# Patient Record
Sex: FEMALE | Race: WHITE | Employment: OTHER | URBAN - METROPOLITAN AREA
[De-identification: names, ages, dates, MRNs, and addresses within clinical notes are randomized per-mention and may not be internally consistent; named-entity substitution may affect disease eponyms.]

---

## 2017-03-17 ENCOUNTER — HOSPITAL ENCOUNTER (OUTPATIENT)
Age: 42
Setting detail: SPECIMEN
Discharge: HOME | End: 2017-03-17
Payer: MEDICARE

## 2017-03-18 LAB
CULTURE: NORMAL
CULTURE: NORMAL
Lab: NORMAL
SPECIMEN DESCRIPTION: NORMAL
STATUS: NORMAL

## 2017-03-22 ENCOUNTER — HOSPITAL ENCOUNTER (OUTPATIENT)
Dept: GENERAL RADIOLOGY | Facility: CLINIC | Age: 42
Discharge: HOME | End: 2017-03-22
Payer: MEDICARE

## 2017-03-22 ENCOUNTER — HOSPITAL ENCOUNTER (OUTPATIENT)
Age: 42
Setting detail: SPECIMEN
Discharge: HOME | End: 2017-03-22
Payer: MEDICARE

## 2017-03-22 DIAGNOSIS — E66.01 MORBID OBESITY WITH BMI OF 45.0-49.9, ADULT (HCC): ICD-10-CM

## 2017-03-22 DIAGNOSIS — M25.552 LEFT HIP PAIN: ICD-10-CM

## 2017-03-22 DIAGNOSIS — E03.9 HYPOTHYROIDISM, UNSPECIFIED TYPE: ICD-10-CM

## 2017-03-22 DIAGNOSIS — R73.09 ABNORMAL GLUCOSE: ICD-10-CM

## 2017-03-22 DIAGNOSIS — E87.6 HYPOPOTASSEMIA: ICD-10-CM

## 2017-03-22 DIAGNOSIS — Z13.9 SCREENING: ICD-10-CM

## 2017-03-22 DIAGNOSIS — Z86.69 HX OF SEIZURE DISORDER: ICD-10-CM

## 2017-03-22 LAB
ABSOLUTE EOS #: 0.1 K/UL (ref 0–0.4)
ABSOLUTE LYMPH #: 2 K/UL (ref 1–4.8)
ABSOLUTE MONO #: 0.3 K/UL (ref 0.1–1.2)
ALBUMIN SERPL-MCNC: 3.8 G/DL (ref 3.5–5.2)
ALBUMIN/GLOBULIN RATIO: 1.2 (ref 1–2.5)
ALP BLD-CCNC: 59 U/L (ref 35–104)
ALT SERPL-CCNC: 10 U/L (ref 5–33)
ANION GAP SERPL CALCULATED.3IONS-SCNC: 13 MMOL/L (ref 9–17)
AST SERPL-CCNC: 16 U/L
BASOPHILS # BLD: 0 % (ref 0–2)
BASOPHILS ABSOLUTE: 0 K/UL (ref 0–0.2)
BILIRUB SERPL-MCNC: 0.37 MG/DL (ref 0.3–1.2)
BILIRUBIN DIRECT: <0.08 MG/DL
BILIRUBIN, INDIRECT: NORMAL MG/DL (ref 0–1)
BUN BLDV-MCNC: 13 MG/DL (ref 6–20)
BUN/CREAT BLD: ABNORMAL (ref 9–20)
CALCIUM SERPL-MCNC: 8.8 MG/DL (ref 8.6–10.4)
CHLORIDE BLD-SCNC: 106 MMOL/L (ref 98–107)
CHOLESTEROL/HDL RATIO: 3.3
CHOLESTEROL: 156 MG/DL
CO2: 22 MMOL/L (ref 20–31)
CREAT SERPL-MCNC: 1.02 MG/DL (ref 0.5–0.9)
DIFFERENTIAL TYPE: ABNORMAL
EOSINOPHILS RELATIVE PERCENT: 2 % (ref 1–4)
ESTIMATED AVERAGE GLUCOSE: 91 MG/DL
GFR AFRICAN AMERICAN: >60 ML/MIN
GFR NON-AFRICAN AMERICAN: >60 ML/MIN
GFR SERPL CREATININE-BSD FRML MDRD: ABNORMAL ML/MIN/{1.73_M2}
GFR SERPL CREATININE-BSD FRML MDRD: ABNORMAL ML/MIN/{1.73_M2}
GLOBULIN: NORMAL G/DL (ref 1.5–3.8)
GLUCOSE BLD-MCNC: 90 MG/DL (ref 70–99)
HBA1C MFR BLD: 4.8 % (ref 4–6)
HCT VFR BLD CALC: 40.4 % (ref 36–46)
HDLC SERPL-MCNC: 48 MG/DL
HEMOGLOBIN: 13.4 G/DL (ref 12–16)
HEPATITIS C ANTIBODY: NONREACTIVE
LDL CHOLESTEROL: 96 MG/DL (ref 0–130)
LYMPHOCYTES # BLD: 37 % (ref 24–44)
MCH RBC QN AUTO: 26.4 PG (ref 26–34)
MCHC RBC AUTO-ENTMCNC: 33.2 G/DL (ref 31–37)
MCV RBC AUTO: 79.4 FL (ref 80–100)
MONOCYTES # BLD: 6 % (ref 2–11)
PDW BLD-RTO: 14.2 % (ref 12.5–15.4)
PLATELET # BLD: 148 K/UL (ref 140–450)
PLATELET ESTIMATE: ABNORMAL
PMV BLD AUTO: 9.9 FL (ref 6–12)
POTASSIUM SERPL-SCNC: 4.2 MMOL/L (ref 3.7–5.3)
RBC # BLD: 5.09 M/UL (ref 4–5.2)
RBC # BLD: ABNORMAL 10*6/UL
SEG NEUTROPHILS: 55 % (ref 36–66)
SEGMENTED NEUTROPHILS ABSOLUTE COUNT: 2.9 K/UL (ref 1.8–7.7)
SODIUM BLD-SCNC: 141 MMOL/L (ref 135–144)
THYROXINE, FREE: 1.89 NG/DL (ref 0.93–1.7)
TOTAL PROTEIN: 6.9 G/DL (ref 6.4–8.3)
TRIGL SERPL-MCNC: 60 MG/DL
TSH SERPL DL<=0.05 MIU/L-ACNC: <0.01 MIU/L (ref 0.3–5)
VLDLC SERPL CALC-MCNC: NORMAL MG/DL (ref 1–30)
WBC # BLD: 5.4 K/UL (ref 3.5–11)
WBC # BLD: ABNORMAL 10*3/UL

## 2017-03-22 PROCEDURE — 73502 X-RAY EXAM HIP UNI 2-3 VIEWS: CPT

## 2017-03-23 LAB — HIV AG/AB: NONREACTIVE

## 2017-05-30 RX ORDER — TOPIRAMATE 50 MG/1
75 TABLET, FILM COATED ORAL 2 TIMES DAILY
Qty: 270 TABLET | Refills: 3 | Status: SHIPPED | OUTPATIENT
Start: 2017-05-30 | End: 2017-06-28 | Stop reason: SDUPTHER

## 2017-06-28 ENCOUNTER — OFFICE VISIT (OUTPATIENT)
Dept: NEUROLOGY | Age: 42
End: 2017-06-28
Payer: MEDICARE

## 2017-06-28 VITALS
HEART RATE: 71 BPM | DIASTOLIC BLOOD PRESSURE: 90 MMHG | HEIGHT: 61 IN | SYSTOLIC BLOOD PRESSURE: 125 MMHG | WEIGHT: 259 LBS | BODY MASS INDEX: 48.9 KG/M2

## 2017-06-28 DIAGNOSIS — R51.9 HEADACHE DISORDER: Primary | ICD-10-CM

## 2017-06-28 PROCEDURE — G8417 CALC BMI ABV UP PARAM F/U: HCPCS | Performed by: PSYCHIATRY & NEUROLOGY

## 2017-06-28 PROCEDURE — 99214 OFFICE O/P EST MOD 30 MIN: CPT | Performed by: PSYCHIATRY & NEUROLOGY

## 2017-06-28 PROCEDURE — 1036F TOBACCO NON-USER: CPT | Performed by: PSYCHIATRY & NEUROLOGY

## 2017-06-28 PROCEDURE — G8427 DOCREV CUR MEDS BY ELIG CLIN: HCPCS | Performed by: PSYCHIATRY & NEUROLOGY

## 2017-06-28 RX ORDER — LAMOTRIGINE 150 MG/1
150 TABLET ORAL DAILY
Qty: 90 TABLET | Refills: 3 | Status: SHIPPED | OUTPATIENT
Start: 2017-06-28 | End: 2018-06-21 | Stop reason: SDUPTHER

## 2017-06-28 RX ORDER — TOPIRAMATE 50 MG/1
TABLET, FILM COATED ORAL
Qty: 335 TABLET | Refills: 3 | Status: SHIPPED | OUTPATIENT
Start: 2017-06-28 | End: 2018-07-16 | Stop reason: SDUPTHER

## 2018-01-04 ENCOUNTER — HOSPITAL ENCOUNTER (OUTPATIENT)
Age: 43
Setting detail: SPECIMEN
Discharge: HOME | End: 2018-01-04
Payer: MEDICARE

## 2018-01-04 DIAGNOSIS — R73.09 ABNORMAL GLUCOSE: ICD-10-CM

## 2018-01-04 DIAGNOSIS — E03.9 HYPOTHYROIDISM, UNSPECIFIED TYPE: ICD-10-CM

## 2018-01-04 DIAGNOSIS — E23.0 HYPOPITUITARISM (HCC): ICD-10-CM

## 2018-01-04 LAB
ABSOLUTE EOS #: 0.13 K/UL (ref 0–0.44)
ABSOLUTE IMMATURE GRANULOCYTE: <0.03 K/UL (ref 0–0.3)
ABSOLUTE LYMPH #: 1.37 K/UL (ref 1.1–3.7)
ABSOLUTE MONO #: 0.54 K/UL (ref 0.1–1.2)
ALBUMIN SERPL-MCNC: 3.6 G/DL (ref 3.5–5.2)
ALBUMIN/GLOBULIN RATIO: 1.1 (ref 1–2.5)
ALP BLD-CCNC: 73 U/L (ref 35–104)
ALT SERPL-CCNC: 12 U/L (ref 5–33)
ANION GAP SERPL CALCULATED.3IONS-SCNC: 10 MMOL/L (ref 9–17)
AST SERPL-CCNC: 16 U/L
BASOPHILS # BLD: 0 % (ref 0–2)
BASOPHILS ABSOLUTE: 0.03 K/UL (ref 0–0.2)
BILIRUB SERPL-MCNC: 0.33 MG/DL (ref 0.3–1.2)
BILIRUBIN DIRECT: <0.08 MG/DL
BILIRUBIN, INDIRECT: NORMAL MG/DL (ref 0–1)
BUN BLDV-MCNC: 11 MG/DL (ref 6–20)
BUN/CREAT BLD: ABNORMAL (ref 9–20)
CALCIUM SERPL-MCNC: 8.9 MG/DL (ref 8.6–10.4)
CHLORIDE BLD-SCNC: 103 MMOL/L (ref 98–107)
CHOLESTEROL/HDL RATIO: 3
CHOLESTEROL: 143 MG/DL
CO2: 23 MMOL/L (ref 20–31)
CREAT SERPL-MCNC: 0.9 MG/DL (ref 0.5–0.9)
DIFFERENTIAL TYPE: ABNORMAL
EOSINOPHILS RELATIVE PERCENT: 2 % (ref 1–4)
GFR AFRICAN AMERICAN: >60 ML/MIN
GFR NON-AFRICAN AMERICAN: >60 ML/MIN
GFR SERPL CREATININE-BSD FRML MDRD: ABNORMAL ML/MIN/{1.73_M2}
GFR SERPL CREATININE-BSD FRML MDRD: ABNORMAL ML/MIN/{1.73_M2}
GLOBULIN: NORMAL G/DL (ref 1.5–3.8)
GLUCOSE FASTING: 100 MG/DL (ref 70–99)
HCT VFR BLD CALC: 43 % (ref 36.3–47.1)
HDLC SERPL-MCNC: 47 MG/DL
HEMOGLOBIN: 14 G/DL (ref 11.9–15.1)
IMMATURE GRANULOCYTES: 0 %
LDL CHOLESTEROL: 75 MG/DL (ref 0–130)
LYMPHOCYTES # BLD: 19 % (ref 24–43)
MCH RBC QN AUTO: 27.5 PG (ref 25.2–33.5)
MCHC RBC AUTO-ENTMCNC: 32.6 G/DL (ref 28.4–34.8)
MCV RBC AUTO: 84.5 FL (ref 82.6–102.9)
MONOCYTES # BLD: 7 % (ref 3–12)
PDW BLD-RTO: 12.6 % (ref 11.8–14.4)
PLATELET # BLD: 185 K/UL (ref 138–453)
PLATELET ESTIMATE: ABNORMAL
PMV BLD AUTO: 12 FL (ref 8.1–13.5)
POTASSIUM SERPL-SCNC: 3.8 MMOL/L (ref 3.7–5.3)
RBC # BLD: 5.09 M/UL (ref 3.95–5.11)
RBC # BLD: ABNORMAL 10*6/UL
SEG NEUTROPHILS: 72 % (ref 36–65)
SEGMENTED NEUTROPHILS ABSOLUTE COUNT: 5.28 K/UL (ref 1.5–8.1)
SODIUM BLD-SCNC: 136 MMOL/L (ref 135–144)
TOTAL PROTEIN: 6.9 G/DL (ref 6.4–8.3)
TRIGL SERPL-MCNC: 104 MG/DL
TSH SERPL DL<=0.05 MIU/L-ACNC: <0.01 MIU/L (ref 0.3–5)
VLDLC SERPL CALC-MCNC: NORMAL MG/DL (ref 1–30)
WBC # BLD: 7.4 K/UL (ref 3.5–11.3)
WBC # BLD: ABNORMAL 10*3/UL

## 2018-01-05 LAB
ESTIMATED AVERAGE GLUCOSE: 94 MG/DL
HBA1C MFR BLD: 4.9 % (ref 4–6)
THYROXINE, FREE: 2.07 NG/DL (ref 0.93–1.7)

## 2018-01-15 ENCOUNTER — HOSPITAL ENCOUNTER (OUTPATIENT)
Dept: GENERAL RADIOLOGY | Facility: CLINIC | Age: 43
Discharge: HOME | End: 2018-01-15
Payer: MEDICARE

## 2018-01-15 DIAGNOSIS — R07.81 RIB PAIN: ICD-10-CM

## 2018-01-15 PROCEDURE — 71046 X-RAY EXAM CHEST 2 VIEWS: CPT

## 2018-03-07 ENCOUNTER — HOSPITAL ENCOUNTER (OUTPATIENT)
Dept: GENERAL RADIOLOGY | Facility: CLINIC | Age: 43
Discharge: HOME | End: 2018-03-09
Payer: MEDICARE

## 2018-03-07 DIAGNOSIS — M54.9 UPPER BACK PAIN: ICD-10-CM

## 2018-03-07 PROCEDURE — 72074 X-RAY EXAM THORAC SPINE4/>VW: CPT

## 2018-05-08 ENCOUNTER — HOSPITAL ENCOUNTER (OUTPATIENT)
Dept: MAMMOGRAPHY | Facility: CLINIC | Age: 43
Discharge: HOME | End: 2018-05-10
Payer: MEDICARE

## 2018-05-08 DIAGNOSIS — Z12.39 SCREENING FOR BREAST CANCER: ICD-10-CM

## 2018-05-08 PROCEDURE — 77067 SCR MAMMO BI INCL CAD: CPT

## 2018-05-24 ENCOUNTER — HOSPITAL ENCOUNTER (OUTPATIENT)
Age: 43
Setting detail: SPECIMEN
Discharge: HOME | End: 2018-05-24
Payer: MEDICARE

## 2018-05-24 ENCOUNTER — HOSPITAL ENCOUNTER (OUTPATIENT)
Dept: GENERAL RADIOLOGY | Facility: CLINIC | Age: 43
Discharge: HOME | End: 2018-05-26
Payer: MEDICARE

## 2018-05-24 DIAGNOSIS — M79.671 RIGHT FOOT PAIN: ICD-10-CM

## 2018-05-24 LAB — URIC ACID: 5.2 MG/DL (ref 2.4–5.7)

## 2018-05-24 PROCEDURE — 73620 X-RAY EXAM OF FOOT: CPT

## 2018-06-21 RX ORDER — LAMOTRIGINE 150 MG/1
150 TABLET ORAL DAILY
Qty: 90 TABLET | Refills: 0 | Status: SHIPPED | OUTPATIENT
Start: 2018-06-21 | End: 2018-07-26 | Stop reason: SDUPTHER

## 2018-06-23 ENCOUNTER — HOSPITAL ENCOUNTER (OUTPATIENT)
Facility: CLINIC | Age: 43
Discharge: HOME | End: 2018-06-23
Payer: MEDICARE

## 2018-06-23 LAB
BUN BLDV-MCNC: 10 MG/DL (ref 6–20)
CHOLESTEROL, FASTING: 161 MG/DL
CHOLESTEROL/HDL RATIO: 3.4
CREAT SERPL-MCNC: 1.12 MG/DL (ref 0.5–0.9)
GFR AFRICAN AMERICAN: >60 ML/MIN
GFR NON-AFRICAN AMERICAN: 54 ML/MIN
GFR SERPL CREATININE-BSD FRML MDRD: ABNORMAL ML/MIN/{1.73_M2}
GFR SERPL CREATININE-BSD FRML MDRD: ABNORMAL ML/MIN/{1.73_M2}
HCT VFR BLD CALC: 41.7 % (ref 36.3–47.1)
HDLC SERPL-MCNC: 47 MG/DL
HEMOGLOBIN: 13.2 G/DL (ref 11.9–15.1)
LDL CHOLESTEROL: 96 MG/DL (ref 0–130)
MCH RBC QN AUTO: 27.1 PG (ref 25.2–33.5)
MCHC RBC AUTO-ENTMCNC: 31.7 G/DL (ref 28.4–34.8)
MCV RBC AUTO: 85.6 FL (ref 82.6–102.9)
NRBC AUTOMATED: 0 PER 100 WBC
PDW BLD-RTO: 13.7 % (ref 11.8–14.4)
PLATELET # BLD: 147 K/UL (ref 138–453)
PMV BLD AUTO: 11.8 FL (ref 8.1–13.5)
RBC # BLD: 4.87 M/UL (ref 3.95–5.11)
TRIGLYCERIDE, FASTING: 91 MG/DL
VLDLC SERPL CALC-MCNC: NORMAL MG/DL (ref 1–30)
WBC # BLD: 5.8 K/UL (ref 3.5–11.3)

## 2018-06-23 PROCEDURE — 85027 COMPLETE CBC AUTOMATED: CPT

## 2018-06-23 PROCEDURE — 84520 ASSAY OF UREA NITROGEN: CPT

## 2018-06-23 PROCEDURE — 36415 COLL VENOUS BLD VENIPUNCTURE: CPT

## 2018-06-23 PROCEDURE — 83036 HEMOGLOBIN GLYCOSYLATED A1C: CPT

## 2018-06-23 PROCEDURE — 83516 IMMUNOASSAY NONANTIBODY: CPT

## 2018-06-23 PROCEDURE — 82565 ASSAY OF CREATININE: CPT

## 2018-06-23 PROCEDURE — 80061 LIPID PANEL: CPT

## 2018-06-23 PROCEDURE — 83519 RIA NONANTIBODY: CPT

## 2018-06-24 LAB
ESTIMATED AVERAGE GLUCOSE: 97 MG/DL
HBA1C MFR BLD: 5 % (ref 4–6)

## 2018-06-26 LAB — ACETYLCHOLINE BINDING ANTIBODY: 0 NMOL/L (ref 0–0.4)

## 2018-06-27 LAB
SEND OUT REPORT: NORMAL
SEND OUT REPORT: NORMAL
TEST NAME: NORMAL
TEST NAME: NORMAL

## 2018-07-16 RX ORDER — TOPIRAMATE 50 MG/1
TABLET, FILM COATED ORAL
Qty: 105 TABLET | Refills: 0 | Status: SHIPPED | OUTPATIENT
Start: 2018-07-16 | End: 2018-07-26 | Stop reason: SDUPTHER

## 2018-07-20 ENCOUNTER — HOSPITAL ENCOUNTER (OUTPATIENT)
Dept: MRI IMAGING | Age: 43
Discharge: HOME | End: 2018-07-22
Payer: MEDICARE

## 2018-07-20 DIAGNOSIS — I63.9 BRAIN STEM INFARCTION (HCC): ICD-10-CM

## 2018-07-20 DIAGNOSIS — H51.8 SPASM OF CONJUGATE GAZE: ICD-10-CM

## 2018-07-20 DIAGNOSIS — C72.32: ICD-10-CM

## 2018-07-20 DIAGNOSIS — G70.00 MYASTHENIA GRAVIS WITHOUT EXACERBATION (HCC): ICD-10-CM

## 2018-07-20 DIAGNOSIS — E13.9 MATURITY ONSET DIABETES MELLITUS IN YOUNG (HCC): ICD-10-CM

## 2018-07-20 PROCEDURE — 6360000004 HC RX CONTRAST MEDICATION: Performed by: OPHTHALMOLOGY

## 2018-07-20 PROCEDURE — A9579 GAD-BASE MR CONTRAST NOS,1ML: HCPCS | Performed by: OPHTHALMOLOGY

## 2018-07-20 PROCEDURE — 70553 MRI BRAIN STEM W/O & W/DYE: CPT

## 2018-07-20 RX ADMIN — GADOTERIDOL 20 ML: 279.3 INJECTION, SOLUTION INTRAVENOUS at 07:53

## 2018-07-26 ENCOUNTER — OFFICE VISIT (OUTPATIENT)
Dept: NEUROLOGY | Age: 43
End: 2018-07-26
Payer: MEDICARE

## 2018-07-26 VITALS
HEART RATE: 77 BPM | HEIGHT: 61 IN | SYSTOLIC BLOOD PRESSURE: 134 MMHG | BODY MASS INDEX: 46.82 KG/M2 | WEIGHT: 248 LBS | DIASTOLIC BLOOD PRESSURE: 89 MMHG

## 2018-07-26 DIAGNOSIS — G40.909 SEIZURE DISORDER (HCC): Primary | ICD-10-CM

## 2018-07-26 PROCEDURE — G8417 CALC BMI ABV UP PARAM F/U: HCPCS | Performed by: PSYCHIATRY & NEUROLOGY

## 2018-07-26 PROCEDURE — G8427 DOCREV CUR MEDS BY ELIG CLIN: HCPCS | Performed by: PSYCHIATRY & NEUROLOGY

## 2018-07-26 PROCEDURE — 99214 OFFICE O/P EST MOD 30 MIN: CPT | Performed by: PSYCHIATRY & NEUROLOGY

## 2018-07-26 PROCEDURE — G8599 NO ASA/ANTIPLAT THER USE RNG: HCPCS | Performed by: PSYCHIATRY & NEUROLOGY

## 2018-07-26 PROCEDURE — 1036F TOBACCO NON-USER: CPT | Performed by: PSYCHIATRY & NEUROLOGY

## 2018-07-26 RX ORDER — TOPIRAMATE 50 MG/1
TABLET, FILM COATED ORAL
Qty: 270 TABLET | Refills: 3 | Status: SHIPPED | OUTPATIENT
Start: 2018-07-26 | End: 2018-08-16 | Stop reason: SDUPTHER

## 2018-07-26 RX ORDER — LAMOTRIGINE 150 MG/1
150 TABLET ORAL DAILY
Qty: 90 TABLET | Refills: 0 | Status: SHIPPED | OUTPATIENT
Start: 2018-07-26 | End: 2018-09-26 | Stop reason: SDUPTHER

## 2018-07-26 NOTE — PROGRESS NOTES
CONSTITUTIONAL Weight: absent, Appetite: absent, Fatigue: absent      HEENT Ears: deaf on left side, Eyes: glasses, Visual disturbance: absent   RESPIRATORY Shortness of breath: absent, Cough: present   CARDIOVASCULAR Chest pain: absent, Leg swelling :absent, Fainting: absent      GI Constipation: absent, Diarrhea: absent, Swallowing change: absent       Urinary frequency: present, Urinary urgency: absent   MUSCULOSKELETAL Neck pain: absent, Back pain: present, Stiffness: absent, Muscle pain: absent, Joint pain: absent, Restless Legs: absent   DERMATOLOGIC Hair loss: absent, Skin changes: absent   NEUROLOGIC Memory loss: absent, Confusion: absent, Seizures: absent Trouble walking or imbalance: absent, Dizziness: absent, Weakness: absent, Numbness: absent,  Tremor: absent, Spasm: absent, Speech difficulty: absent, Headache: absent, Light sensitivity: absent   PSYCHIATRIC Anxiety: absent, Hallucination: absent, Mood disorder: present, depression   HEMATOLOGIC Abnormal bleeding: absent, Anemia: absent, Clotting disorder: absent, Lymph gland changes: absent
Abdomen No masses   Extremities Extremities normal, atraumatic, no cyanosis or edema    Pulses 2+ and symmetric   Skin: Skin color, texture, turgor normal, no rashes or lesions      Neurological Exam:  Mental status  Alert and oriented; intact memory with no confusion, speech or language problems; no hallucinations or delusions    Cranial nerves  II - visual fields intact to confrontation    III, IV, VI - extra-ocular muscles full: no pupillary defect; no BENITO, no nystagmus, no ptosis V - normal facial sensation                                                          VIII - impaired hearing IX, X - symmetrical palate                                                            VII - normal facial symmetry XI - symmetrical shoulder shrug                                                 XII - midline tongue without atrophy or fasciculation   Motor function  Normal muscle bulk and tone; normal power 5/5, including fine motor movements    Sensory function Intact to touch, pin, vibration, proprioception    Cerebellar Intact fine motor movement. No involuntary movements or tremors    Reflex function Intact 2+ DTR and symmetric with no pathologic reflex or Babinski sign    Gait                  Normal station and gait      Thyroid profile Lab Results   Component Value Date    TSH <0.01 (L) 01/04/2018        Vit B12, folate No results found for: Maulik Davis     RPR No results found for: RPR     JAYY Lab Results   Component Value Date    JAYY NEGATIVE 10/23/2013        2 hour GTT Lab Results   Component Value Date    LABA1C 5.0 06/23/2018     Lab Results   Component Value Date    EAG 97 06/23/2018        ESR Lab Results   Component Value Date    SEDRATE 27 (H) 10/23/2013            Assessment and recommendations:  Headaches, history of seizures and history of optic glioma status post radiation therapy. The patient has been stable from neurological standpoint. No headaches and no further breakthrough seizures.   Recommend to

## 2018-08-16 RX ORDER — TOPIRAMATE 50 MG/1
TABLET, FILM COATED ORAL
Qty: 315 TABLET | Refills: 3 | Status: SHIPPED | OUTPATIENT
Start: 2018-08-16 | End: 2019-07-16 | Stop reason: SDUPTHER

## 2018-08-21 ENCOUNTER — HOSPITAL ENCOUNTER (OUTPATIENT)
Facility: CLINIC | Age: 43
Discharge: HOME | End: 2018-08-21
Payer: MEDICARE

## 2018-08-21 LAB
C-REACTIVE PROTEIN: 4.4 MG/L (ref 0–5)
SEDIMENTATION RATE, ERYTHROCYTE: 20 MM (ref 0–20)

## 2018-08-21 PROCEDURE — 86140 C-REACTIVE PROTEIN: CPT

## 2018-08-21 PROCEDURE — 36415 COLL VENOUS BLD VENIPUNCTURE: CPT

## 2018-08-21 PROCEDURE — 85651 RBC SED RATE NONAUTOMATED: CPT

## 2018-09-26 RX ORDER — LAMOTRIGINE 150 MG/1
150 TABLET ORAL DAILY
Qty: 90 TABLET | Refills: 0 | Status: SHIPPED | OUTPATIENT
Start: 2018-09-26 | End: 2019-01-28 | Stop reason: ALTCHOICE

## 2019-01-18 PROBLEM — M17.0 PRIMARY OSTEOARTHRITIS OF BOTH KNEES: Status: ACTIVE | Noted: 2019-01-18

## 2019-01-19 ENCOUNTER — HOSPITAL ENCOUNTER (OUTPATIENT)
Facility: CLINIC | Age: 44
Discharge: HOME | End: 2019-01-19
Payer: MEDICARE

## 2019-01-19 DIAGNOSIS — Z01.818 PREOP EXAMINATION: ICD-10-CM

## 2019-01-19 DIAGNOSIS — R73.09 ABNORMAL GLUCOSE: ICD-10-CM

## 2019-01-19 DIAGNOSIS — Z13.220 SCREENING CHOLESTEROL LEVEL: ICD-10-CM

## 2019-01-19 DIAGNOSIS — E23.0 HYPOPITUITARISM (HCC): ICD-10-CM

## 2019-01-19 LAB
ABSOLUTE EOS #: 0.16 K/UL (ref 0–0.44)
ABSOLUTE IMMATURE GRANULOCYTE: <0.03 K/UL (ref 0–0.3)
ABSOLUTE LYMPH #: 1.76 K/UL (ref 1.1–3.7)
ABSOLUTE MONO #: 0.41 K/UL (ref 0.1–1.2)
ALBUMIN SERPL-MCNC: 3.5 G/DL (ref 3.5–5.2)
ALBUMIN/GLOBULIN RATIO: 1.1 (ref 1–2.5)
ALP BLD-CCNC: 61 U/L (ref 35–104)
ALT SERPL-CCNC: 11 U/L (ref 5–33)
ANION GAP SERPL CALCULATED.3IONS-SCNC: 11 MMOL/L (ref 9–17)
AST SERPL-CCNC: 14 U/L
BASOPHILS # BLD: 1 % (ref 0–2)
BASOPHILS ABSOLUTE: 0.04 K/UL (ref 0–0.2)
BILIRUB SERPL-MCNC: 0.35 MG/DL (ref 0.3–1.2)
BILIRUBIN DIRECT: 0.08 MG/DL
BILIRUBIN, INDIRECT: 0.27 MG/DL (ref 0–1)
BUN BLDV-MCNC: 14 MG/DL (ref 6–20)
BUN/CREAT BLD: ABNORMAL (ref 9–20)
CALCIUM SERPL-MCNC: 8.6 MG/DL (ref 8.6–10.4)
CHLORIDE BLD-SCNC: 107 MMOL/L (ref 98–107)
CHOLESTEROL/HDL RATIO: 3.6
CHOLESTEROL: 152 MG/DL
CO2: 23 MMOL/L (ref 20–31)
CREAT SERPL-MCNC: 1.03 MG/DL (ref 0.5–0.9)
DIFFERENTIAL TYPE: ABNORMAL
EOSINOPHILS RELATIVE PERCENT: 2 % (ref 1–4)
GFR AFRICAN AMERICAN: >60 ML/MIN
GFR NON-AFRICAN AMERICAN: 59 ML/MIN
GFR SERPL CREATININE-BSD FRML MDRD: ABNORMAL ML/MIN/{1.73_M2}
GFR SERPL CREATININE-BSD FRML MDRD: ABNORMAL ML/MIN/{1.73_M2}
GLOBULIN: NORMAL G/DL (ref 1.5–3.8)
GLUCOSE FASTING: 89 MG/DL (ref 70–99)
HCT VFR BLD CALC: 41.8 % (ref 36.3–47.1)
HDLC SERPL-MCNC: 42 MG/DL
HEMOGLOBIN: 13.4 G/DL (ref 11.9–15.1)
IMMATURE GRANULOCYTES: 0 %
LDL CHOLESTEROL: 94 MG/DL (ref 0–130)
LYMPHOCYTES # BLD: 25 % (ref 24–43)
MCH RBC QN AUTO: 27.3 PG (ref 25.2–33.5)
MCHC RBC AUTO-ENTMCNC: 32.1 G/DL (ref 28.4–34.8)
MCV RBC AUTO: 85.1 FL (ref 82.6–102.9)
MONOCYTES # BLD: 6 % (ref 3–12)
NRBC AUTOMATED: 0 PER 100 WBC
PDW BLD-RTO: 12.4 % (ref 11.8–14.4)
PLATELET # BLD: 161 K/UL (ref 138–453)
PLATELET ESTIMATE: ABNORMAL
PMV BLD AUTO: 11.3 FL (ref 8.1–13.5)
POTASSIUM SERPL-SCNC: 4.1 MMOL/L (ref 3.7–5.3)
RBC # BLD: 4.91 M/UL (ref 3.95–5.11)
RBC # BLD: ABNORMAL 10*6/UL
SEG NEUTROPHILS: 66 % (ref 36–65)
SEGMENTED NEUTROPHILS ABSOLUTE COUNT: 4.57 K/UL (ref 1.5–8.1)
SODIUM BLD-SCNC: 141 MMOL/L (ref 135–144)
THYROXINE, FREE: 1.25 NG/DL (ref 0.93–1.7)
TOTAL PROTEIN: 6.6 G/DL (ref 6.4–8.3)
TRIGL SERPL-MCNC: 80 MG/DL
TSH SERPL DL<=0.05 MIU/L-ACNC: <0.01 MIU/L (ref 0.3–5)
VLDLC SERPL CALC-MCNC: NORMAL MG/DL (ref 1–30)
WBC # BLD: 7 K/UL (ref 3.5–11.3)
WBC # BLD: ABNORMAL 10*3/UL

## 2019-01-19 PROCEDURE — 84443 ASSAY THYROID STIM HORMONE: CPT

## 2019-01-19 PROCEDURE — 85025 COMPLETE CBC W/AUTO DIFF WBC: CPT

## 2019-01-19 PROCEDURE — 36415 COLL VENOUS BLD VENIPUNCTURE: CPT

## 2019-01-19 PROCEDURE — 83036 HEMOGLOBIN GLYCOSYLATED A1C: CPT

## 2019-01-19 PROCEDURE — 80061 LIPID PANEL: CPT

## 2019-01-19 PROCEDURE — 80048 BASIC METABOLIC PNL TOTAL CA: CPT

## 2019-01-19 PROCEDURE — 80076 HEPATIC FUNCTION PANEL: CPT

## 2019-01-19 PROCEDURE — 84439 ASSAY OF FREE THYROXINE: CPT

## 2019-01-20 LAB
ESTIMATED AVERAGE GLUCOSE: 91 MG/DL
HBA1C MFR BLD: 4.8 % (ref 4–6)

## 2019-01-28 ENCOUNTER — OFFICE VISIT (OUTPATIENT)
Dept: NEUROLOGY | Age: 44
End: 2019-01-28
Payer: MEDICARE

## 2019-01-28 VITALS
WEIGHT: 252.2 LBS | BODY MASS INDEX: 47.62 KG/M2 | DIASTOLIC BLOOD PRESSURE: 78 MMHG | HEART RATE: 69 BPM | HEIGHT: 61 IN | SYSTOLIC BLOOD PRESSURE: 137 MMHG

## 2019-01-28 DIAGNOSIS — G40.909 SEIZURE DISORDER (HCC): Primary | ICD-10-CM

## 2019-01-28 PROCEDURE — G8427 DOCREV CUR MEDS BY ELIG CLIN: HCPCS | Performed by: PSYCHIATRY & NEUROLOGY

## 2019-01-28 PROCEDURE — 99213 OFFICE O/P EST LOW 20 MIN: CPT | Performed by: PSYCHIATRY & NEUROLOGY

## 2019-01-28 PROCEDURE — 1036F TOBACCO NON-USER: CPT | Performed by: PSYCHIATRY & NEUROLOGY

## 2019-01-28 PROCEDURE — G8484 FLU IMMUNIZE NO ADMIN: HCPCS | Performed by: PSYCHIATRY & NEUROLOGY

## 2019-01-28 PROCEDURE — G8417 CALC BMI ABV UP PARAM F/U: HCPCS | Performed by: PSYCHIATRY & NEUROLOGY

## 2019-03-05 ENCOUNTER — HOSPITAL ENCOUNTER (OUTPATIENT)
Age: 44
Setting detail: SPECIMEN
Discharge: HOME | End: 2019-03-05
Payer: MEDICARE

## 2019-03-06 ENCOUNTER — HOSPITAL ENCOUNTER (OUTPATIENT)
Age: 44
Setting detail: SPECIMEN
Discharge: HOME | End: 2019-03-06
Payer: MEDICARE

## 2019-03-06 LAB
ABSOLUTE EOS #: 0.11 K/UL (ref 0–0.44)
ABSOLUTE IMMATURE GRANULOCYTE: <0.03 K/UL (ref 0–0.3)
ABSOLUTE LYMPH #: 2.28 K/UL (ref 1.1–3.7)
ABSOLUTE MONO #: 0.41 K/UL (ref 0.1–1.2)
ALBUMIN SERPL-MCNC: 3.8 G/DL (ref 3.5–5.2)
ALBUMIN/GLOBULIN RATIO: 1.2 (ref 1–2.5)
ALP BLD-CCNC: 66 U/L (ref 35–104)
ALT SERPL-CCNC: 12 U/L (ref 5–33)
ANION GAP SERPL CALCULATED.3IONS-SCNC: 13 MMOL/L (ref 9–17)
AST SERPL-CCNC: 15 U/L
BASOPHILS # BLD: 1 % (ref 0–2)
BASOPHILS ABSOLUTE: 0.03 K/UL (ref 0–0.2)
BILIRUB SERPL-MCNC: 0.36 MG/DL (ref 0.3–1.2)
BILIRUBIN DIRECT: <0.08 MG/DL
BILIRUBIN, INDIRECT: NORMAL MG/DL (ref 0–1)
BUN BLDV-MCNC: 11 MG/DL (ref 6–20)
BUN/CREAT BLD: ABNORMAL (ref 9–20)
CALCIUM SERPL-MCNC: 8.8 MG/DL (ref 8.6–10.4)
CHLORIDE BLD-SCNC: 110 MMOL/L (ref 98–107)
CHOLESTEROL/HDL RATIO: 3.7
CHOLESTEROL: 152 MG/DL
CO2: 21 MMOL/L (ref 20–31)
CREAT SERPL-MCNC: 1.01 MG/DL (ref 0.5–0.9)
DIFFERENTIAL TYPE: NORMAL
EOSINOPHILS RELATIVE PERCENT: 2 % (ref 1–4)
ESTIMATED AVERAGE GLUCOSE: 94 MG/DL
ESTRADIOL LEVEL: <5 PG/ML (ref 27–314)
GFR AFRICAN AMERICAN: >60 ML/MIN
GFR NON-AFRICAN AMERICAN: >60 ML/MIN
GFR SERPL CREATININE-BSD FRML MDRD: ABNORMAL ML/MIN/{1.73_M2}
GFR SERPL CREATININE-BSD FRML MDRD: ABNORMAL ML/MIN/{1.73_M2}
GLOBULIN: NORMAL G/DL (ref 1.5–3.8)
GLUCOSE BLD-MCNC: 93 MG/DL (ref 70–99)
HBA1C MFR BLD: 4.9 % (ref 4–6)
HCT VFR BLD CALC: 42.9 % (ref 36.3–47.1)
HDLC SERPL-MCNC: 41 MG/DL
HEMOGLOBIN: 13.7 G/DL (ref 11.9–15.1)
HPV SAMPLE: NORMAL
HPV, GENOTYPE 16: NOT DETECTED
HPV, GENOTYPE 18: NOT DETECTED
HPV, HIGH RISK OTHER: NOT DETECTED
HPV, INTERPRETATION: NORMAL
IMMATURE GRANULOCYTES: 0 %
LDL CHOLESTEROL: 92 MG/DL (ref 0–130)
LYMPHOCYTES # BLD: 39 % (ref 24–43)
MCH RBC QN AUTO: 27.2 PG (ref 25.2–33.5)
MCHC RBC AUTO-ENTMCNC: 31.9 G/DL (ref 28.4–34.8)
MCV RBC AUTO: 85.3 FL (ref 82.6–102.9)
MONOCYTES # BLD: 7 % (ref 3–12)
NRBC AUTOMATED: 0 PER 100 WBC
PDW BLD-RTO: 13 % (ref 11.8–14.4)
PLATELET # BLD: 170 K/UL (ref 138–453)
PLATELET ESTIMATE: NORMAL
PMV BLD AUTO: 12.4 FL (ref 8.1–13.5)
POTASSIUM SERPL-SCNC: 3.7 MMOL/L (ref 3.7–5.3)
PROGESTERONE LEVEL: <0.05 NG/ML
RBC # BLD: 5.03 M/UL (ref 3.95–5.11)
RBC # BLD: NORMAL 10*6/UL
SEG NEUTROPHILS: 51 % (ref 36–65)
SEGMENTED NEUTROPHILS ABSOLUTE COUNT: 3.01 K/UL (ref 1.5–8.1)
SODIUM BLD-SCNC: 144 MMOL/L (ref 135–144)
SPECIMEN DESCRIPTION: NORMAL
THYROXINE, FREE: 1.42 NG/DL (ref 0.93–1.7)
TOTAL PROTEIN: 7 G/DL (ref 6.4–8.3)
TRIGL SERPL-MCNC: 94 MG/DL
VLDLC SERPL CALC-MCNC: NORMAL MG/DL (ref 1–30)
WBC # BLD: 5.9 K/UL (ref 3.5–11.3)
WBC # BLD: NORMAL 10*3/UL

## 2019-03-19 LAB — CYTOLOGY REPORT: NORMAL

## 2019-04-01 ENCOUNTER — HOSPITAL ENCOUNTER (OUTPATIENT)
Dept: MAMMOGRAPHY | Facility: CLINIC | Age: 44
Discharge: HOME | End: 2019-04-03
Payer: MEDICARE

## 2019-04-01 DIAGNOSIS — M81.0 OSTEOPOROSIS, UNSPECIFIED OSTEOPOROSIS TYPE, UNSPECIFIED PATHOLOGICAL FRACTURE PRESENCE: ICD-10-CM

## 2019-04-03 ENCOUNTER — HOSPITAL ENCOUNTER (OUTPATIENT)
Dept: MAMMOGRAPHY | Facility: CLINIC | Age: 44
Discharge: HOME | End: 2019-04-05
Payer: MEDICARE

## 2019-04-03 PROCEDURE — 77080 DXA BONE DENSITY AXIAL: CPT

## 2019-05-09 ENCOUNTER — HOSPITAL ENCOUNTER (OUTPATIENT)
Dept: MAMMOGRAPHY | Facility: CLINIC | Age: 44
Discharge: HOME | End: 2019-05-11
Payer: MEDICARE

## 2019-05-09 DIAGNOSIS — Z12.39 SCREENING FOR BREAST CANCER: ICD-10-CM

## 2019-05-09 PROCEDURE — 77067 SCR MAMMO BI INCL CAD: CPT

## 2019-06-05 PROBLEM — F32.A DEPRESSION: Status: ACTIVE | Noted: 2019-06-05

## 2019-06-08 ENCOUNTER — HOSPITAL ENCOUNTER (OUTPATIENT)
Facility: CLINIC | Age: 44
Discharge: HOME | End: 2019-06-08
Payer: MEDICARE

## 2019-06-08 DIAGNOSIS — R73.09 ABNORMAL GLUCOSE: ICD-10-CM

## 2019-06-08 DIAGNOSIS — E89.0 POSTOPERATIVE HYPOTHYROIDISM: ICD-10-CM

## 2019-06-08 LAB
ANION GAP SERPL CALCULATED.3IONS-SCNC: 12 MMOL/L (ref 9–17)
BUN BLDV-MCNC: 12 MG/DL (ref 6–20)
BUN/CREAT BLD: ABNORMAL (ref 9–20)
CALCIUM SERPL-MCNC: 9 MG/DL (ref 8.6–10.4)
CHLORIDE BLD-SCNC: 107 MMOL/L (ref 98–107)
CO2: 24 MMOL/L (ref 20–31)
CREAT SERPL-MCNC: 0.99 MG/DL (ref 0.5–0.9)
GFR AFRICAN AMERICAN: >60 ML/MIN
GFR NON-AFRICAN AMERICAN: >60 ML/MIN
GFR SERPL CREATININE-BSD FRML MDRD: ABNORMAL ML/MIN/{1.73_M2}
GFR SERPL CREATININE-BSD FRML MDRD: ABNORMAL ML/MIN/{1.73_M2}
GLUCOSE FASTING: 95 MG/DL (ref 70–99)
POTASSIUM SERPL-SCNC: 4.5 MMOL/L (ref 3.7–5.3)
SODIUM BLD-SCNC: 143 MMOL/L (ref 135–144)
THYROXINE, FREE: 1.31 NG/DL (ref 0.93–1.7)

## 2019-06-08 PROCEDURE — 83036 HEMOGLOBIN GLYCOSYLATED A1C: CPT

## 2019-06-08 PROCEDURE — 80048 BASIC METABOLIC PNL TOTAL CA: CPT

## 2019-06-08 PROCEDURE — 36415 COLL VENOUS BLD VENIPUNCTURE: CPT

## 2019-06-08 PROCEDURE — 84439 ASSAY OF FREE THYROXINE: CPT

## 2019-06-09 LAB
ESTIMATED AVERAGE GLUCOSE: 91 MG/DL
HBA1C MFR BLD: 4.8 % (ref 4–6)

## 2019-07-16 RX ORDER — TOPIRAMATE 50 MG/1
TABLET, FILM COATED ORAL
Qty: 270 TABLET | Refills: 1 | Status: SHIPPED | OUTPATIENT
Start: 2019-07-16 | End: 2020-01-09 | Stop reason: SDUPTHER

## 2019-10-16 ENCOUNTER — HOSPITAL ENCOUNTER (OUTPATIENT)
Age: 44
Setting detail: SPECIMEN
Discharge: HOME | End: 2019-10-16
Payer: MEDICARE

## 2019-10-16 DIAGNOSIS — E23.0 HYPOPITUITARISM (HCC): ICD-10-CM

## 2019-10-16 DIAGNOSIS — E89.0 POSTOPERATIVE HYPOTHYROIDISM: ICD-10-CM

## 2019-10-16 DIAGNOSIS — R73.01 IMPAIRED FASTING GLUCOSE: ICD-10-CM

## 2019-10-16 LAB
ALBUMIN SERPL-MCNC: 4.1 G/DL (ref 3.5–5.2)
ALBUMIN/GLOBULIN RATIO: 1.1 (ref 1–2.5)
ALP BLD-CCNC: 82 U/L (ref 35–104)
ALT SERPL-CCNC: 11 U/L (ref 5–33)
ANION GAP SERPL CALCULATED.3IONS-SCNC: 14 MMOL/L (ref 9–17)
AST SERPL-CCNC: 17 U/L
BILIRUB SERPL-MCNC: 0.3 MG/DL (ref 0.3–1.2)
BUN BLDV-MCNC: 14 MG/DL (ref 6–20)
BUN/CREAT BLD: ABNORMAL (ref 9–20)
CALCIUM SERPL-MCNC: 9 MG/DL (ref 8.6–10.4)
CHLORIDE BLD-SCNC: 105 MMOL/L (ref 98–107)
CO2: 24 MMOL/L (ref 20–31)
CREAT SERPL-MCNC: 0.96 MG/DL (ref 0.5–0.9)
ESTIMATED AVERAGE GLUCOSE: 97 MG/DL
GFR AFRICAN AMERICAN: >60 ML/MIN
GFR NON-AFRICAN AMERICAN: >60 ML/MIN
GFR SERPL CREATININE-BSD FRML MDRD: ABNORMAL ML/MIN/{1.73_M2}
GFR SERPL CREATININE-BSD FRML MDRD: ABNORMAL ML/MIN/{1.73_M2}
GLUCOSE FASTING: 80 MG/DL (ref 70–99)
HBA1C MFR BLD: 5 % (ref 4–6)
POTASSIUM SERPL-SCNC: 3.8 MMOL/L (ref 3.7–5.3)
SODIUM BLD-SCNC: 143 MMOL/L (ref 135–144)
T3 FREE: 2.45 PG/ML (ref 2.02–4.43)
THYROXINE, FREE: 1.39 NG/DL (ref 0.93–1.7)
TOTAL PROTEIN: 7.9 G/DL (ref 6.4–8.3)
TSH SERPL DL<=0.05 MIU/L-ACNC: <0.01 MIU/L (ref 0.3–5)

## 2020-01-09 RX ORDER — TOPIRAMATE 50 MG/1
TABLET, FILM COATED ORAL
Qty: 270 TABLET | Refills: 1 | Status: SHIPPED | OUTPATIENT
Start: 2020-01-09 | End: 2020-06-15

## 2020-01-16 ENCOUNTER — OFFICE VISIT (OUTPATIENT)
Dept: NEUROLOGY | Age: 45
End: 2020-01-16
Payer: MEDICARE

## 2020-01-16 VITALS
WEIGHT: 263.2 LBS | DIASTOLIC BLOOD PRESSURE: 83 MMHG | SYSTOLIC BLOOD PRESSURE: 121 MMHG | HEIGHT: 61 IN | BODY MASS INDEX: 49.69 KG/M2 | HEART RATE: 72 BPM

## 2020-01-16 PROCEDURE — G8417 CALC BMI ABV UP PARAM F/U: HCPCS | Performed by: PSYCHIATRY & NEUROLOGY

## 2020-01-16 PROCEDURE — G8427 DOCREV CUR MEDS BY ELIG CLIN: HCPCS | Performed by: PSYCHIATRY & NEUROLOGY

## 2020-01-16 PROCEDURE — G8482 FLU IMMUNIZE ORDER/ADMIN: HCPCS | Performed by: PSYCHIATRY & NEUROLOGY

## 2020-01-16 PROCEDURE — 99213 OFFICE O/P EST LOW 20 MIN: CPT | Performed by: PSYCHIATRY & NEUROLOGY

## 2020-01-16 PROCEDURE — 1036F TOBACCO NON-USER: CPT | Performed by: PSYCHIATRY & NEUROLOGY

## 2020-01-16 NOTE — PROGRESS NOTES
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)     Fibromyalgia     Herpes zoster without mention of complication     Hypothyroidism     Migraine     Seizures ([facility] [address]     Sleep apnea      Past Surgical History:   Procedure Laterality Date    BRAIN BIOPSY      tumor biopsy    COCHLEAR IMPLANT Left     KNEE SURGERY      scope    TONSILLECTOMY       Social History     Socioeconomic History    Marital status: Single     Spouse name: Not on file    Number of children: Not on file    Years of education: Not on file    Highest education level: Not on file   Occupational History     Comment: disabled   Social Needs    Financial resource strain: Not on file    Food insecurity:     Worry: Not on file     Inability: Not on file    Transportation needs:     Medical: Not on file     Non-medical: Not on file   Tobacco Use    Smoking status: Never Smoker    Smokeless tobacco: Never Used   Substance and Sexual Activity    Alcohol use: No     Alcohol/week: 0.0 standard drinks     Comment: rare    Drug use: No    Sexual activity: Not Currently     Partners: Male   Lifestyle    Physical activity:     Days per week: Not on file     Minutes per session: Not on file    Stress: Not on file   Relationships    Social connections:     Talks on phone: Not on file     Gets together: Not on file     Attends Mosque service: Not on file     Active member of club or organization: Not on file     Attends meetings of clubs or organizations: Not on file     Relationship status: Not on file    Intimate partner violence:     Fear of current or ex partner: Not on file     Emotionally abused: Not on file Physically abused: Not on file     Forced sexual activity: Not on file   Other Topics Concern    Not on file   Social History Narrative    Not on file     Family History   Problem Relation Age of Onset   Otelia Elmer Migraines Mother     Cancer Mother 50        breast    Diabetes Father     High Blood Pressure Father     High Cholesterol Father     Migraines Sister     High Blood Pressure Maternal Grandmother     Cancer Paternal Grandmother 70        colon cancer    Cancer Paternal Uncle         esophageal     Allergies   Allergen Reactions    Bactrim [Sulfamethoxazole-Trimethoprim]     Bee Venom     Ciprofloxacin     Milk-Related Compounds Diarrhea     /83 (Site: Left Upper Arm, Position: Sitting)   Pulse 72   Ht 5' 1\" (1.549 m)   Wt 263 lb 3.2 oz (119.4 kg)   BMI 49.73 kg/m²         General Examination    General Alert, cooperative, no distress, appears stated age   Head Normocephalic, without obvious abnormality, atraumatic    Eyes Conjunctiva/corneas clear, Lymph nodes: Cervical, supraclavicular, and axillary nodes normal   Ears Nose Throat Normal external ear canals, both ears Nares normal, septum midline   Neck Supple, symmetrical, trachea midline, no adenopathy   Back Symmetric, no curvature, ROM normal, no CVA tenderness   Lungs Respirations unlabored   Chest Wall No deformity   Heart Regular rate and rhythm   Abdomen No masses   Extremities Extremities normal, atraumatic, no cyanosis or edema    Pulses 2+ and symmetric   Skin: Skin color, texture, turgor normal, no rashes or lesions      Neurological Exam:  Mental status  Alert and oriented; intact memory with no confusion, speech or language problems; no hallucinations or delusions    Cranial nerves  II - visual fields intact to confrontation    III, IV, VI - extra-ocular muscles full: no pupillary defect; no BENITO, no nystagmus, no ptosis V - normal facial sensation                                                          VIII - impaired

## 2020-02-26 PROBLEM — F33.1 MODERATE EPISODE OF RECURRENT MAJOR DEPRESSIVE DISORDER (HCC): Status: ACTIVE | Noted: 2020-02-26

## 2020-03-01 ENCOUNTER — HOSPITAL ENCOUNTER (OUTPATIENT)
Facility: CLINIC | Age: 45
Discharge: HOME | End: 2020-03-01
Payer: MEDICARE

## 2020-03-01 LAB
CHOLESTEROL/HDL RATIO: 3.2
CHOLESTEROL: 147 MG/DL
HDLC SERPL-MCNC: 46 MG/DL
LDL CHOLESTEROL: 86 MG/DL (ref 0–130)
TRIGL SERPL-MCNC: 77 MG/DL
TSH SERPL DL<=0.05 MIU/L-ACNC: 0.01 MIU/L (ref 0.3–5)
VLDLC SERPL CALC-MCNC: NORMAL MG/DL (ref 1–30)

## 2020-03-01 PROCEDURE — 84443 ASSAY THYROID STIM HORMONE: CPT

## 2020-03-01 PROCEDURE — 80061 LIPID PANEL: CPT

## 2020-03-01 PROCEDURE — 83036 HEMOGLOBIN GLYCOSYLATED A1C: CPT

## 2020-03-01 PROCEDURE — 36415 COLL VENOUS BLD VENIPUNCTURE: CPT

## 2020-03-02 LAB
ESTIMATED AVERAGE GLUCOSE: 97 MG/DL
HBA1C MFR BLD: 5 % (ref 4–6)

## 2020-06-16 RX ORDER — TOPIRAMATE 50 MG/1
TABLET, FILM COATED ORAL
Qty: 315 TABLET | Refills: 1 | Status: SHIPPED | OUTPATIENT
Start: 2020-06-16 | End: 2020-12-18

## 2020-07-15 ENCOUNTER — APPOINTMENT (OUTPATIENT)
Dept: GENERAL RADIOLOGY | Facility: CLINIC | Age: 45
End: 2020-07-15
Payer: MEDICARE

## 2020-07-15 ENCOUNTER — HOSPITAL ENCOUNTER (EMERGENCY)
Facility: CLINIC | Age: 45
Discharge: HOME | End: 2020-07-15
Attending: EMERGENCY MEDICINE
Payer: MEDICARE

## 2020-07-15 VITALS
SYSTOLIC BLOOD PRESSURE: 138 MMHG | BODY MASS INDEX: 49.84 KG/M2 | TEMPERATURE: 98.6 F | WEIGHT: 264 LBS | OXYGEN SATURATION: 95 % | DIASTOLIC BLOOD PRESSURE: 85 MMHG | RESPIRATION RATE: 24 BRPM | HEIGHT: 61 IN | HEART RATE: 75 BPM

## 2020-07-15 LAB
ABSOLUTE EOS #: 0.1 K/UL (ref 0–0.4)
ABSOLUTE IMMATURE GRANULOCYTE: NORMAL K/UL (ref 0–0.3)
ABSOLUTE LYMPH #: 1.9 K/UL (ref 1–4.8)
ABSOLUTE MONO #: 0.4 K/UL (ref 0.1–1.2)
ANION GAP SERPL CALCULATED.3IONS-SCNC: 8 MMOL/L (ref 9–17)
BASOPHILS # BLD: 1 % (ref 0–2)
BASOPHILS ABSOLUTE: 0 K/UL (ref 0–0.2)
BUN BLDV-MCNC: 11 MG/DL (ref 6–20)
BUN/CREAT BLD: ABNORMAL (ref 9–20)
CALCIUM SERPL-MCNC: 8.6 MG/DL (ref 8.6–10.4)
CHLORIDE BLD-SCNC: 105 MMOL/L (ref 98–107)
CO2: 25 MMOL/L (ref 20–31)
CREAT SERPL-MCNC: 1 MG/DL (ref 0.5–0.9)
D-DIMER QUANTITATIVE: 0.33 MG/L FEU
DIFFERENTIAL TYPE: NORMAL
EOSINOPHILS RELATIVE PERCENT: 2 % (ref 1–4)
GFR AFRICAN AMERICAN: >60 ML/MIN
GFR NON-AFRICAN AMERICAN: >60 ML/MIN
GFR SERPL CREATININE-BSD FRML MDRD: ABNORMAL ML/MIN/{1.73_M2}
GFR SERPL CREATININE-BSD FRML MDRD: ABNORMAL ML/MIN/{1.73_M2}
GLUCOSE BLD-MCNC: 104 MG/DL (ref 70–99)
HCT VFR BLD CALC: 40.2 % (ref 36–46)
HEMOGLOBIN: 13.1 G/DL (ref 12–16)
IMMATURE GRANULOCYTES: NORMAL %
LYMPHOCYTES # BLD: 28 % (ref 24–44)
MCH RBC QN AUTO: 27.9 PG (ref 26–34)
MCHC RBC AUTO-ENTMCNC: 32.7 G/DL (ref 31–37)
MCV RBC AUTO: 85.3 FL (ref 80–100)
MONOCYTES # BLD: 6 % (ref 2–11)
NRBC AUTOMATED: NORMAL PER 100 WBC
PDW BLD-RTO: 13.7 % (ref 12.5–15.4)
PLATELET # BLD: 176 K/UL (ref 140–450)
PLATELET ESTIMATE: NORMAL
PMV BLD AUTO: 8.4 FL (ref 6–12)
POTASSIUM SERPL-SCNC: 3.8 MMOL/L (ref 3.7–5.3)
RBC # BLD: 4.71 M/UL (ref 4–5.2)
RBC # BLD: NORMAL 10*6/UL
SEG NEUTROPHILS: 63 % (ref 36–66)
SEGMENTED NEUTROPHILS ABSOLUTE COUNT: 4.5 K/UL (ref 1.8–7.7)
SODIUM BLD-SCNC: 138 MMOL/L (ref 135–144)
TROPONIN INTERP: NORMAL
TROPONIN T: NORMAL NG/ML
TROPONIN, HIGH SENSITIVITY: 7 NG/L (ref 0–14)
WBC # BLD: 7 K/UL (ref 3.5–11)
WBC # BLD: NORMAL 10*3/UL

## 2020-07-15 PROCEDURE — 93005 ELECTROCARDIOGRAM TRACING: CPT | Performed by: EMERGENCY MEDICINE

## 2020-07-15 PROCEDURE — 85025 COMPLETE CBC W/AUTO DIFF WBC: CPT

## 2020-07-15 PROCEDURE — 36415 COLL VENOUS BLD VENIPUNCTURE: CPT

## 2020-07-15 PROCEDURE — 71045 X-RAY EXAM CHEST 1 VIEW: CPT

## 2020-07-15 PROCEDURE — 84484 ASSAY OF TROPONIN QUANT: CPT

## 2020-07-15 PROCEDURE — 99285 EMERGENCY DEPT VISIT HI MDM: CPT

## 2020-07-15 PROCEDURE — 85379 FIBRIN DEGRADATION QUANT: CPT

## 2020-07-15 PROCEDURE — 80048 BASIC METABOLIC PNL TOTAL CA: CPT

## 2020-07-15 ASSESSMENT — PAIN SCALES - GENERAL: PAINLEVEL_OUTOF10: 4

## 2020-07-16 ENCOUNTER — CARE COORDINATION (OUTPATIENT)
Dept: CARE COORDINATION | Age: 45
End: 2020-07-16

## 2020-07-16 LAB
EKG ATRIAL RATE: 70 BPM
EKG P AXIS: 27 DEGREES
EKG P-R INTERVAL: 132 MS
EKG Q-T INTERVAL: 422 MS
EKG QRS DURATION: 96 MS
EKG QTC CALCULATION (BAZETT): 455 MS
EKG R AXIS: 13 DEGREES
EKG T AXIS: 40 DEGREES
EKG VENTRICULAR RATE: 70 BPM

## 2020-07-16 NOTE — CARE COORDINATION
First outreach call to follow up with patient for follow up ED visit/COVID monitoring, no answer, left detailed vm to return call to this nurse. If no return call, ACM will attempt to contact again tomorrow.      Veda Golden, [facility], [place] RN Care Manager  [phone]
factors. Patient referred for LOOP Program:   Patient's preferred e-mail:  [email or web address]  Patient's preferred phone number: [phone]  Care Plan: self monitoring  LOOP Provider: Michelle/Lima    Patient states that she went to the Urgent Care and was sent to the ED because she was sob and they do no have xrays. She has hx anxiety and asthma. States she used her inhaler but did not help. Encouraged to call PCP for f/u appt.    Patient enrolled in Loop Program.

## 2020-12-18 NOTE — TELEPHONE ENCOUNTER
Pharmacy requesting refill of Topamax.       Medication active on med list yes      Date of last fill: 6/15/20  verified on 12/18/2020   verified by [facility] LPN      Date of last appointment 1/16/20    Next Visit Date:  1/20/2021

## 2020-12-20 RX ORDER — TOPIRAMATE 50 MG/1
TABLET, FILM COATED ORAL
Qty: 315 TABLET | Refills: 0 | Status: SHIPPED | OUTPATIENT
Start: 2020-12-20 | End: 2021-03-15

## 2021-01-15 ENCOUNTER — HOSPITAL ENCOUNTER (OUTPATIENT)
Dept: MAMMOGRAPHY | Facility: CLINIC | Age: 46
Discharge: HOME | End: 2021-01-17
Payer: MEDICARE

## 2021-01-15 DIAGNOSIS — Z12.31 SCREENING MAMMOGRAM, ENCOUNTER FOR: ICD-10-CM

## 2021-01-15 PROCEDURE — 77067 SCR MAMMO BI INCL CAD: CPT

## 2021-01-20 ENCOUNTER — OFFICE VISIT (OUTPATIENT)
Dept: NEUROLOGY | Age: 46
End: 2021-01-20
Payer: MEDICARE

## 2021-01-20 VITALS
HEART RATE: 83 BPM | BODY MASS INDEX: 51.77 KG/M2 | TEMPERATURE: 97.6 F | SYSTOLIC BLOOD PRESSURE: 118 MMHG | WEIGHT: 274.2 LBS | DIASTOLIC BLOOD PRESSURE: 81 MMHG | HEIGHT: 61 IN

## 2021-01-20 DIAGNOSIS — G40.909 SEIZURE DISORDER (HCC): Primary | ICD-10-CM

## 2021-01-20 PROCEDURE — G8417 CALC BMI ABV UP PARAM F/U: HCPCS | Performed by: PSYCHIATRY & NEUROLOGY

## 2021-01-20 PROCEDURE — 1036F TOBACCO NON-USER: CPT | Performed by: PSYCHIATRY & NEUROLOGY

## 2021-01-20 PROCEDURE — G8482 FLU IMMUNIZE ORDER/ADMIN: HCPCS | Performed by: PSYCHIATRY & NEUROLOGY

## 2021-01-20 PROCEDURE — 99214 OFFICE O/P EST MOD 30 MIN: CPT | Performed by: PSYCHIATRY & NEUROLOGY

## 2021-01-20 PROCEDURE — G8427 DOCREV CUR MEDS BY ELIG CLIN: HCPCS | Performed by: PSYCHIATRY & NEUROLOGY

## 2021-01-20 RX ORDER — INDOMETHACIN 25 MG/1
25 CAPSULE ORAL 2 TIMES DAILY WITH MEALS
Qty: 10 CAPSULE | Refills: 0 | Status: SHIPPED | OUTPATIENT
Start: 2021-01-20 | End: 2021-01-27 | Stop reason: SDUPTHER

## 2021-01-20 NOTE — PROGRESS NOTES
[facility]  [facility], [address] [place] [address], [address] [place] [address]  Ph: [phone] or [phone]  FAX: CHANA Pimentel is a 39 y.o. female who comes in today for a follow-up visit. The patient previously has a history of optic glioma status post radiation therapy. She has a history of headaches and epilepsy which are under control. At her last visit on 1/28/2019, the patient was continued on topiramate 75 mg/100 mg a day and lamotrigine 150 mg once a day. The patient reports being compliant with medications without side effects. The patient denies headache, seizure, mood change or vision change. Today, the patient reports sore spots on her scalp under the skin. She reports pain and they last all day. The sore spots come in in various spots. These sore spots have been causing pain for several months. Patient is also reporting new shaking episodes. She admits to sadness due to recent losses. She had a lot of pain when she previously obtained an MRI of the brain. The patient got a cochlear implant in August 2017. Her hearing is improved. The patient's last seizure was in 2000  Other comorbid conditions include history of fibromyalgia, hearing loss, history of optic glioma status post radiation treatment  Her most recent MRI of the brain was done in February 2013.                             REVIEW OF SYSTEMS     Constitutional Weight: present, Appetite: absent, Fatigue: absent   HEENT Visual disturbance: absent, Ears: normal   Respiratory Shortness of breath: absent, Cough: present   Cardiovascular Chest pain: absent, Leg swelling :absent   GI Constipation: absent, Diarrhea: absent, Swallowing change: absent    Urinary frequency: absent, Urinary urgency: absent,    Musculoskeletal Neck pain: present, Back pain: present, Stiffness: absent, Muscle pain: absent, Joint pain: present   Dermatological Hair loss: absent, Skin changes: absent   Neurological Memory Never     Active member of club or organization: Yes     Attends meetings of clubs or organizations: More than 4 times per year     Relationship status: Never     Intimate partner violence     Fear of current or ex partner: Not on file     Emotionally abused: Not on file     Physically abused: Not on file     Forced sexual activity: Not on file   Other Topics Concern    Not on file   Social History Narrative    Not on file     Family History   Problem Relation Age of Onset    Migraines Mother     Cancer Mother 50        breast    Diabetes Father     High Blood Pressure Father     High Cholesterol Father     Migraines Sister     High Blood Pressure Maternal Grandmother     Cancer Paternal Grandmother 70        colon cancer    Cancer Paternal Uncle         esophageal     Allergies   Allergen Reactions    Bactrim [Sulfamethoxazole-Trimethoprim]     Bee Venom     Ciprofloxacin     Milk-Related Compounds Diarrhea     /81   Pulse 83   Temp 97.6 °F (36.4 °C) (Temporal)   Ht 5' 1\" (1.549 m)   Wt 274 lb 3.2 oz (124.4 kg)   BMI 51.81 kg/m²         General Examination    General Alert, cooperative, no distress, appears stated age   Head Normocephalic, without obvious abnormality, atraumatic    Eyes Conjunctiva/corneas clear, Lymph nodes: Cervical, supraclavicular, and axillary nodes normal   Ears Nose Throat Normal external ear canals, both ears Nares normal, septum midline   Neck Supple, symmetrical, trachea midline, no adenopathy   Back Symmetric, no curvature, ROM normal, no CVA tenderness   Lungs Respirations unlabored   Chest Wall No deformity   Heart Regular rate and rhythm   Abdomen No masses   Extremities Extremities normal, atraumatic, no cyanosis or edema    Pulses 2+ and symmetric   Skin: Skin color, texture, turgor normal, no rashes or lesions      Neurological Exam:  Mental status  Alert and oriented; intact memory with no confusion, speech or language problems; no hallucinations or delusions    Cranial nerves  II - visual fields intact to confrontation    III, IV, VI - extra-ocular muscles full: no pupillary defect; no BENITO, no nystagmus, no ptosis V - normal facial sensation                                                          VIII - impaired hearing IX, X - symmetrical palate                                                            VII - normal facial symmetry XI - symmetrical shoulder shrug                                                 XII - midline tongue without atrophy or fasciculation   Motor function  Normal muscle bulk and tone; normal power 5/5, including fine motor movements    Sensory function Intact to touch, pin, vibration, proprioception    Cerebellar Intact fine motor movement. No involuntary movements or tremors    Reflex function Intact 2+ DTR and symmetric with no pathologic reflex or Babinski sign    Gait                  Normal station and gait      Thyroid profile Lab Results   Component Value Date    TSH 0.01 (L) 03/01/2020        Vit B12, folate No results found for: Usha Khalil     RPR No results found for: RPR     JAYY Lab Results   Component Value Date    JAYY NEGATIVE 10/23/2013        2 hour GTT Lab Results   Component Value Date    LABA1C 4.8 01/08/2021     Lab Results   Component Value Date    EAG 97 03/01/2020        ESR Lab Results   Component Value Date    SEDRATE 20 08/21/2018            Assessment and recommendations:  Headaches, history of seizures and history of optic glioma status post radiation therapy. The patient is neurologically stable. Her headaches are under remission for three years now. The patient also does not report having any breakthrough seizures. No mood change. Recommend to continue same dose topiramate 75/100 mg a day along with lamotrigine.   I discussed with her about possibility of stratifying the medication and tapering of lamotrigine however at this time the patient is satisfied with her symptoms control and intends to keep her current medications the same dose. I initiated the patient on indomethacin 25 mg twice daily for 5 days for suspected nummular headaches. Please call back to report effectiveness of medication. Medication compliance, side effects were discussed. Seizure precautions were discussed. She'll follow-up with me on a yearly basis. Scribe Attestation:   By signing my name below, Jermain To, attest that this documentation has been prepared under the direction and in the presence of Sisto Babinski MD.  Electronically Signed: Anika Shepard 1/20/2021 1:02 PM  Physician Attestation:  Gurjit Velásquez MD, personally performed the services described in this documentation. All medical record entries made by the scribe were at my direction and in my presence. I have reviewed the chart and discharge instructions (if applicable) and agree that the record reflects my personal performance and is accurate and complete.   Electronically Signed: Jamel Fitzgerald 1/20/2021 1:02 PM    Diplomate, American Board of Psychiatry and Neurology  Diplomate, American Board of Clinical Neurophysiology  Diplomate, American Board of Epilepsy

## 2021-01-25 NOTE — TELEPHONE ENCOUNTER
Patient phoned in to let Dr. Rosa M Colon know that the indomethacin (indocin) 25 mg capsule is working and is requesting a prescription for the medication.

## 2021-01-26 NOTE — TELEPHONE ENCOUNTER
Patient calling for refill of indomethacin (indocin) 25 mg capsule. Take 1 capsule by mouth 2 times daily (with meals) for 5 days.     Medication active on med list yes      Date of last fill: 1/20/2021  verified on 1/26/2021   verified by Anthony Hood      Date of last appointment 1/20/2021    Next Visit Date:  1/20/2022    Pt notified response time for refills is 24-48 hours

## 2021-01-26 NOTE — TELEPHONE ENCOUNTER
We can give the prescription of indomethacin 25 mg twice daily for next 5 days. I would not recommend the patient to take medication long-term.   Thank you

## 2021-01-27 RX ORDER — INDOMETHACIN 25 MG/1
25 CAPSULE ORAL 2 TIMES DAILY WITH MEALS
Qty: 10 CAPSULE | Refills: 0 | Status: SHIPPED | OUTPATIENT
Start: 2021-01-27 | End: 2022-01-20

## 2021-03-15 NOTE — TELEPHONE ENCOUNTER
Pharmacy requesting refill of Topamax 50 mg.      Medication active on med list yes      Date of last Rx: 12/20/2020 for 90 days  with 0 refills verified on 3/15/21   verified by CASSANDRA ZAMORANO      Date of last appointment 1/20/21    Next Visit Date:  1/20/22

## 2021-03-16 RX ORDER — TOPIRAMATE 50 MG/1
TABLET, FILM COATED ORAL
Qty: 315 TABLET | Refills: 2 | Status: SHIPPED | OUTPATIENT
Start: 2021-03-16 | End: 2021-12-06

## 2021-11-12 PROBLEM — N18.31 TYPE 2 DIABETES MELLITUS WITH STAGE 3A CHRONIC KIDNEY DISEASE, WITHOUT LONG-TERM CURRENT USE OF INSULIN (HCC): Status: ACTIVE | Noted: 2021-11-12

## 2021-11-12 PROBLEM — E11.22 TYPE 2 DIABETES MELLITUS WITH STAGE 3A CHRONIC KIDNEY DISEASE, WITHOUT LONG-TERM CURRENT USE OF INSULIN (HCC): Status: ACTIVE | Noted: 2021-11-12

## 2021-12-03 NOTE — TELEPHONE ENCOUNTER
Pharmacy requesting a  refill of [id number].       Medication active on med list yes      Date of last prescription 01/20/2021  with 2 refills verified on 12/03/2021    verified by ZACH SANFORD      Date of last appointment 01/20/2021    Next Visit Date:  1/20/2022

## 2021-12-06 RX ORDER — TOPIRAMATE 50 MG/1
TABLET, FILM COATED ORAL
Qty: 315 TABLET | Refills: 2 | Status: SHIPPED | OUTPATIENT
Start: 2021-12-06 | End: 2022-09-07

## 2022-01-20 ENCOUNTER — OFFICE VISIT (OUTPATIENT)
Dept: NEUROLOGY | Age: 47
End: 2022-01-20
Payer: MEDICARE

## 2022-01-20 VITALS
HEIGHT: 61 IN | WEIGHT: 272 LBS | HEART RATE: 77 BPM | DIASTOLIC BLOOD PRESSURE: 82 MMHG | SYSTOLIC BLOOD PRESSURE: 121 MMHG | BODY MASS INDEX: 51.35 KG/M2

## 2022-01-20 DIAGNOSIS — G40.909 SEIZURE DISORDER (HCC): Primary | ICD-10-CM

## 2022-01-20 PROCEDURE — G8427 DOCREV CUR MEDS BY ELIG CLIN: HCPCS | Performed by: PSYCHIATRY & NEUROLOGY

## 2022-01-20 PROCEDURE — 1036F TOBACCO NON-USER: CPT | Performed by: PSYCHIATRY & NEUROLOGY

## 2022-01-20 PROCEDURE — 99213 OFFICE O/P EST LOW 20 MIN: CPT | Performed by: PSYCHIATRY & NEUROLOGY

## 2022-01-20 PROCEDURE — G8417 CALC BMI ABV UP PARAM F/U: HCPCS | Performed by: PSYCHIATRY & NEUROLOGY

## 2022-01-20 PROCEDURE — G8484 FLU IMMUNIZE NO ADMIN: HCPCS | Performed by: PSYCHIATRY & NEUROLOGY

## 2022-01-20 RX ORDER — MELOXICAM 15 MG/1
TABLET ORAL
COMMUNITY
Start: 2021-12-13

## 2022-01-20 NOTE — PROGRESS NOTES
[facility]  [facility], [address] [place] [address], [address] [place] [address]  Ph: [phone] or [phone]  FAX: CHANA Pimentel is a 42 y.o. female who comes in today for a follow-up visit. The patient previously has a history of optic glioma status post radiation therapy. She has a history of headaches and epilepsy which are under control. At her last visit on 1/28/2019, the patient was continued on topiramate 75 mg/100 mg a day and lamotrigine 150 mg once a day. The patient reports being compliant with medications without side effects. The patient denies headache, seizure, mood change or vision change. Last visit, the patient reported sore spots on her scalp under the skin. She reports pain and they last all day. The sore spots come in in various spots. These sore spots have been causing pain for several months. Patient is also reporting new shaking episodes. She admits to sadness due to recent losses. She had a lot of pain when she previously obtained an MRI of the brain. The patient got a cochlear implant in August 2017. Her hearing is improved. Today the patient is accompanied by her . He states that she has suffered complete hearing loss in her \"good ear\" and requires an implant in that ear now. Patient has an implant in the other ear, which has restored 50% of her hearing. The patient explained that she would like to refrain from MRIs as much as possible because they interfered with her hearing implants. Last time patient was seen, she was on Topamax and seizure-free. Denies headaches as well. She admits to occasional sinus headaches but she explains that they are mild. She has been compliant with her current medications with no side effects.        The patient's last seizure was in 2000  Other comorbid conditions include history of fibromyalgia, hearing loss, history of optic glioma status post radiation treatment  Her most recent MRI of the brain was done in February 2013.                             REVIEW OF SYSTEMS     Constitutional Weight: present, Appetite: absent, Fatigue: absent   HEENT Visual disturbance: absent, Ears: normal   Respiratory Shortness of breath: absent, Cough: present   Cardiovascular Chest pain: absent, Leg swelling :absent   GI Constipation: absent, Diarrhea: absent, Swallowing change: absent    Urinary frequency: absent, Urinary urgency: absent,    Musculoskeletal Neck pain: present, Back pain: present, Stiffness: absent, Muscle pain: absent, Joint pain: present   Dermatological Hair loss: absent, Skin changes: absent   Neurological Memory loss: absent, Confusion: absent, Seizures: absent, Trouble walking or imbalance: absent, Dizziness: absent, Weakness: absent, Numbness: absent Tremor: absent, Spasm: absent, Speech difficulty: absent, Headache: absent, Light sensitivity: absent   Psychiatric Anxiety: absent, Hallucination: absent, Depression, present   Hematologic Abnormal bleeding/Bruising: absent, Anemia: absent       Past Medical History:   Diagnosis Date    Acquired acanthosis nigricans     Anemia     Arthritis     Brain cancer ([facility] [address]     Contact dermatitis and other eczema, due to unspecified cause     COVID-19 01/26/2021    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)     Fibromyalgia     Herpes zoster without mention of complication     Hypothyroidism     Migraine     Seizures (HCC)     Sleep apnea      Past Surgical History:   Procedure Laterality Date    BRAIN BIOPSY      tumor biopsy    COCHLEAR IMPLANT Left     KNEE SURGERY      scope    TONSILLECTOMY       Social History     Socioeconomic History    Marital status: Single     Spouse name: Not on file    Number of children: Not on file    Years of education: Not on file    Highest education level: Not on file   Occupational History     Comment: disabled   Tobacco Use    Smoking status: Never Smoker    Smokeless tobacco: Never Used   Vaping Use    Vaping Use: Never used   Substance and Sexual Activity    Alcohol use: No     Alcohol/week: 0.0 standard drinks     Comment: rare    Drug use: No    Sexual activity: Not Currently     Partners: Male   Other Topics Concern    Not on file   Social History Narrative    Not on file     Social Determinants of Health     Financial Resource Strain: Low Risk     Difficulty of Paying Living Expenses: Not hard at all   Food Insecurity: No Food Insecurity    Worried About [address] in the Last Year: Never true    [facility] [employer] in the Last Year: Never true   Transportation Needs:     Lack of Transportation (Medical): Not on file    Lack of Transportation (Non-Medical):  Not on file   Physical Activity:     Days of Exercise per Week: Not on file    Minutes of Exercise per Session: Not on file   Stress:     Feeling of Stress : Not on file   Social Connections:     Frequency of Communication with Friends and Family: Not on file    Frequency of Social Gatherings with Friends and Family: Not on file    Attends Synagogue Services: Not on file    Active Member of [address] [place] [address] or Organizations: Not on file    Attends Club or Organization Meetings: Not on file    Marital Status: Not on file   Intimate Partner Violence:     Fear of Current or Ex-Partner: Not on file    Emotionally Abused: Not on file    Physically Abused: Not on file    Sexually Abused: Not on file   Housing Stability:     Unable to Pay for Housing in the Last Year: Not on file    Number of [place] in the Last Year: Not on file    Unstable Housing in the Last Year: Not on file     Family History   Problem Relation Age of Onset    Migraines Mother     Cancer Mother 50        breast    Diabetes Father     High Blood Pressure Father     High Cholesterol Father     Migraines Sister     High Blood Pressure Maternal Grandmother     Cancer Paternal Grandmother 70        colon cancer    Cancer Paternal Uncle         esophageal Allergies   Allergen Reactions    Bactrim [Sulfamethoxazole-Trimethoprim]     Bee Venom     Ciprofloxacin     Milk-Related Compounds Diarrhea     /82 (Site: Left Upper Arm, Position: Sitting, Cuff Size: Medium Adult)   Pulse 77   Ht 5' 1\" (1.549 m)   Wt 272 lb (123.4 kg)   BMI 51.39 kg/m²         General Examination    General Alert, cooperative, no distress, appears stated age   Head Normocephalic, without obvious abnormality, atraumatic    Eyes Conjunctiva/corneas clear, Lymph nodes: Cervical, supraclavicular, and axillary nodes normal   Ears Nose Throat Normal external ear canals, both ears Nares normal, septum midline   Neck Supple, symmetrical, trachea midline, no adenopathy   Back Symmetric, no curvature, ROM normal, no CVA tenderness   Lungs Respirations unlabored   Chest Wall No deformity   Heart Regular rate and rhythm   Abdomen No masses   Extremities Extremities normal, atraumatic, no cyanosis or edema    Pulses 2+ and symmetric   Skin: Skin color, texture, turgor normal, no rashes or lesions      Neurological Exam:  Mental status  Alert and oriented; intact memory with no confusion, speech or language problems; no hallucinations or delusions    Cranial nerves  II - visual fields intact to confrontation    III, IV, VI - extra-ocular muscles full: no pupillary defect; no BENITO, no nystagmus, no ptosis V - normal facial sensation                                                          VIII - impaired hearing IX, X - symmetrical palate                                                            VII - normal facial symmetry XI - symmetrical shoulder shrug                                                 XII - midline tongue without atrophy or fasciculation   Motor function  Normal muscle bulk and tone; normal power 5/5, including fine motor movements    Sensory function Intact to touch, pin, vibration, proprioception    Cerebellar Intact fine motor movement.  No involuntary movements or tremors Reflex function Intact 2+ DTR and symmetric with no pathologic reflex or Babinski sign    Gait                  Normal station and gait      Thyroid profile Lab Results   Component Value Date    TSH 0.01 (L) 03/01/2020        Vit B12, folate No results found for: Poppy Barrera     RPR No results found for: RPR     JAYY Lab Results   Component Value Date    JAYY NEGATIVE 10/23/2013        2 hour GTT Lab Results   Component Value Date    LABA1C 4.8 01/08/2021     Lab Results   Component Value Date    EAG 97 03/01/2020        ESR Lab Results   Component Value Date    SEDRATE 20 08/21/2018            Assessment and recommendations:  Headaches, history of seizures and history of optic glioma status post radiation therapy. For the last year, the patient has been neurologically stable. She denies having any headache. No breakthrough seizures. Recommend continue topiramate 75 mg +100 mg a day along with lamotrigine 200 mg a day  She will follow-up with me on a yearly basis. Scribe Attestation:   IEtienne, am scribing for, and in the presence of, Dr Amrit Burgess. Electronically signed by: Silvana Tabor 1/20/22   Physician Attestation:  Bin Heath MD, personally performed the services described in this documentation. All medical record entries made by the scribe were at my direction and in my presence. I have reviewed the chart and discharge instructions (if applicable) and agree that the record reflects my personal performance and is accurate and complete.   Electronically Signed: Gerson Capone 1/20/2022 2:14 PM    Diplomate, American Board of Psychiatry and Neurology  Diplomate, American Board of Clinical Neurophysiology  Diplomate, American Board of Epilepsy

## 2022-02-22 ENCOUNTER — HOSPITAL ENCOUNTER (EMERGENCY)
Facility: CLINIC | Age: 47
Discharge: HOME | End: 2022-02-22
Attending: EMERGENCY MEDICINE
Payer: COMMERCIAL

## 2022-02-22 VITALS
TEMPERATURE: 98.2 F | OXYGEN SATURATION: 96 % | HEIGHT: 61 IN | BODY MASS INDEX: 51.35 KG/M2 | SYSTOLIC BLOOD PRESSURE: 153 MMHG | WEIGHT: 272 LBS | HEART RATE: 84 BPM | RESPIRATION RATE: 18 BRPM | DIASTOLIC BLOOD PRESSURE: 89 MMHG

## 2022-02-22 DIAGNOSIS — J01.00 ACUTE MAXILLARY SINUSITIS, RECURRENCE NOT SPECIFIED: Primary | ICD-10-CM

## 2022-02-22 PROCEDURE — 99283 EMERGENCY DEPT VISIT LOW MDM: CPT

## 2022-02-22 RX ORDER — AMOXICILLIN AND CLAVULANATE POTASSIUM 875; 125 MG/1; MG/1
1 TABLET, FILM COATED ORAL 2 TIMES DAILY
Qty: 20 TABLET | Refills: 0 | Status: SHIPPED | OUTPATIENT
Start: 2022-02-22 | End: 2022-03-04

## 2022-02-22 RX ORDER — OXYMETAZOLINE HYDROCHLORIDE 0.05 G/100ML
2 SPRAY NASAL 2 TIMES DAILY
Qty: 1 EACH | Refills: 3 | Status: SHIPPED | OUTPATIENT
Start: 2022-02-22 | End: 2022-03-24

## 2022-02-22 NOTE — ED PROVIDER NOTES
[facility]  [facility]  Phone: [address]      Pt Name: Vira Johnston  MRN: [id number]  [place] 1975  Date of evaluation: 2/22/2022    CHIEF COMPLAINT       Chief Complaint   Patient presents with    Sinusitis       HISTORY OF PRESENT ILLNESS    Vira Johnston is a 42 y.o. female who presents sinus pressure for the last several days. Patient states that she was seen at urgent care center and given Flonase nasal spray has not noticed any improvement. She states that she is not able to follow-up with her family doctor at this time. Noted no fevers or chills. States that she gets frequent sinus infections. REVIEW OF SYSTEMS     Constitutional: No fevers or chills   HEENT: No sore throat, , or earache see above eyes: No blurry vision or double vision no drainage   Cardiovascular: No chest pain or tachycardia   Respiratory: No wheezing or shortness of breath no cough   Gastrointestinal: No nausea, vomiting, diarrhea, constipation, or abdominal pain   : No hematuria or dysuria   Musculoskeletal: No swelling or pain   Skin: No rash   Neurological: No focal neurologic complaints, paresthesias, weakness, or headache   PAST MEDICAL HISTORY    has a past medical history of Acquired acanthosis nigricans, Anemia, Arthritis, Brain cancer ([address] Brain tumor ([address] Contact dermatitis and other eczema, due to unspecified cause, COVID-19, 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 SURGICAL HISTORY      has a past surgical history that includes Brain Biopsy; Tonsillectomy; knee surgery; Cochlear implant (Left); and Dental surgery.     CURRENT MEDICATIONS       Previous Medications    ALBUTEROL SULFATE  (90 BASE) MCG/ACT INHALER    INHALE 2 PUFFS BY MOUTH EVERY 6 HOURS AS NEEDED FOR WHEEZING    ARIPIPRAZOLE (ABILIFY) 20 MG TABLET    Take 1 tablet by mouth daily    BUPROPION (WELLBUTRIN XL) 150 MG EXTENDED RELEASE TABLET    Take 300 mg by mouth daily     CALCIUM-MAGNESIUM-VITAMIN D (CALCIUM 1200+D3 PO)    Take 2 tablets by mouth every morning    [employer] SENNA 8.6 MG TABLET    TAKE 1 TABLET BY MOUTH EVERY DAY    EPINEPHRINE (EPIPEN 2-MINNIE) 0.3 MG/0.3ML SOAJ INJECTION    Inject 0.3 mLs into the muscle once for 1 dose Use as directed for allergic reaction    ERGOCALCIFEROL (VITAMIN D2 PO)      Take 1.25 mg by mouth daily     GABAPENTIN (NEURONTIN) 100 MG CAPSULE    Start with one capsule daily may increase by one tablet up to at total of 3 capsules TID    HANDICAP PLACARD MISC    by Does not apply route EXPIRES IN 5 YEARS FROM ORIGINAL SCRIPT DATE    HYDROCORTISONE (CORTEF) 10 MG TABLET    Take 10 mg by mouth 2 times daily. INDOMETHACIN (INDOCIN) 25 MG CAPSULE    Take 1 capsule by mouth 2 times daily (with meals) for 5 days    KLOR-CON M10 10 MEQ EXTENDED RELEASE TABLET    TAKE 1 TABLET BY MOUTH TWICE A DAY    LAMOTRIGINE (LAMICTAL) 150 MG TABLET    Take 200 mg by mouth daily Pt Is Now Taking 200 MG    LEVOTHYROXINE (SYNTHROID) 150 MCG TABLET    Take 1 tablet by mouth every morning (before breakfast)    LORAZEPAM (ATIVAN) 1 MG TABLET    1 mg daily as needed. MELOXICAM (MOBIC) 15 MG TABLET        METFORMIN (GLUCOPHAGE) 500 MG TABLET    TAKE 1 TABLET BY MOUTH TWICE A DAY WITH MEALS    NYSTATIN (MYCOSTATIN) 030541 UNIT/GM CREAM    APPLY TO AFFECTED AREA TWICE A DAY    SERTRALINE (ZOLOFT) 100 MG TABLET    Take 150 mg by mouth daily     SUMATRIPTAN (IMITREX) 100 MG TABLET    TAKE 1 TABLET AT ONSET OFHEADACHE,MAY REPEAT 2 HOURS LATER. MAX 2/DAY    TOPIRAMATE (TOPAMAX) 50 MG TABLET    TAKE 1 AND 1/2 TABLETS IN THE MORNING AND 2 TABLETS AT NIGHT. ALLERGIES     is allergic to bactrim [sulfamethoxazole-trimethoprim], bee venom, ciprofloxacin, and milk-related compounds.     FAMILY HISTORY     She indicated that her mother is . She indicated that her father is alive. She indicated that her sister is alive. She indicated that the status of her maternal grandmother is unknown. She indicated that the status of her paternal grandmother is unknown. She indicated that her paternal uncle is . family history includes Cancer in her paternal uncle; Cancer (age of onset: 50) in her mother; Cancer (age of onset: 70) in her paternal grandmother; Diabetes in her father; High Blood Pressure in her father and maternal grandmother; High Cholesterol in her father; Migraines in her mother and sister. SOCIAL HISTORY      reports that she has never smoked. She has never used smokeless tobacco. She reports current alcohol use. She reports that she does not use drugs. PHYSICAL EXAM       ED Triage Vitals   BP Temp Temp src Pulse Resp SpO2 Height Weight   -- -- -- -- -- -- -- --     Constitutional: Alert, oriented x3, nontoxic, answering questions appropriately, acting properly for age, in no acute distress   HEENT: Extraocular muscles intact, mucous membranes moist. Pupils equal, round, reactive to light, TMs clear with serous otitis bilaterally, no posterior pharyngeal erythema or exudates. Mild pain on palpation of the frontal and maxillary sinuses bilaterally  Lungs are clear to auscultation  Neck: Trachea midline, supple without lymphadenopathy, no posterior midline neck tenderness to palpation   Musculoskeletal: No extremity pain or swelling   Neurologic: Moving all 4 extremities without difficulty   Skin: Warm and dry       DIFFERENTIAL DIAGNOSIS/ MDM:         DIAGNOSTIC RESULTS     EKG: All EKG's are interpreted by the Emergency Department Physician who either signs or Co-signs this chart in the absence of a cardiologist.        Not indicated unless otherwise documented above    LABS:  No results found for this visit on 22.     Not indicated unless otherwise documented above    RADIOLOGY:   I reviewed the radiologist interpretations:    No orders to display       Not indicated unless otherwise documented above    EMERGENCY DEPARTMENT COURSE:     The patient was given the following medications:  Orders Placed This Encounter   Medications    amoxicillin-clavulanate (AUGMENTIN) 875-125 MG per tablet     Sig: Take 1 tablet by mouth 2 times daily for 10 days     Dispense:  20 tablet     Refill:  0    oxymetazoline (12 HOUR NASAL SPRAY) 0.05 % nasal spray     Si sprays by Nasal route 2 times daily     Dispense:  1 each     Refill:  3        Vitals:   -------------------------  BP (!) 153/89   Pulse 84   Temp 98.2 °F (36.8 °C) (Oral)   Resp 18   Ht 5' 1\" (1.549 m)   Wt 123.4 kg (272 lb)   SpO2 96%   BMI 51.39 kg/m²         I have reviewed the disposition diagnosis with the patient and or their family/guardian. I have answered their questions and given discharge instructions. They voiced understanding of these instructions and did not have any furtherquestions or complaints. CRITICAL CARE:    None    CONSULTS:    None    PROCEDURES:    None      OARRS Report if indicated             FINAL IMPRESSION      1.  Acute maxillary sinusitis, recurrence not specified          DISPOSITION/PLAN   DISPOSITION Decision To Discharge 2022 09:13:49 AM        CONDITION ON DISPOSITION: STABLE       PATIENT REFERRED TO:  Lizbeth Gillette DO  [address] [place] [address]  [phone]    In 3 days  If symptoms worsen      DISCHARGE MEDICATIONS:  New Prescriptions    AMOXICILLIN-CLAVULANATE (AUGMENTIN) 875-125 MG PER TABLET    Take 1 tablet by mouth 2 times daily for 10 days    OXYMETAZOLINE (12 HOUR NASAL SPRAY) 0.05 % NASAL SPRAY    2 sprays by Nasal route 2 times daily       (Please note that portions of thisnote were completed with a voice recognition program.  Efforts were made to edit the dictations but occasionally words are mis-transcribed.)    Barbara Eric MD,, MD  Attending Emergency Physician        Alicia Atkinson Colleen Pino MD  02/22/22 [id number]

## 2022-04-11 ENCOUNTER — HOSPITAL ENCOUNTER (EMERGENCY)
Facility: CLINIC | Age: 47
Discharge: HOME | End: 2022-04-11
Attending: EMERGENCY MEDICINE
Payer: MEDICARE

## 2022-04-11 VITALS
DIASTOLIC BLOOD PRESSURE: 87 MMHG | WEIGHT: 272 LBS | TEMPERATURE: 98.2 F | SYSTOLIC BLOOD PRESSURE: 112 MMHG | BODY MASS INDEX: 51.35 KG/M2 | HEART RATE: 76 BPM | RESPIRATION RATE: 14 BRPM | OXYGEN SATURATION: 100 % | HEIGHT: 61 IN

## 2022-04-11 DIAGNOSIS — H57.89 EYE IRRITATION: Primary | ICD-10-CM

## 2022-04-11 PROCEDURE — 99282 EMERGENCY DEPT VISIT SF MDM: CPT

## 2022-04-11 RX ORDER — OLOPATADINE HYDROCHLORIDE 1 MG/ML
1 SOLUTION/ DROPS OPHTHALMIC 2 TIMES DAILY
Qty: 5 ML | Refills: 0 | Status: SHIPPED | OUTPATIENT
Start: 2022-04-11 | End: 2022-05-11

## 2022-04-11 ASSESSMENT — ENCOUNTER SYMPTOMS
EYE DISCHARGE: 1
EYE REDNESS: 0
GASTROINTESTINAL NEGATIVE: 1
RESPIRATORY NEGATIVE: 1
EYE PAIN: 0
EYE ITCHING: 1

## 2022-04-11 NOTE — ED PROVIDER NOTES
[address] ED  eMERGENCY dEPARTMENT eNCOUnter   Independent Attestation     Pt Name: Reno Nobles  MRN: [id number]  [place] 1975  Date of evaluation: 4/11/22       Reno Nobles is a 42 y.o. female who presents with Eye Problem (pussy and crusty in the mornings for over a week )        Based on the medical record, the care appears appropriate. I was personally available for consultation in the Emergency Department.     Jesu Watt DO  Attending Emergency  Physician                 Jesu Watt DO  04/11/22 1116

## 2022-04-11 NOTE — ED PROVIDER NOTES
[facility]  [facility]  Phone: [phone]        Pt Name: Gila Sy  MRN: [id number]  [place] 1975  Date of evaluation: 22    [address] [place] [address]       Chief Complaint   Patient presents with    Eye Problem     pussy and crusty in the mornings for over a week        HISTORY OF PRESENT ILLNESS (Location/Symptom, Timing/Onset, Context/Setting, Quality, Duration, Modifying Factors, Severity)      Gila Sy is a 42 y.o. female with no pertinent PMH who presents to the ED via private auto with bilateral eye drainage when she wakes up over the last week. She denies any fever chills, headache, dizziness, vision changes, chest pain, shortness of breath, abdominal pain, nausea vomiting diarrhea. States that she wakes up with drainage in both eyes and does improve throughout the day. She denies being around anybody that is sick or any recent illness. She does not take any medication for symptoms. She states that nothing makes her symptoms better or worse at this time. PAST MEDICAL / SURGICAL / SOCIAL / FAMILY HISTORY     PMH:  has a past medical history of Acquired acanthosis nigricans, Anemia, Arthritis, Brain cancer ([address] Brain tumor ([address] Contact dermatitis and other eczema, due to unspecified cause, COVID-19, 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 Surgical History:  has a past surgical history that includes Brain Biopsy; Tonsillectomy; knee surgery; Cochlear implant (Left); and Dental surgery. Social History:  reports that she has never smoked. She has never used smokeless tobacco. She reports current alcohol use. She reports that she does not use drugs. Family History: She indicated that her mother is . She indicated that her father is alive. She indicated that her sister is alive.  She indicated that the status of her maternal grandmother is unknown. She indicated that the status of her paternal grandmother is unknown. She indicated that her paternal uncle is . family history includes Cancer in her paternal uncle; Cancer (age of onset: 50) in her mother; Cancer (age of onset: 70) in her paternal grandmother; Diabetes in her father; High Blood Pressure in her father and maternal grandmother; High Cholesterol in her father; Migraines in her mother and sister. Psychiatric History: None    Allergies: Bactrim [sulfamethoxazole-trimethoprim], Bee venom, Ciprofloxacin, and Milk-related compounds    Home Medications:   Prior to Admission medications    Medication Sig Start Date End Date Taking? Authorizing Provider   olopatadine (PATADAY) 0.1 % ophthalmic solution Place 1 drop into both eyes 2 times daily 22 Yes ADELA Morrison - CNP   [facility] SENNA 8.6 MG tablet TAKE 1 TABLET BY MOUTH EVERY DAY 3/21/22   Sue Choi DO   KLOR-CON M10 10 MEQ extended release tablet TAKE 1 TABLET BY MOUTH TWICE A DAY 22   Talia Peña DO   meloxicam (MOBIC) 15 MG tablet  21   Historical Provider, MD   topiramate (TOPAMAX) 50 MG tablet TAKE 1 AND 1/2 TABLETS IN THE MORNING AND 2 TABLETS AT NIGHT.  21   Susy Nava MD   gabapentin (NEURONTIN) 100 MG capsule Start with one capsule daily may increase by one tablet up to at total of 3 capsules TID 21   Historical Provider, MD   nystatin (MYCOSTATIN) 591874 UNIT/GM cream APPLY TO AFFECTED AREA TWICE A DAY 10/25/21   Prieto Choi DO   metFORMIN (GLUCOPHAGE) 500 MG tablet TAKE 1 TABLET BY MOUTH TWICE A DAY WITH MEALS 6/10/21   Talia Peña DO   Handicap Placard [address] [facility] by Does not apply route EXPIRES IN 5 YEARS FROM ORIGINAL SCRIPT DATE 21   Talia Peña DO   indomethacin (INDOCIN) 25 MG capsule Take 1 capsule by mouth 2 times daily (with meals) for 5 days 21  Susy Nava MD   buPROPion (WELLBUTRIN XL) 150 MG extended release tablet Take 300 mg by mouth daily  12/16/20   Historical Provider, MD   ARIPiprazole (ABILIFY) 20 MG tablet Take 1 tablet by mouth daily 11/23/20   Historical Provider, MD   levothyroxine (SYNTHROID) 150 MCG tablet Take 1 tablet by mouth every morning (before breakfast) 11/20/20   Historical Provider, MD   albuterol sulfate  (90 Base) MCG/ACT inhaler INHALE 2 PUFFS BY MOUTH EVERY 6 HOURS AS NEEDED FOR WHEEZING 7/13/20   Adele Choi DO   EPINEPHrine (EPIPEN 2-MINNIE) 0.3 MG/0.3ML SOAJ injection Inject 0.3 mLs into the muscle once for 1 dose Use as directed for allergic reaction 7/8/20 1/20/22  Samara Barahona DO   lamoTRIgine (LAMICTAL) 150 MG tablet Take 200 mg by mouth daily Pt Is Now Taking 200 MG    Historical Provider, MD   Calcium-Magnesium-Vitamin D (CALCIUM 1200+D3 PO) Take 2 tablets by mouth every morning    Historical Provider, MD   LORazepam (ATIVAN) 1 MG tablet 1 mg daily as needed. 1/29/16   Historical Provider, MD   sertraline (ZOLOFT) 100 MG tablet Take 150 mg by mouth daily     Historical Provider, MD   SUMAtriptan (IMITREX) 100 MG tablet TAKE 1 TABLET AT ONSET OFHEADACHE,MAY REPEAT 2 HOURS LATER. MAX 2/DAY 1/22/15   Emory Simpson MD   Ergocalciferol (VITAMIN D2 PO)   Take 1.25 mg by mouth daily     Historical Provider, MD   hydrocortisone (CORTEF) 10 MG tablet Take 10 mg by mouth 2 times daily. Historical Provider, MD       REVIEW OF SYSTEMS  (2-9 systems for level 4, 10 ormore for level 5)      Review of Systems   Constitutional: Negative. HENT: Negative. Eyes: Positive for discharge and itching. Negative for pain, redness and visual disturbance. Respiratory: Negative. Cardiovascular: Negative. Gastrointestinal: Negative. Endocrine: Negative. Genitourinary: Negative. Musculoskeletal: Negative. Skin: Negative. Neurological: Negative. All other systems negative except as marked.      PHYSICAL EXAM  (up to 7 for level 4, 8 or more for level 5)      INITIAL VITALS:  height is 5' 1\" (1.549 m) and weight is 123.4 kg (272 lb). Her oral temperature is 98.2 °F (36.8 °C). Her blood pressure is 112/87 and her pulse is 76. Her respiration is 14 and oxygen saturation is 100%. Vital signs reviewed. Physical Exam  Constitutional:       General: She is not in acute distress. Appearance: Normal appearance. She is normal weight. She is not ill-appearing or toxic-appearing. HENT:      Head: Normocephalic and atraumatic. Nose: Nose normal. No congestion or rhinorrhea. Mouth/Throat:      Mouth: Mucous membranes are moist.      Pharynx: Oropharynx is clear. Eyes:      General: Lids are normal. No scleral icterus. Right eye: No discharge. Left eye: No discharge. Extraocular Movements: Extraocular movements intact. Conjunctiva/sclera: Conjunctivae normal.      Pupils: Pupils are equal, round, and reactive to light. Neck:      Trachea: No tracheal deviation. Cardiovascular:      Rate and Rhythm: Normal rate and regular rhythm. Pulses: Normal pulses. Heart sounds: Normal heart sounds. Pulmonary:      Breath sounds: Normal breath sounds. Abdominal:      General: Abdomen is flat. There is no distension. Palpations: Abdomen is soft. Tenderness: There is no abdominal tenderness. Musculoskeletal:      Cervical back: Neck supple. Right lower leg: No edema. Left lower leg: No edema. Skin:     General: Skin is warm and dry. Capillary Refill: Capillary refill takes less than 2 seconds. Neurological:      Mental Status: She is alert. Psychiatric:         Mood and Affect: Mood normal.         Behavior: Behavior normal.           DIFFERENTIAL DIAGNOSIS / MDM     After my physical exam, there is no evidence of dactylitis, foreign body, hordeolum. Patient's eye exam is negative.   I do believe patient possibly has some allergic conjunctivitis that she states that she does get seasonal allergies occasionally. Plan discharge patient home to follow-up with her PCP within 1 day. We will also start the patient on Pataday eyedrops for comfort. Instructed patient to return to the ER with any worsening symptoms, fevers or chills, chest pain, shortness of breath, abdominal pain, vision changes. Patient is agreement to this plan at this time. All questions and concerns were answered at this time. The patient understands that at this time there is no evidence for a more malignant underlying process, but the patient also understands that early in the process of an illness or injury, an emergency department workup can be falsely reassuring. Routine discharge counseling was given, and the patient understands that worsening, changing or persistent symptoms should prompt an immediate call or follow up with their primary physician or return to the emergency department. The importance of appropriate follow up was also discussed. I have reviewed the disposition diagnosis with the patient and or their family/guardian. I have answered their questions and given discharge instructions. They voiced understanding of these instructions and did not have any further questions or complaints. PLAN (LABS / IMAGING / EKG):  No orders of the defined types were placed in this encounter. MEDICATIONS ORDERED:  Orders Placed This Encounter   Medications    olopatadine (PATADAY) 0.1 % ophthalmic solution     Sig: Place 1 drop into both eyes 2 times daily     Dispense:  5 mL     Refill:  0       Controlled Substances Monitoring:     DIAGNOSTIC RESULTS     EKG: All EKG's are interpreted by the Emergency Department Physician who either signs or Co-signs this chart in the absenceof a cardiologist.        RADIOLOGY: All images are read by the radiologist and their interpretations are reviewed. No orders to display       No results found.     LABS:  No results found for this visit on 04/11/22. EMERGENCY DEPARTMENT COURSE           Vitals:    Vitals:    04/11/22 1111   BP: 112/87   Pulse: 76   Resp: 14   Temp: 98.2 °F (36.8 °C)   TempSrc: Oral   SpO2: 100%   Weight: 123.4 kg (272 lb)   Height: 5' 1\" (1.549 m)     -------------------------  BP: 112/87, Temp: 98.2 °F (36.8 °C), Pulse: 76, Resp: 14      RE-EVALUATION:  See ED Course notes above. CONSULTS:  None    PROCEDURES:  None    FINAL IMPRESSION      1. Eye irritation          DISPOSITION / PLAN     CONDITION ON DISPOSITION:   Good for discharge.      PATIENT REFERRED TO:  Jade Mchugh DO  [address]  [phone]    Call in 1 day      [facility]  [address]  [phone]    If symptoms worsen      DISCHARGE MEDICATIONS:  Discharge Medication List as of 4/11/2022 11:22 AM      START taking these medications    Details   olopatadine (PATADAY) 0.1 % ophthalmic solution Place 1 drop into both eyes 2 times daily, Disp-5 mL, R-0Normal             ADELA Hernandez CNP   Emergency Medicine Nurse Practitioner    (Please note that portions of this note were completed with a voice recognition program.  Efforts were made to edit the dictations but occasionally words aremis-transcribed.)       ADELA Hernandez CNP  04/11/22 1144

## 2022-05-29 ENCOUNTER — HOSPITAL ENCOUNTER (EMERGENCY)
Facility: CLINIC | Age: 47
Discharge: HOME | End: 2022-05-29
Attending: EMERGENCY MEDICINE
Payer: MEDICARE

## 2022-05-29 VITALS
OXYGEN SATURATION: 98 % | DIASTOLIC BLOOD PRESSURE: 78 MMHG | HEART RATE: 75 BPM | TEMPERATURE: 97.5 F | WEIGHT: 277 LBS | RESPIRATION RATE: 17 BRPM | BODY MASS INDEX: 52.34 KG/M2 | SYSTOLIC BLOOD PRESSURE: 136 MMHG

## 2022-05-29 DIAGNOSIS — B37.2 YEAST DERMATITIS: Primary | ICD-10-CM

## 2022-05-29 PROCEDURE — 99283 EMERGENCY DEPT VISIT LOW MDM: CPT

## 2022-05-29 RX ORDER — CLOTRIMAZOLE 1 %
CREAM (GRAM) TOPICAL
Qty: 40 G | Refills: 0 | Status: SHIPPED | OUTPATIENT
Start: 2022-05-29 | End: 2022-06-05

## 2022-05-29 RX ORDER — FLUCONAZOLE 150 MG/1
150 TABLET ORAL ONCE
Qty: 2 TABLET | Refills: 0 | Status: SHIPPED | OUTPATIENT
Start: 2022-05-29 | End: 2022-05-29

## 2022-05-29 ASSESSMENT — ENCOUNTER SYMPTOMS
NAUSEA: 0
CONSTIPATION: 0
SHORTNESS OF BREATH: 0
VOMITING: 0
BACK PAIN: 0
EYE PAIN: 0
DIARRHEA: 0
ABDOMINAL PAIN: 0
BLOOD IN STOOL: 0
COUGH: 0

## 2022-05-29 NOTE — ED PROVIDER NOTES
[facility]  [facility]  Phone: [phone]        Pt Name: Jake Thomas  MRN: [id number]  [place] 1975  Date of evaluation: 5/29/22      CHIEF COMPLAINT       Chief Complaint   Patient presents with    Rash     pt usually uses nystatin but states it is not working          HISTORY OF PRESENT ILLNESS    Jake Thomas is a 42 y.o. female who presents with a rash in her groin, she has a history of fungal infections she has nystatin cream which she has been using it normally that clears it up but has not this time she says it seems to be getting worse has been no fevers no chills no cough no shortness of breath      REVIEW OF SYSTEMS         Review of Systems   Constitutional: Negative for chills and fever. HENT: Negative for congestion and ear pain. It is hard of hearing   Eyes: Negative for pain and visual disturbance. Respiratory: Negative for cough and shortness of breath. Cardiovascular: Negative for chest pain, palpitations and leg swelling. Gastrointestinal: Negative for abdominal pain, blood in stool, constipation, diarrhea, nausea and vomiting. Endocrine: Negative for polydipsia and polyuria. Genitourinary: Negative for difficulty urinating, dysuria and frequency. Musculoskeletal: Negative for back pain, joint swelling, myalgias, neck pain and neck stiffness. Skin: Positive for rash. Rash in the groin especially in the left side   Neurological: Negative for dizziness, weakness and headaches. Hematological: Negative for adenopathy. Does not bruise/bleed easily. Psychiatric/Behavioral: Negative for confusion, self-injury and suicidal ideas.          PAST MEDICAL HISTORY    has a past medical history of Acquired acanthosis nigricans, Anemia, Arthritis, Brain cancer ([address] [address] Contact dermatitis and other eczema, due to unspecified cause, COVID-19, 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 SURGICAL HISTORY      has a past surgical history that includes Brain Biopsy; Tonsillectomy; knee surgery; Cochlear implant (Left); Dental surgery; and Cochlear implant (Right, 05/10/2022). CURRENT MEDICATIONS       Previous Medications    ALBUTEROL SULFATE  (90 BASE) MCG/ACT INHALER    INHALE 2 PUFFS BY MOUTH EVERY 6 HOURS AS NEEDED FOR WHEEZING    ARIPIPRAZOLE (ABILIFY) 20 MG TABLET    Take 1 tablet by mouth daily    BUPROPION (WELLBUTRIN XL) 150 MG EXTENDED RELEASE TABLET    Take 300 mg by mouth daily     CALCIUM-MAGNESIUM-VITAMIN D (CALCIUM 1200+D3 PO)    Take 2 tablets by mouth every morning    [facility] SENNA 8.6 MG TABLET    TAKE 1 TABLET BY MOUTH EVERY DAY    EPINEPHRINE (EPIPEN 2-MINNIE) 0.3 MG/0.3ML SOAJ INJECTION    Inject 0.3 mLs into the muscle once for 1 dose Use as directed for allergic reaction    ERGOCALCIFEROL (VITAMIN D2 PO)      Take 1.25 mg by mouth daily     GABAPENTIN (NEURONTIN) 100 MG CAPSULE    Start with one capsule daily may increase by one tablet up to at total of 3 capsules TID    HANDICAP PLACARD MISC    by Does not apply route EXPIRES IN 5 YEARS FROM ORIGINAL SCRIPT DATE    HYDROCORTISONE (CORTEF) 10 MG TABLET    Take 10 mg by mouth 2 times daily. INDOMETHACIN (INDOCIN) 25 MG CAPSULE    Take 1 capsule by mouth 2 times daily (with meals) for 5 days    KLOR-CON M10 10 MEQ EXTENDED RELEASE TABLET    TAKE 1 TABLET BY MOUTH TWICE A DAY    LAMOTRIGINE (LAMICTAL) 150 MG TABLET    Take 200 mg by mouth daily Pt Is Now Taking 200 MG    LEVOTHYROXINE (SYNTHROID) 150 MCG TABLET    Take 1 tablet by mouth every morning (before breakfast)    LORAZEPAM (ATIVAN) 1 MG TABLET    1 mg daily as needed.      MELOXICAM (MOBIC) 15 MG TABLET        METFORMIN (GLUCOPHAGE) 500 MG TABLET    TAKE 1 TABLET BY MOUTH TWICE A DAY WITH MEALS    NYSTATIN (MYCOSTATIN) 777100 UNIT/GM CREAM    APPLY TO AFFECTED AREA TWICE A DAY    SERTRALINE (ZOLOFT) 100 MG TABLET    Take 150 mg by mouth daily     SUMATRIPTAN (IMITREX) 100 MG TABLET    TAKE 1 TABLET AT ONSET OFHEADACHE,MAY REPEAT 2 HOURS LATER. MAX 2/DAY    TOPIRAMATE (TOPAMAX) 50 MG TABLET    TAKE 1 AND 1/2 TABLETS IN THE MORNING AND 2 TABLETS AT NIGHT. ALLERGIES     is allergic to bactrim [sulfamethoxazole-trimethoprim], bee venom, ciprofloxacin, and milk-related compounds. FAMILY HISTORY     She indicated that her mother is . She indicated that her father is alive. She indicated that her sister is alive. She indicated that the status of her maternal grandmother is unknown. She indicated that the status of her paternal grandmother is unknown. She indicated that her paternal uncle is . family history includes Cancer in her paternal uncle; Cancer (age of onset: 50) in her mother; Cancer (age of onset: 70) in her paternal grandmother; Diabetes in her father; High Blood Pressure in her father and maternal grandmother; High Cholesterol in her father; Migraines in her mother and sister. SOCIAL HISTORY      reports that she has never smoked. She has never used smokeless tobacco. She reports current alcohol use. She reports that she does not use drugs. PHYSICAL EXAM     INITIAL VITALS:  weight is 125.6 kg (277 lb). Her temperature is 97.5 °F (36.4 °C). Her blood pressure is 136/78 and her pulse is 75. Her respiration is 17 and oxygen saturation is 98%. Physical Exam  Constitutional:       Appearance: She is well-developed. She is obese. She is not ill-appearing or diaphoretic. HENT:      Head: Normocephalic and atraumatic. Eyes:      Conjunctiva/sclera: Conjunctivae normal.      Pupils: Pupils are equal, round, and reactive to light. Cardiovascular:      Rate and Rhythm: Normal rate and regular rhythm. Pulmonary:      Effort: Pulmonary effort is normal.      Breath sounds: Normal breath sounds.    Abdominal:      General: Bowel sounds are normal.      Palpations: Abdomen is soft. Musculoskeletal:         General: No tenderness. Normal range of motion. Cervical back: Normal range of motion. Skin:     General: Skin is warm and dry. Findings: Rash present. Comments: Thematous rash with white discharge in the groin especially on the left side with some satellite lesions consistent with yeast   Neurological:      Mental Status: She is alert and oriented to person, place, and time. Psychiatric:         Mood and Affect: Mood normal.         Behavior: Behavior normal.           DIFFERENTIAL DIAGNOSIS/ MDM:     Monilial infection  DIAGNOSTIC RESULTS     EKG: All EKG's are interpreted by the Emergency Department Physician who either signs or Co-signs this chart in the absence of a cardiologist.        RADIOLOGY:   Non-plain film images such as CT, Ultrasound and MRI are read by the radiologist. Plain radiographic images are visualized and the radiologist interpretations are reviewed as follows:         LABS:  No results found for this visit on 05/29/22. EMERGENCY DEPARTMENT COURSE:   Vitals:    Vitals:    05/29/22 0846 05/29/22 0848 05/29/22 0850   BP:  136/78    Pulse: 75     Resp: 17     Temp:   97.5 °F (36.4 °C)   SpO2: 98%     Weight: 125.6 kg (277 lb)       -------------------------  BP: 136/78, Temp: 97.5 °F (36.4 °C), Heart Rate: 75, Resp: 17          CONSULTS:      PROCEDURES:  None    FINAL IMPRESSION      1. Yeast dermatitis          DISPOSITION/PLAN   Discharged in good condition    PATIENT REFERRED TO:  Willie Martino DO  [address] [place] [address]  [phone]    In 3 days        DISCHARGE MEDICATIONS:  New Prescriptions    CLOTRIMAZOLE (LOTRIMIN) 1 % CREAM    Apply topically 3 times daily to lesions on skin.     FLUCONAZOLE (DIFLUCAN) 150 MG TABLET    Take 1 tablet by mouth once for 1 dose Then repeat in 7 days       (Please note that portions of this note were completed with a voice recognition program.  Efforts were made to edit the dictations but occasionally words are mis-transcribed.)    Veronica Lee MD,, MD, F.A.A.E.M.   Attending Emergency Medicine Physician      Veronica Lee MD  05/29/22 9245

## 2022-06-07 ENCOUNTER — OFFICE VISIT (OUTPATIENT)
Dept: GASTROENTEROLOGY | Age: 47
End: 2022-06-07
Payer: MEDICARE

## 2022-06-07 VITALS
HEART RATE: 73 BPM | WEIGHT: 280 LBS | TEMPERATURE: 97.1 F | DIASTOLIC BLOOD PRESSURE: 89 MMHG | SYSTOLIC BLOOD PRESSURE: 139 MMHG | BODY MASS INDEX: 52.91 KG/M2

## 2022-06-07 DIAGNOSIS — Z12.11 COLON CANCER SCREENING: Primary | ICD-10-CM

## 2022-06-07 DIAGNOSIS — Z12.11 COLON CANCER SCREENING: ICD-10-CM

## 2022-06-07 DIAGNOSIS — K21.9 GASTROESOPHAGEAL REFLUX DISEASE, UNSPECIFIED WHETHER ESOPHAGITIS PRESENT: ICD-10-CM

## 2022-06-07 DIAGNOSIS — K58.1 IRRITABLE BOWEL SYNDROME WITH CONSTIPATION: ICD-10-CM

## 2022-06-07 PROCEDURE — 1036F TOBACCO NON-USER: CPT | Performed by: INTERNAL MEDICINE

## 2022-06-07 PROCEDURE — 99204 OFFICE O/P NEW MOD 45 MIN: CPT | Performed by: INTERNAL MEDICINE

## 2022-06-07 PROCEDURE — G8427 DOCREV CUR MEDS BY ELIG CLIN: HCPCS | Performed by: INTERNAL MEDICINE

## 2022-06-07 PROCEDURE — G8417 CALC BMI ABV UP PARAM F/U: HCPCS | Performed by: INTERNAL MEDICINE

## 2022-06-07 RX ORDER — POLYETHYLENE GLYCOL 3350, SODIUM SULFATE ANHYDROUS, SODIUM BICARBONATE, SODIUM CHLORIDE, POTASSIUM CHLORIDE 236; 22.74; 6.74; 5.86; 2.97 G/4L; G/4L; G/4L; G/4L; G/4L
POWDER, FOR SOLUTION ORAL
Qty: 4000 ML | Refills: 0 | Status: SHIPPED | OUTPATIENT
Start: 2022-06-07 | End: 2022-10-17 | Stop reason: ALTCHOICE

## 2022-06-07 RX ORDER — BISACODYL 5 MG
TABLET, DELAYED RELEASE (ENTERIC COATED) ORAL
Qty: 2 TABLET | Refills: 0 | Status: SHIPPED | OUTPATIENT
Start: 2022-06-07 | End: 2022-10-17 | Stop reason: ALTCHOICE

## 2022-06-07 ASSESSMENT — ENCOUNTER SYMPTOMS
VOMITING: 0
TROUBLE SWALLOWING: 0
NAUSEA: 0
ABDOMINAL DISTENTION: 0
BLOOD IN STOOL: 0
SHORTNESS OF BREATH: 1
WHEEZING: 1
DIARRHEA: 0
CONSTIPATION: 1
ANAL BLEEDING: 0
CHOKING: 0
RECTAL PAIN: 0
ABDOMINAL PAIN: 1
COUGH: 0

## 2022-06-07 NOTE — PROGRESS NOTES
Reason for Referral:   Mindy Martins, DO  [address] [place] [address],  [facility]    Chief Complaint   Patient presents with    Constipation     Patient is new, referred for IBS-C. She states she has tried and failed Senna.  Colon Cancer Screening     Patient was also referred for colon screening. HISTORY OF PRESENT ILLNESS: Ms.Stacey GIRMA Banks is a 42 y.o. female , referred for evaluation of** constipation    here for te firt time   Patient is very hard of hearing, but were able to communicate, she is not constipated as she does have bowel movement   twice /day BM , but hard to get out   Been like that for years > 10 , and now worse   does have ht burn twice /week   No recent labs but deandre cbc/LFTs in 2019/2020      Past Medical,Family, and Social History reviewed and does contribute to the patient presentingcondition. Patient's PMH/PSH,SH,PSYCH Hx, MEDs, ALLERGIES, and ROS were all reviewed and updated in the appropriate sections.     PAST MEDICAL HISTORY:  Past Medical History:   Diagnosis Date    Acquired acanthosis nigricans     Anemia     Arthritis     Brain cancer ([address]     Brain tumor ([address]     age 5    Contact dermatitis and other eczema, due to unspecified cause     COVID-19 01/26/2021    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)     Fibromyalgia     Herpes zoster without mention of complication     Hypothyroidism     Migraine     Seizures ([address]     Sleep apnea        Past Surgical History:   Procedure Laterality Date    BRAIN BIOPSY      tumor biopsy    COCHLEAR IMPLANT Left     COCHLEAR IMPLANT Right 05/10/2022    DENTAL SURGERY      implants 1/22    KNEE SURGERY      scope    TONSILLECTOMY         CURRENT MEDICATIONS:    Current Outpatient Medications:     [employer] SENNA 8.6 MG tablet, TAKE 1 TABLET BY MOUTH EVERY DAY, Disp: 30 tablet, Rfl: 1    KLOR-CON M10 10 MEQ extended release tablet, TAKE 1 TABLET BY MOUTH TWICE A DAY, Disp: 180 tablet, Rfl: 1    meloxicam (MOBIC) 15 MG tablet, , Disp: , Rfl:     topiramate (TOPAMAX) 50 MG tablet, TAKE 1 AND 1/2 TABLETS IN THE MORNING AND 2 TABLETS AT NIGHT., Disp: 315 tablet, Rfl: 2    gabapentin (NEURONTIN) 100 MG capsule, Start with one capsule daily may increase by one tablet up to at total of 3 capsules TID, Disp: , Rfl:     nystatin (MYCOSTATIN) 770690 UNIT/GM cream, APPLY TO AFFECTED AREA TWICE A DAY, Disp: 30 g, Rfl: 0    metFORMIN (GLUCOPHAGE) 500 MG tablet, TAKE 1 TABLET BY MOUTH TWICE A DAY WITH MEALS, Disp: 180 tablet, Rfl: 0    Handicap Placard MISC, by Does not apply route EXPIRES IN 5 YEARS FROM ORIGINAL SCRIPT DATE, Disp: 1 each, Rfl: 0    indomethacin (INDOCIN) 25 MG capsule, Take 1 capsule by mouth 2 times daily (with meals) for 5 days, Disp: 10 capsule, Rfl: 0    buPROPion (WELLBUTRIN XL) 150 MG extended release tablet, Take 300 mg by mouth daily , Disp: , Rfl:     ARIPiprazole (ABILIFY) 20 MG tablet, Take 1 tablet by mouth daily, Disp: , Rfl:     levothyroxine (SYNTHROID) 150 MCG tablet, Take 1 tablet by mouth every morning (before breakfast), Disp: , Rfl:     albuterol sulfate  (90 Base) MCG/ACT inhaler, INHALE 2 PUFFS BY MOUTH EVERY 6 HOURS AS NEEDED FOR WHEEZING, Disp: 8.5 Inhaler, Rfl: 0    EPINEPHrine (EPIPEN 2-MINNIE) 0.3 MG/0.3ML SOAJ injection, Inject 0.3 mLs into the muscle once for 1 dose Use as directed for allergic reaction, Disp: 0.3 mL, Rfl: 0    lamoTRIgine (LAMICTAL) 150 MG tablet, Take 200 mg by mouth daily Pt Is Now Taking 200 MG, Disp: , Rfl:     Calcium-Magnesium-Vitamin D (CALCIUM 1200+D3 PO), Take 2 tablets by mouth every morning, Disp: , Rfl:     LORazepam (ATIVAN) 1 MG tablet, 1 mg daily as needed. , Disp: , Rfl: 0    sertraline (ZOLOFT) 100 MG tablet, Take 150 mg by mouth daily , Disp: , Rfl:     SUMAtriptan (IMITREX) 100 MG tablet, TAKE 1 TABLET AT ONSET OFHEADACHE,MAY REPEAT 2 HOURS LATER.  MAX 2/DAY, Disp: 9 tablet, Rfl: 3   Ergocalciferol (VITAMIN D2 PO),  Take 1.25 mg by mouth daily , Disp: , Rfl:     hydrocortisone (CORTEF) 10 MG tablet, Take 10 mg by mouth 2 times daily. , Disp: , Rfl:     ALLERGIES:   Allergies   Allergen Reactions    Bactrim [Sulfamethoxazole-Trimethoprim]     Bee Venom     Ciprofloxacin     Milk-Related Compounds Diarrhea       FAMILY HISTORY:       Problem Relation Age of Onset    Cancer Mother 50        breast   Prajapati Migraines Mother     Diabetes Father     High Blood Pressure Father     High Cholesterol Father     Migraines Sister     High Blood Pressure Maternal Grandmother     Cancer Paternal Grandmother 70        colon cancer    Cancer Paternal Uncle         esophageal         SOCIAL HISTORY:   Social History     Socioeconomic History    Marital status: Single     Spouse name: Not on file    Number of children: Not on file    Years of education: Not on file    Highest education level: Not on file   Occupational History     Comment: disabled   Tobacco Use    Smoking status: Never Smoker    Smokeless tobacco: Never Used   Vaping Use    Vaping Use: Never used   Substance and Sexual Activity    Alcohol use: Yes     Alcohol/week: 0.0 standard drinks     Comment: rare    Drug use: No    Sexual activity: Not Currently     Partners: Male   Other Topics Concern    Not on file   Social History Narrative    Not on file     Social Determinants of Health     Financial Resource Strain: Low Risk     Difficulty of Paying Living Expenses: Not hard at all   Food Insecurity: No Food Insecurity    Worried About [address] [employer] in the Last Year: Never true    [facility] in the Last Year: Never true   Transportation Needs:     Lack of Transportation (Medical): Not on file    Lack of Transportation (Non-Medical):  Not on file   Physical Activity:     Days of Exercise per Week: Not on file    Minutes of Exercise per Session: Not on file   Stress:     Feeling of Stress : Not on file   Social Connections:     Frequency of Communication with Friends and Family: Not on file    Frequency of Social Gatherings with Friends and Family: Not on file    Attends [employer] Services: Not on file    Active Member of Clubs or Organizations: Not on file    Attends Club or Organization Meetings: Not on file    Marital Status: Not on file   Intimate Partner Violence:     Fear of Current or Ex-Partner: Not on file    Emotionally Abused: Not on file    Physically Abused: Not on file    Sexually Abused: Not on file   Housing Stability:     Unable to Pay for Housing in the Last Year: Not on file    Number of [place] in the Last Year: Not on file    Unstable Housing in the Last Year: Not on file       REVIEW OF SYSTEMS: A 12-point review of systemswas obtained and pertinent positives and negatives were enumerated above in the history of present illness. All other reviewed systems / symptoms were negative. Review of Systems   Constitutional: Negative for appetite change, fatigue and unexpected weight change. HENT: Positive for hearing loss. Negative for trouble swallowing. Respiratory: Positive for shortness of breath (asthma) and wheezing (asthma). Negative for cough and choking. Cardiovascular: Negative for chest pain, palpitations and leg swelling. Gastrointestinal: Positive for abdominal pain (upper) and constipation. Negative for abdominal distention, anal bleeding, blood in stool, diarrhea, nausea, rectal pain and vomiting. Genitourinary: Negative for difficulty urinating. Allergic/Immunologic: Negative for environmental allergies and food allergies. Neurological: Negative for dizziness, weakness, light-headedness, numbness and headaches. Hematological: Does not bruise/bleed easily. Psychiatric/Behavioral: Negative for sleep disturbance. The patient is not nervous/anxious.             LABORATORY DATA: Reviewed  Lab Results   Component Value Date    WBC 7.0 07/15/2020    HGB 13.1 07/15/2020    HCT 40.2 07/15/2020    MCV 85.3 07/15/2020     07/15/2020     07/15/2020    K 3.8 07/15/2020     07/15/2020    CO2 25 07/15/2020    BUN 11 07/15/2020    CREATININE 50.0 01/08/2021    LABALBU 4.1 10/16/2019    BILITOT 0.30 10/16/2019    ALKPHOS 82 10/16/2019    AST 17 10/16/2019    ALT 11 10/16/2019    INR 1.0 03/10/2015         Lab Results   Component Value Date    RBC 4.71 07/15/2020    HGB 13.1 07/15/2020    MCV 85.3 07/15/2020    MCH 27.9 07/15/2020    MCHC 32.7 07/15/2020    RDW 13.7 07/15/2020    MPV 8.4 07/15/2020    BASOPCT 1 07/15/2020    LYMPHSABS 1.90 07/15/2020    MONOSABS 0.40 07/15/2020    NEUTROABS 4.50 07/15/2020    EOSABS 0.10 07/15/2020    BASOSABS 0.00 07/15/2020         DIAGNOSTIC TESTING:     No results found. /89   Pulse 73   Temp 97.1 °F (36.2 °C)   Wt 280 lb (127 kg)   BMI 52.91 kg/m²     PHYSICAL EXAMINATION: Vital signs reviewed per the nursing documentation. Body mass index is 52.91 kg/m². Physical Exam  Vitals and nursing note reviewed. Constitutional:       General: She is not in acute distress. Appearance: She is well-developed. She is not diaphoretic. HENT:      Head: Normocephalic. Mouth/Throat:      Pharynx: No oropharyngeal exudate. Eyes:      General: No scleral icterus. Pupils: Pupils are equal, round, and reactive to light. Neck:      Thyroid: No thyromegaly. Vascular: No JVD. Trachea: No tracheal deviation. Cardiovascular:      Rate and Rhythm: Normal rate and regular rhythm. Heart sounds: Normal heart sounds. No murmur heard. Pulmonary:      Effort: Pulmonary effort is normal. No respiratory distress. Breath sounds: Normal breath sounds. No wheezing. Abdominal:      General: Bowel sounds are normal. There is no distension. Palpations: Abdomen is soft. Tenderness: There is no abdominal tenderness. There is no guarding or rebound.       Comments: No ascites Musculoskeletal:         General: Normal range of motion. Cervical back: Normal range of motion and neck supple. Skin:     General: Skin is warm. Coloration: Skin is not pale. Findings: No erythema or rash. Comments: She is not diaphoretic   Neurological:      Mental Status: She is alert and oriented to person, place, and time. Deep Tendon Reflexes: Reflexes are normal and symmetric. Psychiatric:         Behavior: Behavior normal.         Thought Content: Thought content normal.         Judgment: Judgment normal.         IMPRESSION: Ms. Alan Adam is a 42 y.o. female with      Diagnosis Orders   1. Colon cancer screening  COLONOSCOPY W/ OR W/O BIOPSY   2. Irritable bowel syndrome with constipation     3. Gastroesophageal reflux disease, unspecified whether esophagitis present  EGD     Told her to use stool softeners and will proceed with the above for now and take it from there      Diet/life style/natural hx /complication of the dx were all explained in details   Past medical, past surgical, social history, psychiatric history, medications or allergies, all reviewed and  updated        Thank you for allowing me to participate in the care of Ms. Avelar. For any further questions please do not hesitate to contact me. I have reviewed and agree with the MA/RN ROS. Note is dictated utilizing voice recognition software. Unfortunately this leads to occasional typographical errors. Please contact our office if you have any questions.     Kailey Jamison MD  [facility]  O: #[phone]

## 2022-06-30 ENCOUNTER — HOSPITAL ENCOUNTER (EMERGENCY)
Facility: CLINIC | Age: 47
Discharge: HOME | End: 2022-06-30
Attending: EMERGENCY MEDICINE
Payer: MEDICARE

## 2022-06-30 ENCOUNTER — APPOINTMENT (OUTPATIENT)
Dept: GENERAL RADIOLOGY | Facility: CLINIC | Age: 47
End: 2022-06-30
Payer: MEDICARE

## 2022-06-30 VITALS
DIASTOLIC BLOOD PRESSURE: 70 MMHG | SYSTOLIC BLOOD PRESSURE: 109 MMHG | BODY MASS INDEX: 51.92 KG/M2 | OXYGEN SATURATION: 96 % | TEMPERATURE: 98.6 F | WEIGHT: 275 LBS | RESPIRATION RATE: 16 BRPM | HEART RATE: 64 BPM | HEIGHT: 61 IN

## 2022-06-30 DIAGNOSIS — S80.12XA CONTUSION OF LEFT LOWER LEG, INITIAL ENCOUNTER: ICD-10-CM

## 2022-06-30 DIAGNOSIS — S41.112A LACERATION OF LEFT UPPER EXTREMITY, INITIAL ENCOUNTER: Primary | ICD-10-CM

## 2022-06-30 DIAGNOSIS — S80.01XA CONTUSION OF RIGHT KNEE, INITIAL ENCOUNTER: ICD-10-CM

## 2022-06-30 PROCEDURE — 12002 RPR S/N/AX/GEN/TRNK2.6-7.5CM: CPT

## 2022-06-30 PROCEDURE — 12001 RPR S/N/AX/GEN/TRNK 2.5CM/<: CPT

## 2022-06-30 PROCEDURE — 6370000000 HC RX 637 (ALT 250 FOR IP): Performed by: NURSE PRACTITIONER

## 2022-06-30 PROCEDURE — 99283 EMERGENCY DEPT VISIT LOW MDM: CPT

## 2022-06-30 PROCEDURE — 73590 X-RAY EXAM OF LOWER LEG: CPT

## 2022-06-30 PROCEDURE — 73562 X-RAY EXAM OF KNEE 3: CPT

## 2022-06-30 RX ORDER — AMOXICILLIN AND CLAVULANATE POTASSIUM 875; 125 MG/1; MG/1
1 TABLET, FILM COATED ORAL 2 TIMES DAILY
Qty: 20 TABLET | Refills: 0 | Status: SHIPPED | OUTPATIENT
Start: 2022-06-30 | End: 2022-07-10

## 2022-06-30 RX ORDER — LIDOCAINE HYDROCHLORIDE 10 MG/ML
5 INJECTION, SOLUTION INFILTRATION; PERINEURAL ONCE
Status: DISCONTINUED | OUTPATIENT
Start: 2022-06-30 | End: 2022-06-30 | Stop reason: HOSPADM

## 2022-06-30 RX ORDER — IBUPROFEN 600 MG/1
600 TABLET ORAL ONCE
Status: COMPLETED | OUTPATIENT
Start: 2022-06-30 | End: 2022-06-30

## 2022-06-30 RX ADMIN — IBUPROFEN 600 MG: 600 TABLET ORAL at 12:38

## 2022-06-30 ASSESSMENT — PAIN - FUNCTIONAL ASSESSMENT: PAIN_FUNCTIONAL_ASSESSMENT: 0-10

## 2022-06-30 ASSESSMENT — PAIN SCALES - GENERAL
PAINLEVEL_OUTOF10: 6
PAINLEVEL_OUTOF10: 6

## 2022-06-30 ASSESSMENT — ENCOUNTER SYMPTOMS
GASTROINTESTINAL NEGATIVE: 1
RESPIRATORY NEGATIVE: 1
EYES NEGATIVE: 1

## 2022-06-30 ASSESSMENT — PAIN DESCRIPTION - LOCATION: LOCATION: ARM;KNEE

## 2022-06-30 NOTE — ED PROVIDER NOTES
Emergency Department           COMPLAINT       Chief Complaint   Patient presents with    Laceration     Left BiCept    Knee Pain     Right      PHYSICAL EXAM      ED Triage Vitals [06/30/22 1214]   BP Temp Temp Source Heart Rate Resp SpO2 Height Weight   109/70 98.6 °F (37 °C) Oral 64 16 96 % 5' 1\" (1.549 m) 275 lb (124.7 kg)         Constitutional: Alert, oriented x3, nontoxic, answering questions appropriately, acting properly for age, in no acute distress   HEENT: Extraocular muscles intact,   Neck: Trachea midline no posterior midline neck tenderness palpation  Cardiovascular: Regular rhythm and rate no murmurs   Respiratory: Diminished to auscultation bilaterally no wheezes, rhonchi, rales, no respiratory distress no tachypnea no retractions no hypoxia  Gastrointestinal: Soft, nontender, nondistended, positive bowel sounds. No rebound, rigidity, or guarding. Musculoskeletal: Left upper extremity 3 cm laceration to the inner aspect of the left medial distal humerus with subcutaneous tissue exposed. Radial ulnar arteries tachycardia capillary fill less than 2 seconds. Area of ecchymosis proximal to the laceration. Right lower extremity there is ecchymosis to the right knee and left lower extremity there is ecchymosis to the left tib-fib. There is an abrasion to her right posterior humerus midshaft  Neurologic: Moving all 4 extremities without difficulty there are no gross focal neurologic deficits   Skin: Warm and dry       Physical Exam  DIAGNOSTIC RESULTS     EKG: All EKG's are interpreted by the Emergency Department Physician who either signs or Co-signs this chart in the absence of a cardiologist.    Not indicated unless otherwise documented above or in the midlevel documentation    LABS:  No results found for this visit on 06/30/22.     Not indicated unless otherwise documented above or in the midlevel documentation    RADIOLOGY:   I reviewedthe radiologist interpretations:  XR KNEE RIGHT (3 VIEWS)   Final Result   1. No acute bony or joint abnormality   2. Tricompartmental osteoarthritic changes similar to 2014         XR TIBIA FIBULA LEFT (2 VIEWS)   Final Result   No acute osseous abnormality. Not indicated unless otherwise documented above or in the midlevel documentation    EMERGENCY DEPARTMENT COURSE:       PERTINENT ATTENDING PHYSICIAN COMMENTS:    Mechanical fall no loss of consciousness. Laceration left humerus repair. X-ray right knee and left tib-fib.    1 PM x-rays unremarkable laceration repaired. Supportive care. Watch for signs of infection. Ice as needed. Motrin or Tylenol for pain. Follow-up in 10 days for suture removal.  Return if worsening symptoms or other concerns. Faculty Attestation    I performed a history and physical examination of the patient and discussed management with the mid level provideer. I reviewed the mid level provider's note and agree with the documented findings and plan of care. Any areas of disagreement are noted on the chart. I was personally present for the key portions of any procedures. I have documented in the chart those procedures where I was not present during the key portions. I have reviewed the emergency nurses triage note. I agree with the chief complaint, past medical history, past surgical history, allergies, medications, social and family history as documented unless otherwise noted below. Documentation of the HPI, Physical Exam and Medical Decision Making performed by medical students or scribes is based on my personal performance of the HPI, PE and MDM. For Physician Assistant/ Nurse Practitioner cases/documentation I have personally evaluated this patient and have completed at least one if not all key elements of the E/M (history, physical exam, and MDM). Additional findings are as noted.        Kori Phillips, DO  06/30/22 [address] [facility], DO  07/04/22 [id number]

## 2022-08-12 ENCOUNTER — ANESTHESIA EVENT (OUTPATIENT)
Dept: OPERATING ROOM | Age: 47
End: 2022-08-12
Payer: MEDICARE

## 2022-08-12 ENCOUNTER — HOSPITAL ENCOUNTER (OUTPATIENT)
Age: 47
Setting detail: OUTPATIENT SURGERY
Discharge: HOME | End: 2022-08-12
Attending: INTERNAL MEDICINE | Admitting: INTERNAL MEDICINE
Payer: MEDICARE

## 2022-08-12 ENCOUNTER — ANESTHESIA (OUTPATIENT)
Dept: OPERATING ROOM | Age: 47
End: 2022-08-12
Payer: MEDICARE

## 2022-08-12 VITALS
HEIGHT: 61 IN | BODY MASS INDEX: 51.69 KG/M2 | RESPIRATION RATE: 25 BRPM | DIASTOLIC BLOOD PRESSURE: 89 MMHG | HEART RATE: 73 BPM | TEMPERATURE: 97.9 F | SYSTOLIC BLOOD PRESSURE: 134 MMHG | WEIGHT: 273.8 LBS | OXYGEN SATURATION: 98 %

## 2022-08-12 DIAGNOSIS — K21.9 GASTROESOPHAGEAL REFLUX DISEASE, UNSPECIFIED WHETHER ESOPHAGITIS PRESENT: ICD-10-CM

## 2022-08-12 DIAGNOSIS — Z12.11 ENCOUNTER FOR SCREENING FOR MALIGNANT NEOPLASM OF COLON: ICD-10-CM

## 2022-08-12 LAB
GLUCOSE BLD-MCNC: 77 MG/DL (ref 65–105)
GLUCOSE BLD-MCNC: 85 MG/DL (ref 65–105)

## 2022-08-12 PROCEDURE — 43239 EGD BIOPSY SINGLE/MULTIPLE: CPT | Performed by: INTERNAL MEDICINE

## 2022-08-12 PROCEDURE — 3700000000 HC ANESTHESIA ATTENDED CARE: Performed by: INTERNAL MEDICINE

## 2022-08-12 PROCEDURE — 82947 ASSAY GLUCOSE BLOOD QUANT: CPT

## 2022-08-12 PROCEDURE — 7100000011 HC PHASE II RECOVERY - ADDTL 15 MIN: Performed by: INTERNAL MEDICINE

## 2022-08-12 PROCEDURE — 3609010300 HC COLONOSCOPY W/BIOPSY SINGLE/MULTIPLE: Performed by: INTERNAL MEDICINE

## 2022-08-12 PROCEDURE — 2500000003 HC RX 250 WO HCPCS: Performed by: NURSE ANESTHETIST, CERTIFIED REGISTERED

## 2022-08-12 PROCEDURE — 6360000002 HC RX W HCPCS: Performed by: NURSE ANESTHETIST, CERTIFIED REGISTERED

## 2022-08-12 PROCEDURE — 45380 COLONOSCOPY AND BIOPSY: CPT | Performed by: INTERNAL MEDICINE

## 2022-08-12 PROCEDURE — 7100000010 HC PHASE II RECOVERY - FIRST 15 MIN: Performed by: INTERNAL MEDICINE

## 2022-08-12 PROCEDURE — 2580000003 HC RX 258

## 2022-08-12 PROCEDURE — 3700000001 HC ADD 15 MINUTES (ANESTHESIA): Performed by: INTERNAL MEDICINE

## 2022-08-12 PROCEDURE — 2709999900 HC NON-CHARGEABLE SUPPLY: Performed by: INTERNAL MEDICINE

## 2022-08-12 PROCEDURE — 88305 TISSUE EXAM BY PATHOLOGIST: CPT

## 2022-08-12 PROCEDURE — 2580000003 HC RX 258: Performed by: ANESTHESIOLOGY

## 2022-08-12 PROCEDURE — 3609012400 HC EGD TRANSORAL BIOPSY SINGLE/MULTIPLE: Performed by: INTERNAL MEDICINE

## 2022-08-12 RX ORDER — LIDOCAINE HYDROCHLORIDE 10 MG/ML
1 INJECTION, SOLUTION EPIDURAL; INFILTRATION; INTRACAUDAL; PERINEURAL
Status: DISCONTINUED | OUTPATIENT
Start: 2022-08-12 | End: 2022-08-12 | Stop reason: HOSPADM

## 2022-08-12 RX ORDER — SODIUM CHLORIDE 9 MG/ML
INJECTION, SOLUTION INTRAVENOUS PRN
Status: DISCONTINUED | OUTPATIENT
Start: 2022-08-12 | End: 2022-08-12 | Stop reason: HOSPADM

## 2022-08-12 RX ORDER — PROPOFOL 10 MG/ML
INJECTION, EMULSION INTRAVENOUS PRN
Status: DISCONTINUED | OUTPATIENT
Start: 2022-08-12 | End: 2022-08-12 | Stop reason: SDUPTHER

## 2022-08-12 RX ORDER — DEXTROSE MONOHYDRATE 100 MG/ML
INJECTION, SOLUTION INTRAVENOUS CONTINUOUS
Status: DISCONTINUED | OUTPATIENT
Start: 2022-08-12 | End: 2022-08-12 | Stop reason: HOSPADM

## 2022-08-12 RX ORDER — DEXTROSE MONOHYDRATE 100 MG/ML
INJECTION, SOLUTION INTRAVENOUS
Status: COMPLETED
Start: 2022-08-12 | End: 2022-08-12

## 2022-08-12 RX ORDER — SODIUM CHLORIDE, SODIUM LACTATE, POTASSIUM CHLORIDE, CALCIUM CHLORIDE 600; 310; 30; 20 MG/100ML; MG/100ML; MG/100ML; MG/100ML
INJECTION, SOLUTION INTRAVENOUS CONTINUOUS
Status: DISCONTINUED | OUTPATIENT
Start: 2022-08-12 | End: 2022-08-12 | Stop reason: HOSPADM

## 2022-08-12 RX ORDER — LIDOCAINE HYDROCHLORIDE 20 MG/ML
INJECTION, SOLUTION EPIDURAL; INFILTRATION; INTRACAUDAL; PERINEURAL PRN
Status: DISCONTINUED | OUTPATIENT
Start: 2022-08-12 | End: 2022-08-12 | Stop reason: SDUPTHER

## 2022-08-12 RX ORDER — SODIUM CHLORIDE 9 MG/ML
INJECTION, SOLUTION INTRAVENOUS CONTINUOUS
Status: DISCONTINUED | OUTPATIENT
Start: 2022-08-12 | End: 2022-08-12

## 2022-08-12 RX ORDER — SODIUM CHLORIDE 0.9 % (FLUSH) 0.9 %
5-40 SYRINGE (ML) INJECTION EVERY 12 HOURS SCHEDULED
Status: DISCONTINUED | OUTPATIENT
Start: 2022-08-12 | End: 2022-08-12 | Stop reason: HOSPADM

## 2022-08-12 RX ORDER — SODIUM CHLORIDE 0.9 % (FLUSH) 0.9 %
5-40 SYRINGE (ML) INJECTION PRN
Status: DISCONTINUED | OUTPATIENT
Start: 2022-08-12 | End: 2022-08-12 | Stop reason: HOSPADM

## 2022-08-12 RX ADMIN — PROPOFOL 20 MG: 10 INJECTION, EMULSION INTRAVENOUS at 07:46

## 2022-08-12 RX ADMIN — DEXTROSE MONOHYDRATE: 100 INJECTION, SOLUTION INTRAVENOUS at 07:03

## 2022-08-12 RX ADMIN — PROPOFOL 20 MG: 10 INJECTION, EMULSION INTRAVENOUS at 07:56

## 2022-08-12 RX ADMIN — PROPOFOL 20 MG: 10 INJECTION, EMULSION INTRAVENOUS at 08:18

## 2022-08-12 RX ADMIN — PROPOFOL 20 MG: 10 INJECTION, EMULSION INTRAVENOUS at 08:13

## 2022-08-12 RX ADMIN — PROPOFOL 20 MG: 10 INJECTION, EMULSION INTRAVENOUS at 07:57

## 2022-08-12 RX ADMIN — LIDOCAINE HYDROCHLORIDE 100 MG: 20 INJECTION, SOLUTION EPIDURAL; INFILTRATION; INTRACAUDAL; PERINEURAL at 07:45

## 2022-08-12 RX ADMIN — SODIUM CHLORIDE, POTASSIUM CHLORIDE, SODIUM LACTATE AND CALCIUM CHLORIDE: 600; 310; 30; 20 INJECTION, SOLUTION INTRAVENOUS at 06:54

## 2022-08-12 RX ADMIN — PROPOFOL 50 MG: 10 INJECTION, EMULSION INTRAVENOUS at 07:45

## 2022-08-12 RX ADMIN — PROPOFOL 20 MG: 10 INJECTION, EMULSION INTRAVENOUS at 08:15

## 2022-08-12 RX ADMIN — PROPOFOL 20 MG: 10 INJECTION, EMULSION INTRAVENOUS at 08:07

## 2022-08-12 RX ADMIN — PROPOFOL 20 MG: 10 INJECTION, EMULSION INTRAVENOUS at 08:04

## 2022-08-12 RX ADMIN — PROPOFOL 20 MG: 10 INJECTION, EMULSION INTRAVENOUS at 08:10

## 2022-08-12 RX ADMIN — PROPOFOL 20 MG: 10 INJECTION, EMULSION INTRAVENOUS at 07:51

## 2022-08-12 RX ADMIN — PROPOFOL 20 MG: 10 INJECTION, EMULSION INTRAVENOUS at 08:02

## 2022-08-12 RX ADMIN — PROPOFOL 20 MG: 10 INJECTION, EMULSION INTRAVENOUS at 07:59

## 2022-08-12 RX ADMIN — PROPOFOL 20 MG: 10 INJECTION, EMULSION INTRAVENOUS at 07:48

## 2022-08-12 RX ADMIN — PROPOFOL 20 MG: 10 INJECTION, EMULSION INTRAVENOUS at 07:54

## 2022-08-12 ASSESSMENT — PAIN SCALES - GENERAL: PAINLEVEL_OUTOF10: 0

## 2022-08-12 ASSESSMENT — PAIN - FUNCTIONAL ASSESSMENT: PAIN_FUNCTIONAL_ASSESSMENT: 0-10

## 2022-08-12 NOTE — ANESTHESIA PRE PROCEDURE
Department of Anesthesiology  Preprocedure Note       Name:  Bertha Alejo   Age:  42 y.o.  :  1975                                          MRN:  [id number]         Date:  2022      Surgeon: Nabil Espana):  Jeanetta Pallas, MD    Procedure: Procedure(s):  COLONOSCOPY DIAGNOSTIC  EGD ESOPHAGOGASTRODUODENOSCOPY    Medications prior to admission:   Prior to Admission medications    Medication Sig Start Date End Date Taking? Authorizing Provider   KLOR-CON M10 10 MEQ extended release tablet TAKE 1 TABLET BY MOUTH TWICE A DAY 22   Mariann Choi DO   polyethylene glycol (GOLYTELY) 236 g solution Please follow instructions given to you by your provider 22   Jeanetta Pallas, MD   bisacodyl (BISACODYL) 5 MG EC tablet Please follow instructions given to you by your provider 22   Jeanetta Pallas, MD   [facility] SENNA 8.6 MG tablet TAKE 1 TABLET BY MOUTH EVERY DAY 3/21/22   Juan Juarez DO   meloxicam (MOBIC) 15 MG tablet  21   Historical Provider, MD   topiramate (TOPAMAX) 50 MG tablet TAKE 1 AND 1/2 TABLETS IN THE MORNING AND 2 TABLETS AT NIGHT.  21   Edouard Mcdonald MD   gabapentin (NEURONTIN) 100 MG capsule Start with one capsule daily may increase by one tablet up to at total of 3 capsules TID 21   Historical Provider, MD   nystatin (MYCOSTATIN) 616059 UNIT/GM cream APPLY TO AFFECTED AREA TWICE A DAY 10/25/21   Mariann Choi DO   metFORMIN (GLUCOPHAGE) 500 MG tablet TAKE 1 TABLET BY MOUTH TWICE A DAY WITH MEALS 6/10/21   Juan Juarez DO   Handicap Placard [facility] by Does not apply route EXPIRES IN 5 YEARS FROM ORIGINAL SCRIPT DATE 21   Juan Juarez DO   indomethacin (INDOCIN) 25 MG capsule Take 1 capsule by mouth 2 times daily (with meals) for 5 days  Patient not taking: Reported on 2022  Edouard Mcdonald MD   buPROPion (WELLBUTRIN XL) 150 MG extended release tablet Take 300 mg by mouth daily  20   Historical Provider, MD   ARIPiprazole (ABILIFY) 20 MG tablet Take 1 tablet by mouth daily 11/23/20   Historical Provider, MD   levothyroxine (SYNTHROID) 150 MCG tablet Take 1 tablet by mouth every morning (before breakfast) 11/20/20   Historical Provider, MD   albuterol sulfate  (90 Base) MCG/ACT inhaler INHALE 2 PUFFS BY MOUTH EVERY 6 HOURS AS NEEDED FOR WHEEZING 7/13/20   Moni Choi DO   EPINEPHrine (EPIPEN 2-MINNIE) 0.3 MG/0.3ML SOAJ injection Inject 0.3 mLs into the muscle once for 1 dose Use as directed for allergic reaction 7/8/20 8/12/22  Lori De La Cruz DO   lamoTRIgine (LAMICTAL) 150 MG tablet Take 200 mg by mouth daily Pt Is Now Taking 200 MG    Historical Provider, MD   Calcium-Magnesium-Vitamin D (CALCIUM 1200+D3 PO) Take 2 tablets by mouth every morning    Historical Provider, MD   LORazepam (ATIVAN) 1 MG tablet 1 mg daily as needed. 1/29/16   Historical Provider, MD   sertraline (ZOLOFT) 100 MG tablet Take 150 mg by mouth daily     Historical Provider, MD   SUMAtriptan (IMITREX) 100 MG tablet TAKE 1 TABLET AT ONSET OFHEADACHE,MAY REPEAT 2 HOURS LATER. MAX 2/DAY 1/22/15   Kang Owen MD   Ergocalciferol (VITAMIN D2 PO)   Take 1.25 mg by mouth daily     Historical Provider, MD   hydrocortisone (CORTEF) 10 MG tablet Take 10 mg by mouth 2 times daily. Historical Provider, MD       Current medications:    Current Facility-Administered Medications   Medication Dose Route Frequency Provider Last Rate Last Admin    lidocaine PF 1 % injection 1 mL  1 mL IntraDERmal Once PRN Vanesa Meneses MD        lactated ringers infusion   IntraVENous Continuous Ndal Blake Maza MD        sodium chloride flush 0.9 % injection 5-40 mL  5-40 mL IntraVENous 2 times per day Vanesa Meneses MD        sodium chloride flush 0.9 % injection 5-40 mL  5-40 mL IntraVENous PRN Vanesa Meneses MD        0.9 % sodium chloride infusion   IntraVENous PRN Vanesa Meneses MD           Allergies:     Allergies   Allergen Reactions    Bactrim [Sulfamethoxazole-Trimethoprim]     Bee Venom  Ciprofloxacin     Milk-Related Compounds Diarrhea       Problem List:    Patient Active Problem List   Diagnosis Code    Seizure ([facility] R56.9    Hypothyroidism E03.9    Asthma J45.909    Morbid obesity ([facility] E66.01    ROHIT on CPAP G47.33, Z99.89    Hypopituitarism ([facility] E23.0    Moderate episode of recurrent major depressive disorder ([facility] F33.1    Type 2 diabetes mellitus with stage 3a chronic kidney disease, without long-term current use of insulin ([facility] E11.22, N18.31    Irritable bowel syndrome with constipation K58.1       Past Medical History:        Diagnosis Date    Acquired acanthosis nigricans     Anemia     Arthritis     Asthma     Brain cancer ([facility]     Brain tumor [facility])     age 5    Contact dermatitis and other eczema, due to unspecified cause     COVID-19 01/26/2021    Diabetes mellitus ([facility]     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)     Fibromyalgia     Herpes zoster without mention of complication     Hypothyroidism     Migraine     Seizures ([facility]     last seizure in 2000    Sleep apnea     CPAP    TIA (transient ischemic attack) 2000       Past Surgical History:        Procedure Laterality Date    BRAIN BIOPSY      tumor biopsy    COCHLEAR IMPLANT Left     COCHLEAR IMPLANT Right 05/10/2022    DENTAL SURGERY      implants 1/22    KNEE SURGERY      scope    TONSILLECTOMY         Social History:    Social History     Tobacco Use    Smoking status: Never    Smokeless tobacco: Never   Substance Use Topics    Alcohol use:  Yes     Alcohol/week: 0.0 standard drinks     Comment: rare                                Counseling given: Not Answered      Vital Signs (Current):   Vitals:    08/12/22 0616 08/12/22 0622   BP:  121/77   Pulse:  67   Resp:  16   Temp:  97.1 °F (36.2 °C)   TempSrc:  Temporal   SpO2:  100%   Weight: 273 lb 12.8 oz (124.2 kg)    Height: 5' 1\" (1.549 m)                                               BP Readings from Last 3 Encounters:   08/12/22 121/77   06/30/22 109/70   06/07/22 139/89       NPO Status:                                                                                 BMI:   Wt Readings from Last 3 Encounters:   08/12/22 273 lb 12.8 oz (124.2 kg)   06/30/22 275 lb (124.7 kg)   06/07/22 280 lb (127 kg)     Body mass index is 51.73 kg/m². CBC:   Lab Results   Component Value Date/Time    WBC 7.0 07/15/2020 05:29 PM    RBC 4.71 07/15/2020 05:29 PM    HGB 13.1 07/15/2020 05:29 PM    HCT 40.2 07/15/2020 05:29 PM    MCV 85.3 07/15/2020 05:29 PM    RDW 13.7 07/15/2020 05:29 PM     07/15/2020 05:29 PM       CMP:   Lab Results   Component Value Date/Time     07/15/2020 05:29 PM    K 3.8 07/15/2020 05:29 PM     07/15/2020 05:29 PM    CO2 25 07/15/2020 05:29 PM    BUN 11 07/15/2020 05:29 PM    CREATININE 50.0 01/08/2021 05:50 PM    CREATININE 1.00 07/15/2020 05:29 PM    GFRAA >60 07/15/2020 05:29 PM    LABGLOM >60 07/15/2020 05:29 PM    GLUCOSE 104 07/15/2020 05:29 PM    PROT 7.9 10/16/2019 08:45 AM    CALCIUM 8.6 07/15/2020 05:29 PM    BILITOT 0.30 10/16/2019 08:45 AM    ALKPHOS 82 10/16/2019 08:45 AM    AST 17 10/16/2019 08:45 AM    ALT 11 10/16/2019 08:45 AM       POC Tests: No results for input(s): POCGLU, POCNA, POCK, POCCL, POCBUN, POCHEMO, POCHCT in the last 72 hours.     Coags:   Lab Results   Component Value Date/Time    PROTIME 10.7 03/10/2015 11:25 AM    INR 1.0 03/10/2015 11:25 AM    APTT 24.9 03/10/2015 11:25 AM       HCG (If Applicable):   Lab Results   Component Value Date    HCG NEGATIVE 03/27/2013        ABGs: No results found for: PHART, PO2ART, ZFO6YNH, CMK5TGF, BEART, L1BDMUWL     Type & Screen (If Applicable):  No results found for: LABABO, LABRH    Drug/Infectious Status (If Applicable):  Lab Results   Component Value Date/Time    HEPCAB NONREACTIVE 03/22/2017 08:23 AM       COVID-19 Screening (If Applicable): No results found for: COVID19        Anesthesia Evaluation  Patient summary reviewed and Nursing notes reviewed no history of anesthetic complications:   Airway: Mallampati: IV  TM distance: >3 FB   Neck ROM: full  Mouth opening: > = 3 FB   Dental:    (+) edentulous      Pulmonary:   (+) sleep apnea: on CPAP,  asthma:                            Cardiovascular:  Exercise tolerance: no interval change,       (-) valvular problems/murmurs, CABG/stent, dysrhythmias and  angina    ECG reviewed               Beta Blocker:  Not on Beta Blocker         Neuro/Psych:   (+) seizures (2000): well controlled, TIA, headaches: migraine headaches, depression/anxiety             GI/Hepatic/Renal:   (+) GERD:, renal disease:, morbid obesity     (-) liver disease       Endo/Other:    (+) DiabetesType II DM, , hypothyroidism, blood dyscrasia: anemia:., .                 Abdominal:             Vascular:     - DVT and PE. Other Findings:           Anesthesia Plan      MAC     ASA 3     (Placement Date: 07/13/21; Placement Time: 2005 (created via procedure documentation); Mask Ventilation: Ventilated by mask; Type: Cuffed, Inflated; Tube Size: 7 mm; Laryngoscope: Mac; Blade Size: 3; Location: Oral; Grade View: 1; Insertion Attempts: 1; Placement Verification: Auscultation, End tidal CO2, Symmetrical chest wall movement; Secured at: 21 cm; Airway Comment: Dentition unchanged; Removal Date: 07/13/21; Removal Time: 1025)  Induction: intravenous. Anesthetic plan and risks discussed with patient. Plan discussed with CRNA.     Attending anesthesiologist reviewed and agrees with Isac Uribe MD   8/12/2022

## 2022-08-12 NOTE — H&P
History and Physical Service   [place] [id number]    HISTORY AND PHYSICAL EXAMINATION            Date of Evaluation: 8/12/2022  Patient name:  Afua Muro  MRN:   [id number]  YOB: 1975  PCP:    Harvinder Capone DO    History Obtained From:     Patient, medical records    History of Present Illness: This is Afua Muro a 42 y.o. female who presents today for a COLONOSCOPY DIAGNOSTIC, EGD ESOPHAGOGASTRODUODENOSCOPY by Elizabeth Michel MD for Gastroesophageal reflux disease, unspecified whether esophagitis present [K21.9] Encounter for screening for malignant neoplasm of colon [Z12.11]. Patient has complaints of constipation alternating with constipation. She has GERD and occasional abdominal pain. Patient has hiccups after eating any food. She denies bloody tarry stools,  nausea, vomiting, or unintentional weight loss. Patient followed bowel prep until watery clear. Previous colonoscopy about 5 years ago and unsure of date for EGD. FH colon cancer in paternal grandmother and esophageal cancer in paternal uncle. Denies fever, chills, shortness of breath, cough, congestion, wheezing, chest pain, open sores or wounds. Hx diabetes. POC BS 85. Denies taking any blood thinning medications in the last 10 days.      Past Medical History:     Past Medical History:   Diagnosis Date    Acquired acanthosis nigricans     Anemia     Arthritis     Asthma     Brain cancer ([facility]     Brain tumor [facility])     age 5    Contact dermatitis and other eczema, due to unspecified cause     COVID-19 01/26/2021    Diabetes mellitus [facility])     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)     Fibromyalgia     Herpes zoster without mention of complication     Hypothyroidism     Migraine     Seizures ([facility]     last seizure in 2000    Sleep apnea     CPAP    TIA (transient ischemic attack) 2000        Past Surgical History:     Past Surgical History:   Procedure Laterality Date    BRAIN BIOPSY      tumor biopsy    COCHLEAR IMPLANT Left     COCHLEAR IMPLANT Right 05/10/2022    DENTAL SURGERY      implants 1/22    KNEE SURGERY      scope    TONSILLECTOMY          Medications Prior to Admission:     Prior to Admission medications    Medication Sig Start Date End Date Taking? Authorizing Provider   KLOR-CON M1Loreta 10 MEQ extended release tablet TAKE 1 TABLET BY MOUTH TWICE A DAY 7/25/22   Shaheed Choi DO   polyethylene glycol (GOLYTELY) 236 g solution Please follow instructions given to you by your provider 6/7/22   Andreea Sandoval MD   bisacodyl (BISACODYL) 5 MG EC tablet Please follow instructions given to you by your provider 6/7/22   Andreea Sandoval MD   [facility] SENNA 8.6 MG tablet TAKE 1 TABLET BY MOUTH EVERY DAY 3/21/22   Angelica Delgado DO   meloxicam (MOBIC) 15 MG tablet  12/13/21   Historical Provider, MD   topiramate (TOPAMAX) 50 MG tablet TAKE 1 AND 1/2 TABLETS IN THE MORNING AND 2 TABLETS AT NIGHT.  12/6/21   Gadiel Silva MD   gabapentin (NEURONTIN) 100 MG capsule Start with one capsule daily may increase by one tablet up to at total of 3 capsules TID 8/18/21   Historical Provider, MD   nystatin (MYCOSTATIN) 713387 UNIT/GM cream APPLY TO AFFECTED AREA TWICE A DAY 10/25/21   Shaheed Choi DO   metFORMIN (GLUCOPHAGE) 500 MG tablet TAKE 1 TABLET BY MOUTH TWICE A DAY WITH MEALS 6/10/21   Angelica Delgado DO   Handicap Placard [facility] [facility] by Does not apply route EXPIRES IN 5 YEARS FROM ORIGINAL SCRIPT DATE 4/28/21   Angelica Delgado DO   indomethacin (INDOCIN) 25 MG capsule Take 1 capsule by mouth 2 times daily (with meals) for 5 days  Patient not taking: Reported on 8/12/2022 1/27/21 1/20/22  Gadiel Silva MD   buPROPion (WELLBUTRIN XL) 150 MG extended release tablet Take 300 mg by mouth daily  12/16/20   Historical Provider, MD   ARIPiprazole (ABILIFY) 20 MG tablet Take 1 tablet by mouth daily 11/23/20   Historical Provider, MD   levothyroxine (SYNTHROID) 150 MCG tablet Take 1 tablet by mouth every morning (before breakfast) 11/20/20   Historical Provider, MD   albuterol sulfate  (90 Base) MCG/ACT inhaler INHALE 2 PUFFS BY MOUTH EVERY 6 HOURS AS NEEDED FOR WHEEZING 7/13/20   Yanira Choi DO   EPINEPHrine (EPIPEN 2-MINNIE) 0.3 MG/0.3ML SOAJ injection Inject 0.3 mLs into the muscle once for 1 dose Use as directed for allergic reaction 7/8/20 8/12/22  Ina Mahajan DO   lamoTRIgine (LAMICTAL) 150 MG tablet Take 200 mg by mouth daily Pt Is Now Taking 200 MG    Historical Provider, MD   Calcium-Magnesium-Vitamin D (CALCIUM 1200+D3 PO) Take 2 tablets by mouth every morning    Historical Provider, MD   LORazepam (ATIVAN) 1 MG tablet 1 mg daily as needed. 1/29/16   Historical Provider, MD   sertraline (ZOLOFT) 100 MG tablet Take 150 mg by mouth daily     Historical Provider, MD   SUMAtriptan (IMITREX) 100 MG tablet TAKE 1 TABLET AT ONSET OFHEADACHE,MAY REPEAT 2 HOURS LATER. MAX 2/DAY 1/22/15   Josue Fisher MD   Ergocalciferol (VITAMIN D2 PO)   Take 1.25 mg by mouth daily     Historical Provider, MD   hydrocortisone (CORTEF) 10 MG tablet Take 10 mg by mouth 2 times daily. Historical Provider, MD        Allergies:     Bactrim [sulfamethoxazole-trimethoprim], Bee venom, Ciprofloxacin, and Milk-related compounds    Social History:     Tobacco:    reports that she has never smoked. She has never used smokeless tobacco.  Alcohol:      reports current alcohol use. Drug Use:  reports no history of drug use. Family History:     Family History   Problem Relation Age of Onset    Cancer Mother 50        breast    Migraines Mother     Diabetes Father     High Blood Pressure Father     High Cholesterol Father     Migraines Sister     High Blood Pressure Maternal Grandmother     Cancer Paternal Grandmother 70        colon cancer    Cancer Paternal Uncle         esophageal       Review of Systems:     Positive and Negative as described in HPI.     CONSTITUTIONAL:  negative for fevers, chills, sweats, fatigue, weight loss  HEENT: Bilateral cochlear implants. Sore throat. Wears glasses. negative for runny nose  RESPIRATORY: Sleep apnea - wears CPAP. negative for shortness of breath, cough, congestion, wheezing. CARDIOVASCULAR:  negative for chest pain, palpitations. GASTROINTESTINAL: See HPI  GENITOURINARY: Urinary frequency negative for difficulty of urination, burning with urination  INTEGUMENT: Easy bruising negative for rash, skin lesions  HEMATOLOGIC/LYMPHATIC: Anemia  negative for swelling/edema   ALLERGIC/IMMUNOLOGIC:  negative for urticaria , itching  ENDOCRINE: Hypothyroid. Hypopituitarism. Diabetes. Follows with endocrinology. negative increase in drinking, increase in urination, hot or cold intolerance  MUSCULOSKELETAL: Fibromyalgia. Left hip pain. negative muscle aches, swelling of joints  NEUROLOGICAL: Hx brain cancer. Seizures (last seizure 2000). TIA. Occasional headache. Lightheaded today due to blood sugar - anesthesia Dr. Pk Holguin notified and orders placed. negative for  dizziness, numbness, pain, tingling extremities  BEHAVIOR/PSYCH: Depression negative for anxiety    Physical Exam:   /77   Pulse 67   Temp 97.1 °F (36.2 °C) (Temporal)   Resp 16   Ht 5' 1\" (1.549 m)   Wt 273 lb 12.8 oz (124.2 kg)   SpO2 100%   BMI 51.73 kg/m²   No LMP recorded. (Menstrual status: Other - See Notes). No obstetric history on file. Recent Labs     08/12/22  0652   POCGLU 85       General Appearance:  alert, well appearing, and in no acute distress morbidly obese  Mental status: oriented to person, place, and time with normal affect  Head:  normocephalic, atraumatic. Eye: wearing glasses. no icterus, redness, pupils equal and reactive, extraocular eye movements intact, conjunctiva clear  Ear: Hard of hearing. Right hearing aid and cochlear implant on. Left hearing removed.  normal external ear, no discharge  Nose:  no drainage noted  Mouth: edentulous mucous membranes moist  Neck: supple, thick, no carotid bruits, thyroid not palpable  Lungs: Bilateral equal air entry, clear to ausculation, no wheezing, rales or rhonchi, normal effort  Cardiovascular: HR 67 normal rate, regular rhythm, no murmur, gallop, rub. Abdomen: Soft, obese, nontender, nondistended, normal bowel sounds  Neurologic: There are no new focal motor or sensory deficits, normal muscle tone and bulk, no abnormal sensation, normal speech, cranial nerves II through XII grossly intact  Skin: Scattered bruising bilateral upper extremities. No gross lesions, rashes, or bleeding on exposed skin area  Extremities:  peripheral pulses palpable, no pedal edema or calf pain with palpation  Psych: normal affect     Investigations:      Laboratory Testing:  Recent Results (from the past 24 hour(s))   POC Glucose Fingerstick    Collection Time: 08/12/22  6:52 AM   Result Value Ref Range    POC Glucose 85 65 - 105 mg/dL       No results for input(s): HGB, HCT, WBC, MCV, PLATELET, NA, K, CL, CO2, BUN, CREATININE, GLUCOSE, INR, PROTIME, APTT, AST, ALT, LABALBU, HCG in the last 720 hours. No results for input(s): COVID19 in the last 720 hours. Imaging/Diagnostics:    No results found.     Diagnosis:      Gastroesophageal reflux disease, unspecified whether esophagitis present [K21.9]  Encounter for screening for malignant neoplasm of colon [Z12.11]    Plans:     1. COLONOSCOPY DIAGNOSTIC, EGD ESOPHAGOGASTRODUODENOSCOPY      ADELA Galeano CNP  8/12/2022  7:02 AM

## 2022-08-12 NOTE — ANESTHESIA POSTPROCEDURE EVALUATION
Department of Anesthesiology  Postprocedure Note    Patient: Benito Bentley  MRN: [id number]  YOB: 1975  Date of evaluation: 8/12/2022      Procedure Summary     Date: 08/12/22 Room / Location: [place] [address] / [facility] - INPATIENT    Anesthesia Start: 3175 Anesthesia Stop: 3569    Procedures:       COLONOSCOPY WITH BIOPSY      EGD BIOPSY Diagnosis:       Gastroesophageal reflux disease, unspecified whether esophagitis present      Encounter for screening for malignant neoplasm of colon      (Gastroesophageal reflux disease, unspecified whether esophagitis present [K21.9])      (Encounter for screening for malignant neoplasm of colon [Z12.11])    Surgeons: Brian Benitez MD Responsible Provider: Karis Schlatter, MD    Anesthesia Type: MAC ASA Status: 3          Anesthesia Type: No value filed.     Faisal Phase I:      Faisal Phase II: Faisal Score: 10      Anesthesia Post Evaluation    Patient location during evaluation: PACU  Patient participation: complete - patient participated  Level of consciousness: awake  Airway patency: patent  Nausea & Vomiting: no nausea and no vomiting  Complications: no  Cardiovascular status: hemodynamically stable  Respiratory status: acceptable  Hydration status: stable  Multimodal analgesia pain management approach

## 2022-08-12 NOTE — OP NOTE
PROCEDURE NOTE    DATE OF PROCEDURE: 8/12/2022    SURGEON: Denise Peterson MD  Facility : [facility] [address] [facility]  ASSISTANT: None  Anesthesia: MAC  PREOPERATIVE DIAGNOSIS:   Screening    POSTOPERATIVE DIAGNOSIS: as described below    OPERATION: Total colonoscopy     ANESTHESIA: Moderate Sedation    ESTIMATED BLOOD LOSS: less than 50     COMPLICATIONS: None. SPECIMENS:  Was Obtained:     Sigmoid polyp 8 mm removed with cold forceps    HISTORY: The patient is a 42y.o. year old female with history of above preop diagnosis. I recommended colonoscopy with possible biopsy or polypectomy and I explained the risk, benefits, expected outcome, and alternatives to the procedure. Risks included but are not limited to bleeding, infection, respiratory distress, hypotension, and perforation of the colon and possibility of missing a lesion. The patient understands and is in agreement. The patient was counseled at length about the risks of homero Covid-19 during their perioperative period and any recovery window from their procedure. The patient was made aware that homero Covid-19  may worsen their prognosis for recovering from their procedure  and lend to a higher morbidity and/or mortality risk. All material risks, benefits, and reasonable alternatives including postponing the procedure were discussed. The patient does wish to proceed with the procedure at this time. PROCEDURE: The patient was given IV conscious sedation. The patient's SPO2 remained above 90% throughout the procedure. The colonoscope was inserted per rectum and advanced under direct vision to the cecum without difficulty. Post sedation note : The patient's SPO2 remained above 90% throughout the procedure. the vital signs remained stable , and no immediate complication form the procedure noted, patient will be ready for d/c when criteria is met . The prep was poor.       Findings:  Terminal ileum: normal    Cecum/Ascending

## 2022-08-12 NOTE — DISCHARGE INSTRUCTIONS
POST COLONOSCOPY & EGD INSTRUCTIONS    1. ACTIVITY   No driving, operating machinery, signing legal papers, or making important decisions for 24 hours   Resume normal activity after 24 hours   You may return to work after 24 hours. 2. DIET  _____  Diet modification: No   Once you are able to swallow water normally you may resume your normal diet. Try to avoid spicy, greasy, or fried foods for your first meal after the procedure      3. MEDICATIONS   Do not consume alcohol, tranquilizers or sleeping medications for 24 hours unless otherwise advised by your physician. Resume your usual medications unless otherwise instructed. 4. NORMAL CHANGES YOU MAY EXPERIENCE AFTER ENDOSCOPY:  From the Colonoscopy:   Passing gas for several hours is normal   Some mild abdominal cramping is normal   If a biopsy/polypectomy was done, you may see some spotting of blood   You may feel tired or worn out for the next 24-48 hours due to the prep and sedation  From the EGD:   A sore throat is normal   A bloated feeling and belching from air in the stomach is normal   If a biopsy was done, you may spit up some blood tinged mucus         5. CALL YOUR PHYSICIAN IF YOU EXPERIENCE ANY OF THE FOLLOWING   Vomiting blood or passing blood rectally (color may be red or black)   Severe abdominal pain or tenderness (that is not relieved by passing air)   Fever, chills, or excessive sweating   Persistent nausea or vomiting   Redness or swelling at the IV site      6.  ADDITIONAL INSTRUCTIONS      IF YOU HAVE ANY QUESTIONS OR CONCERNS PLEASE CALL YOUR DOCTOR,  IF YOU FEEL YOU HAVE A MEDICAL EMERGENCY PLEASE DIAL [phone]

## 2022-08-12 NOTE — PROGRESS NOTES
PT HAS RIGHT HEARING AID IN, STATES SHE CANNOT HEAR AT ALL WITHOUT IT. GLASSES GIVEN TO FATHER IN PRE OP.

## 2022-08-12 NOTE — OP NOTE
PROCEDURE NOTE    DATE OF PROCEDURE: 8/12/2022     SURGEON: Kassidy Schulz MD  Facility: [facility] [address] GAB[address] STAFFORD  ASSISTANT: None  Anesthesia: mac   PREOPERATIVE DIAGNOSIS:   GERD    Diagnosis:  Gastritis biopsies were taken  Random small bowel biopsies were taken to rule out celiac      POSTOPERATIVE DIAGNOSIS: As described below    OPERATION: Upper GI endoscopy with Biopsy    ANESTHESIA: Moderate Sedation     ESTIMATED BLOOD LOSS: Less than 50 ml    COMPLICATIONS: None. SPECIMENS:  Was Obtained:     As above     HISTORY: The patient is a 42y.o. year old female with history of above preop diagnosis. I recommended esophagogastroduodenoscopy with possible biopsy and I explained the risk, benefits, expected outcome, and alternatives to the procedure. Risks included but are not limited to bleeding, infection, respiratory distress, hypotension, and perforation of the esophagus, stomach, or duodenum. Patient understands and is in agreement. The patient was counseled at length about the risks of homero Covid-19 during their perioperative period and any recovery window from their procedure. The patient was made aware that homero Covid-19  may worsen their prognosis for recovering from their procedure  and lend to a higher morbidity and/or mortality risk. All material risks, benefits, and reasonable alternatives including postponing the procedure were discussed. The patient does wish to proceed with the procedure at this time. PROCEDURE: The patient was given IV conscious sedation. The patient's SPO2 remained above 90% throughout the procedure. The gastroscope was inserted orally and advanced under direct vision through the esophagus, through the stomach, through the pylorus, and into the descending duodenum. Post sedation note : The patient's SPO2 remained above 90% throughout the procedure. the vital signs remained stable , and no immediate complication form the procedure noted, patient will be ready for d/c when criteria is met . Findings:    Retropharyngeal area was grossly normal appearing    Esophagus: normal    Stomach:    Fundus: normal    Body: abnormal: Gastritis biopsies were taken      Antrum: abnormal: Gastritis biopsies were taken    Duodenum:     Descending: normal    Bulb: normal  Random small bowel biopsies were taken to rule out celiac        The scope was removed and the patient tolerated the procedure well.      Recommendations/Plan:   F/U Biopsies  F/U In Office in 3-4 weeks  Discussed with the family    Electronically signed by Mindi Veloz MD  on 8/12/2022 at 7:55 AM

## 2022-08-15 LAB — SURGICAL PATHOLOGY REPORT: NORMAL

## 2022-09-01 NOTE — TELEPHONE ENCOUNTER
Pharmacy requesting refill of Topiramate.       Medication active on med list yes      Date of last fill: 12/6/21 for #315 and 2 refills  verified on 9/1/2022    verified by Samuel Jha LPN      Date of last appointment: 1/20/2022    Next Visit Date: 1/9/2023

## 2022-09-07 RX ORDER — TOPIRAMATE 50 MG/1
TABLET, FILM COATED ORAL
Qty: 315 TABLET | Refills: 1 | Status: SHIPPED | OUTPATIENT
Start: 2022-09-07

## 2022-09-14 ENCOUNTER — HOSPITAL ENCOUNTER (OUTPATIENT)
Dept: CT IMAGING | Facility: CLINIC | Age: 47
Discharge: HOME | End: 2022-09-16
Payer: MEDICARE

## 2022-09-14 DIAGNOSIS — R22.1 NECK SWELLING: ICD-10-CM

## 2022-09-14 LAB
CREAT SERPL-MCNC: 0.8 MG/DL (ref 0.5–0.9)
GFR AFRICAN AMERICAN: >60 ML/MIN
GFR NON-AFRICAN AMERICAN: >60 ML/MIN
GFR SERPL CREATININE-BSD FRML MDRD: NORMAL ML/MIN/{1.73_M2}

## 2022-09-14 PROCEDURE — 2580000003 HC RX 258: Performed by: FAMILY MEDICINE

## 2022-09-14 PROCEDURE — 36415 COLL VENOUS BLD VENIPUNCTURE: CPT

## 2022-09-14 PROCEDURE — 82565 ASSAY OF CREATININE: CPT

## 2022-09-14 PROCEDURE — 6360000004 HC RX CONTRAST MEDICATION: Performed by: FAMILY MEDICINE

## 2022-09-14 PROCEDURE — 70491 CT SOFT TISSUE NECK W/DYE: CPT

## 2022-09-14 RX ORDER — 0.9 % SODIUM CHLORIDE 0.9 %
70 INTRAVENOUS SOLUTION INTRAVENOUS ONCE
Status: COMPLETED | OUTPATIENT
Start: 2022-09-14 | End: 2022-09-14

## 2022-09-14 RX ORDER — SODIUM CHLORIDE 0.9 % (FLUSH) 0.9 %
10 SYRINGE (ML) INJECTION PRN
Status: DISCONTINUED | OUTPATIENT
Start: 2022-09-14 | End: 2022-09-17 | Stop reason: HOSPADM

## 2022-09-14 RX ADMIN — IOPAMIDOL 75 ML: 755 INJECTION, SOLUTION INTRAVENOUS at 15:15

## 2022-09-14 RX ADMIN — SODIUM CHLORIDE, PRESERVATIVE FREE 10 ML: 5 INJECTION INTRAVENOUS at 15:15

## 2022-09-14 RX ADMIN — SODIUM CHLORIDE 70 ML: 9 INJECTION, SOLUTION INTRAVENOUS at 15:12

## 2022-10-19 ENCOUNTER — OFFICE VISIT (OUTPATIENT)
Dept: GASTROENTEROLOGY | Age: 47
End: 2022-10-19
Payer: MEDICARE

## 2022-10-19 VITALS
SYSTOLIC BLOOD PRESSURE: 126 MMHG | WEIGHT: 278 LBS | TEMPERATURE: 97.4 F | DIASTOLIC BLOOD PRESSURE: 70 MMHG | BODY MASS INDEX: 52.53 KG/M2

## 2022-10-19 DIAGNOSIS — K59.09 OTHER CONSTIPATION: ICD-10-CM

## 2022-10-19 DIAGNOSIS — K63.5 HYPERPLASTIC POLYP OF SIGMOID COLON: ICD-10-CM

## 2022-10-19 DIAGNOSIS — K29.00 OTHER ACUTE GASTRITIS WITHOUT HEMORRHAGE: ICD-10-CM

## 2022-10-19 DIAGNOSIS — K21.9 GASTROESOPHAGEAL REFLUX DISEASE, UNSPECIFIED WHETHER ESOPHAGITIS PRESENT: Primary | ICD-10-CM

## 2022-10-19 DIAGNOSIS — K58.1 IRRITABLE BOWEL SYNDROME WITH CONSTIPATION: ICD-10-CM

## 2022-10-19 PROCEDURE — 1036F TOBACCO NON-USER: CPT | Performed by: INTERNAL MEDICINE

## 2022-10-19 PROCEDURE — G8427 DOCREV CUR MEDS BY ELIG CLIN: HCPCS | Performed by: INTERNAL MEDICINE

## 2022-10-19 PROCEDURE — G8417 CALC BMI ABV UP PARAM F/U: HCPCS | Performed by: INTERNAL MEDICINE

## 2022-10-19 PROCEDURE — 99214 OFFICE O/P EST MOD 30 MIN: CPT | Performed by: INTERNAL MEDICINE

## 2022-10-19 PROCEDURE — G8484 FLU IMMUNIZE NO ADMIN: HCPCS | Performed by: INTERNAL MEDICINE

## 2022-10-19 RX ORDER — POLYETHYLENE GLYCOL 3350 17 G/17G
17 POWDER, FOR SOLUTION ORAL DAILY
Qty: 1530 G | Refills: 1 | Status: SHIPPED | OUTPATIENT
Start: 2022-10-19 | End: 2022-11-18

## 2022-10-19 ASSESSMENT — ENCOUNTER SYMPTOMS
BLOOD IN STOOL: 0
RECTAL PAIN: 0
DIARRHEA: 0
ABDOMINAL DISTENTION: 0
COUGH: 0
ABDOMINAL PAIN: 0
NAUSEA: 0
CONSTIPATION: 1
SHORTNESS OF BREATH: 0
WHEEZING: 0
VOMITING: 0
ANAL BLEEDING: 0
TROUBLE SWALLOWING: 0
SORE THROAT: 0
CHOKING: 0
VOICE CHANGE: 0

## 2022-10-19 NOTE — PROGRESS NOTES
GI CLINIC FOLLOW UP    INTERVAL HISTORY:   No referring provider defined for this encounter. Chief Complaint   Patient presents with    Colonoscopy     Pt is here today for a f/u from colonoscopy/EGD proc, previously seen for GERD & constipation. HISTORY OF PRESENT ILLNESS: Ms.Stacey GIRMA Avelar is a 41 y.o. female , referred for evaluation of constipation , GERD, IBs     Here for f/u   We saw this patient last visit with constipation, acid reflux and IBS. Last visit egd/colon   The patient denied any abdominal pain denied any weight loss denied any bleeding denied any nausea or vomiting at this time she is still mildly constipated around that there is no other abnormality    Findings:     Retropharyngeal area was grossly normal appearing     Esophagus: normal     Stomach:    Fundus: normal    Body: abnormal: Gastritis biopsies were taken       Antrum: abnormal: Gastritis biopsies were taken     Duodenum:     Descending: normal    Bulb: normal  Random small bowel biopsies were taken to rule out celiac           The scope was removed and the patient tolerated the procedure well. Recommendations/Plan:   F/U Biopsies  F/U In Office in 3-4 weeks  Discussed with the family     Electronically signed by Martina Sparks MD  on 8/12/2022 at 7:55 AM       The prep was poor. Findings:  Terminal ileum: normal     Cecum/Ascending colon: normal     Transverse colon: normal     Descending/Sigmoid colon: abnormal: Sessile polyp 8 mm removed with cold forceps     Rectum/Anus: examined in normal and retroflexed positions and was normal     Withdrawal Time was (minutes): 8     The colon was decompressed and the scope was removed. The patient tolerated the procedure well.       Recommendations/Plan:   Lifestyle and dietary modifications as discussed  F/U Biopsies  F/U In OfficeYes  Discussed with the family  Repeat colonoscopy [id number]     Electronically signed by Martina Sparks MD  on 8/12/2022 at 8:21 AM 8/12/22 0751     Surgical Pathology Report -- Diagnosis --   A. SMALL BOWEL, BIOPSY:   -DUODENAL MUCOSA WITH A NORMAL VILLOUS ARCHITECTURE AND NO INCREASE IN   INTRAEPITHELIAL LEUKOCYTES. B.  STOMACH, BIOPSY:   -GASTRIC ANTRAL/BODY TYPE MUCOSA WITH PATCHY MILD CHRONIC GASTRITIS. C.  SIGMOID COLON, BIOPSY:   -HYPERPLASTIC POLYP. Rosario Montoya M.D.   **Electronically Signed Out**         claudette/8/15/2022         Clinical Information   Pre-op Diagnosis:  GASTROESOPHAGEAL REFLUX DISEASE, UNSPECIFIED   WHETHER ESOPHAGITIS PRESENT, ENCOUNTER FOR SCREENING FOR MALIGNANT   NEOPLASM OF COLON   Operative Findings:  BIOPSY SMALL BOWEL R/O CELIAC DISEASE; BIOPSY   STOMACH R/O H. PYLORI; BIOPSY SIGMOID POLYP   Operation Performed:  COLONOSCOPY WITH           Past Medical,Family, and Social History reviewed and does contribute to the patient presentingcondition. Patient's PMH/PSH,SH,PSYCH Hx, MEDs, ALLERGIES, and ROS were all reviewed and updated in the appropriate sections.     PAST MEDICAL HISTORY:  Past Medical History:   Diagnosis Date    Acquired acanthosis nigricans     Anemia     Arthritis     Asthma     Brain cancer ([facility] [address]     Brain tumor [facility])     age 5    Contact dermatitis and other eczema, due to unspecified cause     COVID-19 01/26/2021    Diabetes mellitus [facility])     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)     Fibromyalgia     Herpes zoster without mention of complication     Hypothyroidism     Migraine     Seizures ([facility] [address]     last seizure in 2000    Sleep apnea     CPAP    TIA (transient ischemic attack) 2000       Past Surgical History:   Procedure Laterality Date    BRAIN BIOPSY      tumor biopsy    COCHLEAR IMPLANT Left     COCHLEAR IMPLANT Right 05/10/2022    COLONOSCOPY      COLONOSCOPY N/A 8/12/2022    COLONOSCOPY WITH BIOPSY performed by Martina Sparks MD at [address] [place] [address]      implants 1/22    ENDOSCOPY, COLON, DIAGNOSTIC      KNEE SURGERY      scope TONSILLECTOMY      UPPER GASTROINTESTINAL ENDOSCOPY N/A 8/12/2022    EGD BIOPSY performed by Leena Hernandez MD at [address]:    Current Outpatient Medications:     polyethylene glycol (GLYCOLAX) 17 GM/SCOOP powder, Take 17 g by mouth daily, Disp: 1530 g, Rfl: 1    Melatonin ER 5 MG TBCR, TAKE 1 TABLET BY MOUTH ONCE A DAY AT BEDTIME, Disp: , Rfl:     topiramate (TOPAMAX) 50 MG tablet, TAKE 1 AND 1/2 TABLETS IN THE MORNING AND 2 TABLETS AT NIGHT., Disp: 315 tablet, Rfl: 1    KLOR-CON M10 10 MEQ extended release tablet, TAKE 1 TABLET BY MOUTH TWICE A DAY, Disp: 180 tablet, Rfl: 1    [employer] SENNA 8.6 MG tablet, TAKE 1 TABLET BY MOUTH EVERY DAY, Disp: 30 tablet, Rfl: 1    meloxicam (MOBIC) 15 MG tablet, , Disp: , Rfl:     gabapentin (NEURONTIN) 100 MG capsule, Start with one capsule daily may increase by one tablet up to at total of 3 capsules TID, Disp: , Rfl:     nystatin (MYCOSTATIN) 259180 UNIT/GM cream, APPLY TO AFFECTED AREA TWICE A DAY, Disp: 30 g, Rfl: 0    metFORMIN (GLUCOPHAGE) 500 MG tablet, TAKE 1 TABLET BY MOUTH TWICE A DAY WITH MEALS, Disp: 180 tablet, Rfl: 0    Handicap Placard MISC, by Does not apply route EXPIRES IN 5 YEARS FROM ORIGINAL SCRIPT DATE, Disp: 1 each, Rfl: 0    buPROPion (WELLBUTRIN XL) 150 MG extended release tablet, Take 300 mg by mouth daily , Disp: , Rfl:     ARIPiprazole (ABILIFY) 20 MG tablet, Take 1 tablet by mouth daily, Disp: , Rfl:     levothyroxine (SYNTHROID) 150 MCG tablet, Take 1 tablet by mouth every morning (before breakfast), Disp: , Rfl:     albuterol sulfate  (90 Base) MCG/ACT inhaler, INHALE 2 PUFFS BY MOUTH EVERY 6 HOURS AS NEEDED FOR WHEEZING, Disp: 8.5 Inhaler, Rfl: 0    lamoTRIgine (LAMICTAL) 150 MG tablet, Take 200 mg by mouth daily Pt Is Now Taking 200 MG, Disp: , Rfl:     Calcium-Magnesium-Vitamin D (CALCIUM 1200+D3 PO), Take 2 tablets by mouth every morning, Disp: , Rfl:     LORazepam (ATIVAN) 1 MG tablet, 1 mg daily as needed.  , Disp: , Rfl: 0    sertraline (ZOLOFT) 100 MG tablet, Take 150 mg by mouth daily , Disp: , Rfl:     SUMAtriptan (IMITREX) 100 MG tablet, TAKE 1 TABLET AT ONSET OFHEADACHE,MAY REPEAT 2 HOURS LATER. MAX 2/DAY, Disp: 9 tablet, Rfl: 3    Ergocalciferol (VITAMIN D2 PO),  Take 1.25 mg by mouth daily , Disp: , Rfl:     hydrocortisone (CORTEF) 10 MG tablet, Take 10 mg by mouth 2 times daily. , Disp: , Rfl:     indomethacin (INDOCIN) 25 MG capsule, Take 1 capsule by mouth 2 times daily (with meals) for 5 days (Patient not taking: Reported on 8/12/2022), Disp: 10 capsule, Rfl: 0    EPINEPHrine (EPIPEN 2-MINNIE) 0.3 MG/0.3ML SOAJ injection, Inject 0.3 mLs into the muscle once for 1 dose Use as directed for allergic reaction, Disp: 0.3 mL, Rfl: 0    ALLERGIES:   Allergies   Allergen Reactions    Bactrim [Sulfamethoxazole-Trimethoprim]     Bee Venom     Ciprofloxacin     Milk-Related Compounds Diarrhea       FAMILY HISTORY:       Problem Relation Age of Onset    Cancer Mother 50        breast    Migraines Mother     Diabetes Father     High Blood Pressure Father     High Cholesterol Father     Migraines Sister     High Blood Pressure Maternal Grandmother     Cancer Paternal Grandmother 70        colon cancer    Cancer Paternal Uncle         esophageal         SOCIAL HISTORY:   Social History     Socioeconomic History    Marital status: Single     Spouse name: Not on file    Number of children: Not on file    Years of education: Not on file    Highest education level: Not on file   Occupational History     Comment: disabled   Tobacco Use    Smoking status: Never    Smokeless tobacco: Never   Vaping Use    Vaping Use: Never used   Substance and Sexual Activity    Alcohol use:  Yes     Alcohol/week: 0.0 standard drinks     Comment: rare    Drug use: No    Sexual activity: Not Currently     Partners: Male   Other Topics Concern    Not on file   Social History Narrative    Not on file     Social Determinants of Health     Financial Resource Strain: Not on file   Food Insecurity: Not on file   Transportation Needs: Not on file   Physical Activity: Not on file   Stress: Not on file   Social Connections: Not on file   Intimate Partner Violence: Not on file   Housing Stability: Not on file       REVIEW OF SYSTEMS: A 12-point review of systemswas obtained and pertinent positives and negatives were enumerated above in the history of present illness. All other reviewed systems / symptoms were negative. Review of Systems   Constitutional:  Negative for appetite change, fatigue and unexpected weight change. HENT:  Negative for sore throat, trouble swallowing and voice change. Respiratory:  Negative for cough, choking, shortness of breath and wheezing. Cardiovascular:  Negative for chest pain, palpitations and leg swelling. Gastrointestinal:  Positive for constipation. Negative for abdominal distention, abdominal pain, anal bleeding, blood in stool, diarrhea, nausea, rectal pain and vomiting. Neurological:  Negative for dizziness, weakness, light-headedness, numbness and headaches. Hematological:  Does not bruise/bleed easily. Psychiatric/Behavioral:  Negative for confusion and sleep disturbance. The patient is not nervous/anxious.           LABORATORY DATA: Reviewed  Lab Results   Component Value Date    WBC 7.0 07/15/2020    HGB 13.1 07/15/2020    HCT 40.2 07/15/2020    MCV 85.3 07/15/2020     07/15/2020     07/15/2020    K 3.8 07/15/2020     07/15/2020    CO2 25 07/15/2020    BUN 11 07/15/2020    CREATININE 0.80 09/14/2022    LABALBU 4.1 10/16/2019    BILITOT 0.30 10/16/2019    ALKPHOS 82 10/16/2019    AST 17 10/16/2019    ALT 11 10/16/2019    INR 1.0 03/10/2015         Lab Results   Component Value Date    RBC 4.71 07/15/2020    HGB 13.1 07/15/2020    MCV 85.3 07/15/2020    MCH 27.9 07/15/2020    MCHC 32.7 07/15/2020    RDW 13.7 07/15/2020    MPV 8.4 07/15/2020    BASOPCT 1 07/15/2020    LYMPHSABS 1.90 07/15/2020 MONOSABS 0.40 07/15/2020    NEUTROABS 4.50 07/15/2020    EOSABS 0.10 07/15/2020    BASOSABS 0.00 07/15/2020         DIAGNOSTIC TESTING:     No results found. PHYSICAL EXAMINATION: Vital signs reviewed per the nursing documentation. /70   Temp 97.4 °F (36.3 °C)   Wt 278 lb (126.1 kg)   BMI 52.53 kg/m²   Body mass index is 52.53 kg/m². Physical Exam  Vitals and nursing note reviewed. Constitutional:       General: She is not in acute distress. Appearance: She is well-developed. She is not diaphoretic. HENT:      Head: Normocephalic. Mouth/Throat:      Pharynx: No oropharyngeal exudate. Eyes:      General: No scleral icterus. Pupils: Pupils are equal, round, and reactive to light. Neck:      Thyroid: No thyromegaly. Vascular: No JVD. Trachea: No tracheal deviation. Cardiovascular:      Rate and Rhythm: Normal rate and regular rhythm. Heart sounds: Normal heart sounds. No murmur heard. Pulmonary:      Effort: Pulmonary effort is normal. No respiratory distress. Breath sounds: Normal breath sounds. No wheezing. Abdominal:      General: Bowel sounds are normal. There is no distension. Palpations: Abdomen is soft. Tenderness: There is no abdominal tenderness. There is no guarding or rebound. Comments: No ascites   Musculoskeletal:         General: Normal range of motion. Cervical back: Normal range of motion and neck supple. Skin:     General: Skin is warm. Coloration: Skin is not pale. Findings: No erythema or rash. Comments: She is not diaphoretic   Neurological:      Mental Status: She is alert and oriented to person, place, and time. Deep Tendon Reflexes: Reflexes are normal and symmetric. Psychiatric:         Behavior: Behavior normal.         Thought Content: Thought content normal.         Judgment: Judgment normal.         IMPRESSION: Ms. Amparo Urbina is a 41 y.o. female with      Diagnosis Orders   1. Gastroesophageal reflux disease, unspecified whether esophagitis present        2. Other acute gastritis without hemorrhage        3. Hyperplastic polyp of sigmoid colon        4. Irritable bowel syndrome with constipation        5. Other constipation        We will start her on MiraLAX and see how she does she is to report any new symptom  Her CBC and liver enzymes were all normal    Diet/life style/natural hx /complication of the dx were all explained in details   Past medical, past surgical, social history, psychiatric history, medications or allergies, all reviewed and  updated    Thank you for allowing me to participate in the care of Ms. Avelar. For any further questions please do not hesitate to contact me. I have reviewed and agree with the ROS entered by the MA/RN. Note is dictated utilizing voice recognition software. Unfortunately this leads to occasional typographical errors. Please contact our office if you have any questions.       Elizabet Hanley MD  [facility]  O: #[phone]

## 2022-12-05 ENCOUNTER — HOSPITAL ENCOUNTER (INPATIENT)
Age: 47
LOS: 25 days | Discharge: ANOTHER HOSPITAL | DRG: 551 | End: 2022-12-31
Attending: EMERGENCY MEDICINE | Admitting: INTERNAL MEDICINE
Payer: MEDICARE

## 2022-12-05 ENCOUNTER — APPOINTMENT (OUTPATIENT)
Dept: CT IMAGING | Facility: CLINIC | Age: 47
DRG: 551 | End: 2022-12-05
Payer: MEDICARE

## 2022-12-05 ENCOUNTER — HOSPITAL ENCOUNTER (EMERGENCY)
Facility: CLINIC | Age: 47
Discharge: ANOTHER HOSPITAL | DRG: 551 | End: 2022-12-05
Attending: EMERGENCY MEDICINE
Payer: MEDICARE

## 2022-12-05 VITALS
TEMPERATURE: 98.2 F | RESPIRATION RATE: 24 BRPM | BODY MASS INDEX: 52.3 KG/M2 | DIASTOLIC BLOOD PRESSURE: 70 MMHG | HEART RATE: 83 BPM | SYSTOLIC BLOOD PRESSURE: 119 MMHG | HEIGHT: 61 IN | OXYGEN SATURATION: 94 % | WEIGHT: 277 LBS

## 2022-12-05 DIAGNOSIS — S12.691A OTHER CLOSED NONDISPLACED FRACTURE OF SEVENTH CERVICAL VERTEBRA, INITIAL ENCOUNTER (HCC): Primary | ICD-10-CM

## 2022-12-05 DIAGNOSIS — S32.82XA MULTIPLE CLOSED FRACTURES OF PELVIS WITHOUT DISRUPTION OF PELVIC RING, INITIAL ENCOUNTER (HCC): ICD-10-CM
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
## 2022-12-05 DIAGNOSIS — S22.030A CLOSED WEDGE COMPRESSION FRACTURE OF T3 VERTEBRA, INITIAL ENCOUNTER (HCC): ICD-10-CM

## 2022-12-05 LAB
ABSOLUTE EOS #: 0 K/UL (ref 0–0.4)
ABSOLUTE LYMPH #: 0.92 K/UL (ref 1–4.8)
ABSOLUTE MONO #: 0.51 K/UL (ref 0.1–0.8)
ALBUMIN SERPL-MCNC: 4.5 G/DL (ref 3.5–5.2)
ALBUMIN/GLOBULIN RATIO: 1.3 (ref 1–2.5)
ALP BLD-CCNC: 99 U/L (ref 35–104)
ALT SERPL-CCNC: 20 U/L (ref 5–33)
ANION GAP SERPL CALCULATED.3IONS-SCNC: 11 MMOL/L (ref 9–17)
AST SERPL-CCNC: 33 U/L
BASOPHILS # BLD: 0 % (ref 0–2)
BASOPHILS ABSOLUTE: 0 K/UL (ref 0–0.2)
BILIRUB SERPL-MCNC: 0.4 MG/DL (ref 0.3–1.2)
BUN BLDV-MCNC: 17 MG/DL (ref 6–20)
CALCIUM SERPL-MCNC: 10 MG/DL (ref 8.6–10.4)
CHLORIDE BLD-SCNC: 104 MMOL/L (ref 98–107)
CO2: 26 MMOL/L (ref 20–31)
CREAT SERPL-MCNC: 1.1 MG/DL (ref 0.5–0.9)
EOSINOPHILS RELATIVE PERCENT: 0 % (ref 1–4)
GFR SERPL CREATININE-BSD FRML MDRD: >60 ML/MIN/1.73M2
GLUCOSE BLD-MCNC: 102 MG/DL (ref 70–99)
HCT VFR BLD CALC: 45 % (ref 36–46)
HEMOGLOBIN: 14.8 G/DL (ref 12–16)
INR BLD: 1
LYMPHOCYTES # BLD: 9 % (ref 24–44)
MCH RBC QN AUTO: 28 PG (ref 26–34)
MCHC RBC AUTO-ENTMCNC: 33 G/DL (ref 31–37)
MCV RBC AUTO: 84.9 FL (ref 80–100)
MONOCYTES # BLD: 5 % (ref 1–7)
MORPHOLOGY: NORMAL
PDW BLD-RTO: 14.3 % (ref 12.5–15.4)
PLATELET # BLD: 110 K/UL (ref 140–450)
PMV BLD AUTO: 8.6 FL (ref 6–12)
POTASSIUM SERPL-SCNC: 3.6 MMOL/L (ref 3.7–5.3)
PROTHROMBIN TIME: 10.5 SEC (ref 9.4–12.6)
RBC # BLD: 5.3 M/UL (ref 4–5.2)
SEG NEUTROPHILS: 86 % (ref 36–66)
SEGMENTED NEUTROPHILS ABSOLUTE COUNT: 8.77 K/UL (ref 1.8–7.7)
SODIUM BLD-SCNC: 141 MMOL/L (ref 135–144)
TOTAL PROTEIN: 8 G/DL (ref 6.4–8.3)
TROPONIN, HIGH SENSITIVITY: <6 NG/L (ref 0–14)
WBC # BLD: 10.2 K/UL (ref 3.5–11)

## 2022-12-05 PROCEDURE — 2500000003 HC RX 250 WO HCPCS

## 2022-12-05 PROCEDURE — 36415 COLL VENOUS BLD VENIPUNCTURE: CPT

## 2022-12-05 PROCEDURE — 80053 COMPREHEN METABOLIC PANEL: CPT

## 2022-12-05 PROCEDURE — 85025 COMPLETE CBC W/AUTO DIFF WBC: CPT

## 2022-12-05 PROCEDURE — 85610 PROTHROMBIN TIME: CPT

## 2022-12-05 PROCEDURE — 99285 EMERGENCY DEPT VISIT HI MDM: CPT

## 2022-12-05 PROCEDURE — 93005 ELECTROCARDIOGRAM TRACING: CPT | Performed by: NURSE PRACTITIONER

## 2022-12-05 PROCEDURE — 84484 ASSAY OF TROPONIN QUANT: CPT

## 2022-12-05 PROCEDURE — 72125 CT NECK SPINE W/O DYE: CPT

## 2022-12-05 PROCEDURE — 70450 CT HEAD/BRAIN W/O DYE: CPT

## 2022-12-05 PROCEDURE — 71250 CT THORAX DX C-: CPT

## 2022-12-05 ASSESSMENT — PAIN - FUNCTIONAL ASSESSMENT
PAIN_FUNCTIONAL_ASSESSMENT: 0-10
PAIN_FUNCTIONAL_ASSESSMENT: 0-10

## 2022-12-05 ASSESSMENT — PAIN SCALES - GENERAL
PAINLEVEL_OUTOF10: 10
PAINLEVEL_OUTOF10: 10

## 2022-12-05 ASSESSMENT — ENCOUNTER SYMPTOMS: BACK PAIN: 1

## 2022-12-05 ASSESSMENT — PAIN DESCRIPTION - LOCATION: LOCATION: NECK

## 2022-12-05 NOTE — ED PROVIDER NOTES
[address] ED  EMERGENCY DEPARTMENT ENCOUNTER      Pt Name: Fabian Eisenmenger  MRN: [id number]  [place] 1975  Date of evaluation: 12/5/2022  Provider: ADELA Daniel CNP    CHIEF COMPLAINT       Chief Complaint   Patient presents with    Fall     Pt was about to walk up stairs at 9am, when getting to first step pt fell back hitting head, also right arm, pain to chest area, and lower back pain due to fall. Pt had brain tumor when 13years old making hearing difficult         HISTORY OF PRESENT ILLNESS   (Location/Symptom, Timing/Onset, Context/Setting, Quality, Duration, Modifying Factors, Severity)  Note limiting factors. Fabian Eisenmenger is a 41 y.o. female who presents to the emergency department ration of a fall around 9 AM this morning. She was walking up the stairs carrying a laundry basket and fell backwards landing on her back and head. Patient is developmentally delayed and has a history of a brain tumor as a child with a lot of radiation. She is complaining of significant pain to the head, neck, chest, back. Father states that they gave her Motrin this morning and Motrin again just prior to arrival.  He states that she was able to get up off the floor with his assistance this morning. He states he did not witness the fall but it was a very loud noise. He states that after taking some Motrin she went to lay down and get a nap and she was not able to get herself out of bed after that. Given the duration of symptoms he thought it best to have her evaluated. Nursing Notes were reviewed. REVIEW OF SYSTEMS    (2-9 systems for level 4, 10 or more for level 5)     Review of Systems   Cardiovascular:  Positive for chest pain. Musculoskeletal:  Positive for back pain and neck pain. Neurological:  Positive for headaches. All other systems reviewed and are negative. Except as noted above the remainder of the review of systems was reviewed and negative.        PAST MEDICAL HISTORY     Past Medical History:   Diagnosis Date    Acquired acanthosis nigricans     Anemia     Arthritis     Asthma     Brain cancer ([facility] [address]     Brain tumor [facility])     age 5    Contact dermatitis and other eczema, due to unspecified cause     COVID-19 01/26/2021    Diabetes mellitus 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)     Fibromyalgia     Herpes zoster without mention of complication     Hypothyroidism     Migraine     Seizures ([facility] [address]     last seizure in 2000    Sleep apnea     CPAP    TIA (transient ischemic attack) 2000         SURGICAL HISTORY       Past Surgical History:   Procedure Laterality Date    BRAIN BIOPSY      tumor biopsy    COCHLEAR IMPLANT Left     COCHLEAR IMPLANT Right 05/10/2022    COLONOSCOPY      COLONOSCOPY N/A 8/12/2022    COLONOSCOPY WITH BIOPSY performed by Lauryn Ellison MD at [address] [place] [address]      implants 1/22    ENDOSCOPY, COLON, DIAGNOSTIC      KNEE SURGERY      scope    TONSILLECTOMY      UPPER GASTROINTESTINAL ENDOSCOPY N/A 8/12/2022    EGD BIOPSY performed by Lauryn Ellison MD at [facility]       Discharge Medication List as of 12/5/2022 11:26 PM        CONTINUE these medications which have NOT CHANGED    Details   Melatonin ER 5 MG TBCR TAKE 1 TABLET BY MOUTH ONCE A DAY AT BEDTIMEHistorical Med      topiramate (TOPAMAX) 50 MG tablet TAKE 1 AND 1/2 TABLETS IN THE MORNING AND 2 TABLETS AT NIGHT., Disp-315 tablet, R-1Normal      KLOR-CON M10 10 MEQ extended release tablet TAKE 1 TABLET BY MOUTH TWICE A DAY, Disp-180 tablet, R-1Normal      [facility] SENNA 8.6 MG tablet TAKE 1 TABLET BY MOUTH EVERY DAY, Disp-30 tablet, R-1Normal      meloxicam (MOBIC) 15 MG tablet Historical Med      gabapentin (NEURONTIN) 100 MG capsule Start with one capsule daily may increase by one tablet up to at total of 3 capsules TIDHistorical Med      nystatin (MYCOSTATIN) 918366 UNIT/GM cream APPLY TO AFFECTED AREA TWICE A DAY, Disp-30 g, R-0, Normal metFORMIN (GLUCOPHAGE) 500 MG tablet TAKE 1 TABLET BY MOUTH TWICE A DAY WITH MEALS, Disp-180 tablet, R-0Normal      Handicap Placard MISC Starting Wed 4/28/2021, Disp-1 each, R-0, PrintEXPIRES IN 5 YEARS FROM ORIGINAL SCRIPT DATE      indomethacin (INDOCIN) 25 MG capsule Take 1 capsule by mouth 2 times daily (with meals) for 5 days, Disp-10 capsule, R-0Normal      buPROPion (WELLBUTRIN XL) 150 MG extended release tablet Take 300 mg by mouth daily Historical Med      ARIPiprazole (ABILIFY) 20 MG tablet Take 1 tablet by mouth dailyHistorical Med      levothyroxine (SYNTHROID) 150 MCG tablet Take 1 tablet by mouth every morning (before breakfast)Historical Med      albuterol sulfate  (90 Base) MCG/ACT inhaler INHALE 2 PUFFS BY MOUTH EVERY 6 HOURS AS NEEDED FOR WHEEZING, Disp-8.5 Inhaler,R-0Normal      EPINEPHrine (EPIPEN 2-MINNIE) 0.3 MG/0.3ML SOAJ injection Inject 0.3 mLs into the muscle once for 1 dose Use as directed for allergic reaction, Disp-0.3 mL, R-0Normal      lamoTRIgine (LAMICTAL) 150 MG tablet Take 200 mg by mouth daily Pt Is Now Taking 200 [facility]      Calcium-Magnesium-Vitamin D (CALCIUM 1200+D3 PO) Take 2 tablets by mouth every morningHistorical Med      LORazepam (ATIVAN) 1 MG tablet 1 mg daily as needed. , R-0Historical Med      sertraline (ZOLOFT) 100 MG tablet Take 150 mg by mouth daily Historical Med      SUMAtriptan (IMITREX) 100 MG tablet TAKE 1 TABLET AT ONSET OFHEADACHE,MAY REPEAT 2 HOURS LATER. MAX 2/DAY, Disp-9 tablet, R-3      Ergocalciferol (VITAMIN D2 PO)   Take 1.25 mg by mouth daily Historical Med      hydrocortisone (CORTEF) 10 MG tablet Take 10 mg by mouth 2 times daily. Historical Med             ALLERGIES     Bactrim [sulfamethoxazole-trimethoprim], Bee venom, Ciprofloxacin, and Milk-related compounds    FAMILY HISTORY       Family History   Problem Relation Age of Onset    Cancer Mother 50        breast    Migraines Mother     Diabetes Father     High Blood Pressure Father     High Cholesterol Father     Migraines Sister     High Blood Pressure Maternal Grandmother     Cancer Paternal Grandmother 70        colon cancer    Cancer Paternal Uncle         esophageal          SOCIAL HISTORY       Social History     Socioeconomic History    Marital status: Single   Occupational History     Comment: disabled   Tobacco Use    Smoking status: Never    Smokeless tobacco: Never   Vaping Use    Vaping Use: Never used   Substance and Sexual Activity    Alcohol use: Yes     Alcohol/week: 0.0 standard drinks     Comment: rare    Drug use: No    Sexual activity: Not Currently     Partners: Male     Social Determinants of Health     Financial Resource Strain: Low Risk     Difficulty of Paying Living Expenses: Not hard at all   Food Insecurity: No Food Insecurity    Worried About [address] [employer] in the Last Year: Never true    [employer] in the Last Year: Never true       SCREENINGS         [place]ud[place] Coma Scale  Eye Opening: Spontaneous  Best Verbal Response: Confused  Best Motor Response: Obeys commands  Dudley Coma Scale Score: 14                     CIWA Assessment  BP: 119/70  Heart Rate: 83                 PHYSICAL EXAM    (up to 7 for level 4, 8 or more for level 5)     ED Triage Vitals [12/05/22 1515]   BP Temp Temp src Heart Rate Resp SpO2 Height Weight   (!) 91/59 98.1 °F (36.7 °C) -- 67 17 93 % 5' 1\" (1.549 m) 277 lb (125.6 kg)       Physical Exam  Constitutional:       General: She is not in acute distress. Appearance: She is well-developed. She is not diaphoretic. HENT:      Head: Atraumatic. Comments: No obvious hematoma laceration or abrasion     Right Ear: External ear normal.      Left Ear: External ear normal.      Ears:      Comments: Cochlear implants bilaterally     Nose: Nose normal.      Mouth/Throat:      Pharynx: Oropharynx is clear. No oropharyngeal exudate. Eyes:      General: No scleral icterus. Right eye: No discharge.          Left eye: No discharge. Conjunctiva/sclera: Conjunctivae normal.      Pupils: Pupils are equal, round, and reactive to light. Cardiovascular:      Rate and Rhythm: Normal rate and regular rhythm. Pulses: Normal pulses. Heart sounds: Normal heart sounds. No murmur heard. Pulmonary:      Effort: Pulmonary effort is normal. No respiratory distress. Breath sounds: Normal breath sounds. No stridor. No wheezing or rales. Chest:      Chest wall: Tenderness (diffuse anterior chest) present. Abdominal:      General: Bowel sounds are normal. There is no distension. Palpations: Abdomen is soft. There is no mass. Tenderness: There is no abdominal tenderness. There is no guarding. Musculoskeletal:         General: Tenderness (diffuse thoracic and lumbar tenderness with painful ROM) present. Normal range of motion. Cervical back: Normal range of motion and neck supple. Tenderness (diffuse) present. No rigidity. Skin:     General: Skin is warm and dry. Capillary Refill: Capillary refill takes less than 2 seconds. Neurological:      Mental Status: She is alert. Mental status is at baseline. DIAGNOSTIC RESULTS     EKG: All EKG's are interpreted by the Emergency Department Physician who either signs or Co-signs this chart in the absence of a cardiologist.        RADIOLOGY:   Non-plain film images such as CT, Ultrasound and MRI are read by the radiologist. Plain radiographic images are visualized and preliminarily interpreted by the emergency physician with the below findings:        Interpretation per the Radiologist below, if available at the time of this note:    CT Head W/O Contrast   Final Result   No acute intracranial abnormality. Small amount of fluid in the right mastoid air cells. CT CHEST ABDOMEN PELVIS WO CONTRAST Additional Contrast? None   Final Result   Chest:      1. Mild anterior wedging of T3 suggesting an age indeterminate compression   fracture.   No fracture line is identified. Clinical correlation with the   site of symptoms is suggested. 2. Significant motion artifact with scattered patchy ground-glass opacities   in the lungs which may be related to motion artifact. Ground-glass opacities   are otherwise nonspecific and could be related to atypical infection or   pneumonitis. Abdomen and pelvis:      1. Nondisplaced fractures of the right sacral ala and the right obturator   ring. 2. No acute findings elsewhere in the abdomen or pelvis. 3. Left renal cyst.         CT CSpine W/O Contrast   Final Result   Acute fracture of the right C7 transverse process. Spinal degenerative changes as described. ED BEDSIDE ULTRASOUND:   Performed by ED Physician - none    LABS:  Labs Reviewed   CBC WITH AUTO DIFFERENTIAL - Abnormal; Notable for the following components:       Result Value    RBC 5.30 (*)     Platelets 931 (*)     Seg Neutrophils 86 (*)     Lymphocytes 9 (*)     Eosinophils % 0 (*)     Segs Absolute 8.77 (*)     Absolute Lymph # 0.92 (*)     All other components within normal limits   COMPREHENSIVE METABOLIC PANEL - Abnormal; Notable for the following components:    Glucose 102 (*)     Creatinine 1.10 (*)     Potassium 3.6 (*)     AST 33 (*)     All other components within normal limits   PROTIME-INR   TROPONIN       All other labs were within normal range or not returned as of this dictation.     EMERGENCY DEPARTMENT COURSE and DIFFERENTIAL DIAGNOSIS/MDM:   Vitals:    Vitals:    12/05/22 1515 12/05/22 1903 12/05/22 2140 12/05/22 2246   BP: (!) 91/59 107/68 106/63 119/70   Pulse: 67 81 85 83   Resp: 17 22 26 24   Temp: 98.1 °F (36.7 °C) 98.2 °F (36.8 °C) 97.9 °F (36.6 °C) 98.2 °F (36.8 °C)   TempSrc:  Oral Oral Oral   SpO2: 93% 92% 93% 94%   Weight: 125.6 kg (277 lb)      Height: 5' 1\" (1.549 m)              MDM     Amount and/or Complexity of Data Reviewed  Clinical lab tests: reviewed  Tests in the radiology section of CPT®: reviewed          REASSESSMENT     ED Course as of 12/08/22 1115   Mon Dec 05, 2022   6927 I spoke with Dr. Bonifacio Swan, ER attending who is agreeable to accept the patient. [MR]      ED Course User Index  [MR] Lencholinda ADELA Hernandez CNP     Patient has a transverse process fracture C7, she is placed in an Aspen collar for comfort, I do feel that she most likely has an acute T3 fracture as well given the amount of pain here, she also has some pelvic fractures. With this, her inability to ambulate, her significant amount of pain, and developmental delay, we felt it best to transfer to a trauma center. I spoke with [facility] and they are agreeable to accept the patient. All of this was discussed with the patient and the father. He is agreeable to her transfer to [facility].  Pain was addressed, she was not given any pain medication this time due to the fact that she is not hurting unless she is moving    CRITICAL CARE TIME       CONSULTS:  None    PROCEDURES:  Unless otherwise noted below, none     Procedures        FINAL IMPRESSION      1. Other closed nondisplaced fracture of seventh cervical vertebra, initial encounter ([facility] [address]    2. Closed wedge compression fracture of T3 vertebra, initial encounter ([facility] [address]    3. Fall down stairs, initial encounter          DISPOSITION/PLAN   DISPOSITION Decision To Transfer 12/05/2022 05:19:17 PM      PATIENT REFERRED TO:  No follow-up provider specified. DISCHARGE MEDICATIONS:  Discharge Medication List as of 12/5/2022 11:26 PM        Controlled Substances Monitoring:     No flowsheet data found.     (Please note that portions of this note were completed with a voice recognition program.  Efforts were made to edit the dictations but occasionally words are mis-transcribed.)    ADELA Barth CNP (electronically signed)  Attending Emergency Physician           ADELA Barth CNP  12/08/22 1114

## 2022-12-06 ENCOUNTER — APPOINTMENT (OUTPATIENT)
Dept: CT IMAGING | Age: 47
DRG: 551 | End: 2022-12-06
Payer: MEDICARE

## 2022-12-06 ENCOUNTER — APPOINTMENT (OUTPATIENT)
Dept: GENERAL RADIOLOGY | Age: 47
DRG: 551 | End: 2022-12-06
Payer: MEDICARE

## 2022-12-06 PROBLEM — S12.9XXA CERVICAL TRANSVERSE PROCESS FRACTURE, INITIAL ENCOUNTER (HCC): Status: ACTIVE | Noted: 2022-12-06

## 2022-12-06 PROBLEM — S12.601A CLOSED NONDISPLACED FRACTURE OF SEVENTH CERVICAL VERTEBRA (HCC): Status: ACTIVE | Noted: 2022-12-06

## 2022-12-06 LAB
ABSOLUTE EOS #: 0.13 K/UL (ref 0–0.44)
ABSOLUTE EOS #: 0.14 K/UL (ref 0–0.44)
ABSOLUTE IMMATURE GRANULOCYTE: 0.07 K/UL (ref 0–0.3)
ABSOLUTE IMMATURE GRANULOCYTE: 0.08 K/UL (ref 0–0.3)
ABSOLUTE LYMPH #: 1.25 K/UL (ref 1.1–3.7)
ABSOLUTE LYMPH #: 1.48 K/UL (ref 1.1–3.7)
ABSOLUTE MONO #: 0.7 K/UL (ref 0.1–1.2)
ABSOLUTE MONO #: 0.82 K/UL (ref 0.1–1.2)
ALLEN TEST: ABNORMAL
AMMONIA: 28 UMOL/L (ref 11–51)
ANION GAP SERPL CALCULATED.3IONS-SCNC: 9 MMOL/L (ref 9–17)
ANION GAP SERPL CALCULATED.3IONS-SCNC: 9 MMOL/L (ref 9–17)
BASOPHILS # BLD: 0 % (ref 0–2)
BASOPHILS # BLD: 1 % (ref 0–2)
BASOPHILS ABSOLUTE: 0.03 K/UL (ref 0–0.2)
BASOPHILS ABSOLUTE: 0.05 K/UL (ref 0–0.2)
BILIRUBIN URINE: NEGATIVE
BUN BLDV-MCNC: 15 MG/DL (ref 6–20)
BUN BLDV-MCNC: 9 MG/DL (ref 6–20)
CALCIUM SERPL-MCNC: 8.1 MG/DL (ref 8.6–10.4)
CALCIUM SERPL-MCNC: 8.4 MG/DL (ref 8.6–10.4)
CASTS UA: ABNORMAL /LPF (ref 0–8)
CHLORIDE BLD-SCNC: 105 MMOL/L (ref 98–107)
CHLORIDE BLD-SCNC: 109 MMOL/L (ref 98–107)
CO2: 21 MMOL/L (ref 20–31)
CO2: 22 MMOL/L (ref 20–31)
COLOR: YELLOW
CREAT SERPL-MCNC: 0.89 MG/DL (ref 0.5–0.9)
CREAT SERPL-MCNC: 1.01 MG/DL (ref 0.5–0.9)
EKG ATRIAL RATE: 88 BPM
EKG P AXIS: 44 DEGREES
EKG P-R INTERVAL: 166 MS
EKG Q-T INTERVAL: 368 MS
EKG QRS DURATION: 88 MS
EKG QTC CALCULATION (BAZETT): 445 MS
EKG R AXIS: 41 DEGREES
EKG T AXIS: 20 DEGREES
EKG VENTRICULAR RATE: 88 BPM
EOSINOPHILS RELATIVE PERCENT: 1 % (ref 1–4)
EOSINOPHILS RELATIVE PERCENT: 2 % (ref 1–4)
EPITHELIAL CELLS UA: ABNORMAL /HPF (ref 0–5)
FIO2: 50
GFR SERPL CREATININE-BSD FRML MDRD: >60 ML/MIN/1.73M2
GFR SERPL CREATININE-BSD FRML MDRD: >60 ML/MIN/1.73M2
GLUCOSE BLD-MCNC: 110 MG/DL (ref 65–105)
GLUCOSE BLD-MCNC: 110 MG/DL (ref 70–99)
GLUCOSE BLD-MCNC: 116 MG/DL (ref 74–100)
GLUCOSE BLD-MCNC: 117 MG/DL (ref 74–100)
GLUCOSE BLD-MCNC: 121 MG/DL (ref 70–99)
GLUCOSE BLD-MCNC: 57 MG/DL (ref 65–105)
GLUCOSE BLD-MCNC: 86 MG/DL (ref 65–105)
GLUCOSE BLD-MCNC: 90 MG/DL (ref 65–105)
GLUCOSE BLD-MCNC: 91 MG/DL (ref 65–105)
GLUCOSE URINE: NEGATIVE
HCO3 VENOUS: 23.9 MMOL/L (ref 22–29)
HCT VFR BLD CALC: 40 % (ref 36.3–47.1)
HCT VFR BLD CALC: 40.6 % (ref 36.3–47.1)
HEMOGLOBIN: 12.9 G/DL (ref 11.9–15.1)
HEMOGLOBIN: 13.2 G/DL (ref 11.9–15.1)
IMMATURE GRANULOCYTES: 1 %
IMMATURE GRANULOCYTES: 1 %
KETONES, URINE: ABNORMAL
LEUKOCYTE ESTERASE, URINE: NEGATIVE
LYMPHOCYTES # BLD: 15 % (ref 24–43)
LYMPHOCYTES # BLD: 15 % (ref 24–43)
MAGNESIUM: 1.7 MG/DL (ref 1.6–2.6)
MCH RBC QN AUTO: 28.1 PG (ref 25.2–33.5)
MCH RBC QN AUTO: 28.1 PG (ref 25.2–33.5)
MCHC RBC AUTO-ENTMCNC: 32.3 G/DL (ref 28.4–34.8)
MCHC RBC AUTO-ENTMCNC: 32.5 G/DL (ref 28.4–34.8)
MCV RBC AUTO: 86.6 FL (ref 82.6–102.9)
MCV RBC AUTO: 87.1 FL (ref 82.6–102.9)
MODE: ABNORMAL
MONOCYTES # BLD: 8 % (ref 3–12)
MONOCYTES # BLD: 8 % (ref 3–12)
NEGATIVE BASE EXCESS, ART: 4 (ref 0–2)
NEGATIVE BASE EXCESS, VEN: 3 (ref 0–2)
NITRITE, URINE: NEGATIVE
NRBC AUTOMATED: 0 PER 100 WBC
NRBC AUTOMATED: 0 PER 100 WBC
O2 DEVICE/FLOW/%: ABNORMAL
O2 SAT, VEN: 45 % (ref 60–85)
PCO2, VEN: 48.2 MM HG (ref 41–51)
PDW BLD-RTO: 14 % (ref 11.8–14.4)
PDW BLD-RTO: 14.2 % (ref 11.8–14.4)
PH UA: 5.5 (ref 5–8)
PH VENOUS: 7.3 (ref 7.32–7.43)
PHOSPHORUS: 2.9 MG/DL (ref 2.6–4.5)
PLATELET # BLD: 143 K/UL (ref 138–453)
PLATELET # BLD: 145 K/UL (ref 138–453)
PMV BLD AUTO: 10.7 FL (ref 8.1–13.5)
PMV BLD AUTO: 10.9 FL (ref 8.1–13.5)
PO2, VEN: 27.5 MM HG (ref 30–50)
POC HCO3: 20.7 MMOL/L (ref 21–28)
POC LACTIC ACID: 0.76 MMOL/L (ref 0.56–1.39)
POC O2 SATURATION: 98 % (ref 94–98)
POC PCO2: 37.7 MM HG (ref 35–48)
POC PH: 7.35 (ref 7.35–7.45)
POC PO2: 105 MM HG (ref 83–108)
POC TCO2: 23 MMOL/L (ref 22–30)
POTASSIUM SERPL-SCNC: 3.4 MMOL/L (ref 3.7–5.3)
POTASSIUM SERPL-SCNC: 3.8 MMOL/L (ref 3.7–5.3)
PROTEIN UA: ABNORMAL
RBC # BLD: 4.59 M/UL (ref 3.95–5.11)
RBC # BLD: 4.69 M/UL (ref 3.95–5.11)
RBC UA: ABNORMAL /HPF (ref 0–4)
SAMPLE SITE: ABNORMAL
SEG NEUTROPHILS: 73 % (ref 36–65)
SEG NEUTROPHILS: 75 % (ref 36–65)
SEGMENTED NEUTROPHILS ABSOLUTE COUNT: 6.4 K/UL (ref 1.5–8.1)
SEGMENTED NEUTROPHILS ABSOLUTE COUNT: 7.19 K/UL (ref 1.5–8.1)
SODIUM BLD-SCNC: 135 MMOL/L (ref 135–144)
SODIUM BLD-SCNC: 140 MMOL/L (ref 135–144)
SPECIFIC GRAVITY UA: 1.03 (ref 1–1.03)
TURBIDITY: CLEAR
URINE HGB: NEGATIVE
UROBILINOGEN, URINE: NORMAL
VITAMIN D 25-HYDROXY: 30.3 NG/ML
WBC # BLD: 8.6 K/UL (ref 3.5–11.3)
WBC # BLD: 9.7 K/UL (ref 3.5–11.3)
WBC UA: ABNORMAL /HPF (ref 0–5)

## 2022-12-06 PROCEDURE — 71045 X-RAY EXAM CHEST 1 VIEW: CPT

## 2022-12-06 PROCEDURE — 2580000003 HC RX 258

## 2022-12-06 PROCEDURE — 72190 X-RAY EXAM OF PELVIS: CPT

## 2022-12-06 PROCEDURE — 5A1935Z RESPIRATORY VENTILATION, LESS THAN 24 CONSECUTIVE HOURS: ICD-10-PCS | Performed by: SURGERY

## 2022-12-06 PROCEDURE — 80048 BASIC METABOLIC PNL TOTAL CA: CPT

## 2022-12-06 PROCEDURE — 3209999900 CT THORACIC SPINE TRAUMA RECONSTRUCTION

## 2022-12-06 PROCEDURE — 84100 ASSAY OF PHOSPHORUS: CPT

## 2022-12-06 PROCEDURE — 99221 1ST HOSP IP/OBS SF/LOW 40: CPT | Performed by: NEUROLOGICAL SURGERY

## 2022-12-06 PROCEDURE — 73060 X-RAY EXAM OF HUMERUS: CPT

## 2022-12-06 PROCEDURE — 74018 RADEX ABDOMEN 1 VIEW: CPT

## 2022-12-06 PROCEDURE — 73030 X-RAY EXAM OF SHOULDER: CPT

## 2022-12-06 PROCEDURE — 0BH17EZ INSERTION OF ENDOTRACHEAL AIRWAY INTO TRACHEA, VIA NATURAL OR ARTIFICIAL OPENING: ICD-10-PCS | Performed by: SURGERY

## 2022-12-06 PROCEDURE — 2000000000 HC ICU R&B

## 2022-12-06 PROCEDURE — 82803 BLOOD GASES ANY COMBINATION: CPT

## 2022-12-06 PROCEDURE — 6370000000 HC RX 637 (ALT 250 FOR IP)

## 2022-12-06 PROCEDURE — 87635 SARS-COV-2 COVID-19 AMP PRB: CPT

## 2022-12-06 PROCEDURE — 2700000000 HC OXYGEN THERAPY PER DAY

## 2022-12-06 PROCEDURE — 37799 UNLISTED PX VASCULAR SURGERY: CPT

## 2022-12-06 PROCEDURE — 81001 URINALYSIS AUTO W/SCOPE: CPT

## 2022-12-06 PROCEDURE — 3209999900 CT LUMBAR SPINE TRAUMA RECONSTRUCTION

## 2022-12-06 PROCEDURE — 2500000003 HC RX 250 WO HCPCS

## 2022-12-06 PROCEDURE — 6370000000 HC RX 637 (ALT 250 FOR IP): Performed by: STUDENT IN AN ORGANIZED HEALTH CARE EDUCATION/TRAINING PROGRAM

## 2022-12-06 PROCEDURE — 6360000004 HC RX CONTRAST MEDICATION: Performed by: STUDENT IN AN ORGANIZED HEALTH CARE EDUCATION/TRAINING PROGRAM

## 2022-12-06 PROCEDURE — 83735 ASSAY OF MAGNESIUM: CPT

## 2022-12-06 PROCEDURE — 6360000002 HC RX W HCPCS: Performed by: STUDENT IN AN ORGANIZED HEALTH CARE EDUCATION/TRAINING PROGRAM

## 2022-12-06 PROCEDURE — 31500 INSERT EMERGENCY AIRWAY: CPT

## 2022-12-06 PROCEDURE — 70450 CT HEAD/BRAIN W/O DYE: CPT

## 2022-12-06 PROCEDURE — 83605 ASSAY OF LACTIC ACID: CPT

## 2022-12-06 PROCEDURE — 82140 ASSAY OF AMMONIA: CPT

## 2022-12-06 PROCEDURE — 94660 CPAP INITIATION&MGMT: CPT

## 2022-12-06 PROCEDURE — 36600 WITHDRAWAL OF ARTERIAL BLOOD: CPT

## 2022-12-06 PROCEDURE — 82306 VITAMIN D 25 HYDROXY: CPT

## 2022-12-06 PROCEDURE — 71260 CT THORAX DX C+: CPT | Performed by: STUDENT IN AN ORGANIZED HEALTH CARE EDUCATION/TRAINING PROGRAM

## 2022-12-06 PROCEDURE — 85025 COMPLETE CBC W/AUTO DIFF WBC: CPT

## 2022-12-06 PROCEDURE — 70498 CT ANGIOGRAPHY NECK: CPT

## 2022-12-06 PROCEDURE — 83880 ASSAY OF NATRIURETIC PEPTIDE: CPT

## 2022-12-06 PROCEDURE — 94761 N-INVAS EAR/PLS OXIMETRY MLT: CPT

## 2022-12-06 PROCEDURE — 82947 ASSAY GLUCOSE BLOOD QUANT: CPT

## 2022-12-06 PROCEDURE — 2580000003 HC RX 258: Performed by: STUDENT IN AN ORGANIZED HEALTH CARE EDUCATION/TRAINING PROGRAM

## 2022-12-06 PROCEDURE — 94002 VENT MGMT INPAT INIT DAY: CPT

## 2022-12-06 PROCEDURE — 82374 ASSAY BLOOD CARBON DIOXIDE: CPT

## 2022-12-06 RX ORDER — PROPOFOL 10 MG/ML
5-50 INJECTION, EMULSION INTRAVENOUS CONTINUOUS
Status: DISCONTINUED | OUTPATIENT
Start: 2022-12-06 | End: 2022-12-07

## 2022-12-06 RX ORDER — OXYCODONE HYDROCHLORIDE 5 MG/1
5 TABLET ORAL EVERY 6 HOURS PRN
Status: DISCONTINUED | OUTPATIENT
Start: 2022-12-06 | End: 2022-12-08

## 2022-12-06 RX ORDER — SODIUM CHLORIDE 9 MG/ML
INJECTION, SOLUTION INTRAVENOUS PRN
Status: DISCONTINUED | OUTPATIENT
Start: 2022-12-06 | End: 2022-12-08

## 2022-12-06 RX ORDER — SODIUM CHLORIDE 0.9 % (FLUSH) 0.9 %
5-40 SYRINGE (ML) INJECTION PRN
Status: DISCONTINUED | OUTPATIENT
Start: 2022-12-06 | End: 2022-12-31 | Stop reason: HOSPADM

## 2022-12-06 RX ORDER — INSULIN LISPRO 100 [IU]/ML
0-16 INJECTION, SOLUTION INTRAVENOUS; SUBCUTANEOUS EVERY 4 HOURS
Status: DISCONTINUED | OUTPATIENT
Start: 2022-12-06 | End: 2022-12-08

## 2022-12-06 RX ORDER — TOPIRAMATE 100 MG/1
100 TABLET, FILM COATED ORAL NIGHTLY
Status: DISCONTINUED | OUTPATIENT
Start: 2022-12-06 | End: 2022-12-31 | Stop reason: HOSPADM

## 2022-12-06 RX ORDER — POLYETHYLENE GLYCOL 3350 17 G/17G
17 POWDER, FOR SOLUTION ORAL DAILY
Status: DISCONTINUED | OUTPATIENT
Start: 2022-12-06 | End: 2022-12-16

## 2022-12-06 RX ORDER — ALBUTEROL SULFATE 90 UG/1
1 AEROSOL, METERED RESPIRATORY (INHALATION) EVERY 4 HOURS PRN
Status: DISCONTINUED | OUTPATIENT
Start: 2022-12-06 | End: 2022-12-15

## 2022-12-06 RX ORDER — ONDANSETRON 2 MG/ML
4 INJECTION INTRAMUSCULAR; INTRAVENOUS EVERY 6 HOURS PRN
Status: DISCONTINUED | OUTPATIENT
Start: 2022-12-06 | End: 2022-12-08

## 2022-12-06 RX ORDER — IBUPROFEN 800 MG/1
800 TABLET ORAL ONCE
Status: COMPLETED | OUTPATIENT
Start: 2022-12-06 | End: 2022-12-06

## 2022-12-06 RX ORDER — LEVOTHYROXINE SODIUM 0.07 MG/1
150 TABLET ORAL
Status: DISCONTINUED | OUTPATIENT
Start: 2022-12-06 | End: 2022-12-31 | Stop reason: HOSPADM

## 2022-12-06 RX ORDER — CHLORHEXIDINE GLUCONATE 0.12 MG/ML
15 RINSE ORAL 2 TIMES DAILY
Status: DISCONTINUED | OUTPATIENT
Start: 2022-12-06 | End: 2022-12-07

## 2022-12-06 RX ORDER — SODIUM CHLORIDE, SODIUM LACTATE, POTASSIUM CHLORIDE, CALCIUM CHLORIDE 600; 310; 30; 20 MG/100ML; MG/100ML; MG/100ML; MG/100ML
INJECTION, SOLUTION INTRAVENOUS CONTINUOUS
Status: DISCONTINUED | OUTPATIENT
Start: 2022-12-06 | End: 2022-12-07

## 2022-12-06 RX ORDER — METHOCARBAMOL 750 MG/1
750 TABLET, FILM COATED ORAL EVERY 8 HOURS
Status: DISCONTINUED | OUTPATIENT
Start: 2022-12-06 | End: 2022-12-07

## 2022-12-06 RX ORDER — SODIUM CHLORIDE, SODIUM LACTATE, POTASSIUM CHLORIDE, AND CALCIUM CHLORIDE .6; .31; .03; .02 G/100ML; G/100ML; G/100ML; G/100ML
1000 INJECTION, SOLUTION INTRAVENOUS ONCE
Status: COMPLETED | OUTPATIENT
Start: 2022-12-06 | End: 2022-12-06

## 2022-12-06 RX ORDER — GABAPENTIN 300 MG/1
300 CAPSULE ORAL EVERY 8 HOURS
Status: DISCONTINUED | OUTPATIENT
Start: 2022-12-06 | End: 2022-12-07

## 2022-12-06 RX ORDER — IPRATROPIUM BROMIDE AND ALBUTEROL SULFATE 2.5; .5 MG/3ML; MG/3ML
1 SOLUTION RESPIRATORY (INHALATION)
Status: DISCONTINUED | OUTPATIENT
Start: 2022-12-07 | End: 2022-12-10

## 2022-12-06 RX ORDER — LAMOTRIGINE 100 MG/1
200 TABLET ORAL DAILY
Status: DISCONTINUED | OUTPATIENT
Start: 2022-12-06 | End: 2022-12-31 | Stop reason: HOSPADM

## 2022-12-06 RX ORDER — SODIUM CHLORIDE 0.9 % (FLUSH) 0.9 %
5-40 SYRINGE (ML) INJECTION EVERY 12 HOURS SCHEDULED
Status: DISCONTINUED | OUTPATIENT
Start: 2022-12-06 | End: 2022-12-07

## 2022-12-06 RX ORDER — DEXTROSE MONOHYDRATE 100 MG/ML
INJECTION, SOLUTION INTRAVENOUS CONTINUOUS PRN
Status: DISCONTINUED | OUTPATIENT
Start: 2022-12-06 | End: 2022-12-08

## 2022-12-06 RX ORDER — TOPIRAMATE 50 MG/1
75 TABLET, FILM COATED ORAL DAILY
Status: DISCONTINUED | OUTPATIENT
Start: 2022-12-06 | End: 2022-12-31 | Stop reason: HOSPADM

## 2022-12-06 RX ORDER — ACETAMINOPHEN 500 MG
1000 TABLET ORAL EVERY 8 HOURS SCHEDULED
Status: DISCONTINUED | OUTPATIENT
Start: 2022-12-06 | End: 2022-12-10

## 2022-12-06 RX ADMIN — SODIUM CHLORIDE, POTASSIUM CHLORIDE, SODIUM LACTATE AND CALCIUM CHLORIDE: 600; 310; 30; 20 INJECTION, SOLUTION INTRAVENOUS at 18:38

## 2022-12-06 RX ADMIN — IOPAMIDOL 75 ML: 755 INJECTION, SOLUTION INTRAVENOUS at 03:15

## 2022-12-06 RX ADMIN — LEVOTHYROXINE SODIUM 150 MCG: 75 TABLET ORAL at 08:19

## 2022-12-06 RX ADMIN — ACETAMINOPHEN 1000 MG: 500 TABLET ORAL at 17:19

## 2022-12-06 RX ADMIN — IBUPROFEN 800 MG: 800 TABLET, FILM COATED ORAL at 20:00

## 2022-12-06 RX ADMIN — PROPOFOL 30 MCG/KG/MIN: 10 INJECTION, EMULSION INTRAVENOUS at 22:33

## 2022-12-06 RX ADMIN — METHOCARBAMOL TABLETS 750 MG: 750 TABLET, COATED ORAL at 20:09

## 2022-12-06 RX ADMIN — METHOCARBAMOL TABLETS 750 MG: 750 TABLET, COATED ORAL at 10:25

## 2022-12-06 RX ADMIN — Medication 50 MCG/HR: at 22:43

## 2022-12-06 RX ADMIN — SODIUM CHLORIDE, POTASSIUM CHLORIDE, SODIUM LACTATE AND CALCIUM CHLORIDE 1000 ML: 600; 310; 30; 20 INJECTION, SOLUTION INTRAVENOUS at 00:50

## 2022-12-06 RX ADMIN — SODIUM CHLORIDE, POTASSIUM CHLORIDE, SODIUM LACTATE AND CALCIUM CHLORIDE: 600; 310; 30; 20 INJECTION, SOLUTION INTRAVENOUS at 04:25

## 2022-12-06 RX ADMIN — GABAPENTIN 300 MG: 300 CAPSULE ORAL at 03:46

## 2022-12-06 RX ADMIN — LAMOTRIGINE 200 MG: 100 TABLET ORAL at 08:20

## 2022-12-06 RX ADMIN — OXYCODONE 5 MG: 5 TABLET ORAL at 17:20

## 2022-12-06 RX ADMIN — IOPAMIDOL 75 ML: 755 INJECTION, SOLUTION INTRAVENOUS at 23:05

## 2022-12-06 RX ADMIN — DEXTROSE MONOHYDRATE 125 ML: 100 INJECTION, SOLUTION INTRAVENOUS at 20:56

## 2022-12-06 RX ADMIN — GABAPENTIN 300 MG: 300 CAPSULE ORAL at 10:29

## 2022-12-06 RX ADMIN — METHOCARBAMOL TABLETS 750 MG: 750 TABLET, COATED ORAL at 03:45

## 2022-12-06 RX ADMIN — GABAPENTIN 300 MG: 300 CAPSULE ORAL at 20:09

## 2022-12-06 RX ADMIN — TOPIRAMATE 75 MG: 100 TABLET, FILM COATED ORAL at 08:20

## 2022-12-06 ASSESSMENT — PULMONARY FUNCTION TESTS
PIF_VALUE: 21
PIF_VALUE: 22
PIF_VALUE: 27

## 2022-12-06 ASSESSMENT — PAIN SCALES - GENERAL
PAINLEVEL_OUTOF10: 0
PAINLEVEL_OUTOF10: 9
PAINLEVEL_OUTOF10: 9
PAINLEVEL_OUTOF10: 8

## 2022-12-06 ASSESSMENT — PAIN DESCRIPTION - LOCATION
LOCATION: BACK
LOCATION: BACK

## 2022-12-06 ASSESSMENT — ENCOUNTER SYMPTOMS
BACK PAIN: 1
ABDOMINAL PAIN: 0

## 2022-12-06 NOTE — ED NOTES
Pt respirations are even and unlabored, pt is oriented X 4, speaking in complete sentences, bed is in the lowest position, call light is within reach, NAD noted. Will continue to follow plan of care.         Mireya Hinds RN  12/06/22 1037

## 2022-12-06 NOTE — ED NOTES
Pt respirations are even and unlabored, pt is oriented X 4, speaking in complete sentences, bed is in the lowest position, call light is within reach, NAD noted. Will continue to follow plan of care.         Erin Lopez RN  12/06/22 7116

## 2022-12-06 NOTE — ED NOTES
Ortho @ bedside to evaluate patient and update family on ortho injuries.       Merlinda Council, RN  12/06/22 43060 [facility], RN  12/06/22 1812 Lissy Hurst RN  12/06/22 9965

## 2022-12-06 NOTE — ED NOTES
Report called to [facility] coordinator Domenic Hansen RN. Pt departed ED with [facility] SYS CF ALS.      Andrew Pacheco RN  12/05/22 3675

## 2022-12-06 NOTE — CARE COORDINATION
Case Management Assessment  Initial Evaluation    Date/Time of Evaluation: 12/6/2022 11:33 AM  Assessment Completed by: Myron Morales RN    If patient is discharged prior to next notation, then this note serves as note for discharge by case management. Patient Name: Rj Rainey                   YOB: 1975  Diagnosis: Cervical transverse process fracture, initial encounter ([facility] [address] [S12. 9XXA]                   Date / Time: 12/5/2022 11:39 PM    Patient Admission Status: Inpatient   Readmission Risk (Low < 19, Mod (19-27), High > 27): Readmission Risk Score: 10.6    Current PCP: Edel Cruz, DO  PCP verified by CM? Yes    Chart Reviewed: Yes      History Provided by: Other (see comment) (father Jaci Menchaca)  Patient Orientation: Other (see comment) (patient asleep, information from patient's father Jaci Menchaca at bedside)    Patient Cognition:      Hospitalization in the last 30 days (Readmission):  No    If yes, Readmission Assessment in CM Navigator will be completed. Advance Directives:      Code Status: Full Code   Patient's Primary Decision Maker is:        Discharge Planning:    Patient lives with: Parent Type of Home: House  Primary Care Giver: Family  Patient Support Systems include: Parent   Current Financial resources:    Current community resources:    Current services prior to admission: C-pap            Current DME:              Type of Home Care services:  None    ADLS  Prior functional level: Independent in ADLs/IADLs  Current functional level: Independent in ADLs/IADLs    PT AM-PAC:   /24  OT AM-PAC:   /24    Family can provide assistance at DC: Yes  Would you like Case Management to discuss the discharge plan with any other family members/significant others, and if so, who?     Plans to Return to Present Housing: Yes  Other Identified Issues/Barriers to RETURNING to current housing: mobility  Potential Assistance needed at discharge: N/A            Potential DME:    Patient [facility] to [facility]: [facility] for transportation at discharge:      Financial    Payor: Issa Jarquin / Plan: MEDICARE PART A AND B / Product Type: *No Product type* /     Does insurance require precert for SNF: No    Potential assistance Purchasing Medications: No  Meds-[facility]-Beds request:        [id number] [address]  [address]  Phone: [phone] Fax: [phone]      Notes:    Factors facilitating achievement of predicted outcomes: Family support    Barriers to discharge: Medical complications    Additional Case Management Notes: home with parents    The Plan for Transition of Care is related to the following treatment goals of Cervical transverse process fracture, initial encounter ([facility] [address] [S12. 9XXA]    IF APPLICABLE: The Patient and/or patient representative Remberto Dominguez and her family were provided with a choice of provider and agrees with the discharge plan. Freedom of choice list with basic dialogue that supports the patient's individualized plan of care/goals and shares the quality data associated with the providers was provided to:     Patient Representative Name:       The Patient and/or Patient Representative Agree with the Discharge Plan?       Eric Abreu RN  Case Management Department  Ph: [phone] Fax:

## 2022-12-06 NOTE — ED NOTES
Loss of CTA compatible line. Multiple u/s IV attempts by 2 RN's without success. Resident ntfd.       You Champion, PAU  12/06/22 9439

## 2022-12-06 NOTE — ED NOTES
Pt respirations are even and unlabored, pt is oriented X 4, speaking in complete sentences, bed is in the lowest position, call light is within reach, NAD noted. Will continue to follow plan of care.         Jesus Bennett RN  12/06/22 4005

## 2022-12-06 NOTE — ED PROVIDER NOTES
[facility]   Emergency Department  Faculty Attestation       I performed a history and physical examination of the patient and discussed management with the resident. I reviewed the residents note and agree with the documented findings including all diagnostic interpretations and plan of care. Any areas of disagreement are noted on the chart. I was personally present for the key portions of any procedures. I have documented in the chart those procedures where I was not present during the key portions. I have reviewed the emergency nurses triage note. I agree with the chief complaint, past medical history, past surgical history, allergies, medications, social and family history as documented unless otherwise noted below. Documentation of the HPI, Physical Exam and Medical Decision Making performed by scribpeyman is based on my personal performance of the HPI, PE and MDM. For Physician Assistant/ Nurse Practitioner cases/documentation I have personally evaluated this patient and have completed at least one if not all key elements of the E/M (history, physical exam, and MDM). Additional findings are as noted. Pertinent Comments     Primary Care Physician: Patria Hughes DO      ED Triage Vitals [12/05/22 2341]   BP Temp Temp Source Heart Rate Resp SpO2 Height Weight   111/79 99.8 °F (37.7 °C) Oral 80 28 94 % -- --          This is a 41 y.o. female who presents to the Emergency Department as a transfer from [address] [place] [address]. Patent is deaf and unable to wear her hearinig aides currently due to c-collar. Had a fall earlier today. At [facility] was found to have C7 fracture, T3 fractures, and pelvic fractures. Transferred for trauma and neurosurg eval. On exam is awake and alert. In C-Collar. Heart sounds regular. Lungs with decreased air entry bilaterally. Scattered echymosos. No focal neuro deficits. CTLS precautions  Neurosurgery  Trauma  Admit.        Critical Care: None     Georgina Sellers, MD  Attending Emergency Physician         Christie Kemp MD  12/11/22 2208

## 2022-12-06 NOTE — H&P
TRAUMA HISTORY AND PHYSICAL EXAMINATION    PATIENT NAME: Afua Muro  YOB: 1975  MEDICAL RECORD NO. [id number]   DATE: 12/6/2022  PRIMARY CARE PHYSICIAN: Harvinder Capone DO  PATIENT EVALUATED AT THE REQUEST OF : Donald cole    ACTIVATION   []Trauma Alert     [] Trauma Priority     [x]Trauma Consult. IMPRESSION:     Patient Active Problem List   Diagnosis    Seizure ([facility] [address]    Hypothyroidism    Asthma    Morbid obesity ([facility] [address]    ROHIT on CPAP    Hypopituitarism (HCC)    Moderate episode of recurrent major depressive disorder (HCC)    Type 2 diabetes mellitus with stage 3a chronic kidney disease, without long-term current use of insulin (HCC)    Irritable bowel syndrome with constipation    Gastroesophageal reflux disease    Cervical transverse process fracture, initial encounter [facility])       MEDICAL DECISION MAKING AND PLAN:       Acute C7 Right TP fx  Age indeterminate T3 compression fx  Nondisplaced right sacral ala fx  Nondisplaced fracture right superior pubic ramus  Right obturator ring fx  - CTA neck without vascular injury  - f/u lumbar/thoracic CT recons  - admit to stepdown   - NPO @ [facility] for possible orthopedic surgery tomorrow  - CPAP overnight for increased work of breathing   - PT/OT        CONSULT SERVICES    [x] Neurosurgery     [x] Orthopedic Surgery    [] Cardiothoracic     [] Facial Trauma    [] Plastic Surgery (Burn)    [] Pediatric Surgery     [] Internal Medicine    [] Pulmonary Medicine    [] Other:        HISTORY:     Chief Complaint:  \"My back hurts\"    INJURY SUMMARY  Acute C7 Right TP fx  Age indeterminate T3 compression fx  Nondisplaced right sacral ala fx  Nondisplaced fracture right superior pubic ramus  Right obturator ring fx    If intracranial hemorrhage is present, is it a:  [] BIG 1  [] BIG 2  [] BIG 3    GENERAL DATA  Age 41 y.o.  female   Patient information was obtained from patient and past medical records.   History/Exam limitations: communication barrier Deaf.  Patient presented to the Emergency Department by ambulance where the patient received see Ambulance Run Sheet prior to arrival.  Injury Date: 12/6/2022   Approximate Injury Time:         Transport mode:   []Ambulance      [] Helicopter     []Car       [] Other  Referring Hospital: [facility], (e.g., home, farm, industry, street)  Specific Details of Location (e.g., bedroom, kitchen, garage): home, stairs  Type of Residence (if occurred in home setting) (e.g., apartment, mobile home, single family home): unknown    MECHANISM OF INJURY    [] Motor Vehicle Collision   Specific vehicle type involved (e.g., sedan, minivan, SUV, pickup truck):   Collision with (e.g., type of vehicle, building, barn, ditch, tree):     Type of collision  [] Single Vehicle Collision  []Multiple Vehicle Collision  [] unknown collision type    Mechanism considerations  [] Fatality in Same Vehicle      []Ejected       []Rollover          []Extricated    Internal Compartment   []                      []Passenger:      []Front Seat        []Rear Seat     Personal Restraints  [] Unrestrained   []Lap Belt Only Restrained   [] Shoulder Belt Only Restrained  [] 3 Point Restrained  [] unknown     Air Bags  [] Front Air Bag  []Side Air Bag  []Curtain Airbag []Air Bag Not Deployed    []No Air Bag equipped in vehicle      Pediatric Consideration:      [] Booster Seat  []Infant Car Seat  [] Child Car Seat      [] Motorcycle Collision   Wearing Helmet     []Yes     []No    []Unknown    [] ATV crash  Wearing Helmet     []Yes     []No    []Unknown    [] Bicycle Collision Wearing Helmet     []Yes     []No    []Unknown    [] Pedestrian Struck         [x] Fall    []From Standing     []From Height  Ft     [x]Down Stairs ___steps    [] Assault    [] Gunshot  Specify caliber / type of gun: ____________________________    [] Stabbing  Specify weapon type, size: _____________________________    [] Burn  []Flame   []Scald   []Electrical []Chemical  []Inhalation   []House fire    [] Other ______________________________________________________    [] Other protective devices used / worn ___________________________    HISTORY:     Shannon Lorenz is a 41 y.o. female that presented to the Emergency Department following fall down stairs that occurred at 9am this morning. Patient tripped while walking up the stairs with a basket of laundry. She fell back, landing on her back and head. Patient now c/o pain in her chest, back, shoulders. Denies paresthesias or weakness. History limited due to developmental delay and deafness. Patient does normally wear a hearing aid that is currently out of batteries. Loss of Consciousness [x]No   []Yes Duration(min)       [] Unknown     Total Fluids Given Prior To Arrival  mL    MEDICATIONS:   []  None     []  Information not available due to exam limitations documented above    Prior to Admission medications    Medication Sig Start Date End Date Taking?  Authorizing Provider   Melatonin ER 5 MG TBCR TAKE 1 TABLET BY MOUTH ONCE A DAY AT BEDTIME 8/25/22   Historical Provider, MD   topiramate (TOPAMAX) 50 MG tablet TAKE 1 AND 1/2 TABLETS IN THE MORNING AND 2 TABLETS AT NIGHT. 9/7/22   Kelechi BowelsMD FISHMAN-CON M10 10 MEQ extended release tablet TAKE 1 TABLET BY MOUTH TWICE A DAY 7/25/22   Hugo Sarah DO   [facility] SENNA 8.6 MG tablet TAKE 1 TABLET BY MOUTH EVERY DAY 3/21/22   Hugo Sarah DO   meloxicam (MOBIC) 15 MG tablet  12/13/21   Historical Provider, MD   gabapentin (NEURONTIN) 100 MG capsule Start with one capsule daily may increase by one tablet up to at total of 3 capsules TID 8/18/21   Historical Provider, MD   nystatin (MYCOSTATIN) 001975 UNIT/GM cream APPLY TO AFFECTED AREA TWICE A DAY 10/25/21   Jony Choi DO   metFORMIN (GLUCOPHAGE) 500 MG tablet TAKE 1 TABLET BY MOUTH TWICE A DAY WITH MEALS 6/10/21   Hugo Sarah DO   Handicap Placard MISC by Does not apply route EXPIRES IN 5 YEARS FROM ORIGINAL SCRIPT DATE 4/28/21   Anabelle Chin DO   indomethacin (INDOCIN) 25 MG capsule Take 1 capsule by mouth 2 times daily (with meals) for 5 days  Patient not taking: Reported on 8/12/2022 1/27/21 1/20/22  Jorge Lees MD   buPROPion (WELLBUTRIN XL) 150 MG extended release tablet Take 300 mg by mouth daily  12/16/20   Historical Provider, MD   ARIPiprazole (ABILIFY) 20 MG tablet Take 1 tablet by mouth daily 11/23/20   Historical Provider, MD   levothyroxine (SYNTHROID) 150 MCG tablet Take 1 tablet by mouth every morning (before breakfast) 11/20/20   Historical Provider, MD   albuterol sulfate  (90 Base) MCG/ACT inhaler INHALE 2 PUFFS BY MOUTH EVERY 6 HOURS AS NEEDED FOR WHEEZING 7/13/20   Alison Choi DO   EPINEPHrine (EPIPEN 2-MINNIE) 0.3 MG/0.3ML SOAJ injection Inject 0.3 mLs into the muscle once for 1 dose Use as directed for allergic reaction 7/8/20 8/12/22  Anabelle Chin DO   lamoTRIgine (LAMICTAL) 150 MG tablet Take 200 mg by mouth daily Pt Is Now Taking 200 MG    Historical Provider, MD   Calcium-Magnesium-Vitamin D (CALCIUM 1200+D3 PO) Take 2 tablets by mouth every morning    Historical Provider, MD   LORazepam (ATIVAN) 1 MG tablet 1 mg daily as needed. 1/29/16   Historical Provider, MD   sertraline (ZOLOFT) 100 MG tablet Take 150 mg by mouth daily     Historical Provider, MD   SUMAtriptan (IMITREX) 100 MG tablet TAKE 1 TABLET AT ONSET OFHEADACHE,MAY REPEAT 2 HOURS LATER. MAX 2/DAY 1/22/15   Janina Wolff MD   Ergocalciferol (VITAMIN D2 PO)   Take 1.25 mg by mouth daily     Historical Provider, MD   hydrocortisone (CORTEF) 10 MG tablet Take 10 mg by mouth 2 times daily.     Historical Provider, MD       ALLERGIES:   []  None    []   Information not available due to exam limitations documented above     Bactrim [sulfamethoxazole-trimethoprim], Bee venom, Ciprofloxacin, and Milk-related compounds    PAST MEDICAL HISTORY: []  None   []   Information not available due to exam limitations documented above      has a past medical history of Acquired acanthosis nigricans, Anemia, Arthritis, Asthma, Brain cancer ([facility] [address] Brain tumor ([facility] [address] Contact dermatitis and other eczema, due to unspecified cause, COVID-19, Diabetes mellitus ([facility] [address] 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 has a past surgical history that includes Brain Biopsy; Tonsillectomy; knee surgery; Cochlear implant (Left); Dental surgery; Cochlear implant (Right, 05/10/2022); Colonoscopy; Endoscopy, colon, diagnostic; Colonoscopy (N/A, 8/12/2022); and Upper gastrointestinal endoscopy (N/A, 8/12/2022). FAMILY HISTORY   []   Information not available due to exam limitations documented above    family history includes Cancer in her paternal uncle; Cancer (age of onset: 50) in her mother; Cancer (age of onset: 70) in her paternal grandmother; Diabetes in her father; High Blood Pressure in her father and maternal grandmother; High Cholesterol in her father; Migraines in her mother and sister. SOCIAL HISTORY  []   Information not available due to exam limitations documented above     reports that she has never smoked. She has never used smokeless tobacco.   reports current alcohol use. reports no history of drug use. Review of Systems:    []   Information not available due to exam limitations documented above    Review of Systems   Cardiovascular:  Positive for chest pain. Gastrointestinal:  Negative for abdominal pain. Musculoskeletal:  Positive for back pain. Negative for neck pain. Skin:  Negative for wound. Neurological:  Negative for headaches. Hematological:  Does not bruise/bleed easily.          PHYSICAL EXAMINATION:     GLASCOW COMA SCALE  NEUROMUSCULAR BLOCKADE PRIOR TO ARRIVAL     [x]No        []Yes      Variable  Score   Variable  Score  Eye opening [x]Spontaneous 4 Verbal  [x]Oriented  5     []To voice  3   []Confused  4    []To pain  2   []Inapp words  3    []None  1   []Incomp words 2       []None  1   Motor   [x]Obeys  6    []Localizes pain 5    []Withdraws(pain) 4    []Flexion(pain) 3  []Extension(pain) 2    []None  1     GCS Total = 15    PHYSICAL EXAMINATION    VITAL SIGNS:   Vitals:    12/06/22 0515   BP: 130/78   Pulse:    Resp:    Temp:    SpO2: 97%       Physical Exam  Vitals and nursing note reviewed. Constitutional:       General: She is not in acute distress. Appearance: She is obese. HENT:      Head: Normocephalic and atraumatic. Nose: Nose normal.   Neck:      Comments: Aspen collar in place  Cardiovascular:      Rate and Rhythm: Normal rate and regular rhythm. Pulses: Normal pulses. Pulmonary:      Effort: No respiratory distress. Comments: Tachypneic, no audible wheeze  Abdominal:      Palpations: Abdomen is soft. Tenderness: There is no abdominal tenderness. There is no guarding or rebound. Musculoskeletal:         General: No deformity or signs of injury. Normal range of motion. Comments: Tender over bilateral shoulders   Skin:     Comments: Bruising and abrasion over right shoulder   Neurological:      Mental Status: She is alert. Sensory: No sensory deficit. Motor: No weakness. Comments: No stepoff or deformity of spine, no distinct tenderness, deaf, able to communicate verbally with written questions        FOCUSED ABDOMINAL SONOGRAM FOR TRAUMA (FAST): A FAST exam not performed d/t previous abdominal imaging        RADIOLOGY  CTA NECK W CONTRAST   Final Result   No acute trauma of the major arterial vessels of the neck. XR PELVIS (MIN 3 VIEWS)   Final Result   1. Nondisplaced fracture involving the lateral aspect of the right superior   pubic ramus, though better seen on CT imaging. 2. No other radiographically evident acute fracture seen in the pelvis.          XR SHOULDER RIGHT (MIN 2 VIEWS)   Final Result   1. No acute fracture seen in the right shoulder. 2. No acute fracture seen in the left shoulder. XR SHOULDER LEFT (MIN 2 VIEWS)   Final Result   1. No acute fracture seen in the right shoulder. 2. No acute fracture seen in the left shoulder. CT THORACIC SPINE TRAUMA RECONSTRUCTION    (Results Pending)   CT LUMBAR SPINE TRAUMA RECONSTRUCTION    (Results Pending)         LABS    Labs Reviewed   VITAMIN D 25 HYDROXY   CBC WITH AUTO DIFFERENTIAL   BASIC METABOLIC PANEL   POC GLUCOSE FINGERSTICK         Gifty Boudreaux DO  12/6/22, 5:31 AM         Attending Note    Patient seen as a trauma consult for injuries sustained in a backwards fall down several step,  Ortho and Neurosurgery consulted  I have reviewed the above TECSS note(s) and I either performed the key elements of the medical history and physical exam or was present with the resident when the key elements of the medical history and physical exam were performed. I have discussed the findings, established the care plan and recommendations with Residents Patsy Moe and Obed Bruce.     Joy Todd MD  12/6/2022  11:27 AM

## 2022-12-06 NOTE — CONSULTS
Orthopedic Surgery Consult  (Dr. Macario Dumont)      CC/Reason for consult: Hip pain    HPI:      The patient is a 41 y.o. female who fell from 1 step while carrying a basket of laundry backwards. Patient was able to get up and ambulate with the assistance of his father. Patient walked in the ED and [place], though after resting for period of time was unable to ambulate afterwards. Patient patient is hard of hearing and has cochlear implants with the battery needing charging. Exam completed with notes written on paper, hand gestures, and physical exam.  Patient denies numbness, tingling, or weakness. Besides hip pain patient also reports neck pain and bilateral shoulder pain. No obvious deformities appreciated when evaluated. 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 Past Surgical History  Theresa Sunitha has a past surgical history that includes Brain Biopsy; Tonsillectomy; knee surgery; Cochlear implant (Left); Dental surgery; Cochlear implant (Right, 05/10/2022); Colonoscopy; Endoscopy, colon, diagnostic; Colonoscopy (N/A, 8/12/2022); and Upper gastrointestinal endoscopy (N/A, 8/12/2022). Current Medications  Reviewed. See EMR for details. Allergies  Allergies reviewed:  Bactrim [sulfamethoxazole-trimethoprim], Bee venom, Ciprofloxacin, and Milk-related compounds    Social History  Patient reports that she has never smoked. She has never used smokeless tobacco. She reports current alcohol use. She reports that she does not use drugs. Family History  Patient family history includes Cancer in her paternal uncle; Cancer (age of onset: 50) in her mother; Cancer (age of onset: 70) in her paternal grandmother; Diabetes in her father; High Blood Pressure in her father and maternal grandmother; High Cholesterol in her father; Migraines in her mother and sister. ROS:   General: Limited exam due to cochlear implants need to be charged and patient hard of hearing  CV: No chest pain. Resp: Shortness of breath on 2 L of O2 with nasal cannula  MSK: Hip pain localized posteriorly over sacrum, bilateral shoulder pain, neck pain  Neuro: No numbness, tingling, weakness  10 point ROS negative other than stated above    PE:  Blood pressure 111/79, pulse 80, temperature 99.8 °F (37.7 °C), temperature source Oral, resp. rate 28, SpO2 94 %, not currently breastfeeding. Gen: Alert and oriented, NAD, limited exam due to hard of hearing    Head: Normocephalic, atraumatic. Cardiovascular: Regular rate. Respiratory: Chest symmetric, no accessory muscle use. Pelvis: Stable to anterior and lateral compression. With pain localized to sacrum with anterior compression    RUE: Skin intact. No ecchymoses, abrasion, deformity, or lacerations. Tender to palpation over shoulders and with passive range of motion of shoulder, remaining joints nontender. No crepitus. Compartments soft and easily compressible. Ulnar/median/AIN/PIN/radial motor intact. Axillary/MCN/median/ulnar/radial nerves SILT. Radial pulse 2+ with BCR.    LUE: Skin intact. No ecchymoses, abrasion, deformity, or lacerations. Tender to palpation over shoulders and with passive range of motion of shoulder, remaining joints nontender. No crepitus. Compartments soft and easily compressible. Ulnar/median/AIN/PIN/radial motor intact. Axillary/MCN/median/ulnar/radial nerves SILT. Radial pulse 2+ with BCR. RLE: Skin intact. No ecchymoses, abrasions, deformity, or lacerations. Non tender to palpation. No crepitus. Compartments soft and easily compressible. EHL/FHL/TA/GS complex motor intact. Sural/saphenous/SPN/DPN/plantar nerve distribution SILT. (-) log roll. Patient tolerates hip flexion to 90 degrees with minimal discomfort. Knee ligaments grossly intact. DP/PT pulses 2+ with BCR. LLE: Skin intact, small area of ecchymoses with interval healing over patella. No abrasions, deformity, or lacerations. Non tender to palpation. No crepitus. Compartments soft and easily compressible. EHL/FHL/TA/GS complex motor intact. Sural/saphenous/SPN/DPN/plantar nerve distribution SILT. (-) log roll. Patient tolerates hip flexion to 90 degrees with minimal discomfort. Knee ligaments grossly intact. DP/PT pulses 2+ with BCR. Labs  Recent Labs     12/05/22  1735   WBC 10.2   HGB 14.8   HCT 45.0   *   INR 1.0      K 3.6*   BUN 17   CREATININE 1.10*   GLUCOSE 102*        Imaging   CT chest abdomen pelvis  Chest:       1. Mild anterior wedging of T3 suggesting an age indeterminate compression   fracture. No fracture line is identified. Clinical correlation with the   site of symptoms is suggested. 2. Significant motion artifact with scattered patchy ground-glass opacities   in the lungs which may be related to motion artifact. Ground-glass opacities   are otherwise nonspecific and could be related to atypical infection or   pneumonitis. Abdomen and pelvis:       1. Nondisplaced fractures of the right sacral ala and the right obturator   ring. 2. No acute findings elsewhere in the abdomen or pelvis.    3. Left renal cyst.     We will follow-up pelvis x-rays when available    Assessment/Plan: 41 y.o. female who fell from 1 step backwards, being seen for:    -Right sacral ala fracture  -Right superior pubic ramus fracture    Other injuries:  C7 transverse process fracture    -We will discuss imaging with attending to determine operative versus nonoperative management  -Nonweightbearing right lower extremity  -primary team: Trauma  -NPO  -Please hold DVT prophylaxis  -Follow-up VitD  -Pain control per primary  -Ice for pain and swelling  -PT/OT eval and treat  -Please contact ortho with any questions    Lew Devine DO  Orthopedic Surgery Resident  12:11 AM 12/6/2022

## 2022-12-06 NOTE — PROGRESS NOTES
[facility] CARE DEPARTMENT - [address]     Emergency/Trauma Note    PATIENT NAME: Lily Harris    Shift date: 12/05/2022  Shift day: Monday   Shift # 3    Room # 15/15   Name: Lily Harris            Age: 41 y.o. Gender: female          Adventism: None   Place of Advent: Unknown    Trauma/Incident type: Adult Trauma Consult  Admit Date & Time: 12/5/2022 11:39 PM  TRAUMA NAME: N/A    ADVANCE DIRECTIVES IN CHART? No    NAME OF DECISION MAKER: Per next of kin hierarchy, patient's parent, Jamin Roth ([phone]), is patient's primary decision maker. RELATIONSHIP OF DECISION MAKER TO PATIENT: Patient's Parent    PATIENT/EVENT DESCRIPTION:  Lily Harris is a 41 y.o. female who arrived as a transfer from [facility]. Patient was paged out as an \"Adult Trauma Consult,\" due to a \"Fall. \" Per report, patient has sustained fractures from her fall. Per report, patient has history of \"MRDD and Brain Tumor. \" Pt to be admitted to 15/15. SPIRITUAL ASSESSMENT-INTERVENTION-OUTCOME:   responded to page and gathered patient information outside room.  attempted to speak to patient in room, however, patient has a \"hearing aid that needs charging. \"  attempted to assist patient with hearing aid, with no success. Patient was unable to communicate with  during visit. Patient's father, Jamin Roth, is listed as her primary decision maker.  attempted call to patient's father, Jamin Roth, to inquire about patient's hearing aid and to inform him of patient's hospital room number, however, patient's father did not answer call.  left voicemail with phone number to the ED so that patient's father could receive update from care team. Rocio Rojas was unable to inquire further about patient's hearing aid. PATIENT BELONGINGS:  With patient    ANY BELONGINGS OF SIGNIFICANT VALUE NOTED:  Patient's hearing aid remains uncharged in patient room (ED15). REGISTRATION STAFF NOTIFIED?   Yes    WHAT IS YOUR SPIRITUAL CARE PLAN FOR THIS PATIENT?:  [facility] can make follow-up visit, per request. Damari Griffin can be reached 24/7 via [employer]. 12/06/22 0121   Encounter Summary   Service Provided For: Patient   Referral/Consult From: Multi-disciplinary team  (Adult Trauma Consult)   Support System Parent   Last Encounter  12/05/22   Complexity of Encounter Moderate   Begin Time 0121   End Time  0138   Total Time Calculated 17 min   Encounter    Type Initial Screen/Assessment   Crisis   Type Trauma   Assessment/Intervention/Outcome   Assessment Powerlessness   Intervention Sustaining Presence/Ministry of presence   Outcome Coping   Plan and Referrals   Plan/Referrals Continue to visit, (comment); Continue Support (comment)  ( will follow-up by end of shift)     Electronically signed by Brigette Trinh on 12/6/2022 at 2:48 AM.  [facility]  [phone]

## 2022-12-06 NOTE — ED NOTES
The following labs were labeled with appropriate pt sticker and tubed to lab:     [] Blue     [x] Lavender   [] on ice  [x] Green/yellow  [] Green/black [] on ice  [] Johnella Crass  [] on ice  [] Yellow  [] Red  [] Type/ Screen  [] ABG  [] VBG    [] COVID-19 swab    [] Rapid  [] PCR  [] Flu swab  [] Peds Viral Panel     [] Urine Sample  [] Fecal Sample  [] Pelvic Cultures  [] Blood Cultures  [] X 2  [] STREP Cultures         Meño Diaz, PAU  12/06/22 1519

## 2022-12-06 NOTE — ED NOTES
Admitting team perfect served at family request for update on POC. Awaiting response.       Luz Marina Da Silva, PAU  12/06/22 1927

## 2022-12-06 NOTE — ED PROVIDER NOTES
[facility]  Emergency Department Encounter  Emergency Medicine Resident     Pt Name:Liane Forde  MRN: [id number]  [place] 1975  Date of evaluation: 12/6/22  PCP:  Georgie Goel DO      CHIEF COMPLAINT       Chief Complaint   Patient presents with    Fall       HISTORY OF PRESENT ILLNESS  (Location/Symptom, Timing/Onset, Context/Setting, Quality, Duration, Modifying Factors, Severity.)      Bren Parrish is a 41 y.o. female who presents to transfer from [facility]. Patient had a fall this morning as per report, fell backwards while ambulating. Patient self presented, was able to ambulate to the emergency department in [facility]. 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 As per chart, patient is also on multiple antiepileptic drugs. At [facility] ED, she was found to have C7 fracture, age-indeterminate T3 fracture, and pelvic fractures [nondisplaced fractures of the right sacral ala and the right obturator ring]. Patient was placed on 2 L nasal cannula for comfort, not on home oxygen. On arrival, patient is alert and oriented x3, however slow. Patient is hard of hearing, can only hear from right ear. Patient is able to write and read with ease, can communicate this way. 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 has a past surgical history that includes Brain Biopsy;  Tonsillectomy; knee surgery; Cochlear implant (Left); Dental surgery; Cochlear implant (Right, 05/10/2022); Colonoscopy; Endoscopy, colon, diagnostic; Colonoscopy (N/A, 8/12/2022); and Upper gastrointestinal endoscopy (N/A, 8/12/2022).    Social History     Socioeconomic History    Marital status: Single     Spouse name: Not on file    Number of children: Not on file    Years of education: Not on file    Highest education level: Not on file   Occupational History     Comment: disabled   Tobacco Use    Smoking status: Never    Smokeless tobacco: Never   Vaping Use    Vaping Use: Never used   Substance and Sexual Activity    Alcohol use: Yes     Alcohol/week: 0.0 standard drinks     Comment: rare    Drug use: No    Sexual activity: Not Currently     Partners: Male   Other Topics Concern    Not on file   Social History Narrative    Not on file     Social Determinants of Health     Financial Resource Strain: Low Risk     Difficulty of Paying Living Expenses: Not hard at all   Food Insecurity: No Food Insecurity    Worried About Running Out of Food in the Last Year: Never true    Ran Out of Food in the Last Year: Never true   Transportation Needs: Not on file   Physical Activity: Not on file   Stress: Not on file   Social Connections: Not on file   Intimate Partner Violence: Not on file   Housing Stability: Not on file       Family History   Problem Relation Age of Onset    Cancer Mother 48        breast    Migraines Mother     Diabetes Father     High Blood Pressure Father     High Cholesterol Father     Migraines Sister     High Blood Pressure Maternal Grandmother     Cancer Paternal Grandmother 71        colon cancer    Cancer Paternal Uncle         esophageal       Allergies:  Bactrim [sulfamethoxazole-trimethoprim], Bee venom, Ciprofloxacin, and Milk-related compounds    Home Medications:  Prior to Admission medications    Medication Sig Start Date End Date Taking? Authorizing Provider   Melatonin ER 5 MG TBCR TAKE 1 TABLET BY  MOUTH ONCE A DAY AT BEDTIME 8/25/22   Historical Provider, MD   topiramate (TOPAMAX) 50 MG tablet TAKE 1 AND 1/2 TABLETS IN THE MORNING AND 2 TABLETS AT NIGHT. 9/7/22   Jazzy Pierre MD   KLOR-CON M10 10 MEQ extended release tablet TAKE 1 TABLET BY MOUTH TWICE A DAY 7/25/22   Yogesh Fraire DO   [facility] SENNA 8.6 MG tablet TAKE 1 TABLET BY MOUTH EVERY DAY 3/21/22   Yogesh Fraier DO   meloxicam (MOBIC) 15 MG tablet  12/13/21   Historical Provider, MD   gabapentin (NEURONTIN) 100 MG capsule Start with one capsule daily may increase by one tablet up to at total of 3 capsules TID 8/18/21   Historical Provider, MD   nystatin (MYCOSTATIN) 113056 UNIT/GM cream APPLY TO AFFECTED AREA TWICE A DAY 10/25/21   Miryam Choi DO   metFORMIN (GLUCOPHAGE) 500 MG tablet TAKE 1 TABLET BY MOUTH TWICE A DAY WITH MEALS 6/10/21   Yogesh Fraire DO   Handicap Placard [facility] by Does not apply route EXPIRES IN 5 YEARS FROM ORIGINAL SCRIPT DATE 4/28/21   Yogesh Fraire DO   indomethacin (INDOCIN) 25 MG capsule Take 1 capsule by mouth 2 times daily (with meals) for 5 days  Patient not taking: Reported on 8/12/2022 1/27/21 1/20/22  Jazzy Pierre MD   buPROPion (WELLBUTRIN XL) 150 MG extended release tablet Take 300 mg by mouth daily  12/16/20   Historical Provider, MD   ARIPiprazole (ABILIFY) 20 MG tablet Take 1 tablet by mouth daily 11/23/20   Historical Provider, MD   levothyroxine (SYNTHROID) 150 MCG tablet Take 1 tablet by mouth every morning (before breakfast) 11/20/20   Historical Provider, MD   albuterol sulfate  (90 Base) MCG/ACT inhaler INHALE 2 PUFFS BY MOUTH EVERY 6 HOURS AS NEEDED FOR WHEEZING 7/13/20   Miryam Choi DO   EPINEPHrine (EPIPEN 2-MINNIE) 0.3 MG/0.3ML SOAJ injection Inject 0.3 mLs into the muscle once for 1 dose Use as directed for allergic reaction 7/8/20 8/12/22  Yogesh Fraire DO   lamoTRIgine (LAMICTAL) 150 MG tablet Take 200 mg by mouth daily Pt Is Now Taking 200 MG    Historical Provider, MD Calcium-Magnesium-Vitamin D (CALCIUM 1200+D3 PO) Take 2 tablets by mouth every morning    Historical Provider, MD   LORazepam (ATIVAN) 1 MG tablet 1 mg daily as needed. 1/29/16   Historical Provider, MD   sertraline (ZOLOFT) 100 MG tablet Take 150 mg by mouth daily     Historical Provider, MD   SUMAtriptan (IMITREX) 100 MG tablet TAKE 1 TABLET AT ONSET OFHEADACHE,MAY REPEAT 2 HOURS LATER. MAX 2/DAY 1/22/15   Salma Bryant MD   Ergocalciferol (VITAMIN D2 PO)   Take 1.25 mg by mouth daily     Historical Provider, MD   hydrocortisone (CORTEF) 10 MG tablet Take 10 mg by mouth 2 times daily. Historical Provider, MD       REVIEW OF SYSTEMS    (2-9 systems for level 4, 10 or more for level 5)      Review of Systems   Reason unable to perform ROS: SNHL with dislodged hearing aid. PHYSICAL EXAM   (up to 7 for level 4, 8 or more for level 5)      INITIAL VITALS:   /74   Pulse 94   Temp 99.8 °F (37.7 °C) (Oral)   Resp (!) 37   SpO2 96%     Physical Exam  Vitals reviewed. Constitutional:       General: She is in acute distress. HENT:      Head: Normocephalic and atraumatic. Mouth/Throat:      Mouth: Mucous membranes are moist.      Pharynx: Oropharynx is clear. Eyes:      Pupils: Pupils are equal, round, and reactive to light. Neck:      Comments: In C-spine collar  Cardiovascular:      Rate and Rhythm: Normal rate and regular rhythm. Pulses:           Radial pulses are 2+ on the right side and 2+ on the left side. Popliteal pulses are 1+ on the right side and 1+ on the left side. Dorsalis pedis pulses are 1+ on the right side and 1+ on the left side. Pulmonary:      Effort: Tachypnea and respiratory distress present. Breath sounds: No stridor. No decreased breath sounds. Abdominal:      Palpations: Abdomen is soft. Tenderness: There is abdominal tenderness. There is no guarding. Musculoskeletal:      Right shoulder: Tenderness present.       Cervical back: Tenderness present. Lumbar back: Tenderness present. Comments: Right shoulder and upper arm ecchymosis. Skin:     General: Skin is warm and dry. Capillary Refill: Capillary refill takes less than 2 seconds. Findings: Bruising present. Neurological:      Mental Status: She is alert.        DIFFERENTIAL  DIAGNOSIS     PLAN (LABS / IMAGING / EKG):  Orders Placed This Encounter   Procedures    XR PELVIS (MIN 3 VIEWS)    XR SHOULDER RIGHT (MIN 2 VIEWS)    CT THORACIC SPINE TRAUMA RECONSTRUCTION    CT LUMBAR SPINE TRAUMA RECONSTRUCTION    CTA NECK W CONTRAST    XR SHOULDER LEFT (MIN 2 VIEWS)    Vitamin D 25 Hydroxy    CBC with Auto Differential    Basic Metabolic Panel    Diet NPO Exceptions are: Sips of Water with Meds    Vital signs per unit routine    Notify patient's primary care physician of admission    Place intermittent pneumatic compression device    Telemetry monitoring - 48 hour duration    Notify physician    Bedrest - Elevate Head of Bed    Daily weights    Intake and output    Neurovascular checks    Elevate legs    Elevate Head of Bed     Place Cervical Collar    Monitor for signs/symptoms of urinary retention    Encourage deep breathing and coughing every two hours while awake    Full Code    Inpatient consult to Trauma Surgery    Inpatient consult to Neurosurgery    Inpatient consult to Orthopedic Surgery    OT eval and treat    PT evaluation and treat    Initiate Oxygen Therapy Protocol    Incentive spirometry    Adult NIV/Positive Airway Pressure    POC Glucose Fingerstick    ADMIT TO INPATIENT       MEDICATIONS ORDERED:  Orders Placed This Encounter   Medications    lactated ringers bolus    albuterol sulfate HFA (PROVENTIL;VENTOLIN;PROAIR) 108 (90 Base) MCG/ACT inhaler 1 puff     Order Specific Question:   Initiate RT Bronchodilator Protocol     Answer:   Yes - Inpatient Protocol    lamoTRIgine (LAMICTAL) tablet 200 mg    levothyroxine (SYNTHROID) tablet 150 mcg    insulin lispro (HUMALOG) injection vial 0-16 Units    topiramate (TOPAMAX) tablet 75 mg    topiramate (TOPAMAX) tablet 100 mg    sodium chloride flush 0.9 % injection 5-40 mL    sodium chloride flush 0.9 % injection 5-40 mL    0.9 % sodium chloride infusion    polyethylene glycol (GLYCOLAX) packet 17 g    lactated ringers infusion    ondansetron (ZOFRAN) injection 4 mg    acetaminophen (TYLENOL) tablet 1,000 mg    gabapentin (NEURONTIN) capsule 300 mg    methocarbamol (ROBAXIN) tablet 750 mg    oxyCODONE (ROXICODONE) immediate release tablet 5 mg    iopamidol (ISOVUE-370) 76 % injection 75 mL       DDX: Spinal cord compromise,    DIAGNOSTIC RESULTS / EMERGENCY DEPARTMENT COURSE / MDM   LAB RESULTS:  Results for orders placed or performed during the hospital encounter of 12/05/22   Vitamin D 25 Hydroxy   Result Value Ref Range    Vit D, 25-Hydroxy 30.3 >29.9 ng/mL   POC Glucose Fingerstick   Result Value Ref Range    POC Glucose 90 65 - 105 mg/dL       IMPRESSION: 28-year-old female with C7 fracture and multiple pelvic fractures, presenting as a transfer from [address] [place] [address] for orthopedic, neurosurgical, and trauma evaluation. Patient is alert and oriented x3, is hard of hearing, and vitally stable on 10 L nonrebreather. RADIOLOGY:  XR SHOULDER RIGHT (MIN 2 VIEWS)   Final Result   1. No acute fracture seen in the right shoulder. 2. No acute fracture seen in the left shoulder. XR SHOULDER LEFT (MIN 2 VIEWS)   Final Result   1. No acute fracture seen in the right shoulder. 2. No acute fracture seen in the left shoulder.          XR PELVIS (MIN 3 VIEWS)    (Results Pending)   CT THORACIC SPINE TRAUMA RECONSTRUCTION    (Results Pending)   CT LUMBAR SPINE TRAUMA RECONSTRUCTION    (Results Pending)   CTA NECK W CONTRAST    (Results Pending)       EKG    All EKG's are interpreted by the Emergency Department Physician who either signs or Co-signs this chart in the absence of a cardiologist.    [address] COURSE:  Patient is alert and oriented, hypoxic at room air placed on nonrebreather 12 L. Orthopedics, neurosurgery, trauma surgery consulted. Patient was weaned down to 4 L nasal cannula. As per orthopedics, will keep n.p.o. but likely no intervention needed. Will be reviewed by attending in the morning. Will admit to trauma. Will proceed with CT imaging as per trauma, x-ray as per orthopedics. Peripheral IV blown, new peripheral IV placed using ultrasound by Dr. Darryn Diaz. Patient noted to have increased work of breathing, will inform admitting team.    ED Course as of 12/06/22 0351   Tue Dec 06, 2022   0113 As per neurosurgery, collar can be removed while the patient is in bed. Must be replaced if patient is up from bed. [KJ]      ED Course User Index  [KJ] Domi Alegria MD       No notes of [facility] Admission Criteria type on file. PROCEDURES:    CONSULTS:  IP CONSULT TO TRAUMA SURGERY  IP CONSULT TO NEUROSURGERY  IP CONSULT TO ORTHOPEDIC SURGERY    CRITICAL CARE:    FINAL IMPRESSION      1. Other closed nondisplaced fracture of seventh cervical vertebra, initial encounter ([address]    2. Closed wedge compression fracture of T3 vertebra, initial encounter ([address]    3. Multiple closed fractures of pelvis without disruption of pelvic ring, initial encounter ([address]          DISPOSITION / [address] Admitted 12/06/2022 01:19:59 AM      PATIENT REFERRED TO:  No follow-up provider specified.     DISCHARGE MEDICATIONS:  New Prescriptions    No medications on file       Domi Alegria MD  Emergency Medicine Resident    (Please note that portions of thisnote were completed with a voice recognition program.  Efforts were made to edit the dictations but occasionally words are mis-transcribed.)       Domi Alegria MD  Resident  12/06/22 5928

## 2022-12-06 NOTE — ED NOTES
Pt respirations are even and unlabored, pt is oriented X 4, speaking in complete sentences, bed is in the lowest position, call light is within reach, NAD noted. Will continue to follow plan of care.         Erin Lopez RN  12/06/22 9976

## 2022-12-06 NOTE — ED PROVIDER NOTES
[facility]  [facility]  Phone: [phone]      Attending Physician [address]       Chief Complaint   Patient presents with    Fall     Pt was about to walk up stairs at 9am, when getting to first step pt fell back hitting head, also right arm, pain to chest area, and lower back pain due to fall. Pt had brain tumor when 13years old making hearing difficult       DIAGNOSTIC RESULTS     LABS:  Labs Reviewed   CBC WITH AUTO DIFFERENTIAL - Abnormal; Notable for the following components:       Result Value    RBC 5.30 (*)     Platelets 928 (*)     Seg Neutrophils 86 (*)     Lymphocytes 9 (*)     Eosinophils % 0 (*)     Segs Absolute 8.77 (*)     Absolute Lymph # 0.92 (*)     All other components within normal limits   COMPREHENSIVE METABOLIC PANEL - Abnormal; Notable for the following components:    Glucose 102 (*)     Creatinine 1.10 (*)     Potassium 3.6 (*)     AST 33 (*)     All other components within normal limits   PROTIME-INR   TROPONIN       All other labs were within normal range or not returned as of this dictation. RADIOLOGY:  CT Head W/O Contrast   Final Result   No acute intracranial abnormality. Small amount of fluid in the right mastoid air cells. CT CHEST ABDOMEN PELVIS WO CONTRAST Additional Contrast? None   Final Result   Chest:      1. Mild anterior wedging of T3 suggesting an age indeterminate compression   fracture. No fracture line is identified. Clinical correlation with the   site of symptoms is suggested. 2. Significant motion artifact with scattered patchy ground-glass opacities   in the lungs which may be related to motion artifact. Ground-glass opacities   are otherwise nonspecific and could be related to atypical infection or   pneumonitis. Abdomen and pelvis:      1. Nondisplaced fractures of the right sacral ala and the right obturator   ring.    2. No acute findings elsewhere in the abdomen or pelvis. 3. Left renal cyst.         CT CSpine W/O Contrast   Final Result   Acute fracture of the right C7 transverse process. Spinal degenerative changes as described. EMERGENCY DEPARTMENT COURSE:   Vitals:    Vitals:    12/05/22 1515 12/05/22 1903 12/05/22 2140 12/05/22 2246   BP: (!) 91/59 107/68 106/63 119/70   Pulse: 67 81 85 83   Resp: 17 22 26 24   Temp: 98.1 °F (36.7 °C) 98.2 °F (36.8 °C) 97.9 °F (36.6 °C) 98.2 °F (36.8 °C)   TempSrc:  Oral Oral Oral   SpO2: 93% 92% 93% 94%   Weight: 125.6 kg (277 lb)      Height: 5' 1\" (1.549 m)        -------------------------  BP: 119/70, Temp: 98.2 °F (36.8 °C), Heart Rate: 83, Resp: 24      ED Course as of 12/06/22 1215   Mon Dec 05, 2022   1757 I spoke with Dr. Elsie Sigala, ER attending who is agreeable to accept the patient. [MR]      ED Course User Index  [MR] Caitlin Henriquez, ADELA - CNP         PERTINENT ATTENDING PHYSICIAN COMMENTS:    I performed a history and physical examination of the patient and discussed management with the mid level provider. I reviewed the mid level provider's note and agree with the documented findings and plan of care. Any areas of disagreement are noted on the chart. I was personally present for the key portions of any procedures. I have documented in the chart those procedures where I was not present during the key portions. I have reviewed the emergency nurses triage note. I agree with the chief complaint, past medical history, past surgical history, allergies, medications, social and family history as documented unless otherwise noted below. Documentation of the HPI, Physical Exam and Medical Decision Making performed by mid level providers is based on my personal performance of the HPI, PE and MDM. For Physician Assistant/ Nurse Practitioner cases/documentation I have personally evaluated this patient and have completed at least one if not all key elements of the E/M (history, physical exam, and MDM).  Additional findings are as noted. Saw this patient, she did not have any obvious trauma but clearly we did find fractures in both the cervical spine and pelvis and she was transferred to a trauma center for further evaluation and treatment.   EKG and blood work were added on which were grossly unremarkable      (Please note that portions of this note were completed with a voice recognition program.  Efforts were made to edit the dictations but occasionally words are mis-transcribed.)    Yaima Patel DO  Attending Emergency Medicine Physician       Yaima Patel DO  12/06/22 2023

## 2022-12-06 NOTE — ED NOTES
Per ortho, pt is okay to log roll from side to side. Pt cleaned up, new bedding and sheets placed on patient.           Alex Bettencourt RN  12/06/22 8685

## 2022-12-06 NOTE — ED NOTES
Writer Perfect Served admitting service regarding diet order.      Windy Schlatter, RN  12/06/22 1776

## 2022-12-06 NOTE — ED NOTES
Pt presents to the ED by EMS as a transfer from EMS after sustaining a fall. C7, C6 fracture. Multiple pelvic fxs. Pt has a hx of MRDD and a brain tumor. C-Collar in place. Pt placed on a NRB. Pt placed on full cardiac monitor. Call light within reach.       Aaliyah Goode RN  12/05/22 0720

## 2022-12-06 NOTE — CONSULTS
Department of Neurosurgery                                       Resident Consult Note      Reason for Consult:  C7 TP fx T3 age indeterminate compression fx  Requesting Physician:  Robinson Rojas MD  Neurosurgeon:   []Dr. Isaac Bucio  []Dr. Michael Bell  [x]Dr. Ventura Knife    History Obtained From:  patient    CHIEF COMPLAINT:         Neck and back pain    C7 TP fx, age indeterminate T3 compresion fx    HISTORY OF PRESENT ILLNESS:       The patient is a 41 y.o. female who presents with mechanical fall down stairs. She did hit her head but did not lose consciousness. Patient denies any loss of strength or sensation. No AC use.  Patient noted to have C7 TP fx, age indeterminate T3 compresion fx    PAST MEDICAL HISTORY :       Past Medical History:        Diagnosis Date    Acquired acanthosis nigricans     Anemia     Arthritis     Asthma     Brain cancer ([facility] [address]     Brain tumor [facility])     age 5    Contact dermatitis and other eczema, due to unspecified cause     COVID-19 01/26/2021    Diabetes mellitus [facility])     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)     Fibromyalgia     Herpes zoster without mention of complication     Hypothyroidism     Migraine     Seizures ([facility] [address]     last seizure in 2000    Sleep apnea     CPAP    TIA (transient ischemic attack) 2000       Past Surgical History:        Procedure Laterality Date    BRAIN BIOPSY      tumor biopsy    COCHLEAR IMPLANT Left     COCHLEAR IMPLANT Right 05/10/2022    COLONOSCOPY      COLONOSCOPY N/A 8/12/2022    COLONOSCOPY WITH BIOPSY performed by Mindi Veloz MD at [facility]      implants 1/22    ENDOSCOPY, COLON, DIAGNOSTIC      KNEE SURGERY      scope    TONSILLECTOMY      UPPER GASTROINTESTINAL ENDOSCOPY N/A 8/12/2022    EGD BIOPSY performed by Mindi Veloz MD at [place] [id number] History:   Social History     Socioeconomic History    Marital status: Single     Spouse name: Not on file    Number of children: Not on file    Years of education: Not on file    Highest education level: Not on file   Occupational History     Comment: disabled   Tobacco Use    Smoking status: Never    Smokeless tobacco: Never   Vaping Use    Vaping Use: Never used   Substance and Sexual Activity    Alcohol use: Yes     Alcohol/week: 0.0 standard drinks     Comment: rare    Drug use: No    Sexual activity: Not Currently     Partners: Male   Other Topics Concern    Not on file   Social History Narrative    Not on file     Social Determinants of Health     Financial Resource Strain: Low Risk     Difficulty of Paying Living Expenses: Not hard at all   Food Insecurity: No Food Insecurity    Worried About Running Out of Food in the Last Year: Never true    Ran Out of Food in the Last Year: Never true   Transportation Needs: Not on file   Physical Activity: Not on file   Stress: Not on file   Social Connections: Not on file   Intimate Partner Violence: Not on file   Housing Stability: Not on file       Family History:       Problem Relation Age of Onset    Cancer Mother 50        breast    Migraines Mother     Diabetes Father     High Blood Pressure Father     High Cholesterol Father     Migraines Sister     High Blood Pressure Maternal Grandmother     Cancer Paternal Grandmother 70        colon cancer    Cancer Paternal Uncle         esophageal       Allergies:  Bactrim [sulfamethoxazole-trimethoprim], Bee venom, Ciprofloxacin, and Milk-related compounds    Home Medications:  Prior to Admission medications    Medication Sig Start Date End Date Taking?  Authorizing Provider   Melatonin ER 5 MG TBCR TAKE 1 TABLET BY MOUTH ONCE A DAY AT BEDTIME 8/25/22   Historical Provider, MD   topiramate (TOPAMAX) 50 MG tablet TAKE 1 AND 1/2 TABLETS IN THE MORNING AND 2 TABLETS AT NIGHT. 9/7/22   Nahomy Terry MD   KLOR-CON M10 10 MEQ extended release tablet TAKE 1 TABLET BY MOUTH TWICE A DAY 7/25/22   Sue Choi DO   [facility] SENNA 8.6 MG tablet TAKE 1 TABLET BY MOUTH EVERY DAY 3/21/22 Jesús Conley DO   meloxicam (MOBIC) 15 MG tablet  12/13/21   Historical Provider, MD   gabapentin (NEURONTIN) 100 MG capsule Start with one capsule daily may increase by one tablet up to at total of 3 capsules TID 8/18/21   Historical Provider, MD   nystatin (MYCOSTATIN) 349379 UNIT/GM cream APPLY TO AFFECTED AREA TWICE A DAY 10/25/21   Jayy Choi DO   metFORMIN (GLUCOPHAGE) 500 MG tablet TAKE 1 TABLET BY MOUTH TWICE A DAY WITH MEALS 6/10/21   Jesús Conley DO   Handicap Placard [id number] [facility] by Does not apply route EXPIRES IN 5 YEARS FROM ORIGINAL SCRIPT DATE 4/28/21   Jesús Conley DO   indomethacin (INDOCIN) 25 MG capsule Take 1 capsule by mouth 2 times daily (with meals) for 5 days  Patient not taking: Reported on 8/12/2022 1/27/21 1/20/22  Magen Vu MD   buPROPion (WELLBUTRIN XL) 150 MG extended release tablet Take 300 mg by mouth daily  12/16/20   Historical Provider, MD   ARIPiprazole (ABILIFY) 20 MG tablet Take 1 tablet by mouth daily 11/23/20   Historical Provider, MD   levothyroxine (SYNTHROID) 150 MCG tablet Take 1 tablet by mouth every morning (before breakfast) 11/20/20   Historical Provider, MD   albuterol sulfate  (90 Base) MCG/ACT inhaler INHALE 2 PUFFS BY MOUTH EVERY 6 HOURS AS NEEDED FOR WHEEZING 7/13/20   Jayy Choi DO   EPINEPHrine (EPIPEN 2-MINNIE) 0.3 MG/0.3ML SOAJ injection Inject 0.3 mLs into the muscle once for 1 dose Use as directed for allergic reaction 7/8/20 8/12/22  Jesús Conley DO   lamoTRIgine (LAMICTAL) 150 MG tablet Take 200 mg by mouth daily Pt Is Now Taking 200 MG    Historical Provider, MD   Calcium-Magnesium-Vitamin D (CALCIUM 1200+D3 PO) Take 2 tablets by mouth every morning    Historical Provider, MD   LORazepam (ATIVAN) 1 MG tablet 1 mg daily as needed.   1/29/16   Historical Provider, MD   sertraline (ZOLOFT) 100 MG tablet Take 150 mg by mouth daily     Historical Provider, MD   SUMAtriptan (IMITREX) 100 MG tablet TAKE 1 TABLET AT ONSET OFHEADACHE,MAY REPEAT 2 HOURS LATER. MAX 2/DAY 1/22/15   Mani Larkin MD   Ergocalciferol (VITAMIN D2 PO)   Take 1.25 mg by mouth daily     Historical Provider, MD   hydrocortisone (CORTEF) 10 MG tablet Take 10 mg by mouth 2 times daily. Historical Provider, MD       Current Medications:   Current Facility-Administered Medications: lactated ringers bolus, 1,000 mL, IntraVENous, Once    REVIEW OF SYSTEMS:       CONSTITUTIONAL: negative for fatigue and malaise   EYES: negative for double vision and photophobia    HEENT: negative for tinnitus and sore throat   RESPIRATORY: negative for cough, shortness of breath   CARDIOVASCULAR: negative for chest pain, palpitations   GASTROINTESTINAL: negative for nausea, vomiting   GENITOURINARY: negative for incontinence   MUSCULOSKELETAL: + for neck or back pain   NEUROLOGICAL: negative for seizures   PSYCHIATRIC: negative for agitated     Review of systems otherwise negative.     PHYSICAL EXAM:       /79   Pulse 80   Temp 99.8 °F (37.7 °C) (Oral)   Resp 28   SpO2 94%       CONSTITUTIONAL:  Appears uncomfortable   HEAD:  normocephalic, atraumatic    EYES:  PERRLA, EOMI.   ENT:  moist mucous membranes   NECK:  Aspen in place midline cervical tenderness no step off or deformity    BACK:  T/L midline tenderness, no step-offs or deformities    LUNGS:  Diminished BL   CARDIOVASCULAR:  normal s1 / s2   ABDOMEN:  Soft, no rigidity   NEUROLOGIC:  EYE OPENING     Spontaneous - 4 [x]       To voice - 3 []       To pain - 2 []       None - 1 []    VERBAL RESPONSE     Appropriate, oriented - 5 [x]       Dazed or confused - 4 []       Syllables, expletives - 3 []       Grunts - 2 []       None - 1 []    MOTOR RESPONSE     Spontaneous, command - 6 [x]       Localizes pain - 5 []       Withdraws pain - 4 []       Abnormal flexion - 3 []       Abnormal extension - 2 []       None - 1 []            Total GCS: 15    Mental Status:  A & O x3, awake             Cranial Nerves: cranial nerves II-XII are grossly intact    Motor Exam:    Drift:  absent  Tone:  normal    Motor exam is symmetrical 5 out of 5 all extremities bilaterally    Sensory:    Touch:    Right Upper Extremity:  normal  Left Upper Extremity:  normal  Right Lower Extremity:  normal  Left Lower Extremity:  normal    Plantar Response:    Right:  downgoing  Left:  downgoing     SKIN:  no rash      LABS AND IMAGING:     CBC with Differential:    Lab Results   Component Value Date/Time    WBC 10.2 12/05/2022 05:35 PM    RBC 5.30 12/05/2022 05:35 PM    HGB 14.8 12/05/2022 05:35 PM    HCT 45.0 12/05/2022 05:35 PM     12/05/2022 05:35 PM    MCV 84.9 12/05/2022 05:35 PM    MCH 28.0 12/05/2022 05:35 PM    MCHC 33.0 12/05/2022 05:35 PM    RDW 14.3 12/05/2022 05:35 PM    LYMPHOPCT 9 12/05/2022 05:35 PM    MONOPCT 5 12/05/2022 05:35 PM    BASOPCT 0 12/05/2022 05:35 PM    MONOSABS 0.51 12/05/2022 05:35 PM    LYMPHSABS 0.92 12/05/2022 05:35 PM    EOSABS 0.00 12/05/2022 05:35 PM    BASOSABS 0.00 12/05/2022 05:35 PM    DIFFTYPE NOT REPORTED 07/15/2020 05:29 PM     BMP:    Lab Results   Component Value Date/Time     12/05/2022 05:35 PM    K 3.6 12/05/2022 05:35 PM     12/05/2022 05:35 PM    CO2 26 12/05/2022 05:35 PM    BUN 17 12/05/2022 05:35 PM    LABALBU 4.5 12/05/2022 05:35 PM    CREATININE 1.10 12/05/2022 05:35 PM    CALCIUM 10.0 12/05/2022 05:35 PM    GFRAA >60 09/14/2022 02:16 PM    LABGLOM >60 12/05/2022 05:35 PM    GLUCOSE 102 12/05/2022 05:35 PM       Radiology Review:  CT Head W/O Contrast    Result Date: 12/5/2022  No acute intracranial abnormality. Small amount of fluid in the right mastoid air cells. CT CSpine W/O Contrast    Result Date: 12/5/2022  Acute fracture of the right C7 transverse process. Spinal degenerative changes as described. CT CHEST ABDOMEN PELVIS WO CONTRAST Additional Contrast? None    Result Date: 12/5/2022  Chest: 1.  Mild anterior wedging of T3 suggesting an age indeterminate compression fracture. No fracture line is identified. Clinical correlation with the site of symptoms is suggested. 2. Significant motion artifact with scattered patchy ground-glass opacities in the lungs which may be related to motion artifact. Ground-glass opacities are otherwise nonspecific and could be related to atypical infection or pneumonitis. Abdomen and pelvis: 1. Nondisplaced fractures of the right sacral ala and the right obturator ring. 2. No acute findings elsewhere in the abdomen or pelvis. 3. Left renal cyst.          ASSESSMENT AND PLAN:       Patient Active Problem List   Diagnosis    Seizure ([address]    Hypothyroidism    Asthma    Morbid obesity ([address]    ROHIT on CPAP    Hypopituitarism (HCC)    Moderate episode of recurrent major depressive disorder (HCC)    Type 2 diabetes mellitus with stage 3a chronic kidney disease, without long-term current use of insulin (HCC)    Irritable bowel syndrome with constipation    Gastroesophageal reflux disease         A/P:  This is a 41 y.o. female with C7 TP fx and likely acute T3 compression fx  Patient care will be discussed with attending, will reevaluate patient along with attending     - No neurosurgical interventions planned for now  - CTLS recommendations: collar when out of bed  - No further imaging from a NSG standpoint  - HOB: 30 degrees   - Neuro checks per protocol  - Ok to begin prophylactic anticoagulation from neurosurgery stand point. However, we recommend careful evaluation of all other risk factors associated with anticoagulation therapy as applied to this patient's medical condition    Additional recommendations may follow    Please contact neurosurgery with any changes in patients neurologic status. Thank you for your consult.        DO STEWART Bishop pager [phone]  12/6/2022  12:34 AM

## 2022-12-07 PROBLEM — R53.83 LETHARGY: Status: ACTIVE | Noted: 2022-12-07

## 2022-12-07 LAB
ALLEN TEST: ABNORMAL
ANION GAP SERPL CALCULATED.3IONS-SCNC: 13 MMOL/L (ref 9–17)
BUN BLDV-MCNC: 9 MG/DL (ref 6–20)
CALCIUM SERPL-MCNC: 8.1 MG/DL (ref 8.6–10.4)
CHLORIDE BLD-SCNC: 104 MMOL/L (ref 98–107)
CO2: 18 MMOL/L (ref 20–31)
CREAT SERPL-MCNC: 0.78 MG/DL (ref 0.5–0.9)
EKG ATRIAL RATE: 97 BPM
EKG P AXIS: 36 DEGREES
EKG P-R INTERVAL: 166 MS
EKG Q-T INTERVAL: 372 MS
EKG QRS DURATION: 88 MS
EKG QTC CALCULATION (BAZETT): 472 MS
EKG R AXIS: 19 DEGREES
EKG T AXIS: 19 DEGREES
EKG VENTRICULAR RATE: 97 BPM
FIO2: 35
FIO2: 50
GFR SERPL CREATININE-BSD FRML MDRD: >60 ML/MIN/1.73M2
GLUCOSE BLD-MCNC: 103 MG/DL (ref 70–99)
GLUCOSE BLD-MCNC: 109 MG/DL (ref 74–100)
GLUCOSE BLD-MCNC: 114 MG/DL (ref 65–105)
GLUCOSE BLD-MCNC: 122 MG/DL (ref 65–105)
GLUCOSE BLD-MCNC: 129 MG/DL (ref 65–105)
GLUCOSE BLD-MCNC: 133 MG/DL (ref 74–100)
GLUCOSE BLD-MCNC: 137 MG/DL (ref 65–105)
GLUCOSE BLD-MCNC: 37 MG/DL (ref 65–105)
GLUCOSE BLD-MCNC: 95 MG/DL (ref 65–105)
MODE: ABNORMAL
MODE: ABNORMAL
MRSA, DNA, NASAL: NEGATIVE
NEGATIVE BASE EXCESS, ART: 4 (ref 0–2)
NEGATIVE BASE EXCESS, ART: 4 (ref 0–2)
O2 DEVICE/FLOW/%: ABNORMAL
O2 DEVICE/FLOW/%: ABNORMAL
POC HCO3: 22.5 MMOL/L (ref 21–28)
POC HCO3: 22.9 MMOL/L (ref 21–28)
POC LACTIC ACID: 0.63 MMOL/L (ref 0.56–1.39)
POC LACTIC ACID: 0.75 MMOL/L (ref 0.56–1.39)
POC O2 SATURATION: 94 % (ref 94–98)
POC O2 SATURATION: 97 % (ref 94–98)
POC PCO2: 43.6 MM HG (ref 35–48)
POC PCO2: 46.4 MM HG (ref 35–48)
POC PH: 7.3 (ref 7.35–7.45)
POC PH: 7.32 (ref 7.35–7.45)
POC PO2: 103.2 MM HG (ref 83–108)
POC PO2: 75.4 MM HG (ref 83–108)
POTASSIUM SERPL-SCNC: 3.5 MMOL/L (ref 3.7–5.3)
PRO-BNP: 305 PG/ML
SAMPLE SITE: ABNORMAL
SAMPLE SITE: ABNORMAL
SARS-COV-2, RAPID: NOT DETECTED
SODIUM BLD-SCNC: 135 MMOL/L (ref 135–144)
SPECIMEN DESCRIPTION: NORMAL
SPECIMEN DESCRIPTION: NORMAL
THYROXINE, FREE: 1.17 NG/DL (ref 0.93–1.7)
TROPONIN, HIGH SENSITIVITY: <6 NG/L (ref 0–14)
TSH SERPL DL<=0.05 MIU/L-ACNC: <0.01 UIU/ML (ref 0.3–5)

## 2022-12-07 PROCEDURE — 94761 N-INVAS EAR/PLS OXIMETRY MLT: CPT

## 2022-12-07 PROCEDURE — 93010 ELECTROCARDIOGRAM REPORT: CPT | Performed by: INTERNAL MEDICINE

## 2022-12-07 PROCEDURE — 95819 EEG AWAKE AND ASLEEP: CPT

## 2022-12-07 PROCEDURE — 36415 COLL VENOUS BLD VENIPUNCTURE: CPT

## 2022-12-07 PROCEDURE — 6360000002 HC RX W HCPCS

## 2022-12-07 PROCEDURE — 36620 INSERTION CATHETER ARTERY: CPT

## 2022-12-07 PROCEDURE — 87641 MR-STAPH DNA AMP PROBE: CPT

## 2022-12-07 PROCEDURE — 82947 ASSAY GLUCOSE BLOOD QUANT: CPT

## 2022-12-07 PROCEDURE — 94640 AIRWAY INHALATION TREATMENT: CPT

## 2022-12-07 PROCEDURE — 93005 ELECTROCARDIOGRAM TRACING: CPT | Performed by: STUDENT IN AN ORGANIZED HEALTH CARE EDUCATION/TRAINING PROGRAM

## 2022-12-07 PROCEDURE — 2500000003 HC RX 250 WO HCPCS: Performed by: STUDENT IN AN ORGANIZED HEALTH CARE EDUCATION/TRAINING PROGRAM

## 2022-12-07 PROCEDURE — 6370000000 HC RX 637 (ALT 250 FOR IP): Performed by: STUDENT IN AN ORGANIZED HEALTH CARE EDUCATION/TRAINING PROGRAM

## 2022-12-07 PROCEDURE — 51702 INSERT TEMP BLADDER CATH: CPT

## 2022-12-07 PROCEDURE — 83605 ASSAY OF LACTIC ACID: CPT

## 2022-12-07 PROCEDURE — 2000000000 HC ICU R&B

## 2022-12-07 PROCEDURE — 95819 EEG AWAKE AND ASLEEP: CPT | Performed by: PSYCHIATRY & NEUROLOGY

## 2022-12-07 PROCEDURE — 2700000000 HC OXYGEN THERAPY PER DAY

## 2022-12-07 PROCEDURE — 6360000002 HC RX W HCPCS: Performed by: STUDENT IN AN ORGANIZED HEALTH CARE EDUCATION/TRAINING PROGRAM

## 2022-12-07 PROCEDURE — 94003 VENT MGMT INPAT SUBQ DAY: CPT

## 2022-12-07 PROCEDURE — 2580000003 HC RX 258: Performed by: STUDENT IN AN ORGANIZED HEALTH CARE EDUCATION/TRAINING PROGRAM

## 2022-12-07 PROCEDURE — 99291 CRITICAL CARE FIRST HOUR: CPT | Performed by: INTERNAL MEDICINE

## 2022-12-07 PROCEDURE — 2580000003 HC RX 258

## 2022-12-07 PROCEDURE — 84443 ASSAY THYROID STIM HORMONE: CPT

## 2022-12-07 PROCEDURE — 84439 ASSAY OF FREE THYROXINE: CPT

## 2022-12-07 PROCEDURE — 99223 1ST HOSP IP/OBS HIGH 75: CPT | Performed by: PSYCHIATRY & NEUROLOGY

## 2022-12-07 PROCEDURE — 80048 BASIC METABOLIC PNL TOTAL CA: CPT

## 2022-12-07 PROCEDURE — 84484 ASSAY OF TROPONIN QUANT: CPT

## 2022-12-07 PROCEDURE — 82803 BLOOD GASES ANY COMBINATION: CPT

## 2022-12-07 PROCEDURE — 94660 CPAP INITIATION&MGMT: CPT

## 2022-12-07 PROCEDURE — 6370000000 HC RX 637 (ALT 250 FOR IP): Performed by: NURSE PRACTITIONER

## 2022-12-07 PROCEDURE — 37799 UNLISTED PX VASCULAR SURGERY: CPT

## 2022-12-07 PROCEDURE — 36600 WITHDRAWAL OF ARTERIAL BLOOD: CPT

## 2022-12-07 RX ORDER — SUMATRIPTAN 100 MG/1
100 TABLET, FILM COATED ORAL
COMMUNITY

## 2022-12-07 RX ORDER — DEXMEDETOMIDINE HYDROCHLORIDE 4 UG/ML
.1-1.5 INJECTION, SOLUTION INTRAVENOUS CONTINUOUS
Status: DISCONTINUED | OUTPATIENT
Start: 2022-12-07 | End: 2022-12-07

## 2022-12-07 RX ORDER — POTASSIUM CHLORIDE 7.45 MG/ML
10 INJECTION INTRAVENOUS
Status: COMPLETED | OUTPATIENT
Start: 2022-12-07 | End: 2022-12-07

## 2022-12-07 RX ORDER — DEXTROSE AND SODIUM CHLORIDE 5; .45 G/100ML; G/100ML
INJECTION, SOLUTION INTRAVENOUS CONTINUOUS
Status: DISCONTINUED | OUTPATIENT
Start: 2022-12-07 | End: 2022-12-09

## 2022-12-07 RX ORDER — BUPROPION HYDROCHLORIDE 300 MG/1
TABLET ORAL
Status: ON HOLD | COMMUNITY
Start: 2022-11-17 | End: 2022-12-07

## 2022-12-07 RX ORDER — HYDROCORTISONE 10 MG/1
10 TABLET ORAL 2 TIMES DAILY
COMMUNITY

## 2022-12-07 RX ORDER — METOCLOPRAMIDE HYDROCHLORIDE 5 MG/ML
10 INJECTION INTRAMUSCULAR; INTRAVENOUS EVERY 6 HOURS
Status: DISCONTINUED | OUTPATIENT
Start: 2022-12-07 | End: 2022-12-08

## 2022-12-07 RX ADMIN — POTASSIUM CHLORIDE 10 MEQ: 10 INJECTION, SOLUTION INTRAVENOUS at 07:45

## 2022-12-07 RX ADMIN — CHLORHEXIDINE GLUCONATE 15 ML: 1.2 SOLUTION ORAL at 08:03

## 2022-12-07 RX ADMIN — IPRATROPIUM BROMIDE AND ALBUTEROL SULFATE 1 AMPULE: .5; 3 SOLUTION RESPIRATORY (INHALATION) at 07:50

## 2022-12-07 RX ADMIN — METHOCARBAMOL TABLETS 750 MG: 750 TABLET, COATED ORAL at 01:30

## 2022-12-07 RX ADMIN — SODIUM CHLORIDE, PRESERVATIVE FREE 20 MG: 5 INJECTION INTRAVENOUS at 00:13

## 2022-12-07 RX ADMIN — POTASSIUM CHLORIDE 10 MEQ: 10 INJECTION, SOLUTION INTRAVENOUS at 09:22

## 2022-12-07 RX ADMIN — ACETAMINOPHEN 1000 MG: 500 TABLET ORAL at 20:13

## 2022-12-07 RX ADMIN — METOCLOPRAMIDE 10 MG: 5 INJECTION, SOLUTION INTRAMUSCULAR; INTRAVENOUS at 11:46

## 2022-12-07 RX ADMIN — ACETAMINOPHEN 1000 MG: 500 TABLET ORAL at 05:20

## 2022-12-07 RX ADMIN — LAMOTRIGINE 200 MG: 100 TABLET ORAL at 08:04

## 2022-12-07 RX ADMIN — ACETAMINOPHEN 1000 MG: 500 TABLET ORAL at 00:15

## 2022-12-07 RX ADMIN — ACETAMINOPHEN 1000 MG: 500 TABLET ORAL at 13:16

## 2022-12-07 RX ADMIN — SERTRALINE 150 MG: 50 TABLET, FILM COATED ORAL at 09:58

## 2022-12-07 RX ADMIN — METOCLOPRAMIDE 10 MG: 5 INJECTION, SOLUTION INTRAMUSCULAR; INTRAVENOUS at 17:45

## 2022-12-07 RX ADMIN — IPRATROPIUM BROMIDE AND ALBUTEROL SULFATE 1 AMPULE: .5; 3 SOLUTION RESPIRATORY (INHALATION) at 15:52

## 2022-12-07 RX ADMIN — POTASSIUM CHLORIDE 10 MEQ: 10 INJECTION, SOLUTION INTRAVENOUS at 06:08

## 2022-12-07 RX ADMIN — GABAPENTIN 300 MG: 300 CAPSULE ORAL at 01:30

## 2022-12-07 RX ADMIN — PROPOFOL 30 MCG/KG/MIN: 10 INJECTION, EMULSION INTRAVENOUS at 06:02

## 2022-12-07 RX ADMIN — POTASSIUM CHLORIDE 10 MEQ: 10 INJECTION, SOLUTION INTRAVENOUS at 10:29

## 2022-12-07 RX ADMIN — TOPIRAMATE 100 MG: 100 TABLET, FILM COATED ORAL at 01:30

## 2022-12-07 RX ADMIN — SODIUM CHLORIDE, PRESERVATIVE FREE 20 MG: 5 INJECTION INTRAVENOUS at 08:03

## 2022-12-07 RX ADMIN — SODIUM CHLORIDE, PRESERVATIVE FREE 10 ML: 5 INJECTION INTRAVENOUS at 00:22

## 2022-12-07 RX ADMIN — METOCLOPRAMIDE 10 MG: 5 INJECTION, SOLUTION INTRAMUSCULAR; INTRAVENOUS at 23:11

## 2022-12-07 RX ADMIN — LEVOTHYROXINE SODIUM 150 MCG: 75 TABLET ORAL at 08:04

## 2022-12-07 RX ADMIN — POLYETHYLENE GLYCOL 3350 17 G: 17 POWDER, FOR SOLUTION ORAL at 08:03

## 2022-12-07 RX ADMIN — CHLORHEXIDINE GLUCONATE 15 ML: 1.2 SOLUTION ORAL at 00:15

## 2022-12-07 RX ADMIN — DEXTROSE MONOHYDRATE 250 ML: 100 INJECTION, SOLUTION INTRAVENOUS at 10:10

## 2022-12-07 RX ADMIN — TOPIRAMATE 100 MG: 100 TABLET, FILM COATED ORAL at 20:12

## 2022-12-07 RX ADMIN — IPRATROPIUM BROMIDE AND ALBUTEROL SULFATE 1 AMPULE: .5; 3 SOLUTION RESPIRATORY (INHALATION) at 21:30

## 2022-12-07 RX ADMIN — IPRATROPIUM BROMIDE AND ALBUTEROL SULFATE 1 AMPULE: .5; 3 SOLUTION RESPIRATORY (INHALATION) at 11:15

## 2022-12-07 RX ADMIN — DEXTROSE AND SODIUM CHLORIDE: 5; 450 INJECTION, SOLUTION INTRAVENOUS at 11:46

## 2022-12-07 ASSESSMENT — PULMONARY FUNCTION TESTS
PIF_VALUE: 15
PIF_VALUE: 16
PIF_VALUE: 17
PIF_VALUE: 16

## 2022-12-07 ASSESSMENT — PAIN SCALES - GENERAL: PAINLEVEL_OUTOF10: 0

## 2022-12-07 NOTE — CONSULTS
Neurology Consult Note        Reason for Consult:  AMS, hx seizures  Requesting Physician:  Dr. Tiana Johnston:  AMS    History Obtained From:  electronic medical record       HISTORY OF PRESENT ILLNESS:              The patient is a 41 y.o. female with significant past medical history of optic glioma s/p radiation therapy, headaches, morbid obesity (current BMI 52.34), epilepsy (follows with Dr. Wynelle Bumpers), DM, ROHIT on CPAP, GERD, non-smoker, IBS with constipation, hip and knee arthritis with recurrent loss of balance, legally blind in left eye, recurrent major depression, asthma, postoperative hypothyroidism, fibromylagia, b/l sensorineural hearing loss s/p bilateral cochlear implant placement, childhood CNS neoplasm at age ~6 with reportedly associated mild developmental delay, who initially presented as transfer from [facility] for trauma evaluation after fall with C7, T3 fractures, and pelvic fractures found on imaging. Patient reportedly going up stairs with basket of laundry when she tripped and fell backwards, hitting the back of her head. Upon arrival to [facility], pt initially maintaining oxygen saturations on 2L O2 via NC but progressively declined in respiratory status with increasing O2 needs up to 10L O2 supplementation via NRB and persistent tachypnea. Responsiveness also started to decrease and patient transferred to Trauma ICU and intubated for impedning resp failure. Neurology is consulted for assessment of AMS in patient with hx of seizure. Per OP Neurologist note on 1/2022 patient's epilepsy had been well controlled on current AED regimen (Topiramate 75 mg qd + 100 mg hs and Lamotrigine 200 mg qd) with patient's last seizure being in 2000.       Past Medical History:        Diagnosis Date    Acquired acanthosis nigricans     Anemia     Arthritis     Asthma     Brain cancer ([facility] [address]     Brain tumor [facility])     age 5    Contact dermatitis and other eczema, due to unspecified cause     COVID-19 01/26/2021    Diabetes mellitus [facility])     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)     Fibromyalgia     Herpes zoster without mention of complication     Hypothyroidism     Migraine     Seizures ([facility] [address]     last seizure in 2000    Sleep apnea     CPAP    TIA (transient ischemic attack) 2000     Past Surgical History:        Procedure Laterality Date    BRAIN BIOPSY      tumor biopsy    COCHLEAR IMPLANT Left     COCHLEAR IMPLANT Right 05/10/2022    COLONOSCOPY      COLONOSCOPY N/A 8/12/2022    COLONOSCOPY WITH BIOPSY performed by Bozena Wiley MD at [address] [place] [address]      implants 1/22    ENDOSCOPY, COLON, DIAGNOSTIC      KNEE SURGERY      scope    TONSILLECTOMY      UPPER GASTROINTESTINAL ENDOSCOPY N/A 8/12/2022    EGD BIOPSY performed by Bozena Wiley MD at [facility] OR     Current Medications:   Current Facility-Administered Medications: albuterol sulfate HFA (PROVENTIL;VENTOLIN;PROAIR) 108 (90 Base) MCG/ACT inhaler 1 puff, 1 puff, Inhalation, Q4H PRN  lamoTRIgine (LAMICTAL) tablet 200 mg, 200 mg, Oral, Daily  levothyroxine (SYNTHROID) tablet 150 mcg, 150 mcg, Oral, QAM AC  insulin lispro (HUMALOG) injection vial 0-16 Units, 0-16 Units, SubCUTAneous, Q4H  topiramate (TOPAMAX) tablet 75 mg, 75 mg, Oral, Daily  topiramate (TOPAMAX) tablet 100 mg, 100 mg, Oral, Nightly  sodium chloride flush 0.9 % injection 5-40 mL, 5-40 mL, IntraVENous, 2 times per day  sodium chloride flush 0.9 % injection 5-40 mL, 5-40 mL, IntraVENous, PRN  0.9 % sodium chloride infusion, , IntraVENous, PRN  polyethylene glycol (GLYCOLAX) packet 17 g, 17 g, Oral, Daily  lactated ringers infusion, , IntraVENous, Continuous  ondansetron (ZOFRAN) injection 4 mg, 4 mg, IntraVENous, Q6H PRN  acetaminophen (TYLENOL) tablet 1,000 mg, 1,000 mg, Oral, 3 times per day  gabapentin (NEURONTIN) capsule 300 mg, 300 mg, Oral, Q8H  methocarbamol (ROBAXIN) tablet 750 mg, 750 mg, Oral, Q8H  oxyCODONE (ROXICODONE) immediate release tablet 5 mg, 5 mg, Oral, Q6H PRN  glucose chewable tablet 16 g, 4 tablet, Oral, PRN  dextrose bolus 10% 125 mL, 125 mL, IntraVENous, PRN **OR** dextrose bolus 10% 250 mL, 250 mL, IntraVENous, PRN  glucagon (rDNA) injection 1 mg, 1 mg, SubCUTAneous, PRN  dextrose 10 % infusion, , IntraVENous, Continuous PRN  ipratropium-albuterol (DUONEB) nebulizer solution 1 ampule, 1 ampule, Inhalation, Q4H WA  propofol injection, 5-50 mcg/kg/min, IntraVENous, Continuous  fentaNYL 50 mcg/mL 50 mL infusion,  mcg/hr, IntraVENous, Continuous  chlorhexidine (PERIDEX) 0.12 % solution 15 mL, 15 mL, Mouth/Throat, BID  famotidine (PEPCID) 20 mg in sodium chloride (PF) 0.9 % 10 mL injection, 20 mg, IntraVENous, BID  Allergies:  Bactrim [sulfamethoxazole-trimethoprim], Bee venom, Ciprofloxacin, and Milk-related compounds    Social History:  TOBACCO:   reports that she has never smoked. She has never used smokeless tobacco.  ETOH:   reports current alcohol use. DRUGS:   reports no history of drug use.       Family History:       Problem Relation Age of Onset    Cancer Mother 50        breast    Migraines Mother     Diabetes Father     High Blood Pressure Father     High Cholesterol Father     Migraines Sister     High Blood Pressure Maternal Grandmother     Cancer Paternal Grandmother 70        colon cancer    Cancer Paternal Uncle         esophageal       REVIEW OF SYSTEMS:  Review of Systems   Unable to perform ROS: Intubated       PHYSICAL EXAM: done during sedation holiday (pt was on propofol and fentanyl which were held for exam)    Vitals:  /89   Pulse (!) 107   Temp 99.9 °F (37.7 °C) (Axillary)   Resp 29   SpO2 100%      General Appearance:  lying in bed, intubated on vent, labored breathing    Cardiac /77 with HR borderline tachycardic while at bedside ( -109 bpm during encounter); regular rhythm   Pulmonology Tachypneic with labored breaths, RR in 20s (with vent RR set at 18), accessory muscle use to breathe   Mental status   Intubated, opens eyes to voice,  Does not follow commands; could be affected by chronic hearing loss and hearing aid out of battery   Cranial nerves   II, III, IV, VI - pupils round but asymmetric, bilaterally reacting to light, see pupillometer findigns below            VII -  No obvious facial asymmetry  VIII - chronic hearing loss at baseline        Motor function  Withdraws to pain with bilateral lower extremities and left upper extremity,   Not withdrawing to pain with RUE,   Normal muscle bulk. No increased tone. No involuntary muscle activity or tremors noted   Sensory function Grimaces to pain   Cerebellar Hyporeflexic but symmetric throughout; DTRs possibly affected by body habitus,  Negative Morales's bilaterally,  Unequivocal Babinski sign   Reflex function 2+ b/l symmetric in biceps, brachioradialis, patellar, calcaneal  babinski b/l downgoing   Gait                  Not assessed         Additional Examination Elements and Findings:  old healed left sided scalp scar from prior surgery, scalp area at temporal lobe region indented bilaterally, per RN brainstem reflexes have been intact          DATA  Lab Results:   CBC:   Recent Labs     12/05/22  1735 12/06/22  0519 12/06/22 2212   WBC 10.2 8.6 9.7   HGB 14.8 12.9 13.2   * 143 145     BMP:    Recent Labs     12/05/22  1735 12/06/22  0519 12/06/22  2212    140 135   K 3.6* 3.4* 3.8    109* 105   CO2 26 22 21   BUN 17 15 9   CREATININE 1.10* 1.01* 0.89   GLUCOSE 102* 110* 121*         Lab Results   Component Value Date    CHOL 147 03/01/2020    LDLCHOLESTEROL 86 03/01/2020    HDL 46 03/01/2020    TRIG 77 03/01/2020    ALT 20 12/05/2022    AST 33 (H) 12/05/2022    TSH 0.01 (L) 03/01/2020    INR 1.0 12/05/2022    GLUF 80 10/16/2019    LABA1C 5.3 04/26/2022       No results found for: PHENYTOIN, PHENYTOIN, VALPROATE, CBMZ    Imaging:  XR SHOULDER RIGHT (MIN 2 VIEWS)    Result Date: 12/6/2022  1.  No acute fracture seen in the right shoulder. 2. No acute fracture seen in the left shoulder. XR HUMERUS RIGHT (MIN 2 VIEWS)    Result Date: 12/6/2022  No acute osseous abnormality in the right humerus. CT HEAD WO CONTRAST    Result Date: 12/7/2022  No acute intracranial abnormality. No evidence of intracranial hemorrhage or any other definable acute intracranial abnormality. Redemonstration of dense calcification in the bilateral lentiform nuclei, bilateral caudate nuclei and bilateral thalami similar to findings on previous CT scan in 2010. There are bilateral cochlear implants redemonstrated. No demonstrable fracture in calvarium or in base of skull. CT Head W/O Contrast    Result Date: 12/5/2022  No acute intracranial abnormality. Small amount of fluid in the right mastoid air cells. CT CSpine W/O Contrast    Result Date: 12/5/2022  Acute fracture of the right C7 transverse process. Spinal degenerative changes as described. XR SHOULDER LEFT (MIN 2 VIEWS)    Result Date: 12/6/2022  1. No acute fracture seen in the right shoulder. 2. No acute fracture seen in the left shoulder. XR CHEST PORTABLE    Result Date: 12/7/2022  Lower end of the ETT is 1.6 cm above manny. Mild-to-moderate pulmonary vascular congestion. Finding suggestive of interstitial pulmonary edema in lungs. XR CHEST PORTABLE    Result Date: 12/6/2022  Placement of gastric tube which extends to the distal aspect of the stomach. Cardiomegaly and vascular congestion without evidence of interstitial edema. No confluent airspace consolidation. XR CHEST PORTABLE    Result Date: 12/6/2022  Very limited study due to underpenetration. Increased opacity in the lungs which could be related to under penetration and overlying soft tissues, but diffuse airspace infiltrates cannot be excluded. Cardiomegaly without evidence of CHF. CT CHEST PULMONARY EMBOLISM W CONTRAST    Result Date: 12/7/2022  No evidence of pulmonary embolism.  No evidence of thoracic aortic aneurysm or dissection. There are mild atelectatic changes, and/or infiltrate in the posterior lung bases bilaterally, left more than right. Trace left basilar pleural effusion. Focal moderate dense infiltrates, and/or atelectasis in the posterior segment right pulmonary upper lobe. No subphrenic acute process demonstrated. XR PELVIS (MIN 3 VIEWS)    Result Date: 12/6/2022  1. Nondisplaced fracture involving the lateral aspect of the right superior pubic ramus, though better seen on CT imaging. 2. No other radiographically evident acute fracture seen in the pelvis. CTA NECK W CONTRAST    Result Date: 12/6/2022  No acute trauma of the major arterial vessels of the neck. XR ABDOMEN FOR NG/OG/NE TUBE PLACEMENT    Result Date: 12/6/2022  Gastric tube in place with the tip and side-port in the stomach. CT CHEST ABDOMEN PELVIS WO CONTRAST Additional Contrast? None    Result Date: 12/5/2022  Chest: 1. Mild anterior wedging of T3 suggesting an age indeterminate compression fracture. No fracture line is identified. Clinical correlation with the site of symptoms is suggested. 2. Significant motion artifact with scattered patchy ground-glass opacities in the lungs which may be related to motion artifact. Ground-glass opacities are otherwise nonspecific and could be related to atypical infection or pneumonitis. Abdomen and pelvis: 1. Nondisplaced fractures of the right sacral ala and the right obturator ring. 2. No acute findings elsewhere in the abdomen or pelvis. 3. Left renal cyst.     CT LUMBAR SPINE TRAUMA RECONSTRUCTION    Result Date: 12/6/2022  10-20% compression deformity of the T3 vertebra, possibly acute. No other evidence of acute thoracic or lumbar spine fracture. Multilevel degenerative change. CT THORACIC SPINE TRAUMA RECONSTRUCTION    Result Date: 12/6/2022  10-20% compression deformity of the T3 vertebra, possibly acute.   No other evidence of acute thoracic or lumbar spine fracture. Multilevel degenerative change. IMPRESSION/RECOMMENDATIONS:     Case of a 41year old female with PMHx including asthma, hypothyroidism, morbid obesity (BMI 52.34), childhood CNS tumor s/p resection with mild cognitive delay at baseline, b/l hearing loss, epilepsy (well controlled on AED, follows with Dr. Dann Mcclellan), migraines, who is transferred to [facility] from [facility] ED on 12/06 after mechanical fall with hit to head and resultant spinal and pelvic fractures. Upon arrival at [facility], patient had declining respiratory status with associated decline in mentation, reportedly becoming less responsive, and eventually requiring intubation for impending respiratory failure. Neurology consulted for assessment of AMS with hx of seizures. On exam patient is intubated, on sedation holiday, not following commands, with slightly asymmetric pupils and weakness of RUE. She has been persistently tachypneic, tachycardic, and febrile with Tmax 101.6 F. CT Head negative for acute intracranial abnormality. No convulsive activity has been witnessed/reported and patient's last seizure was in 2000. Acute metabolic encephalopathy,  Suspect associated to hypoxic respiratory failure,   Rule out stroke/fat embolism given recent pelvic fracture,   Rule out breakthrough seizure given hx of epilepsy in febrile patient  Wean off sedation as tolerated  EEG  MRI brain  Continue current home AEDs  Recommend infxn workup and check TSH with reflex    Further recommendations to follow pending discussion with attending,    Thank you for your consult,    We will follow along with you.        Joslyn Rosado DO  Neurology Resident PGY2  12/7/2022

## 2022-12-07 NOTE — PROCEDURES
Arterial Line Placement Procedure Note    DATE: 12/5/2022  RE: Rj Rainey   1975    PREOPERATIVE DIAGNOSIS:  Hypoventilation    POSTOPERATIVE DIAGNOSIS:  Hypoventilation    INDICATION:  Need for invasive blood pressure monitoring. OPERATION PERFORMED:  Placement of a 18 Gauge  Arterial line in left radial artery    Performed by: Alyson Joseph DO, PGY-2    ANESTHESIA: 3 cc Lidocaine    Procedure: Sterile technique was initiated (hand washing, gown, cap, mask, gloves, draping) before the skin over the left radial artery was prepped with chlorhexidine. After skin infiltration with 1% plain lidocaine, the vessel was identified with a 20 Ga. introducer needle and a guide wire was placed without difficulty. Then, a 18 Ga. catheter was easily threaded. Good waveform obtained on monitor and line withdrew and flushed well. A-line was secured with skin sutures, and dressed.     Complications: None    Alyson Joseph DO  9:15 AM

## 2022-12-07 NOTE — PROCEDURES
PROCEDURE NOTE - EMERGENCY INTUBATION    PATIENT NAME: Prabhu Rice  MEDICAL RECORD NO. [id number]  DATE: 12/6/2022  SURGEON: Dr. Randa Trevino: Amanda Joiner DO    PREOPERATIVE DIAGNOSIS:  Acute Respiratory Failure  POSTOPERATIVE DIAGNOSIS:  Same  PROCEDURE PERFORMED:  Emergency endotracheal intubation  ATTENDING SURGEON: Dr. Dawna Wooten: Galina Presley DO  COMPLICATIONS:  None  OPERATIVE NOTE PREPARED BY: Galina Presley DO    MEDICATIONS: etomidate intravenously and succinycholine intravenously    DISCUSSION:  Prabhu Rice is a 41y.o.-year-old female who requires intubation and ventilatory support due to impending respiratory failure. The history and physical examination were reviewed and confirmed. CONSENT: Verbal consent obtained from patient's father Jose G Ovalles    PROCEDURE:  A timeout was initiated by the bedside nurse and was confirmed by those present. The patient was placed in the appropriate position. A glidescope was utilized due to patient's body habitus. Intubation was performed by direct laryngoscopy using a laryngoscope and a 7.5 cuffed endotracheal tube. The cuff was then inflated and the tube was secured appropriately at a distance of 21 cm to the dental ridge. Initial confirmation of placement included bilateral breath sounds and an end tidal CO2 detector. A chest x-ray to verify correct placement of the tube will be performed and reviewed. The patient tolerated the procedure well.       Galina Presley DO  Trauma Resident  12/6/22, 10:02 PM

## 2022-12-07 NOTE — ED NOTES
Gabapentin 300mg and robaxin were given prior to transfer to floor.          Gerson Leal RN  12/06/22 [id number]

## 2022-12-07 NOTE — PROGRESS NOTES
[facility])  Occupational Therapy Not Seen Note    DATE: 2022    NAME: Dipika Bales  MRN: [id number]   : 1975      Patient not seen this date for Occupational Therapy due to:    Sedation: Propofol/intubated     Next Scheduled Treatment: Ck      Electronically signed by Allison Beltran OT on 2022 at 7:42 AM

## 2022-12-07 NOTE — ED NOTES
The following labs labeled with pt sticker and tubed to lab:     [] Blue     [] John Elio   [] on ice  [] Green/yellow  [] Green/black [] on ice  [] Yellow  [] Red  [] Pink      [] COVID-19 swab    [] Rapid  [] PCR  [] Flu Swab  [] Strep Swab  [] Peds Viral Panel     [x] Urine Sample  [] Pelvic Cultures  [] Blood Cultures   [] Wound Cultures          Nita Guerrero RN  12/06/22 2013

## 2022-12-07 NOTE — PROCEDURES
Date: 12/7/2022  Referring physician: Tamica Boogie DO    Indication  Patient aged 41 y with encephalopathy. EEG done to assess for epileptiform activity. Introduction  This routine 20-minute EEG was recorded using the International 10-20 System on a MyCube workstation at 256 samples/s. Automated spike and seizure detection algorithms were applied. Description  The background consistent of diffuse none reactive polymorphic delta and theta slowing with brief periods of attenuation. No consistent focal slowing or interhemispheric asymmetry was noted. Stage I and stage II sleep were observed. There were no interictal epileptiform discharges or electrographic seizures. Activations  Hyperventilation was not performed. Intermittent photic stimulation was performed and demonstrated no posterior driving response. Impression  Abnormal awake EEG. The slowing mentioned above suggests moderate non specific encephalopathy. No epileptiform discharges were identified. Please note the absence of such activity on this record cannot conclusively rule out an epileptic disorder. If such is still clinically suspected, a repeat study with sleep deprivation and/or prolonged sampling may be helpful. Satish Bolden MD  Epilepsy Board Certified. Neurology Board Certified.     Electronically Signed

## 2022-12-07 NOTE — PROGRESS NOTES
Pt arrived to the floor approx 2030 from ED. Pt only responsive to voice and pain. Pt not following commands and tachypneic into the 40's with the highest being a RR of 50. Pt has been on continuous bipap since yesterday evening per ED nurse. /95 RR 44  O2 99 BS 57.     2043 Notified trauma Cee Guerrero DO who came to bedside. New orders rec'vd. 2056 dextrose bolus 10% 125 mL given. 2135 Report given and pt transferred to ICU.

## 2022-12-07 NOTE — CONSULTS
Comprehensive Nutrition Assessment    Type and Reason for Visit:  Initial (vent)    Nutrition Recommendations/Plan:   Continue Immune Enhancing formula with goal rate of 50 ml/hr providing 1800 kcal and 113 gm protein  Monitor weight, labs and TF tolerance     Malnutrition Assessment:  Malnutrition Status: At risk for malnutrition (Comment) (12/07/22 1047)    Context:  Acute Illness     Findings of the 6 clinical characteristics of malnutrition:  Energy Intake:  Mild decrease in energy intake (Comment)  Weight Loss:  No significant weight loss     Body Fat Loss:  No significant body fat loss     Muscle Mass Loss:  No significant muscle mass loss    Fluid Accumulation:  No significant fluid accumulation     Strength:  Not Performed    Nutrition Assessment:    Patient admitted following fall with multiple fractures. She is intubated/sedated. Order placed by doctor this morning for Immune Enhancing TF with goal rate of 50 ml/hr. Nutrition Related Findings:    Medication includes reglan, synthroid, glycolax. glucose , Ca 8.1, K 3.5         Current Nutrition Intake & Therapies:    Average Meal Intake: NPO  Average Supplements Intake: NPO  Current Tube Feeding (TF) Orders:  Feeding Route:    Formula:    Schedule:    Feeding Regimen:    Additives/Modulars:    Water Flushes:    Current TF & Flush Orders Provides:    Goal TF & Flush Orders Provides:      Anthropometric Measures:  Height: 5' 1\" (154.9 cm)  Ideal Body Weight (IBW): 105 lbs (48 kg)       Current Body Weight: 282 lb (127.9 kg), 268.6 % IBW.  Weight Source: Bed Scale  Current BMI (kg/m2): 53.3                          BMI Categories: Obese Class 3 (BMI 40.0 or greater)    Estimated Daily Nutrient Needs:  Energy Requirements Based On: Kcal/kg  Weight Used for Energy Requirements: Current  Energy (kcal/day): 1800 kcal/day  Weight Used for Protein Requirements: Ideal  Protein (g/day):  gm/day  Method Used for Fluid Requirements: 1 ml/kcal  Fluid (ml/day): 1800 mL or per MD    Nutrition Diagnosis:   Inadequate oral intake related to impaired respiratory function as evidenced by NPO or clear liquid status due to medical condition, intubation    Nutrition Interventions:   Food and/or Nutrient Delivery: Continue Current Tube Feeding  Nutrition Education/Counseling: No recommendation at this time  Coordination of Nutrition Care: Continue to monitor while inpatient       Goals:     Goals: Meet at least 75% of estimated needs, prior to discharge       Nutrition Monitoring and Evaluation:   Behavioral-Environmental Outcomes: None Identified  Food/Nutrient Intake Outcomes: Enteral Nutrition Intake/Tolerance  Physical Signs/Symptoms Outcomes: Biochemical Data, GI Status, Skin, Weight    Discharge Planning:     Too soon to determine     Karsten Hays, [address]  Contact: [phone]

## 2022-12-07 NOTE — ED NOTES
Dr. Sepideh Aldana @ bedside to re-evaluate patient     Notified of increase in temperature.         Yan Epstein RN  12/06/22 630 [address], RN  12/06/22 2281

## 2022-12-07 NOTE — CONSULTS
PULMONARY & CRITICAL CARE MEDICINE CONSULT NOTE     Patient:  Afua Muro  MRN: [id number]  Admit date: 12/5/2022  Primary Care Physician: Harvinder Capone DO  Consulting Physician: Jose Jimenez MD  CODE Status: Full Code  LOS: 1     SUBJECTIVE     CHIEF COMPLAINT/REASON FOR CONSULT: Acute respiratory failure/vent management, possible transfer to medical ICU    HISTORY OF PRESENT ILLNESS:  The patient is a 41 y.o. female with multiple comorbidities including asthma, seizure disorder, diabetes mellitus, morbid obesity, obstructive sleep apnea. Patient was initially brought to the hospital after she fell backwards while carrying laundry. She sustained multiple fractures including C7 transverse process fracture, nondisplaced right sacral alar fracture, right superior pubic ramus fracture and right obturator ring fracture. She was initially admitted on the floor. However, her mentation deteriorated on the floor and she had to be intubated and transferred to trauma ICU. Pulmonary service was consulted this morning for potential transfer to medical ICU. However, due to lack of bed it was suggested that we can help with ventilator management and the primary service be switched over to intermittent than trauma. At the time of my evaluation this morning, patient was on propofol. She was arousable and able to follow commands. She was put on a pressure support/CPAP, which was tolerated well. Patient's family reported that patient had mild history of asthma which she uses as needed albuterol and she has been on CPAP for several years for history of obstructive sleep apnea.     PAST MEDICAL HISTORY:        Diagnosis Date    Acquired acanthosis nigricans     Anemia     Arthritis     Asthma     Brain cancer ([facility] [address]     Brain tumor [facility])     age 5    Contact dermatitis and other eczema, due to unspecified cause     COVID-19 01/26/2021    Diabetes mellitus [facility])     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)     Fibromyalgia     Herpes zoster without mention of complication     Hypothyroidism     Migraine     Seizures ([facility] [address]     last seizure in 2000    Sleep apnea     CPAP    TIA (transient ischemic attack) 2000     PAST SURGICAL HISTORY:        Procedure Laterality Date    BRAIN BIOPSY      tumor biopsy    COCHLEAR IMPLANT Left     COCHLEAR IMPLANT Right 05/10/2022    COLONOSCOPY      COLONOSCOPY N/A 8/12/2022    COLONOSCOPY WITH BIOPSY performed by Earlene Michel MD at [address]      implants 1/22    ENDOSCOPY, COLON, DIAGNOSTIC      KNEE SURGERY      scope    TONSILLECTOMY      UPPER GASTROINTESTINAL ENDOSCOPY N/A 8/12/2022    EGD BIOPSY performed by Earlene Michel MD at [facility]:       Problem Relation Age of Onset    Cancer Mother 50        breast    Migraines Mother     Diabetes Father     High Blood Pressure Father     High Cholesterol Father     Migraines Sister     High Blood Pressure Maternal Grandmother     Cancer Paternal Grandmother 70        colon cancer    Cancer Paternal Uncle         esophageal     SOCIAL HISTORY:   TOBACCO:   reports that she has never smoked. She has never used smokeless tobacco.  ETOH:  reports current alcohol use. DRUGS: reports no history of drug use. ALLERGIES:    Allergies   Allergen Reactions    Bactrim [Sulfamethoxazole-Trimethoprim]     Bee Venom     Ciprofloxacin     Milk-Related Compounds Diarrhea         HOME MEDICATIONS:  Prior to Admission medications    Medication Sig Start Date End Date Taking?  Authorizing Provider   SUMAtriptan (IMITREX) 100 MG tablet Take 100 mg by mouth once as needed for Migraine 1.5 tabs qam, 2 tabs qpm   Yes Historical Provider, MD   hydrocortisone (CORTEF) 10 MG tablet Take 10 mg by mouth 2 times daily   Yes Historical Provider, MD   topiramate (TOPAMAX) 50 MG tablet TAKE 1 AND 1/2 TABLETS IN THE MORNING AND 2 TABLETS AT NIGHT. 9/7/22   MD KYE Saini-CON M10 10 MEQ extended release tablet TAKE 1 TABLET BY MOUTH TWICE A DAY 7/25/22   Jada Choi DO   metFORMIN (GLUCOPHAGE) 500 MG tablet TAKE 1 TABLET BY MOUTH TWICE A DAY WITH MEALS 6/10/21   Gómez Villasenor DO   Handicap Placard MISC by Does not apply route EXPIRES IN 5 YEARS FROM ORIGINAL SCRIPT DATE 4/28/21   Gómez Villasenor DO   buPROPion (WELLBUTRIN XL) 150 MG extended release tablet Take 300 mg by mouth daily  12/16/20   Historical Provider, MD   ARIPiprazole (ABILIFY) 20 MG tablet Take 1 tablet by mouth daily 11/23/20   Historical Provider, MD   levothyroxine (SYNTHROID) 150 MCG tablet Take 1 tablet by mouth every morning (before breakfast) 11/20/20   Historical Provider, MD   albuterol sulfate  (90 Base) MCG/ACT inhaler INHALE 2 PUFFS BY MOUTH EVERY 6 HOURS AS NEEDED FOR WHEEZING 7/13/20   Jada Choi DO   EPINEPHrine (EPIPEN 2-MINNIE) 0.3 MG/0.3ML SOAJ injection Inject 0.3 mLs into the muscle once for 1 dose Use as directed for allergic reaction 7/8/20 8/12/22  Gómez Villasenor DO   lamoTRIgine (LAMICTAL) 150 MG tablet Take 150 mg by mouth daily    Historical Provider, MD   Calcium-Magnesium-Vitamin D (CALCIUM 1200+D3 PO) Take 2 tablets by mouth every morning    Historical Provider, MD   LORazepam (ATIVAN) 1 MG tablet 1 mg daily as needed.   1/29/16   Historical Provider, MD   sertraline (ZOLOFT) 100 MG tablet Take 150 mg by mouth daily     Historical Provider, MD   Ergocalciferol (VITAMIN D2 PO)   Take 1.25 mg by mouth daily     Historical Provider, MD     IMMUNIZATIONS:  Most Recent Immunizations   Administered Date(s) Administered    COVID-19, PFIZER Bivalent BOOSTER, (age 12y+), IM, 30 mcg/0.3 mL dose 10/25/2022    COVID-19, PFIZER GRAY top, DO NOT Dilute, (age 15 y+), IM, 30 mcg/0.3 mL 05/22/2022    COVID-19, PFIZER PURPLE top, DILUTE for use, (age 15 y+), 30mcg/0.3mL 08/20/2021    Influenza A (E4L6-93) Vaccine PF IM 12/16/2009    Influenza Vaccine, unspecified formulation 10/02/2016    Influenza Virus Vaccine 09/29/2015 Influenza, AFLURIA (age 1 yrs+), FLUZONE, (age 10 mo+), MDV, 0.5mL 2018    Influenza, FLUARIX, FLULAVAL, FLUZONE (age 10 mo+) AND AFLURIA, (age 1 y+), PF, 0.5mL 2021    Pneumococcal Conjugate 13-valent ([id number]) 2019    Pneumococcal Polysaccharide ([id number]) 01/15/2018    Td (Adult), 5 Lf Tetanus Toxoid, Pf (Tenivac, Decavac) 2008    Td, unspecified formulation 2008     REVIEW OF SYSTEMS:  Unobtainable from patient due to sedation/mechanical ventilation    OBJECTIVE     VENTILATOR SETTINGS:  Vent Information  Ventilator ID: [id number]  Equipment Changed: HME  Ventilator Initiate: Yes  Vent Mode: AC/PRVC     PaO2/FiO2 RATIO:  Recent Labs     22  1310   POCPO2 75.4*      FiO2 : 40 %     VITAL SIGNS:   LAST:  /70   Pulse 95   Temp 99.5 °F (37.5 °C) (Oral)   Resp 20   Ht 5' 1\" (1.549 m)   Wt 282 lb (127.9 kg)   SpO2 96%   BMI 53.28 kg/m²   8-24 HR RANGE:  TEMP Temp  Av.1 °F (37.8 °C)  Min: 99.3 °F (37.4 °C)  Max: 101.6 °F (51.1 °C)   BP Systolic (65YRK), RIN:092 , Min:113 , XAD:771      Diastolic (93ZKO), PWI:85, Min:68, Max:103     PULSE Pulse  Av.5  Min: 89  Max: 110   RR Resp  Av.7  Min: 16  Max: 22   O2 SAT SpO2  Av.2 %  Min: 94 %  Max: 98 %   OXYGEN DELIVERY O2 Flow Rate (L/min)  Av.3 L/min  Min: 15 L/min  Max: 20 L/min        Physical Exam  Constitutional:       General: She is not in acute distress. Appearance: She is morbidly obese. She is ill-appearing. Interventions: She is sedated and intubated. HENT:      Head: Normocephalic. Eyes:      Conjunctiva/sclera: Conjunctivae normal.      Pupils: Pupils are equal, round, and reactive to light. Cardiovascular:      Rate and Rhythm: Normal rate and regular rhythm. Pulses: Normal pulses. Heart sounds: Normal heart sounds, S1 normal and S2 normal. No murmur heard. Pulmonary:      Effort: She is intubated. Breath sounds: Decreased breath sounds present. Abdominal:      General: Bowel sounds are normal. There is no distension. Palpations: Abdomen is soft. There is no mass. Musculoskeletal:      Right lower leg: No edema. Left lower leg: No edema. Skin:     Coloration: Skin is not jaundiced or pale. Findings: No rash. Nails: There is no clubbing. Neurological:      General: No focal deficit present. Mental Status: She is alert. DATA REVIEW     Medications: Current Inpatient  Scheduled Meds:   sertraline  150 mg Oral Daily    metoclopramide  10 mg IntraVENous Q6H    lamoTRIgine  200 mg Oral Daily    levothyroxine  150 mcg Oral QAM AC    insulin lispro  0-16 Units SubCUTAneous Q4H    topiramate  75 mg Oral Daily    topiramate  100 mg Oral Nightly    polyethylene glycol  17 g Oral Daily    acetaminophen  1,000 mg Oral 3 times per day    ipratropium-albuterol  1 ampule Inhalation Q4H WA     Continuous Infusions:   dextrose 5 % and 0.45 % NaCl 75 mL/hr at 12/07/22 1800    sodium chloride      dextrose         INPUT/OUTPUT:  In: 2931.7 [I.V.:2385. 3; NG/GT:150]  Out: 4561 [Urine:2890]  Date 12/07/22 0000 - 12/07/22 2359   Shift 4845-6739 1922-5638 1725-4565 24 Hour Total   INTAKE   I.V.(mL/kg) 1574. 4(12.3) 636.4(5) 174.6(1.4) 2385. 3(18.6)   NG/GT(mL/kg) 60(0.5) 90(0.7)  150(1.2)   IV Piggyback(mL/kg) 2.9(0) 393.5(3.1)  396.4(3.1)   Shift Total(mL/kg) 1637. 3(12.8) 1119.9(8.8) 174.6(1.4) 9586.9(24.3)   OUTPUT   Urine(mL/kg/hr) 400(0.4) 1125(1.1) 50 1575   Shift Total(mL/kg) 400(3.1) 1125(8.8) 50(0.4) 1575(12.3)   Weight (kg) 127.9 127.9 127.9 127.9        LABS:  ABGs:   Recent Labs     12/06/22  2331 12/07/22  0605 12/07/22  1310   POCPH 7.347* 7.301* 7.320*   POCPCO2 37.7 46.4 43.6   POCPO2 105.0 103.2 75.4*   POCHCO3 20.7* 22.9 22.5   DZSL2BNB 98 97 94     CBC:   Recent Labs     12/05/22  1735 12/06/22  0519 12/06/22  2212   WBC 10.2 8.6 9.7   HGB 14.8 12.9 13.2   HCT 45.0 40.0 40.6   MCV 84.9 87.1 86.6   * 143 145   LYMPHOPCT 9* 15* 15*   RBC 5.30* 4.59 4.69   MCH 28.0 28.1 28.1   MCHC 33.0 32.3 32.5   RDW 14.3 14.0 14.2     CRP:   No results for input(s): CRP in the last 72 hours. LDH:   No results for input(s): LDH in the last 72 hours. BMP:   Recent Labs     12/05/22  1735 12/06/22  0519 12/06/22 2212 12/07/22  0357    140 135 135   K 3.6* 3.4* 3.8 3.5*    109* 105 104   CO2 26 22 21 18*   BUN 17 15 9 9   CREATININE 1.10* 1.01* 0.89 0.78   GLUCOSE 102* 110* 121* 103*   PHOS  --   --  2.9  --      Liver Function Test:   Recent Labs     12/05/22 1735   PROT 8.0   LABALBU 4.5   ALT 20   AST 33*   ALKPHOS 99   BILITOT 0.4     Coagulation Profile:   Recent Labs     12/05/22 1735   INR 1.0   PROTIME 10.5     D-Dimer:  No results for input(s): DDIMER in the last 72 hours. Ferritin:    No results for input(s): FERRITIN in the last 72 hours. Lactic Acid:  No results for input(s): LACTA in the last 72 hours. Cardiac Enzymes:  No results for input(s): CKTOTAL, CKMB, CKMBINDEX, TROPONINI in the last 72 hours. Invalid input(s): TROPONIN, HSTROP  BNP/ProBNP:   Recent Labs     12/06/22 2212   PROBNP 305*     Triglycerides:  No results for input(s): TRIG in the last 72 hours. Microbiology:  Urine Culture:  No components found for: CURINE  Blood Culture:  No components found for: CBLOOD, CFUNGUSBL  Sputum Culture:  No components found for: CSPUTUM  Recent Labs     12/07/22  0200   [address] [facility] . NASAL SWAB     No results for input(s): SPUTUM, SPECIAL, CULTURE, STATUS, ORG, CDIFFTOXPCR, MPNEUM, MPNEUG in the last 72 hours. Invalid input(s): CURINE, [address], CFUNGUSBL,  [address] [facility]     Pathology:    Radiology Reports:  CT HEAD WO CONTRAST   Final Result   No acute intracranial abnormality. No evidence of intracranial hemorrhage or any other definable acute   intracranial abnormality.       Redemonstration of dense calcifications in the bilateral lentiform nuclei,   bilateral caudate nuclei and bilateral thalami similar to findings on   previous CT scan in 2010. There are bilateral cochlear implants redemonstrated. No demonstrable fracture in calvarium or in base of skull. CT CHEST PULMONARY EMBOLISM W CONTRAST   Final Result   No evidence of pulmonary embolism. No evidence of thoracic aortic aneurysm or dissection. There are mild atelectatic changes, and/or infiltrate in the posterior lung   bases bilaterally, left more than right. Trace left basilar pleural effusion. Focal moderate dense infiltrates, and/or atelectasis in the posterior segment   of right pulmonary upper lobe. No subphrenic acute process demonstrated. XR CHEST PORTABLE   Final Result   Lower end of the ET tube is 1.6 cm above manny. Mild-to-moderate pulmonary vascular congestion. Finding suggestive of   interstitial pulmonary edema in lungs. XR CHEST PORTABLE   Final Result   Placement of gastric tube which extends to the distal aspect of the stomach. Cardiomegaly and vascular congestion without evidence of interstitial edema. No confluent airspace consolidation. XR ABDOMEN FOR NG/OG/NE TUBE PLACEMENT   Final Result   Gastric tube in place with the tip and side-port in the stomach. XR CHEST PORTABLE   Final Result   Very limited study due to underpenetration. Increased opacity in the lungs which could be related to under penetration   and overlying soft tissues, but diffuse airspace infiltrates cannot be   excluded. Cardiomegaly without evidence of CHF. XR HUMERUS RIGHT (MIN 2 VIEWS)   Final Result   No acute osseous abnormality in the right humerus. CTA NECK W CONTRAST   Final Result   No acute trauma of the major arterial vessels of the neck. CT THORACIC SPINE TRAUMA RECONSTRUCTION   Final Result   10-20% compression deformity of the T3 vertebra, possibly acute. No other   evidence of acute thoracic or lumbar spine fracture.   Multilevel degenerative change. CT LUMBAR SPINE TRAUMA RECONSTRUCTION   Final Result   10-20% compression deformity of the T3 vertebra, possibly acute. No other   evidence of acute thoracic or lumbar spine fracture. Multilevel degenerative   change. XR PELVIS (MIN 3 VIEWS)   Final Result   1. Nondisplaced fracture involving the lateral aspect of the right superior   pubic ramus, though better seen on CT imaging. 2. No other radiographically evident acute fracture seen in the pelvis. XR SHOULDER RIGHT (MIN 2 VIEWS)   Final Result   1. No acute fracture seen in the right shoulder. 2. No acute fracture seen in the left shoulder. XR SHOULDER LEFT (MIN 2 VIEWS)   Final Result   1. No acute fracture seen in the right shoulder. 2. No acute fracture seen in the left shoulder. Echocardiogram:   No results found for this or any previous visit. ASSESSMENT AND PLAN     Assessment:    Acute hypoxemic respiratory failure, requiring nasal mechanical ventilation  S/p fall with multiple fractures  Bibasilar atelectasis  History of mild asthma  Morbid obesity  Obstructive sleep apnea  Seizure disorder    Plan:    I personally interviewed/examined the patient; reviewed interval history, interpreted all available radiographic and laboratory data at the time of service.     Patient was on propofol for sedation  She remains hemodynamically stable   Ventilator settings were reviewed  Tolerated spontaneous breathing trial  Recommended extubation  Use CPAP/BiPAP at night for history of sleep apnea  Obtain X-ray chest as needed   Maintain bronchopulmonary hygiene  Continue aspiration precautions   Continue bronchodilators  Chemical DVT prophylaxis as per primary service  Antimicrobials reviewed; continue to monitor off antimicrobials  Glycemic control appropriate  Physical/occupational therapy    Critical care time of 30 minutes was spent (excluding procedures), in coordination of care during bedside rounds and discussion of patient care in detail, and recommendations of the team were adopted in the plan. Necessity of all invasive devices was also confirmed. Braden Payne MD  Pulmonary and Critical Care Medicine           12/7/2022    This note is created with the assistance of a speech recognition program.  While intending to generate a document that actually reflects the content of the visit, the document can still have some errors including those of syntax and sound-alike substitutions which may escape proof reading. It such instances, actual meaning can be extrapolated by contextual diversion.

## 2022-12-07 NOTE — PROGRESS NOTES
Trauma Tertiary Survey    Admit Date: 12/5/2022  Hospital day 1    Fall down stairs       Past Medical History:   Diagnosis Date    Acquired acanthosis nigricans     Anemia     Arthritis     Asthma     Brain cancer ([address]     Brain tumor [facility])     age 5    Contact dermatitis and other eczema, due to unspecified cause     COVID-19 01/26/2021    Diabetes mellitus [facility])     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)     Fibromyalgia     Herpes zoster without mention of complication     Hypothyroidism     Migraine     Seizures (HCC)     last seizure in 2000    Sleep apnea     CPAP    TIA (transient ischemic attack) 2000       Scheduled Meds:   lamoTRIgine  200 mg Oral Daily    levothyroxine  150 mcg Oral QAM AC    insulin lispro  0-16 Units SubCUTAneous Q4H    topiramate  75 mg Oral Daily    topiramate  100 mg Oral Nightly    sodium chloride flush  5-40 mL IntraVENous 2 times per day    polyethylene glycol  17 g Oral Daily    acetaminophen  1,000 mg Oral 3 times per day    gabapentin  300 mg Oral Q8H    methocarbamol  750 mg Oral Q8H    ibuprofen  800 mg Oral Once     Continuous Infusions:   sodium chloride      lactated ringers 75 mL/hr at 12/06/22 1838     PRN Meds:albuterol sulfate HFA, sodium chloride flush, sodium chloride, ondansetron, oxyCODONE    Subjective:     Patient has complaints of chest pain and back pain. .  Pain is moderate, worsens with movement, and some relief by rest.  There is not associated numbness, tingling, weakness. Objective:   Patient Vitals for the past 8 hrs:   BP Temp Temp src Pulse Resp SpO2   12/06/22 1933 134/89 (!) 101.6 °F (38.7 °C) Oral (!) 101 (!) 43 97 %   12/06/22 1645 -- -- -- 96 (!) 44 95 %   12/06/22 1215 119/81 -- -- 87 (!) 39 --   12/06/22 1200 118/72 -- -- 86 -- 93 %       I/O last 3 completed shifts:   In: 1000 [IV Piggyback:1000]  Out: 750 [Urine:750]  I/O this shift:  In: -   Out: [address]    Radiology:  [address] [facility] [[id number]] Collected: 12/06/22 0332    Updated: 12/06/22 0430    Narrative:     EXAMINATION:   CTA OF THE NECK 12/6/2022 1:26 am     TECHNIQUE:   CTA of the neck was performed with the administration of intravenous   contrast. Multiplanar reformatted images are provided for review. MIP images   are provided for review. Stenosis of the internal carotid arteries measured   using NASCET criteria. Automated exposure control, iterative reconstruction,   and/or weight based adjustment of the mA/kV was utilized to reduce the   radiation dose to as low as reasonably achievable. COMPARISON:   None. HISTORY:   ORDERING SYSTEM PROVIDED HISTORY: C7 fx   TECHNOLOGIST PROVIDED HISTORY:   C7 fx     Decision Support Exception - unselect if not a suspected or confirmed   emergency medical condition->Emergency Medical Condition (MA)   Reason for Exam: C7 fx     FINDINGS:   AORTIC ARCH/ARCH VESSELS: No dissection or arterial injury. No significant   stenosis of the brachiocephalic or subclavian arteries. CAROTID ARTERIES: No dissection, arterial injury, or hemodynamically   significant stenosis by NASCET criteria. VERTEBRAL ARTERIES: No dissection, arterial injury, or significant stenosis. SOFT TISSUES: No acute abnormality. No active extravasation. BONES: Please see separate maxillofacial and/or cervical spine CT reports. Impression:     No acute trauma of the major arterial vessels of the neck. XR PELVIS (MIN 3 VIEWS) [[id number]]    Collected: 12/06/22 0244    Updated: 12/06/22 0353    Narrative:     EXAMINATION:   ONE XRAY VIEW OF THE PELVIS     12/6/2022 2:39 am     COMPARISON:   None. HISTORY:   ORDERING SYSTEM PROVIDED HISTORY: Trauma/Fracture   TECHNOLOGIST PROVIDED HISTORY:   AP, In[place] views please, thank you. Trauma/Fracture   Reason for Exam: hx fall     FINDINGS:   Mild degenerative changes are seen in the hip joints.   The nondisplaced   fracture involving the lateral aspect of the superior pubic ramus on the   right is seen on the 2nd image. No other pelvic fracture is seen   radiographically. No sacral edgar are fracture is seen radiographically. Osteopenia. No acute soft tissue abnormality is identified. Impression:     1. Nondisplaced fracture involving the lateral aspect of the right superior   pubic ramus, though better seen on CT imaging. 2. No other radiographically evident acute fracture seen in the pelvis. XR SHOULDER RIGHT (MIN 2 VIEWS) [[id number]]    Collected: 12/06/22 0245    Updated: 12/06/22 0347    Narrative:     EXAMINATION:   3 XRAY VIEWS OF THE LEFT SHOULDER; TWO XRAY VIEWS OF THE RIGHT SHOULDER     12/6/2022 2:38 am     COMPARISON:   None. HISTORY:   ORDERING SYSTEM PROVIDED HISTORY: Trauma/Fracture   TECHNOLOGIST PROVIDED HISTORY:   Trauma/Fracture   Reason for Exam: best films due to pt size and ability,held by 2 people,pt   mrdd     FINDINGS:   Right shoulder: Mild degenerative change seen within the acromioclavicular   and glenohumeral joint. No right-sided chest wall fracture is identified. No dislocation is seen. No humeral fracture. Left shoulder: Degenerative change seen at the glenohumeral and   acromioclavicular joint. No AC joint separation. No acute fracture is   identified. Impression:     1. No acute fracture seen in the right shoulder. 2. No acute fracture seen in the left shoulder. XR SHOULDER LEFT (MIN 2 VIEWS) [[id number]]    Collected: 12/06/22 0243    Updated: 12/06/22 0347    Narrative:     EXAMINATION:   3 XRAY VIEWS OF THE LEFT SHOULDER; TWO XRAY VIEWS OF THE RIGHT SHOULDER     12/6/2022 2:38 am     COMPARISON:   None.      HISTORY:   ORDERING SYSTEM PROVIDED HISTORY: Trauma/Fracture   TECHNOLOGIST PROVIDED HISTORY:   Trauma/Fracture   Reason for Exam: best films due to pt size and ability,held by 2 people,pt   mrdd     FINDINGS:   Right shoulder: Mild degenerative change seen within the acromioclavicular   and glenohumeral joint. No right-sided chest wall fracture is identified. No dislocation is seen. No humeral fracture. Left shoulder: Degenerative change seen at the glenohumeral and   acromioclavicular joint. No AC joint separation. No acute fracture is   identified. Impression:     1. No acute fracture seen in the right shoulder. 2. No acute fracture seen in the left shoulder.         PHYSICAL EXAM:   GCS:  4 - Opens eyes on own   6 - Follows simple motor commands  5 - Alert and oriented    Pupil size:  Left 2 mm Right 2 mm  Pupil reaction: Yes  Wiggles fingers: Left Yes Right Yes  Hand grasp:   Left normal   Right normal  Wiggles toes: Left Yes    Right Yes  Plantar flexion: Left normal  Right normal    /89   Pulse (!) 107   Temp 99.9 °F (37.7 °C) (Axillary)   Resp 29   SpO2 100%   General appearance: cooperative, no distress, and on CPAP , severe hearing loss, hearing aide in place in left ear  Head: Normocephalic, without obvious abnormality, atraumatic  Eyes:  difficult to visualize d/t excess tissue, opens both eyes, PERRL, moves both e yes without pain  Neck: supple, symmetrical, trachea midline, nontender midline neck  Lungs: tachpnea, no accessory muscle use, no audible wheezing  Heart: regular rate and rhythm, tender over anterior chest wall without obviosu signs of trauma  Abdomen:  protuberant, soft, nontender, old bruising periumbilical  Pulses: 2+ and symmetric  Skin:  color, texture, turgor normal, bruising over right shoulder with abrasions overtop  Neurologic: Alert and responds yes and no to questioning, following commands, moves all fingers and toes    Spine:     Spine Tenderness ROM   Cervical 0 /10 Limited d/t positioning with bipap mask   Thoracic 5 /10 Unable to assess d/t body habitus   Lumbar 5 /10 Unable to assess d/t body habitus     Musculoskeletal    Joint Tenderness Swelling ROM   Right shoulder present present Limited secondary to pain   Left shoulder present absent Limited secondary to pain   Right elbow absent absent normal   Left elbow absent absent normal   Right wrist absent absent normal   Left wrist absent absent normal   Right hand grasp absent absent normal   Left hand grasp absent absent normal   Right hip minimal absent Limited secondary to positioning   Left hip absent absent Limited secondary to positioning   Right knee absent absent normal   Left knee absent absent normal   Right ankle absent absent normal   Left ankle absent absent normal   Right foot absent absent normal   Left foot absent absent normal             CONSULTS: neurosurgery, orthopedics    PROCEDURES: none    INJURIES:        Patient Active Problem List   Diagnosis    Seizure ([facility]    Hypothyroidism    Asthma    Morbid obesity ([facility]    ROHIT on CPAP    Hypopituitarism ([facility])    Moderate episode of recurrent major depressive disorder ([facility]    Type 2 diabetes mellitus with stage 3a chronic kidney disease, without long-term current use of insulin ([facility])    Irritable bowel syndrome with constipation    Gastroesophageal reflux disease    Cervical transverse process fracture, initial encounter ([facility])    Closed nondisplaced fracture of seventh cervical vertebra ([facility])         Assessment/Plan:     - Monitor respiratory status as patient continues to be tachypneic on CPAP  - f/u VBG  - UA ordered d/t new onset fever of 101F  - NWB RLE per orthopedics, no surgical intervention for now  - Wear c-collar when out of bed per NSG, no surgical intervention     DISPOSITION:   Remain in medical floor   PT/OT        Gurjit Mcmillan DO  12/6/22, 7:53 PM

## 2022-12-07 NOTE — PLAN OF CARE
Problem: Discharge Planning  Goal: Discharge to home or other facility with appropriate resources  Outcome: Progressing     Problem: Safety - Medical Restraint  Goal: Remains free of injury from restraints (Restraint for Interference with Medical Device)  Description: INTERVENTIONS:  1. Determine that other, less restrictive measures have been tried or would not be effective before applying the restraint  2. Evaluate the patient's condition at the time of restraint application  3. Inform patient/family regarding the reason for restraint  4.  Q2H: Monitor safety, psychosocial status, comfort, nutrition and hydration  Outcome: Progressing  Flowsheets  Taken 12/7/2022 0600  Remains free of injury from restraints (restraint for interference with medical device):   Determine that other, less restrictive measures have been tried or would not be effective before applying the restraint   Evaluate the patient's condition at the time of restraint application   Inform patient/family regarding the reason for restraint   Every 2 hours: Monitor safety, psychosocial status, comfort, nutrition and hydration  Taken 12/7/2022 0400  Remains free of injury from restraints (restraint for interference with medical device):   Determine that other, less restrictive measures have been tried or would not be effective before applying the restraint   Evaluate the patient's condition at the time of restraint application   Inform patient/family regarding the reason for restraint   Every 2 hours: Monitor safety, psychosocial status, comfort, nutrition and hydration  Taken 12/7/2022 0200  Remains free of injury from restraints (restraint for interference with medical device):   Determine that other, less restrictive measures have been tried or would not be effective before applying the restraint   Evaluate the patient's condition at the time of restraint application   Inform patient/family regarding the reason for restraint   Every 2 hours: Monitor safety, psychosocial status, comfort, nutrition and hydration     Problem: Confusion  Goal: Confusion, delirium, dementia, or psychosis is improved or at baseline  Description: INTERVENTIONS:  1. Assess for possible contributors to thought disturbance, including medications, impaired vision or hearing, underlying metabolic abnormalities, dehydration, psychiatric diagnoses, and notify attending LIP  2. [place] high risk fall precautions, as indicated  3. Provide frequent short contacts to provide reality reorientation, refocusing and direction  4. Decrease environmental stimuli, including noise as appropriate  5. Monitor and intervene to maintain adequate nutrition, hydration, elimination, sleep and activity  6. If unable to ensure safety without constant attention obtain sitter and review sitter guidelines with assigned personnel  7.  Initiate Psychosocial CNS and Spiritual Care consult, as indicated  Outcome: Progressing     Problem: Pain  Goal: Verbalizes/displays adequate comfort level or baseline comfort level  Outcome: Progressing     Problem: Chronic Conditions and Co-morbidities  Goal: Patient's chronic conditions and co-morbidity symptoms are monitored and maintained or improved  Outcome: Progressing

## 2022-12-07 NOTE — PROGRESS NOTES
Patient seen and examined at bedside due to concerns for declining mental status and tachypnea. Patient has been on CPAP today and was transferred to the floor from ED. Patient does have left hearing aid in and will nod some but will not answer questions appropriately. RR 30-40s, slightly tachycardic to 105. SBP 160s. Transfer to ICU for intubation. Called patient's father Amrit De La Torre to update. Will emergently intubate. CXR, ABG, repeat labs, CT head and CT PE.      Gabby Madrigal D.O. PGY-5  Department of Surgery  12/6/2022, 9:52 PM

## 2022-12-07 NOTE — PROGRESS NOTES
ICU PROGRESS NOTE    PATIENT NAME: Rubia Oates  MEDICAL RECORD NO. [id number]  DATE: 12/7/2022    PRIMARY CARE PHYSICIAN: Esha Gómez, DO    HD: # 1    ASSESSMENT    Rubia Oates is a 41 y.o. pt s/p fall backwards  Injuries:   Acute C7 Right TP fx  Age indeterminate T3 compression fx  Nondisplaced right sacral ala fx  Nondisplaced fracture right superior pubic ramus  Right obturator ring fx    MEDICAL DECISION MAKING AND PLAN    Neuro  GCS 3T  Pain control: Tylenol, Daniella, fentanyl infusion  Sedation: D/c propofol, start precedex  Neurosurgery consult: C-collar when out of bed  Neurology consult: Possible EEG vs MRI  Hx: Cochlear implant on the right, brain cancer as a child, TIA, seizures (last seizure 2000)  Neuro home meds: Lamictal, Topiramate  Psych home meds: Wellbutrin, Abilify, Ativan, Zoloft    CV/Heme  -109  -182  Home meds: none  H/H: 13.2/40.6  F/u troponin, EKG    Pulm  Intubated, PRVC  ABG 7.301/46.4/103.2/22.9  PF ratio 206  CXR with some vascular congestion   Hx: sleep apnea  Duoneb ordered  CT PE negative    Renal/Electrolytes  BUN/Cr: 9/0.78  Na/K: 135/3.5, replacement ordered  IVF: D5 1/2 NS 75   Suresh in place  UOP: 965 mL/12 hours, 0.6 cc/kg/hr  UA: trace protein, moderate ketones    GI/Nutrition  Diet: NPO, will start tube feeds today  Reglan 10 Q6 x 72 hours  Pepcid, glycolax, zofran prn    Endocrine  Glucose: 110, 91, 109  HDSS ordered: none given  Hx Hypothyroid: home levothyroxine ordered  TSH <0.01, Thyroxine 1.17    Musculoskeletal  Ortho:   F/u recommendations  NWB RLE  Hold DVT prophylaxis    Infectious Disease  Abx: not indicated  WBC 9.7    Lines  OG, suresh catheter, ETT     Dispo: transfer to medicine service    CHECKLIST    CAM-ICU RASS: -2  RESTRAINTS: in place  IVF: D5 1/2 NS  NUTRITION: NPO, start tube feeds today  ANTIBIOTICS: None  GI: Pepcid  DVT: Holding per ortho  GLYCEMIC CONTROL: HDSS  HOB >45: Yes  MOBILITY: NWB RLE  SBT: After confirmation from consultants regarding plans  IS: N/A    Chief Complaint: \"Altered mental status\"    MARY GRACE Nunez is a 41 yr old female s/p fall backwards while carrying laundry. She was admitted to the floor and overnight had declining mentation, increased respiratory distress. Pt was transferred to the TICU where she was intubated. CT head was negative, CT PE negative, labs  and abg mostly within normal limits. Neurology consulted for possible seizure vs stroke. OBJECTIVE  VITALS: Temp: Temp: 100.2 °F (37.9 °C)Temp  Av.7 °F (38.2 °C)  Min: 99.9 °F (37.7 °C)  Max: 101.6 °F (48.2 °C) BP Systolic (04MMD), GLU:558 , Min:117 , LARISA:695   Diastolic (73FAU), CMY:84, Min:68, Max:103   Pulse Pulse  Av.7  Min: 86  Max: 110 Resp Resp  Av.4  Min: 26  Max: 49 Pulse ox SpO2  Av.6 %  Min: 93 %  Max: 100 %    Physical Exam  Constitutional:       Appearance: She is obese. Comments: Intubated, sedated   HENT:      Head: Normocephalic and atraumatic. Right Ear: External ear normal.      Left Ear: External ear normal.      Nose: Nose normal.      Mouth/Throat:      Mouth: Mucous membranes are moist.      Pharynx: Oropharynx is clear. Eyes:      Conjunctiva/sclera: Conjunctivae normal.   Cardiovascular:      Rate and Rhythm: Normal rate and regular rhythm. Pulses: Normal pulses. Pulmonary:      Breath sounds: No stridor. No wheezing. Comments: Intubated, PRVC  Abdominal:      General: Abdomen is flat. There is no distension. Palpations: Abdomen is soft. Tenderness: There is no abdominal tenderness. Musculoskeletal:         General: No swelling or tenderness. Cervical back: Normal range of motion and neck supple. Right lower leg: Edema present. Left lower leg: Edema present. Skin:     General: Skin is warm and dry. Coloration: Skin is not jaundiced. Findings: No bruising.    Neurological:      Comments: GCS 3T  Bilateral lower extremities respond to pain, Left upper extremity responds to pain  Right upper extremity not responding to pain     LAB:  CBC:   Recent Labs     12/05/22  1735 12/06/22  0519 12/06/22 2212   WBC 10.2 8.6 9.7   HGB 14.8 12.9 13.2   HCT 45.0 40.0 40.6   MCV 84.9 87.1 86.6   * 143 145     BMP:   Recent Labs     12/06/22  0519 12/06/22 2212 12/07/22  0357    135 135   K 3.4* 3.8 3.5*   * 105 104   CO2 22 21 18*   BUN 15 9 9   CREATININE 1.01* 0.89 0.78   GLUCOSE 110* 121* 103*         RADIOLOGY:  CXR:   Mild-to-moderate pulmonary vascular congestion. Finding suggestive of   interstitial pulmonary edema in lungs.          Tete Martins DO  12/7/22, 6:19 AM

## 2022-12-07 NOTE — ED NOTES
TRANSFER - OUT REPORT:    Verbal report given to Caitlin Harris RN on [employer]  being transferred to - for change in patient condition (multiple fractures)     -Pt fell while carrying laundry up the stairs at home  -Pt MRDD, able to answer questions at baseline   -[facility]- Wears hearing aid, normally bilaterally but parents took right hearing aid home. Pt has hearing aid in left ear.   -Patient has been tachypenic all day, VBG done, Dr. Pawel Ruvalcaba notified on patient remaninig tachypenic on cpap. Pt normally wears CPAP @ night   -Pt febrile, temp 101, trauma resident notified, antipyretic given prior to transfer to floor.  -Urinalysis sent   -Per Dr. Oskar Denson with ortho, patient is no longer non-weightbearing. Pt does have compression fracture to t-spine and cervical fracture. Neurosurgery was down to see patient.   -Per willard RT, pt is good to go to the floor on 6L NC, cpap went with patient. -Maintence fluids running at 100mL an hour    -Pt tried using purewick, unable to void, had one unmeasured episode of urine and bladder scanned with 750mL in bladder. Admitting team ordered straight cath, 550mL removed, 15mL left in PVR bladder scan. May need routine bladder scans if patient is unable to void   -Pt able to take medications whole with water     Report consisted of patient's Situation, Background, Assessment and   Recommendations(SBAR). f              Information from the following report(s) Nurse Handoff Report was reviewed with the receiving nurse. [place] Assessment: Presents to emergency department  because of falls (Syncope, seizure, or loss of consciousness): Yes, Age > 79: No, Altered Mental Status, Intoxication with alcohol or substance confusion (Disorientation, impaired judgment, poor safety awaremess, or inability to follow instructions): No, Impaired Mobility: Ambulates or transfers with assistive devices or assistance;  Unable to ambulate or transer.: Yes, Nursing Judgement: Yes  Lines:   Peripheral IV 12/05/22 Antecubital (Active)       Peripheral IV 12/06/22 Right Forearm (Active)   Site Assessment Clean, dry & intact 12/06/22 0258   Line Status Brisk blood return;Normal saline locked; Flushed 12/06/22 0258        Opportunity for questions and clarification was provided.       Patient transported with:  O2 @ 6L lpm on full monitor, with RN and bipap with patient            Diana Diego RN  12/06/22 2045       Diana Diego RN  12/06/22 2059

## 2022-12-07 NOTE — DISCHARGE INSTRUCTIONS
Orthopaedic Instructions:  -Weight bearing status: Weight bearing as tolerated with the right leg  -Always work on toe motion to decrease swelling.  -Ice (20 minutes on and off 1 hour) to reduce swelling and throbbing pain.  -Call the office or come to Emergency Room if signs of infection appear (hot, swollen, red, draining pus, fever)  -Take medications as prescribed.  -Wean off narcotics (percocet/norco) as soon as possible. Do not take tylenol if still taking narcotics.  -Follow up with  Dr. Luis Stearns  in his office  4 weeks (week off 1/11/23)   after injury . Call [phone]  to schedule/confirm. Vimal Aguilar MD  PGY-1 Resident Physician  Orthopaedic [address]  12/7/2022 at 5:36 AM     Discharge Instructions for Trauma         What to do after you leave the hospital:  General questions or concerns may be called to the trauma nurse line at [phone] and please leave a message. Trauma is a life-threatening condition. Your doctor will want to closely monitor you. Be sure to go to all of your appointments. Please continue to use your Incentive Spirometer as directed. You can practice 10 deep breaths/hour while awake. Using the [address] will promote the health of your lungs by taking slow, deep breaths in. It is also important in preventing pneumonia or a pneumothorax from developing. Discharge instructions for internal medicine  Please take your medications as prescribed. Please visit your PCP within 1 week. Please visit neurosurgery as needed for cervical vertebral fracture. Please visit neurology for management of seizure medications. Please visit pulmonology for respiratory failure requiring oxygen and follow-up for aspiration MRSA pneumonia  Please come to the ED if you have any worsening of symptoms.

## 2022-12-07 NOTE — PROGRESS NOTES
Pt arrived on unit at 2145   Patient prepared to be emergently intubated  Dr. Linus Diamond at bedside, [facility] notified  2153 Time out for procedure. 2154 30 mg etomidate given   100 mg succinylcholine given  2155 Color change  2156 Intubated 24 cm at lip  2156 confirmation of placement identified with visualization, lung sounds auscultated present and equal bilaterally, and positive color change with colormetric co2 detector   2158 OG placed.  55cm   xray at bedside confirmed placement of OG and ETT

## 2022-12-07 NOTE — PLAN OF CARE
Problem: Discharge Planning  Goal: Discharge to home or other facility with appropriate resources  12/7/2022 0748 by Rossy Lazar RN  Outcome: Progressing  12/7/2022 0653 by Larry Coelho RN  Outcome: Progressing     Problem: Safety - Medical Restraint  Goal: Remains free of injury from restraints (Restraint for Interference with Medical Device)  Description: INTERVENTIONS:  1. Determine that other, less restrictive measures have been tried or would not be effective before applying the restraint  2. Evaluate the patient's condition at the time of restraint application  3. Inform patient/family regarding the reason for restraint  4.  Q2H: Monitor safety, psychosocial status, comfort, nutrition and hydration  12/7/2022 0748 by Rossy Lazar RN  Outcome: Progressing  12/7/2022 0653 by Larry Coelho RN  Outcome: Progressing  Flowsheets  Taken 12/7/2022 0600  Remains free of injury from restraints (restraint for interference with medical device):   Determine that other, less restrictive measures have been tried or would not be effective before applying the restraint   Evaluate the patient's condition at the time of restraint application   Inform patient/family regarding the reason for restraint   Every 2 hours: Monitor safety, psychosocial status, comfort, nutrition and hydration  Taken 12/7/2022 0400  Remains free of injury from restraints (restraint for interference with medical device):   Determine that other, less restrictive measures have been tried or would not be effective before applying the restraint   Evaluate the patient's condition at the time of restraint application   Inform patient/family regarding the reason for restraint   Every 2 hours: Monitor safety, psychosocial status, comfort, nutrition and hydration  Taken 12/7/2022 0200  Remains free of injury from restraints (restraint for interference with medical device):   Determine that other, less restrictive measures have been tried or would not be effective before applying the restraint   Evaluate the patient's condition at the time of restraint application   Inform patient/family regarding the reason for restraint   Every 2 hours: Monitor safety, psychosocial status, comfort, nutrition and hydration     Problem: Confusion  Goal: Confusion, delirium, dementia, or psychosis is improved or at baseline  Description: INTERVENTIONS:  1. Assess for possible contributors to thought disturbance, including medications, impaired vision or hearing, underlying metabolic abnormalities, dehydration, psychiatric diagnoses, and notify attending LIP  2. [place] high risk fall precautions, as indicated  3. Provide frequent short contacts to provide reality reorientation, refocusing and direction  4. Decrease environmental stimuli, including noise as appropriate  5. Monitor and intervene to maintain adequate nutrition, hydration, elimination, sleep and activity  6. If unable to ensure safety without constant attention obtain sitter and review sitter guidelines with assigned personnel  7.  Initiate Psychosocial CNS and Spiritual Care consult, as indicated  12/7/2022 0748 by Melissa Fry RN  Outcome: Progressing  12/7/2022 0653 by Antoinette Knutson RN  Outcome: Progressing     Problem: Pain  Goal: Verbalizes/displays adequate comfort level or baseline comfort level  12/7/2022 0748 by Melissa Fry RN  Outcome: Progressing  12/7/2022 0653 by Antoinette Knutson RN  Outcome: Progressing     Problem: Chronic Conditions and Co-morbidities  Goal: Patient's chronic conditions and co-morbidity symptoms are monitored and maintained or improved  12/7/2022 0748 by Melissa Fry RN  Outcome: Progressing  12/7/2022 0653 by Antoinette Knutson RN  Outcome: Progressing

## 2022-12-08 LAB
ANION GAP SERPL CALCULATED.3IONS-SCNC: 9 MMOL/L (ref 9–17)
BUN BLDV-MCNC: 7 MG/DL (ref 6–20)
CALCIUM SERPL-MCNC: 7.8 MG/DL (ref 8.6–10.4)
CHLORIDE BLD-SCNC: 102 MMOL/L (ref 98–107)
CO2: 21 MMOL/L (ref 20–31)
CREAT SERPL-MCNC: 0.75 MG/DL (ref 0.5–0.9)
GFR SERPL CREATININE-BSD FRML MDRD: >60 ML/MIN/1.73M2
GLUCOSE BLD-MCNC: 115 MG/DL (ref 65–105)
GLUCOSE BLD-MCNC: 125 MG/DL (ref 65–105)
GLUCOSE BLD-MCNC: 129 MG/DL (ref 70–99)
POTASSIUM SERPL-SCNC: 3.8 MMOL/L (ref 3.7–5.3)
SODIUM BLD-SCNC: 132 MMOL/L (ref 135–144)

## 2022-12-08 PROCEDURE — 82947 ASSAY GLUCOSE BLOOD QUANT: CPT

## 2022-12-08 PROCEDURE — 2580000003 HC RX 258: Performed by: STUDENT IN AN ORGANIZED HEALTH CARE EDUCATION/TRAINING PROGRAM

## 2022-12-08 PROCEDURE — 6360000002 HC RX W HCPCS: Performed by: STUDENT IN AN ORGANIZED HEALTH CARE EDUCATION/TRAINING PROGRAM

## 2022-12-08 PROCEDURE — 51798 US URINE CAPACITY MEASURE: CPT

## 2022-12-08 PROCEDURE — 51701 INSERT BLADDER CATHETER: CPT

## 2022-12-08 PROCEDURE — 97163 PT EVAL HIGH COMPLEX 45 MIN: CPT

## 2022-12-08 PROCEDURE — 6370000000 HC RX 637 (ALT 250 FOR IP): Performed by: STUDENT IN AN ORGANIZED HEALTH CARE EDUCATION/TRAINING PROGRAM

## 2022-12-08 PROCEDURE — 6360000002 HC RX W HCPCS: Performed by: NURSE PRACTITIONER

## 2022-12-08 PROCEDURE — 99232 SBSQ HOSP IP/OBS MODERATE 35: CPT | Performed by: PSYCHIATRY & NEUROLOGY

## 2022-12-08 PROCEDURE — 6370000000 HC RX 637 (ALT 250 FOR IP)

## 2022-12-08 PROCEDURE — 6370000000 HC RX 637 (ALT 250 FOR IP): Performed by: NURSE PRACTITIONER

## 2022-12-08 PROCEDURE — 94640 AIRWAY INHALATION TREATMENT: CPT

## 2022-12-08 PROCEDURE — 80048 BASIC METABOLIC PNL TOTAL CA: CPT

## 2022-12-08 PROCEDURE — APPSS30 APP SPLIT SHARED TIME 16-30 MINUTES: Performed by: NURSE PRACTITIONER

## 2022-12-08 PROCEDURE — 97530 THERAPEUTIC ACTIVITIES: CPT

## 2022-12-08 PROCEDURE — 97167 OT EVAL HIGH COMPLEX 60 MIN: CPT

## 2022-12-08 PROCEDURE — 36415 COLL VENOUS BLD VENIPUNCTURE: CPT

## 2022-12-08 PROCEDURE — 2580000003 HC RX 258

## 2022-12-08 PROCEDURE — 97535 SELF CARE MNGMENT TRAINING: CPT

## 2022-12-08 PROCEDURE — 2700000000 HC OXYGEN THERAPY PER DAY

## 2022-12-08 PROCEDURE — 94660 CPAP INITIATION&MGMT: CPT

## 2022-12-08 PROCEDURE — 94761 N-INVAS EAR/PLS OXIMETRY MLT: CPT

## 2022-12-08 PROCEDURE — 2060000000 HC ICU INTERMEDIATE R&B

## 2022-12-08 RX ORDER — SODIUM CHLORIDE, SODIUM LACTATE, POTASSIUM CHLORIDE, AND CALCIUM CHLORIDE .6; .31; .03; .02 G/100ML; G/100ML; G/100ML; G/100ML
1000 INJECTION, SOLUTION INTRAVENOUS ONCE
Status: DISCONTINUED | OUTPATIENT
Start: 2022-12-08 | End: 2022-12-08

## 2022-12-08 RX ORDER — ENOXAPARIN SODIUM 100 MG/ML
30 INJECTION SUBCUTANEOUS 2 TIMES DAILY
Status: DISCONTINUED | OUTPATIENT
Start: 2022-12-08 | End: 2022-12-31 | Stop reason: HOSPADM

## 2022-12-08 RX ORDER — SODIUM CHLORIDE, SODIUM LACTATE, POTASSIUM CHLORIDE, AND CALCIUM CHLORIDE .6; .31; .03; .02 G/100ML; G/100ML; G/100ML; G/100ML
500 INJECTION, SOLUTION INTRAVENOUS ONCE
Status: COMPLETED | OUTPATIENT
Start: 2022-12-08 | End: 2022-12-08

## 2022-12-08 RX ORDER — OXYCODONE HYDROCHLORIDE 5 MG/1
5 TABLET ORAL EVERY 6 HOURS PRN
Status: DISCONTINUED | OUTPATIENT
Start: 2022-12-08 | End: 2022-12-10

## 2022-12-08 RX ADMIN — ENOXAPARIN SODIUM 30 MG: 100 INJECTION SUBCUTANEOUS at 23:57

## 2022-12-08 RX ADMIN — ACETAMINOPHEN 1000 MG: 500 TABLET ORAL at 05:40

## 2022-12-08 RX ADMIN — POLYETHYLENE GLYCOL 3350 17 G: 17 POWDER, FOR SOLUTION ORAL at 08:07

## 2022-12-08 RX ADMIN — IPRATROPIUM BROMIDE AND ALBUTEROL SULFATE 1 AMPULE: .5; 3 SOLUTION RESPIRATORY (INHALATION) at 16:59

## 2022-12-08 RX ADMIN — METOCLOPRAMIDE 10 MG: 5 INJECTION, SOLUTION INTRAMUSCULAR; INTRAVENOUS at 05:41

## 2022-12-08 RX ADMIN — TOPIRAMATE 75 MG: 100 TABLET, FILM COATED ORAL at 08:07

## 2022-12-08 RX ADMIN — LEVOTHYROXINE SODIUM 150 MCG: 75 TABLET ORAL at 06:34

## 2022-12-08 RX ADMIN — ACETAMINOPHEN 1000 MG: 500 TABLET ORAL at 22:03

## 2022-12-08 RX ADMIN — IPRATROPIUM BROMIDE AND ALBUTEROL SULFATE 1 AMPULE: .5; 3 SOLUTION RESPIRATORY (INHALATION) at 11:52

## 2022-12-08 RX ADMIN — DEXTROSE AND SODIUM CHLORIDE: 5; 450 INJECTION, SOLUTION INTRAVENOUS at 00:37

## 2022-12-08 RX ADMIN — IPRATROPIUM BROMIDE AND ALBUTEROL SULFATE 1 AMPULE: .5; 3 SOLUTION RESPIRATORY (INHALATION) at 20:25

## 2022-12-08 RX ADMIN — ENOXAPARIN SODIUM 30 MG: 100 INJECTION SUBCUTANEOUS at 08:06

## 2022-12-08 RX ADMIN — SODIUM CHLORIDE, POTASSIUM CHLORIDE, SODIUM LACTATE AND CALCIUM CHLORIDE 500 ML: 600; 310; 30; 20 INJECTION, SOLUTION INTRAVENOUS at 05:14

## 2022-12-08 RX ADMIN — SERTRALINE 150 MG: 50 TABLET, FILM COATED ORAL at 08:07

## 2022-12-08 RX ADMIN — LAMOTRIGINE 200 MG: 100 TABLET ORAL at 08:09

## 2022-12-08 RX ADMIN — TOPIRAMATE 100 MG: 100 TABLET, FILM COATED ORAL at 22:03

## 2022-12-08 RX ADMIN — ACETAMINOPHEN 1000 MG: 500 TABLET ORAL at 13:00

## 2022-12-08 RX ADMIN — DEXTROSE AND SODIUM CHLORIDE: 5; 450 INJECTION, SOLUTION INTRAVENOUS at 11:32

## 2022-12-08 RX ADMIN — OXYCODONE 5 MG: 5 TABLET ORAL at 17:13

## 2022-12-08 ASSESSMENT — PAIN DESCRIPTION - ORIENTATION: ORIENTATION: RIGHT;LEFT

## 2022-12-08 ASSESSMENT — PAIN SCALES - GENERAL
PAINLEVEL_OUTOF10: 8
PAINLEVEL_OUTOF10: 0
PAINLEVEL_OUTOF10: 0
PAINLEVEL_OUTOF10: 8
PAINLEVEL_OUTOF10: 0

## 2022-12-08 ASSESSMENT — PAIN DESCRIPTION - LOCATION: LOCATION: BACK;CHEST;ARM

## 2022-12-08 ASSESSMENT — PAIN DESCRIPTION - DESCRIPTORS: DESCRIPTORS: ACHING;DISCOMFORT

## 2022-12-08 NOTE — PROGRESS NOTES
Physical Therapy  Facility/Department: [address] NEURO  Physical Therapy Initial Assessment    Name: Louise Perkins  : 1975  MRN: [id number]  Date of Service: 2022  Chief Complaint   Patient presents with    Fall     Discharge Recommendations: Further therapy recommended at discharge. The patient should be able to tolerate at least 3 hours of therapy per day over 5 days or 15 hours over 7 days. This patient may benefit from a Physical Medicine and Rehab consult. PT Equipment Recommendations  Equipment Needed: No (unsafe to attempt transfers and ambulation without skilled assistance)      Patient Diagnosis(es): The primary encounter diagnosis was Other closed nondisplaced fracture of seventh cervical vertebra, initial encounter ([facility] [address] Diagnoses of Closed wedge compression fracture of T3 vertebra, initial encounter ([facility] [address] and Multiple closed fractures of pelvis without disruption of pelvic ring, initial encounter [facility]) were also pertinent to this visit. 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 Past Surgical History:  has a past surgical history that includes Brain Biopsy; Tonsillectomy; knee surgery; Cochlear implant (Left); Dental surgery; Cochlear implant (Right, 05/10/2022); Colonoscopy; Endoscopy, colon, diagnostic; Colonoscopy (N/A, 2022); and Upper gastrointestinal endoscopy (N/A, 2022). Assessment   Body Structures, Functions, Activity Limitations Requiring Skilled Therapeutic Intervention: Decreased functional mobility ; Decreased endurance;Decreased strength;Decreased balance; Increased pain  Assessment: The pt required Max A x2 for bed mobility and Mod-Max A for dynamic sitting balance. Recommend intense PT to address deficits and progress toward prior level of independence. The pt is currently unsafe to return to prior living arrangement due to level of assistance required for functional mobility and high risk of falls. Therapy Prognosis: Good  Decision Making: High Complexity  Requires PT Follow-Up: Yes  Activity Tolerance  Activity Tolerance: Patient limited by endurance; Patient limited by pain     Plan   Physcial Therapy Plan  General Plan: 6-7 times per week  Current Treatment Recommendations: Strengthening, ROM, Balance training, Functional mobility training, Transfer training, Endurance training, Gait training, Stair training, Therapeutic activities, Patient/Caregiver education & training, Equipment evaluation, education, & procurement, Safety education & training, Wheelchair mobility training  Safety Devices  Type of Devices: Gait belt, Nurse notified, Call light within reach, Left in bed, Bed alarm in place  Restraints  Restraints Initially in Place: No     Restrictions  Restrictions/Precautions  Restrictions/Precautions: Fall Risk, Weight Bearing  Required Braces or Orthoses?: No  Lower Extremity Weight Bearing Restrictions  Right Lower Extremity Weight Bearing: Non Weight Bearing  Position Activity Restriction  Other position/activity restrictions: up with assist, no intervention required for C7 or T3 fracture per Phong Goudl via [employer]; Acute C7 Right TP fx, Age indeterminate T3 compression fx, Nondisplaced right sacral ala fx, Nondisplaced fracture right superior pubic ramus, Right obturator ring fx     Subjective   General  Patient assessed for rehabilitation services?: Yes  Response To Previous Treatment: Not applicable  Family / Caregiver Present: Yes (pt's father)  Follows Commands: Impaired (unable to utilize cochlear implant hearing devices this date)  Subjective  Subjective: RN and pt's father agreeable to PT. Pt supine in bed upon arrival, pleasant and cooperative throughout. Pt endorses pain in \"whole back\", did not quantify. Ice applied to neck/upper thoracic spine following mobility. Pt is unable to hear without cochlear implant devices in place, attempted to place, however unable to connect successfully.          Social/Functional History  Social/Functional History  Lives With: Parent (Father and father's fiance)  Type of Home: House  Home Layout: Two level, Bed/Bath upstairs, 1/2 bath on main level  Home Access: Stairs to enter without rails  Entrance Stairs - Number of Steps: 2  Bathroom Shower/Tub: Tub/Shower unit  Bathroom Toilet: Standard  Bathroom Equipment: Grab bars in shower, Shower chair  Home Equipment: Cane (pt ambulates without AD at baseline)  Receives Help From: Family  ADL Assistance: Independent  Homemaking Assistance: Independent (shares with father)  Homemaking Responsibilities: Yes  Ambulation Assistance: Independent  Transfer Assistance: Independent  Active : No  Mode of Transportation: Family, Car, SUV  Occupation: On disability  Leisure & Hobbies: Enjoys crafting  Additional Comments: Pt's father is able to provide 24hr assistance upon discharge  Vision/Hearing  Vision  Vision: Impaired  Vision Exceptions: Wears glasses at all times  Hearing  Hearing: Exceptions to [facility]  Hearing Exceptions: Bilateral hearing aid    Cognition   Cognition  Overall Cognitive Status: Exceptions  Safety Judgement: Decreased awareness of need for assistance;Decreased awareness of need for safety  Insights: Decreased awareness of deficits  Initiation: Requires cues for some  Sequencing: Requires cues for some  Cognition Comment: pt able to read lips and follow commands with demonstrations     Objective     Gross Assessment  Tone: Normal     Joint Mobility  Spine: WFL  ROM RLE: WFL  ROM LLE: WFL  ROM RUE: WFL  ROM LUE: WFL  Strength RLE  Comment: not assessed due to NWB RLE  Strength LLE  Comment: grossly 4/5, except hip flexion 3-/5  Strength RUE  Comment: formally assessed by OT  Strength LUE  Comment: formally assessed by OT           Bed mobility  Supine to Sit: Maximum assistance;2 Person assistance  Sit to Supine: Maximum assistance;2 Person assistance  Scooting: Maximal assistance  Transfers  Comment: unsafe to attempt due to poor sitting balance  Ambulation  More Ambulation?: No  Stairs/Curb  Stairs?: No     Balance  Posture: Fair  Sitting - Static: Fair;-  Sitting - Dynamic: Poor;+  Comments: Sitting EOB ~20 minutes with Min A for static sitting and Mod-Max A for dynamic sitting     AM-PAC Score  AM-PAC Inpatient Mobility Raw Score : 8 (12/08/22 1552)  AM-PAC Inpatient T-Scale Score : 28.52 (12/08/22 1552)  Mobility Inpatient CMS 0-100% Score: 86.62 (12/08/22 1552)  Mobility Inpatient CMS G-Code Modifier : CM (12/08/22 1552)      Goals  Short Term Goals  Time Frame for Short Term Goals: 14 visits  Short Term Goal 1: Perform bed mobility Mod I  Short Term Goal 2: Perform sit to stand with NWB RLE and Min A  Short Term Goal 3: Propel wheelchair 300ft with BUE and LLE and supervision  Short Term Goal 4: Ambulate 20ft with RW, NWB RLE and Min A       Education  Patient Education  Education Given To: Patient  Education Provided: Role of Therapy;Transfer Training;Plan of Care  Education Method: Demonstration;Verbal  Barriers to Learning: Hearing  Education Outcome: Continued education needed      Therapy Time   Individual Concurrent Group Co-treatment   Time In 0836         Time Out 0922         Minutes 46         Timed Code Treatment Minutes: 23 Minutes; co-eval with OT       Colleen Madera, PT

## 2022-12-08 NOTE — PLAN OF CARE
Problem: Discharge Planning  Goal: Discharge to home or other facility with appropriate resources  12/7/2022 2050 by Larry Coelho RN  Outcome: Progressing  Flowsheets  Taken 12/7/2022 2000 by Larry Coelho RN  Discharge to home or other facility with appropriate resources: Identify barriers to discharge with patient and caregiver  Taken 12/7/2022 1535 by Rossy Lazar RN  Discharge to home or other facility with appropriate resources: Identify barriers to discharge with patient and caregiver  Taken 12/7/2022 1200 by Rossy Lazar RN  Discharge to home or other facility with appropriate resources: Identify barriers to discharge with patient and caregiver  Taken 12/7/2022 0800 by Rossy Lazar RN  Discharge to home or other facility with appropriate resources: Identify barriers to discharge with patient and caregiver  12/7/2022 0748 by Rossy Lazar RN  Outcome: Progressing  12/7/2022 0653 by Larry Coelho RN  Outcome: Progressing     Problem: Safety - Medical Restraint  Goal: Remains free of injury from restraints (Restraint for Interference with Medical Device)  Description: INTERVENTIONS:  1. Determine that other, less restrictive measures have been tried or would not be effective before applying the restraint  2. Evaluate the patient's condition at the time of restraint application  3. Inform patient/family regarding the reason for restraint  4.  Q2H: Monitor safety, psychosocial status, comfort, nutrition and hydration  12/7/2022 1742 by Rossy Lazar RN  Outcome: Completed  Flowsheets  Taken 12/7/2022 1200  Remains free of injury from restraints (restraint for interference with medical device): Determine that other, less restrictive measures have been tried or would not be effective before applying the restraint  Taken 12/7/2022 1000  Remains free of injury from restraints (restraint for interference with medical device): Determine that other, less restrictive measures have been tried or would not be effective before applying the restraint  Taken 12/7/2022 0800  Remains free of injury from restraints (restraint for interference with medical device): Determine that other, less restrictive measures have been tried or would not be effective before applying the restraint  12/7/2022 0748 by Rafa Lemons, RN  Outcome: Progressing  12/7/2022 0653 by Suzan Schultz RN  Outcome: Progressing  Flowsheets  Taken 12/7/2022 0600  Remains free of injury from restraints (restraint for interference with medical device):   Determine that other, less restrictive measures have been tried or would not be effective before applying the restraint   Evaluate the patient's condition at the time of restraint application   Inform patient/family regarding the reason for restraint   Every 2 hours: Monitor safety, psychosocial status, comfort, nutrition and hydration  Taken 12/7/2022 0400  Remains free of injury from restraints (restraint for interference with medical device):   Determine that other, less restrictive measures have been tried or would not be effective before applying the restraint   Evaluate the patient's condition at the time of restraint application   Inform patient/family regarding the reason for restraint   Every 2 hours: Monitor safety, psychosocial status, comfort, nutrition and hydration  Taken 12/7/2022 0200  Remains free of injury from restraints (restraint for interference with medical device):   Determine that other, less restrictive measures have been tried or would not be effective before applying the restraint   Evaluate the patient's condition at the time of restraint application   Inform patient/family regarding the reason for restraint   Every 2 hours: Monitor safety, psychosocial status, comfort, nutrition and hydration     Problem: Confusion  Goal: Confusion, delirium, dementia, or psychosis is improved or at baseline  Description: INTERVENTIONS:  1.  Assess for possible contributors to thought disturbance, including medications, impaired vision or hearing, underlying metabolic abnormalities, dehydration, psychiatric diagnoses, and notify attending LIP  2. [place] high risk fall precautions, as indicated  3. Provide frequent short contacts to provide reality reorientation, refocusing and direction  4. Decrease environmental stimuli, including noise as appropriate  5. Monitor and intervene to maintain adequate nutrition, hydration, elimination, sleep and activity  6. If unable to ensure safety without constant attention obtain sitter and review sitter guidelines with assigned personnel  7. Initiate Psychosocial CNS and Spiritual Care consult, as indicated  12/7/2022 2050 by Demetris Gonzalez RN  Outcome: Progressing  Flowsheets  Taken 12/7/2022 2000 by Demetris Gonzalez RN  Effect of thought disturbance (confusion, delirium, dementia, or psychosis) are managed with adequate functional status:   Assess for contributors to thought disturbance, including medications, impaired vision or hearing, underlying metabolic abnormalities, dehydration, psychiatric diagnoses, notify LIP   [place] high risk fall precautions, as indicated   Provide frequent short contacts to provide reality reorientation, refocusing and direction   Decrease environmental stimuli, including noise as appropriate  Taken 12/7/2022 1535 by Dorothea Prabhakar RN  Effect of thought disturbance (confusion, delirium, dementia, or psychosis) are managed with adequate functional status: Assess for contributors to thought disturbance, including medications, impaired vision or hearing, underlying metabolic abnormalities, dehydration, psychiatric diagnoses, notify LIP  Taken 12/7/2022 1200 by Dorothea Prabhakar RN  Effect of thought disturbance (confusion, delirium, dementia, or psychosis) are managed with adequate functional status: Assess for contributors to thought disturbance, including medications, impaired  vision or hearing, underlying metabolic abnormalities, dehydration, psychiatric diagnoses, notify LIP  Taken 12/7/2022 0800 by Torres Ricketts RN  Effect of thought disturbance (confusion, delirium, dementia, or psychosis) are managed with adequate functional status: Assess for contributors to thought disturbance, including medications, impaired vision or hearing, underlying metabolic abnormalities, dehydration, psychiatric diagnoses, notify LIP  12/7/2022 0748 by Torres Ricketts RN  Outcome: Progressing  12/7/2022 0653 by Miguel Escobar RN  Outcome: Progressing     Problem: Pain  Goal: Verbalizes/displays adequate comfort level or baseline comfort level  12/7/2022 2050 by Miguel Escobar RN  Outcome: Progressing  Flowsheets  Taken 12/7/2022 1400 by Torres Ricketts RN  Verbalizes/displays adequate comfort level or baseline comfort level: Encourage patient to monitor pain and request assistance  Taken 12/7/2022 1000 by Torres Ricketts RN  Verbalizes/displays adequate comfort level or baseline comfort level: Encourage patient to monitor pain and request assistance  12/7/2022 0748 by Torres Ricketts RN  Outcome: Progressing  12/7/2022 0653 by Miguel Escobar RN  Outcome: Progressing     Problem: Chronic Conditions and Co-morbidities  Goal: Patient's chronic conditions and co-morbidity symptoms are monitored and maintained or improved  12/7/2022 2050 by Miguel Escobar RN  Outcome: Progressing  Flowsheets  Taken 12/7/2022 2000 by Tere Clark 34 - Patient's Chronic Conditions and Co-Morbidity Symptoms are Monitored and Maintained or Improved: Monitor and assess patient's chronic conditions and comorbid symptoms for stability, deterioration, or improvement  Taken 12/7/2022 1535 by Torres Ricketts RN  Care Plan - Patient's Chronic Conditions and Co-Morbidity Symptoms are Monitored and Maintained or Improved: Monitor and assess patient's chronic conditions and comorbid symptoms for stability, deterioration, or improvement  Taken 12/7/2022 1200 by Rima Bernardo RN  Care Plan - Patient's Chronic Conditions and Co-Morbidity Symptoms are Monitored and Maintained or Improved: Monitor and assess patient's chronic conditions and comorbid symptoms for stability, deterioration, or improvement  Taken 12/7/2022 0800 by Rima Bernardo RN  Care Plan - Patient's Chronic Conditions and Co-Morbidity Symptoms are Monitored and Maintained or Improved: Monitor and assess patient's chronic conditions and comorbid symptoms for stability, deterioration, or improvement  12/7/2022 0748 by Rima Bernardo RN  Outcome: Progressing  12/7/2022 0653 by Yeimi Pinedo RN  Outcome: Progressing     Problem: Skin/Tissue Integrity  Goal: Absence of new skin breakdown  Description: 1. Monitor for areas of redness and/or skin breakdown  2. Assess vascular access sites hourly  3. Every 4-6 hours minimum:  Change oxygen saturation probe site  4. Every 4-6 hours:  If on nasal continuous positive airway pressure, respiratory therapy assess nares and determine need for appliance change or resting period.   Outcome: Progressing     Problem: Safety - Adult  Goal: Free from fall injury  Outcome: Progressing

## 2022-12-08 NOTE — DISCHARGE INSTR - COC
Continuity of Care Form    Patient Name: Nasrin Yuen   :  1975  MRN:  [id number]    Admit date:  2022  Discharge date:  ***    Code Status Order: Full Code   Advance Directives:     Admitting Physician:  Denise Scott MD  PCP: Willie Cassidy DO    Discharging Nurse: [facility] Unit/Room#: 8774/8909-99  Discharging Unit Phone Number: ***    Emergency Contact:   Extended Emergency Contact Information  Primary Emergency Contact: ValentinoEfra  Address: Stas Patten [address] [place] [address] Phone: [phone]  Relation: Parent    Past Surgical History:  Past Surgical History:   Procedure Laterality Date    BRAIN BIOPSY      tumor biopsy    COCHLEAR IMPLANT Left     COCHLEAR IMPLANT Right 05/10/2022    COLONOSCOPY      COLONOSCOPY N/A 2022    COLONOSCOPY WITH BIOPSY performed by Brooklynn Murillo MD at [address] [place] [address]     ENDOSCOPY, COLON, DIAGNOSTIC      KNEE SURGERY      scope    TONSILLECTOMY      UPPER GASTROINTESTINAL ENDOSCOPY N/A 2022    EGD BIOPSY performed by Brooklynn Murillo MD at [address] [place] [address] History:   Immunization History   Administered Date(s) Administered    COVID-19, PFIZER Bivalent BOOSTER, (age 12y+), IM, 30 mcg/0.3 mL dose 10/25/2022    COVID-19, PFIZER GRAY top, DO NOT Dilute, (age 15 y+), IM, 30 mcg/0.3 mL 2022    COVID-19, PFIZER PURPLE top, DILUTE for use, (age 15 y+), 30mcg/0.3mL 2021, 2021, 2021    Influenza A (G0H7-12) Vaccine PF IM 2009    Influenza Vaccine, unspecified formulation 10/03/2012, 2013, 10/02/2016    Influenza Virus Vaccine 2011, 2014, 2015    Influenza, AFLURIA (age 1 yrs+), FLUZONE, (age 10 mo+), MDV, 0.5mL 2018    Influenza, FLUARIX, FLULAVAL, FLUZONE (age 10 mo+) AND AFLURIA, (age 1 y+), PF, 0.5mL 10/02/2016, 2017, 2018, 10/01/2019, 2020, 2021    Pneumococcal Conjugate 13-valent [place]) 01/18/2019    Pneumococcal Polysaccharide ([id number]) 09/01/2008, 01/18/2016, 01/15/2018    Td (Adult), 5 Lf Tetanus Toxoid, Pf (Tenivac, Decavac) 03/19/2008    Td, unspecified formulation 03/19/2008       Active Problems:  Patient Active Problem List   Diagnosis Code    Seizure ([facility] R56.9    Hypothyroidism E03.9    Asthma J45.909    Morbid obesity ([facility] E66.01    ROHIT on CPAP G47.33, Z99.89    Hypopituitarism ([facility]) E23.0    Moderate episode of recurrent major depressive disorder ([facility]) F33.1    Type 2 diabetes mellitus with stage 3a chronic kidney disease, without long-term current use of insulin (HCC) E11.22, N18.31    Irritable bowel syndrome with constipation K58.1    Gastroesophageal reflux disease K21.9    Cervical transverse process fracture, initial encounter ([facility] S12. 9XXA    Closed nondisplaced fracture of seventh cervical vertebra ([facility]) S12.601A    Lethargy R53.83       Isolation/Infection:   Isolation            No Isolation          Patient Infection Status       Infection Onset Added Last Indicated Last Indicated By Review Planned Expiration Resolved Resolved By    None active    Resolved    COVID-19 (Rule Out) 12/06/22 12/06/22 12/06/22 COVID-19, Rapid (Ordered)   12/07/22 Rule-Out Test Resulted            Nurse Assessment:  Last Vital Signs: /76   Pulse 88   Temp 98.2 °F (36.8 °C) (Oral)   Resp 20   Ht 5' 1\" (1.549 m)   Wt 284 lb 4.8 oz (129 kg)   SpO2 94%   BMI 53.72 kg/m²     Last documented pain score (0-10 scale): Pain Level: 0  Last Weight:   Wt Readings from Last 1 Encounters:   12/08/22 284 lb 4.8 oz (129 kg)     Mental Status:  {IP PT MENTAL STATUS:20030}    IV Access:  { MARIE IV ACCESS:732596506}    Nursing Mobility/ADLs:  Walking   {P DME RTDZ:416481541}  Transfer  {P DME MHDF:485505759}  Bathing  {P DME ZSNS:499197056}  Dressing  {P DME ERXU:446250941}  Toileting  {CHP DME JMMP:040840850}  Feeding  {CHP DME XTPV:067038116}  Med Admin  {P DME PI:387287403}  Med Delivery   [address] MED Delivery:521640779}    Wound Care Documentation and Therapy:        Elimination:  Continence: Bowel: {YES / JZ:07156}  Bladder: {YES / AO:45209}  Urinary Catheter: {Urinary Catheter:704013089}   Colostomy/Ileostomy/Ileal Conduit: {YES / DF:68823}       Date of Last BM: ***    Intake/Output Summary (Last 24 hours) at 2022 1150  Last data filed at 2022 0800  Gross per 24 hour   Intake 2622.26 ml   Output 625 ml   Net 1997.26 ml     I/O last 3 completed shifts:   In: 3547.2 [P.O.:60; I.V.:2940.8; NG/GT:150; IV Piggyback:396.4]  Out: 2340 [Urine:2340]    Safety Concerns:     [address] Safety Concerns:098513943}    Impairments/Disabilities:      [address] [employer] Impairments/Disabilities:148214125}    Nutrition Therapy:  Current Nutrition Therapy:   [facility] Diet List:817049873}    Routes of Feeding: {P DME Other Feedings:735914152}  Liquids: {Slp liquid thickness:02227}  Daily Fluid Restriction: {CHP DME Yes amt example:718585232}  Last Modified Barium Swallow with Video (Video Swallowing Test): {Done Not Done XAVS:982569238}    Treatments at the Time of Hospital Discharge:   Respiratory Treatments: ***  Oxygen Therapy:  {Therapy; copd oxygen:95268}  Ventilator:    { CC Vent PKYK:493842648}    Rehab Therapies: {THERAPEUTIC INTERVENTION:8738641832}  Weight Bearing Status/Restrictions: [facility] Weight Bearin}  Other Medical Equipment (for information only, NOT a DME order):  {EQUIPMENT:743658546}  Other Treatments: ***    Patient's personal belongings (please select all that are sent with patient):  {CHP DME Belongings:378316294}    RN SIGNATURE:  {Esignature:565862055}    CASE MANAGEMENT/SOCIAL WORK SECTION    Inpatient Status Date: ***    Readmission Risk Assessment Score:  Readmission Risk              Risk of Unplanned Readmission:  19           Discharging to Facility/ Agency   Name:   Address:  Phone:  Fax:    Dialysis Facility (if applicable) Name:  Address:  Dialysis Schedule:  Phone:  Fax:    / signature: {Esignature:425228190}    PHYSICIAN SECTION    Prognosis: Good    Condition at Discharge: Stable    Rehab Potential (if transferring to Rehab): Good    Recommended Labs or Other Treatments After Discharge: ***    Physician Certification: I certify the above information and transfer of [facility]  is necessary for the continuing treatment of the diagnosis listed and that she requires {Admit to Appropriate Level of Care:19830} for less 30 days.      Update Admission H&P: No change in H&P    PHYSICIAN SIGNATURE:  Electronically signed by Sunday German on 12/28/22 at 1:02 PM EST

## 2022-12-08 NOTE — PROGRESS NOTES
Occupational Therapy  Facility/Department: [address] NEURO  Occupational Therapy Initial Assessment    Name: Nasrin Yuen  : 1975  MRN: [id number]  Date of Service: 2022  Chief Complaint   Patient presents with    Fall     Discharge Recommendations:  Patient would benefit from continued therapy after discharge. Further therapy recommended at discharge. The patient should be able to tolerate at least 3 hours of therapy per day over 5 days or 15 hours over 7 days. This patient may benefit from a Physical Medicine and Rehab consult. OT Equipment Recommendations  Equipment Needed: Yes  Mobility Devices: ADL Assistive Devices  ADL Assistive Devices: Transfer Tub Bench; Toileting - Drop Arm Commode, Heavy Duty Drop Arm Commode;Grab Bars - toilet;Grab Bars - shower; Reacher;Sock-Aid Hard       Patient Diagnosis(es): The primary encounter diagnosis was Other closed nondisplaced fracture of seventh cervical vertebra, initial encounter ([facility] Diagnoses of Closed wedge compression fracture of T3 vertebra, initial encounter ([facility] and Multiple closed fractures of pelvis without disruption of pelvic ring, initial encounter [facility]) were also pertinent to this visit. 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 Past Surgical History:  has a past surgical history that includes Brain Biopsy; Tonsillectomy; knee surgery; Cochlear implant (Left); Dental surgery; Cochlear implant (Right, 05/10/2022);  Colonoscopy; Endoscopy, colon, diagnostic; Colonoscopy (N/A, 2022); and Upper gastrointestinal endoscopy (N/A, 8/12/2022). Assessment   Performance deficits / Impairments: Decreased ADL status; Decreased functional mobility ; Decreased ROM; Decreased strength;Decreased endurance;Decreased high-level IADLs;Decreased fine motor control;Decreased cognition;Decreased safe awareness;Decreased balance;Decreased posture  Assessment: Patient required Max A x2 to complete bed mobility to sit EOB grossly at Mod A with posterior lean. Pt implants not working at this time depsite multiple attempts, impacting ability to hear and progress treatment tasks. Pt with noteable edema in B UEs impacting performance in simple grooming and self-feeding tasks, requiring Mod A to wash face this date. Pt unsafe to attempt transfer this date. Pt would benefit from continued acute OT services to address functional deficits through skilled interventions to promote independence with ADLs and functional tasks.   Prognosis: Fair  Decision Making: High Complexity  REQUIRES OT FOLLOW-UP: Yes  Activity Tolerance  Activity Tolerance: Patient Tolerated treatment well  Activity Tolerance Comments: limited d/t hearing aid not working        Plan   Occupational Therapy Plan  Times Per Week: 4-5x/wk  Current Treatment Recommendations: Strengthening, Balance training, ROM, Functional mobility training, Endurance training, Safety education & training, Positioning, Equipment evaluation, education, & procurement, Patient/Caregiver education & training, Self-Care / ADL     Restrictions  Restrictions/Precautions  Restrictions/Precautions: Fall Risk, Weight Bearing  Required Braces or Orthoses?: No  Lower Extremity Weight Bearing Restrictions  Right Lower Extremity Weight Bearing: Non Weight Bearing  Position Activity Restriction  Other position/activity restrictions: up with assist, no intervention required for C7 or T3 fracture per Brenden Wayne via [employer]; Acute C7 Right TP fx, Age indeterminate T3 compression fx, Nondisplaced right sacral ala fx, Nondisplaced fracture right superior pubic ramus, Right obturator ring fx    Subjective   General  Patient assessed for rehabilitation services?: Yes  Family / Caregiver Present: Yes (Father)  General Comment  Comments: RN Ok'd patient for OT/PT evaluation. Pt pleasant, cooperative and agreeable. Pt reports pain in neck and back, unable to provide numeric value upon request.     Social/Functional History  Social/Functional History  Lives With: Parent (Father and father's fiance)  Type of Home: House  Home Layout: Two level, Bed/Bath upstairs, 1/2 bath on main level  Home Access: Stairs to enter without rails  Entrance Stairs - Number of Steps: 2  Bathroom Shower/Tub: Tub/Shower unit  Bathroom Toilet: Standard  Bathroom Equipment: Grab bars in shower, Shower chair  Home Equipment: Cane (pt ambulates without AD at baseline)  Receives Help From: Family  ADL Assistance: Independent  Homemaking Assistance: Independent (shares with father)  Homemaking Responsibilities: Yes  Meal Prep Responsibility: Primary  Laundry Responsibility: Primary  Cleaning Responsibility: Primary  Shopping Responsibility: Primary  Ambulation Assistance: Independent  Transfer Assistance: Independent  Active : No  Mode of Transportation: Family, Car, SUV  Occupation: On disability  Leisure & Hobbies: Enjoys crafting  Additional Comments: Pt's father is able to provide 24hr assistance upon discharge       Objective   Heart Rate: 95  Heart Rate Source: Monitor  BP: (!) 134/90  BP Location: Left lower arm  BP Method: Automatic  MAP (Calculated): 105  Resp: 23  SpO2: 96 %  O2 Device: Heated high flow cannula             Safety Devices  Type of Devices: Gait belt;Nurse notified;Call light within reach; Left in bed;Bed alarm in place  Restraints  Restraints Initially in Place: No  Balance  Sitting: With support (Mod A grossly EOB for ~22-23 minutes with posterior lean; standing transfer not attempted for safety)  Transfer Training  Transfer Training: No AROM:  (limited R shoulder flexion d/t pain; edema impacting performance in AROM this date)  PROM: Within functional limits  Strength: Grossly decreased, non-functional (B shoulder 2-/5; B bicep/tricep 3/5; B  4-/5)  Coordination: Grossly decreased, non-functional (limited d/t edema this date)  Tone: Abnormal (edema in B UEs)  Sensation:  (GILDARDO d/t cognition/hearing deficits)  ADL  Feeding: Minimal assistance;Verbal cueing;Setup; Increased time to complete  Grooming: Moderate assistance;Setup;Verbal cueing; Increased time to complete  UE Bathing: Maximum assistance; Increased time to complete;Verbal cueing;Setup  LE Bathing: Maximum assistance; Increased time to complete;Verbal cueing;Setup  UE Dressing: Maximum assistance  LE Dressing: Dependent/Total;Increased time to complete;Verbal cueing;Setup  Toileting: Dependent/Total;Increased time to complete;Setup  Additional Comments: Patient required Mod A to complete washing face with hand over hand assistance to reach above nose this date. Pt demonstrates B UE edema impacting ROM and decreased strength for engagement in functional tasks. Pt limited throughout d/t hearing aid not present     Activity Tolerance  Activity Tolerance: Patient limited by endurance; Patient limited by pain  Bed mobility  Supine to Sit: Maximum assistance;2 Person assistance  Sit to Supine: Maximum assistance;2 Person assistance  Scooting: Maximal assistance     Vision  Vision: Impaired  Vision Exceptions: Wears glasses at all times  Hearing  Hearing: Exceptions to [facility]  Hearing Exceptions: Bilateral hearing aid  Cognition  Overall Cognitive Status: Exceptions  Safety Judgement: Decreased awareness of need for assistance;Decreased awareness of need for safety  Insights: Decreased awareness of deficits  Initiation: Requires cues for some  Sequencing: Requires cues for some  Cognition Comment: pt able to read lips and follow commands with demonstrations     Education Given To: Patient; Family  Education Provided: Role of Therapy;Plan of Care;Equipment;Precautions; Fall Prevention Strategies;Orientation; Family Education  Education Method: Verbal;Demonstration  Barriers to Learning: Hearing  Education Outcome: Verbalized understanding;Continued education needed  AM-PAC Score        AM-PAC Inpatient Daily Activity Raw Score: 11 (12/08/22 1634)  AM-PAC Inpatient ADL T-Scale Score : 29.04 (12/08/22 1634)  ADL Inpatient CMS 0-100% Score: 70.42 (12/08/22 1634)  ADL Inpatient CMS G-Code Modifier : CL (12/08/22 1634)  Goals  Short Term Goals  Time Frame for Short Term Goals: Patient will, by discharge  Short Term Goal 1: demo self-feeding/grooming at Mod I using AE PRN  Short Term Goal 2: demo bed mobility at [address] [facility] A to promote OOB activity  Short Term Goal 3: demo UB ADLs at [address] [facility] Term Goal 4: demo 10+ min of dynamic sitting balance at [facility] [place] to engage in ADLs safely  Short Term Goal 5: notify OTR to update POC as patient progresses     Therapy Time   Individual Concurrent Group Co-treatment   Time In 0836         Time Out 0923         Minutes 47         Timed Code Treatment Minutes: [address], OTR/L

## 2022-12-08 NOTE — PROGRESS NOTES
NEUROLOGY INPATIENT PROGRESS NOTE    12/8/2022         Current Exam:     Chart reviewed. Discussed with RN. Patient is extubated, cannot hear so has very limited verbal output but is near baseline per her father at the bedside. She is able to follow commands to all 4 limbs. No seizure activity. Brief History:    Dario Garcia is a  41 y.o. female with H/O childhood brain cancer, DM, hypothyroidism, seizures with last occurring in 2000, bilateral cochlear implants, who was admitted on 12/5/2022 with mechanical fall. Per the record she was going up stairs with a basket of laundry when she tripped over the steps and fell backwards hitting her head. She presented to [facility] for trauma eval with C7 and T3 fractures along with pelvic fractures. She was transferred to [facility] for further evaluation and care and respiratory status declined requiring intubation. She was later extubated on 12/7 without incident. Neurology was consulted due to her history of seizure disorder. CT head was done with no acute intracranial abnormality. She was continued on her home antiseizure medications Topamax 75+100, Lamictal 200 mg a day. There has been no seizure activity reported while she has been inpatient. EEG was done to ensure no subclinical seizures and had no epileptiform activity, moderate slowing. MRI is not able to be done given her bilateral cochlear implants as radiologist feels there would be too much artifact and images would be unable to be interpreted       No current facility-administered medications on file prior to encounter.      Current Outpatient Medications on File Prior to Encounter   Medication Sig Dispense Refill    SUMAtriptan (IMITREX) 100 MG tablet Take 100 mg by mouth once as needed for Migraine 1.5 tabs qam, 2 tabs qpm      hydrocortisone (CORTEF) 10 MG tablet Take 10 mg by mouth 2 times daily      topiramate (TOPAMAX) 50 MG tablet TAKE 1 AND 1/2 TABLETS IN THE MORNING AND 2 TABLETS AT NIGHT. 315 tablet 1    KLOR-CON M10 10 MEQ extended release tablet TAKE 1 TABLET BY MOUTH TWICE A  tablet 1    metFORMIN (GLUCOPHAGE) 500 MG tablet TAKE 1 TABLET BY MOUTH TWICE A DAY WITH MEALS 180 tablet 0    Handicap Placard MISC by Does not apply route EXPIRES IN 5 YEARS FROM ORIGINAL SCRIPT DATE 1 each 0    buPROPion (WELLBUTRIN XL) 150 MG extended release tablet Take 300 mg by mouth daily       ARIPiprazole (ABILIFY) 20 MG tablet Take 1 tablet by mouth daily      levothyroxine (SYNTHROID) 150 MCG tablet Take 1 tablet by mouth every morning (before breakfast)      albuterol sulfate  (90 Base) MCG/ACT inhaler INHALE 2 PUFFS BY MOUTH EVERY 6 HOURS AS NEEDED FOR WHEEZING 8.5 Inhaler 0    EPINEPHrine (EPIPEN 2-MINNIE) 0.3 MG/0.3ML SOAJ injection Inject 0.3 mLs into the muscle once for 1 dose Use as directed for allergic reaction 0.3 mL 0    lamoTRIgine (LAMICTAL) 150 MG tablet Take 150 mg by mouth daily      Calcium-Magnesium-Vitamin D (CALCIUM 1200+D3 PO) Take 2 tablets by mouth every morning      LORazepam (ATIVAN) 1 MG tablet 1 mg daily as needed. 0    sertraline (ZOLOFT) 100 MG tablet Take 150 mg by mouth daily       Ergocalciferol (VITAMIN D2 PO)   Take 1.25 mg by mouth daily          Allergies: Shannon Lorenz is allergic to bactrim [sulfamethoxazole-trimethoprim], bee venom, ciprofloxacin, and milk-related compounds.     Past Medical History:   Diagnosis Date    Acquired acanthosis nigricans     Anemia     Arthritis     Asthma     Brain cancer ([facility]     Brain tumor [facility])     age 5    Contact dermatitis and other eczema, due to unspecified cause     COVID-19 01/26/2021    Diabetes mellitus [facility])     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)     Fibromyalgia     Herpes zoster without mention of complication     Hypothyroidism     Migraine     Seizures ([facility]     last seizure in 2000    Sleep apnea     CPAP    TIA (transient ischemic attack) 2000       Past Surgical History: Procedure Laterality Date    BRAIN BIOPSY      tumor biopsy    COCHLEAR IMPLANT Left     COCHLEAR IMPLANT Right 05/10/2022    COLONOSCOPY      COLONOSCOPY N/A 8/12/2022    COLONOSCOPY WITH BIOPSY performed by Zack Kapadia MD at [address] [place] [address]      implants 1/22    ENDOSCOPY, COLON, DIAGNOSTIC      KNEE SURGERY      scope    TONSILLECTOMY      UPPER GASTROINTESTINAL ENDOSCOPY N/A 8/12/2022    EGD BIOPSY performed by Zack Kapadia MD at [place] [address] History: Louise Perkins  reports that she has never smoked. She has never used smokeless tobacco. She reports current alcohol use. She reports that she does not use drugs. Family History   Problem Relation Age of Onset    Cancer Mother 50        breast    Migraines Mother     Diabetes Father     High Blood Pressure Father     High Cholesterol Father     Migraines Sister     High Blood Pressure Maternal Grandmother     Cancer Paternal Grandmother 70        colon cancer    Cancer Paternal Uncle         esophageal       Objective:   /79   Pulse 93   Temp 98.1 °F (36.7 °C) (Axillary) Comment (Src): on cpap  Resp 20   Ht 5' 1\" (1.549 m)   Wt 284 lb 4.8 oz (129 kg)   SpO2 92%   BMI 53.72 kg/m²     Blood pressure range: Systolic (14ZBV), JMV:061 , Min:113 , SUW:234   ; Diastolic (06KSV), OGC:63, Min:68, Max:93      Review of Systems:  Constitutional  Negative for fever and chills    HEENT  Negative for ear discharge, ear pain, nosebleed    Eyes  Negative for photophobia, pain and discharge    Respiratory  Negative for hemoptysis and sputum    Cardiovascular  Negative for orthopnea, claudication and PND    Gastrointestinal  Negative for abdominal pain, diarrhea, blood in stool    Musculoskeletal  Negative for joint pain, negative for myalgia    Skin  Negative for rash or itching    Endo/heme/allergies  Negative for polydipsia, environmental allergy    Psychiatric/behavioral  Negative for suicidal ideation.  Patient is not anxious NEUROLOGIC EXAMINATION  GENERAL  Appears comfortable and in no distress   HEENT  NC/ AT   NECK  Supple   MENTAL STATUS:  Alert, no confusion, dysarthric and minimal speech secondary to deafness, normal language, no hallucination or delusion. Follows simple commands well. CRANIAL NERVES: II     -      Visual fields intact to confrontation  III,IV,VI -  EOMs full, no afferent defect, no BENITO, no ptosis  V     -     Normal facial sensation  VII    -     Normal facial symmetry  VIII   -     Intact hearing  IX,X -     Symmetrical palate  XI    -     Symmetrical shoulder shrug  XII   -     Midline tongue, no atrophy    MOTOR FUNCTION:  Lifts all limbs to command, squeezed hands and wiggles toes to command with normal bulk, normal tone and no involuntary movements, no tremor   SENSORY FUNCTION:  Normal touch, normal pin   CEREBELLAR FUNCTION:  Intact fine motor control over upper limbs   REFLEX FUNCTION:  Symmetric, no perverted reflex, no Babinski sign   STATION and GAIT  Not tested       Data:    Lab Results:   CBC:   Recent Labs     12/05/22  1735 12/06/22  0519 12/06/22  2212   WBC 10.2 8.6 9.7   HGB 14.8 12.9 13.2   * 143 145     BMP:    Recent Labs     12/06/22  2212 12/07/22  0357 12/08/22  0338    135 132*   K 3.8 3.5* 3.8    104 102   CO2 21 18* 21   BUN 9 9 7   CREATININE 0.89 0.78 0.75   GLUCOSE 121* 103* 129*         Lab Results   Component Value Date    CHOL 147 03/01/2020    LDLCHOLESTEROL 86 03/01/2020    HDL 46 03/01/2020    TRIG 77 03/01/2020    ALT 20 12/05/2022    AST 33 (H) 12/05/2022    TSH <0.01 (L) 12/07/2022    INR 1.0 12/05/2022    GLUF 80 10/16/2019    LABA1C 5.3 04/26/2022           Diagnostic data reviewed:  CT HEAD (12/6/22) -  No acute intracranial abnormality. No evidence of intracranial hemorrhage or any other definable acute   intracranial abnormality.        Redemonstration of dense calcifications in the bilateral lentiform nuclei,   bilateral caudate nuclei and bilateral thalami similar to findings on   previous CT scan in 2010. There are bilateral cochlear implants redemonstrated. No demonstrable fracture in calvarium or in base of skull. EEG (12/7/22) -  Abnormal awake EEG. The slowing mentioned above suggests moderate non specific encephalopathy. Impression:  -Mechanical fall with resultant cervical spine fracture  -Respiratory failure initially on ventilator now extubated  -History of seizure disorder with last seizure occurring in 2000    Plan:  -Continue home seizure meds  -Seizure precautions  -Unable to perform MRI brain due to bilateral cochlear implants  -No need for further neurologic testing as patient is at her baseline  -Neurology to sign off. Please call with any questions. Please note that this note was generated using a voice recognition dictation software. Although every effort was made to ensure the accuracy of this automated transcription, some errors in transcription may have occurred.

## 2022-12-08 NOTE — PROGRESS NOTES
ICU PROGRESS NOTE    PATIENT NAME: Ryan Irvin  MEDICAL RECORD NO. [id number]  DATE: 12/8/2022    PRIMARY CARE PHYSICIAN: Paramjit Mane, DO    HD: # 2    ASSESSMENT    Ryan Irvin is a 41 y.o. pt s/p fall backwards  Injuries:   Acute C7 Right TP fx  Age indeterminate T3 compression fx  Nondisplaced right sacral ala fx  Nondisplaced fracture right superior pubic ramus  Right obturator ring fx    MEDICAL DECISION MAKING AND PLAN    Neuro  GCS 15  Pain control: Tylenol  Pt extubated, Precedex D/c'ed  Neurosurgery consult: C-collar when out of bed  Neurology consult: EEG performed: moderate non specific encephalopathy, No epileptiform discharges were identified  Hx: Cochlear implant on the right, brain cancer as a child, TIA, seizures (last seizure 2000)  Neuro home meds: Lamictal, Topiramate  Psych home meds: Wellbutrin, Abilify, Ativan, Zoloft    CV/Heme  HR 93-97  -150  MAP: 108  Home meds: none  H/H: 13.2/40.6  Troponin <6  EKG normal    Pulm  Pt tolerated SBT, pt extubated 12/7  CPAP/BiPAP at night  Pt on HFNC at 20L during the day  Hx: obesity hypoventilation syndrome  Duoneb ordered  CT PE negative  IS: will complete after CPAP comes off    Renal/Electrolytes  BUN/Cr: 7/0.75  Na/K: 132(135)/3.8  Calcium: 7.8  IVF: D5 1/2 , 500 cc LR bolus given this am  External catheter  UOP: 200 CC's (0.1 cc/kg/hr) on straight cath  UA: trace protein, moderate ketones    GI/Nutrition  Diet: Soft diet  Glycolax, zofran prn    Endocrine  Glucose: 133,129,125 (all under 180)  HDSS discontinued  Hx Hypothyroid: home levothyroxine ordered  TSH <0.01, Thyroxine 1.17    Musculoskeletal  Ortho: Non-op, will follow-up on 12/14    Infectious Disease  Abx: not indicated  WBC 9.7  Tmax: 37.3    Lines  External catheter, peripheral IVs     Dispo: transfer to step-down    CHECKLIST    CAM-ICU RASS: +1  RESTRAINTS: none  IVF: D5 1/2 NS  NUTRITION: Soft diet  ANTIBIOTICS: None  GI: Zofran PRN  DVT: Lovenox  GLYCEMIC CONTROL: HDSS discontinued  HOB >45: Yes  MOBILITY: Pt following commands  SBT: Tolerated, extubated    Chief Complaint: \"Altered mental status\"    [address] Trevor Pandya is a 41 yr old female s/p fall backwards while carrying laundry. She was extubated yesterday, tolerating cpap at night. On high flow nasal cannula during the day. Pt required straight cath overnight, with 200 out. OBJECTIVE  VITALS: Temp: Temp: 99.1 °F (37.3 °C)Temp  Av.4 °F (37.4 °C)  Min: 99 °F (37.2 °C)  Max: 100 °F (90.7 °C) BP Systolic (32CPP), LWY:704 , Min:113 , EIQ:751   Diastolic (49UCZ), MARLO:68, Min:68, Max:95   Pulse Pulse  Av  Min: 89  Max: 108 Resp Resp  Av.6  Min: 16  Max: 31 Pulse ox SpO2  Av.3 %  Min: 94 %  Max: 100 %    Physical Exam  Constitutional:       Appearance: She is obese. Comments: Extubated  HENT:      Head: Normocephalic and atraumatic. Right Ear: External ear normal.      Left Ear: External ear normal.      Comment: Pt hard of hearing     Nose: Nose normal.      Mouth/Throat:      Mouth: Mucous membranes are moist.      Pharynx: Oropharynx is clear. Eyes:      Conjunctiva/sclera: Conjunctivae normal.   Cardiovascular:      Rate and Rhythm: Normal rate and regular rhythm. Pulses: Normal pulses. Pulmonary:      Breath sounds: No stridor. No wheezing. Comments: Bibasilar rhonchi  Abdominal:      General: Abdomen is flat. There is no distension. Palpations: Abdomen is soft. Tenderness: There is no abdominal tenderness. Musculoskeletal:         General: No swelling or tenderness. Cervical back: Normal range of motion and neck supple. Right lower leg: No Edema present. Left lower leg: No Edema present. Skin:     General: Skin is warm and dry. Coloration: Skin is not jaundiced. Findings: No bruising.    Neurological:      Comments: GCS 15, pt following commands      LAB:  CBC:   Recent Labs     22  1735 22  0519 22  2212 WBC 10.2 8.6 9.7   HGB 14.8 12.9 13.2   HCT 45.0 40.0 40.6   MCV 84.9 87.1 86.6   * 143 145       BMP:   Recent Labs     12/06/22  2212 12/07/22  0357 12/08/22  0338    135 132*   K 3.8 3.5* 3.8    104 102   CO2 21 18* 21   BUN 9 9 7   CREATININE 0.89 0.78 0.75   GLUCOSE 121* 103* 129*           RADIOLOGY:  CXR:   Mild-to-moderate pulmonary vascular congestion. Finding suggestive of   interstitial pulmonary edema in lungs.          Sally Bhagat  12/7/22, 6:15 AM

## 2022-12-09 PROBLEM — S32.82XA MULTIPLE CLOSED PELVIC FRACTURES WITHOUT DISRUPTION OF PELVIC CIRCLE (HCC): Status: ACTIVE | Noted: 2022-12-09

## 2022-12-09 LAB
ABSOLUTE EOS #: 0.21 K/UL (ref 0–0.44)
ABSOLUTE IMMATURE GRANULOCYTE: 0.05 K/UL (ref 0–0.3)
ABSOLUTE LYMPH #: 0.87 K/UL (ref 1.1–3.7)
ABSOLUTE MONO #: 0.82 K/UL (ref 0.1–1.2)
ANION GAP SERPL CALCULATED.3IONS-SCNC: 9 MMOL/L (ref 9–17)
BASOPHILS # BLD: 1 % (ref 0–2)
BASOPHILS ABSOLUTE: 0.04 K/UL (ref 0–0.2)
BUN BLDV-MCNC: 5 MG/DL (ref 6–20)
CALCIUM SERPL-MCNC: 7.8 MG/DL (ref 8.6–10.4)
CHLORIDE BLD-SCNC: 105 MMOL/L (ref 98–107)
CO2: 21 MMOL/L (ref 20–31)
CREAT SERPL-MCNC: 0.64 MG/DL (ref 0.5–0.9)
EOSINOPHILS RELATIVE PERCENT: 3 % (ref 1–4)
GFR SERPL CREATININE-BSD FRML MDRD: >60 ML/MIN/1.73M2
GLUCOSE BLD-MCNC: 120 MG/DL (ref 70–99)
HCT VFR BLD CALC: 40.6 % (ref 36.3–47.1)
HEMOGLOBIN: 12.9 G/DL (ref 11.9–15.1)
IMMATURE GRANULOCYTES: 1 %
LYMPHOCYTES # BLD: 11 % (ref 24–43)
MCH RBC QN AUTO: 28.3 PG (ref 25.2–33.5)
MCHC RBC AUTO-ENTMCNC: 31.8 G/DL (ref 28.4–34.8)
MCV RBC AUTO: 89 FL (ref 82.6–102.9)
MONOCYTES # BLD: 11 % (ref 3–12)
NRBC AUTOMATED: 0 PER 100 WBC
PDW BLD-RTO: 13.7 % (ref 11.8–14.4)
PLATELET # BLD: 156 K/UL (ref 138–453)
PMV BLD AUTO: 10.8 FL (ref 8.1–13.5)
POTASSIUM SERPL-SCNC: 3.8 MMOL/L (ref 3.7–5.3)
RBC # BLD: 4.56 M/UL (ref 3.95–5.11)
SEG NEUTROPHILS: 73 % (ref 36–65)
SEGMENTED NEUTROPHILS ABSOLUTE COUNT: 5.71 K/UL (ref 1.5–8.1)
SODIUM BLD-SCNC: 135 MMOL/L (ref 135–144)
WBC # BLD: 7.7 K/UL (ref 3.5–11.3)

## 2022-12-09 PROCEDURE — 6370000000 HC RX 637 (ALT 250 FOR IP)

## 2022-12-09 PROCEDURE — 6370000000 HC RX 637 (ALT 250 FOR IP): Performed by: NURSE PRACTITIONER

## 2022-12-09 PROCEDURE — 97530 THERAPEUTIC ACTIVITIES: CPT

## 2022-12-09 PROCEDURE — 97535 SELF CARE MNGMENT TRAINING: CPT

## 2022-12-09 PROCEDURE — 6370000000 HC RX 637 (ALT 250 FOR IP): Performed by: STUDENT IN AN ORGANIZED HEALTH CARE EDUCATION/TRAINING PROGRAM

## 2022-12-09 PROCEDURE — 80048 BASIC METABOLIC PNL TOTAL CA: CPT

## 2022-12-09 PROCEDURE — 2060000000 HC ICU INTERMEDIATE R&B

## 2022-12-09 PROCEDURE — 94640 AIRWAY INHALATION TREATMENT: CPT

## 2022-12-09 PROCEDURE — 85025 COMPLETE CBC W/AUTO DIFF WBC: CPT

## 2022-12-09 PROCEDURE — 94761 N-INVAS EAR/PLS OXIMETRY MLT: CPT

## 2022-12-09 PROCEDURE — 99222 1ST HOSP IP/OBS MODERATE 55: CPT | Performed by: PHYSICAL MEDICINE & REHABILITATION

## 2022-12-09 PROCEDURE — 99221 1ST HOSP IP/OBS SF/LOW 40: CPT | Performed by: STUDENT IN AN ORGANIZED HEALTH CARE EDUCATION/TRAINING PROGRAM

## 2022-12-09 PROCEDURE — 6360000002 HC RX W HCPCS: Performed by: NURSE PRACTITIONER

## 2022-12-09 PROCEDURE — 36415 COLL VENOUS BLD VENIPUNCTURE: CPT

## 2022-12-09 PROCEDURE — 2700000000 HC OXYGEN THERAPY PER DAY

## 2022-12-09 PROCEDURE — 27197 CLSD TX PELVIC RING FX: CPT | Performed by: STUDENT IN AN ORGANIZED HEALTH CARE EDUCATION/TRAINING PROGRAM

## 2022-12-09 PROCEDURE — 94660 CPAP INITIATION&MGMT: CPT

## 2022-12-09 RX ADMIN — ENOXAPARIN SODIUM 30 MG: 100 INJECTION SUBCUTANEOUS at 08:34

## 2022-12-09 RX ADMIN — SERTRALINE 150 MG: 50 TABLET, FILM COATED ORAL at 08:34

## 2022-12-09 RX ADMIN — IPRATROPIUM BROMIDE AND ALBUTEROL SULFATE 1 AMPULE: .5; 3 SOLUTION RESPIRATORY (INHALATION) at 20:08

## 2022-12-09 RX ADMIN — ACETAMINOPHEN 1000 MG: 500 TABLET ORAL at 20:17

## 2022-12-09 RX ADMIN — ENOXAPARIN SODIUM 30 MG: 100 INJECTION SUBCUTANEOUS at 20:18

## 2022-12-09 RX ADMIN — TOPIRAMATE 100 MG: 100 TABLET, FILM COATED ORAL at 20:17

## 2022-12-09 RX ADMIN — OXYCODONE 5 MG: 5 TABLET ORAL at 16:34

## 2022-12-09 RX ADMIN — ACETAMINOPHEN 1000 MG: 500 TABLET ORAL at 16:34

## 2022-12-09 RX ADMIN — IPRATROPIUM BROMIDE AND ALBUTEROL SULFATE 1 AMPULE: .5; 3 SOLUTION RESPIRATORY (INHALATION) at 15:34

## 2022-12-09 RX ADMIN — TOPIRAMATE 75 MG: 100 TABLET, FILM COATED ORAL at 08:34

## 2022-12-09 RX ADMIN — LEVOTHYROXINE SODIUM 150 MCG: 75 TABLET ORAL at 08:34

## 2022-12-09 RX ADMIN — IPRATROPIUM BROMIDE AND ALBUTEROL SULFATE 1 AMPULE: .5; 3 SOLUTION RESPIRATORY (INHALATION) at 08:47

## 2022-12-09 RX ADMIN — LAMOTRIGINE 200 MG: 100 TABLET ORAL at 08:34

## 2022-12-09 RX ADMIN — IPRATROPIUM BROMIDE AND ALBUTEROL SULFATE 1 AMPULE: .5; 3 SOLUTION RESPIRATORY (INHALATION) at 12:04

## 2022-12-09 ASSESSMENT — PAIN SCALES - GENERAL
PAINLEVEL_OUTOF10: 7
PAINLEVEL_OUTOF10: 0

## 2022-12-09 ASSESSMENT — PAIN DESCRIPTION - DESCRIPTORS: DESCRIPTORS: ACHING;CRAMPING;DISCOMFORT

## 2022-12-09 ASSESSMENT — PAIN - FUNCTIONAL ASSESSMENT: PAIN_FUNCTIONAL_ASSESSMENT: ACTIVITIES ARE NOT PREVENTED

## 2022-12-09 ASSESSMENT — PAIN DESCRIPTION - LOCATION: LOCATION: BACK

## 2022-12-09 NOTE — PROGRESS NOTES
Occupational Therapy  Facility/Department: [address] NEURO  Occupational Daily Treatment Note    Name: Prabhu Rice  : 1975  MRN: [id number]  Date of Service: 2022    Discharge Recommendations:  Patient would benefit from continued therapy after discharge    Patient Diagnosis(es): The primary encounter diagnosis was Other closed nondisplaced fracture of seventh cervical vertebra, initial encounter ([facility] Diagnoses of Closed wedge compression fracture of T3 vertebra, initial encounter ([facility] and Multiple closed fractures of pelvis without disruption of pelvic ring, initial encounter [facility]) were also pertinent to this visit. 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 Past Surgical History:  has a past surgical history that includes Brain Biopsy; Tonsillectomy; knee surgery; Cochlear implant (Left); Dental surgery; Cochlear implant (Right, 05/10/2022); Colonoscopy; Endoscopy, colon, diagnostic; Colonoscopy (N/A, 2022); and Upper gastrointestinal endoscopy (N/A, 2022). Assessment   Performance deficits / Impairments: Decreased ADL status; Decreased functional mobility ; Decreased ROM; Decreased strength;Decreased endurance;Decreased high-level IADLs;Decreased fine motor control;Decreased cognition;Decreased safe awareness;Decreased balance;Decreased posture  Prognosis: Fair  Activity Tolerance  Activity Tolerance: Treatment limited secondary to medical complications (free text); Patient limited by fatigue;Patient limited by pain  Activity Tolerance Comments: Elevated RR after transferring to seated EOB. Plan   Occupational Therapy Plan  Times Per Week: 4-5x/wk  Current Treatment Recommendations: Strengthening, Balance training, ROM, Functional mobility training, Endurance training, Safety education & training, Positioning, Equipment evaluation, education, & procurement, Patient/Caregiver education & training, Self-Care / ADL     Restrictions  Restrictions/Precautions  Restrictions/Precautions: Fall Risk, Weight Bearing  Required Braces or Orthoses?: Yes  Lower Extremity Weight Bearing Restrictions  Right Lower Extremity Weight Bearing: Non Weight Bearing  Required Braces or Orthoses  Cervical: c-collar  Position Activity Restriction  Other position/activity restrictions: up with assist, no intervention required for C7 or T3 fracture per Rosalinda Sams via [employer]; Acute C7 Right TP fx, Age indeterminate T3 compression fx, Nondisplaced right sacral ala fx, Nondisplaced fracture right superior pubic ramus, Right obturator ring fx. Cochlear implant R ear during session this day. Hi-stanley NC 35 Lm. C-collar when OOB. Pain assessment: Pt states pain in back, shoulders and RLE, states \"a lot\". Subjective   General  Patient assessed for rehabilitation services?: Yes  Family / Caregiver Present: Yes (father)     Safety Devices  Type of Devices: All fall risk precautions in place;Call light within reach;Nurse notified; Left in bed    Balance  Sitting: With support (Max A x2 seated EOB x1 minute. Pt displayed posterior lean with verbal cues to corect with poor return.)  Standing:  (GILDARDO d/t poor sitting balance.)     ADL  Additional Comments: Pt leathargic this day requiring multiple verbal/tactile cues to keep eyes open to participate. Pt taken off bi-pap and placed on hi-flow NC. C-collar donned prior to transferring to seated EOB. Supie/sit transfer to seated EOB max A x2. Pt displayed posterior lean with verbal cues to correct with poor return.  with RR elevating to 41 after 1 minute.  Pt returned to supine in bed with emotional support and verbal cues to bring RR down to within acceptable levels. Pt placed back on bi-pap at end of session. Pt unable to participate in ADL care this day. Activity Tolerance  Activity Tolerance: Patient limited by endurance; Patient limited by pain; Other (comment); Treatment limited secondary to medical complications (noted increased HR and respirations, nursing notified pt returned to supine placed on BiPap, vitals stabilized, pt positioned for comfort further Pt intervention deferred)  Bed mobility  Rolling to Left: Maximum assistance;2 Person assistance  Rolling to Right: Maximum assistance;2 Person assistance  Supine to Sit: Maximum assistance;2 Person assistance  Sit to Supine: Maximum assistance;2 Person assistance  Scooting: Maximal assistance;2 Person assistance     Cognition  Overall Cognitive Status: Exceptions  Arousal/Alertness: Delayed responses to stimuli  Following Commands: Follows one step commands with increased time  Attention Span: Attends with cues to redirect  Safety Judgement: Decreased awareness of need for assistance;Decreased awareness of need for safety  Insights: Decreased awareness of deficits  Initiation: Requires cues for some  Sequencing: Requires cues for some  Cognition Comment: pt able to read lips and follow commands with demonstrations, Eek  Orientation  Overall Orientation Status: Impaired  Orientation Level: Oriented to person; Unable to assess (Pt sleepy w/difficulty keeping eyes opem. Eek (cochlear implant R ear). )     Education Given To: Patient; Family  Education Provided Comments: OT POC, transfer safety, importance of participation in therapy with poor return.   Barriers to Learning: Hearing;Cognition  AM-PAC Score     AM-PAC Inpatient Daily Activity Raw Score: 10 (12/09/22 1611)  AM-PAC Inpatient ADL T-Scale Score : 27.31 (12/09/22 1611)  ADL Inpatient CMS 0-100% Score: 74.7 (12/09/22 1611)  ADL Inpatient CMS G-Code Modifier : CL (12/09/22 8521)      Goals  Short Term Goals  Time Frame for Short Term Goals: Patient will, by discharge  Short Term Goal 1: demo self-feeding/grooming at Mod I using AE PRN  Short Term Goal 2: demo bed mobility at [address] to promote OOB activity  Short Term Goal 3: demo UB ADLs at [address] Term Goal 4: demo 10+ min [facility] to engage in ADLs safely  Short Term Goal 5: notify OTR to update POC as patient progresses       Therapy Time   Individual Concurrent Group Co-treatment   Time In 1407         Time Out 1434         Minutes 27         Timed Code Treatment Minutes: 27 Minutes    Pt supine in bed upon therapist arrival. Pleasant and agreeable to therapy. See above for LOF for all tasks. Pt retired to supine in bed at end of session with call light within reach.       RICHELLE Sigala/GIRMA

## 2022-12-09 NOTE — PROGRESS NOTES
Comprehensive Nutrition Assessment    Type and Reason for Visit:  Reassess    Nutrition Recommendations/Plan:   Continue current diet. Will provide frozen Magic Cups x 2 per day. Encourage/monitor PO intakes as tolerated. Will monitor labs, weights, and plan of care. Malnutrition Assessment:  Malnutrition Status: At risk for malnutrition (12/07/22 1047)    Context:  Acute Illness     Findings of the 6 clinical characteristics of malnutrition:  Energy Intake:  Mild decrease in energy intake  Weight Loss:  No significant weight loss     Body Fat Loss:  No significant body fat loss   Muscle Mass Loss:  No significant muscle mass loss  Fluid Accumulation:  No significant fluid accumulation   Strength:  Not Performed    Nutrition Assessment:    Pt extubated on 12/7. Pt has been started on a soft and bite sized diet. Eating less than 50% of meals. Will provide frozen magic cups with meals. Last BM 12/8. Labs/Meds reviewed. Nutrition Related Findings:    Labs/Meds reviewed. Last BM 12/8. Wound Type: Deep Tissue Injury (to right buttocks)       Current Nutrition Intake & Therapies:    Average Meal Intake: 26-50%  Average Supplements Intake: 1-25%, 26-50%  ADULT DIET; Dysphagia - Soft and Bite Sized  ADULT ORAL NUTRITION SUPPLEMENT; Breakfast, Lunch, Dinner; Standard High Calorie/High Protein Oral Supplement    Anthropometric Measures:  Height: 5' 1\" (154.9 cm)  Ideal Body Weight (IBW): 105 lbs (48 kg)    Admission Body Weight: 282 lb (127.9 kg)  Current Body Weight: 284 lb 4.8 oz (129 kg), 270.8 % IBW.  Weight Source: Bed Scale  Current BMI (kg/m2): 53.7  Usual Body Weight: 273 lb 12.8 oz (124.2 kg) (8/12/22 bed scale per chart review)  % Weight Change (Calculated): 3.8                    BMI Categories: Obese Class 3 (BMI 40.0 or greater)    Estimated Daily Nutrient Needs:  Energy Requirements Based On: Formula  Weight Used for Energy Requirements: Current  Energy (kcal/day): 4075-9055 kcals/day  Weight Used for Protein Requirements: Ideal  Protein (g/day):  gm pro/day  Method Used for Fluid Requirements: 1 ml/kcal  Fluid (ml/day): 1800 mL or per MD    Nutrition Diagnosis:   Inadequate oral intake related to  (recent extubation; current condition) as evidenced by intake 26-50% (recent start of oral diet; need for ONS)    Nutrition Interventions:   Food and/or Nutrient Delivery: Continue Current Diet, Modify Oral Nutrition Supplement  Nutrition Education/Counseling: No recommendation at this time  Coordination of Nutrition Care: Continue to monitor while inpatient       Goals:  Previous Goal Met: Progressing toward Goal(s)  Goals: Meet at least 75% of estimated needs, prior to discharge       Nutrition Monitoring and Evaluation:   Behavioral-Environmental Outcomes: None Identified  Food/Nutrient Intake Outcomes: Food and Nutrient Intake, Supplement Intake  Physical Signs/Symptoms Outcomes: Biochemical Data, GI Status, Fluid Status or Edema, Nutrition Focused Physical Findings, Skin, Weight    Discharge Planning:     Too soon to determine     Elvin Church RD, LD  Contact: [phone]

## 2022-12-09 NOTE — CARE COORDINATION
SBIRT is deferred as pt is developmentally delayed.           Alcohol Screening and Brief Intervention        Deferred [x]    Completed on: 12/9/2022   STERLING Shin

## 2022-12-09 NOTE — PROGRESS NOTES
Physical Therapy  Facility/Department: [address] NEURO  Daily Treatment Note  NAME: Bertha Alejo  : 1975  MRN: [id number]    Date of Service: 2022    Discharge Recommendations:  Patient would benefit from continued therapy after discharge   PT Equipment Recommendations  Equipment Needed: No    Patient Diagnosis(es): The primary encounter diagnosis was Other closed nondisplaced fracture of seventh cervical vertebra, initial encounter ([facility] [address] Diagnoses of Closed wedge compression fracture of T3 vertebra, initial encounter ([facility] and Multiple closed fractures of pelvis without disruption of pelvic ring, initial encounter [facility]) were also pertinent to this visit. Assessment   Assessment: Pt participated in sitting activity at EOB with focus on trunck control, posture and activity tolerance. Pt with poor activity mekhi patton noted increased respirations, increased pain, returned supine, nursing placed on BiPap. Pt will continue to benefit from PT intervention to progress activity and promote imrpoved abitly  Activity Tolerance: Patient limited by endurance; Patient limited by pain; Other (comment); Treatment limited secondary to medical complications (noted increased HR and respirations, nursing notified pt retruned to supine placed on BiPap, vitals stabalized, pt positioned for comfort further PT intervention deferred)  Equipment Needed: No     Plan    Physcial Therapy Plan  General Plan: 6-7 times per week  Current Treatment Recommendations: Strengthening;ROM;Balance training;Functional mobility training;Transfer training; Endurance training;Gait training;Stair training; Therapeutic activities; Patient/Caregiver education & training;Equipment evaluation, education, & procurement; Safety education & training; Wheelchair mobility training     Restrictions  Restrictions/Precautions  Restrictions/Precautions: Fall Risk, Weight Bearing  Required Braces or Orthoses?: No  Lower Extremity Weight Bearing Restrictions  Right Lower Extremity Weight Bearing: Non Weight Bearing  Position Activity Restriction  Other position/activity restrictions: up with assist, no intervention required for C7 or T3 fracture per Spencer Crooked via [employer]; Acute C7 Right TP fx, Age indeterminate T3 compression fx, Nondisplaced right sacral ala fx, Nondisplaced fracture right superior pubic ramus, Right obturator ring fx     Subjective    Subjective  Subjective: okay per RN to see, Neurosurgery consult: C-collar when out of bed, now WBAT bilateral LE's  Pain: BACK, BILATERAL SHOULDERS \" a lot\"  Orientation  Overall Orientation Status: Impaired  Orientation Level: Oriented to place;Oriented to time (Pt [place], sleepy today)  Cognition  Overall Cognitive Status: Exceptions  Safety Judgement: Decreased awareness of need for assistance;Decreased awareness of need for safety  Insights: Decreased awareness of deficits  Initiation: Requires cues for some  Sequencing: Requires cues for some  Cognition Comment: pt able to read lips and follow commands with demonstrations, [place]     Objective   Vitals     Bed Mobility Training  Bed Mobility Training: Yes  Overall Level of Assistance: Maximum assistance;Assist X2;Additional time; Other (comment) (rajani coto in HOB 45 degrees)  Interventions: Manual cues; Safety awareness training;Verbal cues; Tactile cues  Rolling: Moderate assistance;Assist X2;Additional time  Supine to Sit: Maximum assistance;Assist X2;Additional time  Sit to Supine: Maximum assistance;Assist X2;Additional time  Scooting:  Total assistance  Balance  Sitting: With support (sitting EOB x 8 minutes Max a and constant support fair - tolerance)  Standing:  (standing not assessed)  Transfer Training  Transfer Training: No  Gait Training  Gait Training: No  Wheelchair Management  Wheelchair Management: No  Neuromuscular Education  Neuromuscular Education: Yes  NDT Treatment: Sitting  Functional Movement Patterns: Pt participated in sitting activity at EOB with focus on trunck control, posture and activity tolerance. Pt with poor activity mekhi patton noted increased respirations, increased pain, returned supine, nursing placed on BiPap        Safety Devices  Type of Devices: Gait belt;Nurse notified;Call light within reach; Left in bed;Bed alarm in place; Patient at risk for falls; All fall risk precautions in place  Restraints  Restraints Initially in Place: No       AM-PAC Inpatient Mobility Raw Score   8    Goals  Short Term Goals  Time Frame for Short Term Goals: 14 visits  Short Term Goal 1: Perform bed mobility Mod I  Short Term Goal 2: Perform sit to stand with NWB RLE and Min A  Short Term Goal 3: Propel wheelchair 300ft with BUE and LLE and supervision  Short Term Goal 4: Ambulate 20ft with RW, NWB RLE and Min A    Education  Patient Education  Education Given To: Patient; Family  Education Provided: Role of Therapy;Transfer Training;Plan of Care;Precautions  Education Method: Demonstration;Verbal  Barriers to Learning: Hearing  Education Outcome: Continued education needed    Therapy Time   Individual Concurrent Group Co-treatment   Time In 1409         Time Out 1435         Minutes 26         Timed Code Treatment Minutes: [address], PT, DPT

## 2022-12-09 NOTE — PROGRESS NOTES
PROGRESS NOTE          PATIENT NAME: Catrachita Marmolejo  MEDICAL RECORD NO. [id number]  DATE: 12/9/2022    HD: # 3      Patient Active Problem List   Diagnosis    Seizure ([facility] [address]    Hypothyroidism    Asthma    Morbid obesity ([facility] [address]    ROHIT on CPAP    Hypopituitarism ([facility] [address]    Moderate episode of recurrent major depressive disorder ([facility] [address]    Type 2 diabetes mellitus with stage 3a chronic kidney disease, without long-term current use of insulin ([facility])    Irritable bowel syndrome with constipation    Gastroesophageal reflux disease    Cervical transverse process fracture, initial encounter ([facility])    Closed nondisplaced fracture of seventh cervical vertebra ([facility])    Lethargy       DIAGNOSIS AND PLAN  Acute C7 R TP Fx  - C-collar when out of bed, per neurosurgery consult    Nondisplaced R Sacral Ala Fx  Nondisplaced R sup pubic ramus Fx  R obturator ring Fx  - WBAT  - Ortho non-op, F/U 12/14  - VTE ppx  - Reg diet, TKO fluids    Acute Resp Failure, resolved  - continue CPAP at night and high flow NC during the day  - Extubated 12/7  - CT PE negative  - continue IS  - Wean HFNC    Seizure Disorder  Neurology consulted. EEG negative for seizures. Home Topamax and Lamictal restarted. Dispo: PM&R consulted. Planning for SNF. Chief Complaint: Chest pain    SUBJECTIVE  47YF admitted 12/5/22 for mechanical fall backwards down stairs. Pt intubated on admission for declining respiratory status and was extubated on 78/4 w/o complication. Pt tolerating CPAP at night and high flow nasal cannula during day. Pt resting comfortably w/o complaints at this time. No acute event overnight. OBJECTIVE  VITALS:   Vitals:    12/09/22 0847   BP:    Pulse: 99   Resp: 27   Temp:    SpO2: 96%     Physical Exam  Constitutional:       Appearance: She is obese. HENT:      Head: Normocephalic and atraumatic.       Ears:      Comments: Pt hard of hearing, wears cochlear implants     Nose: Nose normal.   Eyes:      Extraocular Movements: Extraocular movements intact. Conjunctiva/sclera: Conjunctivae normal.      Pupils: Pupils are equal, round, and reactive to light. Cardiovascular:      Rate and Rhythm: Normal rate and regular rhythm. Pulmonary:      Effort: No respiratory distress. Comments: Pt on high flow NC. Tachypneic, Sats well maintained. No accessory muscle use  Chest:      Chest wall: No tenderness. Abdominal:      General: There is no distension. Palpations: Abdomen is soft. Tenderness: There is no abdominal tenderness. Musculoskeletal:         General: No tenderness. Right lower leg: No edema. Left lower leg: No edema. Skin:     General: Skin is warm. Neurological:      Mental Status: Mental status is at baseline.        LAB:  CBC:   Recent Labs     12/06/22  2212 12/09/22  0345   WBC 9.7 7.7   HGB 13.2 12.9   HCT 40.6 40.6   MCV 86.6 89.0    156     BMP:   Recent Labs     12/07/22  0357 12/08/22  0338 12/09/22  0345    132* 135   K 3.5* 3.8 3.8    102 105   CO2 18* 21 21   BUN 9 7 5*   CREATININE 0.78 0.75 0.64   GLUCOSE 103* 129* 120*       RADIOLOGY:  CXR: mild to mod pulm vascular congestion, finding suggestive of interstitial pulm edema in lungs      [employer] PA-S2  [facility] Kenrick Mancia[address]  12/09/22 10:35 AM     Lamar Robertson MD  General Surgery PGY5  Available via [employer]

## 2022-12-09 NOTE — CONSULTS
Physical Medicine & Rehabilitation  Consult Note      Admitting Physician: Suad Lincoln MD    Primary Care Provider: Avila Bowman DO     Reason for Consult:  Acute Inpatient Rehabilitation    Chief Complaint: Fall down stairs    History of Present Illness:  Referring Provider is requesting an evaluation for appropriate placement upon discharge from acute care. Ms. Willian Todd is a 41 y.o. female who was admitted to [facility] on 12/5/2022 with Fall    41year-old female status post fall downstairs with taking a bath and laundry fell backwards and hit her head. Noted to have developmental delay and deafness. Neurology 12/8-mechanical fall with cervical spine fracture, respiratory failure initially now extubated, history of seizure disorder-continue seizure medications and precautions unable to do MRI due to cochlear implants no further neurologic testing as she is at her baseline    Neurosurgery-C7 transverse process fracture likely acute T3 compression fracture-no neurosurgical intervention, CT LS recommendations collar when out of bed, no further neurosurgical intervention, head of bed at 30 degrees okay for DVT prophylaxis    Orthopedic surgery-right sacral ilya fracture, right superior pubic ramus fracture, further evaluation for operative versus nonoperative management, nonweightbearing right lower extremity    Pulmonary-12/7 seen while on ventilator, uses CPAP/BiPAP at night continue aspiration precautions    Trauma-history of cochlear implants, brain cancer as a child TIA and seizures, obesity on high flow nasal cannula 20 L during the day-noted extubated 12/7    Radiology:  XR SHOULDER RIGHT (MIN 2 VIEWS)    Result Date: 12/6/2022  1. No acute fracture seen in the right shoulder. 2. No acute fracture seen in the left shoulder. XR HUMERUS RIGHT (MIN 2 VIEWS)    Result Date: 12/6/2022  No acute osseous abnormality in the right humerus.      CT HEAD WO CONTRAST    Result Date: 12/7/2022  No acute intracranial abnormality. No evidence of intracranial hemorrhage or any other definable acute intracranial abnormality. Redemonstration of dense calcifications in the bilateral lentiform nuclei, bilateral caudate nuclei and bilateral thalami similar to findings on previous CT scan in 2010. There are bilateral cochlear implants redemonstrated. No demonstrable fracture in calvarium or in base of skull. CT Head W/O Contrast    Result Date: 12/5/2022  No acute intracranial abnormality. Small amount of fluid in the right mastoid air cells. CT CSpine W/O Contrast    Result Date: 12/5/2022  Acute fracture of the right C7 transverse process. Spinal degenerative changes as described. XR SHOULDER LEFT (MIN 2 VIEWS)    Result Date: 12/6/2022  1. No acute fracture seen in the right shoulder. 2. No acute fracture seen in the left shoulder. XR CHEST PORTABLE    Result Date: 12/7/2022  Lower end of the ET tube is 1.6 cm above manny. Mild-to-moderate pulmonary vascular congestion. Finding suggestive of interstitial pulmonary edema in lungs. XR CHEST PORTABLE    Result Date: 12/6/2022  Placement of gastric tube which extends to the distal aspect of the stomach. Cardiomegaly and vascular congestion without evidence of interstitial edema. No confluent airspace consolidation. XR CHEST PORTABLE    Result Date: 12/6/2022  Very limited study due to underpenetration. Increased opacity in the lungs which could be related to under penetration and overlying soft tissues, but diffuse airspace infiltrates cannot be excluded. Cardiomegaly without evidence of CHF. CT CHEST PULMONARY EMBOLISM W CONTRAST    Result Date: 12/7/2022  No evidence of pulmonary embolism. No evidence of thoracic aortic aneurysm or dissection. There are mild atelectatic changes, and/or infiltrate in the posterior lung bases bilaterally, left more than right. Trace left basilar pleural effusion.  Focal moderate dense infiltrates, and/or atelectasis in the posterior segment of right pulmonary upper lobe. No subphrenic acute process demonstrated. XR PELVIS (MIN 3 VIEWS)    Result Date: 12/6/2022  1. Nondisplaced fracture involving the lateral aspect of the right superior pubic ramus, though better seen on CT imaging. 2. No other radiographically evident acute fracture seen in the pelvis. CTA NECK W CONTRAST    Result Date: 12/6/2022  No acute trauma of the major arterial vessels of the neck. XR ABDOMEN FOR NG/OG/NE TUBE PLACEMENT    Result Date: 12/6/2022  Gastric tube in place with the tip and side-port in the stomach. CT CHEST ABDOMEN PELVIS WO CONTRAST Additional Contrast? None    Result Date: 12/5/2022  Chest: 1. Mild anterior wedging of T3 suggesting an age indeterminate compression fracture. No fracture line is identified. Clinical correlation with the site of symptoms is suggested. 2. Significant motion artifact with scattered patchy ground-glass opacities in the lungs which may be related to motion artifact. Ground-glass opacities are otherwise nonspecific and could be related to atypical infection or pneumonitis. Abdomen and pelvis: 1. Nondisplaced fractures of the right sacral ala and the right obturator ring. 2. No acute findings elsewhere in the abdomen or pelvis. 3. Left renal cyst.     CT LUMBAR SPINE TRAUMA RECONSTRUCTION    Result Date: 12/6/2022  10-20% compression deformity of the T3 vertebra, possibly acute. No other evidence of acute thoracic or lumbar spine fracture. Multilevel degenerative change. CT THORACIC SPINE TRAUMA RECONSTRUCTION    Result Date: 12/6/2022  10-20% compression deformity of the T3 vertebra, possibly acute. No other evidence of acute thoracic or lumbar spine fracture. Multilevel degenerative change.        Review of Systems:  Constitutional: negative for anorexia, chills, fatigue, fevers, sweats and weight loss  Eyes: negative for redness and visual disturbance  Ears, nose, mouth, throat, and face: negative for earaches, sore throat and tinnitus  Respiratory: negative for cough and shortness of breath  Cardiovascular: negative for chest pain, dyspnea and palpitations  Gastrointestinal: negative for abdominal pain, change in bowel habits, constipation, nausea and vomiting  Genitourinary:negative for dysuria, frequency, hesitancy and urinary incontinence  Integument/breast: negative for pruritus and rash  Musculoskeletal:negative for muscle weakness and stiff joints  Neurological: negative for dizziness, headaches and weakness  Behavioral/Psych: negative for decreased appetite, depression and fatigue    Functional History:  PTA: Independent with all activities. Current:  PT:       Restrictions/Precautions  Restrictions/Precautions: Fall Risk, Weight Bearing  Required Braces or Orthoses?: No  Lower Extremity Weight Bearing Restrictions  Right Lower Extremity Weight Bearing: Non Weight Bearing  Position Activity Restriction  Other position/activity restrictions: up with assist, no intervention required for C7 or T3 fracture per Ngoc Powell via [employer]; Acute C7 Right TP fx, Age indeterminate T3 compression fx, Nondisplaced right sacral ala fx, Nondisplaced fracture right superior pubic ramus, Right obturator ring fx    Transfer Training  Transfer Training: No  Bed mobility  Supine to Sit: Maximum assistance, 2 Person assistance  Sit to Supine: Maximum assistance, 2 Person assistance  Scooting: Maximal assistance       Transfers  Comment: unsafe to attempt due to poor sitting balance     Bed mobility  Supine to Sit: Maximum assistance;2 Person assistance  Sit to Supine: Maximum assistance;2 Person assistance  Scooting: Maximal assistance  Transfers  Comment: unsafe to attempt due to poor sitting balance  Ambulation  More Ambulation?: No    OT:   ADL  Feeding: Minimal assistance, Verbal cueing, Setup, Increased time to complete  Grooming:  Moderate assistance, Setup, Verbal cueing, Increased time to complete  UE Bathing: Maximum assistance, Increased time to complete, Verbal cueing, Setup  LE Bathing: Maximum assistance, Increased time to complete, Verbal cueing, Setup  UE Dressing: Maximum assistance  LE Dressing: Dependent/Total, Increased time to complete, Verbal cueing, Setup  Toileting: Dependent/Total, Increased time to complete, Setup  Additional Comments: Patient required Mod A to complete washing face with hand over hand assistance to reach above nose this date. Pt demonstrates B UE edema impacting ROM and decreased strength for engagement in functional tasks. Pt limited throughout d/t hearing aid not present         ST:      Past Medical History:        Diagnosis Date    Acquired acanthosis nigricans     Anemia     Arthritis     Asthma     Brain cancer ([facility] [address]     Brain tumor [facility])     age 5    Contact dermatitis and other eczema, due to unspecified cause     COVID-19 01/26/2021    Diabetes mellitus [facility])     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)     Fibromyalgia     Herpes zoster without mention of complication     Hypothyroidism     Migraine     Seizures ([facility] [address]     last seizure in 2000    Sleep apnea     CPAP    TIA (transient ischemic attack) 2000       Past Surgical History:        Procedure Laterality Date    BRAIN BIOPSY      tumor biopsy    COCHLEAR IMPLANT Left     COCHLEAR IMPLANT Right 05/10/2022    COLONOSCOPY      COLONOSCOPY N/A 8/12/2022    COLONOSCOPY WITH BIOPSY performed by Andreea Sandoval MD at [address] [place] [address]      implants 1/22    ENDOSCOPY, COLON, DIAGNOSTIC      KNEE SURGERY      scope    TONSILLECTOMY      UPPER GASTROINTESTINAL ENDOSCOPY N/A 8/12/2022    EGD BIOPSY performed by Andreea Sandoval MD at [address]:     Allergies   Allergen Reactions    Bactrim [Sulfamethoxazole-Trimethoprim]     Bee Venom     Ciprofloxacin     Milk-Related Compounds Diarrhea        Current Medications:   Current Facility-Administered Medications: enoxaparin Sodium (LOVENOX) injection 30 mg, 30 mg, SubCUTAneous, BID  oxyCODONE (ROXICODONE) immediate release tablet 5 mg, 5 mg, Oral, Q6H PRN  sertraline (ZOLOFT) tablet 150 mg, 150 mg, Oral, Daily  dextrose 5 % and 0.45 % sodium chloride infusion, , IntraVENous, Continuous  albuterol sulfate HFA (PROVENTIL;VENTOLIN;PROAIR) 108 (90 Base) MCG/ACT inhaler 1 puff, 1 puff, Inhalation, Q4H PRN  lamoTRIgine (LAMICTAL) tablet 200 mg, 200 mg, Oral, Daily  levothyroxine (SYNTHROID) tablet 150 mcg, 150 mcg, Oral, QAM AC  topiramate (TOPAMAX) tablet 75 mg, 75 mg, Oral, Daily  topiramate (TOPAMAX) tablet 100 mg, 100 mg, Oral, Nightly  sodium chloride flush 0.9 % injection 5-40 mL, 5-40 mL, IntraVENous, PRN  polyethylene glycol (GLYCOLAX) packet 17 g, 17 g, Oral, Daily  acetaminophen (TYLENOL) tablet 1,000 mg, 1,000 mg, Oral, 3 times per day  ipratropium-albuterol (DUONEB) nebulizer solution 1 ampule, 1 ampule, Inhalation, Q4H WA    Social History:  Social History     Socioeconomic History    Marital status: Single     Spouse name: Not on file    Number of children: Not on file    Years of education: Not on file    Highest education level: Not on file   Occupational History     Comment: disabled   Tobacco Use    Smoking status: Never    Smokeless tobacco: Never   Vaping Use    Vaping Use: Never used   Substance and Sexual Activity    Alcohol use:  Yes     Alcohol/week: 0.0 standard drinks     Comment: rare    Drug use: No    Sexual activity: Not Currently     Partners: Male   Other Topics Concern    Not on file   Social History Narrative    Not on file     Social Determinants of Health     Financial Resource Strain: Low Risk     Difficulty of Paying Living Expenses: Not hard at all   Food Insecurity: No Food Insecurity    Worried About Running Out of Food in the Last Year: Never true    Ran Out of Food in the Last Year: Never true   Transportation Needs: Not on file   Physical Activity: Not on file   Stress: Not on file   Social Connections: Not on file   Intimate Partner Violence: Not on file   Housing Stability: Not on file     Social/Functional History  Lives With: Parent (Father and father's fiance)  Type of Home: House  Home Layout: Two level, Bed/Bath upstairs, 1/2 bath on main level  Home Access: Stairs to enter without rails  Entrance Stairs - Number of Steps: 2  Bathroom Shower/Tub: Tub/Shower unit  Bathroom Toilet: Standard  Bathroom Equipment: Grab bars in shower, Shower chair  Home Equipment: Cane (pt ambulates without AD at baseline)  Receives Help From: Family  ADL Assistance: Independent  Homemaking Assistance: Independent (shares with father)  Homemaking Responsibilities: Yes  Ambulation Assistance: Independent  Transfer Assistance: Independent  Active : No  Mode of Transportation: Family, Car, SUV  Occupation: On disability  Leisure & Hobbies: Enjoys crafting  Additional Comments: Pt's father is able to provide 24hr assistance upon discharge    Family History:       Problem Relation Age of Onset    Cancer Mother 50        breast    Migraines Mother     Diabetes Father     High Blood Pressure Father     High Cholesterol Father     Migraines Sister     High Blood Pressure Maternal Grandmother     Cancer Paternal Grandmother 70        colon cancer    Cancer Paternal Uncle         esophageal           Physical Exam:    BP (!) 139/90   Pulse 99   Temp 98.3 °F (36.8 °C) (Oral)   Resp 27   Ht 5' 1\" (1.549 m)   Wt 284 lb 4.8 oz (129 kg)   SpO2 96%   BMI 53.72 kg/m²     General appearance: alert, appears stated age, cooperative, and no distress  HEENT: Normocephalic, without obvious abnormality, atraumatic  Pulm: clear to auscultation bilaterally. Cardiac: regular rate and rhythm, no murmur. Abdomen: soft, non-tender; bowel sounds normal.  MSK: extremities normal, atraumatic, no edema, normal tone.    ROM: Unable to assess-minimal movement upper or lower extremities due to pain  Mental status/Psych: Alert, orientedX1 year 2023, in hospital, not sure president, attempted to follow one-step commands t  Skin:     Neuro:      Sensory: Intact in BUE and BLE to soft and pin sensation. Motor: Muscle tone and bulk are normal bilaterally. No pronator drift. Coordination: finger to nose normal bilaterally. Diagnostics:  CBC   Lab Results   Component Value Date/Time    WBC 7.7 12/09/2022 03:45 AM    RBC 4.56 12/09/2022 03:45 AM    HGB 12.9 12/09/2022 03:45 AM    HCT 40.6 12/09/2022 03:45 AM    MCV 89.0 12/09/2022 03:45 AM    RDW 13.7 12/09/2022 03:45 AM     12/09/2022 03:45 AM     BMP    Lab Results   Component Value Date/Time     12/09/2022 03:45 AM    K 3.8 12/09/2022 03:45 AM     12/09/2022 03:45 AM    CO2 21 12/09/2022 03:45 AM    BUN 5 12/09/2022 03:45 AM     Uric Acid  No components found for: URIC  VITAMIN B12 No components found for: B12  PT/INR  No results found for: PTINR      Impression: Ms. Dipika Bales is a 41 y.o. female with a history of Cervical transverse process fracture, initial encounter ([facility] [address]    Fall with multiple trauma  C7 transverse process fracture, likely acute T3 compression fracture-cervical collar when out of bed, 1030 degrees no neurosurgical intervention  Right sacral ilya fracture right superior pubic ramus fracture-Ortho decide on operative versus nonoperative management-nonweightbearing right lower extremity  Obesity, BMI 53.72, uses CPAP/BiPAP, extubated 12/7-on 20 L high flow oxygen-hypoventilation syndrome/sleep apnea-albuterol, DuoNeb  Cochlear implants/deafness  History of brain cancer  History of TIA seizures-Lamictal  MRDD  Fibromyalgia per past medical history  Pain-Roxicodone  Depression-Zoloft, Topamax  Hypothyroid-Synthroid    Recommendations:  1. Diagnosis: Multiple trauma  2. Therapy: Max assist 2 person supine to sit, max assist to dependent with ADLs, poor sitting balance  3. Medical  Necessity: As above  4. Support: Clarify noted father able to provide assistance  5. Rehab recommendation: max assist 1-2 poor sitting balance, and mobility due to pain even on exam, suspect patient have difficulty maintaining nonweightbearing due to both cognition and pain-recommend skilled nurse facility-discussed options with father present-reviewed ARU versus SNF-he agrees SNF  6. DVT proph: Lovenox    It was my pleasure to evaluate Homar Avelar today. Please call with questions. Snow Oliver. Jose Wilson MD          This note is created with the assistance of a speech recognition program.  While intending to generate a document that actually reflects the content of the visit, the document can still have some errors including those of syntax and sound a like substitutions which may escape proof reading.   In such instances, actual meaning can be extrapolated by contextual diversion

## 2022-12-10 ENCOUNTER — APPOINTMENT (OUTPATIENT)
Dept: GENERAL RADIOLOGY | Age: 47
DRG: 551 | End: 2022-12-10
Payer: MEDICARE

## 2022-12-10 PROBLEM — J69.0 ASPIRATION PNEUMONIA (HCC): Status: ACTIVE | Noted: 2022-12-10

## 2022-12-10 LAB
ABSOLUTE EOS #: 0.23 K/UL (ref 0–0.44)
ABSOLUTE IMMATURE GRANULOCYTE: 0.05 K/UL (ref 0–0.3)
ABSOLUTE LYMPH #: 0.96 K/UL (ref 1.1–3.7)
ABSOLUTE MONO #: 0.94 K/UL (ref 0.1–1.2)
ADENOVIRUS PCR: NOT DETECTED
ALLEN TEST: POSITIVE
ALLEN TEST: POSITIVE
ANION GAP SERPL CALCULATED.3IONS-SCNC: 8 MMOL/L (ref 9–17)
BASOPHILS # BLD: 0 % (ref 0–2)
BASOPHILS ABSOLUTE: 0.03 K/UL (ref 0–0.2)
BORDETELLA PARAPERTUSSIS: NOT DETECTED
BORDETELLA PERTUSSIS PCR: NOT DETECTED
BUN BLDV-MCNC: 7 MG/DL (ref 6–20)
CALCIUM SERPL-MCNC: 8 MG/DL (ref 8.6–10.4)
CHLAMYDIA PNEUMONIAE BY PCR: NOT DETECTED
CHLORIDE BLD-SCNC: 106 MMOL/L (ref 98–107)
CO2: 23 MMOL/L (ref 20–31)
CORONAVIRUS 229E PCR: NOT DETECTED
CORONAVIRUS HKU1 PCR: NOT DETECTED
CORONAVIRUS NL63 PCR: NOT DETECTED
CORONAVIRUS OC43 PCR: NOT DETECTED
CREAT SERPL-MCNC: 0.62 MG/DL (ref 0.5–0.9)
EOSINOPHILS RELATIVE PERCENT: 3 % (ref 1–4)
FIO2: 100
FIO2: 50
GFR SERPL CREATININE-BSD FRML MDRD: >60 ML/MIN/1.73M2
GLUCOSE BLD-MCNC: 108 MG/DL (ref 70–99)
HCT VFR BLD CALC: 40.6 % (ref 36.3–47.1)
HEMOGLOBIN: 12.5 G/DL (ref 11.9–15.1)
HUMAN METAPNEUMOVIRUS PCR: NOT DETECTED
IMMATURE GRANULOCYTES: 1 %
INFLUENZA A BY PCR: NOT DETECTED
INFLUENZA B BY PCR: NOT DETECTED
LYMPHOCYTES # BLD: 14 % (ref 24–43)
MCH RBC QN AUTO: 28 PG (ref 25.2–33.5)
MCHC RBC AUTO-ENTMCNC: 30.8 G/DL (ref 28.4–34.8)
MCV RBC AUTO: 91 FL (ref 82.6–102.9)
MODE: ABNORMAL
MODE: ABNORMAL
MONOCYTES # BLD: 14 % (ref 3–12)
MYCOPLASMA PNEUMONIAE PCR: NOT DETECTED
NEGATIVE BASE EXCESS, ART: 2 (ref 0–2)
NEGATIVE BASE EXCESS, ART: 2 (ref 0–2)
NRBC AUTOMATED: 0 PER 100 WBC
O2 DEVICE/FLOW/%: ABNORMAL
O2 DEVICE/FLOW/%: ABNORMAL
PARAINFLUENZA 1 PCR: NOT DETECTED
PARAINFLUENZA 2 PCR: NOT DETECTED
PARAINFLUENZA 3 PCR: NOT DETECTED
PARAINFLUENZA 4 PCR: NOT DETECTED
PDW BLD-RTO: 14.2 % (ref 11.8–14.4)
PLATELET # BLD: 215 K/UL (ref 138–453)
PMV BLD AUTO: 11.4 FL (ref 8.1–13.5)
POC HCO3: 23.5 MMOL/L (ref 21–28)
POC HCO3: 24.1 MMOL/L (ref 21–28)
POC O2 SATURATION: 100 % (ref 94–98)
POC O2 SATURATION: 95 % (ref 94–98)
POC PCO2: 40.9 MM HG (ref 35–48)
POC PCO2: 45.6 MM HG (ref 35–48)
POC PH: 7.33 (ref 7.35–7.45)
POC PH: 7.37 (ref 7.35–7.45)
POC PO2: 219.1 MM HG (ref 83–108)
POC PO2: 80.1 MM HG (ref 83–108)
POTASSIUM SERPL-SCNC: 4.1 MMOL/L (ref 3.7–5.3)
PRO-BNP: 87 PG/ML
RBC # BLD: 4.46 M/UL (ref 3.95–5.11)
RESP SYNCYTIAL VIRUS PCR: NOT DETECTED
RHINO/ENTEROVIRUS PCR: NOT DETECTED
SAMPLE SITE: ABNORMAL
SAMPLE SITE: ABNORMAL
SARS-COV-2, PCR: NOT DETECTED
SEG NEUTROPHILS: 68 % (ref 36–65)
SEGMENTED NEUTROPHILS ABSOLUTE COUNT: 4.63 K/UL (ref 1.5–8.1)
SODIUM BLD-SCNC: 137 MMOL/L (ref 135–144)
SPECIMEN DESCRIPTION: NORMAL
WBC # BLD: 6.8 K/UL (ref 3.5–11.3)

## 2022-12-10 PROCEDURE — 6360000002 HC RX W HCPCS

## 2022-12-10 PROCEDURE — 85025 COMPLETE CBC W/AUTO DIFF WBC: CPT

## 2022-12-10 PROCEDURE — 80048 BASIC METABOLIC PNL TOTAL CA: CPT

## 2022-12-10 PROCEDURE — 36600 WITHDRAWAL OF ARTERIAL BLOOD: CPT

## 2022-12-10 PROCEDURE — 6360000002 HC RX W HCPCS: Performed by: INTERNAL MEDICINE

## 2022-12-10 PROCEDURE — 6360000002 HC RX W HCPCS: Performed by: NURSE PRACTITIONER

## 2022-12-10 PROCEDURE — 89220 SPUTUM SPECIMEN COLLECTION: CPT

## 2022-12-10 PROCEDURE — 2700000000 HC OXYGEN THERAPY PER DAY

## 2022-12-10 PROCEDURE — 6370000000 HC RX 637 (ALT 250 FOR IP): Performed by: STUDENT IN AN ORGANIZED HEALTH CARE EDUCATION/TRAINING PROGRAM

## 2022-12-10 PROCEDURE — 82803 BLOOD GASES ANY COMBINATION: CPT

## 2022-12-10 PROCEDURE — 2580000003 HC RX 258

## 2022-12-10 PROCEDURE — 31500 INSERT EMERGENCY AIRWAY: CPT

## 2022-12-10 PROCEDURE — 0202U NFCT DS 22 TRGT SARS-COV-2: CPT

## 2022-12-10 PROCEDURE — 0BH17EZ INSERTION OF ENDOTRACHEAL AIRWAY INTO TRACHEA, VIA NATURAL OR ARTIFICIAL OPENING: ICD-10-PCS | Performed by: ANESTHESIOLOGY

## 2022-12-10 PROCEDURE — 87186 SC STD MICRODIL/AGAR DIL: CPT

## 2022-12-10 PROCEDURE — 94660 CPAP INITIATION&MGMT: CPT

## 2022-12-10 PROCEDURE — 6370000000 HC RX 637 (ALT 250 FOR IP): Performed by: INTERNAL MEDICINE

## 2022-12-10 PROCEDURE — 2000000000 HC ICU R&B

## 2022-12-10 PROCEDURE — 87070 CULTURE OTHR SPECIMN AEROBIC: CPT

## 2022-12-10 PROCEDURE — 87205 SMEAR GRAM STAIN: CPT

## 2022-12-10 PROCEDURE — 74018 RADEX ABDOMEN 1 VIEW: CPT

## 2022-12-10 PROCEDURE — 71045 X-RAY EXAM CHEST 1 VIEW: CPT

## 2022-12-10 PROCEDURE — 86403 PARTICLE AGGLUT ANTBDY SCRN: CPT

## 2022-12-10 PROCEDURE — 94761 N-INVAS EAR/PLS OXIMETRY MLT: CPT

## 2022-12-10 PROCEDURE — 94640 AIRWAY INHALATION TREATMENT: CPT

## 2022-12-10 PROCEDURE — 6370000000 HC RX 637 (ALT 250 FOR IP): Performed by: NURSE PRACTITIONER

## 2022-12-10 PROCEDURE — 5A1955Z RESPIRATORY VENTILATION, GREATER THAN 96 CONSECUTIVE HOURS: ICD-10-PCS | Performed by: ANESTHESIOLOGY

## 2022-12-10 PROCEDURE — 94003 VENT MGMT INPAT SUBQ DAY: CPT

## 2022-12-10 PROCEDURE — 83880 ASSAY OF NATRIURETIC PEPTIDE: CPT

## 2022-12-10 PROCEDURE — 36415 COLL VENOUS BLD VENIPUNCTURE: CPT

## 2022-12-10 PROCEDURE — 6370000000 HC RX 637 (ALT 250 FOR IP)

## 2022-12-10 PROCEDURE — 99291 CRITICAL CARE FIRST HOUR: CPT | Performed by: INTERNAL MEDICINE

## 2022-12-10 PROCEDURE — 87641 MR-STAPH DNA AMP PROBE: CPT

## 2022-12-10 PROCEDURE — 87040 BLOOD CULTURE FOR BACTERIA: CPT

## 2022-12-10 RX ORDER — FENTANYL CITRATE 50 UG/ML
INJECTION, SOLUTION INTRAMUSCULAR; INTRAVENOUS
Status: DISPENSED
Start: 2022-12-10 | End: 2022-12-10

## 2022-12-10 RX ORDER — PROPOFOL 10 MG/ML
INJECTION, EMULSION INTRAVENOUS
Status: COMPLETED
Start: 2022-12-10 | End: 2022-12-10

## 2022-12-10 RX ORDER — OXYCODONE HYDROCHLORIDE 5 MG/1
5 TABLET ORAL EVERY 4 HOURS PRN
Status: DISCONTINUED | OUTPATIENT
Start: 2022-12-10 | End: 2022-12-16

## 2022-12-10 RX ORDER — FENTANYL CITRATE 50 UG/ML
50 INJECTION, SOLUTION INTRAMUSCULAR; INTRAVENOUS
Status: DISCONTINUED | OUTPATIENT
Start: 2022-12-10 | End: 2022-12-22

## 2022-12-10 RX ORDER — PROPOFOL 10 MG/ML
5-50 INJECTION, EMULSION INTRAVENOUS CONTINUOUS
Status: DISCONTINUED | OUTPATIENT
Start: 2022-12-10 | End: 2022-12-10

## 2022-12-10 RX ORDER — HYDROCORTISONE 10 MG/1
10 TABLET ORAL 2 TIMES DAILY
Status: DISCONTINUED | OUTPATIENT
Start: 2022-12-10 | End: 2022-12-11

## 2022-12-10 RX ORDER — CHLORHEXIDINE GLUCONATE 0.12 MG/ML
15 RINSE ORAL 2 TIMES DAILY
Status: DISCONTINUED | OUTPATIENT
Start: 2022-12-10 | End: 2022-12-18

## 2022-12-10 RX ORDER — HALOPERIDOL 5 MG/ML
5 INJECTION INTRAMUSCULAR ONCE
Status: DISCONTINUED | OUTPATIENT
Start: 2022-12-10 | End: 2022-12-16

## 2022-12-10 RX ORDER — FENTANYL CITRATE 50 UG/ML
50 INJECTION, SOLUTION INTRAMUSCULAR; INTRAVENOUS ONCE
Status: COMPLETED | OUTPATIENT
Start: 2022-12-10 | End: 2022-12-10

## 2022-12-10 RX ORDER — ACETAMINOPHEN 500 MG
500 TABLET ORAL EVERY 8 HOURS PRN
Status: DISCONTINUED | OUTPATIENT
Start: 2022-12-10 | End: 2022-12-10

## 2022-12-10 RX ORDER — OXYCODONE HYDROCHLORIDE 5 MG/1
10 TABLET ORAL EVERY 4 HOURS PRN
Status: DISCONTINUED | OUTPATIENT
Start: 2022-12-10 | End: 2022-12-16

## 2022-12-10 RX ORDER — PROPOFOL 10 MG/ML
5-50 INJECTION, EMULSION INTRAVENOUS CONTINUOUS
Status: DISCONTINUED | OUTPATIENT
Start: 2022-12-10 | End: 2022-12-11

## 2022-12-10 RX ORDER — SODIUM CHLORIDE 9 MG/ML
INJECTION, SOLUTION INTRAVENOUS CONTINUOUS
Status: DISCONTINUED | OUTPATIENT
Start: 2022-12-10 | End: 2022-12-11

## 2022-12-10 RX ORDER — HALOPERIDOL 5 MG/ML
INJECTION INTRAMUSCULAR
Status: COMPLETED
Start: 2022-12-10 | End: 2022-12-10

## 2022-12-10 RX ORDER — LEVALBUTEROL INHALATION SOLUTION 0.63 MG/3ML
0.63 SOLUTION RESPIRATORY (INHALATION) 4 TIMES DAILY
Status: DISPENSED | OUTPATIENT
Start: 2022-12-10 | End: 2022-12-20

## 2022-12-10 RX ORDER — ACETAMINOPHEN 325 MG/1
650 TABLET ORAL EVERY 6 HOURS PRN
Status: DISCONTINUED | OUTPATIENT
Start: 2022-12-10 | End: 2022-12-31 | Stop reason: HOSPADM

## 2022-12-10 RX ORDER — FENTANYL CITRATE 50 UG/ML
25 INJECTION, SOLUTION INTRAMUSCULAR; INTRAVENOUS ONCE
Status: COMPLETED | OUTPATIENT
Start: 2022-12-10 | End: 2022-12-10

## 2022-12-10 RX ADMIN — TOPIRAMATE 75 MG: 100 TABLET, FILM COATED ORAL at 12:15

## 2022-12-10 RX ADMIN — LEVOTHYROXINE SODIUM 150 MCG: 75 TABLET ORAL at 12:39

## 2022-12-10 RX ADMIN — CHLORHEXIDINE GLUCONATE 15 ML: 1.2 RINSE ORAL at 15:28

## 2022-12-10 RX ADMIN — FENTANYL CITRATE 50 MCG: 50 INJECTION INTRAMUSCULAR; INTRAVENOUS at 07:13

## 2022-12-10 RX ADMIN — SERTRALINE 150 MG: 50 TABLET, FILM COATED ORAL at 12:39

## 2022-12-10 RX ADMIN — PIPERACILLIN AND TAZOBACTAM 3375 MG: 3; .375 INJECTION, POWDER, LYOPHILIZED, FOR SOLUTION INTRAVENOUS at 19:44

## 2022-12-10 RX ADMIN — HYDROCORTISONE 10 MG: 10 TABLET ORAL at 19:45

## 2022-12-10 RX ADMIN — PROPOFOL 30 MCG/KG/MIN: 10 INJECTION, EMULSION INTRAVENOUS at 08:27

## 2022-12-10 RX ADMIN — PROPOFOL 20 MCG/KG/MIN: 10 INJECTION, EMULSION INTRAVENOUS at 19:51

## 2022-12-10 RX ADMIN — TOPIRAMATE 100 MG: 100 TABLET, FILM COATED ORAL at 21:15

## 2022-12-10 RX ADMIN — LEVALBUTEROL HYDROCHLORIDE 0.63 MG: 0.63 SOLUTION RESPIRATORY (INHALATION) at 15:31

## 2022-12-10 RX ADMIN — POLYETHYLENE GLYCOL 3350 17 G: 17 POWDER, FOR SOLUTION ORAL at 12:03

## 2022-12-10 RX ADMIN — FENTANYL CITRATE 50 MCG: 50 INJECTION, SOLUTION INTRAMUSCULAR; INTRAVENOUS at 23:07

## 2022-12-10 RX ADMIN — IPRATROPIUM BROMIDE AND ALBUTEROL SULFATE 1 AMPULE: .5; 3 SOLUTION RESPIRATORY (INHALATION) at 06:53

## 2022-12-10 RX ADMIN — ACETAMINOPHEN 650 MG: 325 TABLET ORAL at 19:49

## 2022-12-10 RX ADMIN — PIPERACILLIN AND TAZOBACTAM 3375 MG: 3; .375 INJECTION, POWDER, LYOPHILIZED, FOR SOLUTION INTRAVENOUS at 12:33

## 2022-12-10 RX ADMIN — SODIUM CHLORIDE: 9 INJECTION, SOLUTION INTRAVENOUS at 11:53

## 2022-12-10 RX ADMIN — FENTANYL CITRATE 25 MCG: 50 INJECTION INTRAMUSCULAR; INTRAVENOUS at 07:37

## 2022-12-10 RX ADMIN — CHLORHEXIDINE GLUCONATE 15 ML: 1.2 RINSE ORAL at 19:44

## 2022-12-10 RX ADMIN — PROPOFOL 20 MCG/KG/MIN: 10 INJECTION, EMULSION INTRAVENOUS at 12:02

## 2022-12-10 RX ADMIN — OXYCODONE 10 MG: 5 TABLET ORAL at 23:08

## 2022-12-10 RX ADMIN — HYDROCORTISONE 10 MG: 10 TABLET ORAL at 12:15

## 2022-12-10 RX ADMIN — ACETAMINOPHEN 500 MG: 500 TABLET ORAL at 11:54

## 2022-12-10 RX ADMIN — ENOXAPARIN SODIUM 30 MG: 100 INJECTION SUBCUTANEOUS at 21:30

## 2022-12-10 RX ADMIN — HALOPERIDOL LACTATE 5 MG: 5 INJECTION, SOLUTION INTRAMUSCULAR at 08:07

## 2022-12-10 RX ADMIN — LEVALBUTEROL HYDROCHLORIDE 0.63 MG: 0.63 SOLUTION RESPIRATORY (INHALATION) at 21:29

## 2022-12-10 RX ADMIN — ENOXAPARIN SODIUM 30 MG: 100 INJECTION SUBCUTANEOUS at 12:39

## 2022-12-10 RX ADMIN — LAMOTRIGINE 200 MG: 100 TABLET ORAL at 12:39

## 2022-12-10 ASSESSMENT — PAIN - FUNCTIONAL ASSESSMENT: PAIN_FUNCTIONAL_ASSESSMENT: PREVENTS OR INTERFERES SOME ACTIVE ACTIVITIES AND ADLS

## 2022-12-10 ASSESSMENT — PAIN DESCRIPTION - DESCRIPTORS: DESCRIPTORS: CRUSHING;HEAVINESS

## 2022-12-10 ASSESSMENT — PAIN SCALES - GENERAL: PAINLEVEL_OUTOF10: 5

## 2022-12-10 ASSESSMENT — ENCOUNTER SYMPTOMS: TACHYPNEA: 1

## 2022-12-10 ASSESSMENT — PULMONARY FUNCTION TESTS
PIF_VALUE: 29
PIF_VALUE: 25
PIF_VALUE: 14
PIF_VALUE: 22
PIF_VALUE: 18

## 2022-12-10 ASSESSMENT — PAIN DESCRIPTION - LOCATION: LOCATION: GENERALIZED

## 2022-12-10 NOTE — PROGRESS NOTES
Rapid response called. Went bedside. Primary team at bedside as well. Impending respiratory failure. Patient intubated by primary team with anesthesiology service bedside. Will take to medical ICU.        Jair Saxena MD  PGY-2, Internal Medicine Resident  Madi Malcolm         12/10/2022, 9:34 AM Yvon Vazquez, Dietetic Intern

## 2022-12-10 NOTE — PROGRESS NOTES
Physical Therapy        Physical Therapy Cancel Note      DATE: 12/10/2022    NAME: Afua Muro  MRN: [id number]   : 1975      Patient not seen this date for Physical Therapy due to:    Patient is not appropriate for PT evaluation/treatment at this time d/t rapid response this AM, pt transferred to ICU.  Will check back       Electronically signed by Marshell Kawasaki, PTA on 12/10/2022 at 10:27 AM

## 2022-12-10 NOTE — PLAN OF CARE
Patient's respiratory status began to decline as she became more tachypneic in the 50's-60's while on BiPAP. Attempted to control air hunger and respiratory distress with 75 mcg of Fentanyl, however was unsuccessful. BiPAP settings adjusted without improvement of respiratory status. Rapid response called. Decision was made to intubate patient with anesthesia at bedside and transfer to MICU.      David Faust DO, PGY-1  [address]   12/10/2022, 8:28 AM

## 2022-12-10 NOTE — PROGRESS NOTES
PROGRESS NOTE          PATIENT NAME: Dario Garcia  MEDICAL RECORD NO. [id number]  DATE: 12/10/2022    HD: # 4      Patient Active Problem List   Diagnosis    Seizure ([facility]    Hypothyroidism    Asthma    Morbid obesity ([facility]    ROHIT on CPAP    Hypopituitarism ([facility]    Moderate episode of recurrent major depressive disorder ([facility]    Type 2 diabetes mellitus with stage 3a chronic kidney disease, without long-term current use of insulin (HCC)    Irritable bowel syndrome with constipation    Gastroesophageal reflux disease    Cervical transverse process fracture, initial encounter ([facility]    Closed nondisplaced fracture of seventh cervical vertebra (HCC)    Lethargy    Multiple closed pelvic fractures without disruption of pelvic [place] ([facility]       DIAGNOSIS AND PLAN  47YF admitted 12/5/22 for mechanical fall backwards down stairs. Found to have acute C7 right transverse process fracture, nondisplaced right sacral ilya fracture, nondisplaced right superior pubic ramus fracture, right obturator ring fracture. Patient was intubated on admission for declining respiratory status and was extubated on 50/5 w/o complication. Pt tolerating CPAP at night and high flow nasal cannula during day. Acute C7 R TP Fx  -Neurosurgery consulted. C-collar when out of bed    Nondisplaced R Sacral Ala Fx  Nondisplaced R sup pubic ramus Fx  R obturator ring Fx  - WBAT  - Ortho non-op, F/U 12/14  - VTE ppx  - Reg diet, TKO fluids    Acute Resp Failure, improved  - continue CPAP at night and high flow NC during the day. As needed BiPAP. Monitor closely. Tachypneic in the 40s this morning  - Extubated 12/7  - CT PE negative  - continue IS  - Wean HFNC as able    Seizure Disorder  - Neurology consulted. EEG negative for seizures. - Home Topamax and Lamictal restarted. Dispo: PM&R consulted. Planning for SNF. Chief Complaint: Chest pain    SUBJECTIVE  Patient seen resting comfortably on high flow nasal cannula.   Shortly after patient became tachypneic in the 40s and was placed back on BiPAP.  Dose of fentanyl ordered for air hunger.  We will continue to monitor closely    OBJECTIVE  VITALS:   Vitals:    12/10/22 0655   BP:    Pulse: (!) 101   Resp: (!) 47   Temp:    SpO2: 96%     Physical Exam  Constitutional:       Appearance: She is obese.   HENT:      Head: Normocephalic and atraumatic.      Ears:      Comments: Pt hard of hearing, wears cochlear implants     Nose: Nose normal.      Mouth/Throat:      Mouth: Mucous membranes are dry.   Eyes:      Extraocular Movements: Extraocular movements intact.      Conjunctiva/sclera: Conjunctivae normal.      Pupils: Pupils are equal, round, and reactive to light.   Cardiovascular:      Rate and Rhythm: Normal rate and regular rhythm.   Pulmonary:      Effort: No respiratory distress.      Breath sounds: Normal breath sounds.      Comments: Pt on high flow NC. Tachypneic, Sats well maintained  Chest:      Chest wall: No tenderness.   Abdominal:      General: There is no distension.      Palpations: Abdomen is soft.      Tenderness: There is no abdominal tenderness.   Musculoskeletal:         General: No tenderness.      Right lower leg: No edema.      Left lower leg: No edema.   Skin:     General: Skin is warm and dry.   Neurological:      Mental Status: Mental status is at baseline.       LAB:  CBC:   Recent Labs     12/09/22  0345 12/10/22  0306   WBC 7.7 6.8   HGB 12.9 12.5   HCT 40.6 40.6   MCV 89.0 91.0    215       BMP:   Recent Labs     12/08/22  0338 12/09/22  0345 12/10/22  0306   * 135 137   K 3.8 3.8 4.1    105 106   CO2 21 21 23   BUN 7 5* 7   CREATININE 0.75 0.64 0.62   GLUCOSE 129* 120* 108*         RADIOLOGY:  XR SHOULDER RIGHT (MIN 2 VIEWS)    Result Date: 12/6/2022  1. No acute fracture seen in the right shoulder. 2. No acute fracture seen in the left shoulder.     XR HUMERUS RIGHT (MIN 2 VIEWS)    Result Date: 12/6/2022  No acute osseous abnormality in the  right humerus. CT HEAD WO CONTRAST    Result Date: 12/7/2022  No acute intracranial abnormality. No evidence of intracranial hemorrhage or any other definable acute intracranial abnormality. Redemonstration of dense calcifications in the bilateral lentiform nuclei, bilateral caudate nuclei and bilateral thalami similar to findings on previous CT scan in 2010. There are bilateral cochlear implants redemonstrated. No demonstrable fracture in calvarium or in base of skull. CT Head W/O Contrast    Result Date: 12/5/2022  No acute intracranial abnormality. Small amount of fluid in the right mastoid air cells. CT CSpine W/O Contrast    Result Date: 12/5/2022  Acute fracture of the right C7 transverse process. Spinal degenerative changes as described. XR SHOULDER LEFT (MIN 2 VIEWS)    Result Date: 12/6/2022  1. No acute fracture seen in the right shoulder. 2. No acute fracture seen in the left shoulder. XR CHEST PORTABLE    Result Date: 12/7/2022  Lower end of the ET tube is 1.6 cm above manny. Mild-to-moderate pulmonary vascular congestion. Finding suggestive of interstitial pulmonary edema in lungs. XR CHEST PORTABLE    Result Date: 12/6/2022  Placement of gastric tube which extends to the distal aspect of the stomach. Cardiomegaly and vascular congestion without evidence of interstitial edema. No confluent airspace consolidation. XR CHEST PORTABLE    Result Date: 12/6/2022  Very limited study due to underpenetration. Increased opacity in the lungs which could be related to under penetration and overlying soft tissues, but diffuse airspace infiltrates cannot be excluded. Cardiomegaly without evidence of CHF. CT CHEST PULMONARY EMBOLISM W CONTRAST    Result Date: 12/7/2022  No evidence of pulmonary embolism. No evidence of thoracic aortic aneurysm or dissection. There are mild atelectatic changes, and/or infiltrate in the posterior lung bases bilaterally, left more than right.   Trace left basilar pleural effusion. Focal moderate dense infiltrates, and/or atelectasis in the posterior segment of right pulmonary upper lobe. No subphrenic acute process demonstrated. XR PELVIS (MIN 3 VIEWS)    Result Date: 12/6/2022  1. Nondisplaced fracture involving the lateral aspect of the right superior pubic ramus, though better seen on CT imaging. 2. No other radiographically evident acute fracture seen in the pelvis. CTA NECK W CONTRAST    Result Date: 12/6/2022  No acute trauma of the major arterial vessels of the neck. XR ABDOMEN FOR NG/OG/NE TUBE PLACEMENT    Result Date: 12/6/2022  Gastric tube in place with the tip and side-port in the stomach. CT CHEST ABDOMEN PELVIS WO CONTRAST Additional Contrast? None    Result Date: 12/5/2022  Chest: 1. Mild anterior wedging of T3 suggesting an age indeterminate compression fracture. No fracture line is identified. Clinical correlation with the site of symptoms is suggested. 2. Significant motion artifact with scattered patchy ground-glass opacities in the lungs which may be related to motion artifact. Ground-glass opacities are otherwise nonspecific and could be related to atypical infection or pneumonitis. Abdomen and pelvis: 1. Nondisplaced fractures of the right sacral ala and the right obturator ring. 2. No acute findings elsewhere in the abdomen or pelvis. 3. Left renal cyst.     CT LUMBAR SPINE TRAUMA RECONSTRUCTION    Result Date: 12/6/2022  10-20% compression deformity of the T3 vertebra, possibly acute. No other evidence of acute thoracic or lumbar spine fracture. Multilevel degenerative change. CT THORACIC SPINE TRAUMA RECONSTRUCTION    Result Date: 12/6/2022  10-20% compression deformity of the T3 vertebra, possibly acute. No other evidence of acute thoracic or lumbar spine fracture. Multilevel degenerative change.           Og Bernstein DO, PGY-1  [facility], [address]  12/10/22 7:12 AM         Attending Note    Patient has been transferred to MICU under care of medical service No other trauma issues to be addressed  I have reviewed the above TEC note(s) and I either performed the key elements of the medical history and physical exam or was present with the resident when the key elements of the medical history and physical exam were performed. I have discussed the findings, established the care plan and recommendations with Resident, NEELAM RN, bedside nurse.     Ludwin Barrera MD  12/10/2022  11:41 AM

## 2022-12-10 NOTE — PROGRESS NOTES
SPIRITUAL CARE DEPARTMENT - [address]  PROGRESS NOTE    Shift date: 12/10/22  Shift day: Saturday   Shift # 1    Room # 3021/3021-01   Name: Benito Bentley                Hinduism: none   Place of Mormonism:      Referral: Routine Visit    Admit Date & Time: 12/5/2022 11:39 PM    Assessment:  Benito Bentley is a 41 y.o. female in the hospital because \"HCC\". Upon entering the room writer observes pt lying in bed intubated. Pt's father standing bedside. Intervention:  I followed up to this pt's room because earlier in the day I had been with pt's father when pt was being transferred to MICU. I offered space for pt's father to express feelings, needs, and concerns providing ministry of presence. Pt's father appeared to be coping and optimistic. Outcome:  Pt's father expressed gratitude for this visit    Plan:  Chaplains will remain available to offer spiritual and emotional support as needed.       Electronically signed by Cathy Rose on 12/10/2022 at 3:19 PM.  [facility]  [phone]

## 2022-12-10 NOTE — PROGRESS NOTES
Pt was stable throughout night with no breathing events. Slept semi-fowlers on BiPap, pt tolerated well. No concerns. At 630 writer and other RN removed pt from Bi-Pap and put on [address] O2. Assisted pt to [facility] to attempt to void. While sitting pt became more out of breath and was moved back into back. BiPap was reapplied and Respiratory Therapist was called at [phone]. Pt RR became tachypneic with RR in the 40-50s with labored breathing. Trauma resident were paged at [phone] about situation. Trauma arrive around 0705 and ordered 50mcg of fentanyl (given at 0713), order for high fowlers, and to monitor. Respiratory came back to assess pt. Writer repaged  trauma at [phone] to alert that RR remained in 46s and pt was becoming restless. Trauma ordered 25mcg more of fentanyl (given at 0737). Pts RR stayed in 40-50's with no physical change. Requested further instructions from trauma and asked if a Rapid Response should be called at 0744. Trauma came to floor to assess. Instructed to keep pt in high fowlers and give 5mg of haladol (given at 807). Pt had no change. Rapid Response was called at 0811. Vitals:BP-134/102, HR-127, RR-52, O2-99%. Anesthesia arrived at bedside at 0819. Decision was made it was medically necessary to intubate 0822. Amadate 20mg given at 0822. Succs 200mg given at 0823. Intubation began. HR-48 O2-53% at 0824. Pt was intubated and vitals stabilized. Pts father Surendra Ear) arrived at bedside and was updated with condition. Cecile assisted with questions. Report was given to ICU RN at bedside. Pt was moved to ICU 3021. No further questions were requested of writer. Pt was taken with belongings-dentures in glove, hearing aids in pts ears. Father informed writer that pts glasses went missing upon arrival in the ER.

## 2022-12-10 NOTE — PROCEDURES
PROCEDURE NOTE - EMERGENCY INTUBATION    PATIENT NAME: Dipika Bales  MEDICAL RECORD NO. [id number]  DATE: 12/10/2022  ATTENDING PHYSICIAN: Dr. Janet Arita DIAGNOSIS:  Acute Respiratory Failure  POSTOPERATIVE DIAGNOSIS:  Same  PROCEDURE PERFORMED:  Emergency endotracheal intubation  PERFORMING PHYSICIAN: Neela Lombardo DO    MEDICATIONS: etomidate intravenously and succinycholine intravenously    DISCUSSION:  Dipika Bales is a 41y.o.-year-old female who requires intubation and ventilatory support due to impending respiratory failure. The history and physical examination were reviewed and confirmed. CONSENT: Unable to be obtained due to the emergent nature of this procedure. PROCEDURE:  A timeout was initiated at 8:22 am by the bedside nurse and was confirmed by those present. The patient was placed in the appropriate position. Cricoid pressure was not required. Intubation was performed using glidescope. a 7.5 cuffed endotracheal tube. The cuff was then inflated and the tube was secured appropriately at a distance of 23 cm to the dental ridge. Initial confirmation of placement included bilateral breath sounds, an end tidal CO2 detector, tube fogging, adequate chest rise, and adequate pulse oximetry reading. A chest x-ray to verify correct placement of the tube has been ordered but is still pending. The patient tolerated the procedure well.      COMPLICATIONS:  None     Neela Lombardo DO  8:36 AM, 12/10/22

## 2022-12-10 NOTE — PLAN OF CARE
Problem: Discharge Planning  Goal: Discharge to home or other facility with appropriate resources  Outcome: Progressing     Problem: Confusion  Goal: Confusion, delirium, dementia, or psychosis is improved or at baseline  Description: INTERVENTIONS:  1. Assess for possible contributors to thought disturbance, including medications, impaired vision or hearing, underlying metabolic abnormalities, dehydration, psychiatric diagnoses, and notify attending LIP  2. [place] high risk fall precautions, as indicated  3. Provide frequent short contacts to provide reality reorientation, refocusing and direction  4. Decrease environmental stimuli, including noise as appropriate  5. Monitor and intervene to maintain adequate nutrition, hydration, elimination, sleep and activity  6. If unable to ensure safety without constant attention obtain sitter and review sitter guidelines with assigned personnel  7. Initiate Psychosocial CNS and Spiritual Care consult, as indicated  Outcome: Progressing     Problem: Pain  Goal: Verbalizes/displays adequate comfort level or baseline comfort level  Outcome: Progressing     Problem: Chronic Conditions and Co-morbidities  Goal: Patient's chronic conditions and co-morbidity symptoms are monitored and maintained or improved  Outcome: Progressing     Problem: Skin/Tissue Integrity  Goal: Absence of new skin breakdown  Description: 1. Monitor for areas of redness and/or skin breakdown  2. Assess vascular access sites hourly  3. Every 4-6 hours minimum:  Change oxygen saturation probe site  4. Every 4-6 hours:  If on nasal continuous positive airway pressure, respiratory therapy assess nares and determine need for appliance change or resting period.   Outcome: Progressing     Problem: Safety - Adult  Goal: Free from fall injury  Outcome: Progressing     Problem: ABCDS Injury Assessment  Goal: Absence of physical injury  Outcome: Progressing     Problem: Nutrition Deficit:  Goal: Optimize nutritional status  Outcome: Progressing

## 2022-12-10 NOTE — PLAN OF CARE
Problem: Discharge Planning  Goal: Discharge to home or other facility with appropriate resources  12/10/2022 1252 by Wilson Harris RN  Outcome: Progressing  12/10/2022 0618 by Lisa Pacheco RN  Outcome: Progressing     Problem: Confusion  Goal: Confusion, delirium, dementia, or psychosis is improved or at baseline  Description: INTERVENTIONS:  1. Assess for possible contributors to thought disturbance, including medications, impaired vision or hearing, underlying metabolic abnormalities, dehydration, psychiatric diagnoses, and notify attending LIP  2. [place] high risk fall precautions, as indicated  3. Provide frequent short contacts to provide reality reorientation, refocusing and direction  4. Decrease environmental stimuli, including noise as appropriate  5. Monitor and intervene to maintain adequate nutrition, hydration, elimination, sleep and activity  6. If unable to ensure safety without constant attention obtain sitter and review sitter guidelines with assigned personnel  7. Initiate Psychosocial CNS and Spiritual Care consult, as indicated  12/10/2022 1252 by Wilson Harris RN  Outcome: Progressing  12/10/2022 0618 by Lisa Pacheco RN  Outcome: Progressing     Problem: Pain  Goal: Verbalizes/displays adequate comfort level or baseline comfort level  12/10/2022 1252 by Wilson Harris RN  Outcome: Progressing  12/10/2022 0618 by Lisa Pacheco RN  Outcome: Progressing     Problem: Chronic Conditions and Co-morbidities  Goal: Patient's chronic conditions and co-morbidity symptoms are monitored and maintained or improved  12/10/2022 1252 by Wilson Harris RN  Outcome: Progressing  12/10/2022 0618 by Lisa Pacheco RN  Outcome: Progressing     Problem: Skin/Tissue Integrity  Goal: Absence of new skin breakdown  Description: 1. Monitor for areas of redness and/or skin breakdown  2. Assess vascular access sites hourly  3.   Every 4-6 hours minimum:  Change oxygen saturation probe site  4. Every 4-6 hours:  If on nasal continuous positive airway pressure, respiratory therapy assess nares and determine need for appliance change or resting period. 12/10/2022 1252 by Mague Pedro RN  Outcome: Progressing  12/10/2022 0618 by Maria Fernanda Davis RN  Outcome: Progressing     Problem: Safety - Adult  Goal: Free from fall injury  12/10/2022 1252 by Mague Pedro RN  Outcome: Progressing  Flowsheets (Taken 12/10/2022 1251)  Free From Fall Injury: Instruct family/caregiver on patient safety  12/10/2022 0618 by Maria Fernanda Davis RN  Outcome: Progressing     Problem: ABCDS Injury Assessment  Goal: Absence of physical injury  12/10/2022 1252 by Mague Pedro RN  Outcome: Progressing  Flowsheets (Taken 12/10/2022 1251)  Absence of Physical Injury: Implement safety measures based on patient assessment  12/10/2022 0618 by Maria Fernanda Davis RN  Outcome: Progressing     Problem: Nutrition Deficit:  Goal: Optimize nutritional status  12/10/2022 1252 by Mague Pedro RN  Outcome: Progressing  12/10/2022 0618 by Maria Fernanda Davis RN  Outcome: Progressing     Problem: Safety - Medical Restraint  Goal: Remains free of injury from restraints (Restraint for Interference with Medical Device)  Description: INTERVENTIONS:  1. Determine that other, less restrictive measures have been tried or would not be effective before applying the restraint  2. Evaluate the patient's condition at the time of restraint application  3. Inform patient/family regarding the reason for restraint  4.  Q2H: Monitor safety, psychosocial status, comfort, nutrition and hydration  Outcome: Progressing  Flowsheets (Taken 12/10/2022 0949)  Remains free of injury from restraints (restraint for interference with medical device): Every 2 hours: Monitor safety, psychosocial status, comfort, nutrition and hydration

## 2022-12-10 NOTE — PROGRESS NOTES
Date: 12/10/2022  Time: 0830  Patient identity confirmed:  Yes  Indications: Resp Failure  Preoxygenation: yes    Laryngoscope size and type Glidescope  Airway introducer used: No  Evac: No  ETT size:a 7.5 cuffed  Number of attempts:1   Cords visualized:  [x] Clearly  [] Poorly  Breath sounds present bilaterally: Yes   ETCO2   [x] Positive   ETT secured at  23    ETT secured with Century  Chest x-ray ordered: Yes     Difficult airway:    Yes       If yes, was red tape placed around ETT:   Yes    Was this a Code Situation:    No             BP: 132/76        Procedure performed by: Trauma Resident Dr. Chantal Prince with Dr. Nacho Sanabria at bedside.       Patient placed on Patel transport vent and moved to MICU rm 3021      Jagjit Marte RCP  12:06 PM

## 2022-12-10 NOTE — PROGRESS NOTES
SPIRITUAL CARE DEPARTMENT - [address]  PROGRESS NOTE    Shift date: 12/10/22  Shift day: Saturday   Shift # 1    Room # 3021/3021-01   Name: Nakul Petersen                Oriental orthodox: none   Place of Confucianism:      Referral: Rapid Response    Admit Date & Time: 12/5/2022 11:39 PM    Assessment:  Nakul Petersen is a 41 y.o. female in the hospital because \"HCC\". Upon entering the room writer observes pt surrounded by medical team. Pt sitting up in bed with bipap mask on face. Not long after this pt was intubated. Also, after this pt's father walked onto the unit. This writer met w/ pt's father providing ministry of presence. Intervention:  Writer introduced self and title as  Writer offered space for pt's father  to express feelings, needs, and concerns and provided a ministry presence. Engaged in conversation w/ pt's father actively listening to him. This writer then escorted pt's father to [address] family waiting room where pt was transferred to. After arriving in car 3 waiting room this writer spk a brief prayer for comfort. Outcome:  Pt's father expressed gratitude for escorting him to [address] and for prayer    Plan:  Chaplains will remain available to offer spiritual and emotional support as needed. Electronically signed by Eloisa Burciaga on 12/10/2022 at 9:17 AM.  [facility]  [phone]       12/10/22 3164   Encounter Summary   Service Provided For: Family   Referral/Consult From: Multi-disciplinary team   Support System Parent   Last Encounter  12/10/22   Complexity of Encounter Moderate   Begin Time 0810   End Time  0845   Total Time Calculated 35 min   Crisis   Type Rapid Response   Spiritual/Emotional needs   Type Spiritual Support   Assessment/Intervention/Outcome   Assessment Coping   Intervention Sustaining Presence/Ministry of presence; Active listening;Prayer (assurance of)/Banks;Life review/Legacy   Outcome Engaged in conversation;Expressed Gratitude;Coping;Comfort

## 2022-12-11 ENCOUNTER — APPOINTMENT (OUTPATIENT)
Dept: GENERAL RADIOLOGY | Age: 47
DRG: 551 | End: 2022-12-11
Payer: MEDICARE

## 2022-12-11 PROBLEM — J96.01 ACUTE RESPIRATORY FAILURE WITH HYPOXIA (HCC): Status: ACTIVE | Noted: 2022-12-11

## 2022-12-11 LAB
ABSOLUTE EOS #: 0.19 K/UL (ref 0–0.44)
ABSOLUTE IMMATURE GRANULOCYTE: 0.07 K/UL (ref 0–0.3)
ABSOLUTE LYMPH #: 0.88 K/UL (ref 1.1–3.7)
ABSOLUTE MONO #: 1.35 K/UL (ref 0.1–1.2)
ALLEN TEST: POSITIVE
ANION GAP SERPL CALCULATED.3IONS-SCNC: 9 MMOL/L (ref 9–17)
BASOPHILS # BLD: 0 % (ref 0–2)
BASOPHILS ABSOLUTE: 0.03 K/UL (ref 0–0.2)
BUN BLDV-MCNC: 7 MG/DL (ref 6–20)
CALCIUM SERPL-MCNC: 8.1 MG/DL (ref 8.6–10.4)
CHLORIDE BLD-SCNC: 104 MMOL/L (ref 98–107)
CO2: 21 MMOL/L (ref 20–31)
CREAT SERPL-MCNC: 0.66 MG/DL (ref 0.5–0.9)
EOSINOPHILS RELATIVE PERCENT: 2 % (ref 1–4)
FIO2: 40
GFR SERPL CREATININE-BSD FRML MDRD: >60 ML/MIN/1.73M2
GLUCOSE BLD-MCNC: 116 MG/DL (ref 70–99)
GLUCOSE BLD-MCNC: 124 MG/DL (ref 74–100)
HCT VFR BLD CALC: 39.7 % (ref 36.3–47.1)
HEMOGLOBIN: 12.1 G/DL (ref 11.9–15.1)
IMMATURE GRANULOCYTES: 1 %
LYMPHOCYTES # BLD: 8 % (ref 24–43)
MCH RBC QN AUTO: 27.8 PG (ref 25.2–33.5)
MCHC RBC AUTO-ENTMCNC: 30.5 G/DL (ref 28.4–34.8)
MCV RBC AUTO: 91.3 FL (ref 82.6–102.9)
MODE: ABNORMAL
MONOCYTES # BLD: 12 % (ref 3–12)
MRSA, DNA, NASAL: ABNORMAL
NEGATIVE BASE EXCESS, ART: 2 (ref 0–2)
NRBC AUTOMATED: 0 PER 100 WBC
O2 DEVICE/FLOW/%: ABNORMAL
PATIENT TEMP: 38.1
PDW BLD-RTO: 14 % (ref 11.8–14.4)
PLATELET # BLD: 154 K/UL (ref 138–453)
PMV BLD AUTO: 10.6 FL (ref 8.1–13.5)
POC HCO3: 24.6 MMOL/L (ref 21–28)
POC O2 SATURATION: 94 % (ref 94–98)
POC PCO2 TEMP: 49 MM HG
POC PCO2: 46.8 MM HG (ref 35–48)
POC PH TEMP: 7.31
POC PH: 7.33 (ref 7.35–7.45)
POC PO2 TEMP: 83 MM HG
POC PO2: 76.9 MM HG (ref 83–108)
POTASSIUM SERPL-SCNC: 3.9 MMOL/L (ref 3.7–5.3)
RBC # BLD: 4.35 M/UL (ref 3.95–5.11)
SAMPLE SITE: ABNORMAL
SEG NEUTROPHILS: 78 % (ref 36–65)
SEGMENTED NEUTROPHILS ABSOLUTE COUNT: 9.03 K/UL (ref 1.5–8.1)
SODIUM BLD-SCNC: 134 MMOL/L (ref 135–144)
SPECIMEN DESCRIPTION: ABNORMAL
WBC # BLD: 11.6 K/UL (ref 3.5–11.3)

## 2022-12-11 PROCEDURE — 6360000002 HC RX W HCPCS

## 2022-12-11 PROCEDURE — 94640 AIRWAY INHALATION TREATMENT: CPT

## 2022-12-11 PROCEDURE — 82947 ASSAY GLUCOSE BLOOD QUANT: CPT

## 2022-12-11 PROCEDURE — 82803 BLOOD GASES ANY COMBINATION: CPT

## 2022-12-11 PROCEDURE — 6370000000 HC RX 637 (ALT 250 FOR IP): Performed by: INTERNAL MEDICINE

## 2022-12-11 PROCEDURE — 36600 WITHDRAWAL OF ARTERIAL BLOOD: CPT

## 2022-12-11 PROCEDURE — 36415 COLL VENOUS BLD VENIPUNCTURE: CPT

## 2022-12-11 PROCEDURE — 94003 VENT MGMT INPAT SUBQ DAY: CPT

## 2022-12-11 PROCEDURE — 2580000003 HC RX 258

## 2022-12-11 PROCEDURE — 6370000000 HC RX 637 (ALT 250 FOR IP)

## 2022-12-11 PROCEDURE — 80048 BASIC METABOLIC PNL TOTAL CA: CPT

## 2022-12-11 PROCEDURE — 94761 N-INVAS EAR/PLS OXIMETRY MLT: CPT

## 2022-12-11 PROCEDURE — 6370000000 HC RX 637 (ALT 250 FOR IP): Performed by: NURSE PRACTITIONER

## 2022-12-11 PROCEDURE — 6370000000 HC RX 637 (ALT 250 FOR IP): Performed by: STUDENT IN AN ORGANIZED HEALTH CARE EDUCATION/TRAINING PROGRAM

## 2022-12-11 PROCEDURE — 97110 THERAPEUTIC EXERCISES: CPT

## 2022-12-11 PROCEDURE — 6360000002 HC RX W HCPCS: Performed by: INTERNAL MEDICINE

## 2022-12-11 PROCEDURE — 99291 CRITICAL CARE FIRST HOUR: CPT | Performed by: INTERNAL MEDICINE

## 2022-12-11 PROCEDURE — 2000000000 HC ICU R&B

## 2022-12-11 PROCEDURE — 6360000002 HC RX W HCPCS: Performed by: NURSE PRACTITIONER

## 2022-12-11 PROCEDURE — 2700000000 HC OXYGEN THERAPY PER DAY

## 2022-12-11 PROCEDURE — 71045 X-RAY EXAM CHEST 1 VIEW: CPT

## 2022-12-11 PROCEDURE — 85025 COMPLETE CBC W/AUTO DIFF WBC: CPT

## 2022-12-11 PROCEDURE — C9113 INJ PANTOPRAZOLE SODIUM, VIA: HCPCS

## 2022-12-11 RX ORDER — PROPOFOL 10 MG/ML
INJECTION, EMULSION INTRAVENOUS
Status: COMPLETED
Start: 2022-12-11 | End: 2022-12-11

## 2022-12-11 RX ORDER — SODIUM CHLORIDE 9 MG/ML
INJECTION, SOLUTION INTRAVENOUS PRN
Status: DISCONTINUED | OUTPATIENT
Start: 2022-12-11 | End: 2022-12-31 | Stop reason: HOSPADM

## 2022-12-11 RX ORDER — SODIUM CHLORIDE 9 MG/ML
INJECTION, SOLUTION INTRAVENOUS CONTINUOUS
Status: DISCONTINUED | OUTPATIENT
Start: 2022-12-11 | End: 2022-12-23

## 2022-12-11 RX ORDER — PROPOFOL 10 MG/ML
INJECTION, EMULSION INTRAVENOUS
Status: DISPENSED
Start: 2022-12-11 | End: 2022-12-11

## 2022-12-11 RX ORDER — PROPOFOL 10 MG/ML
5-50 INJECTION, EMULSION INTRAVENOUS CONTINUOUS
Status: DISCONTINUED | OUTPATIENT
Start: 2022-12-11 | End: 2022-12-13

## 2022-12-11 RX ORDER — HYDROCORTISONE 10 MG/1
10 TABLET ORAL 2 TIMES DAILY
Status: DISCONTINUED | OUTPATIENT
Start: 2022-12-11 | End: 2022-12-19

## 2022-12-11 RX ADMIN — TOPIRAMATE 75 MG: 100 TABLET, FILM COATED ORAL at 07:54

## 2022-12-11 RX ADMIN — LEVOTHYROXINE SODIUM 150 MCG: 75 TABLET ORAL at 07:54

## 2022-12-11 RX ADMIN — PIPERACILLIN AND TAZOBACTAM 3375 MG: 3; .375 INJECTION, POWDER, LYOPHILIZED, FOR SOLUTION INTRAVENOUS at 03:52

## 2022-12-11 RX ADMIN — PREDNISONE 30 MG: 20 TABLET ORAL at 13:53

## 2022-12-11 RX ADMIN — ACETAMINOPHEN 650 MG: 325 TABLET ORAL at 10:08

## 2022-12-11 RX ADMIN — LEVALBUTEROL HYDROCHLORIDE 0.63 MG: 0.63 SOLUTION RESPIRATORY (INHALATION) at 17:30

## 2022-12-11 RX ADMIN — LEVALBUTEROL HYDROCHLORIDE 0.63 MG: 0.63 SOLUTION RESPIRATORY (INHALATION) at 11:39

## 2022-12-11 RX ADMIN — OXYCODONE 10 MG: 5 TABLET ORAL at 04:19

## 2022-12-11 RX ADMIN — ENOXAPARIN SODIUM 30 MG: 100 INJECTION SUBCUTANEOUS at 20:16

## 2022-12-11 RX ADMIN — PROPOFOL 20 MCG/KG/MIN: 10 INJECTION, EMULSION INTRAVENOUS at 22:02

## 2022-12-11 RX ADMIN — OXYCODONE 5 MG: 5 TABLET ORAL at 08:54

## 2022-12-11 RX ADMIN — Medication 1250 MG: at 13:11

## 2022-12-11 RX ADMIN — CHLORHEXIDINE GLUCONATE 15 ML: 1.2 RINSE ORAL at 20:16

## 2022-12-11 RX ADMIN — POLYETHYLENE GLYCOL 3350 17 G: 17 POWDER, FOR SOLUTION ORAL at 08:05

## 2022-12-11 RX ADMIN — PIPERACILLIN AND TAZOBACTAM 3375 MG: 3; .375 INJECTION, POWDER, LYOPHILIZED, FOR SOLUTION INTRAVENOUS at 12:10

## 2022-12-11 RX ADMIN — SERTRALINE 150 MG: 50 TABLET, FILM COATED ORAL at 07:54

## 2022-12-11 RX ADMIN — LEVALBUTEROL HYDROCHLORIDE 0.63 MG: 0.63 SOLUTION RESPIRATORY (INHALATION) at 20:14

## 2022-12-11 RX ADMIN — ACETAMINOPHEN 650 MG: 325 TABLET ORAL at 16:12

## 2022-12-11 RX ADMIN — LEVALBUTEROL HYDROCHLORIDE 0.63 MG: 0.63 SOLUTION RESPIRATORY (INHALATION) at 07:56

## 2022-12-11 RX ADMIN — HYDROCORTISONE 10 MG: 10 TABLET ORAL at 07:55

## 2022-12-11 RX ADMIN — SODIUM CHLORIDE: 9 INJECTION, SOLUTION INTRAVENOUS at 12:08

## 2022-12-11 RX ADMIN — TOPIRAMATE 100 MG: 100 TABLET, FILM COATED ORAL at 20:16

## 2022-12-11 RX ADMIN — HYDROCORTISONE 10 MG: 10 TABLET ORAL at 20:16

## 2022-12-11 RX ADMIN — CHLORHEXIDINE GLUCONATE 15 ML: 1.2 RINSE ORAL at 07:58

## 2022-12-11 RX ADMIN — SODIUM CHLORIDE: 9 INJECTION, SOLUTION INTRAVENOUS at 05:54

## 2022-12-11 RX ADMIN — SODIUM CHLORIDE, PRESERVATIVE FREE 40 MG: 5 INJECTION INTRAVENOUS at 10:40

## 2022-12-11 RX ADMIN — ACETAMINOPHEN 650 MG: 325 TABLET ORAL at 04:19

## 2022-12-11 RX ADMIN — FENTANYL CITRATE 50 MCG: 50 INJECTION, SOLUTION INTRAMUSCULAR; INTRAVENOUS at 02:13

## 2022-12-11 RX ADMIN — PROPOFOL 30 MCG/KG/MIN: 10 INJECTION, EMULSION INTRAVENOUS at 00:50

## 2022-12-11 RX ADMIN — PROPOFOL 35 MCG/KG/MIN: 10 INJECTION, EMULSION INTRAVENOUS at 05:07

## 2022-12-11 RX ADMIN — LAMOTRIGINE 200 MG: 100 TABLET ORAL at 07:54

## 2022-12-11 RX ADMIN — PROPOFOL 15 MCG/KG/MIN: 10 INJECTION, EMULSION INTRAVENOUS at 17:06

## 2022-12-11 RX ADMIN — ENOXAPARIN SODIUM 30 MG: 100 INJECTION SUBCUTANEOUS at 07:54

## 2022-12-11 RX ADMIN — PIPERACILLIN AND TAZOBACTAM 3375 MG: 3; .375 INJECTION, POWDER, LYOPHILIZED, FOR SOLUTION INTRAVENOUS at 19:28

## 2022-12-11 ASSESSMENT — PULMONARY FUNCTION TESTS
PIF_VALUE: 11
PIF_VALUE: 15
PIF_VALUE: 15
PIF_VALUE: 10
PIF_VALUE: 9
PIF_VALUE: 19
PIF_VALUE: 13

## 2022-12-11 NOTE — PROGRESS NOTES
[facility] Pharmacokinetic Monitoring Service - Vancomycin     Rubia Oates is a 41 y.o. female starting on vancomycin therapy for aspiration pneumonia. Pharmacy consulted by Aaron Nails for monitoring and adjustment. Target Concentration: Goal AUC/SURJIT 400-600 mg*hr/L    Additional Antimicrobials: zosyn    Pertinent Laboratory Values: Wt Readings from Last 1 Encounters:   12/11/22 278 lb 14.1 oz (126.5 kg)     Temp Readings from Last 1 Encounters:   12/11/22 (!) 100.8 °F (38.2 °C) (Esophageal)     Estimated Creatinine Clearance: 135 mL/min (based on SCr of 0.66 mg/dL).   Recent Labs     12/10/22  0306 12/11/22  0706   CREATININE 0.62 0.66   WBC 6.8 11.6*     Procalcitonin: N/A    Pertinent Cultures:  Culture Date Source Results   12/10 Blood NG x 12h   12/10 Resp Few GPC   MRSA Nasal Swab: showed MRSA positive result on 12/10    Plan:  Dosing recommendations based on Bayesian software  Start vancomycin 1250 mg IVPB q12h  Anticipated AUC of 480 and trough concentration of 14 at steady state  Renal labs as indicated   Vancomycin concentration not ordered yet  Pharmacy will continue to monitor patient and adjust therapy as indicated    Thank you for the consult,  TOMEKA Padilla [facility]  12/11/2022 12:26 PM

## 2022-12-11 NOTE — PLAN OF CARE
Problem: Discharge Planning  Goal: Discharge to home or other facility with appropriate resources  Outcome: Progressing  Flowsheets (Taken 12/10/2022 2000)  Discharge to home or other facility with appropriate resources: Identify barriers to discharge with patient and caregiver     Problem: Confusion  Goal: Confusion, delirium, dementia, or psychosis is improved or at baseline  Description: INTERVENTIONS:  1. Assess for possible contributors to thought disturbance, including medications, impaired vision or hearing, underlying metabolic abnormalities, dehydration, psychiatric diagnoses, and notify attending LIP  2. [place] high risk fall precautions, as indicated  3. Provide frequent short contacts to provide reality reorientation, refocusing and direction  4. Decrease environmental stimuli, including noise as appropriate  5. Monitor and intervene to maintain adequate nutrition, hydration, elimination, sleep and activity  6. If unable to ensure safety without constant attention obtain sitter and review sitter guidelines with assigned personnel  7. Initiate Psychosocial CNS and Spiritual Care consult, as indicated  Outcome: Progressing     Problem: Pain  Goal: Verbalizes/displays adequate comfort level or baseline comfort level  Outcome: Progressing     Problem: Chronic Conditions and Co-morbidities  Goal: Patient's chronic conditions and co-morbidity symptoms are monitored and maintained or improved  Outcome: Progressing  Flowsheets (Taken 12/10/2022 2000)  Care Plan - Patient's Chronic Conditions and Co-Morbidity Symptoms are Monitored and Maintained or Improved: Monitor and assess patient's chronic conditions and comorbid symptoms for stability, deterioration, or improvement     Problem: Skin/Tissue Integrity  Goal: Absence of new skin breakdown  Description: 1. Monitor for areas of redness and/or skin breakdown  2. Assess vascular access sites hourly  3.   Every 4-6 hours minimum:  Change oxygen saturation probe site  4. Every 4-6 hours:  If on nasal continuous positive airway pressure, respiratory therapy assess nares and determine need for appliance change or resting period. Outcome: Progressing     Problem: Safety - Adult  Goal: Free from fall injury  Outcome: Progressing     Problem: ABCDS Injury Assessment  Goal: Absence of physical injury  Outcome: Progressing     Problem: Nutrition Deficit:  Goal: Optimize nutritional status  Outcome: Progressing     Problem: Safety - Medical Restraint  Goal: Remains free of injury from restraints (Restraint for Interference with Medical Device)  Description: INTERVENTIONS:  1. Determine that other, less restrictive measures have been tried or would not be effective before applying the restraint  2. Evaluate the patient's condition at the time of restraint application  3. Inform patient/family regarding the reason for restraint  4.  Q2H: Monitor safety, psychosocial status, comfort, nutrition and hydration  Outcome: Progressing  Flowsheets (Taken 12/10/2022 2000)  Remains free of injury from restraints (restraint for interference with medical device): Every 2 hours: Monitor safety, psychosocial status, comfort, nutrition and hydration     Problem: Respiratory - Adult  Goal: Achieves optimal ventilation and oxygenation  12/11/2022 0528 by Judy Peterson RN  Outcome: Progressing  12/10/2022 2238 by Adrienne Aguilar RCP  Flowsheets (Taken 12/10/2022 2238)  Achieves optimal ventilation and oxygenation:   Assess for changes in respiratory status   Position to facilitate oxygenation and minimize respiratory effort   Assess the need for suctioning and aspirate as needed   Respiratory therapy support as indicated   Assess and instruct to report shortness of breath or any respiratory difficulty   Encourage broncho-pulmonary hygiene including cough, deep breathe, incentive spirometry   Oxygen supplementation based on oxygen saturation or arterial blood gases   Assess for changes in mentation and behavior

## 2022-12-11 NOTE — PLAN OF CARE
Problem: Discharge Planning  Goal: Discharge to home or other facility with appropriate resources  12/11/2022 1248 by Meagan Nur RN  Outcome: Progressing  12/11/2022 0528 by Joshua Espinal RN  Outcome: Progressing  Flowsheets (Taken 12/10/2022 2000)  Discharge to home or other facility with appropriate resources: Identify barriers to discharge with patient and caregiver     Problem: Confusion  Goal: Confusion, delirium, dementia, or psychosis is improved or at baseline  Description: INTERVENTIONS:  1. Assess for possible contributors to thought disturbance, including medications, impaired vision or hearing, underlying metabolic abnormalities, dehydration, psychiatric diagnoses, and notify attending LIP  2. [place] high risk fall precautions, as indicated  3. Provide frequent short contacts to provide reality reorientation, refocusing and direction  4. Decrease environmental stimuli, including noise as appropriate  5. Monitor and intervene to maintain adequate nutrition, hydration, elimination, sleep and activity  6. If unable to ensure safety without constant attention obtain sitter and review sitter guidelines with assigned personnel  7.  Initiate Psychosocial CNS and Spiritual Care consult, as indicated  12/11/2022 1248 by Meagan Nur RN  Outcome: Progressing  12/11/2022 0528 by Joshua Espinal RN  Outcome: Progressing     Problem: Pain  Goal: Verbalizes/displays adequate comfort level or baseline comfort level  12/11/2022 1248 by Meagan Nur RN  Outcome: Progressing  12/11/2022 0528 by Joshua Espinal RN  Outcome: Progressing     Problem: Chronic Conditions and Co-morbidities  Goal: Patient's chronic conditions and co-morbidity symptoms are monitored and maintained or improved  12/11/2022 1248 by Meagan Nur RN  Outcome: Progressing  12/11/2022 0528 by Joshua Espinal RN  Outcome: Progressing  Flowsheets (Taken 12/10/2022 2000)  Care Plan - Patient's Chronic Conditions and Co-Morbidity Symptoms are Monitored and Maintained or Improved: Monitor and assess patient's chronic conditions and comorbid symptoms for stability, deterioration, or improvement     Problem: Skin/Tissue Integrity  Goal: Absence of new skin breakdown  Description: 1. Monitor for areas of redness and/or skin breakdown  2. Assess vascular access sites hourly  3. Every 4-6 hours minimum:  Change oxygen saturation probe site  4. Every 4-6 hours:  If on nasal continuous positive airway pressure, respiratory therapy assess nares and determine need for appliance change or resting period. 12/11/2022 1248 by Linh Lopez RN  Outcome: Progressing  12/11/2022 0528 by Vidal Henson RN  Outcome: Progressing     Problem: Safety - Adult  Goal: Free from fall injury  12/11/2022 1248 by Linh Lopez RN  Outcome: Progressing  Flowsheets (Taken 12/11/2022 1246)  Free From Fall Injury: Instruct family/caregiver on patient safety  12/11/2022 0528 by Vidal Henson RN  Outcome: Progressing     Problem: ABCDS Injury Assessment  Goal: Absence of physical injury  12/11/2022 1248 by Linh Lopez RN  Outcome: Progressing  Flowsheets (Taken 12/11/2022 1246)  Absence of Physical Injury: Implement safety measures based on patient assessment  12/11/2022 0528 by Vidal Henson RN  Outcome: Progressing     Problem: Nutrition Deficit:  Goal: Optimize nutritional status  12/11/2022 1248 by Linh Lopez RN  Outcome: Progressing  12/11/2022 0528 by Vidal Henson RN  Outcome: Progressing     Problem: Respiratory - Adult  Goal: Achieves optimal ventilation and oxygenation  12/11/2022 1248 by Linh Lopez RN  Outcome: Progressing  12/11/2022 0528 by Vidal Henson RN  Outcome: Progressing     Problem: Safety - Medical Restraint  Goal: Remains free of injury from restraints (Restraint for Interference with Medical Device)  Description: INTERVENTIONS:  1.  Determine that other, less restrictive measures have been tried or would not be effective before applying the restraint  2. Evaluate the patient's condition at the time of restraint application  3. Inform patient/family regarding the reason for restraint  4.  Q2H: Monitor safety, psychosocial status, comfort, nutrition and hydration  12/11/2022 1248 by Bhumi Almaguer RN  Outcome: Completed  Flowsheets (Taken 12/11/2022 0800)  Remains free of injury from restraints (restraint for interference with medical device): Every 2 hours: Monitor safety, psychosocial status, comfort, nutrition and hydration  12/11/2022 0528 by Judy Peterson RN  Outcome: Progressing  Flowsheets (Taken 12/10/2022 2000)  Remains free of injury from restraints (restraint for interference with medical device): Every 2 hours: Monitor safety, psychosocial status, comfort, nutrition and hydration

## 2022-12-11 NOTE — PROGRESS NOTES
Physical Therapy  DATE: 2022  NAME: Prabhu Rice  MRN: [id number]   : 1975    Discharge Recommendations: Continue to Assess (pending progress)     Subjective: Pt intubated 12/10/22, non responsive to vc's, tactile stimulation. Pain: GILDARDO  Patient follows: No Commands  Is patient on ventilator: Yes  Is patient on sedation: Yes  Precautions: B wrist restraints    Therapeutic exercises:  LE(s)  Right Passive range of motion all planes x 15 reps  bilateral gastrocnemius stretching 3 reps x 20 seconds    Goals  Short Term Goals  Short Term Goal 1: Perform bed mobility Mod I  Short Term Goal 2: Perform sit to stand with NWB RLE and Min A  Short Term Goal 3: Propel wheelchair 300ft with BUE and LLE and supervision  Short Term Goal 4: Ambulate 20ft with RW, NWB RLE and Min A       Plan: Progress functional mobility as medically appropriate.    Time In: 0900  Time Out: 0915  Time Coded Minutes (treatment minutes): 15  Rehab Potential: Fair  Treatments/week: 6-7 x's per wk    Norm Chopra PTA

## 2022-12-11 NOTE — PROGRESS NOTES
INTENSIVE CARE UNIT  Resident Physician Progress Note    Patient - Liane RAYO Spart  Date of Admission -  12/5/2022 11:39 PM  Date of Evaluation -  12/11/2022  Room and Bed Number -  3021/3021-01   Hospital Day - 5      SUBJECTIVE:     OVERNIGHT EVENTS:   Patient remained tachycardic with max HR of 122  Patient was febrile t-max 100.8  Patient remained on ventilator with following settings  Fio2 40%, RR 18, , PEEP 5 and sedated on propofol continuous  RT tried to extract respiratory secretion even with NS flushing no secretions were drained      TODAY:    Patient examined at bedside, labs reviewed.  Patient is currently sedated on propofol, peridex and ventilator with following settings  FiO2 40%,  RR 18, PEEP 5, .    Patient on zosyn, , Bp 135/90 without any pressor support.   Respiratory panel is negative.  BnP 87  Blood cultures both negative  Respiratory culture positive for Few gram positive cocci in clusters   ABGS : pH 7.329, Pco2 46.8, PO2 76.9, HCO3 24.6, O2 stat 94  CBC : WBC 6.8->11.6, HB 12.5->12.1, ->154  BMP : glucose 116, BUN/Cr 7/0.66, Na+ 134, K 3.9  I 2668.1, O : 1925, Net 743.1    Review of Systems   Reason unable to perform ROS: Patient sedated and on ventilatior.     Plan for today :  PT/OT consulted, F/u with recommendations  Increasing Goal of tube feedings as recommended  Continuous NS @ 75 ml/hr  Continue Antibiotics  Start prednisone 30 mg via OG tube  OD x 4 days   MRSA nares DNA positive, started on vancomycin, f/u with resp culture       Brief History:   Patient, 47-year-old female, initially presented to hospital on 12/5 with an episode of fall.  Patient is developmentally delayed and has a history of brain tumor as a child with a lot of radiation.  Patient also has past medical history of diabetes mellitus, bilateral sensorineural hearing loss.   Had a CT head showed no intracranial abnormality, CT abdominal pelvis showed  Nondisplaced fractures of right sacral  ilya, nondisplaced fracture of right superior ramus and right obturator ring. CT spine showed acute fracture of right C7 transverse process and also age indeterminate compressive fracture of T3. Neurosurgery for cervical fractures was consulted, who recommended no neurosurgical intervention. Orthopedic surgery for pelvic fractures was also consulted, no surgical intervention but medical management and also recommended to follow-up in orthopedic clinic after discharge. Patient was started on BiPAP for increased work of breathing. Patient was oriented x4 at day of presentation.  - patient had a decline in mental status with tachypnea, respiratory rate o 30s to 40 with heart rate of 105 patient was intubated emergently     - Neurology was consulted because of history of migraines, seizure disorder, patient was started on her home antiepileptic drugs including Topamax and Lamictal, EEG was ordered to rule out any subclinical seizure activity, MRI was also ordered to rule out any CVA. Patient was doing fine, arousable and following commands. Pulmonology was consulted, recommended to extubate and patient was extubated.      12/10 -rapid was called today due to impending respiratory failure, patient was intubated again and was sent to ICU     AWAKE & FOLLOWING COMMANDS:  [x] No   [] Yes    SECRETIONS Amount:  [] Small [] Moderate  [] Large  [x] None  Color:     [] White [] Colored  [] Bloody    SEDATION:  RAAS Score:  [x] Propofol gtt  [] Versed gtt  [] Ativan gtt   [] No Sedation    PARALYZED:  [x] No    [] Yes    VASOPRESSORS:  [x] No    [] Yes  [] Levophed [] Dopamine [] Vasopressin  [] Dobutamine [] Phenylephrine [] Epinephrine    OBJECTIVE:     VITAL SIGNS:  /78   Pulse (!) 114   Temp (!) 100.8 °F (38.2 °C) (Esophageal)   Resp 23   Ht 5' 1\" (1.549 m)   Wt 278 lb 14.1 oz (126.5 kg)   SpO2 100%   BMI 52.69 kg/m²   Tmax over 24 hours:  Temp (24hrs), Av.4 °F (38 °C), Min:99.3 °F (37.4 °C), Max:101.5 °F (38.6 °C)      Patient Vitals for the past 8 hrs:   BP Temp Temp src Pulse Resp SpO2 Weight   12/11/22 0645 -- -- -- (!) 114 23 100 % --   12/11/22 0630 -- -- -- (!) 113 15 94 % --   12/11/22 0615 -- -- -- (!) 115 23 96 % --   12/11/22 0600 135/78 (!) 100.8 °F (38.2 °C) Esophageal (!) 113 22 96 % 278 lb 14.1 oz (126.5 kg)   12/11/22 0545 -- -- -- (!) 113 24 97 % --   12/11/22 0530 -- -- -- (!) 112 23 98 % --   12/11/22 0515 -- -- -- (!) 111 24 100 % --   12/11/22 0510 -- -- -- (!) 110 23 98 % --   12/11/22 0500 137/79 -- -- (!) 109 24 98 % --   12/11/22 0445 -- -- -- (!) 110 28 100 % --   12/11/22 0430 -- -- -- (!) 108 27 96 % --   12/11/22 0415 -- -- -- (!) 108 29 100 % --   12/11/22 0400 121/84 100.4 °F (38 °C) Esophageal (!) 106 26 97 % --   12/11/22 0345 -- -- -- (!) 108 25 98 % --   12/11/22 0330 -- -- -- (!) 108 25 100 % --   12/11/22 0319 -- -- -- (!) 106 23 99 % --   12/11/22 0315 -- -- -- (!) 106 23 98 % --   12/11/22 0300 138/88 -- -- (!) 104 25 97 % --   12/11/22 0245 -- -- -- (!) 109 24 98 % --   12/11/22 0230 -- -- -- (!) 107 19 99 % --   12/11/22 0215 -- -- -- (!) 107 24 98 % --   12/11/22 0200 136/74 99.7 °F (37.6 °C) Esophageal (!) 109 -- 99 % --   12/11/22 0145 -- -- -- (!) 107 -- 98 % --   12/11/22 0130 -- -- -- (!) 108 27 99 % --   12/11/22 0115 -- -- -- (!) 109 26 99 % --   12/11/22 0100 131/75 -- -- (!) 109 26 99 % --   12/11/22 0045 -- -- -- (!) 108 18 98 % --   12/11/22 0030 -- -- -- (!) 108 24 98 % --   12/11/22 0015 -- -- -- (!) 107 22 97 % --   12/11/22 0000 (!) 134/114 100 °F (37.8 °C) Esophageal (!) 107 27 98 % --   12/10/22 2345 -- -- -- (!) 110 26 99 % --   12/10/22 2330 -- -- -- (!) 111 23 100 % --   12/10/22 2315 -- -- -- (!) 114 26 97 % --         Intake/Output Summary (Last 24 hours) at 12/11/2022 0707  Last data filed at 12/11/2022 0701  Gross per 24 hour   Intake 2701.76 ml   Output 1925 ml   Net 776.76 ml     Date 12/11/22 0000 - 12/11/22 2359   Shift 8714-4713 6218-5912 9470-0503 24 Hour Total   INTAKE   I.V.(mL/kg) 857. 4(6.8)   857. 4(6.8)   NG/GT(mL/kg) 113(0.9)   113(0.9)   IV Piggyback(mL/kg) 89.4(0.7)   89.4(0.7)   Shift Total(mL/kg) 1059. 8(8.4)   1059. 8(8.4)   OUTPUT   Urine(mL/kg/hr) 700   700   Shift Total(mL/kg) 700(5.5)   700(5.5)   Weight (kg) 126.5 126.5 126.5 126.5     Wt Readings from Last 3 Encounters:   12/11/22 278 lb 14.1 oz (126.5 kg)   12/05/22 277 lb (125.6 kg)   11/07/22 279 lb (126.6 kg)     Body mass index is 52.69 kg/m². Physical Exam  Constitutional:       Interventions: She is sedated and intubated. Cardiovascular:      Rate and Rhythm: Tachycardia present. Heart sounds: Normal heart sounds. Pulmonary:      Effort: She is intubated. Comments: Crackles present bilateral  Abdominal:      General: Bowel sounds are normal.      Palpations: Abdomen is soft. Skin:     General: Skin is warm. Neurological:      Mental Status: She is unresponsive. MEDICATIONS:  Scheduled Meds:   propofol        haloperidol lactate  5 mg IntraVENous Once    chlorhexidine  15 mL Mouth/Throat BID    piperacillin-tazobactam  3,375 mg IntraVENous Q8H    hydrocortisone  10 mg Oral BID    levalbuterol  0.63 mg Nebulization 4x daily    enoxaparin  30 mg SubCUTAneous BID    sertraline  150 mg Oral Daily    lamoTRIgine  200 mg Oral Daily    levothyroxine  150 mcg Oral QAM AC    topiramate  75 mg Oral Daily    topiramate  100 mg Oral Nightly    polyethylene glycol  17 g Oral Daily     Continuous Infusions:   sodium chloride 50 mL/hr at 12/11/22 0701    propofol 30 mcg/kg/min (12/11/22 0701)     PRN Meds:   fentanNYL, 50 mcg, Q2H PRN  oxyCODONE, 5 mg, Q4H PRN  oxyCODONE, 10 mg, Q4H PRN  acetaminophen, 650 mg, Q6H PRN  albuterol sulfate HFA, 1 puff, Q4H PRN  sodium chloride flush, 5-40 mL, PRN        SUPPORT DEVICES: [x] Ventilator [] BIPAP  [] Nasal Cannula [] Room Air    VENT SETTINGS (Comprehensive) (if applicable):   PRVC mode, FiO2 40%, PEEP 5, Respiratory Rate 18, Tidal Volume 380  Vent Information  Ventilator ID: [id number]  Equipment Changed: HME, Expiratory Filter  Ventilator Initiate: Yes  Vent Mode: AC/PRVC  Additional Respiratory Assessments  Heart Rate: (!) 114  Resp: 23  SpO2: 100 %  End Tidal CO2: 40 (%)  Position: Semi-Love's  Humidification Source: HME  Humidification Temp: 33    ABGs:   Arterial Blood Gas result:  pH 7.329; pCO2 46.8; pO2 76.9; HCO3 24.6; BE 2; %O2 Sat 94.   Lab Results   Component Value Date/Time    FIO2 40.0 12/11/2022 04:55 AM     DATA:  Complete Blood Count:   Recent Labs     12/09/22  0345 12/10/22  0306   WBC 7.7 6.8   RBC 4.56 4.46   HGB 12.9 12.5   HCT 40.6 40.6   MCV 89.0 91.0   MCH 28.3 28.0   MCHC 31.8 30.8   RDW 13.7 14.2    215   MPV 10.8 11.4        Last 3 Blood Glucose:   Recent Labs     12/09/22  0345 12/10/22  0306   GLUCOSE 120* 108*        PT/INR:    Lab Results   Component Value Date/Time    PROTIME 10.5 12/05/2022 05:35 PM    INR 1.0 12/05/2022 05:35 PM     PTT:    Lab Results   Component Value Date/Time    APTT 24.9 03/10/2015 11:25 AM       Comprehensive Metabolic Profile:   Recent Labs     12/09/22  0345 12/10/22  0306    137   K 3.8 4.1    106   CO2 21 23   BUN 5* 7   CREATININE 0.64 0.62   GLUCOSE 120* 108*   CALCIUM 7.8* 8.0*      Magnesium:   Lab Results   Component Value Date/Time    MG 1.7 12/06/2022 10:12 PM     Phosphorus:   Lab Results   Component Value Date/Time    PHOS 2.9 12/06/2022 10:12 PM     Ionized Calcium: No results found for: CAION     Urinalysis:   Lab Results   Component Value Date/Time    NITRU NEGATIVE 12/06/2022 08:05 PM    COLORU Yellow 12/06/2022 08:05 PM    PHUR 5.5 12/06/2022 08:05 PM    WBCUA None 12/06/2022 08:05 PM    RBCUA None 12/06/2022 08:05 PM    MUCUS 1+ 03/27/2013 05:54 PM    TRICHOMONAS NOT REPORTED 03/27/2013 05:54 PM    YEAST NOT REPORTED 03/27/2013 05:54 PM    BACTERIA RARE 03/27/2013 05:54 PM    CLARITYU clear 11/11/2014 01:20 PM    SPECGRAV 1.035 12/06/2022 08:05 PM    LEUKOCYTESUR NEGATIVE 12/06/2022 08:05 PM    UROBILINOGEN Normal 12/06/2022 08:05 PM    BILIRUBINUR NEGATIVE 12/06/2022 08:05 PM    BILIRUBINUR neagtive 03/17/2017 04:30 PM    BLOODU negative 03/17/2017 04:30 PM    GLUCOSEU NEGATIVE 12/06/2022 08:05 PM    KETUA MODERATE 12/06/2022 08:05 PM    AMORPHOUS NOT REPORTED 03/27/2013 05:54 PM       HgBA1c:    Lab Results   Component Value Date/Time    LABA1C 5.3 04/26/2022 12:00 AM     TSH:    Lab Results   Component Value Date/Time    TSH <0.01 12/07/2022 03:57 AM     Lactic Acid: No results found for: LACTA   Troponin: No results for input(s): TROPONINI in the last 72 hours. Microbiology:  Blood Culture:  No components found for: CBLOOD, CFUNGUSBL  Sputum Culture: Gram positive cocci in clusters     ASSESSMENT:     Patient Active Problem List    Diagnosis Date Noted    Acute respiratory failure with hypoxia ([address] 12/11/2022    Aspiration pneumonia ([address] 12/10/2022    Multiple closed pelvic fractures without disruption of pelvic [place] ([address] 12/09/2022    Lethargy 12/07/2022    Cervical transverse process fracture, initial encounter ([address] 12/06/2022    Closed nondisplaced fracture of seventh cervical vertebra ([address] 12/06/2022    Gastroesophageal reflux disease 08/12/2022    Irritable bowel syndrome with constipation 06/07/2022    Hypothyroidism 08/06/2014    Asthma 08/06/2014    Seizure ([address] 10/23/2013    Type 2 diabetes mellitus with stage 3a chronic kidney disease, without long-term current use of insulin ([address] 11/12/2021    Moderate episode of recurrent major depressive disorder ([address] 02/26/2020    Hypopituitarism ([address] 08/24/2016    ROHIT on CPAP 05/08/2015    Morbid obesity ([address] 08/06/2014          PLAN:     WEAN PER PROTOCOL:  [x] No   [] Yes  [] N/A    ICU PROPHYLAXIS:  Stress ulcer:  [] PPI Agent  [] [id number] [] Sucralfate  [] Other:  VTE:   [x] Enoxaparin  [] Unfract.  Heparin Subcut  [x] EPC Cuffs    NUTRITION:  [] NPO [x] Tube Feeding (Specify: ) [] TPN  [] PO    FAMILY UPDATED:    [] No  [x] Yes    TRANSFER OUT OF ICU:   [x] No  [] Yes        Additional Assessment:  Principal Problem:    Cervical transverse process fracture, initial encounter ([facility]  Active Problems:    Seizure ([facility]    Hypothyroidism    Asthma    Closed nondisplaced fracture of seventh cervical vertebra (HCC)    Lethargy    Multiple closed pelvic fractures without disruption of pelvic [place] (HCC)    Aspiration pneumonia (HCC)    Acute respiratory failure with hypoxia (HCC)  Resolved Problems:    * No resolved hospital problems. *      Plan:    Acute Respiratory failure with hypoxia :  Secondary to Aspiration pneumonia  Sedated on propofol and on ventilator   Respiratory culture positive for gram positive cocci in clusters  Currently on zosyn IV   WBC : 11.6  Pulmonary toilet and chest physiotherapy. Aspiration pneumonia   Secondary to intubation   Sedated on propofol and on ventilator   Respiratory culture positive for gram positive cocci in clusters  Currently on zosyn IV   WBC : 11.6  Pulmonary toilet and chest physiotherapy. Cervical Transverse process fracture   Neurosurgery no active intervention required right now.      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.  7:07 AM

## 2022-12-11 NOTE — PLAN OF CARE
Problem: Respiratory - Adult  Goal: Achieves optimal ventilation and oxygenation  Flowsheets (Taken 12/10/2022 2238)  Achieves optimal ventilation and oxygenation:   Assess for changes in respiratory status   Position to facilitate oxygenation and minimize respiratory effort   Assess the need for suctioning and aspirate as needed   Respiratory therapy support as indicated   Assess and instruct to report shortness of breath or any respiratory difficulty   Encourage broncho-pulmonary hygiene including cough, deep breathe, incentive spirometry   Oxygen supplementation based on oxygen saturation or arterial blood gases   Assess for changes in mentation and behavior

## 2022-12-12 ENCOUNTER — APPOINTMENT (OUTPATIENT)
Dept: GENERAL RADIOLOGY | Age: 47
DRG: 551 | End: 2022-12-12
Payer: MEDICARE

## 2022-12-12 LAB
ABSOLUTE EOS #: <0.03 K/UL (ref 0–0.44)
ABSOLUTE IMMATURE GRANULOCYTE: 0.11 K/UL (ref 0–0.3)
ABSOLUTE LYMPH #: 0.73 K/UL (ref 1.1–3.7)
ABSOLUTE MONO #: 0.84 K/UL (ref 0.1–1.2)
ALLEN TEST: ABNORMAL
ANION GAP SERPL CALCULATED.3IONS-SCNC: 8 MMOL/L (ref 9–17)
BASOPHILS # BLD: 0 % (ref 0–2)
BASOPHILS ABSOLUTE: 0.03 K/UL (ref 0–0.2)
BUN BLDV-MCNC: 12 MG/DL (ref 6–20)
CALCIUM SERPL-MCNC: 8.3 MG/DL (ref 8.6–10.4)
CHLORIDE BLD-SCNC: 104 MMOL/L (ref 98–107)
CO2: 24 MMOL/L (ref 20–31)
CREAT SERPL-MCNC: 0.66 MG/DL (ref 0.5–0.9)
CULTURE: ABNORMAL
CULTURE: ABNORMAL
DIRECT EXAM: ABNORMAL
EOSINOPHILS RELATIVE PERCENT: 0 % (ref 1–4)
FIO2: 35
GFR SERPL CREATININE-BSD FRML MDRD: >60 ML/MIN/1.73M2
GLUCOSE BLD-MCNC: 121 MG/DL (ref 70–99)
GLUCOSE BLD-MCNC: 138 MG/DL (ref 74–100)
HCT VFR BLD CALC: 36.6 % (ref 36.3–47.1)
HEMOGLOBIN: 12.1 G/DL (ref 11.9–15.1)
IMMATURE GRANULOCYTES: 1 %
LYMPHOCYTES # BLD: 6 % (ref 24–43)
MCH RBC QN AUTO: 28.5 PG (ref 25.2–33.5)
MCHC RBC AUTO-ENTMCNC: 33.1 G/DL (ref 28.4–34.8)
MCV RBC AUTO: 86.3 FL (ref 82.6–102.9)
MONOCYTES # BLD: 7 % (ref 3–12)
NEGATIVE BASE EXCESS, ART: 1 (ref 0–2)
NRBC AUTOMATED: 0 PER 100 WBC
O2 DEVICE/FLOW/%: ABNORMAL
PDW BLD-RTO: 14.3 % (ref 11.8–14.4)
PLATELET # BLD: 137 K/UL (ref 138–453)
PMV BLD AUTO: 11.9 FL (ref 8.1–13.5)
POC HCO3: 24.3 MMOL/L (ref 21–28)
POC O2 SATURATION: 92 % (ref 94–98)
POC PCO2: 39.7 MM HG (ref 35–48)
POC PH: 7.39 (ref 7.35–7.45)
POC PO2: 62.6 MM HG (ref 83–108)
POTASSIUM SERPL-SCNC: 4.6 MMOL/L (ref 3.7–5.3)
RBC # BLD: 4.24 M/UL (ref 3.95–5.11)
SAMPLE SITE: ABNORMAL
SEG NEUTROPHILS: 86 % (ref 36–65)
SEGMENTED NEUTROPHILS ABSOLUTE COUNT: 9.68 K/UL (ref 1.5–8.1)
SODIUM BLD-SCNC: 136 MMOL/L (ref 135–144)
SPECIMEN DESCRIPTION: ABNORMAL
WBC # BLD: 11.4 K/UL (ref 3.5–11.3)

## 2022-12-12 PROCEDURE — 94640 AIRWAY INHALATION TREATMENT: CPT

## 2022-12-12 PROCEDURE — 6370000000 HC RX 637 (ALT 250 FOR IP): Performed by: NURSE PRACTITIONER

## 2022-12-12 PROCEDURE — 6360000002 HC RX W HCPCS: Performed by: INTERNAL MEDICINE

## 2022-12-12 PROCEDURE — 2700000000 HC OXYGEN THERAPY PER DAY

## 2022-12-12 PROCEDURE — 80048 BASIC METABOLIC PNL TOTAL CA: CPT

## 2022-12-12 PROCEDURE — C9113 INJ PANTOPRAZOLE SODIUM, VIA: HCPCS

## 2022-12-12 PROCEDURE — 2580000003 HC RX 258

## 2022-12-12 PROCEDURE — 6360000002 HC RX W HCPCS

## 2022-12-12 PROCEDURE — 94761 N-INVAS EAR/PLS OXIMETRY MLT: CPT

## 2022-12-12 PROCEDURE — 94003 VENT MGMT INPAT SUBQ DAY: CPT

## 2022-12-12 PROCEDURE — 2000000000 HC ICU R&B

## 2022-12-12 PROCEDURE — 82947 ASSAY GLUCOSE BLOOD QUANT: CPT

## 2022-12-12 PROCEDURE — 51701 INSERT BLADDER CATHETER: CPT

## 2022-12-12 PROCEDURE — 6360000002 HC RX W HCPCS: Performed by: NURSE PRACTITIONER

## 2022-12-12 PROCEDURE — 71045 X-RAY EXAM CHEST 1 VIEW: CPT

## 2022-12-12 PROCEDURE — 93306 TTE W/DOPPLER COMPLETE: CPT

## 2022-12-12 PROCEDURE — 36415 COLL VENOUS BLD VENIPUNCTURE: CPT

## 2022-12-12 PROCEDURE — 82803 BLOOD GASES ANY COMBINATION: CPT

## 2022-12-12 PROCEDURE — 6370000000 HC RX 637 (ALT 250 FOR IP): Performed by: INTERNAL MEDICINE

## 2022-12-12 PROCEDURE — 6370000000 HC RX 637 (ALT 250 FOR IP)

## 2022-12-12 PROCEDURE — 6370000000 HC RX 637 (ALT 250 FOR IP): Performed by: STUDENT IN AN ORGANIZED HEALTH CARE EDUCATION/TRAINING PROGRAM

## 2022-12-12 PROCEDURE — 99291 CRITICAL CARE FIRST HOUR: CPT | Performed by: INTERNAL MEDICINE

## 2022-12-12 PROCEDURE — 36600 WITHDRAWAL OF ARTERIAL BLOOD: CPT

## 2022-12-12 PROCEDURE — 85025 COMPLETE CBC W/AUTO DIFF WBC: CPT

## 2022-12-12 RX ADMIN — LAMOTRIGINE 200 MG: 100 TABLET ORAL at 08:29

## 2022-12-12 RX ADMIN — HYDROCORTISONE 10 MG: 10 TABLET ORAL at 20:29

## 2022-12-12 RX ADMIN — CHLORHEXIDINE GLUCONATE 15 ML: 1.2 RINSE ORAL at 20:28

## 2022-12-12 RX ADMIN — PROPOFOL 20 MCG/KG/MIN: 10 INJECTION, EMULSION INTRAVENOUS at 18:48

## 2022-12-12 RX ADMIN — PROPOFOL 20 MCG/KG/MIN: 10 INJECTION, EMULSION INTRAVENOUS at 13:17

## 2022-12-12 RX ADMIN — PREDNISONE 30 MG: 20 TABLET ORAL at 07:51

## 2022-12-12 RX ADMIN — LEVALBUTEROL HYDROCHLORIDE 0.63 MG: 0.63 SOLUTION RESPIRATORY (INHALATION) at 15:20

## 2022-12-12 RX ADMIN — LEVALBUTEROL HYDROCHLORIDE 0.63 MG: 0.63 SOLUTION RESPIRATORY (INHALATION) at 11:42

## 2022-12-12 RX ADMIN — TOPIRAMATE 75 MG: 100 TABLET, FILM COATED ORAL at 08:29

## 2022-12-12 RX ADMIN — TOPIRAMATE 100 MG: 100 TABLET, FILM COATED ORAL at 20:29

## 2022-12-12 RX ADMIN — SODIUM CHLORIDE: 9 INJECTION, SOLUTION INTRAVENOUS at 07:22

## 2022-12-12 RX ADMIN — SERTRALINE 150 MG: 50 TABLET, FILM COATED ORAL at 07:51

## 2022-12-12 RX ADMIN — ENOXAPARIN SODIUM 30 MG: 100 INJECTION SUBCUTANEOUS at 20:29

## 2022-12-12 RX ADMIN — POLYETHYLENE GLYCOL 3350 17 G: 17 POWDER, FOR SOLUTION ORAL at 07:55

## 2022-12-12 RX ADMIN — PIPERACILLIN AND TAZOBACTAM 3375 MG: 3; .375 INJECTION, POWDER, LYOPHILIZED, FOR SOLUTION INTRAVENOUS at 03:16

## 2022-12-12 RX ADMIN — LEVOTHYROXINE SODIUM 150 MCG: 75 TABLET ORAL at 07:51

## 2022-12-12 RX ADMIN — ENOXAPARIN SODIUM 30 MG: 100 INJECTION SUBCUTANEOUS at 08:28

## 2022-12-12 RX ADMIN — Medication 1250 MG: at 00:30

## 2022-12-12 RX ADMIN — Medication 1250 MG: at 15:58

## 2022-12-12 RX ADMIN — PIPERACILLIN AND TAZOBACTAM 3375 MG: 3; .375 INJECTION, POWDER, LYOPHILIZED, FOR SOLUTION INTRAVENOUS at 11:57

## 2022-12-12 RX ADMIN — HYDROCORTISONE 10 MG: 10 TABLET ORAL at 07:51

## 2022-12-12 RX ADMIN — PROPOFOL 20 MCG/KG/MIN: 10 INJECTION, EMULSION INTRAVENOUS at 04:50

## 2022-12-12 RX ADMIN — SODIUM CHLORIDE, PRESERVATIVE FREE 40 MG: 5 INJECTION INTRAVENOUS at 07:55

## 2022-12-12 RX ADMIN — LEVALBUTEROL HYDROCHLORIDE 0.63 MG: 0.63 SOLUTION RESPIRATORY (INHALATION) at 19:49

## 2022-12-12 ASSESSMENT — PULMONARY FUNCTION TESTS
PIF_VALUE: 13
PIF_VALUE: 21
PIF_VALUE: 7
PIF_VALUE: 14
PIF_VALUE: 9
PIF_VALUE: 19
PIF_VALUE: 17
PIF_VALUE: 13
PIF_VALUE: 7
PIF_VALUE: 17

## 2022-12-12 NOTE — PLAN OF CARE
Problem: Chronic Conditions and Co-morbidities  Goal: Patient's chronic conditions and co-morbidity symptoms are monitored and maintained or improved  12/12/2022 1602 by Paulie Corona RN  Outcome: Progressing  12/12/2022 0654 by Rosanna Palma RN  Outcome: Progressing     Problem: Skin/Tissue Integrity  Goal: Absence of new skin breakdown  Description: 1. Monitor for areas of redness and/or skin breakdown  2. Assess vascular access sites hourly  3. Every 4-6 hours minimum:  Change oxygen saturation probe site  4. Every 4-6 hours:  If on nasal continuous positive airway pressure, respiratory therapy assess nares and determine need for appliance change or resting period.   12/12/2022 1602 by Paulie Corona RN  Outcome: Progressing  12/12/2022 0654 by Rosanna Palma RN  Outcome: Progressing     Problem: Respiratory - Adult  Goal: Achieves optimal ventilation and oxygenation  12/12/2022 1602 by Paulie Corona RN  Outcome: Progressing  12/12/2022 0823 by Sukhjinder Williamson RCP  Outcome: Progressing  12/12/2022 0654 by Rosanna Palma RN  Outcome: Progressing  12/12/2022 0211 by Andrea Fontenot RCP  Flowsheets (Taken 12/12/2022 0211)  Achieves optimal ventilation and oxygenation:   Assess for changes in respiratory status   Assess the need for suctioning and aspirate as needed   Respiratory therapy support as indicated   Assess for changes in mentation and behavior   Oxygen supplementation based on oxygen saturation or arterial blood gases  12/12/2022 0211 by Andrea Fontenot RCP  Outcome: Progressing  Flowsheets (Taken 12/12/2022 0211)  Achieves optimal ventilation and oxygenation:   Assess for changes in respiratory status   Assess the need for suctioning and aspirate as needed   Respiratory therapy support as indicated   Assess for changes in mentation and behavior   Oxygen supplementation based on oxygen saturation or arterial blood gases

## 2022-12-12 NOTE — PROGRESS NOTES
[facility]up[facility] [address] [facility]  Occupational Therapy Not Seen Note    DATE: 2022    NAME: Willian Todd  MRN: [id number]   : 1975      Patient not seen this date for Occupational Therapy due to:    Pt remains intubated & sedated. Will continue to follow.       Electronically signed by LOLIS Garg on 2022 at 3:18 PM

## 2022-12-12 NOTE — PLAN OF CARE
Problem: Respiratory - Adult  Goal: Achieves optimal ventilation and oxygenation  12/12/2022 0823 by Gaby Mott RCP  Outcome: Progressing  12/12/2022 0654 by Yari King RN  Outcome: Progressing  12/12/2022 0211 by Nick Fontenot RCP  Flowsheets (Taken 12/12/2022 0211)  Achieves optimal ventilation and oxygenation:   Assess for changes in respiratory status   Assess the need for suctioning and aspirate as needed   Respiratory therapy support as indicated   Assess for changes in mentation and behavior   Oxygen supplementation based on oxygen saturation or arterial blood gases  12/12/2022 0211 by Nick Fontenot RCP  Outcome: Progressing  Flowsheets (Taken 12/12/2022 0211)  Achieves optimal ventilation and oxygenation:   Assess for changes in respiratory status   Assess the need for suctioning and aspirate as needed   Respiratory therapy support as indicated   Assess for changes in mentation and behavior   Oxygen supplementation based on oxygen saturation or arterial blood gases

## 2022-12-12 NOTE — PLAN OF CARE
Problem: Respiratory - Adult  Goal: Achieves optimal ventilation and oxygenation  12/12/2022 0211 by Joyce Fontenot RCP  Flowsheets (Taken 12/12/2022 0211)  Achieves optimal ventilation and oxygenation:   Assess for changes in respiratory status   Assess the need for suctioning and aspirate as needed   Respiratory therapy support as indicated   Assess for changes in mentation and behavior   Oxygen supplementation based on oxygen saturation or arterial blood gases

## 2022-12-12 NOTE — CARE COORDINATION
Patient now intubated (12/10) on propofol with TF via OGT. From home, lives with parents - following progress.

## 2022-12-12 NOTE — PROGRESS NOTES
Comprehensive Nutrition Assessment    Type and Reason for Visit:  Reassess    Nutrition Recommendations/Plan:   Continue Current Tube Feeding at this time; will continue to monitor TF tolerance/adequacy, propofol rate, labs, and care plans. Modify TF as needed. Malnutrition Assessment:  Malnutrition Status: At risk for malnutrition  Context:  Acute Illness     Findings of the 6 clinical characteristics of malnutrition:  Energy Intake:  Mild decrease in energy intake  Weight Loss:  No significant weight loss     Body Fat Loss:  No significant body fat loss     Muscle Mass Loss:  No significant muscle mass loss    Fluid Accumulation:  No significant fluid accumulation     Strength:  Not Performed    Nutrition Assessment:    Chart reviewed. Pt was reintubated on 12/10/22; TF initiated. Currently on Immune Enhancing Formula at 45 mL/hr (goal). Last BM noted: 12/9/22. Meds/labs reviewed. Nutrition Related Findings:    Last BM: 12/9/22 Wound Type: Deep Tissue Injury (to right buttocks)       Current Nutrition Intake & Therapies:    ADULT TUBE FEEDING; Orogastric; Immune Enhancing; Continuous; 10; Yes; 10; Q 6 hours; 45; 30; Q 4 hours  Current Tube Feeding (TF) Orders:  Feeding Route: Orogastric  Formula: Immune Enhancing  Schedule: Continuous  Feeding Regimen: 45 mL/hr  Current TF & Flush Orders Provides: 1620 kcal and 101 g pro/da/y  Additional Calorie Sources:  Propofol at 15.1 mL/hr =399 kcal/day    Anthropometric Measures:  Height: 5' 1\" (154.9 cm)  Ideal Body Weight (IBW): 105 lbs (48 kg)    Admission Body Weight: 282 lb (127.9 kg)  Current Body Weight: 278 lb 3.5 oz (126.2 kg), 270.8 % IBW.  Weight Source: Bed Scale  Current BMI (kg/m2): 52.6  Usual Body Weight: 273 lb 12.8 oz (124.2 kg) (8/12/22 bed scale per chart review)  % Weight Change (Calculated): 3.8                    BMI Categories: Obese Class 3 (BMI 40.0 or greater)    Estimated Daily Nutrient Needs:  Energy Requirements Based On: Formula  Weight Used for Energy Requirements: Current  Energy (kcal/day): 1041-0235 kcal/day  Weight Used for Protein Requirements: Ideal  Protein (g/day):  gm pro/day  Method Used for Fluid Requirements: 1 ml/kcal  Fluid (ml/day): 1800 mL or per MD    Nutrition Diagnosis:   Inadequate oral intake related to impaired respiratory function as evidenced by NPO or clear liquid status due to medical condition, nutrition support - enteral nutrition    Nutrition Interventions:   Food and/or Nutrient Delivery: Continue Current Tube Feeding  Nutrition Education/Counseling: No recommendation at this time  Coordination of Nutrition Care: Continue to monitor while inpatient       Goals:  Previous Goal Met: Goal(s) Achieved  Goals: Meet at least 75% of estimated needs, prior to discharge       Nutrition Monitoring and Evaluation:   Behavioral-Environmental Outcomes: None Identified  Food/Nutrient Intake Outcomes: Enteral Nutrition Intake/Tolerance  Physical Signs/Symptoms Outcomes: Biochemical Data, Fluid Status or Edema, Nutrition Focused Physical Findings, Skin, Weight    Discharge Planning:     Too soon to determine     [address], [place]  Contact: [phone]

## 2022-12-12 NOTE — PLAN OF CARE
Problem: Discharge Planning  Goal: Discharge to home or other facility with appropriate resources  Outcome: Progressing     Problem: Confusion  Goal: Confusion, delirium, dementia, or psychosis is improved or at baseline  Description: INTERVENTIONS:  1. Assess for possible contributors to thought disturbance, including medications, impaired vision or hearing, underlying metabolic abnormalities, dehydration, psychiatric diagnoses, and notify attending LIP  2. [place] high risk fall precautions, as indicated  3. Provide frequent short contacts to provide reality reorientation, refocusing and direction  4. Decrease environmental stimuli, including noise as appropriate  5. Monitor and intervene to maintain adequate nutrition, hydration, elimination, sleep and activity  6. If unable to ensure safety without constant attention obtain sitter and review sitter guidelines with assigned personnel  7. Initiate Psychosocial CNS and Spiritual Care consult, as indicated  Outcome: Progressing     Problem: Pain  Goal: Verbalizes/displays adequate comfort level or baseline comfort level  Outcome: Progressing     Problem: Chronic Conditions and Co-morbidities  Goal: Patient's chronic conditions and co-morbidity symptoms are monitored and maintained or improved  Outcome: Progressing     Problem: Skin/Tissue Integrity  Goal: Absence of new skin breakdown  Description: 1. Monitor for areas of redness and/or skin breakdown  2. Assess vascular access sites hourly  3. Every 4-6 hours minimum:  Change oxygen saturation probe site  4. Every 4-6 hours:  If on nasal continuous positive airway pressure, respiratory therapy assess nares and determine need for appliance change or resting period.   Outcome: Progressing     Problem: Safety - Adult  Goal: Free from fall injury  Outcome: Progressing     Problem: ABCDS Injury Assessment  Goal: Absence of physical injury  Outcome: Progressing     Problem: Nutrition Deficit:  Goal: Optimize nutritional status  Outcome: Progressing     Problem: Respiratory - Adult  Goal: Achieves optimal ventilation and oxygenation  12/12/2022 0654 by Nia Ocampo RN  Outcome: Progressing  12/12/2022 0211 by Gabino Fontenot RCP  Flowsheets (Taken 12/12/2022 0211)  Achieves optimal ventilation and oxygenation:   Assess for changes in respiratory status   Assess the need for suctioning and aspirate as needed   Respiratory therapy support as indicated   Assess for changes in mentation and behavior   Oxygen supplementation based on oxygen saturation or arterial blood gases  12/12/2022 0211 by Gabino Fontenot RCP  Outcome: Progressing  Flowsheets (Taken 12/12/2022 0211)  Achieves optimal ventilation and oxygenation:   Assess for changes in respiratory status   Assess the need for suctioning and aspirate as needed   Respiratory therapy support as indicated   Assess for changes in mentation and behavior   Oxygen supplementation based on oxygen saturation or arterial blood gases

## 2022-12-12 NOTE — PROGRESS NOTES
INTENSIVE CARE UNIT  Resident Physician Progress Note    Patient - Asad Parra  Date of Admission -  12/5/2022 11:39 PM  Date of Evaluation -  12/12/2022  Room and Bed Number -  3021/3021-01   Hospital Day - 6      SUBJECTIVE:     OVERNIGHT EVENTS:      No acute events happened overnight  Fevers are coming down, patient still tachycardic  Patient remained sedated on propofol continuous , ventilated with FiO2 45%, respiratory rate 18, PEEP of 5 and tidal volume 380      TODAY:    Patient examined at bedside, labs reviewed. Patient sedated on propofol, and on ventilator, FiO2 45, PEEP 5, respiratory rate 18 and tidal volume 380. Patient experiencing a lot of increase in respiratory secretions. Patient still tachycardic 102/min, blood pressure 115/61  Temperature 37.8  Respiratory culture positive for staph aureus heavy growth  ABGs : pH 7.39, PCO2 39.7, PO2 62.6, HCO3 24.3, O2 sat 92  WBC 11.4, hemoglobin 12.1, BMP unremarkable  Input 2270, output 2411, net -140    Review of Systems   Reason unable to perform ROS: Patient sedated and on ventilatior. Today :  Patient is having increase in resp secretions and her chest is having bilateral crackles. , ordered Cxr and also decrease the fluids to 50 ml/hr from 75 ml/hr      Brief History:   Patient, 68-year-old female, initially presented to hospital on 12/5 with an episode of fall. Patient is developmentally delayed and has a history of brain tumor as a child with a lot of radiation. Patient also has past medical history of diabetes mellitus, bilateral sensorineural hearing loss. Had a CT head showed no intracranial abnormality, CT abdominal pelvis showed  Nondisplaced fractures of right sacral ilya, nondisplaced fracture of right superior ramus and right obturator ring. CT spine showed acute fracture of right C7 transverse process and also age indeterminate compressive fracture of T3.   Neurosurgery for cervical fractures was consulted, who recommended no neurosurgical intervention. Orthopedic surgery for pelvic fractures was also consulted, no surgical intervention but medical management and also recommended to follow-up in orthopedic clinic after discharge. Patient was started on BiPAP for increased work of breathing. Patient was oriented x4 at day of presentation.  - patient had a decline in mental status with tachypnea, respiratory rate o 30s to 40 with heart rate of 105 patient was intubated emergently     - Neurology was consulted because of history of migraines, seizure disorder, patient was started on her home antiepileptic drugs including Topamax and Lamictal, EEG was ordered to rule out any subclinical seizure activity, MRI was also ordered to rule out any CVA. Patient was doing fine, arousable and following commands. Pulmonology was consulted, recommended to extubate and patient was extubated.      12/10 -rapid was called today due to impending respiratory failure, patient was intubated again and was sent to ICU      -respiratory culture came back positive for staph aureus , nasal respiratory swab came out positive for MRSA DNA, patient started on vancomycin    AWAKE & FOLLOWING COMMANDS:  [x] No   [] Yes    SECRETIONS Amount:  [] Small [] Moderate  [x] Large   [] None  Color:     [x] White [] Colored  [] Bloody    SEDATION:  RAAS Score:  [x] Propofol gtt  [] Versed gtt  [] Ativan gtt   [] No Sedation    PARALYZED:  [x] No    [] Yes    VASOPRESSORS:  [x] No    [] Yes  [] Levophed [] Dopamine [] Vasopressin  [] Dobutamine [] Phenylephrine [] Epinephrine    OBJECTIVE:     VITAL SIGNS:  /66   Pulse (!) 104   Temp 99.9 °F (37.7 °C) (Esophageal)   Resp 25   Ht 5' 1\" (1.549 m)   Wt 278 lb 3.5 oz (126.2 kg)   SpO2 100%   BMI 52.57 kg/m²   Tmax over 24 hours:  Temp (24hrs), Av.1 °F (37.8 °C), Min:99.3 °F (37.4 °C), Max:100.9 °F (38.3 °C)      Patient Vitals for the past 8 hrs:   BP Temp Temp src Pulse Resp SpO2   22 0645 -- -- -- (!) 104 25 100 %   12/12/22 0630 -- -- -- (!) 104 25 100 %   12/12/22 0615 -- -- -- (!) 103 25 99 %   12/12/22 0600 119/66 99.9 °F (37.7 °C) Esophageal (!) 103 24 99 %   12/12/22 0545 -- -- -- (!) 103 26 98 %   12/12/22 0530 -- -- -- (!) 103 24 98 %   12/12/22 0515 -- -- -- (!) 101 23 98 %   12/12/22 0500 113/66 -- -- 98 25 97 %   12/12/22 0445 -- -- -- 97 24 95 %   12/12/22 0430 -- -- -- 98 25 94 %   12/12/22 0415 -- -- -- 99 23 96 %   12/12/22 0400 113/78 99.5 °F (37.5 °C) Esophageal 99 22 96 %   12/12/22 0345 -- -- -- 98 24 96 %   12/12/22 0338 -- -- -- (!) 101 23 98 %   12/12/22 0330 -- -- -- 99 23 97 %   12/12/22 0315 -- -- -- (!) 101 18 98 %   12/12/22 0300 118/71 -- -- (!) 101 22 98 %   12/12/22 0245 -- -- -- (!) 102 23 98 %   12/12/22 0230 -- -- -- (!) 102 24 98 %   12/12/22 0215 -- -- -- 99 25 96 %   12/12/22 0200 121/73 99.3 °F (37.4 °C) Esophageal (!) 102 30 98 %   12/12/22 0145 -- -- -- (!) 105 22 98 %   12/12/22 0130 -- -- -- (!) 106 22 98 %   12/12/22 0115 -- -- -- (!) 104 22 99 %   12/12/22 0100 110/67 -- -- (!) 104 21 98 %   12/12/22 0045 -- -- -- (!) 105 20 99 %   12/12/22 0030 -- -- -- (!) 104 16 99 %   12/12/22 0015 -- -- -- (!) 104 21 98 %   12/12/22 0000 109/67 99.3 °F (37.4 °C) Esophageal (!) 104 21 98 %   12/11/22 2345 -- -- -- (!) 105 21 98 %   12/11/22 2331 -- -- -- (!) 105 21 99 %   12/11/22 2330 -- -- -- (!) 108 22 98 %   12/11/22 2315 -- -- -- (!) 106 21 99 %         Intake/Output Summary (Last 24 hours) at 12/12/2022 0707  Last data filed at 12/12/2022 0512  Gross per 24 hour   Intake 2236.9 ml   Output 2411 ml   Net -174.1 ml     Date 12/12/22 0000 - 12/12/22 2359   Shift 5963-8812 8498-0765 1096-2311 24 Hour Total   INTAKE   I.V.(mL/kg) 321.6(2.5)   321.6(2.5)   NG/GT(mL/kg) 278(2.2)   278(2.2)   IV Piggyback(mL/kg) 291.7(2.3)   291.7(2.3)   Shift Total(mL/kg) 891.3(7.1)   891.3(7.1)   OUTPUT   Urine(mL/kg/hr) 500   500   Shift Total(mL/kg) 500(4)   500(4)   Weight (kg) 126.2 126.2 126.2 126.2     Wt Readings from Last 3 Encounters:   12/11/22 278 lb 3.5 oz (126.2 kg)   12/05/22 277 lb (125.6 kg)   11/07/22 279 lb (126.6 kg)     Body mass index is 52.57 kg/m². Physical Exam  Constitutional:       General: She is sleeping. Interventions: She is sedated and intubated. Comments: Patient sedated, not following commands   Cardiovascular:      Rate and Rhythm: Tachycardia present. Pulses: Normal pulses. Heart sounds: Normal heart sounds. Pulmonary:      Effort: She is intubated. Comments: Bilateral crackles   Abdominal:      General: Abdomen is flat. Bowel sounds are normal.      Palpations: Abdomen is soft. Neurological:      Mental Status: She is unresponsive.            MEDICATIONS:  Scheduled Meds:   pantoprazole (PROTONIX) 40 mg injection  40 mg IntraVENous Daily    vancomycin (VANCOCIN) intermittent dosing (placeholder)   Other RX Placeholder    vancomycin  1,250 mg IntraVENous Q12H    predniSONE  30 mg Oral Daily    hydrocortisone  10 mg Oral BID    haloperidol lactate  5 mg IntraVENous Once    chlorhexidine  15 mL Mouth/Throat BID    piperacillin-tazobactam  3,375 mg IntraVENous Q8H    levalbuterol  0.63 mg Nebulization 4x daily    enoxaparin  30 mg SubCUTAneous BID    sertraline  150 mg Oral Daily    lamoTRIgine  200 mg Oral Daily    levothyroxine  150 mcg Oral QAM AC    topiramate  75 mg Oral Daily    topiramate  100 mg Oral Nightly    polyethylene glycol  17 g Oral Daily     Continuous Infusions:   sodium chloride Stopped (12/12/22 0316)    sodium chloride Stopped (12/12/22 0437)    propofol 20 mcg/kg/min (12/12/22 0512)     PRN Meds:   sodium chloride, , PRN  fentanNYL, 50 mcg, Q2H PRN  oxyCODONE, 5 mg, Q4H PRN  oxyCODONE, 10 mg, Q4H PRN  acetaminophen, 650 mg, Q6H PRN  albuterol sulfate HFA, 1 puff, Q4H PRN  sodium chloride flush, 5-40 mL, PRN        SUPPORT DEVICES: [x] Ventilator [] BIPAP  [] Nasal Cannula [] Room Air    VENT SETTINGS (Comprehensive) (if applicable): PRVC mode, FiO2 45%, PEEP 5, Respiratory Rate 18, Tidal Volume 380  Vent Information  Ventilator ID: [id number]  Equipment Changed: Expiratory Filter  Ventilator Initiate: Yes  Vent Mode: AC/PRVC  Additional Respiratory Assessments  Heart Rate: (!) 104  Resp: 25  SpO2: 100 %  End Tidal CO2: 34 (%)  Position: Semi-Love's  Humidification Source: Heated wire  Humidification Temp: 37.3    ABGs:   Arterial Blood Gas result:  pH 7.394; pCO2 39.7; pO2 62.6; HCO3 24.3; BE 1; %O2 Sat 92.   Lab Results   Component Value Date/Time    FIO2 35.0 12/12/2022 04:27 AM         DATA:  Complete Blood Count:   Recent Labs     12/10/22  0306 12/11/22  0706 12/12/22  0454   WBC 6.8 11.6* 11.4*   RBC 4.46 4.35 4.24   HGB 12.5 12.1 12.1   HCT 40.6 39.7 36.6   MCV 91.0 91.3 86.3   MCH 28.0 27.8 28.5   MCHC 30.8 30.5 33.1   RDW 14.2 14.0 14.3    154 137*   MPV 11.4 10.6 11.9        Last 3 Blood Glucose:   Recent Labs     12/10/22  0306 12/11/22  0706 12/12/22  0454   GLUCOSE 108* 116* 121*        PT/INR:    Lab Results   Component Value Date/Time    PROTIME 10.5 12/05/2022 05:35 PM    INR 1.0 12/05/2022 05:35 PM     PTT:    Lab Results   Component Value Date/Time    APTT 24.9 03/10/2015 11:25 AM       Comprehensive Metabolic Profile:   Recent Labs     12/10/22  0306 12/11/22  0706 12/12/22  0454    134* 136   K 4.1 3.9 4.6    104 104   CO2 23 21 24   BUN 7 7 12   CREATININE 0.62 0.66 0.66   GLUCOSE 108* 116* 121*   CALCIUM 8.0* 8.1* 8.3*      Magnesium:   Lab Results   Component Value Date/Time    MG 1.7 12/06/2022 10:12 PM     Phosphorus:   Lab Results   Component Value Date/Time    PHOS 2.9 12/06/2022 10:12 PM     Ionized Calcium: No results found for: CAION     Urinalysis:   Lab Results   Component Value Date/Time    NITRU NEGATIVE 12/06/2022 08:05 PM    COLORU Yellow 12/06/2022 08:05 PM    PHUR 5.5 12/06/2022 08:05 PM    WBCUA None 12/06/2022 08:05 PM    RBCUA None 12/06/2022 08:05 PM MUCUS 1+ 03/27/2013 05:54 PM    TRICHOMONAS NOT REPORTED 03/27/2013 05:54 PM    YEAST NOT REPORTED 03/27/2013 05:54 PM    BACTERIA RARE 03/27/2013 05:54 PM    CLARITYU clear 11/11/2014 01:20 PM    SPECGRAV 1.035 12/06/2022 08:05 PM    LEUKOCYTESUR NEGATIVE 12/06/2022 08:05 PM    UROBILINOGEN Normal 12/06/2022 08:05 PM    BILIRUBINUR NEGATIVE 12/06/2022 08:05 PM    BILIRUBINUR neagtive 03/17/2017 04:30 PM    BLOODU negative 03/17/2017 04:30 PM    GLUCOSEU NEGATIVE 12/06/2022 08:05 PM    KETUA MODERATE 12/06/2022 08:05 PM    AMORPHOUS NOT REPORTED 03/27/2013 05:54 PM       HgBA1c:    Lab Results   Component Value Date/Time    LABA1C 5.3 04/26/2022 12:00 AM     TSH:    Lab Results   Component Value Date/Time    TSH <0.01 12/07/2022 03:57 AM     Lactic Acid: No results found for: LACTA   Troponin: No results for input(s): TROPONINI in the last 72 hours.       ASSESSMENT:     Patient Active Problem List    Diagnosis Date Noted    Acute respiratory failure with hypoxia ([address] 12/11/2022    Aspiration pneumonia ([address] 12/10/2022    Multiple closed pelvic fractures without disruption of pelvic [place] ([address] 12/09/2022    Lethargy 12/07/2022    Cervical transverse process fracture, initial encounter ([address] 12/06/2022    Closed nondisplaced fracture of seventh cervical vertebra ([address] 12/06/2022    Gastroesophageal reflux disease 08/12/2022    Irritable bowel syndrome with constipation 06/07/2022    Hypopituitarism ([address] 08/24/2016    Hypothyroidism 08/06/2014    Asthma 08/06/2014    Seizure ([address] 10/23/2013    Type 2 diabetes mellitus with stage 3a chronic kidney disease, without long-term current use of insulin ([address] 11/12/2021    Moderate episode of recurrent major depressive disorder ([address] 02/26/2020    ROHIT on CPAP 05/08/2015    Morbid obesity ([address] 08/06/2014          PLAN:     WEAN PER PROTOCOL:  [x] No   [] Yes  [] N/A    ICU PROPHYLAXIS:  Stress ulcer:  [x] PPI Agent  [] [id number] [] Sucralfate  [] Other:  VTE:   [x] Enoxaparin  [] Unfract. Heparin Subcut  [] EPC Cuffs    NUTRITION:  [] NPO [x] Tube Feeding (Specify: ) [] TPN  [] PO      FAMILY UPDATED:    [] No  [x] Yes    TRANSFER OUT OF ICU:   [x] No  [] Yes        Additional Assessment:    Principal Problem:    Cervical transverse process fracture, initial encounter ([facility]  Active Problems:    Seizure ([facility]    Hypothyroidism    Asthma    Closed nondisplaced fracture of seventh cervical vertebra (HCC)    Lethargy    Multiple closed pelvic fractures without disruption of pelvic [place] (HCC)    Aspiration pneumonia (HCC)    Acute respiratory failure with hypoxia (HCC)  Resolved Problems:    * No resolved hospital problems. *      Plan:    Acute Respiratory failure with hypoxia :  Secondary to Aspiration pneumonia  Sedated on propofol and on ventilator   Respiratory culture positive for Staph aureus   Currently on zosyn IV and on IV vancomycin   WBC : 11.4  Pulmonary toilet and chest physiotherapy. Aspiration pneumonia   Secondary to intubation   Sedated on propofol and on ventilator   Respiratory culture positive for Staph aureus   Currently on zosyn IV and on IV vancomycin   WBC : 11.4  Pulmonary toilet and chest physiotherapy. Cervical Transverse process fracture   Neurosurgery no active intervention required right now.       Seizure   Started on Home medications   Currently on lamotrigine 200 mg tab OD, Topamax 100 mg OD      Asthma   On Levabeutrol nebulizer QID  Currently on albuterol sulfate as needed inhaler 1 puff every 4 hours   prednisone 30 mg via OG tube  OD x 4 days      Pelvic fracture  Orthopedic surgery consulted, appreciate recommendations  No acute intervention required, recommends to follow-up as an outpatient after discharge     Hypopituitarism   Secondary to  childhood radiation due to brain tumor  Taking Synthroid 150 mcg OD for hypothyroidism central   Taking hydrocortisone 10 mg BD  for Secondary/ central hypopituitarism Hypothyroidism  Secondary to hypopituitarism secondary to childhood radiation due to brain tumor  On levothyroxine 150 mcg  Also on hydrocortisone 10 mg Po BID      Hyponatremia  Resolved  Sodium today 12/12 - 136       Prophylaxis:  DVT: Lovenox 30 mg  GI : protonix IV 40 mg     Dispo: To remain in ICU     Electronically signed by Alyce Javier MD on 12/12/2022 at 7:07 AM    Alyce Javier MD  Internal Medicine Resident, PGY-1  [facility],  [facility].  7:07 AM        Attending Physician Statement  I have discussed the care of Addison Hernandez, including pertinent history and exam findings with the resident. I have reviewed the key elements of all parts of the encounter with the resident. I have seen and examined the patient with the resident. I agree with the assessment and plan and status of the problem list as documented. I seen the patient during the round today, last imaging studies seen, events noted. Different fractures with fall did not require intervention per neurosurgery and orthopedics. 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 Last chest x-ray shows bilateral effusion and infiltrate respiratory culture positive for MRSA and she is currently on vancomycin. Respiratory secretions are large and increased. Patient did have fever overnight  Ventilatory support PRVC/18/380/5/40 percent arterial blood gas 7.3 9/39/62/24. She is on low-dose of propofol 20 mcg when I saw her she did not follow commands  She is on lamotrigine and Topamax for seizure disorder. Urine output is 2411 in last 24 hours  Patient is currently on prednisone 30 mg for 5 days she does have bilateral rhonchi and mild wheezing on levalbuterol. She is on replacement Synthroid and on hydrocortisone chronically.   Continue current ventilatory support at current setting, monitor endotracheal secretion      Discussed with father and updated  Discussed with nursing staff, treatment and plan discussed. Discussed with respiratory therapist.    Total critical care time caring for this patient with life threatening, unstable organ failure, including direct patient contact, management of life support systems, review of data including imaging and labs, discussions with other team members and physicians at least 35 min so far today, excluding procedures. Please note that this chart was generated using voice recognition Dragon dictation software. Although every effort was made to ensure the accuracy of this automated transcription, some errors in transcription may have occurred.      Jose Watters MD  12/12/2022 11:41 AM

## 2022-12-13 ENCOUNTER — APPOINTMENT (OUTPATIENT)
Dept: GENERAL RADIOLOGY | Age: 47
DRG: 551 | End: 2022-12-13
Payer: MEDICARE

## 2022-12-13 LAB
ABSOLUTE EOS #: 0.09 K/UL (ref 0–0.44)
ABSOLUTE IMMATURE GRANULOCYTE: 0.15 K/UL (ref 0–0.3)
ABSOLUTE LYMPH #: 1.29 K/UL (ref 1.1–3.7)
ABSOLUTE MONO #: 0.78 K/UL (ref 0.1–1.2)
ALLEN TEST: POSITIVE
ANION GAP SERPL CALCULATED.3IONS-SCNC: 7 MMOL/L (ref 9–17)
BASOPHILS # BLD: 1 % (ref 0–2)
BASOPHILS ABSOLUTE: 0.05 K/UL (ref 0–0.2)
BUN BLDV-MCNC: 20 MG/DL (ref 6–20)
CALCIUM SERPL-MCNC: 8 MG/DL (ref 8.6–10.4)
CHLORIDE BLD-SCNC: 108 MMOL/L (ref 98–107)
CO2: 23 MMOL/L (ref 20–31)
CREAT SERPL-MCNC: 0.6 MG/DL (ref 0.5–0.9)
EOSINOPHILS RELATIVE PERCENT: 1 % (ref 1–4)
FIO2: 40
GFR SERPL CREATININE-BSD FRML MDRD: >60 ML/MIN/1.73M2
GLUCOSE BLD-MCNC: 111 MG/DL (ref 70–99)
GLUCOSE BLD-MCNC: 118 MG/DL (ref 74–100)
HCT VFR BLD CALC: 32.4 % (ref 36.3–47.1)
HEMOGLOBIN: 10.2 G/DL (ref 11.9–15.1)
IMMATURE GRANULOCYTES: 1 %
LYMPHOCYTES # BLD: 12 % (ref 24–43)
MCH RBC QN AUTO: 29.3 PG (ref 25.2–33.5)
MCHC RBC AUTO-ENTMCNC: 31.5 G/DL (ref 28.4–34.8)
MCV RBC AUTO: 93.1 FL (ref 82.6–102.9)
MONOCYTES # BLD: 8 % (ref 3–12)
NEGATIVE BASE EXCESS, ART: 1 (ref 0–2)
NRBC AUTOMATED: 0.2 PER 100 WBC
PDW BLD-RTO: 14.5 % (ref 11.8–14.4)
PLATELET # BLD: 385 K/UL (ref 138–453)
PMV BLD AUTO: 11.6 FL (ref 8.1–13.5)
POC HCO3: 24 MMOL/L (ref 21–28)
POC O2 SATURATION: 95 % (ref 94–98)
POC PCO2: 38.4 MM HG (ref 35–48)
POC PH: 7.4 (ref 7.35–7.45)
POC PO2: 73.1 MM HG (ref 83–108)
POTASSIUM SERPL-SCNC: 3.7 MMOL/L (ref 3.7–5.3)
RBC # BLD: 3.48 M/UL (ref 3.95–5.11)
RBC # BLD: ABNORMAL 10*6/UL
SAMPLE SITE: ABNORMAL
SEG NEUTROPHILS: 77 % (ref 36–65)
SEGMENTED NEUTROPHILS ABSOLUTE COUNT: 8.06 K/UL (ref 1.5–8.1)
SODIUM BLD-SCNC: 138 MMOL/L (ref 135–144)
VANCOMYCIN TROUGH: 16.4 UG/ML (ref 10–20)
WBC # BLD: 10.4 K/UL (ref 3.5–11.3)

## 2022-12-13 PROCEDURE — 2500000003 HC RX 250 WO HCPCS

## 2022-12-13 PROCEDURE — 82803 BLOOD GASES ANY COMBINATION: CPT

## 2022-12-13 PROCEDURE — 36600 WITHDRAWAL OF ARTERIAL BLOOD: CPT

## 2022-12-13 PROCEDURE — 6360000002 HC RX W HCPCS

## 2022-12-13 PROCEDURE — 80048 BASIC METABOLIC PNL TOTAL CA: CPT

## 2022-12-13 PROCEDURE — 94761 N-INVAS EAR/PLS OXIMETRY MLT: CPT

## 2022-12-13 PROCEDURE — 36415 COLL VENOUS BLD VENIPUNCTURE: CPT

## 2022-12-13 PROCEDURE — 6360000002 HC RX W HCPCS: Performed by: INTERNAL MEDICINE

## 2022-12-13 PROCEDURE — 6360000002 HC RX W HCPCS: Performed by: NURSE PRACTITIONER

## 2022-12-13 PROCEDURE — 2700000000 HC OXYGEN THERAPY PER DAY

## 2022-12-13 PROCEDURE — C9113 INJ PANTOPRAZOLE SODIUM, VIA: HCPCS

## 2022-12-13 PROCEDURE — 6370000000 HC RX 637 (ALT 250 FOR IP)

## 2022-12-13 PROCEDURE — 6370000000 HC RX 637 (ALT 250 FOR IP): Performed by: NURSE PRACTITIONER

## 2022-12-13 PROCEDURE — 51798 US URINE CAPACITY MEASURE: CPT

## 2022-12-13 PROCEDURE — 94003 VENT MGMT INPAT SUBQ DAY: CPT

## 2022-12-13 PROCEDURE — 51701 INSERT BLADDER CATHETER: CPT

## 2022-12-13 PROCEDURE — 99291 CRITICAL CARE FIRST HOUR: CPT | Performed by: INTERNAL MEDICINE

## 2022-12-13 PROCEDURE — 72190 X-RAY EXAM OF PELVIS: CPT

## 2022-12-13 PROCEDURE — 94640 AIRWAY INHALATION TREATMENT: CPT

## 2022-12-13 PROCEDURE — 82947 ASSAY GLUCOSE BLOOD QUANT: CPT

## 2022-12-13 PROCEDURE — 85025 COMPLETE CBC W/AUTO DIFF WBC: CPT

## 2022-12-13 PROCEDURE — 6370000000 HC RX 637 (ALT 250 FOR IP): Performed by: STUDENT IN AN ORGANIZED HEALTH CARE EDUCATION/TRAINING PROGRAM

## 2022-12-13 PROCEDURE — 2000000000 HC ICU R&B

## 2022-12-13 PROCEDURE — 80202 ASSAY OF VANCOMYCIN: CPT

## 2022-12-13 PROCEDURE — 2580000003 HC RX 258

## 2022-12-13 RX ORDER — FUROSEMIDE 10 MG/ML
20 INJECTION INTRAMUSCULAR; INTRAVENOUS ONCE
Status: COMPLETED | OUTPATIENT
Start: 2022-12-13 | End: 2022-12-13

## 2022-12-13 RX ORDER — DEXMEDETOMIDINE HYDROCHLORIDE 4 UG/ML
.1-1.5 INJECTION, SOLUTION INTRAVENOUS CONTINUOUS
Status: DISCONTINUED | OUTPATIENT
Start: 2022-12-13 | End: 2022-12-16

## 2022-12-13 RX ADMIN — Medication 1250 MG: at 00:03

## 2022-12-13 RX ADMIN — TOPIRAMATE 75 MG: 100 TABLET, FILM COATED ORAL at 08:07

## 2022-12-13 RX ADMIN — ENOXAPARIN SODIUM 30 MG: 100 INJECTION SUBCUTANEOUS at 08:09

## 2022-12-13 RX ADMIN — LEVALBUTEROL HYDROCHLORIDE 0.63 MG: 0.63 SOLUTION RESPIRATORY (INHALATION) at 16:11

## 2022-12-13 RX ADMIN — SERTRALINE 150 MG: 50 TABLET, FILM COATED ORAL at 08:08

## 2022-12-13 RX ADMIN — HYDROCORTISONE 10 MG: 10 TABLET ORAL at 20:43

## 2022-12-13 RX ADMIN — LAMOTRIGINE 200 MG: 100 TABLET ORAL at 08:08

## 2022-12-13 RX ADMIN — DEXMEDETOMIDINE HYDROCHLORIDE 0.2 MCG/KG/HR: 4 INJECTION, SOLUTION INTRAVENOUS at 13:29

## 2022-12-13 RX ADMIN — Medication 1250 MG: at 13:03

## 2022-12-13 RX ADMIN — LEVALBUTEROL HYDROCHLORIDE 0.63 MG: 0.63 SOLUTION RESPIRATORY (INHALATION) at 19:57

## 2022-12-13 RX ADMIN — PREDNISONE 30 MG: 20 TABLET ORAL at 08:08

## 2022-12-13 RX ADMIN — LEVOTHYROXINE SODIUM 150 MCG: 75 TABLET ORAL at 08:08

## 2022-12-13 RX ADMIN — SODIUM CHLORIDE, PRESERVATIVE FREE 40 MG: 5 INJECTION INTRAVENOUS at 08:10

## 2022-12-13 RX ADMIN — FENTANYL CITRATE 50 MCG: 50 INJECTION, SOLUTION INTRAMUSCULAR; INTRAVENOUS at 00:03

## 2022-12-13 RX ADMIN — LEVALBUTEROL HYDROCHLORIDE 0.63 MG: 0.63 SOLUTION RESPIRATORY (INHALATION) at 08:15

## 2022-12-13 RX ADMIN — POLYETHYLENE GLYCOL 3350 17 G: 17 POWDER, FOR SOLUTION ORAL at 08:17

## 2022-12-13 RX ADMIN — TOPIRAMATE 100 MG: 100 TABLET, FILM COATED ORAL at 20:43

## 2022-12-13 RX ADMIN — PROPOFOL 20 MCG/KG/MIN: 10 INJECTION, EMULSION INTRAVENOUS at 01:15

## 2022-12-13 RX ADMIN — FUROSEMIDE 20 MG: 10 INJECTION, SOLUTION INTRAMUSCULAR; INTRAVENOUS at 13:34

## 2022-12-13 RX ADMIN — HYDROCORTISONE 10 MG: 10 TABLET ORAL at 08:08

## 2022-12-13 RX ADMIN — LEVALBUTEROL HYDROCHLORIDE 0.63 MG: 0.63 SOLUTION RESPIRATORY (INHALATION) at 12:09

## 2022-12-13 RX ADMIN — ENOXAPARIN SODIUM 30 MG: 100 INJECTION SUBCUTANEOUS at 20:43

## 2022-12-13 RX ADMIN — PROPOFOL 20 MCG/KG/MIN: 10 INJECTION, EMULSION INTRAVENOUS at 09:01

## 2022-12-13 ASSESSMENT — PULMONARY FUNCTION TESTS
PIF_VALUE: 13
PIF_VALUE: 8
PIF_VALUE: 23
PIF_VALUE: 19
PIF_VALUE: 7
PIF_VALUE: 11

## 2022-12-13 NOTE — PROGRESS NOTES
Occupational [address]  Occupational Therapy Not Seen Note    DATE: 2022    NAME: Bren Parrish  MRN: [id number]   : 1975      Patient not seen this date for Occupational Therapy due to: I&S    Next Scheduled Treatment: 22    Electronically signed by LOLIS Proctor on 2022 at 3:27 PM

## 2022-12-13 NOTE — PLAN OF CARE
Problem: Respiratory - Adult  Goal: Achieves optimal ventilation and oxygenation  12/13/2022 0821 by Madison Lin RCP  Outcome: Progressing  12/13/2022 0537 by Maye Grayson RN  Outcome: Progressing  12/12/2022 2117 by Mallory Chapman RCP  Flowsheets (Taken 12/12/2022 2117)  Achieves optimal ventilation and oxygenation:   Assess for changes in respiratory status   Position to facilitate oxygenation and minimize respiratory effort   Assess the need for suctioning and aspirate as needed   Respiratory therapy support as indicated   Assess for changes in mentation and behavior   Oxygen supplementation based on oxygen saturation or arterial blood gases   BRONCHOSPASM/BRONCHOCONSTRICTION     [x]         IMPROVE AERATION/BREATH SOUNDS  [x]   ADMINISTER BRONCHODILATOR THERAPY AS APPROPRIATE  [x]   ASSESS BREATH SOUNDS  []   IMPLEMENT AEROSOL/MDI PROTOCOL  [x]   PATIENT EDUCATION AS NEEDED

## 2022-12-13 NOTE — PLAN OF CARE
Problem: Respiratory - Adult  Goal: Achieves optimal ventilation and oxygenation  Outcome ongoing  12/12/2022 2117 by Edson Paredes RCP  Flowsheets (Taken 12/12/2022 2117)  Achieves optimal ventilation and oxygenation:   Assess for changes in respiratory status   Position to facilitate oxygenation and minimize respiratory effort   Assess the need for suctioning and aspirate as needed   Respiratory therapy support as indicated   Assess for changes in mentation and behavior   Oxygen supplementation based on oxygen saturation or arterial blood gases

## 2022-12-13 NOTE — PLAN OF CARE
Problem: Confusion  Goal: Confusion, delirium, dementia, or psychosis is improved or at baseline  Description: INTERVENTIONS:  1. Assess for possible contributors to thought disturbance, including medications, impaired vision or hearing, underlying metabolic abnormalities, dehydration, psychiatric diagnoses, and notify attending LIP  2. Center high risk fall precautions, as indicated  3. Provide frequent short contacts to provide reality reorientation, refocusing and direction  4. Decrease environmental stimuli, including noise as appropriate  5. Monitor and intervene to maintain adequate nutrition, hydration, elimination, sleep and activity  6. If unable to ensure safety without constant attention obtain sitter and review sitter guidelines with assigned personnel  7. Initiate Psychosocial CNS and Spiritual Care consult, as indicated  12/13/2022 1126 by Gina Fitzgerald RN  Outcome: Progressing  12/13/2022 0537 by Shae Avila RN  Outcome: Progressing     Problem: Pain  Goal: Verbalizes/displays adequate comfort level or baseline comfort level  12/13/2022 1126 by Gina Fitzgerald RN  Outcome: Progressing  12/13/2022 0537 by Shae Avila RN  Outcome: Progressing     Problem: Skin/Tissue Integrity  Goal: Absence of new skin breakdown  Description: 1. Monitor for areas of redness and/or skin breakdown  2. Assess vascular access sites hourly  3. Every 4-6 hours minimum:  Change oxygen saturation probe site  4. Every 4-6 hours:  If on nasal continuous positive airway pressure, respiratory therapy assess nares and determine need for appliance change or resting period.   12/13/2022 1126 by Gina Fitzgerald RN  Outcome: Progressing  12/13/2022 0537 by Shae Avila RN  Outcome: Progressing

## 2022-12-13 NOTE — PLAN OF CARE
Problem: Discharge Planning  Goal: Discharge to home or other facility with appropriate resources  12/13/2022 0537 by Joseph Tinajero RN  Outcome: Progressing  12/12/2022 1602 by Keith Lazar RN  Outcome: Progressing     Problem: Confusion  Goal: Confusion, delirium, dementia, or psychosis is improved or at baseline  Description: INTERVENTIONS:  1. Assess for possible contributors to thought disturbance, including medications, impaired vision or hearing, underlying metabolic abnormalities, dehydration, psychiatric diagnoses, and notify attending LIP  2. [place] high risk fall precautions, as indicated  3. Provide frequent short contacts to provide reality reorientation, refocusing and direction  4. Decrease environmental stimuli, including noise as appropriate  5. Monitor and intervene to maintain adequate nutrition, hydration, elimination, sleep and activity  6. If unable to ensure safety without constant attention obtain sitter and review sitter guidelines with assigned personnel  7. Initiate Psychosocial CNS and Spiritual Care consult, as indicated  12/13/2022 0537 by Joseph Tinajero RN  Outcome: Progressing  12/12/2022 1602 by Keith Lazar RN  Outcome: Progressing     Problem: Pain  Goal: Verbalizes/displays adequate comfort level or baseline comfort level  12/13/2022 0537 by Joseph Tinajero RN  Outcome: Progressing  12/12/2022 1602 by Keith Lazar RN  Outcome: Progressing     Problem: Chronic Conditions and Co-morbidities  Goal: Patient's chronic conditions and co-morbidity symptoms are monitored and maintained or improved  12/13/2022 0537 by Joseph Tinajero RN  Outcome: Progressing  12/12/2022 1602 by Keith Lazar RN  Outcome: Progressing     Problem: Skin/Tissue Integrity  Goal: Absence of new skin breakdown  Description: 1. Monitor for areas of redness and/or skin breakdown  2. Assess vascular access sites hourly  3.   Every 4-6 hours minimum:  Change oxygen saturation probe site  4. Every 4-6 hours:  If on nasal continuous positive airway pressure, respiratory therapy assess nares and determine need for appliance change or resting period.   12/13/2022 0537 by Lutricia Canavan, RN  Outcome: Progressing  12/12/2022 1602 by Dustin Grant RN  Outcome: Progressing     Problem: Safety - Adult  Goal: Free from fall injury  12/13/2022 0537 by Lutricia Canavan, RN  Outcome: Progressing  12/12/2022 1602 by Dustin Grant RN  Outcome: Progressing     Problem: ABCDS Injury Assessment  Goal: Absence of physical injury  12/13/2022 0537 by Lutricia Canavan, RN  Outcome: Progressing  12/12/2022 1602 by Dustin Grant RN  Outcome: Progressing     Problem: Nutrition Deficit:  Goal: Optimize nutritional status  12/13/2022 0537 by Lutricia Canavan, RN  Outcome: Progressing  12/12/2022 1602 by Dustin Grant RN  Outcome: Progressing  Flowsheets (Taken 12/12/2022 1552 by Kenn Dakins, LOTUS, LD)  Nutrient intake appropriate for improving, restoring, or maintaining nutritional needs: Recommend, monitor, and adjust tube feedings and TPN/PPN based on assessed needs     Problem: Respiratory - Adult  Goal: Achieves optimal ventilation and oxygenation  12/13/2022 0537 by Lutricia Canavan, RN  Outcome: Progressing  12/12/2022 2117 by Cherylene Demark, [address] (Taken 12/12/2022 2117)  Achieves optimal ventilation and oxygenation:   Assess for changes in respiratory status   Position to facilitate oxygenation and minimize respiratory effort   Assess the need for suctioning and aspirate as needed   Respiratory therapy support as indicated   Assess for changes in mentation and behavior   Oxygen supplementation based on oxygen saturation or arterial blood gases  12/12/2022 1602 by Dustin Grant RN  Outcome: Progressing

## 2022-12-13 NOTE — PROGRESS NOTES
[address] [facility] Pharmacokinetic Monitoring Service - Vancomycin    Consulting Provider: Dr. Josseline Dixon   Indication: pneumonia  Target Concentration: Goal AUC/SURJIT 400-600 mg*hr/L  Day of Therapy: 3  Additional Antimicrobials: n/a    Pertinent Laboratory Values: Wt Readings from Last 1 Encounters:   12/13/22 275 lb 12.7 oz (125.1 kg)     Temp Readings from Last 1 Encounters:   12/13/22 99.7 °F (37.6 °C) (Bladder)     Estimated Creatinine Clearance: 147 mL/min (based on SCr of 0.6 mg/dL).   Recent Labs     12/12/22  0454 12/13/22  0633   CREATININE 0.66 0.60   WBC 11.4* 10.4     Pertinent Cultures:  Culture Date Source Results   12.10 Sputum MRSA - heavy   MRSA Nasal Swab: showed MRSA positive result on 12.10    Recent vancomycin administrations                     vancomycin (VANCOCIN) 1250 mg in sodium chloride 0.9% 250 mL IVPB (mg) 1,250 mg New Bag 12/13/22 1303     1,250 mg New Bag  0003     1,250 mg New Bag 12/12/22 1558     1,250 mg New Bag  0030     1,250 mg New Bag 12/11/22 1311        Assessment:  Date/Time Current Dose Concentration Timing of Concentration (h) AUC   12.13 1250mg IV Q12H 16.4 mcg/mL 11.5 hrs post dose 530   Note: Serum concentrations collected for AUC dosing may appear elevated if collected in close proximity to the dose administered, this is not necessarily an indication of toxicity    Plan:  Current dosing regimen is therapeutic  Continue current dose  Pharmacy will continue to monitor patient and adjust therapy as indicated    Thank you for the consult,  Cristina Bonner, [facility]  12/13/2022 3:17 PM

## 2022-12-13 NOTE — PROGRESS NOTES
INTENSIVE CARE UNIT  Resident Physician Progress Note    Patient - Bren Parrish  Date of Admission -  12/5/2022 11:39 PM  Date of Evaluation -  12/13/2022  Room and Bed Number -  3021/3021-01   Hospital Day - 7      SUBJECTIVE:     OVERNIGHT EVENTS:      No acute events happened overnight  Patient normal heart rate with normal temperature, remained afebrile    TODAY:    Patient examined at bedside, labs reviewed  Patient sedated on propofol, sedated on ventilator with FiO2 40%, PEEP 5, respiratory rate  18 and . ABG showed pH 7.404, PCO2 38.4, PO2 73.1, HCO3 24.0, O2 sat 95  WBC trending down 10.4, hemoglobin 10.2, BMP is unremarkable  Total intake 2474, total output 1725, net +749, urine output 1725 mL/kg/h    Review of Systems   Reason unable to perform ROS: Patient sedated and on ventilatior. Brief History:   Patient, 31-year-old female, initially presented to hospital on 12/5 with an episode of fall. Patient is developmentally delayed and has a history of brain tumor as a child with a lot of radiation. Patient also has past medical history of diabetes mellitus, bilateral sensorineural hearing loss. Had a CT head showed no intracranial abnormality, CT abdominal pelvis showed  Nondisplaced fractures of right sacral ilya, nondisplaced fracture of right superior ramus and right obturator ring. CT spine showed acute fracture of right C7 transverse process and also age indeterminate compressive fracture of T3. Neurosurgery for cervical fractures was consulted, who recommended no neurosurgical intervention. Orthopedic surgery for pelvic fractures was also consulted, no surgical intervention but medical management and also recommended to follow-up in orthopedic clinic after discharge. Patient was started on BiPAP for increased work of breathing. Patient was oriented x4 at day of presentation.    12/06 - patient had a decline in mental status with tachypnea, respiratory rate o 30s to 40 with heart rate of 105 patient was intubated emergently     - Neurology was consulted because of history of migraines, seizure disorder, patient was started on her home antiepileptic drugs including Topamax and Lamictal, EEG was ordered to rule out any subclinical seizure activity, MRI was also ordered to rule out any CVA. Patient was doing fine, arousable and following commands. Pulmonology was consulted, recommended to extubate and patient was extubated.      12/10 -rapid was called today due to impending respiratory failure, patient was intubated again and was sent to ICU       -respiratory culture came back positive for staph aureus , nasal respiratory swab came out positive for MRSA DNA, patient started on vancomycin      : Tachycardia improved to normal heart rate, patient remained afebrile, WBC count trending down    AWAKE & FOLLOWING COMMANDS:  [x] No   [] Yes    SECRETIONS Amount:  [] Small [x] Moderate  [] Large  [] None  Color:     [] White [] Colored  [] Bloody    SEDATION:  RAAS Score:  [x] Propofol gtt  [] Versed gtt  [] Ativan gtt   [] No Sedation    PARALYZED:  [x] No    [] Yes    VASOPRESSORS:  [x] No    [] Yes  [] Levophed [] Dopamine [] Vasopressin  [] Dobutamine [] Phenylephrine [] Epinephrine    OBJECTIVE:     VITAL SIGNS:  /82   Pulse 83   Temp 99.3 °F (37.4 °C) (Esophageal)   Resp 24   Ht 5' 1\" (1.549 m)   Wt 275 lb 12.7 oz (125.1 kg)   SpO2 97%   BMI 52.11 kg/m²   Tmax over 24 hours:  Temp (24hrs), Av °F (37.8 °C), Min:99.1 °F (37.3 °C), Max:100.4 °F (38 °C)      Patient Vitals for the past 8 hrs:   BP Temp Temp src Pulse Resp SpO2 Weight   22 0615 -- -- -- 83 24 97 % --   22 0600 125/82 99.3 °F (37.4 °C) Esophageal 82 23 99 % --   22 0545 -- -- -- 83 26 98 % --   22 0530 -- -- -- 85 25 98 % --   22 0515 -- -- -- 84 25 99 % --   22 0501 -- -- -- 82 24 99 % --   22 0500 115/75 -- -- 83 23 99 % --   22 0445 -- -- -- 82 25 99 % --   12/13/22 0430 -- -- -- 86 26 99 % --   12/13/22 0426 -- -- -- -- -- -- 275 lb 12.7 oz (125.1 kg)   12/13/22 0415 -- -- -- 86 21 100 % --   12/13/22 0400 118/85 99.5 °F (37.5 °C) Esophageal 90 27 99 % --   12/13/22 0345 -- -- -- 85 25 99 % --   12/13/22 0330 -- -- -- 88 27 99 % --   12/13/22 0315 -- -- -- 87 29 96 % --   12/13/22 0310 -- -- -- 85 26 98 % --   12/13/22 0300 121/77 -- -- 87 23 99 % --   12/13/22 0245 -- -- -- 82 26 99 % --   12/13/22 0230 -- -- -- 79 23 98 % --   12/13/22 0215 -- -- -- 80 24 98 % --   12/13/22 0200 113/68 99.1 °F (37.3 °C) Esophageal 83 24 98 % --   12/13/22 0145 -- -- -- 85 22 99 % --   12/13/22 0130 -- -- -- 85 21 98 % --   12/13/22 0115 -- -- -- 85 (!) 45 99 % --   12/13/22 0100 119/77 -- -- 80 23 97 % --   12/13/22 0045 -- -- -- 83 22 96 % --   12/13/22 0030 -- -- -- 86 28 99 % --   12/13/22 0015 -- -- -- 84 25 94 % --   12/13/22 0000 119/69 100.2 °F (37.9 °C) Esophageal 97 30 98 % --         Intake/Output Summary (Last 24 hours) at 12/13/2022 0751  Last data filed at 12/13/2022 9289  Gross per 24 hour   Intake 2402.41 ml   Output 1725 ml   Net 677.41 ml     Date 12/13/22 0000 - 12/13/22 2359   Shift 8392-1057 2329-3520 [id number] 24 Hour Total   INTAKE   I.V.(mL/kg) 361.3(2.9)   361.3(2.9)   NG/GT(mL/kg) 364(2.9)   364(2.9)   IV Piggyback(mL/kg) 249.4(2)   249.4(2)   Shift Total(mL/kg) 974.7(7.8)   974.7(7.8)   OUTPUT   Urine(mL/kg/hr) 875   875   Shift Total(mL/kg) 875(7)   875(7)   Weight (kg) 125.1 125.1 125.1 125. 1     Wt Readings from Last 3 Encounters:   12/13/22 275 lb 12.7 oz (125.1 kg)   12/05/22 277 lb (125.6 kg)   11/07/22 279 lb (126.6 kg)     Body mass index is 52.11 kg/m². Physical Exam  Constitutional:       General: She is sleeping. Interventions: She is sedated and intubated. Comments: Patient sedated, not following commands   Cardiovascular:      Rate and Rhythm: Heart rate normal present. Pulses: Normal pulses.       Heart sounds: Normal heart sounds. Pulmonary:      Effort: She is intubated. Comments: Bilateral crackles, improved  Abdominal:      General: Abdomen is flat. Bowel sounds are normal.      Palpations: Abdomen is soft. Neurological:      Mental Status: She is unresponsive. MEDICATIONS:  Scheduled Meds:   pantoprazole (PROTONIX) 40 mg injection  40 mg IntraVENous Daily    vancomycin (VANCOCIN) intermittent dosing (placeholder)   Other RX Placeholder    vancomycin  1,250 mg IntraVENous Q12H    predniSONE  30 mg Oral Daily    hydrocortisone  10 mg Oral BID    haloperidol lactate  5 mg IntraVENous Once    chlorhexidine  15 mL Mouth/Throat BID    levalbuterol  0.63 mg Nebulization 4x daily    enoxaparin  30 mg SubCUTAneous BID    sertraline  150 mg Oral Daily    lamoTRIgine  200 mg Oral Daily    levothyroxine  150 mcg Oral QAM AC    topiramate  75 mg Oral Daily    topiramate  100 mg Oral Nightly    polyethylene glycol  17 g Oral Daily     Continuous Infusions:   sodium chloride Stopped (12/12/22 0316)    sodium chloride 30 mL/hr at 12/13/22 0639    propofol 20 mcg/kg/min (12/13/22 0639)     PRN Meds:   sodium chloride, , PRN  fentanNYL, 50 mcg, Q2H PRN  oxyCODONE, 5 mg, Q4H PRN  oxyCODONE, 10 mg, Q4H PRN  acetaminophen, 650 mg, Q6H PRN  albuterol sulfate HFA, 1 puff, Q4H PRN  sodium chloride flush, 5-40 mL, PRN        SUPPORT DEVICES: [x] Ventilator [] BIPAP  [] Nasal Cannula [] Room Air    VENT SETTINGS (Comprehensive) (if applicable): PRVC mode, FiO2 40%, PEEP 5, Respiratory Rate 18, Tidal Volume 380  Vent Information  Ventilator ID: [id number]  Equipment Changed: Expiratory Filter  Ventilator Initiate: Yes  Vent Mode: AC/PRVC  Additional Respiratory Assessments  Heart Rate: 83  Resp: 24  SpO2: 97 %  End Tidal CO2: 32 (%)  Position: Semi-Love's  Humidification Source: Heated wire  Humidification Temp: 38.5    ABGs:   Arterial Blood Gas result:  pH 7.404; pCO2 38.4; pO2 73.1; HCO3 24.0; BE 1; %O2 Sat 95.   Lab Results Component Value Date/Time    FIO2 40.0 12/13/2022 04:39 AM         DATA:  Complete Blood Count:   Recent Labs     12/11/22  0706 12/12/22  0454 12/13/22  0633   WBC 11.6* 11.4* 10.4   RBC 4.35 4.24 3.48*   HGB 12.1 12.1 10.2*   HCT 39.7 36.6 32.4*   MCV 91.3 86.3 93.1   MCH 27.8 28.5 29.3   MCHC 30.5 33.1 31.5   RDW 14.0 14.3 14.5*    137* 385   MPV 10.6 11.9 11.6        Last 3 Blood Glucose:   Recent Labs     12/11/22  0706 12/12/22  0454 12/13/22  0633   GLUCOSE 116* 121* 111*        PT/INR:    Lab Results   Component Value Date/Time    PROTIME 10.5 12/05/2022 05:35 PM    INR 1.0 12/05/2022 05:35 PM     PTT:    Lab Results   Component Value Date/Time    APTT 24.9 03/10/2015 11:25 AM       Comprehensive Metabolic Profile:   Recent Labs     12/11/22  0706 12/12/22 0454 12/13/22  0633   * 136 138   K 3.9 4.6 3.7    104 108*   CO2 21 24 23   BUN 7 12 20   CREATININE 0.66 0.66 0.60   GLUCOSE 116* 121* 111*   CALCIUM 8.1* 8.3* 8.0*      Magnesium:   Lab Results   Component Value Date/Time    MG 1.7 12/06/2022 10:12 PM     Phosphorus:   Lab Results   Component Value Date/Time    PHOS 2.9 12/06/2022 10:12 PM     Ionized Calcium: No results found for: CAION     Urinalysis:   Lab Results   Component Value Date/Time    NITRU NEGATIVE 12/06/2022 08:05 PM    COLORU Yellow 12/06/2022 08:05 PM    PHUR 5.5 12/06/2022 08:05 PM    WBCUA None 12/06/2022 08:05 PM    RBCUA None 12/06/2022 08:05 PM    MUCUS 1+ 03/27/2013 05:54 PM    TRICHOMONAS NOT REPORTED 03/27/2013 05:54 PM    YEAST NOT REPORTED 03/27/2013 05:54 PM    BACTERIA RARE 03/27/2013 05:54 PM    CLARITYU clear 11/11/2014 01:20 PM    SPECGRAV 1.035 12/06/2022 08:05 PM    LEUKOCYTESUR NEGATIVE 12/06/2022 08:05 PM    UROBILINOGEN Normal 12/06/2022 08:05 PM    BILIRUBINUR NEGATIVE 12/06/2022 08:05 PM    BILIRUBINUR neagtive 03/17/2017 04:30 PM    BLOODU negative 03/17/2017 04:30 PM    GLUCOSEU NEGATIVE 12/06/2022 08:05 PM    KETUA MODERATE 12/06/2022 08:05 PM    AMORPHOUS NOT REPORTED 03/27/2013 05:54 PM       HgBA1c:    Lab Results   Component Value Date/Time    LABA1C 5.3 04/26/2022 12:00 AM     TSH:    Lab Results   Component Value Date/Time    TSH <0.01 12/07/2022 03:57 AM     Lactic Acid: No results found for: LACTA   Troponin: No results for input(s): TROPONINI in the last 72 hours. ASSESSMENT:     Patient Active Problem List    Diagnosis Date Noted    Acute respiratory failure with hypoxia ([facility] [address] 12/11/2022    Aspiration pneumonia ([facility] [address] 12/10/2022    Multiple closed pelvic fractures without disruption of pelvic Afognak ([facility] [address] 12/09/2022    Lethargy 12/07/2022    Cervical transverse process fracture, [facility] 12/06/2022    Closed nondisplaced fracture of seventh cervical vertebra ([facility] 12/06/2022    Gastroesophageal reflux disease 08/12/2022    Irritable bowel syndrome with constipation 06/07/2022    Hypopituitarism ([facility] [address] 08/24/2016    Hypothyroidism 08/06/2014    Asthma 08/06/2014    Seizure ([facility] 10/23/2013    Type 2 diabetes mellitus with stage 3a chronic kidney disease, without long-term current use of insulin ([facility] [address] 11/12/2021    Moderate episode of recurrent major depressive disorder ([facility] [address] 02/26/2020    ROHIT on CPAP 05/08/2015    Morbid obesity ([facility] [address] 08/06/2014          PLAN:     WEAN PER PROTOCOL:  [x] No   [] Yes  [] N/A    ICU PROPHYLAXIS:  Stress ulcer:  [x] PPI Agent  [] [id number] [] Sucralfate  [] Other:  VTE:   [x] Enoxaparin  [] Unfract.  Heparin Subcut  [x] EPC Cuffs    NUTRITION:  [] NPO [] Tube Feeding (Specify: ) [] TPN  [] PO    FAMILY UPDATED:    [] No  [x] Yes    TRANSFER OUT OF ICU:   [x] No  [] Yes        Additional Assessment:  Principal Problem:    Cervical transverse process fracture, initial encounter [facility])  Active Problems:    Seizure ([facility] [address]    Hypothyroidism    Asthma    Closed nondisplaced fracture of seventh cervical vertebra (HCC)    Lethargy    Multiple closed pelvic fractures without disruption of pelvic Afognak ([facility]    Aspiration pneumonia ([facility] [address]    Acute respiratory failure with hypoxia ([facility]        Plan:     Acute Respiratory failure with hypoxia :  Secondary to Aspiration pneumonia  Sedated on propofol and on ventilator   Respiratory culture positive for Staph aureus   Currently on IV vancomycin   WBC : 10.4, trending down  Pulmonary toilet and chest physiotherapy. Aspiration pneumonia   Secondary to intubation   Sedated on propofol and on ventilator   Respiratory culture positive for Staph aureus   Currently on IV vancomycin   WBC : 10.4, trending down  Pulmonary toilet and chest physiotherapy. Cervical Transverse process fracture   Neurosurgery no active intervention required right now. Seizure   Started on Home medications   Currently on lamotrigine 200 mg tab OD, Topamax 100 mg OD      Asthma   On Levabeutrol nebulizer QID  Currently on albuterol sulfate as needed inhaler 1 puff every 4 hours   prednisone 30 mg via OG tube  OD x 4 days      Pelvic fracture  Orthopedic surgery consulted, appreciate recommendations  No acute intervention required, recommends to follow-up as an outpatient after discharge     Hypopituitarism   Secondary to  childhood radiation due to brain tumor  Taking Synthroid 150 mcg OD for hypothyroidism central   Taking hydrocortisone 10 mg BD  for Secondary/ central hypopituitarism      Hypothyroidism  Secondary to hypopituitarism secondary to childhood radiation due to brain tumor  On levothyroxine 150 mcg  Also on hydrocortisone 10 mg Po BID      Prophylaxis:  DVT: Lovenox 30 mg  GI : protonix IV 40 mg     Dispo: To remain in ICU     Electronically signed by Karla Gordon MD on 12/13/2022 at 7:51 AM    Karla Gordon MD  Internal Medicine Resident, PGY-1  [facility],  [facility].  7:51 AM      Attending Physician Statement  I have discussed the care of Asad Parra, including pertinent history and exam findings with the resident.  I have reviewed the key elements of all parts of the encounter with the resident. I have seen and examined the patient with the resident. I agree with the assessment and plan and status of the problem list as documented. I seen the patient during around today, chart reviewed, labs seen ventilator setting reviewed. Patient is on propofol 20 mcg she was lethargic when I saw her did not follow commands. WBC count is decreasing urine output is 1.7 L last 24-hour. She is afebrile overnight WBC count is decreasing. She is currently on vancomycin for MRSA in respiratory cultures. Chest x-ray shows low lung volume and atelectasis. We will give her 1 dose of Lasix 20 mg and follow urine output. Will take her off propofol drip and will start her on Precedex drip. Ventilator support PRVC/\18/380/5/40%, ABG 7.40/38/73/24  Daily spontaneous breathing trial.    Discussed with nursing staff, treatment and plan discussed. Total critical care time caring for this patient with life threatening, unstable organ failure, including direct patient contact, management of life support systems, review of data including imaging and labs, discussions with other team members and physicians at least 27  Min so far today, excluding procedures. Please note that this chart was generated using voice recognition Dragon dictation software. Although every effort was made to ensure the accuracy of this automated transcription, some errors in transcription may have occurred.      Matthias Figueroa MD  12/13/2022 12:58 PM

## 2022-12-14 ENCOUNTER — APPOINTMENT (OUTPATIENT)
Dept: GENERAL RADIOLOGY | Age: 47
DRG: 551 | End: 2022-12-14
Payer: MEDICARE

## 2022-12-14 PROBLEM — A49.02 MRSA INFECTION: Status: ACTIVE | Noted: 2022-12-14

## 2022-12-14 PROBLEM — J22 ACUTE LOWER RESPIRATORY TRACT INFECTION: Status: ACTIVE | Noted: 2022-12-14

## 2022-12-14 LAB
ABSOLUTE EOS #: 0.12 K/UL (ref 0–0.44)
ABSOLUTE IMMATURE GRANULOCYTE: 0.16 K/UL (ref 0–0.3)
ABSOLUTE LYMPH #: 1.26 K/UL (ref 1.1–3.7)
ABSOLUTE MONO #: 0.72 K/UL (ref 0.1–1.2)
ALLEN TEST: POSITIVE
ANION GAP SERPL CALCULATED.3IONS-SCNC: 8 MMOL/L (ref 9–17)
BASOPHILS # BLD: 1 % (ref 0–2)
BASOPHILS ABSOLUTE: 0.04 K/UL (ref 0–0.2)
BUN BLDV-MCNC: 25 MG/DL (ref 6–20)
CALCIUM SERPL-MCNC: 8.1 MG/DL (ref 8.6–10.4)
CHLORIDE BLD-SCNC: 109 MMOL/L (ref 98–107)
CO2: 23 MMOL/L (ref 20–31)
CREAT SERPL-MCNC: 0.63 MG/DL (ref 0.5–0.9)
EOSINOPHILS RELATIVE PERCENT: 2 % (ref 1–4)
FIO2: 40
GFR SERPL CREATININE-BSD FRML MDRD: >60 ML/MIN/1.73M2
GLUCOSE BLD-MCNC: 117 MG/DL (ref 70–99)
GLUCOSE BLD-MCNC: 118 MG/DL (ref 74–100)
HCT VFR BLD CALC: 33.4 % (ref 36.3–47.1)
HEMOGLOBIN: 10 G/DL (ref 11.9–15.1)
IMMATURE GRANULOCYTES: 2 %
LYMPHOCYTES # BLD: 16 % (ref 24–43)
MCH RBC QN AUTO: 27.9 PG (ref 25.2–33.5)
MCHC RBC AUTO-ENTMCNC: 29.9 G/DL (ref 28.4–34.8)
MCV RBC AUTO: 93 FL (ref 82.6–102.9)
MONOCYTES # BLD: 9 % (ref 3–12)
NRBC AUTOMATED: 0.4 PER 100 WBC
PDW BLD-RTO: 14.5 % (ref 11.8–14.4)
PLATELET # BLD: 223 K/UL (ref 138–453)
PMV BLD AUTO: 10.8 FL (ref 8.1–13.5)
POC HCO3: 24.5 MMOL/L (ref 21–28)
POC O2 SATURATION: 96 % (ref 94–98)
POC PCO2: 38.4 MM HG (ref 35–48)
POC PH: 7.41 (ref 7.35–7.45)
POC PO2: 77.6 MM HG (ref 83–108)
POSITIVE BASE EXCESS, ART: 0 (ref 0–3)
POTASSIUM SERPL-SCNC: 3.7 MMOL/L (ref 3.7–5.3)
RBC # BLD: 3.59 M/UL (ref 3.95–5.11)
RBC # BLD: ABNORMAL 10*6/UL
SAMPLE SITE: ABNORMAL
SEG NEUTROPHILS: 71 % (ref 36–65)
SEGMENTED NEUTROPHILS ABSOLUTE COUNT: 5.54 K/UL (ref 1.5–8.1)
SODIUM BLD-SCNC: 140 MMOL/L (ref 135–144)
WBC # BLD: 7.8 K/UL (ref 3.5–11.3)

## 2022-12-14 PROCEDURE — 2500000003 HC RX 250 WO HCPCS

## 2022-12-14 PROCEDURE — 36600 WITHDRAWAL OF ARTERIAL BLOOD: CPT

## 2022-12-14 PROCEDURE — 6370000000 HC RX 637 (ALT 250 FOR IP)

## 2022-12-14 PROCEDURE — 6370000000 HC RX 637 (ALT 250 FOR IP): Performed by: INTERNAL MEDICINE

## 2022-12-14 PROCEDURE — 82803 BLOOD GASES ANY COMBINATION: CPT

## 2022-12-14 PROCEDURE — 6370000000 HC RX 637 (ALT 250 FOR IP): Performed by: NURSE PRACTITIONER

## 2022-12-14 PROCEDURE — 80048 BASIC METABOLIC PNL TOTAL CA: CPT

## 2022-12-14 PROCEDURE — 99222 1ST HOSP IP/OBS MODERATE 55: CPT | Performed by: INTERNAL MEDICINE

## 2022-12-14 PROCEDURE — 6360000002 HC RX W HCPCS

## 2022-12-14 PROCEDURE — 2000000000 HC ICU R&B

## 2022-12-14 PROCEDURE — 82947 ASSAY GLUCOSE BLOOD QUANT: CPT

## 2022-12-14 PROCEDURE — 6360000002 HC RX W HCPCS: Performed by: INTERNAL MEDICINE

## 2022-12-14 PROCEDURE — 94003 VENT MGMT INPAT SUBQ DAY: CPT

## 2022-12-14 PROCEDURE — 94761 N-INVAS EAR/PLS OXIMETRY MLT: CPT

## 2022-12-14 PROCEDURE — 6370000000 HC RX 637 (ALT 250 FOR IP): Performed by: STUDENT IN AN ORGANIZED HEALTH CARE EDUCATION/TRAINING PROGRAM

## 2022-12-14 PROCEDURE — 2700000000 HC OXYGEN THERAPY PER DAY

## 2022-12-14 PROCEDURE — 6360000002 HC RX W HCPCS: Performed by: NURSE PRACTITIONER

## 2022-12-14 PROCEDURE — 85025 COMPLETE CBC W/AUTO DIFF WBC: CPT

## 2022-12-14 PROCEDURE — 71045 X-RAY EXAM CHEST 1 VIEW: CPT

## 2022-12-14 PROCEDURE — 51798 US URINE CAPACITY MEASURE: CPT

## 2022-12-14 PROCEDURE — 36415 COLL VENOUS BLD VENIPUNCTURE: CPT

## 2022-12-14 PROCEDURE — C9113 INJ PANTOPRAZOLE SODIUM, VIA: HCPCS

## 2022-12-14 PROCEDURE — APPSS60 APP SPLIT SHARED TIME 46-60 MINUTES: Performed by: NURSE PRACTITIONER

## 2022-12-14 PROCEDURE — 94640 AIRWAY INHALATION TREATMENT: CPT

## 2022-12-14 PROCEDURE — 99291 CRITICAL CARE FIRST HOUR: CPT | Performed by: INTERNAL MEDICINE

## 2022-12-14 PROCEDURE — 51701 INSERT BLADDER CATHETER: CPT

## 2022-12-14 PROCEDURE — 2580000003 HC RX 258

## 2022-12-14 RX ORDER — FUROSEMIDE 10 MG/ML
INJECTION INTRAMUSCULAR; INTRAVENOUS
Status: COMPLETED
Start: 2022-12-14 | End: 2022-12-14

## 2022-12-14 RX ORDER — FUROSEMIDE 10 MG/ML
20 INJECTION INTRAMUSCULAR; INTRAVENOUS DAILY
Status: DISCONTINUED | OUTPATIENT
Start: 2022-12-14 | End: 2022-12-23

## 2022-12-14 RX ADMIN — DEXMEDETOMIDINE HYDROCHLORIDE 0.2 MCG/KG/HR: 4 INJECTION, SOLUTION INTRAVENOUS at 17:25

## 2022-12-14 RX ADMIN — SODIUM CHLORIDE, PRESERVATIVE FREE 40 MG: 5 INJECTION INTRAVENOUS at 08:33

## 2022-12-14 RX ADMIN — LEVALBUTEROL HYDROCHLORIDE 0.63 MG: 0.63 SOLUTION RESPIRATORY (INHALATION) at 19:42

## 2022-12-14 RX ADMIN — LAMOTRIGINE 200 MG: 100 TABLET ORAL at 08:22

## 2022-12-14 RX ADMIN — PREDNISONE 30 MG: 20 TABLET ORAL at 08:22

## 2022-12-14 RX ADMIN — FUROSEMIDE 20 MG: 10 INJECTION, SOLUTION INTRAMUSCULAR; INTRAVENOUS at 13:10

## 2022-12-14 RX ADMIN — ENOXAPARIN SODIUM 30 MG: 100 INJECTION SUBCUTANEOUS at 21:07

## 2022-12-14 RX ADMIN — HYDROCORTISONE 10 MG: 10 TABLET ORAL at 20:54

## 2022-12-14 RX ADMIN — TOPIRAMATE 75 MG: 100 TABLET, FILM COATED ORAL at 08:23

## 2022-12-14 RX ADMIN — CHLORHEXIDINE GLUCONATE 15 ML: 1.2 RINSE ORAL at 20:54

## 2022-12-14 RX ADMIN — LEVALBUTEROL HYDROCHLORIDE 0.63 MG: 0.63 SOLUTION RESPIRATORY (INHALATION) at 08:44

## 2022-12-14 RX ADMIN — LEVALBUTEROL HYDROCHLORIDE 0.63 MG: 0.63 SOLUTION RESPIRATORY (INHALATION) at 16:35

## 2022-12-14 RX ADMIN — HYDROCORTISONE 10 MG: 10 TABLET ORAL at 08:23

## 2022-12-14 RX ADMIN — TOPIRAMATE 100 MG: 100 TABLET, FILM COATED ORAL at 20:54

## 2022-12-14 RX ADMIN — POLYETHYLENE GLYCOL 3350 17 G: 17 POWDER, FOR SOLUTION ORAL at 08:32

## 2022-12-14 RX ADMIN — Medication 1250 MG: at 12:47

## 2022-12-14 RX ADMIN — Medication 1250 MG: at 01:16

## 2022-12-14 RX ADMIN — SODIUM CHLORIDE: 9 INJECTION, SOLUTION INTRAVENOUS at 20:52

## 2022-12-14 RX ADMIN — DEXMEDETOMIDINE HYDROCHLORIDE 0.2 MCG/KG/HR: 4 INJECTION, SOLUTION INTRAVENOUS at 02:45

## 2022-12-14 RX ADMIN — LEVOTHYROXINE SODIUM 150 MCG: 75 TABLET ORAL at 08:20

## 2022-12-14 RX ADMIN — LEVALBUTEROL HYDROCHLORIDE 0.63 MG: 0.63 SOLUTION RESPIRATORY (INHALATION) at 11:57

## 2022-12-14 RX ADMIN — ENOXAPARIN SODIUM 30 MG: 100 INJECTION SUBCUTANEOUS at 08:33

## 2022-12-14 RX ADMIN — SERTRALINE 150 MG: 50 TABLET, FILM COATED ORAL at 08:22

## 2022-12-14 ASSESSMENT — PULMONARY FUNCTION TESTS
PIF_VALUE: 15
PIF_VALUE: 14
PIF_VALUE: 14
PIF_VALUE: 11
PIF_VALUE: 19
PIF_VALUE: 11
PIF_VALUE: 13
PIF_VALUE: 17
PIF_VALUE: 10
PIF_VALUE: 17
PIF_VALUE: 19
PIF_VALUE: 18
PIF_VALUE: 16
PIF_VALUE: 10
PIF_VALUE: 17
PIF_VALUE: 17
PIF_VALUE: 11
PIF_VALUE: 11
PIF_VALUE: 24
PIF_VALUE: 24

## 2022-12-14 NOTE — PROGRESS NOTES
INTENSIVE CARE UNIT  Resident Physician Progress Note    Patient - Den Nunez  Date of Admission -  12/5/2022 11:39 PM  Date of Evaluation -  12/14/2022  Room and Bed Number -  3021/3021-01   Hospital Day - 8      SUBJECTIVE:     OVERNIGHT EVENTS:      No acute events happened overnight  Patient normal heart rate with normal temperature, remained afebrile     TODAY:    Patient examined at bedside, labs reviewed  Patient sedated on continuous precedex drip,  Ventilated with RR 18, , PEEP 5, FIO2 40%  ABG : pH 7.413, PCO2 38.4, PO2 77.6, HCO3 24.5, O2 stat 96. Hb 10, WBC 7.8, BMP unremarkable  In last 24 hours I : 2200, O : 2150, Net : +50.2    ECHO :  The Left ventricle appears normal in size. Normal wall thickness. Normal LV systolic function, Ejection Fraction > 65%  Normal RV size and function. RV systolic pressure 30 mmHg  The LA and RA appears normal in size. No obvious significant structural valvular abnormality noted. No significant valvular stenosis or regurgitation noted. Normal aortic root dimensions. No significant pericardial infusion seen. The IVC appears normal in size, normal respiratory variation suggestive of  normal right atrial filling pressure. Review of Systems   Reason unable to perform ROS: Patient sedated and on ventilatior. Brief History:   Patient, 44-year-old female, initially presented to hospital on 12/5 with an episode of fall. Patient is developmentally delayed and has a history of brain tumor as a child with a lot of radiation. Patient also has past medical history of diabetes mellitus, bilateral sensorineural hearing loss. Had a CT head showed no intracranial abnormality, CT abdominal pelvis showed  Nondisplaced fractures of right sacral ilya, nondisplaced fracture of right superior ramus and right obturator ring. CT spine showed acute fracture of right C7 transverse process and also age indeterminate compressive fracture of T3.   Neurosurgery for cervical fractures was consulted, who recommended no neurosurgical intervention. Orthopedic surgery for pelvic fractures was also consulted, no surgical intervention but medical management and also recommended to follow-up in orthopedic clinic after discharge. Patient was started on BiPAP for increased work of breathing. Patient was oriented x4 at day of presentation. 12/06 - patient had a decline in mental status with tachypnea, respiratory rate o 30s to 40 with heart rate of 105 patient was intubated emergently     12/7- Neurology was consulted because of history of migraines, seizure disorder, patient was started on her home antiepileptic drugs including Topamax and Lamictal, EEG was ordered to rule out any subclinical seizure activity, MRI was also ordered to rule out any CVA. Patient was doing fine, arousable and following commands. Pulmonology was consulted, recommended to extubate and patient was extubated. 12/10 -rapid was called today due to impending respiratory failure, patient was intubated again and was sent to ICU      12/12 -respiratory culture came back positive for staph aureus , nasal respiratory swab came out positive for MRSA DNA, patient started on vancomycin      12/13 : Tachycardia improved to normal heart rate, patient remained afebrile, WBC count trending down.  Sedation changed to precedex    AWAKE & FOLLOWING COMMANDS:  [x] No   [] Yes    SECRETIONS Amount:  [] Small [] Moderate  [x] Large  [] None  Color:     [] White [] Colored  [] Bloody    SEDATION:  RAAS Score:  [] Propofol gtt  [] Versed gtt  [] Ativan gtt   [x] Precedex    PARALYZED:  [] No    [] Yes    VASOPRESSORS:  [x] No    [] Yes  [] Levophed [] Dopamine [] Vasopressin  [] Dobutamine [] Phenylephrine [] Epinephrine    OBJECTIVE:     VITAL SIGNS:  /72   Pulse 58   Temp 99.3 °F (37.4 °C) (Esophageal)   Resp 28   Ht 5' 1\" (1.549 m)   Wt 281 lb 8.4 oz (127.7 kg)   SpO2 98%   BMI 53.19 kg/m²   Tmax over 24 hours:  Temp (24hrs), Av.8 °F (37.7 °C), Min:99.1 °F (37.3 °C), Max:100.4 °F (38 °C)      Patient Vitals for the past 8 hrs:   BP Temp Temp src Pulse Resp SpO2 Weight   22 0600 108/72 -- -- 58 28 98 % 281 lb 8.4 oz (127.7 kg)   22 0503 -- -- -- 57 22 99 % --   22 0500 (!) 92/55 -- -- 57 24 99 % --   22 0400 (!) 100/59 99.3 °F (37.4 °C) Esophageal 57 23 98 % --   22 0317 -- -- -- 60 22 99 % --   22 0300 109/83 -- -- 58 24 99 % --   22 0200 96/60 -- -- 60 25 100 % --   22 0100 97/61 -- -- 62 25 99 % --   22 0000 99/65 99.7 °F (37.6 °C) Esophageal 71 30 98 % --   22 2305 -- -- -- 63 24 98 % --         Intake/Output Summary (Last 24 hours) at 2022 0701  Last data filed at 2022 0645  Gross per 24 hour   Intake 2200.19 ml   Output 2150 ml   Net 50.19 ml     Date 22 0000 - 22 235   Shift 8590-4678 2617-1363 0525-8218 24 Hour Total   INTAKE   I.V.(mL/kg) 344.7(2.7)   344.7(2.7)   NG/GT(mL/kg) 236(1.8)   236(1.8)   IV Piggyback(mL/kg) 250(2)   250(2)   Shift Total(mL/kg) 830.7(6.5)   830.7(6.5)   OUTPUT   Shift Total(mL/kg)       Weight (kg) 127.7 127.7 127.7 127.7     Wt Readings from Last 3 Encounters:   22 281 lb 8.4 oz (127.7 kg)   22 277 lb (125.6 kg)   22 279 lb (126.6 kg)     Body mass index is 53.19 kg/m². Physical Exam  Constitutional:       General: She is sleeping. Interventions: She is sedated and intubated. Comments: Patient sedated, not following commands   Cardiovascular:      Rate and Rhythm: Heart rate normal present. Pulses: Normal pulses. Heart sounds: Normal heart sounds. Pulmonary:      Effort: She is intubated. Comments: Bilateral crackles  Abdominal:      General: Abdomen is flat. Bowel sounds are normal.      Palpations: Abdomen is soft.      MEDICATIONS:  Scheduled Meds:   pantoprazole (PROTONIX) 40 mg injection  40 mg IntraVENous Daily    vancomycin (VANCOCIN) intermittent dosing (placeholder)   Other RX Placeholder    vancomycin  1,250 mg IntraVENous Q12H    predniSONE  30 mg Oral Daily    hydrocortisone  10 mg Oral BID    haloperidol lactate  5 mg IntraVENous Once    chlorhexidine  15 mL Mouth/Throat BID    levalbuterol  0.63 mg Nebulization 4x daily    enoxaparin  30 mg SubCUTAneous BID    sertraline  150 mg Oral Daily    lamoTRIgine  200 mg Oral Daily    levothyroxine  150 mcg Oral QAM AC    topiramate  75 mg Oral Daily    topiramate  100 mg Oral Nightly    polyethylene glycol  17 g Oral Daily     Continuous Infusions:   dexmedetomidine 0.2 mcg/kg/hr (12/14/22 0645)    sodium chloride Stopped (12/12/22 0316)    sodium chloride 30 mL/hr at 12/14/22 0645     PRN Meds:   sodium chloride, , PRN  fentanNYL, 50 mcg, Q2H PRN  oxyCODONE, 5 mg, Q4H PRN  oxyCODONE, 10 mg, Q4H PRN  acetaminophen, 650 mg, Q6H PRN  albuterol sulfate HFA, 1 puff, Q4H PRN  sodium chloride flush, 5-40 mL, PRN        SUPPORT DEVICES: [x] Ventilator [] BIPAP  [] Nasal Cannula [] Room Air    VENT SETTINGS (Comprehensive) (if applicable): PRVC mode, FiO2 40%, PEEP 5, Respiratory Rate 18, Tidal Volume 380  Vent Information  Ventilator ID: [id number]  Equipment Changed: Humidification  Ventilator Initiate: Yes  Vent Mode: AC/PRVC  Additional Respiratory Assessments  Heart Rate: 58  Resp: 28  SpO2: 98 %  End Tidal CO2: 31 (%)  Position: Semi-Love's  Humidification Source: Heated wire  Humidification Temp: 37.5    ABGs:   Arterial Blood Gas result:  pH 7.413; pCO2 38.4; pO2 77.6; HCO3 24.5; BE 0 ; %O2 Sat 96.   Lab Results   Component Value Date/Time    FIO2 40.0 12/14/2022 04:35 AM         DATA:  Complete Blood Count:   Recent Labs     12/12/22  0454 12/13/22  0633 12/14/22  0530   WBC 11.4* 10.4 7.8   RBC 4.24 3.48* 3.59*   HGB 12.1 10.2* 10.0*   HCT 36.6 32.4* 33.4*   MCV 86.3 93.1 93.0   MCH 28.5 29.3 27.9   MCHC 33.1 31.5 29.9   RDW 14.3 14.5* 14.5*   * 385 223   MPV 11.9 11.6 10.8        Last 3 Blood Glucose: Recent Labs     12/11/22  0706 12/12/22  0454 12/13/22  0633 12/14/22  0530   GLUCOSE 116* 121* 111* 117*        PT/INR:    Lab Results   Component Value Date/Time    PROTIME 10.5 12/05/2022 05:35 PM    INR 1.0 12/05/2022 05:35 PM     PTT:    Lab Results   Component Value Date/Time    APTT 24.9 03/10/2015 11:25 AM       Comprehensive Metabolic Profile:   Recent Labs     12/12/22  0454 12/13/22  0633 12/14/22  0530    138 140   K 4.6 3.7 3.7    108* 109*   CO2 24 23 23   BUN 12 20 25*   CREATININE 0.66 0.60 0.63   GLUCOSE 121* 111* 117*   CALCIUM 8.3* 8.0* 8.1*      Magnesium:   Lab Results   Component Value Date/Time    MG 1.7 12/06/2022 10:12 PM     Phosphorus:   Lab Results   Component Value Date/Time    PHOS 2.9 12/06/2022 10:12 PM     Ionized Calcium: No results found for: CAION     Urinalysis:   Lab Results   Component Value Date/Time    NITRU NEGATIVE 12/06/2022 08:05 PM    COLORU Yellow 12/06/2022 08:05 PM    PHUR 5.5 12/06/2022 08:05 PM    WBCUA None 12/06/2022 08:05 PM    RBCUA None 12/06/2022 08:05 PM    MUCUS 1+ 03/27/2013 05:54 PM    TRICHOMONAS NOT REPORTED 03/27/2013 05:54 PM    YEAST NOT REPORTED 03/27/2013 05:54 PM    BACTERIA RARE 03/27/2013 05:54 PM    CLARITYU clear 11/11/2014 01:20 PM    SPECGRAV 1.035 12/06/2022 08:05 PM    LEUKOCYTESUR NEGATIVE 12/06/2022 08:05 PM    UROBILINOGEN Normal 12/06/2022 08:05 PM    BILIRUBINUR NEGATIVE 12/06/2022 08:05 PM    BILIRUBINUR neagtive 03/17/2017 04:30 PM    BLOODU negative 03/17/2017 04:30 PM    GLUCOSEU NEGATIVE 12/06/2022 08:05 PM    KETUA MODERATE 12/06/2022 08:05 PM    AMORPHOUS NOT REPORTED 03/27/2013 05:54 PM       HgBA1c:    Lab Results   Component Value Date/Time    LABA1C 5.3 04/26/2022 12:00 AM     TSH:    Lab Results   Component Value Date/Time    TSH <0.01 12/07/2022 03:57 AM     Lactic Acid: No results found for: LACTA   Troponin: No results for input(s): TROPONINI in the last 72 hours.     ASSESSMENT:     Patient Active Problem List    Diagnosis Date Noted    Acute respiratory failure with hypoxia ([facility] 12/11/2022    Aspiration pneumonia ([facility] 12/10/2022    Multiple closed pelvic fractures without disruption of pelvic [place] ([facility] 12/09/2022    Lethargy 12/07/2022    Cervical transverse process fracture, initial encounter ([facility] 12/06/2022    Closed nondisplaced fracture of seventh cervical vertebra ([facility] 12/06/2022    Gastroesophageal reflux disease 08/12/2022    Irritable bowel syndrome with constipation 06/07/2022    Hypopituitarism ([facility] 08/24/2016    Hypothyroidism 08/06/2014    Asthma 08/06/2014    Seizure ([facility] 10/23/2013    Type 2 diabetes mellitus with stage 3a chronic kidney disease, without long-term current use of insulin ([facility] 11/12/2021    Moderate episode of recurrent major depressive disorder ([facility] 02/26/2020    ROHIT on CPAP 05/08/2015    Morbid obesity ([facility] 08/06/2014          PLAN:     WEAN PER PROTOCOL:  [x] No   [] Yes  [] N/A    ICU PROPHYLAXIS:  Stress ulcer:  [x] PPI Agent  [] [id number] [] Sucralfate  [] Other:  VTE:   [x] Enoxaparin  [] Unfract. Heparin Subcut  [] EPC Cuffs    NUTRITION:  [] NPO [x] Tube Feeding (Specify: ) [] TPN  [] PO    HOME MEDS RECONCILED: [] No  [x] Yes    FAMILY UPDATED:    [] No  [x] Yes    TRANSFER OUT OF ICU:   [x] No  [] Yes        Plan:    Acute Respiratory failure with hypoxia :  Secondary to Aspiration pneumonia  Sedated on precedex and on ventilator   Respiratory culture positive for Staph aureus   Currently on IV vancomycin   WBC : 7.8 trending down  Pulmonary toilet and chest physiotherapy. Aspiration pneumonia   Secondary to intubation   Sedated on precedex and on ventilator   Respiratory culture positive for Staph aureus   Currently on IV vancomycin   WBC : 7.8 trending down  Pulmonary toilet and chest physiotherapy. Cervical Transverse process fracture   Neurosurgery no active intervention required right now.       Seizure   Started on Home medications   Currently on lamotrigine 200 mg tab OD, Topamax 100 mg OD      Asthma   On Levabeutrol nebulizer QID  Currently on albuterol sulfate as needed inhaler 1 puff every 4 hours   prednisone 30 mg via OG tube  OD x 4 days      Pelvic fracture  Orthopedic surgery consulted, appreciate recommendations  No acute intervention required,Ortho will continue to follow while inpatient, patient may follow-up outpatient in clinic with Dr. Jose Luis Mora 7 days from today's evaluation     Hypopituitarism   Secondary to  childhood radiation due to brain tumor  Taking Synthroid 150 mcg OD for hypothyroidism central   Taking hydrocortisone 10 mg BD  for Secondary/ central hypopituitarism      Hypothyroidism  Secondary to hypopituitarism secondary to childhood radiation due to brain tumor  On levothyroxine 150 mcg    Prophylaxis:  DVT: Lovenox 30 mg  GI : protonix IV 40 mg     Dispo: To remain in ICU     Electronically signed by Mark Oconnell MD on 12/14/2022 at [address] [facility], MD  Internal Medicine Resident, PGY-1  [facility].  7:01 AM      Attending Physician Statement  I have discussed the care of Nakul Petersen, including pertinent history and exam findings with the resident. I have reviewed the key elements of all parts of the encounter with the resident. I have seen and examined the patient with the resident. I agree with the assessment and plan and status of the problem list as documented. I seen the patient during around today, chart reviewed, labs and medications reviewed overnight events noted. She is off propofol and she is on Precedex drip when I saw her she has eyes open she did not follow commands on very low-dose of Precedex drip. Ventilator setting PRVC/18/380/5/40 percent ABG 7.4 0/38/77/24. Chest x-ray did not show any change from previous x-ray. Urine output 2150 last 24 hours she had received Lasix 20 mg yesterday we will start Lasix 20 mg daily.   She does have moderate to large endotracheal secretion which has been continued bilateral rhonchi is present we will use Lasix 20 mg IV daily and continue with vancomycin. Discussed with nursing staff, treatment and plan discussed. Discussed with respiratory therapist.    Total critical care time caring for this patient with life threatening, unstable organ failure, including direct patient contact, management of life support systems, review of data including imaging and labs, discussions with other team members and physicians at least 27  Min so far today, excluding procedures. Please note that this chart was generated using voice recognition Dragon dictation software. Although every effort was made to ensure the accuracy of this automated transcription, some errors in transcription may have occurred.      Natali Perkins MD  12/14/2022 12:56 PM

## 2022-12-14 NOTE — PLAN OF CARE
Problem: Respiratory - Adult  Goal: Achieves optimal ventilation and oxygenation  12/14/2022 1005 by Charli Davis RCP  Outcome: Progressing     Problem: OXYGENATION/RESPIRATORY FUNCTION  Goal: Patient will maintain patent airway  Outcome: Ongoing  Goal: Patient will achieve/maintain normal respiratory rate/effort  Respiratory rate and effort will be within normal limits for the patient  Outcome: Ongoing    Problem: MECHANICAL VENTILATION  Goal: Patient will maintain patent airway  Outcome: Ongoing  Goal: Oral health is maintained or improved  Outcome: Ongoing  Goal: ET tube will be managed safely  Outcome: Ongoing  Goal: Ability to express needs and understand communication  Outcome: Ongoing  Goal: Mobility/activity is maintained at optimum level for patient  Outcome: Ongoing    Problem: ASPIRATION PRECAUTIONS  Goal: Patients risk of aspiration is minimized  Outcome: Ongoing    Problem: SKIN INTEGRITY  Goal: Skin integrity is maintained or improved  Outcome: Ongoing  BRONCHOSPASM/BRONCHOCONSTRICTION     [x]         IMPROVE AERATION/BREATH SOUNDS  [x]   ADMINISTER BRONCHODILATOR THERAPY AS APPROPRIATE  [x]   ASSESS BREATH SOUNDS  []   IMPLEMENT AEROSOL/MDI PROTOCOL  [x]   PATIENT EDUCATION AS NEEDED

## 2022-12-14 NOTE — CONSULTS
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  Hypothyroidism   Asthma  Deafness in right ear  Seizure disorder  Developmental delay  Morbid obesity  Allergy to bactrim & Cipro    Recommendations:   Continue IV vancomycin for MRSA sputum  Neurosurgery and ortho recommendations    Medical Decision Making/Summary/Discussion:12/14/2022       Infection Control Recommendations   [place] Precautions  Contact for MRSA    Antimicrobial Stewardship Recommendations     Simplification of therapy  Targeted therapy    Coordination of Outpatient Care:   Estimated Length of IV antimicrobials:TBD  Patient will need Midline Catheter Insertion: TBD  Patient will need PICC line Insertion:BD  Patient will need: Home IV , [place],  SNF,  LTAC: TBD  Patient will need outpatient wound care:    Chief complaint/reason for consultation:   Positive sputum culture MRSA      History of Present Illness:   Dipika Bales is a 41y.o.-year-old female who was initially admitted on 12/5/2022. Patient seen at the request of Dr. Yaniv Gloria:    This patient, with a history of developmental delay, presented to the [facility] ED after falling down some stairs at home. She was complaining of neck and back pain. Imaging revealed an acute C7 transverse process fracture, chronic T3 compression fx and fractures of right sacral ala, nondisplaced fracture of right superior ramus and right obturator ring. She was transferred [place] [facility]. Neurosurgery was consulted and it was determined there is no neurosurgical intervention needed.      Orthopedic surgery was also consulted and they also did not recommend surgical intervention, but rather outpatient follow-up. On 12/6/22, the patient's mentation declined and she became tachypneic requiring emergent intubation. Neurology was consulted for the patient's history of seizures. Her home seizure medications were restarted. An EEG did not show any seizure activity and the patient's mentation improved. She was extubated on the 7th but required re-intubation on 12/10/22 after a rapid response was called. CXR showed concern for RUL aspiration pneumonia. IV Zosyn was started and cultures were obtained. Viral respiratory panel was negative for influenza and Covid    Sputum cultures grew MRSA and the patient's antibiotics were changed to IV vancomycin on 12/11/22. There has been no growth on blood or urine cultures. ID was consulted for MRSA on sputum cultures      CT Head W/O Contrast   Final Result   No acute intracranial abnormality. Small amount of fluid in the right mastoid air cells. CT CHEST ABDOMEN PELVIS WO CONTRAST Additional Contrast? None   Final Result   Chest:       1. Mild anterior wedging of T3 suggesting an age indeterminate compression   fracture. No fracture line is identified. Clinical correlation with the   site of symptoms is suggested. 2. Significant motion artifact with scattered patchy ground-glass opacities   in the lungs which may be related to motion artifact. Ground-glass opacities   are otherwise nonspecific and could be related to atypical infection or   pneumonitis. Abdomen and pelvis:       1. Nondisplaced fractures of the right sacral ala and the right obturator   ring. 2. No acute findings elsewhere in the abdomen or pelvis. 3. Left renal cyst.           CT CSpine W/O Contrast   Final Result   Acute fracture of the right C7 transverse process. Spinal degenerative changes as described.                CURRENT EVALUATION 12/14/2022  /67   Pulse 64   Temp 99.7 °F (37.6 °C) (Esophageal)   Resp 27   Ht 5' 1\" (1.549 m)   Wt 281 lb 8.4 oz (127.7 kg)   SpO2 96%   BMI 53.19 kg/m²     Patient evaluated in the ICU    TMAX 99.7  Borderline hypotension    The patient is on mechanical ventilation with 40% FiO2 and PEEP 6  SpO2:96%    The patient is following commands. TF for nutrition    The patient is tolerating IV vancomycin for MRSA sputum. Rhonchi to auscultation    Labs, X rays reviewed: 12/14/2022    BUN:25  Cr:0.63    WBC:7.8  Hb:10.0  Plat: 223    CRP:    Cultures:  Urine:    Blood:  12/10/22: No growth  Sputum :  12/10/22: MRSA  Wound:    MRSA Nares:  12/10/22: Detected    Imaging:  CXR  12/12/22 12/6/22      CT Chest  12/6/22      Discussed with patient, RN, family. I have personally reviewed the past medical history, past surgical history, medications, social history, and family history, and I have updated the database accordingly.   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)     Fibromyalgia     Herpes zoster without mention of complication     Hypothyroidism     Migraine     Seizures ([facility] [address]     last seizure in 2000    Sleep apnea     CPAP    TIA (transient ischemic attack) 2000       Past Surgical  History:     Past Surgical History:   Procedure Laterality Date    BRAIN BIOPSY      tumor biopsy    COCHLEAR IMPLANT Left     COCHLEAR IMPLANT Right 05/10/2022    COLONOSCOPY      COLONOSCOPY N/A 8/12/2022    COLONOSCOPY WITH BIOPSY performed by Andreea Sandoval MD at [address] [place] [address]      implants 1/22    ENDOSCOPY, COLON, DIAGNOSTIC      KNEE SURGERY      scope    TONSILLECTOMY      UPPER GASTROINTESTINAL ENDOSCOPY N/A 8/12/2022    EGD BIOPSY performed by Stephen Stevens MD at [facility] OR       Medications:      pantoprazole (PROTONIX) 40 mg injection  40 mg IntraVENous Daily    vancomycin (VANCOCIN) intermittent dosing (placeholder)   Other RX Placeholder    vancomycin  1,250 mg IntraVENous Q12H    hydrocortisone  10 mg Oral BID    haloperidol lactate  5 mg IntraVENous Once    chlorhexidine  15 mL Mouth/Throat BID    levalbuterol  0.63 mg Nebulization 4x daily    enoxaparin  30 mg SubCUTAneous BID    sertraline  150 mg Oral Daily    lamoTRIgine  200 mg Oral Daily    levothyroxine  150 mcg Oral QAM AC    topiramate  75 mg Oral Daily    topiramate  100 mg Oral Nightly    polyethylene glycol  17 g Oral Daily       Social History:     Social History     Socioeconomic History    Marital status: Single     Spouse name: Not on file    Number of children: Not on file    Years of education: Not on file    Highest education level: Not on file   Occupational History     Comment: disabled   Tobacco Use    Smoking status: Never    Smokeless tobacco: Never   Vaping Use    Vaping Use: Never used   Substance and Sexual Activity    Alcohol use:  Yes     Alcohol/week: 0.0 standard drinks     Comment: rare    Drug use: No    Sexual activity: Not Currently     Partners: Male   Other Topics Concern    Not on file   Social History Narrative    Not on file     Social Determinants of Health     Financial Resource Strain: Low Risk     Difficulty of Paying Living Expenses: Not hard at all   Food Insecurity: No Food Insecurity    Worried About Running Out of Food in the Last Year: Never true    Ran Out of Food in the Last Year: Never true   Transportation Needs: Not on file   Physical Activity: Not on file   Stress: Not on file   Social Connections: Not on file   Intimate Partner Violence: Not on file   Housing Stability: Not on file       Family History:     Family History   Problem Relation Age of Onset    Cancer Mother 50 breast    Migraines Mother     Diabetes Father     High Blood Pressure Father     High Cholesterol Father     Migraines Sister     High Blood Pressure Maternal Grandmother     Cancer Paternal Grandmother 70        colon cancer    Cancer Paternal Uncle         esophageal        Allergies:   Bactrim [sulfamethoxazole-trimethoprim], Bee venom, Ciprofloxacin, and Milk-related compounds     Review of Systems:   GILDARDO-intubated and sedated  Constitutional: No fevers or chills. No systemic complaints  Head: No headaches  Eyes: No double vision or blurry vision. No conjunctival inflammation. ENT: No sore throat or runny nose. . No hearing loss, tinnitus or vertigo. Cardiovascular: No chest pain or palpitations. No shortness of breath. No LAZARO  Lung: No shortness of breath or cough. No sputum production  Abdomen: No nausea, vomiting, diarrhea, or abdominal pain. Sherryle Moder No cramps. Genitourinary: No increased urinary frequency, or dysuria. No hematuria. No suprapubic or CVA pain  Musculoskeletal: No muscle aches or pains. No joint effusions, swelling or deformities  Hematologic: No bleeding or bruising. Neurologic: No headache, weakness, numbness, or tingling. Integument: No rash, no ulcers. Psychiatric: No depression. Endocrine: No polyuria, no polydipsia, no polyphagia.     Physical Examination :   Patient Vitals for the past 8 hrs:   BP Temp Temp src Pulse Resp SpO2 Weight   12/14/22 0900 105/67 -- -- 64 27 96 % --   12/14/22 0858 105/67 -- -- 63 29 96 % --   12/14/22 0848 -- -- -- 66 (!) 34 99 % --   12/14/22 0800 97/68 99.7 °F (37.6 °C) Esophageal 61 30 96 % --   12/14/22 0700 94/61 -- -- 64 29 100 % --   12/14/22 0600 108/72 -- -- 58 28 98 % 281 lb 8.4 oz (127.7 kg)   12/14/22 0503 -- -- -- 57 22 99 % --   12/14/22 0500 (!) 92/55 -- -- 57 24 99 % --   12/14/22 0400 (!) 100/59 99.3 °F (37.4 °C) Esophageal 57 23 98 % --   12/14/22 0317 -- -- -- 60 22 99 % --   12/14/22 0300 109/83 -- -- 58 24 99 % --     General Appearance: Intubated and sedated  Head:  Normocephalic, no trauma  Eyes: Pupils equal, round, reactive to light and accommodation; extraocular movements intact; sclera anicteric; conjunctivae pink. No embolic phenomena. ENT: Oropharynx clear, without erythema, exudate, or thrush. No tenderness of sinuses. Mouth/throat: mucosa pink and moist. No lesions. Dentition in good repair. Neck:Supple, without lymphadenopathy. Thyroid normal, No bruits. Pulmonary/Chest: Rhonchi to auscultation rhonchi. No dullness to percussion. Cardiovascular: Regular rate and rhythm without murmurs, rubs, or gallops. Abdomen: Soft, non tender. Bowel sounds hypoactive No organomegaly  All four Extremities: No cyanosis, clubbing, edema, or effusions. Neurologic: GILDARDO-following commands  Skin: Warm and dry with good turgor. No signs of peripheral arterial or venous insufficiency. No ulcerations. No open wounds. Medical Decision Making -Laboratory:   I have independently reviewed/ordered the following labs:    CBC with Differential:   Recent Labs     12/13/22  0633 12/14/22  0530   WBC 10.4 7.8   HGB 10.2* 10.0*   HCT 32.4* 33.4*    223   LYMPHOPCT 12* 16*   MONOPCT 8 9     BMP:   Recent Labs     12/13/22  0633 12/14/22  0530    140   K 3.7 3.7   * 109*   CO2 23 23   BUN 20 25*   CREATININE 0.60 0.63     Hepatic Function Panel: No results for input(s): PROT, LABALBU, BILIDIR, IBILI, BILITOT, ALKPHOS, ALT, AST in the last 72 hours. No results for input(s): RPR in the last 72 hours. No results for input(s): HIV in the last 72 hours. No results for input(s): BC in the last 72 hours.   Lab Results   Component Value Date/Time    MUCUS 1+ 03/27/2013 05:54 PM    RBC 3.59 12/14/2022 05:30 AM    TRICHOMONAS NOT REPORTED 03/27/2013 05:54 PM    WBC 7.8 12/14/2022 05:30 AM    YEAST NOT REPORTED 03/27/2013 05:54 PM    TURBIDITY Clear 12/06/2022 08:05 PM     Lab Results   Component Value Date/Time    CREATININE 0.63 12/14/2022 05:30 AM GLUCOSE 117 12/14/2022 05:30 AM       Medical Decision Making-Imaging:     Narrative   EXAMINATION:   ONE XRAY VIEW OF THE CHEST       12/12/2022 8:40 am       COMPARISON:   Chest x-ray dated 11 December 2022       HISTORY:   ORDERING SYSTEM PROVIDED HISTORY: increase in respiratory secretions   TECHNOLOGIST PROVIDED HISTORY:   increase in respiratory secretions       FINDINGS:   Endotracheal tube and enteric tube in stable position. No pneumothorax or   pleural effusion. Stable cardiomediastinal silhouette. Persistent bilateral   airspace opacities. Impression   Stable bilateral airspace opacities. This could represent multifocal   pneumonia, however, edema is not fully excluded       Medical Decision Iaebjp-Bogchtws-Zrgux:   Culture, Respiratory  Order: [id number]  Status: Final result    Visible to patient: No (not released)    Next appt: 01/10/2023 at 09:00 AM in Neurology (Jane Givens MD)    Specimen Information: Sputum Induced   0 Result Notes  Component 12/10/22 1130    Specimen Description . INDUCED SPUTUM    Direct Exam < 10 EPITHELIAL CELLS/LPF    Direct Exam <10 NEUTROPHILS/LPF    Direct Exam FEW GRAM POSITIVE COCCI IN CLUSTERS Abnormal     Culture METHICILLIN RESISTANT STAPHYLOCOCCUS AUREUS HEAVY GROWTH Abnormal     Culture NO NORMAL JEFFREY    Resulting Agency [employer]    Methicillin-Resistant Staphylococcus aureus (1)    Antibiotic Interpretation Microscan Method Status    penicillin Resistant >=0.5 BACTERIAL SUSCEPTIBILITY PANEL SURJIT Final    clindamycin Sensitive <=0.25 BACTERIAL SUSCEPTIBILITY PANEL SURJIT Final    erythromycin Resistant >=8 BACTERIAL SUSCEPTIBILITY PANEL SURJIT Final    gentamicin Sensitive <=0.5 BACTERIAL SUSCEPTIBILITY PANEL SURJIT Final     Gentamicin is used only in combination with other active agents that test susceptible.        Induced Clind Resist Negative NEGATIVE BACTERIAL SUSCEPTIBILITY PANEL SURJIT Final    levofloxacin Intermediate 4 BACTERIAL SUSCEPTIBILITY PANEL SURJIT Final    oxacillin Resistant >=4 BACTERIAL SUSCEPTIBILITY PANEL SURJIT Final    tetracycline Sensitive <=1 BACTERIAL SUSCEPTIBILITY PANEL SURJIT Final    trimethoprim-sulfamethoxazole Sensitive <=10 BACTERIAL SUSCEPTIBILITY PANEL SURJIT Final    vancomycin Sensitive 1 BACTERIAL SUSCEPTIBILITY PANEL SURJIT Final          Specimen Collected: 12/10/22 11:30 EST Last Resulted: 12/12/22 09:53 EST             Medical Decision Making-Other:     Note:  Labs, medications, radiologic studies were reviewed with personal review of films  Large amounts of data were reviewed  Discussed with nursing Staff, Discharge planner  Infection Control and Prevention measures reviewed  All prior entries were reviewed  Administer medications as ordered  Prognosis: Guarded  Discharge planning reviewed      Thank you for allowing us to participate in the care of this patient. Please call with questions. ADELA Mansfield - CNP    ATTESTATION:    I have discussed the case, including pertinent history and exam findings with the APRN. I have evaluated the  History, physical findings and pictures of the patient and the key elements of the encounter have been performed by me. I have reviewed the laboratory data, other diagnostic studies and discussed them with the APRN. I have updated the medical record where necessary. I agree with the assessment, plan and orders as documented by the APRN.     Shirley Rosen MD.    Pager: [phone] - Office: [phone]

## 2022-12-14 NOTE — PLAN OF CARE
Problem: Discharge Planning  Goal: Discharge to home or other facility with appropriate resources  Outcome: Progressing     Problem: Confusion  Goal: Confusion, delirium, dementia, or psychosis is improved or at baseline  Description: INTERVENTIONS:  1. Assess for possible contributors to thought disturbance, including medications, impaired vision or hearing, underlying metabolic abnormalities, dehydration, psychiatric diagnoses, and notify attending LIP  2. [place] high risk fall precautions, as indicated  3. Provide frequent short contacts to provide reality reorientation, refocusing and direction  4. Decrease environmental stimuli, including noise as appropriate  5. Monitor and intervene to maintain adequate nutrition, hydration, elimination, sleep and activity  6. If unable to ensure safety without constant attention obtain sitter and review sitter guidelines with assigned personnel  7. Initiate Psychosocial CNS and Spiritual Care consult, as indicated  Outcome: Progressing  Flowsheets (Taken 12/13/2022 2000)  Effect of thought disturbance (confusion, delirium, dementia, or psychosis) are managed with adequate functional status: Assess for contributors to thought disturbance, including medications, impaired vision or hearing, underlying metabolic abnormalities, dehydration, psychiatric diagnoses, notify LIP     Problem: Pain  Goal: Verbalizes/displays adequate comfort level or baseline comfort level  Outcome: Progressing     Problem: Chronic Conditions and Co-morbidities  Goal: Patient's chronic conditions and co-morbidity symptoms are monitored and maintained or improved  Outcome: Progressing     Problem: Skin/Tissue Integrity  Goal: Absence of new skin breakdown  Description: 1. Monitor for areas of redness and/or skin breakdown  2. Assess vascular access sites hourly  3. Every 4-6 hours minimum:  Change oxygen saturation probe site  4.   Every 4-6 hours:  If on nasal continuous positive airway pressure, respiratory therapy assess nares and determine need for appliance change or resting period.   Outcome: Progressing     Problem: Safety - Adult  Goal: Free from fall injury  Outcome: Progressing     Problem: ABCDS Injury Assessment  Goal: Absence of physical injury  Outcome: Progressing     Problem: Nutrition Deficit:  Goal: Optimize nutritional status  Outcome: Progressing     Problem: Respiratory - Adult  Goal: Achieves optimal ventilation and oxygenation  12/14/2022 0433 by Latrell Pelletier RN  Outcome: Progressing  12/13/2022 2157 by Edson Paredes RCP  Flowsheets (Taken 12/13/2022 2157)  Achieves optimal ventilation and oxygenation:   Assess for changes in respiratory status   Position to facilitate oxygenation and minimize respiratory effort   Assess the need for suctioning and aspirate as needed   Respiratory therapy support as indicated   Assess for changes in mentation and behavior   Oxygen supplementation based on oxygen saturation or arterial blood gases

## 2022-12-14 NOTE — PLAN OF CARE
Problem: Discharge Planning  Goal: Discharge to home or other facility with appropriate resources  12/14/2022 1015 by Marianne Hemphill RN  Outcome: Progressing  12/14/2022 0433 by Flavio Reilly RN  Outcome: Progressing     Problem: Confusion  Goal: Confusion, delirium, dementia, or psychosis is improved or at baseline  Description: INTERVENTIONS:  1. Assess for possible contributors to thought disturbance, including medications, impaired vision or hearing, underlying metabolic abnormalities, dehydration, psychiatric diagnoses, and notify attending LIP  2. [place] high risk fall precautions, as indicated  3. Provide frequent short contacts to provide reality reorientation, refocusing and direction  4. Decrease environmental stimuli, including noise as appropriate  5. Monitor and intervene to maintain adequate nutrition, hydration, elimination, sleep and activity  6. If unable to ensure safety without constant attention obtain sitter and review sitter guidelines with assigned personnel  7.  Initiate Psychosocial CNS and Spiritual Care consult, as indicated  12/14/2022 1015 by Marianne Hemphill RN  Outcome: Progressing  12/14/2022 0433 by Flavio Reilly RN  Outcome: Progressing  Flowsheets (Taken 12/13/2022 2000)  Effect of thought disturbance (confusion, delirium, dementia, or psychosis) are managed with adequate functional status: Assess for contributors to thought disturbance, including medications, impaired vision or hearing, underlying metabolic abnormalities, dehydration, psychiatric diagnoses, notify LIP     Problem: Pain  Goal: Verbalizes/displays adequate comfort level or baseline comfort level  12/14/2022 1015 by Marianne Hemphill RN  Outcome: Progressing  12/14/2022 0433 by Flavio Reilly RN  Outcome: Progressing     Problem: Chronic Conditions and Co-morbidities  Goal: Patient's chronic conditions and co-morbidity symptoms are monitored and maintained or improved  12/14/2022 1015 by Marianne Hemphill RN  Outcome: Progressing  12/14/2022 0433 by Patrick Carmona RN  Outcome: Progressing     Problem: Skin/Tissue Integrity  Goal: Absence of new skin breakdown  Description: 1. Monitor for areas of redness and/or skin breakdown  2. Assess vascular access sites hourly  3. Every 4-6 hours minimum:  Change oxygen saturation probe site  4. Every 4-6 hours:  If on nasal continuous positive airway pressure, respiratory therapy assess nares and determine need for appliance change or resting period.   12/14/2022 1015 by Gloria Sawyer RN  Outcome: Progressing  12/14/2022 0433 by Patrick Carmona RN  Outcome: Progressing     Problem: Safety - Adult  Goal: Free from fall injury  12/14/2022 1015 by Gloria Sawyer RN  Outcome: Progressing  12/14/2022 0433 by Patrick Carmona RN  Outcome: Progressing     Problem: ABCDS Injury Assessment  Goal: Absence of physical injury  12/14/2022 1015 by Gloria Sawyer RN  Outcome: Progressing  12/14/2022 0433 by Patrick Carmona RN  Outcome: Progressing     Problem: Nutrition Deficit:  Goal: Optimize nutritional status  12/14/2022 1015 by Gloria Sawyer RN  Outcome: Progressing  12/14/2022 0433 by Patrick Carmona RN  Outcome: Progressing     Problem: Respiratory - Adult  Goal: Achieves optimal ventilation and oxygenation  12/14/2022 1015 by Gloria Sawyer RN  Outcome: Progressing  12/14/2022 1005 by Kellie Frias RCP  Outcome: Progressing  12/14/2022 0433 by Patrick Carmona RN  Outcome: Progressing  12/13/2022 2157 by Joe Valente RCP  Flowsheets (Taken 12/13/2022 2157)  Achieves optimal ventilation and oxygenation:   Assess for changes in respiratory status   Position to facilitate oxygenation and minimize respiratory effort   Assess the need for suctioning and aspirate as needed   Respiratory therapy support as indicated   Assess for changes in mentation and behavior   Oxygen supplementation based on oxygen saturation or arterial blood gases

## 2022-12-14 NOTE — PLAN OF CARE
Problem: Respiratory - Adult  Goal: Achieves optimal ventilation and oxygenation  Outcome ongoing  12/13/2022 2157 by Kentrell Seay RCP  Flowsheets (Taken 12/13/2022 2157)  Achieves optimal ventilation and oxygenation:   Assess for changes in respiratory status   Position to facilitate oxygenation and minimize respiratory effort   Assess the need for suctioning and aspirate as needed   Respiratory therapy support as indicated   Assess for changes in mentation and behavior   Oxygen supplementation based on oxygen saturation or arterial blood gases

## 2022-12-14 NOTE — PROGRESS NOTES
Orthopedic Progress Note    Patient:  William Tate, 41 y.o. female  YOB: 1975       Subjective:  Patient seen and examined. Currently intubated and sedated due to respiratory distress secondary to suspected pneumonia, though responds to some commands. Pain controlled on current regimen. No issues overnight per nursing. OG tube in.    Objective:   Vitals:    12/14/22 0400   BP: (!) 100/59   Pulse: 57   Resp: 23   Temp:    SpO2:      Gen: NAD, cooperative    Cardiovascular: Regular rate    Respiratory: Symmetric chest rise. No accessory muscle use    MSK:  BLE: Skin intact over the hips without ecchymoses. Unable to elicit painful response with anterior to posterior and lateral compression. Patient does not have clonus to ankle with ankle jerk reflex. Patient moves ankle and toes on command. Unable to assess sensory, though grossly intact, negative babinski. Toes warm and well-perfused with BCR.     Recent Labs     12/13/22  0633   WBC 10.4   HGB 10.2*   HCT 32.4*         K 3.7   BUN 20   CREATININE 0.60   GLUCOSE 111*      Anticoag: Lovenox  Abx: Vancomycin    Impression/Plan: 41 y.o. female who fell from 1 step backwards, being seen for:     -Right sacral ala fracture  -Right superior pubic ramus fracture     Other injuries:  C7 transverse process fracture     -No plans for orthopedic intervention  -Weightbearing as tolerated to right lower extremity  -Primary team: Trauma  -Diet OK from orthopedic perspective  -DVT prophylaxis per primary  -Pain control per primary  -Ice for pain and swelling  -PT/OT eval and treat  -Please contact ortho with any questions  -Patient may follow-up outpatient in clinic with Dr. Shay Garcia 6 weeks from today's evaluation    Electronically signed by Zackary Peck DO, on 12/14/2022 at 5:03 AM.

## 2022-12-15 ENCOUNTER — APPOINTMENT (OUTPATIENT)
Dept: GENERAL RADIOLOGY | Age: 47
DRG: 551 | End: 2022-12-15
Payer: MEDICARE

## 2022-12-15 LAB
ABSOLUTE EOS #: 0.17 K/UL (ref 0–0.44)
ABSOLUTE IMMATURE GRANULOCYTE: 0.31 K/UL (ref 0–0.3)
ABSOLUTE LYMPH #: 1.56 K/UL (ref 1.1–3.7)
ABSOLUTE MONO #: 0.76 K/UL (ref 0.1–1.2)
ALLEN TEST: POSITIVE
ANION GAP SERPL CALCULATED.3IONS-SCNC: 10 MMOL/L (ref 9–17)
BASOPHILS # BLD: 1 % (ref 0–2)
BASOPHILS ABSOLUTE: 0.06 K/UL (ref 0–0.2)
BUN BLDV-MCNC: 28 MG/DL (ref 6–20)
CALCIUM SERPL-MCNC: 8.1 MG/DL (ref 8.6–10.4)
CHLORIDE BLD-SCNC: 106 MMOL/L (ref 98–107)
CO2: 20 MMOL/L (ref 20–31)
CREAT SERPL-MCNC: 0.67 MG/DL (ref 0.5–0.9)
CULTURE: NORMAL
CULTURE: NORMAL
EOSINOPHILS RELATIVE PERCENT: 2 % (ref 1–4)
FIO2: 40
GFR SERPL CREATININE-BSD FRML MDRD: >60 ML/MIN/1.73M2
GLUCOSE BLD-MCNC: 110 MG/DL (ref 74–100)
GLUCOSE BLD-MCNC: 94 MG/DL (ref 70–99)
HCT VFR BLD CALC: 33.2 % (ref 36.3–47.1)
HEMOGLOBIN: 10.1 G/DL (ref 11.9–15.1)
IMMATURE GRANULOCYTES: 4 %
LYMPHOCYTES # BLD: 21 % (ref 24–43)
Lab: NORMAL
Lab: NORMAL
MCH RBC QN AUTO: 27.9 PG (ref 25.2–33.5)
MCHC RBC AUTO-ENTMCNC: 30.4 G/DL (ref 28.4–34.8)
MCV RBC AUTO: 91.7 FL (ref 82.6–102.9)
MONOCYTES # BLD: 10 % (ref 3–12)
NEGATIVE BASE EXCESS, ART: 1 (ref 0–2)
NRBC AUTOMATED: 0 PER 100 WBC
O2 DEVICE/FLOW/%: ABNORMAL
PDW BLD-RTO: 14.5 % (ref 11.8–14.4)
PLATELET # BLD: 252 K/UL (ref 138–453)
PMV BLD AUTO: 10.8 FL (ref 8.1–13.5)
POC HCO3: 23.2 MMOL/L (ref 21–28)
POC O2 SATURATION: 96 % (ref 94–98)
POC PCO2: 35.6 MM HG (ref 35–48)
POC PH: 7.42 (ref 7.35–7.45)
POC PO2: 78.4 MM HG (ref 83–108)
POTASSIUM SERPL-SCNC: 3.7 MMOL/L (ref 3.7–5.3)
RBC # BLD: 3.62 M/UL (ref 3.95–5.11)
RBC # BLD: ABNORMAL 10*6/UL
SAMPLE SITE: ABNORMAL
SEG NEUTROPHILS: 62 % (ref 36–65)
SEGMENTED NEUTROPHILS ABSOLUTE COUNT: 4.72 K/UL (ref 1.5–8.1)
SODIUM BLD-SCNC: 136 MMOL/L (ref 135–144)
SPECIMEN DESCRIPTION: NORMAL
SPECIMEN DESCRIPTION: NORMAL
WBC # BLD: 7.6 K/UL (ref 3.5–11.3)

## 2022-12-15 PROCEDURE — 2580000003 HC RX 258: Performed by: INTERNAL MEDICINE

## 2022-12-15 PROCEDURE — 6360000002 HC RX W HCPCS: Performed by: INTERNAL MEDICINE

## 2022-12-15 PROCEDURE — 99233 SBSQ HOSP IP/OBS HIGH 50: CPT | Performed by: INTERNAL MEDICINE

## 2022-12-15 PROCEDURE — 94761 N-INVAS EAR/PLS OXIMETRY MLT: CPT

## 2022-12-15 PROCEDURE — 82947 ASSAY GLUCOSE BLOOD QUANT: CPT

## 2022-12-15 PROCEDURE — 2580000003 HC RX 258

## 2022-12-15 PROCEDURE — 85025 COMPLETE CBC W/AUTO DIFF WBC: CPT

## 2022-12-15 PROCEDURE — 51702 INSERT TEMP BLADDER CATH: CPT

## 2022-12-15 PROCEDURE — 74018 RADEX ABDOMEN 1 VIEW: CPT

## 2022-12-15 PROCEDURE — 6370000000 HC RX 637 (ALT 250 FOR IP)

## 2022-12-15 PROCEDURE — 6370000000 HC RX 637 (ALT 250 FOR IP): Performed by: STUDENT IN AN ORGANIZED HEALTH CARE EDUCATION/TRAINING PROGRAM

## 2022-12-15 PROCEDURE — 6370000000 HC RX 637 (ALT 250 FOR IP): Performed by: NURSE PRACTITIONER

## 2022-12-15 PROCEDURE — 2000000000 HC ICU R&B

## 2022-12-15 PROCEDURE — 71045 X-RAY EXAM CHEST 1 VIEW: CPT

## 2022-12-15 PROCEDURE — 87040 BLOOD CULTURE FOR BACTERIA: CPT

## 2022-12-15 PROCEDURE — 87086 URINE CULTURE/COLONY COUNT: CPT

## 2022-12-15 PROCEDURE — 82803 BLOOD GASES ANY COMBINATION: CPT

## 2022-12-15 PROCEDURE — 36415 COLL VENOUS BLD VENIPUNCTURE: CPT

## 2022-12-15 PROCEDURE — 51798 US URINE CAPACITY MEASURE: CPT

## 2022-12-15 PROCEDURE — 80048 BASIC METABOLIC PNL TOTAL CA: CPT

## 2022-12-15 PROCEDURE — 6360000002 HC RX W HCPCS: Performed by: NURSE PRACTITIONER

## 2022-12-15 PROCEDURE — 2700000000 HC OXYGEN THERAPY PER DAY

## 2022-12-15 PROCEDURE — 2500000003 HC RX 250 WO HCPCS: Performed by: HEALTH CARE PROVIDER

## 2022-12-15 PROCEDURE — C9113 INJ PANTOPRAZOLE SODIUM, VIA: HCPCS

## 2022-12-15 PROCEDURE — 87154 CUL TYP ID BLD PTHGN 6+ TRGT: CPT

## 2022-12-15 PROCEDURE — 94003 VENT MGMT INPAT SUBQ DAY: CPT

## 2022-12-15 PROCEDURE — 87205 SMEAR GRAM STAIN: CPT

## 2022-12-15 PROCEDURE — 36600 WITHDRAWAL OF ARTERIAL BLOOD: CPT

## 2022-12-15 PROCEDURE — 94640 AIRWAY INHALATION TREATMENT: CPT

## 2022-12-15 PROCEDURE — 6360000002 HC RX W HCPCS

## 2022-12-15 PROCEDURE — 99291 CRITICAL CARE FIRST HOUR: CPT | Performed by: INTERNAL MEDICINE

## 2022-12-15 PROCEDURE — 2500000003 HC RX 250 WO HCPCS

## 2022-12-15 RX ORDER — LEVALBUTEROL INHALATION SOLUTION 0.63 MG/3ML
0.63 SOLUTION RESPIRATORY (INHALATION) EVERY 4 HOURS PRN
Status: DISCONTINUED | OUTPATIENT
Start: 2022-12-15 | End: 2022-12-31 | Stop reason: HOSPADM

## 2022-12-15 RX ORDER — GUAIFENESIN 100 MG/5ML
200 SYRUP ORAL EVERY 4 HOURS
Status: DISCONTINUED | OUTPATIENT
Start: 2022-12-15 | End: 2022-12-27

## 2022-12-15 RX ADMIN — FENTANYL CITRATE 50 MCG: 50 INJECTION, SOLUTION INTRAMUSCULAR; INTRAVENOUS at 18:17

## 2022-12-15 RX ADMIN — LEVOTHYROXINE SODIUM 150 MCG: 75 TABLET ORAL at 11:33

## 2022-12-15 RX ADMIN — ENOXAPARIN SODIUM 30 MG: 100 INJECTION SUBCUTANEOUS at 21:00

## 2022-12-15 RX ADMIN — LEVALBUTEROL HYDROCHLORIDE 0.63 MG: 0.63 SOLUTION RESPIRATORY (INHALATION) at 12:38

## 2022-12-15 RX ADMIN — GUAIFENESIN 200 MG: 200 SOLUTION ORAL at 19:52

## 2022-12-15 RX ADMIN — Medication 1250 MG: at 01:06

## 2022-12-15 RX ADMIN — LEVALBUTEROL HYDROCHLORIDE 0.63 MG: 0.63 SOLUTION RESPIRATORY (INHALATION) at 16:09

## 2022-12-15 RX ADMIN — Medication 1000 MG: at 23:48

## 2022-12-15 RX ADMIN — TOPIRAMATE 75 MG: 100 TABLET, FILM COATED ORAL at 11:34

## 2022-12-15 RX ADMIN — TOPIRAMATE 100 MG: 100 TABLET, FILM COATED ORAL at 22:49

## 2022-12-15 RX ADMIN — LAMOTRIGINE 200 MG: 100 TABLET ORAL at 11:33

## 2022-12-15 RX ADMIN — LEVALBUTEROL HYDROCHLORIDE 0.63 MG: 0.63 SOLUTION RESPIRATORY (INHALATION) at 20:35

## 2022-12-15 RX ADMIN — HYDROCORTISONE 10 MG: 10 TABLET ORAL at 21:30

## 2022-12-15 RX ADMIN — FUROSEMIDE 20 MG: 10 INJECTION, SOLUTION INTRAMUSCULAR; INTRAVENOUS at 08:36

## 2022-12-15 RX ADMIN — SODIUM CHLORIDE: 9 INJECTION, SOLUTION INTRAVENOUS at 14:10

## 2022-12-15 RX ADMIN — GUAIFENESIN 200 MG: 200 SOLUTION ORAL at 23:12

## 2022-12-15 RX ADMIN — HYDROCORTISONE 10 MG: 10 TABLET ORAL at 11:33

## 2022-12-15 RX ADMIN — Medication 1000 MG: at 11:44

## 2022-12-15 RX ADMIN — SODIUM CHLORIDE, PRESERVATIVE FREE 40 MG: 5 INJECTION INTRAVENOUS at 08:35

## 2022-12-15 RX ADMIN — GUAIFENESIN 200 MG: 200 SOLUTION ORAL at 16:08

## 2022-12-15 RX ADMIN — SERTRALINE 150 MG: 50 TABLET, FILM COATED ORAL at 11:34

## 2022-12-15 RX ADMIN — DEXMEDETOMIDINE HYDROCHLORIDE 0.2 MCG/KG/HR: 4 INJECTION, SOLUTION INTRAVENOUS at 06:14

## 2022-12-15 RX ADMIN — ENOXAPARIN SODIUM 30 MG: 100 INJECTION SUBCUTANEOUS at 08:36

## 2022-12-15 RX ADMIN — GUAIFENESIN 200 MG: 200 SOLUTION ORAL at 11:34

## 2022-12-15 RX ADMIN — ACETAMINOPHEN 650 MG: 325 TABLET ORAL at 19:52

## 2022-12-15 ASSESSMENT — PULMONARY FUNCTION TESTS
PIF_VALUE: 26
PIF_VALUE: 21
PIF_VALUE: 12
PIF_VALUE: 11
PIF_VALUE: 10
PIF_VALUE: 29

## 2022-12-15 NOTE — PROGRESS NOTES
[facility] - Progress Note  Today's Date and Time: 12/15/2022, 12:47 PM    Impression :   Aspiration pneumonia-MRSA sputum  Acute respiratory hypoxic failure  Cervical transverse process fracture s/p fall 12/5/22  Pelvic fracture s/p fall 12/5/22  Hypopituitarism s/p radiation for childhood brain tumor  Hypothyroidism   Asthma  Deafness in right ear  Seizure disorder  Developmental delay  Morbid obesity  Allergy to bactrim & Cipro    Recommendations:   Continue IV vancomycin for MRSA sputum  Neurosurgery and ortho recommendations    Medical Decision Making/Summary/Discussion:12/15/2022       Infection Control Recommendations   [place] Precautions  Contact for MRSA    Antimicrobial Stewardship Recommendations     Simplification of therapy  Targeted therapy    Coordination of Outpatient Care:   Estimated Length of IV antimicrobials:TBD  Patient will need Midline Catheter Insertion: TBD  Patient will need PICC line Insertion:BD  Patient will need: Home IV , [place],  SNF,  LTAC: TBD  Patient will need outpatient wound care:    Chief complaint/reason for consultation:   Positive sputum culture MRSA      History of Present Illness:   Addison Hernandez is a 41y.o.-year-old female who was initially admitted on 12/5/2022. Patient seen at the request of Dr. Vidhya Torres:    This patient, with a history of developmental delay, presented to the [facility] ED after falling down some stairs at home. She was complaining of neck and back pain. Imaging revealed an acute C7 transverse process fracture, chronic T3 compression fx and fractures of right sacral ala, nondisplaced fracture of right superior ramus and right obturator ring. She was transferred to [facility] for further medical management. Neurosurgery was consulted and it was determined there is no neurosurgical intervention needed.      Orthopedic surgery was also consulted and they also did not recommend surgical intervention, but rather outpatient follow-up. On 12/6/22, the patient's mentation declined and she became tachypneic requiring emergent intubation. Neurology was consulted for the patient's history of seizures. Her home seizure medications were restarted. An EEG did not show any seizure activity and the patient's mentation improved. She was extubated on the 7th but required re-intubation on 12/10/22 after a rapid response was called. CXR showed concern for RUL aspiration pneumonia. IV Zosyn was started and cultures were obtained. Viral respiratory panel was negative for influenza and Covid    Sputum cultures grew MRSA and the patient's antibiotics were changed to IV vancomycin on 12/11/22. There has been no growth on blood or urine cultures. ID was consulted for MRSA on sputum cultures      CT Head W/O Contrast   Final Result   No acute intracranial abnormality. Small amount of fluid in the right mastoid air cells. CT CHEST ABDOMEN PELVIS WO CONTRAST Additional Contrast? None   Final Result   Chest:       1. Mild anterior wedging of T3 suggesting an age indeterminate compression   fracture. No fracture line is identified. Clinical correlation with the   site of symptoms is suggested. 2. Significant motion artifact with scattered patchy ground-glass opacities   in the lungs which may be related to motion artifact. Ground-glass opacities   are otherwise nonspecific and could be related to atypical infection or   pneumonitis. Abdomen and pelvis:       1. Nondisplaced fractures of the right sacral ala and the right obturator   ring. 2. No acute findings elsewhere in the abdomen or pelvis. 3. Left renal cyst.           CT CSpine W/O Contrast   Final Result   Acute fracture of the right C7 transverse process. Spinal degenerative changes as described.                CURRENT EVALUATION 12/15/2022  BP 92/61   Pulse 68   Temp 100 °F (37.8 °C) (Bladder)   Resp 27   Ht 5' 1\" (1.549 m)   Wt 289 lb 14.5 oz (131.5 kg)   SpO2 91%   BMI 54.78 kg/m²     Patient evaluated in the ICU    TMAX 101.2  VS stable    The patient is on mechanical ventilation with 40% FiO2 and PEEP 6-->5  SpO2:96-->91%    The patient is following commands. TF for nutrition    The patient is tolerating IV vancomycin for MRSA sputum. Rhonchi to auscultation with thick secretions in ETT    Urine culture and repeat blood cultures ordered 12/15/22    Stable CXR    Labs, X rays reviewed: 12/15/2022    BUN:25-->28  Cr:0.63-->0.67    WBC:7.8-->7.6  Hb:10.0-->10.1  Plat: 223-->252    CRP:    Cultures:  Urine:  12/15/22: Pending  Blood:  12/10/22: No growth  12/15/2: Pending  Sputum :  12/10/22: MRSA  Wound:    MRSA Nares:  12/10/22: Detected    Imaging:  CXR  12/12/22 12/6/22      CT Chest  12/6/22      Discussed with patient, RN, family. I have personally reviewed the past medical history, past surgical history, medications, social history, and family history, and I have updated the database accordingly.   Past Medical History:     Past Medical History:   Diagnosis Date    Acquired acanthosis nigricans     Anemia     Arthritis     Asthma     Brain cancer ([facility]     Brain tumor [facility])     age 5    Contact dermatitis and other eczema, due to unspecified cause     COVID-19 01/26/2021    Diabetes mellitus [facility])     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)     Fibromyalgia     Herpes zoster without mention of complication     Hypothyroidism     Migraine     Seizures ([facility]     last seizure in 2000    Sleep apnea     CPAP    TIA (transient ischemic attack) 2000       Past Surgical  History:     Past Surgical History:   Procedure Laterality Date    BRAIN BIOPSY      tumor biopsy    COCHLEAR IMPLANT Left     COCHLEAR IMPLANT Right 05/10/2022    COLONOSCOPY      COLONOSCOPY N/A 8/12/2022    COLONOSCOPY WITH BIOPSY performed by Mary Jeffers MD at [facility]      implants 1/22    ENDOSCOPY, COLON, DIAGNOSTIC      KNEE SURGERY      scope    TONSILLECTOMY      UPPER GASTROINTESTINAL ENDOSCOPY N/A 8/12/2022    EGD BIOPSY performed by Mary Jeffers MD at [facility] OR       Medications:      guaiFENesin  200 mg Oral Q4H    vancomycin  1,000 mg IntraVENous Q12H    furosemide  20 mg IntraVENous Daily    pantoprazole (PROTONIX) 40 mg injection  40 mg IntraVENous Daily    vancomycin (VANCOCIN) intermittent dosing (placeholder)   Other RX Placeholder    hydrocortisone  10 mg Oral BID    haloperidol lactate  5 mg IntraVENous Once    chlorhexidine  15 mL Mouth/Throat BID    levalbuterol  0.63 mg Nebulization 4x daily    enoxaparin  30 mg SubCUTAneous BID    sertraline  150 mg Oral Daily    lamoTRIgine  200 mg Oral Daily    levothyroxine  150 mcg Oral QAM AC    topiramate  75 mg Oral Daily    topiramate  100 mg Oral Nightly    polyethylene glycol  17 g Oral Daily       Social History:     Social History     Socioeconomic History    Marital status: Single     Spouse name: Not on file    Number of children: Not on file    Years of education: Not on file    Highest education level: Not on file   Occupational History     Comment: disabled   Tobacco Use    Smoking status: Never    Smokeless tobacco: Never   Vaping Use    Vaping Use: Never used   Substance and Sexual Activity    Alcohol use:  Yes     Alcohol/week: 0.0 standard drinks     Comment: rare    Drug use: No    Sexual activity: Not Currently     Partners: Male   Other Topics Concern    Not on file   Social History Narrative    Not on file     Social Determinants of Health     Financial Resource Strain: Low Risk     Difficulty of Paying Living Expenses: Not hard at all   Food Insecurity: No Food Insecurity    Worried About Running Out of Food in the Last Year: Never true    Ran Out of Food in the Last Year: Never true   Transportation Needs: Not on file   Physical Activity: Not on file Stress: Not on file   Social Connections: Not on file   Intimate Partner Violence: Not on file   Housing Stability: Not on file       Family History:     Family History   Problem Relation Age of Onset    Cancer Mother 50        breast    Migraines Mother     Diabetes Father     High Blood Pressure Father     High Cholesterol Father     Migraines Sister     High Blood Pressure Maternal Grandmother     Cancer Paternal Grandmother 70        colon cancer    Cancer Paternal Uncle         esophageal        Allergies:   Bactrim [sulfamethoxazole-trimethoprim], Bee venom, Ciprofloxacin, and Milk-related compounds     Review of Systems:   GILDARDO-intubated and sedated  Constitutional: No fevers or chills. No systemic complaints  Head: No headaches  Eyes: No double vision or blurry vision. No conjunctival inflammation. ENT: No sore throat or runny nose. . No hearing loss, tinnitus or vertigo. Cardiovascular: No chest pain or palpitations. No shortness of breath. No LAZARO  Lung: No shortness of breath or cough. No sputum production  Abdomen: No nausea, vomiting, diarrhea, or abdominal pain. [place] No cramps. Genitourinary: No increased urinary frequency, or dysuria. No hematuria. No suprapubic or CVA pain  Musculoskeletal: No muscle aches or pains. No joint effusions, swelling or deformities  Hematologic: No bleeding or bruising. Neurologic: No headache, weakness, numbness, or tingling. Integument: No rash, no ulcers. Psychiatric: No depression. Endocrine: No polyuria, no polydipsia, no polyphagia. Physical Examination :   Patient Vitals for the past 8 hrs:   BP Pulse Resp SpO2   12/15/22 1240 -- 68 27 91 %   12/15/22 0838 -- 66 30 93 %   12/15/22 0600 92/61 61 16 94 %   12/15/22 0500 (!) 83/52 53 23 95 %     General Appearance: Intubated and sedated  Head:  Normocephalic, no trauma  Eyes: Pupils equal, round, reactive to light and accommodation; extraocular movements intact; sclera anicteric; conjunctivae pink.  No embolic phenomena. ENT: Oropharynx clear, without erythema, exudate, or thrush. No tenderness of sinuses. Mouth/throat: mucosa pink and moist. No lesions. Dentition in good repair. Neck:Supple, without lymphadenopathy. Thyroid normal, No bruits. Pulmonary/Chest: Rhonchi to auscultation rhonchi. No dullness to percussion. Cardiovascular: Regular rate and rhythm without murmurs, rubs, or gallops. Abdomen: Soft, non tender. Bowel sounds hypoactive No organomegaly  All four Extremities: No cyanosis, clubbing, edema, or effusions. Neurologic: GILDARDO-following commands  Skin: Warm and dry with good turgor. No signs of peripheral arterial or venous insufficiency. No ulcerations. No open wounds. Medical Decision Making -Laboratory:   I have independently reviewed/ordered the following labs:    CBC with Differential:   Recent Labs     12/14/22  0530 12/15/22  0534   WBC 7.8 7.6   HGB 10.0* 10.1*   HCT 33.4* 33.2*    252   LYMPHOPCT 16* 21*   MONOPCT 9 10     BMP:   Recent Labs     12/14/22  0530 12/15/22  0534    136   K 3.7 3.7   * 106   CO2 23 20   BUN 25* 28*   CREATININE 0.63 0.67     Hepatic Function Panel: No results for input(s): PROT, LABALBU, BILIDIR, IBILI, BILITOT, ALKPHOS, ALT, AST in the last 72 hours. No results for input(s): RPR in the last 72 hours. No results for input(s): HIV in the last 72 hours. No results for input(s): BC in the last 72 hours.   Lab Results   Component Value Date/Time    MUCUS 1+ 03/27/2013 05:54 PM    RBC 3.62 12/15/2022 05:34 AM    TRICHOMONAS NOT REPORTED 03/27/2013 05:54 PM    WBC 7.6 12/15/2022 05:34 AM    YEAST NOT REPORTED 03/27/2013 05:54 PM    TURBIDITY Clear 12/06/2022 08:05 PM     Lab Results   Component Value Date/Time    CREATININE 0.67 12/15/2022 05:34 AM    GLUCOSE 94 12/15/2022 05:34 AM       Medical Decision Making-Imaging:     Narrative   EXAMINATION:   ONE XRAY VIEW OF THE CHEST       12/12/2022 8:40 am       COMPARISON:   Chest x-ray dated 11 December 2022       HISTORY:   ORDERING SYSTEM PROVIDED HISTORY: increase in respiratory secretions   TECHNOLOGIST PROVIDED HISTORY:   increase in respiratory secretions       FINDINGS:   Endotracheal tube and enteric tube in stable position. No pneumothorax or   pleural effusion. Stable cardiomediastinal silhouette. Persistent bilateral   airspace opacities. Impression   Stable bilateral airspace opacities. This could represent multifocal   pneumonia, however, edema is not fully excluded       Medical Decision [id number]-Fyunt:   Culture, Respiratory  Order: [id number]  Status: Final result    Visible to patient: No (not released)    Next appt: 01/10/2023 at 09:00 AM in Neurology (Osiel Carter MD)    Specimen Information: Sputum Induced   0 Result Notes  Component 12/10/22 1130    Specimen Description . INDUCED SPUTUM    Direct Exam < 10 EPITHELIAL CELLS/LPF    Direct Exam <10 NEUTROPHILS/LPF    Direct Exam FEW GRAM POSITIVE COCCI IN CLUSTERS Abnormal     Culture METHICILLIN RESISTANT STAPHYLOCOCCUS AUREUS HEAVY GROWTH Abnormal     Culture NO NORMAL JEFFREY    Resulting Agency [employer]    Methicillin-Resistant Staphylococcus aureus (1)    Antibiotic Interpretation Microscan Method Status    penicillin Resistant >=0.5 BACTERIAL SUSCEPTIBILITY PANEL SURJIT Final    clindamycin Sensitive <=0.25 BACTERIAL SUSCEPTIBILITY PANEL SURJIT Final    erythromycin Resistant >=8 BACTERIAL SUSCEPTIBILITY PANEL SURJIT Final    gentamicin Sensitive <=0.5 BACTERIAL SUSCEPTIBILITY PANEL SURJIT Final     Gentamicin is used only in combination with other active agents that test susceptible.        Induced Clind Resist Negative NEGATIVE BACTERIAL SUSCEPTIBILITY PANEL SURJIT Final    levofloxacin Intermediate 4 BACTERIAL SUSCEPTIBILITY PANEL SURJIT Final    oxacillin Resistant >=4 BACTERIAL SUSCEPTIBILITY PANEL SURJIT Final    tetracycline Sensitive <=1 BACTERIAL SUSCEPTIBILITY PANEL SURJIT Final trimethoprim-sulfamethoxazole Sensitive <=10 BACTERIAL SUSCEPTIBILITY PANEL SURJIT Final    vancomycin Sensitive 1 BACTERIAL SUSCEPTIBILITY PANEL SURJIT Final          Specimen Collected: 12/10/22 11:30 EST Last Resulted: 12/12/22 09:53 EST             Medical Decision Making-Other:     Note:  Labs, medications, radiologic studies were reviewed with personal review of films  Large amounts of data were reviewed  Discussed with nursing Staff, Discharge planner  Infection Control and Prevention measures reviewed  All prior entries were reviewed  Administer medications as ordered  Prognosis: Guarded  Discharge planning reviewed      Thank you for allowing us to participate in the care of this patient. Please call with questions. Nasrin Bernal, APRN - CNP    ATTESTATION:    I have discussed the case, including pertinent history and exam findings with the APRN. I have evaluated the  History, physical findings and pictures of the patient and the key elements of the encounter have been performed by me. I have reviewed the laboratory data, other diagnostic studies and discussed them with the APRN. I have updated the medical record where necessary. I agree with the assessment, plan and orders as documented by the APRN.     Betty Watkins MD.        Pager: [phone] - Office: [phone]

## 2022-12-15 NOTE — PROGRESS NOTES
SPIRITUAL CARE DEPARTMENT - [address]  PROGRESS NOTE    Shift date: 12/15/2022   Shift day: Thursday   Shift # 1    Room # 3021/3021-01   Name: Afua Muro                Alevism: None   Place of Yazidism:     Referral: Routine Visit    Admit Date & Time: 12/5/2022 11:39 PM    Assessment:  Afua Muro is a 41 y.o. female in the hospital because of a fall. Upon entering the room writer observes patient on vent and father present. Father discussed patient's past and present health issues and said she takes twice as long to heal as \"regular people do\". When  inquired about david background, he stated that patient was [employer] and attended a [employer] with her grandmother, but does not practice anything at this time. Intervention:  Writer introduced self and title as  Writer offered space for father  to express feelings, needs, and concerns and provided a ministry presence.  assured him of prayers. Outcome:  Father expressed his appreciation for the visit and support. Plan:  Chaplains will remain available to offer spiritual and emotional support as needed. Electronically signed by Ledy Jalloh on 12/15/2022 at 11:33 AM.  [facility]  [phone]        12/15/22 1132   Encounter Summary   Service Provided For: Family   Referral/Consult From: [facility] Parent   Last Encounter  12/15/22   Complexity of Encounter Low   Begin Time 1103   End Time  1113   Total Time Calculated 10 min   Encounter    Type Follow up   Assessment/Intervention/Outcome   Assessment Calm   Intervention Active listening;Discussed belief system/Hindu practices/david; Explored/Affirmed feelings, thoughts, concerns;Prayer (assurance of)/[place]   Outcome Engaged in conversation;Expressed feelings, needs, and concerns;Expressed Gratitude;Receptive

## 2022-12-15 NOTE — PROGRESS NOTES
INTENSIVE CARE UNIT  Resident Physician Progress Note    Patient - Catrachita Marmolejo  Date of Admission -  12/5/2022 11:39 PM  Date of Evaluation -  12/15/2022  Room and Bed Number -  3021/3021-01   Hospital Day - 9      SUBJECTIVE:     OVERNIGHT EVENTS:      Porras catheter placed overnight for urinary retention. TODAY:      Remains on precedex gtt at 0.2mcg/kg/hr. Tmax 37.9 (100.2). Hemodynamically stable with MAPs mainly 94-07, systolic 60M-68Z. HR 60s-70s. On PRVC at 18/380/5/40%  ABG this AM: 7.42/35.6/78.4/23.2    UOP: 2.8L/24 hours (0.9cc/kg/hr) -- was started on lasix 20mg IV daily yesterday  Unmeasured stool x2. NET since admit: +2.9L    Labs:  Na 136 (140) / K 3.7 (3.7)   BUN 28 (25) / Cr 0.67 (0.63)   Gluc 94    WBC 7.6 (7.8)   HgB 10.1 (10.0) / Hct 33.2 (33.4)   Plts 252 (223)       ID following for MRSA in sputum -- cont vanc  Ortho following for right sacral ala fx, R superior pubic ramus fx -- no acute interventiosn, outpatient f/u. ECHO :  The Left ventricle appears normal in size. Normal wall thickness. Normal LV systolic function, Ejection Fraction > 65%  Normal RV size and function. RV systolic pressure 30 mmHg  The LA and RA appears normal in size. No obvious significant structural valvular abnormality noted. No significant valvular stenosis or regurgitation noted. Normal aortic root dimensions. No significant pericardial infusion seen. The IVC appears normal in size, normal respiratory variation suggestive of  normal right atrial filling pressure. Review of Systems   Reason unable to perform ROS: Patient sedated and on ventilatior. Brief History:   Patient, 30-year-old female, initially presented to hospital on 12/5 with an episode of fall. Patient is developmentally delayed and has a history of brain tumor as a child with a lot of radiation. Patient also has past medical history of diabetes mellitus, bilateral sensorineural hearing loss.    Had a CT head showed no intracranial abnormality, CT abdominal pelvis showed  Nondisplaced fractures of right sacral ilya, nondisplaced fracture of right superior ramus and right obturator ring. CT spine showed acute fracture of right C7 transverse process and also age indeterminate compressive fracture of T3. Neurosurgery for cervical fractures was consulted, who recommended no neurosurgical intervention. Orthopedic surgery for pelvic fractures was also consulted, no surgical intervention but medical management and also recommended to follow-up in orthopedic clinic after discharge. Patient was started on BiPAP for increased work of breathing. Patient was oriented x4 at day of presentation. 12/06 - patient had a decline in mental status with tachypnea, respiratory rate o 30s to 40 with heart rate of 105 patient was intubated emergently     12/7- Neurology was consulted because of history of migraines, seizure disorder, patient was started on her home antiepileptic drugs including Topamax and Lamictal, EEG was ordered to rule out any subclinical seizure activity, MRI was also ordered to rule out any CVA. Patient was doing fine, arousable and following commands. Pulmonology was consulted, recommended to extubate and patient was extubated. 12/10 -rapid was called today due to impending respiratory failure, patient was intubated again and was sent to ICU      12/12 -respiratory culture came back positive for staph aureus , nasal respiratory swab came out positive for MRSA DNA, patient started on vancomycin      12/13 : Tachycardia improved to normal heart rate, patient remained afebrile, WBC count trending down.  Sedation changed to precedex    AWAKE & FOLLOWING COMMANDS:  [x] No   [] Yes    SECRETIONS Amount:  [] Small [] Moderate  [x] Large  [] None  Color:     [] White [] Colored  [] Bloody    SEDATION:  RAAS Score:  [] Propofol gtt  [] Versed gtt  [] Ativan gtt   [x] Precedex    PARALYZED:  [] No    [] Yes    VASOPRESSORS:  [x] No    [] Yes  [] Levophed [] Dopamine [] Vasopressin  [] Dobutamine [] Phenylephrine [] Epinephrine    OBJECTIVE:     VITAL SIGNS:  BP 92/61   Pulse 61   Temp 100 °F (37.8 °C) (Bladder)   Resp 16   Ht 5' 1\" (1.549 m)   Wt 289 lb 14.5 oz (131.5 kg)   SpO2 94%   BMI 54.78 kg/m²   Tmax over 24 hours:  Temp (24hrs), Av.8 °F (37.7 °C), Min:99.3 °F (37.4 °C), Max:100.2 °F (37.9 °C)      Patient Vitals for the past 8 hrs:   BP Temp Temp src Pulse Resp SpO2 Weight   12/15/22 0600 92/61 -- -- 61 16 94 % --   12/15/22 0500 (!) 83/52 -- -- 53 23 95 % --   12/15/22 0430 -- 100 °F (37.8 °C) Bladder -- -- -- --   12/15/22 0409 -- -- -- 60 18 94 % --   12/15/22 0345 -- 99.5 °F (37.5 °C) Esophageal -- -- -- --   12/15/22 0335 -- -- -- 53 21 94 % --   12/15/22 0300 (!) 9258 -- -- 56 19 94 % --   12/15/22 0145 -- 99.9 °F (37.7 °C) Esophageal -- -- -- --   12/15/22 0136 -- -- -- -- -- -- 289 lb 14.5 oz (131.5 kg)   12/15/22 0100 (!) 96/54 -- -- 81 17 95 % --   12/15/22 0030 -- 99.5 °F (37.5 °C) Esophageal -- -- -- --   12/15/22 0000 (!) 8655 -- -- 65 24 94 % --           Intake/Output Summary (Last 24 hours) at 12/15/2022 0718  Last data filed at 12/15/2022 0500  Gross per 24 hour   Intake 1910.1 ml   Output 2810 ml   Net -899.9 ml       Date 12/15/22 0000 - 12/15/22 2359   Shift 6936-7718 2910-2540 5020-7467 24 Hour Total   INTAKE   I.V.(mL/kg) 47.9(0.4)   47.9(0.4)   NG/GT(mL/kg) 432(3.3)   432(3.3)   IV Piggyback(mL/kg) 82.2(0.6)   82.2(0.6)   Shift Total(mL/kg) 562(4.3)   562(4.3)   OUTPUT   Urine(mL/kg/hr) 750   750   Shift Total(mL/kg) 750(5.7)   750(5.7)   Weight (kg) 131.5 131.5 131.5 131.5       Wt Readings from Last 3 Encounters:   12/15/22 289 lb 14.5 oz (131.5 kg)   22 277 lb (125.6 kg)   22 279 lb (126.6 kg)     Body mass index is 54.78 kg/m². Physical Exam  Constitutional:       General: She is sleeping. Interventions: She is sedated and intubated.       Comments: Patient sedated, not following commands   Cardiovascular:      Rate and Rhythm: Heart rate normal present. Pulses: Normal pulses. Heart sounds: Normal heart sounds. Pulmonary:      Effort: She is intubated. Comments: Bilateral crackles  Abdominal:      General: Abdomen is flat. Bowel sounds are normal.      Palpations: Abdomen is soft. MEDICATIONS:  Scheduled Meds:   furosemide  20 mg IntraVENous Daily    pantoprazole (PROTONIX) 40 mg injection  40 mg IntraVENous Daily    vancomycin (VANCOCIN) intermittent dosing (placeholder)   Other RX Placeholder    vancomycin  1,250 mg IntraVENous Q12H    hydrocortisone  10 mg Oral BID    haloperidol lactate  5 mg IntraVENous Once    chlorhexidine  15 mL Mouth/Throat BID    levalbuterol  0.63 mg Nebulization 4x daily    enoxaparin  30 mg SubCUTAneous BID    sertraline  150 mg Oral Daily    lamoTRIgine  200 mg Oral Daily    levothyroxine  150 mcg Oral QAM AC    topiramate  75 mg Oral Daily    topiramate  100 mg Oral Nightly    polyethylene glycol  17 g Oral Daily     Continuous Infusions:   dexmedetomidine 0.2 mcg/kg/hr (12/15/22 7690)    sodium chloride Stopped (12/12/22 0316)    sodium chloride 50 mL/hr at 12/15/22 0612     PRN Meds:   sodium chloride, , PRN  fentanNYL, 50 mcg, Q2H PRN  oxyCODONE, 5 mg, Q4H PRN  oxyCODONE, 10 mg, Q4H PRN  acetaminophen, 650 mg, Q6H PRN  albuterol sulfate HFA, 1 puff, Q4H PRN  sodium chloride flush, 5-40 mL, PRN      SUPPORT DEVICES: [x] Ventilator [] BIPAP  [] Nasal Cannula [] Room Air    VENT SETTINGS (Comprehensive) (if applicable):   PRVC mode, FiO2 40%, PEEP 5, Respiratory Rate 18, Tidal Volume 380  Vent Information  Ventilator ID: [id number]  Equipment Changed: Expiratory Filter, Suction catheter  Ventilator Initiate: Yes  Vent Mode: AC/PRVC  Additional Respiratory Assessments  Heart Rate: 61  Resp: 16  SpO2: 94 %  End Tidal CO2: 34 (%)  Position: Semi-Love's  Humidification Source: Heated wire  Humidification Temp: 37.2  Circuit Condensation: Drained  Skin Barrier Applied: No    ABGs:   Arterial Blood Gas result:  pH 7.413; pCO2 38.4; pO2 77.6; HCO3 24.5; BE 0 ; %O2 Sat 96.   Lab Results   Component Value Date/Time    FIO2 40.0 12/15/2022 04:12 AM         DATA:  Complete Blood Count:   Recent Labs     12/13/22 0633 12/14/22  0530 12/15/22  0534   WBC 10.4 7.8 7.6   RBC 3.48* 3.59* 3.62*   HGB 10.2* 10.0* 10.1*   HCT 32.4* 33.4* 33.2*   MCV 93.1 93.0 91.7   MCH 29.3 27.9 27.9   MCHC 31.5 29.9 30.4   RDW 14.5* 14.5* 14.5*    223 252   MPV 11.6 10.8 10.8          Last 3 Blood Glucose:   Recent Labs     12/13/22 0633 12/14/22  0530 12/15/22  0534   GLUCOSE 111* 117* 94          PT/INR:    Lab Results   Component Value Date/Time    PROTIME 10.5 12/05/2022 05:35 PM    INR 1.0 12/05/2022 05:35 PM     PTT:    Lab Results   Component Value Date/Time    APTT 24.9 03/10/2015 11:25 AM       Comprehensive Metabolic Profile:   Recent Labs     12/13/22 0633 12/14/22  0530 12/15/22  0534    140 136   K 3.7 3.7 3.7   * 109* 106   CO2 23 23 20   BUN 20 25* 28*   CREATININE 0.60 0.63 0.67   GLUCOSE 111* 117* 94   CALCIUM 8.0* 8.1* 8.1*        Magnesium:   Lab Results   Component Value Date/Time    MG 1.7 12/06/2022 10:12 PM     Phosphorus:   Lab Results   Component Value Date/Time    PHOS 2.9 12/06/2022 10:12 PM     Ionized Calcium: No results found for: CAION     Urinalysis:   Lab Results   Component Value Date/Time    NITRU NEGATIVE 12/06/2022 08:05 PM    COLORU Yellow 12/06/2022 08:05 PM    PHUR 5.5 12/06/2022 08:05 PM    WBCUA None 12/06/2022 08:05 PM    RBCUA None 12/06/2022 08:05 PM    MUCUS 1+ 03/27/2013 05:54 PM    TRICHOMONAS NOT REPORTED 03/27/2013 05:54 PM    YEAST NOT REPORTED 03/27/2013 05:54 PM    BACTERIA RARE 03/27/2013 05:54 PM    CLARITYU clear 11/11/2014 01:20 PM    SPECGRAV 1.035 12/06/2022 08:05 PM    LEUKOCYTESUR NEGATIVE 12/06/2022 08:05 PM    UROBILINOGEN Normal 12/06/2022 08:05 PM    BILIRUBINUR NEGATIVE 12/06/2022 08:05 PM    BILIRUBINUR neagtive 03/17/2017 04:30 PM    BLOODU negative 03/17/2017 04:30 PM    GLUCOSEU NEGATIVE 12/06/2022 08:05 PM    KETUA MODERATE 12/06/2022 08:05 PM    AMORPHOUS NOT REPORTED 03/27/2013 05:54 PM       HgBA1c:    Lab Results   Component Value Date/Time    LABA1C 5.3 04/26/2022 12:00 AM     TSH:    Lab Results   Component Value Date/Time    TSH <0.01 12/07/2022 03:57 AM     Lactic Acid: No results found for: LACTA   Troponin: No results for input(s): TROPONINI in the last 72 hours. ASSESSMENT:     Patient Active Problem List    Diagnosis Date Noted    MRSA infection 12/14/2022    Acute lower respiratory tract infection 12/14/2022    Acute respiratory failure with hypoxia (HCC) 12/11/2022    Aspiration pneumonia ([facility] 12/10/2022    Multiple closed pelvic fractures without disruption of pelvic [place] ([facility] 12/09/2022    Lethargy 12/07/2022    Cervical transverse process fracture, initial encounter ([facility] 12/06/2022    Closed nondisplaced fracture of seventh cervical vertebra ([facility] 12/06/2022    Gastroesophageal reflux disease 08/12/2022    Irritable bowel syndrome with constipation 06/07/2022    Type 2 diabetes mellitus with stage 3a chronic kidney disease, without long-term current use of insulin ([facility] 11/12/2021    Moderate episode of recurrent major depressive disorder ([facility] 02/26/2020    Hypopituitarism ([facility] 08/24/2016    ROHIT on CPAP 05/08/2015    Hypothyroidism 08/06/2014    Asthma 08/06/2014    Morbid obesity ([facility] 08/06/2014    Seizure ([facility] 10/23/2013          PLAN:     WEAN PER PROTOCOL:  [x] No   [] Yes  [] N/A    ICU PROPHYLAXIS:  Stress ulcer:  [x] PPI Agent  [] [id number] [] Sucralfate  [] Other:  VTE:   [x] Enoxaparin  [] Unfract.  Heparin Subcut  [] EPC Cuffs    NUTRITION:  [] NPO [x] Tube Feeding (Specify: ) [] TPN  [] PO    HOME MEDS RECONCILED: [] No  [x] Yes    FAMILY UPDATED:    [] No  [x] Yes    TRANSFER OUT OF ICU:   [x] No  [] Yes        Plan:    Acute Respiratory failure with hypoxia :  Secondary to Aspiration pneumonia  Sedated on precedex and on ventilator   Respiratory culture positive for Staph aureus   ID following, currently on IV vancomycin, continue  WBC 7.6 (7.8)   Pulmonary toilet and chest physiotherapy. Copious thick secretions, started on guaifenesin      Aspiration pneumonia   Secondary to intubation   Sedated on precedex and on ventilator   Respiratory culture positive for Staph aureus   Currently on IV vancomycin   WBC 7.6 (7.8)   Pulmonary toilet and chest physiotherapy. Cervical Transverse process fracture   Neurosurgery no active intervention required right now. Seizure   Started on Home medications   Currently on lamotrigine 200 mg tab OD, Topamax 100 mg OD      Asthma   On Levabeutrol nebulizer QID  Currently on albuterol sulfate as needed inhaler 1 puff every 4 hours   prednisone 30 mg via OG tube  OD x 4 days      Pelvic fracture  Orthopedic surgery consulted, appreciate recommendations  No acute intervention required,Ortho will continue to follow while inpatient, patient may follow-up outpatient in clinic with Dr. Eugenia Cannon 7 days from today's evaluation     Hypopituitarism   Secondary to  childhood radiation due to brain tumor  Taking Synthroid 150 mcg OD for hypothyroidism central   Taking hydrocortisone 10 mg BD  for Secondary/ central hypopituitarism      Hypothyroidism  Secondary to hypopituitarism secondary to childhood radiation due to brain tumor  On levothyroxine 150 mcg    Prophylaxis:  DVT: Lovenox 30 mg  GI : protonix IV 40 mg     Dispo: To remain in ICU     Electronically signed by Marco Paetl DO on 12/15/2022 at 7:18 AM    Marco Patel DO  Emergency Medicine Resident, [address] [place] [address].  7:18 AM       Attending Physician Statement  I have discussed the care of Karlimichael Dina, including pertinent history and exam findings with the resident.  I have reviewed the key elements of all parts of the encounter with the resident. I have seen and examined the patient with the resident. I agree with the assessment and plan and status of the problem list as documented. I have seen the patient during around today. Patient had been on Precedex drip low-dose not arousable does have eyes opening today did not follow command. She is remained on ventilator PRVC/18/380/5/40 percent did not tolerate CPAP trial today large amount of endotracheal secretions reported temperature of 100.2 she is currently on vancomycin for MRSA and respiratory secretions. If fever spike or worsening suggestion will get cultures again. Patient had Porras's catheter removed but she had urine retention and Porras's catheter was placed back again. Urine output 2.8 L she is on Lasix 20 mg once daily we will continue to monitor renal function and urine output. Discussed with father in detail and updated    Discussed with nursing staff, treatment and plan discussed. Discussed with respiratory therapist.    Total critical care time caring for this patient with life threatening, unstable organ failure, including direct patient contact, management of life support systems, review of data including imaging and labs, discussions with other team members and physicians at least 27  Min so far today, excluding procedures. Please note that this chart was generated using voice recognition Dragon dictation software. Although every effort was made to ensure the accuracy of this automated transcription, some errors in transcription may have occurred.      Chito Higgins MD  12/15/2022 12:51 PM

## 2022-12-15 NOTE — PLAN OF CARE
Patient tolerating tube feeding at goal. Porras catheter placed for urinary retention.  Improved comfort noted using FLACC, and facial expression, vs.

## 2022-12-15 NOTE — PROGRESS NOTES
[address] [facility] Pharmacokinetic Monitoring Service - Vancomycin    Consulting Provider: Dr. Clemente Caal    Indication: pneumonia   Target Concentration: Goal AUC/SURJIT 400-600 mg*hr/L  Day of Therapy: 5  Additional Antimicrobials: none    Pertinent Laboratory Values: Wt Readings from Last 1 Encounters:   12/15/22 289 lb 14.5 oz (131.5 kg)     Temp Readings from Last 1 Encounters:   12/15/22 100 °F (37.8 °C) (Bladder)     Estimated Creatinine Clearance: 136 mL/min (based on SCr of 0.67 mg/dL).   Recent Labs     12/14/22  0530 12/15/22  0534   CREATININE 0.63 0.67   WBC 7.8 7.6     Procalcitonin: none    Pertinent Cultures:  Culture Date Source Results   12/10 sputum MRSA    MRSA Nasal Swab: showed MRSA positive result on 12/10/2022    Recent vancomycin administrations                     vancomycin (VANCOCIN) 1250 mg in sodium chloride 0.9% 250 mL IVPB (mg) 1,250 mg New Bag 12/15/22 0106     1,250 mg New Bag 12/14/22 1247     1,250 mg New Bag  0116     1,250 mg New Bag 12/13/22 1303     1,250 mg New Bag  0003     1,250 mg New Bag 12/12/22 1558                    Assessment:  Date/Time Current Dose Concentration Timing of Concentration (h) AUC   12/15 1250 mg q12h N/a N/a N/a   Note: Serum concentrations collected for AUC dosing may appear elevated if collected in close proximity to the dose administered, this is not necessarily an indication of toxicity    Plan:  \"Decrease dose to 1000 mg every 12 hours   Repeat vancomycin concentration ordered for 12/16 @ 0600   Pharmacy will continue to monitor patient and adjust therapy as indicated    Thank you for the consult,  TOMEKA Castillo [facility]  12/15/2022 9:35 AM

## 2022-12-15 NOTE — PROGRESS NOTES
Comprehensive Nutrition Assessment    Type and Reason for Visit:  Reassess    Nutrition Recommendations/Plan:   Modify Tube Feeding now that pt is off propofol; suggest Standard without Fiber goal 75 mL/hr to provide 2160 kcal and 100 g pro/day. Will monitor TF tolerance/adequacy- modify formula/rate as needed. Malnutrition Assessment:  Malnutrition Status: At risk for malnutrition   Context:  Acute Illness     Findings of the 6 clinical characteristics of malnutrition:  Energy Intake:  Mild decrease in energy intake  Weight Loss:  No significant weight loss     Body Fat Loss:  No significant body fat loss     Muscle Mass Loss:  No significant muscle mass loss    Fluid Accumulation:  No significant fluid accumulation     Strength:  Not Performed    Nutrition Assessment:    Chart reviewed and spoke with RN. Pt remains on vent. Immune Enhancing TF at goal of 45 mL/hr and tolerating well per RN. Last BM noted: today, 12/15/22. Meds/Labs reviewed. Nutrition Related Findings:    Last BM: 12/15/22 Wound Type: Deep Tissue Injury (to right buttocks)       Current Nutrition Intake & Therapies:    ADULT TUBE FEEDING; Orogastric; Immune Enhancing; Continuous; 10; Yes; 10; Q 6 hours; 45; 30; Q 4 hours  Current Tube Feeding (TF) Orders:  Feeding Route: Orogastric  Formula: Immune Enhancing  Schedule: Continuous  Feeding Regimen: 45 mL/hr  Current TF & Flush Orders Provides: Immune Enhancing Formula at 45 mL/hr =1620 kcal and 101 g pro/day  Goal TF & Flush Orders Provides: Standard without Fiber Formula at 75 mL/hr would provide 2160 kcal and 100 g pro/day  Additional Calorie Sources:  none    Anthropometric Measures:  Height: 5' 1\" (154.9 cm)  Ideal Body Weight (IBW): 105 lbs (48 kg)    Admission Body Weight: 282 lb (127.9 kg)  Current Body Weight: 289 lb 14.5 oz (131.5 kg), 270.8 % IBW.  Weight Source: Bed Scale  Current BMI (kg/m2): 54.8  Usual Body Weight: 273 lb 12.8 oz (124.2 kg) (8/12/22 bed scale per chart review)  % Weight Change (Calculated): 3.8                    BMI Categories: Obese Class 3 (BMI 40.0 or greater)    Estimated Daily Nutrient Needs:  Energy Requirements Based On: Formula  Weight Used for Energy Requirements: Admission  Energy (kcal/day): 9414-3842 kcal/day  Weight Used for Protein Requirements: Ideal  Protein (g/day):  g pro/day  Method Used for Fluid Requirements: Other (Comment)  Fluid (ml/day): fluids per MD    Nutrition Diagnosis:   Inadequate oral intake related to impaired respiratory function as evidenced by NPO or clear liquid status due to medical condition, nutrition support - enteral nutrition    Nutrition Interventions:   Food and/or Nutrient Delivery: Modify Tube Feeding  Nutrition Education/Counseling: No recommendation at this time  Coordination of Nutrition Care: Continue to monitor while inpatient       Goals:  Previous Goal Met: Progressing toward Goal(s)  Goals: Meet at least 75% of estimated needs, prior to discharge       Nutrition Monitoring and Evaluation:   Behavioral-Environmental Outcomes: None Identified  Food/Nutrient Intake Outcomes: Enteral Nutrition Intake/Tolerance  Physical Signs/Symptoms Outcomes: Biochemical Data, Fluid Status or Edema, Nutrition Focused Physical Findings, Skin, Weight    Discharge Planning:     Too soon to determine     [address], LD  Contact: [phone]

## 2022-12-15 NOTE — PLAN OF CARE
Problem: Respiratory - Adult  Goal: Achieves optimal ventilation and oxygenation  12/15/2022 0846 by Thea Dupont RCP  Outcome: Progressing  12/15/2022 0800 by Swetha Orozco  Outcome: Progressing  12/15/2022 0502 by Abdiaziz Simmons RN  Outcome: Progressing  Flowsheets (Taken 12/14/2022 1948)  Achieves optimal ventilation and oxygenation: Assess for changes in respiratory status

## 2022-12-15 NOTE — PLAN OF CARE
Problem: Discharge Planning  Goal: Discharge to home or other facility with appropriate resources  12/15/2022 0800 by Giselle Patino  Outcome: Progressing  12/15/2022 0502 by Pooja Ahn RN  Outcome: Progressing  Flowsheets (Taken 12/14/2022 1948)  Discharge to home or other facility with appropriate resources:   Identify discharge learning needs (meds, wound care, etc)   Arrange for interpreters to assist at discharge as needed   Arrange for needed discharge resources and transportation as appropriate     Problem: Confusion  Goal: Confusion, delirium, dementia, or psychosis is improved or at baseline  Description: INTERVENTIONS:  1. Assess for possible contributors to thought disturbance, including medications, impaired vision or hearing, underlying metabolic abnormalities, dehydration, psychiatric diagnoses, and notify attending LIP  2. [place] high risk fall precautions, as indicated  3. Provide frequent short contacts to provide reality reorientation, refocusing and direction  4. Decrease environmental stimuli, including noise as appropriate  5. Monitor and intervene to maintain adequate nutrition, hydration, elimination, sleep and activity  6. If unable to ensure safety without constant attention obtain sitter and review sitter guidelines with assigned personnel  7.  Initiate Psychosocial CNS and Spiritual Care consult, as indicated  12/15/2022 0800 by Giselle Patino  Outcome: Progressing  12/15/2022 0502 by Pooja Ahn RN  Outcome: Progressing  Flowsheets (Taken 12/14/2022 1948)  Effect of thought disturbance (confusion, delirium, dementia, or psychosis) are managed with adequate functional status:   Assess for contributors to thought disturbance, including medications, impaired vision or hearing, underlying metabolic abnormalities, dehydration, psychiatric diagnoses, notify LIP   Provide frequent short contacts to provide reality reorientation, refocusing and direction   Monitor and intervene to maintain adequate nutrition, hydration, elimination, sleep and activity     Problem: Pain  Goal: Verbalizes/displays adequate comfort level or baseline comfort level  12/15/2022 0800 by Ghulam Qureshi  Outcome: Progressing  12/15/2022 0502 by Sandra Pollard RN  Outcome: Progressing     Problem: Chronic Conditions and Co-morbidities  Goal: Patient's chronic conditions and co-morbidity symptoms are monitored and maintained or improved  12/15/2022 0800 by Ghulam Qureshi  Outcome: Progressing  12/15/2022 0502 by Sandra Pollard RN  Outcome: Progressing  Flowsheets (Taken 12/14/2022 1948)  Care Plan - Patient's Chronic Conditions and Co-Morbidity Symptoms are Monitored and Maintained or Improved: Monitor and assess patient's chronic conditions and comorbid symptoms for stability, deterioration, or improvement     Problem: Skin/Tissue Integrity  Goal: Absence of new skin breakdown  Description: 1. Monitor for areas of redness and/or skin breakdown  2. Assess vascular access sites hourly  3. Every 4-6 hours minimum:  Change oxygen saturation probe site  4. Every 4-6 hours:  If on nasal continuous positive airway pressure, respiratory therapy assess nares and determine need for appliance change or resting period.   12/15/2022 0800 by Ghulam Qureshi  Outcome: Progressing  12/15/2022 0502 by Sandra Pollard RN  Outcome: Progressing     Problem: Safety - Adult  Goal: Free from fall injury  12/15/2022 0800 by Ghulam Qureshi  Outcome: Progressing  12/15/2022 0502 by Sandra Pollard RN  Outcome: Progressing     Problem: ABCDS Injury Assessment  Goal: Absence of physical injury  12/15/2022 0800 by Ghulam Qureshi  Outcome: Progressing  12/15/2022 0502 by Sandra Pollard RN  Outcome: Progressing     Problem: Nutrition Deficit:  Goal: Optimize nutritional status  12/15/2022 0800 by Ghulam Qureshi  Outcome: Progressing  12/15/2022 0502 by Sandra Pollard RN  Outcome: Progressing     Problem: Respiratory - Adult  Goal: Achieves optimal ventilation and oxygenation  12/15/2022 0800 by Jacob Segura  Outcome: Progressing  12/15/2022 0502 by Familia Nichols RN  Outcome: Progressing  Flowsheets (Taken 12/14/2022 1948)  Achieves optimal ventilation and oxygenation: Assess for changes in respiratory status

## 2022-12-15 NOTE — PROGRESS NOTES
Provider perfect served due to no urine output since shift change at 1 when patient was incontinent due to catheter failure. Bladder scan performed per order. 255mL found. This amount was less than order for straight cath >350. Provider at bedside. 1150mL out at 1500. Will continue to monitor.

## 2022-12-16 ENCOUNTER — APPOINTMENT (OUTPATIENT)
Dept: GENERAL RADIOLOGY | Age: 47
DRG: 551 | End: 2022-12-16
Payer: MEDICARE

## 2022-12-16 LAB
ABSOLUTE EOS #: 0.55 K/UL (ref 0–0.44)
ABSOLUTE IMMATURE GRANULOCYTE: 0.33 K/UL (ref 0–0.3)
ABSOLUTE LYMPH #: 1.75 K/UL (ref 1.1–3.7)
ABSOLUTE MONO #: 0.97 K/UL (ref 0.1–1.2)
ALLEN TEST: POSITIVE
ANION GAP SERPL CALCULATED.3IONS-SCNC: 11 MMOL/L (ref 9–17)
BASOPHILS # BLD: 1 % (ref 0–2)
BASOPHILS ABSOLUTE: 0.09 K/UL (ref 0–0.2)
BUN BLDV-MCNC: 24 MG/DL (ref 6–20)
CALCIUM SERPL-MCNC: 7.9 MG/DL (ref 8.6–10.4)
CHLORIDE BLD-SCNC: 106 MMOL/L (ref 98–107)
CO2: 21 MMOL/L (ref 20–31)
CREAT SERPL-MCNC: 0.62 MG/DL (ref 0.5–0.9)
CULTURE: NORMAL
EOSINOPHILS RELATIVE PERCENT: 5 % (ref 1–4)
FIO2: 40
GFR SERPL CREATININE-BSD FRML MDRD: >60 ML/MIN/1.73M2
GLUCOSE BLD-MCNC: 100 MG/DL (ref 74–100)
GLUCOSE BLD-MCNC: 95 MG/DL (ref 70–99)
HCT VFR BLD CALC: 34.9 % (ref 36.3–47.1)
HEMOGLOBIN: 10.7 G/DL (ref 11.9–15.1)
IMMATURE GRANULOCYTES: 3 %
LYMPHOCYTES # BLD: 16 % (ref 24–43)
MCH RBC QN AUTO: 27.7 PG (ref 25.2–33.5)
MCHC RBC AUTO-ENTMCNC: 30.7 G/DL (ref 28.4–34.8)
MCV RBC AUTO: 90.4 FL (ref 82.6–102.9)
MODE: ABNORMAL
MONOCYTES # BLD: 9 % (ref 3–12)
NRBC AUTOMATED: 0 PER 100 WBC
O2 DEVICE/FLOW/%: ABNORMAL
PDW BLD-RTO: 14.6 % (ref 11.8–14.4)
PLATELET # BLD: 283 K/UL (ref 138–453)
PMV BLD AUTO: 10.7 FL (ref 8.1–13.5)
POC HCO3: 24.2 MMOL/L (ref 21–28)
POC O2 SATURATION: 96 % (ref 94–98)
POC PCO2: 38.6 MM HG (ref 35–48)
POC PH: 7.41 (ref 7.35–7.45)
POC PO2: 79.3 MM HG (ref 83–108)
POSITIVE BASE EXCESS, ART: 0 (ref 0–3)
POTASSIUM SERPL-SCNC: 3.9 MMOL/L (ref 3.7–5.3)
RBC # BLD: 3.86 M/UL (ref 3.95–5.11)
RBC # BLD: ABNORMAL 10*6/UL
SAMPLE SITE: ABNORMAL
SEG NEUTROPHILS: 66 % (ref 36–65)
SEGMENTED NEUTROPHILS ABSOLUTE COUNT: 6.96 K/UL (ref 1.5–8.1)
SODIUM BLD-SCNC: 138 MMOL/L (ref 135–144)
SPECIMEN DESCRIPTION: NORMAL
VANCOMYCIN RANDOM: 18.2 UG/ML
WBC # BLD: 10.7 K/UL (ref 3.5–11.3)

## 2022-12-16 PROCEDURE — 36415 COLL VENOUS BLD VENIPUNCTURE: CPT

## 2022-12-16 PROCEDURE — 80202 ASSAY OF VANCOMYCIN: CPT

## 2022-12-16 PROCEDURE — 2580000003 HC RX 258

## 2022-12-16 PROCEDURE — APPSS30 APP SPLIT SHARED TIME 16-30 MINUTES: Performed by: NURSE PRACTITIONER

## 2022-12-16 PROCEDURE — 82947 ASSAY GLUCOSE BLOOD QUANT: CPT

## 2022-12-16 PROCEDURE — 6360000002 HC RX W HCPCS: Performed by: NURSE PRACTITIONER

## 2022-12-16 PROCEDURE — 6370000000 HC RX 637 (ALT 250 FOR IP): Performed by: STUDENT IN AN ORGANIZED HEALTH CARE EDUCATION/TRAINING PROGRAM

## 2022-12-16 PROCEDURE — 6360000002 HC RX W HCPCS

## 2022-12-16 PROCEDURE — 94761 N-INVAS EAR/PLS OXIMETRY MLT: CPT

## 2022-12-16 PROCEDURE — 6360000002 HC RX W HCPCS: Performed by: INTERNAL MEDICINE

## 2022-12-16 PROCEDURE — 71045 X-RAY EXAM CHEST 1 VIEW: CPT

## 2022-12-16 PROCEDURE — 80048 BASIC METABOLIC PNL TOTAL CA: CPT

## 2022-12-16 PROCEDURE — 36600 WITHDRAWAL OF ARTERIAL BLOOD: CPT

## 2022-12-16 PROCEDURE — 82803 BLOOD GASES ANY COMBINATION: CPT

## 2022-12-16 PROCEDURE — 2000000000 HC ICU R&B

## 2022-12-16 PROCEDURE — C9113 INJ PANTOPRAZOLE SODIUM, VIA: HCPCS

## 2022-12-16 PROCEDURE — 6370000000 HC RX 637 (ALT 250 FOR IP): Performed by: NURSE PRACTITIONER

## 2022-12-16 PROCEDURE — 94660 CPAP INITIATION&MGMT: CPT

## 2022-12-16 PROCEDURE — 94003 VENT MGMT INPAT SUBQ DAY: CPT

## 2022-12-16 PROCEDURE — 94640 AIRWAY INHALATION TREATMENT: CPT

## 2022-12-16 PROCEDURE — 2500000003 HC RX 250 WO HCPCS: Performed by: HEALTH CARE PROVIDER

## 2022-12-16 PROCEDURE — 94664 DEMO&/EVAL PT USE INHALER: CPT

## 2022-12-16 PROCEDURE — 6370000000 HC RX 637 (ALT 250 FOR IP)

## 2022-12-16 PROCEDURE — 2700000000 HC OXYGEN THERAPY PER DAY

## 2022-12-16 PROCEDURE — 99291 CRITICAL CARE FIRST HOUR: CPT | Performed by: INTERNAL MEDICINE

## 2022-12-16 PROCEDURE — 85025 COMPLETE CBC W/AUTO DIFF WBC: CPT

## 2022-12-16 PROCEDURE — 99233 SBSQ HOSP IP/OBS HIGH 50: CPT | Performed by: INTERNAL MEDICINE

## 2022-12-16 RX ORDER — POLYETHYLENE GLYCOL 3350 17 G/17G
17 POWDER, FOR SOLUTION ORAL DAILY PRN
Status: DISCONTINUED | OUTPATIENT
Start: 2022-12-16 | End: 2022-12-31 | Stop reason: HOSPADM

## 2022-12-16 RX ORDER — OXYCODONE HYDROCHLORIDE 5 MG/1
10 TABLET ORAL EVERY 4 HOURS PRN
Status: DISCONTINUED | OUTPATIENT
Start: 2022-12-16 | End: 2022-12-24

## 2022-12-16 RX ORDER — OXYCODONE HYDROCHLORIDE 5 MG/1
5 TABLET ORAL EVERY 4 HOURS PRN
Status: DISCONTINUED | OUTPATIENT
Start: 2022-12-16 | End: 2022-12-24

## 2022-12-16 RX ADMIN — HYDROCORTISONE 10 MG: 10 TABLET ORAL at 21:01

## 2022-12-16 RX ADMIN — GUAIFENESIN 200 MG: 200 SOLUTION ORAL at 12:13

## 2022-12-16 RX ADMIN — FUROSEMIDE 20 MG: 10 INJECTION, SOLUTION INTRAMUSCULAR; INTRAVENOUS at 09:01

## 2022-12-16 RX ADMIN — GUAIFENESIN 200 MG: 200 SOLUTION ORAL at 23:33

## 2022-12-16 RX ADMIN — Medication 1250 MG: at 11:33

## 2022-12-16 RX ADMIN — ENOXAPARIN SODIUM 30 MG: 100 INJECTION SUBCUTANEOUS at 21:02

## 2022-12-16 RX ADMIN — TOPIRAMATE 100 MG: 100 TABLET, FILM COATED ORAL at 21:01

## 2022-12-16 RX ADMIN — Medication 1250 MG: at 23:33

## 2022-12-16 RX ADMIN — GUAIFENESIN 200 MG: 200 SOLUTION ORAL at 04:00

## 2022-12-16 RX ADMIN — LEVALBUTEROL HYDROCHLORIDE 0.63 MG: 0.63 SOLUTION RESPIRATORY (INHALATION) at 20:00

## 2022-12-16 RX ADMIN — TOPIRAMATE 75 MG: 100 TABLET, FILM COATED ORAL at 08:55

## 2022-12-16 RX ADMIN — GUAIFENESIN 200 MG: 200 SOLUTION ORAL at 19:24

## 2022-12-16 RX ADMIN — ENOXAPARIN SODIUM 30 MG: 100 INJECTION SUBCUTANEOUS at 09:01

## 2022-12-16 RX ADMIN — SERTRALINE 150 MG: 50 TABLET, FILM COATED ORAL at 08:55

## 2022-12-16 RX ADMIN — GUAIFENESIN 200 MG: 200 SOLUTION ORAL at 16:02

## 2022-12-16 RX ADMIN — SODIUM CHLORIDE, PRESERVATIVE FREE 40 MG: 5 INJECTION INTRAVENOUS at 09:01

## 2022-12-16 RX ADMIN — SODIUM CHLORIDE: 9 INJECTION, SOLUTION INTRAVENOUS at 01:38

## 2022-12-16 RX ADMIN — LEVOTHYROXINE SODIUM 150 MCG: 75 TABLET ORAL at 06:51

## 2022-12-16 RX ADMIN — LAMOTRIGINE 200 MG: 100 TABLET ORAL at 08:55

## 2022-12-16 RX ADMIN — LEVALBUTEROL HYDROCHLORIDE 0.63 MG: 0.63 SOLUTION RESPIRATORY (INHALATION) at 17:36

## 2022-12-16 RX ADMIN — GUAIFENESIN 200 MG: 200 SOLUTION ORAL at 08:55

## 2022-12-16 RX ADMIN — HYDROCORTISONE 10 MG: 10 TABLET ORAL at 08:55

## 2022-12-16 RX ADMIN — LEVALBUTEROL HYDROCHLORIDE 0.63 MG: 0.63 SOLUTION RESPIRATORY (INHALATION) at 12:45

## 2022-12-16 RX ADMIN — FENTANYL CITRATE 50 MCG: 50 INJECTION, SOLUTION INTRAMUSCULAR; INTRAVENOUS at 00:51

## 2022-12-16 RX ADMIN — LEVALBUTEROL HYDROCHLORIDE 0.63 MG: 0.63 SOLUTION RESPIRATORY (INHALATION) at 09:06

## 2022-12-16 ASSESSMENT — PULMONARY FUNCTION TESTS
PIF_VALUE: 11
PIF_VALUE: 28
PIF_VALUE: 21
PIF_VALUE: 20
PIF_VALUE: 15
PIF_VALUE: 10
PIF_VALUE: 15
PIF_VALUE: 22
PIF_VALUE: 24

## 2022-12-16 ASSESSMENT — PAIN SCALES - GENERAL: PAINLEVEL_OUTOF10: 0

## 2022-12-16 NOTE — PLAN OF CARE
Problem: Discharge Planning  Goal: Discharge to home or other facility with appropriate resources  Outcome: Progressing     Problem: Confusion  Goal: Confusion, delirium, dementia, or psychosis is improved or at baseline  Description: INTERVENTIONS:  1. Assess for possible contributors to thought disturbance, including medications, impaired vision or hearing, underlying metabolic abnormalities, dehydration, psychiatric diagnoses, and notify attending LIP  2. [place] high risk fall precautions, as indicated  3. Provide frequent short contacts to provide reality reorientation, refocusing and direction  4. Decrease environmental stimuli, including noise as appropriate  5. Monitor and intervene to maintain adequate nutrition, hydration, elimination, sleep and activity  6. If unable to ensure safety without constant attention obtain sitter and review sitter guidelines with assigned personnel  7. Initiate Psychosocial CNS and Spiritual Care consult, as indicated  Outcome: Progressing     Problem: Pain  Goal: Verbalizes/displays adequate comfort level or baseline comfort level  Outcome: Progressing     Problem: Chronic Conditions and Co-morbidities  Goal: Patient's chronic conditions and co-morbidity symptoms are monitored and maintained or improved  Outcome: Progressing     Problem: Skin/Tissue Integrity  Goal: Absence of new skin breakdown  Description: 1. Monitor for areas of redness and/or skin breakdown  2. Assess vascular access sites hourly  3. Every 4-6 hours minimum:  Change oxygen saturation probe site  4. Every 4-6 hours:  If on nasal continuous positive airway pressure, respiratory therapy assess nares and determine need for appliance change or resting period.   Outcome: Progressing     Problem: Safety - Adult  Goal: Free from fall injury  Outcome: Progressing     Problem: ABCDS Injury Assessment  Goal: Absence of physical injury  Outcome: Progressing     Problem: Nutrition Deficit:  Goal: Optimize nutritional status  Outcome: Progressing     Problem: Respiratory - Adult  Goal: Achieves optimal ventilation and oxygenation  Outcome: Progressing

## 2022-12-16 NOTE — PLAN OF CARE
BRONCHOSPASM/BRONCHOCONSTRICTION     [x]         IMPROVE AERATION/BREATH SOUNDS  [x]   ADMINISTER BRONCHODILATOR THERAPY AS APPROPRIATE  [x]   ASSESS BREATH SOUNDS  [x]   IMPLEMENT AEROSOL/MDI PROTOCOL  [x]   PATIENT EDUCATION AS NEEDED   PROVIDE ADEQUATE OXYGENATION WITH ACCEPTABLE SP02/ABG'S    [x]  IDENTIFY APPROPRIATE OXYGEN THERAPY  [x]   MONITOR SP02/ABG'S AS NEEDED   [x]   PATIENT EDUCATION AS NEEDED   MECHANICAL VENTILATION     [x]  PROVIDE OPTIMAL VENTILATION  [x]   ASSESS FOR EXTUBATION READINESS  [x]   ASSESS FOR WEANING READINESS  [x]  EXTUBATE AS TOLERATED  [x]  IMPLEMENT ADULT MECHANICAL VENTILATION PROTOCOL  [x]  MAINTAIN ADEQUATE OXYGENATION    Problem: Respiratory - Adult  Goal: Achieves optimal ventilation and oxygenation  12/16/2022 1009 by Parish Lugo RCP  Outcome: Progressing  12/16/2022 0730 by Carly Vazquez  Outcome: Progressing  12/16/2022 0255 by Susana Ott RN  Outcome: Progressing     PERFORM SPONTANEOUS WEANING TRIAL AS TOLERATED

## 2022-12-16 NOTE — PROGRESS NOTES
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 Stop date 12-22-22  Neurosurgery and ortho recommendations    Medical Decision Making/Summary/Discussion:12/16/2022       Infection Control Recommendations   [place] Precautions  Contact for MRSA    Antimicrobial Stewardship Recommendations     Simplification of therapy  Targeted therapy    Coordination of Outpatient Care:   Estimated Length of IV antimicrobials:TBD  Patient will need Midline Catheter Insertion: TBD  Patient will need PICC line Insertion:BD  Patient will need: Home IV , [place],  SNF,  LTAC: TBD  Patient will need outpatient wound care:    Chief complaint/reason for consultation:   Positive sputum culture MRSA      History of Present Illness:   Ant Arroyo is a 41y.o.-year-old female who was initially admitted on 12/5/2022. Patient seen at the request of Dr. Gretchen Martines:    This patient, with a history of developmental delay, presented to the [facility] ED after falling down some stairs at home. She was complaining of neck and back pain. Imaging revealed an acute C7 transverse process fracture, chronic T3 compression fx and fractures of right sacral ala, nondisplaced fracture of right superior ramus and right obturator ring. She was transferred to [facility] for further medical management. Neurosurgery was consulted and it was determined there is no neurosurgical intervention needed.      Orthopedic surgery was also consulted and they also did not recommend surgical intervention, but rather outpatient follow-up. On 12/6/22, the patient's mentation declined and she became tachypneic requiring emergent intubation. Neurology was consulted for the patient's history of seizures. Her home seizure medications were restarted. An EEG did not show any seizure activity and the patient's mentation improved. She was extubated on the 7th but required re-intubation on 12/10/22 after a rapid response was called. CXR showed concern for RUL aspiration pneumonia. IV Zosyn was started and cultures were obtained. Viral respiratory panel was negative for influenza and Covid    Sputum cultures grew MRSA and the patient's antibiotics were changed to IV vancomycin on 12/11/22. There has been no growth on blood or urine cultures. ID was consulted for MRSA on sputum cultures      CT Head W/O Contrast   Final Result   No acute intracranial abnormality. Small amount of fluid in the right mastoid air cells. CT CHEST ABDOMEN PELVIS WO CONTRAST Additional Contrast? None   Final Result   Chest:       1. Mild anterior wedging of T3 suggesting an age indeterminate compression   fracture. No fracture line is identified. Clinical correlation with the   site of symptoms is suggested. 2. Significant motion artifact with scattered patchy ground-glass opacities   in the lungs which may be related to motion artifact. Ground-glass opacities   are otherwise nonspecific and could be related to atypical infection or   pneumonitis. Abdomen and pelvis:       1. Nondisplaced fractures of the right sacral ala and the right obturator   ring. 2. No acute findings elsewhere in the abdomen or pelvis. 3. Left renal cyst.           CT CSpine W/O Contrast   Final Result   Acute fracture of the right C7 transverse process. Spinal degenerative changes as described.                CURRENT EVALUATION 12/16/2022  /65   Pulse 78   Temp 100.4 °F (38 °C) (Bladder)   Resp 24   Ht 5' 1\" (1.549 m)   Wt 281 lb 4.9 oz (127.6 kg)   SpO2 96%   BMI 53.15 kg/m²     Patient evaluated in the ICU    TMAX 100.4  VS stable    The patient is on mechanical ventilation with 40% FiO2 and PEEP 5  SpO2:97%    The patient is following commands. TF for nutrition    The patient is tolerating IV vancomycin for MRSA sputum. Urine culture pending     Stable CXR    Labs, X rays reviewed: 12/16/2022    BUN:25-->28-->24  Cr:0.63-->0.67-->0.62    WBC:7.8-->7.6-->10.7  Hb:10.0-->10.1-->10.7  Plat: 223-->252-->283    CRP:    Cultures:  Urine:  12/15/22: Pending  Blood:  12/10/22: No growth  12/15/2: Pending  Sputum :  12/10/22: MRSA  Wound:    MRSA Nares:  12/10/22: Detected    Imaging:  CXR  12/12/22 12/6/22      CT Chest  12/6/22      Discussed with patient, RN, family. I have personally reviewed the past medical history, past surgical history, medications, social history, and family history, and I have updated the database accordingly.   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)     Fibromyalgia     Herpes zoster without mention of complication     Hypothyroidism     Migraine     Seizures ([facility] [address]     last seizure in 2000    Sleep apnea     CPAP    TIA (transient ischemic attack) 2000       Past Surgical  History:     Past Surgical History:   Procedure Laterality Date    BRAIN BIOPSY      tumor biopsy    COCHLEAR IMPLANT Left     COCHLEAR IMPLANT Right 05/10/2022    COLONOSCOPY      COLONOSCOPY N/A 8/12/2022    COLONOSCOPY WITH BIOPSY performed by Kelechi Kruger MD at [facility] SURGERY      implants 1/22    ENDOSCOPY, COLON, DIAGNOSTIC      KNEE SURGERY      scope    TONSILLECTOMY      UPPER GASTROINTESTINAL ENDOSCOPY N/A 8/12/2022    EGD BIOPSY performed by Radu Bradley MD at [facility] OR       Medications:      guaiFENesin  200 mg Oral Q4H    vancomycin  1,000 mg IntraVENous Q12H    furosemide  20 mg IntraVENous Daily    pantoprazole (PROTONIX) 40 mg injection  40 mg IntraVENous Daily    vancomycin (VANCOCIN) intermittent dosing (placeholder)   Other RX Placeholder    hydrocortisone  10 mg Oral BID    haloperidol lactate  5 mg IntraVENous Once    chlorhexidine  15 mL Mouth/Throat BID    levalbuterol  0.63 mg Nebulization 4x daily    enoxaparin  30 mg SubCUTAneous BID    sertraline  150 mg Oral Daily    lamoTRIgine  200 mg Oral Daily    levothyroxine  150 mcg Oral QAM AC    topiramate  75 mg Oral Daily    topiramate  100 mg Oral Nightly       Social History:     Social History     Socioeconomic History    Marital status: Single     Spouse name: Not on file    Number of children: Not on file    Years of education: Not on file    Highest education level: Not on file   Occupational History     Comment: disabled   Tobacco Use    Smoking status: Never    Smokeless tobacco: Never   Vaping Use    Vaping Use: Never used   Substance and Sexual Activity    Alcohol use:  Yes     Alcohol/week: 0.0 standard drinks     Comment: rare    Drug use: No    Sexual activity: Not Currently     Partners: Male   Other Topics Concern    Not on file   Social History Narrative    Not on file     Social Determinants of Health     Financial Resource Strain: Low Risk     Difficulty of Paying Living Expenses: Not hard at all   Food Insecurity: No Food Insecurity    Worried About Running Out of Food in the Last Year: Never true    Ran Out of Food in the Last Year: Never true   Transportation Needs: Not on file   Physical Activity: Not on file   Stress: Not on file   Social Connections: Not on file   Intimate Partner Violence: Not on file   Housing Stability: Not on file       Family History:     Family History   Problem Relation Age of Onset    Cancer Mother 50        breast    Migraines Mother     Diabetes Father     High Blood Pressure Father     High Cholesterol Father     Migraines Sister     High Blood Pressure Maternal Grandmother     Cancer Paternal Grandmother 70        colon cancer    Cancer Paternal Uncle         esophageal        Allergies:   Bactrim [sulfamethoxazole-trimethoprim], Bee venom, Ciprofloxacin, and Milk-related compounds     Review of Systems:   GILDARDO-intubated and sedated  Constitutional: No fevers or chills. No systemic complaints  Head: No headaches  Eyes: No double vision or blurry vision. No conjunctival inflammation. ENT: No sore throat or runny nose. . No hearing loss, tinnitus or vertigo. Cardiovascular: No chest pain or palpitations. No shortness of breath. No LAZARO  Lung: No shortness of breath or cough. No sputum production  Abdomen: No nausea, vomiting, diarrhea, or abdominal pain. Katy Raddle No cramps. Genitourinary: No increased urinary frequency, or dysuria. No hematuria. No suprapubic or CVA pain  Musculoskeletal: No muscle aches or pains. No joint effusions, swelling or deformities  Hematologic: No bleeding or bruising. Neurologic: No headache, weakness, numbness, or tingling. Integument: No rash, no ulcers. Psychiatric: No depression. Endocrine: No polyuria, no polydipsia, no polyphagia.     Physical Examination :   Patient Vitals for the past 8 hrs:   BP Temp Temp src Pulse Resp SpO2 Weight   12/16/22 0700 122/65 100.4 °F (38 °C) Bladder 78 24 96 % --   12/16/22 0630 -- -- -- 75 24 96 % --   12/16/22 0629 -- -- -- 77 20 96 % --   12/16/22 0600 109/60 100.4 °F (38 °C) Bladder 67 21 97 % 281 lb 4.9 oz (127.6 kg)   12/16/22 0530 -- -- -- 63 21 95 % --   12/16/22 0500 121/70 (!) 100.6 °F (38.1 °C) Bladder 67 25 96 % --   12/16/22 0430 -- -- -- 65 25 95 % --   12/16/22 0400 126/73 (!) 100.6 °F (38.1 °C) Bladder 74 23 96 % --   12/16/22 0351 -- -- -- 74 19 95 % --   12/16/22 0330 -- -- -- 78 20 95 % --   12/16/22 0300 118/67 100.4 °F (38 °C) Bladder 78 29 95 % --   12/16/22 0230 -- -- -- 80 23 97 % --   12/16/22 0200 117/67 100.4 °F (38 °C) Bladder 70 27 94 % --   12/16/22 0130 -- -- -- 66 19 93 % --   12/16/22 0121 -- -- -- -- 22 -- --   12/16/22 0104 -- -- -- 64 22 92 % --   12/16/22 0100 (!) 99/58 -- -- 61 21 92 % --     General Appearance: Intubated and sedated  Head:  Normocephalic, no trauma  Eyes: Pupils equal, round, reactive to light and accommodation; extraocular movements intact; sclera anicteric; conjunctivae pink. No embolic phenomena. ENT: Oropharynx clear, without erythema, exudate, or thrush. No tenderness of sinuses. Mouth/throat: mucosa pink and moist. No lesions. Dentition in good repair. Neck:Supple, without lymphadenopathy. Thyroid normal, No bruits. Pulmonary/Chest: Rhonchi to auscultation rhonchi. No dullness to percussion. Cardiovascular: Regular rate and rhythm without murmurs, rubs, or gallops. Abdomen: Soft, non tender. Bowel sounds hypoactive No organomegaly  All four Extremities: No cyanosis, clubbing, edema, or effusions. Neurologic: GILDARDO-following commands  Skin: Warm and dry with good turgor. No signs of peripheral arterial or venous insufficiency. No ulcerations. No open wounds. Medical Decision Making -Laboratory:   I have independently reviewed/ordered the following labs:    CBC with Differential:   Recent Labs     12/15/22  0534 12/16/22 0719   WBC 7.6 10.7   HGB 10.1* 10.7*   HCT 33.2* 34.9*    283   LYMPHOPCT 21* 16*   MONOPCT 10 9     BMP:   Recent Labs     12/15/22  0534 12/16/22 0719    138   K 3.7 3.9    106   CO2 20 21   BUN 28* 24*   CREATININE 0.67 0.62     Hepatic Function Panel: No results for input(s): PROT, LABALBU, BILIDIR, IBILI, BILITOT, ALKPHOS, ALT, AST in the last 72 hours. No results for input(s): RPR in the last 72 hours.   No results for input(s): HIV in the last 72 hours. No results for input(s): BC in the last 72 hours. Lab Results   Component Value Date/Time    MUCUS 1+ 03/27/2013 05:54 PM    RBC 3.86 12/16/2022 07:19 AM    TRICHOMONAS NOT REPORTED 03/27/2013 05:54 PM    WBC 10.7 12/16/2022 07:19 AM    YEAST NOT REPORTED 03/27/2013 05:54 PM    TURBIDITY Clear 12/06/2022 08:05 PM     Lab Results   Component Value Date/Time    CREATININE 0.62 12/16/2022 07:19 AM    GLUCOSE 95 12/16/2022 07:19 AM       Medical Decision Making-Imaging:     Narrative   EXAMINATION:   ONE XRAY VIEW OF THE CHEST       12/12/2022 8:40 am       COMPARISON:   Chest x-ray dated 11 December 2022       HISTORY:   ORDERING SYSTEM PROVIDED HISTORY: increase in respiratory secretions   TECHNOLOGIST PROVIDED HISTORY:   increase in respiratory secretions       FINDINGS:   Endotracheal tube and enteric tube in stable position. No pneumothorax or   pleural effusion. Stable cardiomediastinal silhouette. Persistent bilateral   airspace opacities. Impression   Stable bilateral airspace opacities. This could represent multifocal   pneumonia, however, edema is not fully excluded       Medical Decision [id number]:   Culture, Respiratory  Order: [id number]  Status: Final result    Visible to patient: No (not released)    Next appt: 01/10/2023 at 09:00 AM in Neurology (Jose Martin Mtz MD)    Specimen Information: Sputum Induced   0 Result Notes  Component 12/10/22 1130    Specimen Description . INDUCED SPUTUM    Direct Exam < 10 EPITHELIAL CELLS/LPF    Direct Exam <10 NEUTROPHILS/LPF    Direct Exam FEW GRAM POSITIVE COCCI IN CLUSTERS Abnormal     Culture METHICILLIN RESISTANT STAPHYLOCOCCUS AUREUS HEAVY GROWTH Abnormal     Culture NO NORMAL JEFFREY    Resulting Agency [facility]        Susceptibility    Methicillin-Resistant Staphylococcus aureus (1)    Antibiotic Interpretation Microscan Method Status    penicillin Resistant >=0.5 BACTERIAL SUSCEPTIBILITY PANEL SURJIT Final    clindamycin Sensitive <=0.25 BACTERIAL SUSCEPTIBILITY PANEL SURJIT Final    erythromycin Resistant >=8 BACTERIAL SUSCEPTIBILITY PANEL SURJIT Final    gentamicin Sensitive <=0.5 BACTERIAL SUSCEPTIBILITY PANEL SURJIT Final     Gentamicin is used only in combination with other active agents that test susceptible. Induced Clind Resist Negative NEGATIVE BACTERIAL SUSCEPTIBILITY PANEL SURJIT Final    levofloxacin Intermediate 4 BACTERIAL SUSCEPTIBILITY PANEL SURJIT Final    oxacillin Resistant >=4 BACTERIAL SUSCEPTIBILITY PANEL SURJIT Final    tetracycline Sensitive <=1 BACTERIAL SUSCEPTIBILITY PANEL SURJIT Final    trimethoprim-sulfamethoxazole Sensitive <=10 BACTERIAL SUSCEPTIBILITY PANEL SURJIT Final    vancomycin Sensitive 1 BACTERIAL SUSCEPTIBILITY PANEL SURJIT Final          Specimen Collected: 12/10/22 11:30 EST Last Resulted: 12/12/22 09:53 EST             Medical Decision Making-Other:     Note:  Labs, medications, radiologic studies were reviewed with personal review of films  Large amounts of data were reviewed  Discussed with nursing Staff, Discharge planner  Infection Control and Prevention measures reviewed  All prior entries were reviewed  Administer medications as ordered  Prognosis: Guarded  Discharge planning reviewed      Thank you for allowing us to participate in the care of this patient. Please call with questions. Chandu Chaudhari, APRN - CNP    ATTESTATION:    I have discussed the case, including pertinent history and exam findings with the APRN. I have evaluated the  History, physical findings and pictures of the patient and the key elements of the encounter have been performed by me. I have reviewed the laboratory data, other diagnostic studies and discussed them with the APRN. I have updated the medical record where necessary. I agree with the assessment, plan and orders as documented by the APRN.     Jeannette Resendiz MD.      Pager: [phone] - Office: [phone]

## 2022-12-16 NOTE — CARE COORDINATION
Transitional planning. Intubated/ Sedated FiO2 40%, PEEP of 5, following commands, has cochlear implants, LTACH choice is [facility]- referral sent. Pt's father has SNF list, will review and provide CM with 3 choices.      200 Pt's father provided CM with 3 SNF choices, order of choice as follows:  1) [facility] Nguyen  2) [facility] Nguyen  3) [facility]  Referrals sent to all three

## 2022-12-16 NOTE — PROGRESS NOTES
INTENSIVE CARE UNIT  Resident Physician Progress Note    Patient - Den Nunez  Date of Admission -  12/5/2022 11:39 PM  Date of Evaluation -  12/16/2022  Room and Bed Number -  3021/3021-01   Hospital Day - 10    HPI:     44-year-old female initially presented to hospital on 12/5 after a fall. Patient is developmentally delayed and has a history of brain tumor as a child with a lot of radiation. Patient also has past medical history of diabetes mellitus, bilateral sensorineural hearing loss. Had a CT head showed no intracranial abnormality, CT abdominal pelvis showed  nondisplaced fractures of right sacral ilya, nondisplaced fracture of right superior ramus and right obturator ring. CT spine showed acute fracture of right C7 transverse process and also age indeterminate compressive fracture of T3. Neurosurgery for cervical fractures was consulted, who recommended no neurosurgical intervention. Orthopedic surgery for pelvic fractures was also consulted, no surgical intervention but medical management and also recommended to follow-up in orthopedic clinic after discharge. Patient was started on BiPAP for increased work of breathing. Patient was oriented x4 at day of presentation. 12/06 - patient had a decline in mental status with tachypnea, respiratory rate of 30s to 40 with heart rate of 105 patient was intubated emergently     12/7- Neurology was consulted because of history of migraines, seizure disorder, patient was started on her home antiepileptic drugs including Topamax and Lamictal, EEG was ordered to rule out any subclinical seizure activity, MRI was also ordered to rule out any CVA. Patient was doing fine, arousable and following commands. Pulmonology was consulted, recommended to extubate and patient was extubated.      12/10 -rapid was called today due to impending respiratory failure, patient was intubated again and was sent to ICU      12/12 -respiratory culture came back positive for staph aureus , nasal respiratory swab came out positive for MRSA DNA, patient started on vancomycin       : Tachycardia improved to normal heart rate, patient remained afebrile, WBC count trending down. Sedation changed to precedex     12/15: Precedex discontinued. SUBJECTIVE:     OVERNIGHT EVENTS:    Off precedex. Continued yellow frothy secretions from ET tube.      AWAKE & FOLLOWING COMMANDS:  [] No   [x] Yes    SECRETIONS Amount:  [] Small [] Moderate  [x] Large  [] None  Color:     [] White [x] Colored - yellow frothy  [] Bloody    SEDATION:  RAAS Score:  [] Propofol gtt  [] Versed gtt  [] Ativan gtt   [x] No Sedation    PARALYZED:  [x] No    [] Yes    VASOPRESSORS:  [x] No    [] Yes  [] Levophed [] Dopamine [] Vasopressin  [] Dobutamine [] Phenylephrine [] Epinephrine      OBJECTIVE:     VITAL SIGNS:  /65   Pulse 78   Temp 100.4 °F (38 °C) (Bladder)   Resp 24   Ht 5' 1\" (1.549 m)   Wt 281 lb 4.9 oz (127.6 kg)   SpO2 96%   BMI 53.15 kg/m²   Tmax over 24 hours:  Temp (24hrs), Av.6 °F (38.1 °C), Min:100.4 °F (38 °C), Max:100.9 °F (38.3 °C)      Patient Vitals for the past 8 hrs:   BP Temp Temp src Pulse Resp SpO2 Weight   22 0700 122/65 100.4 °F (38 °C) Bladder 78 24 96 % --   22 0630 -- -- -- 75 24 96 % --   22 0629 -- -- -- 77 20 96 % --   22 0600 109/60 100.4 °F (38 °C) Bladder 67 21 97 % 281 lb 4.9 oz (127.6 kg)   22 0530 -- -- -- 63 21 95 % --   22 0500 121/70 (!) 100.6 °F (38.1 °C) Bladder 67 25 96 % --   22 0430 -- -- -- 65 25 95 % --   22 0400 126/73 (!) 100.6 °F (38.1 °C) Bladder 74 23 96 % --   22 0351 -- -- -- 74 19 95 % --   22 0330 -- -- -- 78 20 95 % --   22 0300 118/67 100.4 °F (38 °C) Bladder 78 29 95 % --   22 0230 -- -- -- 80 23 97 % --   22 0200 117/67 100.4 °F (38 °C) Bladder 70 27 94 % --   22 0130 -- -- -- 66 19 93 % --   22 0121 -- -- -- -- 22 -- --   22 0104 -- -- -- 64 22 92 % --   12/16/22 0100 (!) 99/58 -- -- 61 21 92 % --         Intake/Output Summary (Last 24 hours) at 12/16/2022 0832  Last data filed at 12/16/2022 0700  Gross per 24 hour   Intake 2991.68 ml   Output 3270 ml   Net -278.32 ml     Date 12/16/22 0000 - 12/16/22 2359   Shift 6568-5248 7850-4147 0942-9051 24 Hour Total   INTAKE   I.V.(mL/kg) 348.8(2.7)   348.8(2.7)   NG/GT(mL/kg) 757(5.9)   757(5.9)   IV Piggyback(mL/kg) 246.9(1.9)   246.9(1.9)   Shift Total(mL/kg) 1352. 6(10.6)   1352. 6(10.6)   OUTPUT   Urine(mL/kg/hr) 775(0.8)   775   Shift Total(mL/kg) 775(6.1)   775(6.1)   Weight (kg) 127.6 127.6 127.6 127.6     Wt Readings from Last 3 Encounters:   12/16/22 281 lb 4.9 oz (127.6 kg)   12/05/22 277 lb (125.6 kg)   11/07/22 279 lb (126.6 kg)     Body mass index is 53.15 kg/m².         PHYSICAL EXAM:  GEN:  awake, alert, and cooperative  EYES:   pupils equal, round, and reactive to light  HEENT:  Normocephalic, without obvious abnormality  LUNGS:  Diffuse crackles in bilateral lung bases  CV:    regular rate and rhythm and normal S1 and S2  ABDOMEN:   No scars, normal bowel sounds, soft, non-distended, non-tender, no masses palpated, no hepatosplenomegaly  MSK:    there is no redness, warmth, or swelling of the joints  NEURO[de-identified]   Awake, alert, following commands  SKIN:   Normal skin color, texture, turgor  EXTREMITIES:  No erythema, distal pulses intact    MEDICATIONS:  Scheduled Meds:   guaiFENesin  200 mg Oral Q4H    vancomycin  1,000 mg IntraVENous Q12H    furosemide  20 mg IntraVENous Daily    pantoprazole (PROTONIX) 40 mg injection  40 mg IntraVENous Daily    vancomycin (VANCOCIN) intermittent dosing (placeholder)   Other RX Placeholder    hydrocortisone  10 mg Oral BID    haloperidol lactate  5 mg IntraVENous Once    chlorhexidine  15 mL Mouth/Throat BID    levalbuterol  0.63 mg Nebulization 4x daily    enoxaparin  30 mg SubCUTAneous BID    sertraline  150 mg Oral Daily    lamoTRIgine  200 mg Oral Daily levothyroxine  150 mcg Oral QAM AC    topiramate  75 mg Oral Daily    topiramate  100 mg Oral Nightly    polyethylene glycol  17 g Oral Daily     Continuous Infusions:   dexmedetomidine Stopped (12/15/22 1308)    sodium chloride Stopped (12/12/22 0316)    sodium chloride 50 mL/hr at 12/16/22 0400     PRN Meds:   levalbuterol, 0.63 mg, Q4H PRN  sodium chloride, , PRN  fentanNYL, 50 mcg, Q2H PRN  oxyCODONE, 5 mg, Q4H PRN  oxyCODONE, 10 mg, Q4H PRN  acetaminophen, 650 mg, Q6H PRN  sodium chloride flush, 5-40 mL, PRN        SUPPORT DEVICES: [x] Ventilator [] BIPAP  [] Nasal Cannula [] Room Air    VENT SETTINGS (Comprehensive) (if applicable): PRVC mode, FiO2 40%, PEEP 5, Respiratory Rate 18, Tidal Volume 380  Vent Information  Ventilator ID: [id number]  Equipment Changed: Expiratory Filter  Ventilator Initiate: Yes  Vent Mode: AC/PRVC  Additional Respiratory Assessments  Heart Rate: 78  Resp: 24  SpO2: 96 %  End Tidal CO2: 35 (%)  Position: Semi-Love's  Humidification Source: Heated wire  Humidification Temp: 36.8  Circuit Condensation: Drained  Skin Barrier Applied: No    ABGs:   Arterial Blood Gas result:  pH 7.406; pCO2 38.6; pO2 79.3; HCO3 24.2; %O2 Sat 96.   Lab Results   Component Value Date/Time    FIO2 40.0 12/16/2022 05:01 AM         DATA:  Complete Blood Count:   Recent Labs     12/14/22  0530 12/15/22  0534 12/16/22  0719   WBC 7.8 7.6 10.7   RBC 3.59* 3.62* 3.86*   HGB 10.0* 10.1* 10.7*   HCT 33.4* 33.2* 34.9*   MCV 93.0 91.7 90.4   MCH 27.9 27.9 27.7   MCHC 29.9 30.4 30.7   RDW 14.5* 14.5* 14.6*    252 283   MPV 10.8 10.8 10.7        Last 3 Blood Glucose:   Recent Labs     12/14/22  0530 12/15/22  0534 12/16/22  0719   GLUCOSE 117* 94 95        PT/INR:    Lab Results   Component Value Date/Time    PROTIME 10.5 12/05/2022 05:35 PM    INR 1.0 12/05/2022 05:35 PM     PTT:    Lab Results   Component Value Date/Time    APTT 24.9 03/10/2015 11:25 AM       Comprehensive Metabolic Profile:   Recent Labs 12/14/22  0530 12/15/22  0534 12/16/22  0719    136 138   K 3.7 3.7 3.9   * 106 106   CO2 23 20 21   BUN 25* 28* 24*   CREATININE 0.63 0.67 0.62   GLUCOSE 117* 94 95   CALCIUM 8.1* 8.1* 7.9*      Magnesium:   Lab Results   Component Value Date/Time    MG 1.7 12/06/2022 10:12 PM     Phosphorus:   Lab Results   Component Value Date/Time    PHOS 2.9 12/06/2022 10:12 PM     Ionized Calcium: No results found for: CAION     Urinalysis:   Lab Results   Component Value Date/Time    NITRU NEGATIVE 12/06/2022 08:05 PM    COLORU Yellow 12/06/2022 08:05 PM    PHUR 5.5 12/06/2022 08:05 PM    WBCUA None 12/06/2022 08:05 PM    RBCUA None 12/06/2022 08:05 PM    MUCUS 1+ 03/27/2013 05:54 PM    TRICHOMONAS NOT REPORTED 03/27/2013 05:54 PM    YEAST NOT REPORTED 03/27/2013 05:54 PM    BACTERIA RARE 03/27/2013 05:54 PM    CLARITYU clear 11/11/2014 01:20 PM    SPECGRAV 1.035 12/06/2022 08:05 PM    LEUKOCYTESUR NEGATIVE 12/06/2022 08:05 PM    UROBILINOGEN Normal 12/06/2022 08:05 PM    BILIRUBINUR NEGATIVE 12/06/2022 08:05 PM    BILIRUBINUR neagtive 03/17/2017 04:30 PM    BLOODU negative 03/17/2017 04:30 PM    GLUCOSEU NEGATIVE 12/06/2022 08:05 PM    KETUA MODERATE 12/06/2022 08:05 PM    AMORPHOUS NOT REPORTED 03/27/2013 05:54 PM       HgBA1c:    Lab Results   Component Value Date/Time    LABA1C 5.3 04/26/2022 12:00 AM     TSH:    Lab Results   Component Value Date/Time    TSH <0.01 12/07/2022 03:57 AM     Lactic Acid: No results found for: LACTA   Troponin: No results for input(s): TROPONINI in the last 72 hours.     Microbiology:  Blood Culture: NGx5d, NGx12 hours    ASSESSMENT:     Patient Active Problem List    Diagnosis Date Noted    MRSA infection 12/14/2022    Acute lower respiratory tract infection 12/14/2022    Acute respiratory failure with hypoxia ([facility] [address] 12/11/2022    Aspiration pneumonia ([facility] [address] 12/10/2022    Multiple closed pelvic fractures without disruption of pelvic [place] ([facility] [address] 12/09/2022    Lethargy 12/07/2022 Cervical transverse process fracture, initial encounter ([facility] 12/06/2022    Closed nondisplaced fracture of seventh cervical vertebra ([facility] 12/06/2022    Gastroesophageal reflux disease 08/12/2022    Irritable bowel syndrome with constipation 06/07/2022    Type 2 diabetes mellitus with stage 3a chronic kidney disease, without long-term current use of insulin ([facility] 11/12/2021    Moderate episode of recurrent major depressive disorder ([facility] 02/26/2020    Hypopituitarism ([facility] 08/24/2016    ROHIT on CPAP 05/08/2015    Hypothyroidism 08/06/2014    Asthma 08/06/2014    Morbid obesity ([facility] 08/06/2014    Seizure ([facility] 10/23/2013          PLAN:     Neuro  Intubated  Off precedex  PRVC 18/380/5/40%  ABG 12/16 pH 7.406, pCO2 38.6, pO2 79.2, HCO3 24.2, SaO2 96  Lamotrigine 200  Topamax 75 daily, 100 nightly  Zoloft 150 mg    Cards  HR 78-80  MAP average 81  Lasix 20 daily    Pulmonary  Intubated for aspiration pneumonia  PRVC 18/380/5/40%  ABG 12/16 pH 7.406, pCO2 38.6, pO2 79.2, HCO3 24.2, SaO2 96  Trial CPAP today  CXR 12/15 - no change in bilateral airspace disease  Pulmonary toilet  Prednisone 40 x 4 days  Hydrocortisone 10 mg BID  Xopenex nebulizer QID  Guanifenesen 200 mg q 4h    Renal  Porras placed 12/15 for retention  I/O 3445 ml (1.1 ml/kg/hr)  Lasix 20 mg daily    GI  Tube feeds 75 ml/hr, at goal  Protonix 40    ID  Vancomycin, course started 12/12  Febrile, 38.3  Respiratory culture (+) S Aureus  Blood cultures (-) x 5 days  Repeat blood cultures (-) x 12 hours  ID following    Endo  Synthroid 150 daily  Hydrocortisone 10 mg BID    DVT Ppx: Lovenox   GI Ppx: Protonix        ICU PROPHYLAXIS:  Stress ulcer:  [x] PPI Agent  [] [id number] [] Sucralfate  [] Other:  VTE:   [x] Enoxaparin  [] Unfract.  Heparin Subcut  [] EPC Cuffs    NUTRITION:  [] NPO [x] Tube Feeding (Specify: )  ADULT TUBE FEEDING; Orogastric; Standard without Fiber; Continuous; 45; Yes; 20; Q 4 hours; 75; 30; Q 4 hours   [] TPN  [] PO    FAMILY UPDATED: [] No  [x] Yes    TRANSFER OUT OF ICU:   [x] No  [] Yes      Edda Morse M.D. Emergency Medicine Resident, PGY-2  12/16/2022 8:32 AM        Attending Physician Statement  I have discussed the care of Bren Parrish, including pertinent history and exam findings with the resident. I have reviewed the key elements of all parts of the encounter with the resident. I have seen and examined the patient with the resident. I agree with the assessment and plan and status of the problem list as documented. Patient is off Precedex not on any sedation maintaining blood pressure not on pressor support. She does have fever intermittently T-max was 38.3 last 24 hours. WBC count is normal.  Urine output is good 3445 in last 24 hours chest x-ray shows better aeration decrease effusion. She is completely off Precedex and eyes opening present intermittently follows some command. Ventilator setting PRVC/18/380/5/40 percent ABG 7.4 0/38/79/24. Did not tolerate spontaneous breathing trial well today we will continue with daily spontaneous breathing trial.  She has a large amount of endotracheal secretion present. Will decrease IV fluids continue with tube feeding. Discussed with father and updated. Discussed with nursing staff, treatment and plan discussed. Discussed with respiratory therapist.    Total critical care time caring for this patient with life threatening, unstable organ failure, including direct patient contact, management of life support systems, review of data including imaging and labs, discussions with other team members and physicians at least 27  Min so far today, excluding procedures. Please note that this chart was generated using voice recognition Dragon dictation software. Although every effort was made to ensure the accuracy of this automated transcription, some errors in transcription may have occurred.      Cheng Cerda MD  12/16/2022 11:06 AM

## 2022-12-16 NOTE — PROGRESS NOTES
[address] [facility] Pharmacokinetic Monitoring Service - Vancomycin    Consulting Provider: Dr Ana Maria Staples   Indication: Pneumonia  Target Concentration: Goal AUC/SURJIT 400-600 mg*hr/L  Day of Therapy: 6  Additional Antimicrobials: n/a    Pertinent Laboratory Values: Wt Readings from Last 1 Encounters:   12/16/22 281 lb 4.9 oz (127.6 kg)     Temp Readings from Last 1 Encounters:   12/16/22 100.4 °F (38 °C) (Bladder)     Estimated Creatinine Clearance: 144 mL/min (based on SCr of 0.62 mg/dL).   Recent Labs     12/15/22  0534 12/16/22  0719   CREATININE 0.67 0.62   WBC 7.6 10.7     Pertinent Cultures:  Culture Date Source Results   12.10 Sputum MRSA   MRSA Nasal Swab: showed MRSA positive result on 12.10    Recent vancomycin administrations                     vancomycin (VANCOCIN) 1000 mg in sodium chloride 0.9% 250 mL IVPB (mg) 1,000 mg New Bag 12/15/22 2348     1,000 mg New Bag  1144    vancomycin (VANCOCIN) 1250 mg in sodium chloride 0.9% 250 mL IVPB (mg) 1,250 mg New Bag 12/15/22 0106     1,250 mg New Bag 12/14/22 1247     1,250 mg New Bag  0116     1,250 mg New Bag 12/13/22 1303        Assessment:  Date/Time Current Dose Concentration Timing of Concentration (h) AUC   12.16 1000mg IV Q12H 18.2 mcg/mL 7.5hrs post dose 430   Note: Serum concentrations collected for AUC dosing may appear elevated if collected in close proximity to the dose administered, this is not necessarily an indication of toxicity    Plan:  Unclear reason for dose adjustment yesterday  Will increase vancomycin back to 1250mg IV Q12H and continue to monitor  Pharmacy will continue to monitor patient and adjust therapy as indicated    Thank you for the consult,  Aniya Castillo, PharmD BCPS [facility]  12/16/2022 9:26 AM

## 2022-12-16 NOTE — PLAN OF CARE
Problem: Discharge Planning  Goal: Discharge to home or other facility with appropriate resources  12/16/2022 0730 by Nina Cota  Outcome: Progressing  12/16/2022 0255 by Ella Clifton RN  Outcome: Progressing     Problem: Confusion  Goal: Confusion, delirium, dementia, or psychosis is improved or at baseline  Description: INTERVENTIONS:  1. Assess for possible contributors to thought disturbance, including medications, impaired vision or hearing, underlying metabolic abnormalities, dehydration, psychiatric diagnoses, and notify attending LIP  2. [place] high risk fall precautions, as indicated  3. Provide frequent short contacts to provide reality reorientation, refocusing and direction  4. Decrease environmental stimuli, including noise as appropriate  5. Monitor and intervene to maintain adequate nutrition, hydration, elimination, sleep and activity  6. If unable to ensure safety without constant attention obtain sitter and review sitter guidelines with assigned personnel  7. Initiate Psychosocial CNS and Spiritual Care consult, as indicated  12/16/2022 0730 by Nina Cota  Outcome: Progressing  12/16/2022 0255 by Ella Clifton RN  Outcome: Progressing     Problem: Pain  Goal: Verbalizes/displays adequate comfort level or baseline comfort level  12/16/2022 0730 by Nina Cota  Outcome: Progressing  12/16/2022 0255 by Ella Clifton RN  Outcome: Progressing     Problem: Chronic Conditions and Co-morbidities  Goal: Patient's chronic conditions and co-morbidity symptoms are monitored and maintained or improved  12/16/2022 0730 by Nina Cota  Outcome: Progressing  12/16/2022 0255 by Ella Clifton RN  Outcome: Progressing     Problem: Skin/Tissue Integrity  Goal: Absence of new skin breakdown  Description: 1. Monitor for areas of redness and/or skin breakdown  2. Assess vascular access sites hourly  3. Every 4-6 hours minimum:  Change oxygen saturation probe site  4.   Every 4-6 hours:  If on nasal continuous positive airway pressure, respiratory therapy assess nares and determine need for appliance change or resting period.  12/16/2022 0730 by Heidi Le  Outcome: Progressing  12/16/2022 0255 by Isaiah Abel RN  Outcome: Progressing     Problem: Safety - Adult  Goal: Free from fall injury  12/16/2022 0730 by Heidi Le  Outcome: Progressing  12/16/2022 0255 by Isaiah Abel RN  Outcome: Progressing     Problem: ABCDS Injury Assessment  Goal: Absence of physical injury  12/16/2022 0730 by Heidi Le  Outcome: Progressing  12/16/2022 0255 by Isaiah Abel RN  Outcome: Progressing     Problem: Nutrition Deficit:  Goal: Optimize nutritional status  12/16/2022 0730 by Heidi Le  Outcome: Progressing  12/16/2022 0255 by Isaiah Abel RN  Outcome: Progressing     Problem: Respiratory - Adult  Goal: Achieves optimal ventilation and oxygenation  12/16/2022 0730 by Heidi Le  Outcome: Progressing  12/16/2022 0255 by Isaiah Abel RN  Outcome: Progressing

## 2022-12-17 LAB
ABSOLUTE EOS #: 0.72 K/UL (ref 0–0.44)
ABSOLUTE IMMATURE GRANULOCYTE: 0.34 K/UL (ref 0–0.3)
ABSOLUTE LYMPH #: 1.74 K/UL (ref 1.1–3.7)
ABSOLUTE MONO #: 0.82 K/UL (ref 0.1–1.2)
ALLEN TEST: POSITIVE
ANION GAP SERPL CALCULATED.3IONS-SCNC: 10 MMOL/L (ref 9–17)
BASOPHILS # BLD: 1 % (ref 0–2)
BASOPHILS ABSOLUTE: 0.09 K/UL (ref 0–0.2)
BUN BLDV-MCNC: 19 MG/DL (ref 6–20)
CALCIUM SERPL-MCNC: 8.2 MG/DL (ref 8.6–10.4)
CHLORIDE BLD-SCNC: 101 MMOL/L (ref 98–107)
CO2: 21 MMOL/L (ref 20–31)
CREAT SERPL-MCNC: 0.6 MG/DL (ref 0.5–0.9)
CULTURE: ABNORMAL
EOSINOPHILS RELATIVE PERCENT: 6 % (ref 1–4)
FIO2: 40
GFR SERPL CREATININE-BSD FRML MDRD: >60 ML/MIN/1.73M2
GLUCOSE BLD-MCNC: 100 MG/DL (ref 70–99)
GLUCOSE BLD-MCNC: 104 MG/DL (ref 74–100)
HCT VFR BLD CALC: 35.9 % (ref 36.3–47.1)
HEMOGLOBIN: 10.9 G/DL (ref 11.9–15.1)
IMMATURE GRANULOCYTES: 3 %
LYMPHOCYTES # BLD: 15 % (ref 24–43)
Lab: ABNORMAL
MCH RBC QN AUTO: 27.5 PG (ref 25.2–33.5)
MCHC RBC AUTO-ENTMCNC: 30.4 G/DL (ref 28.4–34.8)
MCV RBC AUTO: 90.4 FL (ref 82.6–102.9)
MONOCYTES # BLD: 7 % (ref 3–12)
NRBC AUTOMATED: 0 PER 100 WBC
PATIENT TEMP: 38.1
PDW BLD-RTO: 14.4 % (ref 11.8–14.4)
PLATELET # BLD: 338 K/UL (ref 138–453)
PMV BLD AUTO: 10.4 FL (ref 8.1–13.5)
POC HCO3: 24.6 MMOL/L (ref 21–28)
POC O2 SATURATION: 97 % (ref 94–98)
POC PCO2 TEMP: 41 MM HG
POC PCO2: 38.7 MM HG (ref 35–48)
POC PH TEMP: 7.4
POC PH: 7.41 (ref 7.35–7.45)
POC PO2 TEMP: 100 MM HG
POC PO2: 93.8 MM HG (ref 83–108)
POSITIVE BASE EXCESS, ART: 0 (ref 0–3)
POTASSIUM SERPL-SCNC: 4.1 MMOL/L (ref 3.7–5.3)
RBC # BLD: 3.97 M/UL (ref 3.95–5.11)
SAMPLE SITE: NORMAL
SEG NEUTROPHILS: 68 % (ref 36–65)
SEGMENTED NEUTROPHILS ABSOLUTE COUNT: 7.58 K/UL (ref 1.5–8.1)
SODIUM BLD-SCNC: 132 MMOL/L (ref 135–144)
SPECIMEN DESCRIPTION: ABNORMAL
WBC # BLD: 11.3 K/UL (ref 3.5–11.3)

## 2022-12-17 PROCEDURE — 2580000003 HC RX 258

## 2022-12-17 PROCEDURE — 85025 COMPLETE CBC W/AUTO DIFF WBC: CPT

## 2022-12-17 PROCEDURE — 6360000002 HC RX W HCPCS: Performed by: INTERNAL MEDICINE

## 2022-12-17 PROCEDURE — 99232 SBSQ HOSP IP/OBS MODERATE 35: CPT | Performed by: INTERNAL MEDICINE

## 2022-12-17 PROCEDURE — 2580000003 HC RX 258: Performed by: STUDENT IN AN ORGANIZED HEALTH CARE EDUCATION/TRAINING PROGRAM

## 2022-12-17 PROCEDURE — 6360000002 HC RX W HCPCS: Performed by: NURSE PRACTITIONER

## 2022-12-17 PROCEDURE — 99291 CRITICAL CARE FIRST HOUR: CPT | Performed by: INTERNAL MEDICINE

## 2022-12-17 PROCEDURE — 2500000003 HC RX 250 WO HCPCS: Performed by: HEALTH CARE PROVIDER

## 2022-12-17 PROCEDURE — 94003 VENT MGMT INPAT SUBQ DAY: CPT

## 2022-12-17 PROCEDURE — 6370000000 HC RX 637 (ALT 250 FOR IP)

## 2022-12-17 PROCEDURE — 6370000000 HC RX 637 (ALT 250 FOR IP): Performed by: STUDENT IN AN ORGANIZED HEALTH CARE EDUCATION/TRAINING PROGRAM

## 2022-12-17 PROCEDURE — 36600 WITHDRAWAL OF ARTERIAL BLOOD: CPT

## 2022-12-17 PROCEDURE — 94762 N-INVAS EAR/PLS OXIMTRY CONT: CPT

## 2022-12-17 PROCEDURE — 82947 ASSAY GLUCOSE BLOOD QUANT: CPT

## 2022-12-17 PROCEDURE — 2000000000 HC ICU R&B

## 2022-12-17 PROCEDURE — 94640 AIRWAY INHALATION TREATMENT: CPT

## 2022-12-17 PROCEDURE — 94761 N-INVAS EAR/PLS OXIMETRY MLT: CPT

## 2022-12-17 PROCEDURE — C9113 INJ PANTOPRAZOLE SODIUM, VIA: HCPCS

## 2022-12-17 PROCEDURE — 2580000003 HC RX 258: Performed by: NURSE PRACTITIONER

## 2022-12-17 PROCEDURE — 82803 BLOOD GASES ANY COMBINATION: CPT

## 2022-12-17 PROCEDURE — 2700000000 HC OXYGEN THERAPY PER DAY

## 2022-12-17 PROCEDURE — 80048 BASIC METABOLIC PNL TOTAL CA: CPT

## 2022-12-17 PROCEDURE — 36415 COLL VENOUS BLD VENIPUNCTURE: CPT

## 2022-12-17 PROCEDURE — 6370000000 HC RX 637 (ALT 250 FOR IP): Performed by: NURSE PRACTITIONER

## 2022-12-17 PROCEDURE — 6360000002 HC RX W HCPCS

## 2022-12-17 RX ADMIN — Medication 1250 MG: at 11:59

## 2022-12-17 RX ADMIN — LAMOTRIGINE 200 MG: 100 TABLET ORAL at 09:30

## 2022-12-17 RX ADMIN — SODIUM CHLORIDE: 9 INJECTION, SOLUTION INTRAVENOUS at 08:01

## 2022-12-17 RX ADMIN — SODIUM CHLORIDE, PRESERVATIVE FREE 5 ML: 5 INJECTION INTRAVENOUS at 08:18

## 2022-12-17 RX ADMIN — ENOXAPARIN SODIUM 30 MG: 100 INJECTION SUBCUTANEOUS at 21:27

## 2022-12-17 RX ADMIN — GUAIFENESIN 200 MG: 200 SOLUTION ORAL at 15:49

## 2022-12-17 RX ADMIN — LEVALBUTEROL HYDROCHLORIDE 0.63 MG: 0.63 SOLUTION RESPIRATORY (INHALATION) at 20:03

## 2022-12-17 RX ADMIN — LEVOTHYROXINE SODIUM 150 MCG: 75 TABLET ORAL at 06:40

## 2022-12-17 RX ADMIN — GUAIFENESIN 200 MG: 200 SOLUTION ORAL at 23:35

## 2022-12-17 RX ADMIN — SODIUM CHLORIDE, PRESERVATIVE FREE 40 MG: 5 INJECTION INTRAVENOUS at 08:12

## 2022-12-17 RX ADMIN — ACETAMINOPHEN 650 MG: 325 TABLET ORAL at 19:35

## 2022-12-17 RX ADMIN — HYDROCORTISONE 10 MG: 10 TABLET ORAL at 19:35

## 2022-12-17 RX ADMIN — Medication 1250 MG: at 23:34

## 2022-12-17 RX ADMIN — GUAIFENESIN 200 MG: 200 SOLUTION ORAL at 03:42

## 2022-12-17 RX ADMIN — FUROSEMIDE 20 MG: 10 INJECTION, SOLUTION INTRAMUSCULAR; INTRAVENOUS at 08:08

## 2022-12-17 RX ADMIN — ACETAMINOPHEN 650 MG: 325 TABLET ORAL at 08:12

## 2022-12-17 RX ADMIN — GUAIFENESIN 200 MG: 200 SOLUTION ORAL at 08:00

## 2022-12-17 RX ADMIN — TOPIRAMATE 100 MG: 100 TABLET, FILM COATED ORAL at 20:47

## 2022-12-17 RX ADMIN — LEVALBUTEROL HYDROCHLORIDE 0.63 MG: 0.63 SOLUTION RESPIRATORY (INHALATION) at 15:57

## 2022-12-17 RX ADMIN — HYDROCORTISONE 10 MG: 10 TABLET ORAL at 08:12

## 2022-12-17 RX ADMIN — SERTRALINE 150 MG: 50 TABLET, FILM COATED ORAL at 08:12

## 2022-12-17 RX ADMIN — GUAIFENESIN 200 MG: 200 SOLUTION ORAL at 11:59

## 2022-12-17 RX ADMIN — TOPIRAMATE 75 MG: 100 TABLET, FILM COATED ORAL at 09:31

## 2022-12-17 RX ADMIN — LEVALBUTEROL HYDROCHLORIDE 0.63 MG: 0.63 SOLUTION RESPIRATORY (INHALATION) at 08:37

## 2022-12-17 RX ADMIN — GUAIFENESIN 200 MG: 200 SOLUTION ORAL at 19:35

## 2022-12-17 RX ADMIN — ENOXAPARIN SODIUM 30 MG: 100 INJECTION SUBCUTANEOUS at 09:31

## 2022-12-17 ASSESSMENT — PULMONARY FUNCTION TESTS
PIF_VALUE: 25
PIF_VALUE: 18
PIF_VALUE: 17
PIF_VALUE: 25
PIF_VALUE: 29
PIF_VALUE: 28
PIF_VALUE: 23
PIF_VALUE: 8
PIF_VALUE: 17

## 2022-12-17 NOTE — PROGRESS NOTES
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 While the patient does not have significant hypoxia she continues to have significant respiratory secretions which are preventing extubation  Continue aggressive pulmonary toileting and we will follow her progress    Infection Control Recommendations:   Contact precautions    Discharge Planning:   Estimated Length of IV antimicrobials: To be determined  Patient will need Midline Catheter Insertion/ PICC line Insertion: No  Patient will need: Home IV , [place],  SNF,  LTAC: Undetermined  Patient willneed outpatient wound care: No    Medical Decision making / Summary of Stay:   Prabhu Rice is a 41y.o.-year-old female who was initially admitted on 12/5/2022. Patient seen at the request of Dr. Adam Dukes:     This patient, with a history of developmental delay, presented to the [facility] ED after falling down some stairs at home. She was complaining of neck and back pain. Imaging revealed an acute C7 transverse process fracture, chronic T3 compression fx and fractures of right sacral ala, nondisplaced fracture of right superior ramus and right obturator ring. She was transferred to [facility] for further medical management. Neurosurgery was consulted and it was determined there is no neurosurgical intervention needed.       Orthopedic surgery was also consulted and they also did not recommend surgical intervention, but rather outpatient follow-up. On 12/6/22, the patient's mentation declined and she became tachypneic requiring emergent intubation. Neurology was consulted for the patient's history of seizures. Her home seizure medications were restarted. An EEG did not show any seizure activity and the patient's mentation improved. She was extubated on the 7th but required re-intubation on 12/10/22 after a rapid response was called. CXR showed concern for RUL aspiration pneumonia. IV Zosyn was started and cultures were obtained. Viral respiratory panel was negative for influenza and Covid     Sputum cultures grew MRSA and the patient's antibiotics were changed to IV vancomycin on 12/11/22. There has been no growth on blood or urine cultures. ID was consulted for MRSA on sputum cultures        CT Head W/O Contrast   Final Result   No acute intracranial abnormality. Small amount of fluid in the right mastoid air cells. CT CHEST ABDOMEN PELVIS WO CONTRAST Additional Contrast? None   Final Result   Chest:       1. Mild anterior wedging of T3 suggesting an age indeterminate compression   fracture. No fracture line is identified. Clinical correlation with the   site of symptoms is suggested. 2. Significant motion artifact with scattered patchy ground-glass opacities   in the lungs which may be related to motion artifact. Ground-glass opacities   are otherwise nonspecific and could be related to atypical infection or   pneumonitis. Abdomen and pelvis:       1. Nondisplaced fractures of the right sacral ala and the right obturator   ring. 2. No acute findings elsewhere in the abdomen or pelvis. 3. Left renal cyst.           CT CSpine W/O Contrast   Final Result   Acute fracture of the right C7 transverse process. Spinal degenerative changes as described.            Current evaluation:12/17/2022    /65   Pulse 68   Temp 100.2 °F (37.9 °C) (Bladder)   Resp 18   Ht 5' 1\" (1.549 m)   Wt 270 lb 8.1 oz (122.7 kg)   SpO2 95%   BMI 51.11 kg/m²     Temperature Range: Temp: 100.2 °F (37.9 °C) Temp  Av.4 °F (38 °C)  Min: 100 °F (37.8 °C)  Max: 100.8 °F (38.2 °C)  The patient is seen and evaluated at bedside she remains intubated on the ventilator and is sedated. Review of Systems   Unable to perform ROS: Intubated     Physical Examination :     Physical Exam  Constitutional:       Appearance: She is well-developed. Interventions: She is sedated and intubated. HENT:      Head: Normocephalic and atraumatic. Cardiovascular:      Rate and Rhythm: Regular rhythm. Heart sounds: Normal heart sounds. Pulmonary:      Effort: Pulmonary effort is normal. She is intubated. Breath sounds: Normal breath sounds. Abdominal:      General: Bowel sounds are normal.      Palpations: Abdomen is soft. Skin:     General: Skin is warm and dry. Laboratory data:   I have independently reviewed the followinglabs:  CBC with Differential:   Recent Labs     22  0719 22  0451   WBC 10.7 11.3   HGB 10.7* 10.9*   HCT 34.9* 35.9*    338   LYMPHOPCT 16* 15*   MONOPCT 9 7     BMP:   Recent Labs     22  0719 22  0451    132*   K 3.9 4.1    101   CO2 21 21   BUN 24* 19   CREATININE 0.62 0.60     Hepatic Function Panel: No results for input(s): PROT, LABALBU, BILIDIR, IBILI, BILITOT, ALKPHOS, ALT, AST in the last 72 hours.       No results found for: PROCAL  Lab Results   Component Value Date/Time    CRP 4.4 2018 06:46 PM     Lab Results   Component Value Date    SEDRATE 20 2018         Lab Results   Component Value Date/Time    DDIMER 0.33 07/15/2020 05:29 PM     No results found for: FERRITIN  No results found for: LDH  No results found for: FIBRINOGEN    Results in Past 30 Days  Result Component Current Result Ref Range Previous Result Ref Range   SARS-CoV-2, PCR Not Detected (12/10/2022) Not Detected Not Detected (2022) Not Detected     Lab Results   Component Value Date/Time    COVID19 Not Detected 12/10/2022 01:09 PM    COVID19 Not Detected 12/06/2022 10:15 PM       No results for input(s): VANCOTROUGH in the last 72 hours. Imaging Studies:   ONE XRAY VIEW OF THE CHEST 12/16/2022 9:50 am  Impression   Similar appearance of mild bilateral airspace disease. This appears   consistent with pneumonia     Cultures:     Culture, Blood 1 [[id number]] Collected: 12/15/22 1349   Order Status: Completed Specimen: Blood Updated: 12/17/22 1310    Specimen Description . BLOOD    Special Requests L FOREARM  10ML    Culture NO GROWTH 2 DAYS   Culture, Blood 1 [[id number]] (Abnormal) Collected: 12/15/22 1352   Order Status: Completed Specimen: Blood Updated: 12/17/22 0841    Specimen Description . BLOOD    Special Requests L AC  5ML    Culture POSITIVE Blood Culture Abnormal      DIRECT GRAM STAIN FROM BOTTLE: GRAM POSITIVE COCCI IN CLUSTERS     Staphylococcus epidermidis Detected: mecA/C Gene Detected- Methicillin Resistant Organism Methodology- Polymerase Chain Reaction (PCR)     STAPHYLOCOCCUS EPIDERMIDIS A single positive blood culture of coagulase negative Staphylocci, diphtheroids,micrococci, Cutibacterium, viridans Streptocci, Bacillus, or Lactobacillus species should be interpreted with caution and viewed as a likely skin contaminant. Abnormal      (NOTE) Direct Gram Stain from bottle and Polymerase Chain Reaction (PCR) results called to and read back by:JEANETTE RENE AT 1420 ON 12/16/22   Culture, Urine [[id number]] Collected: 12/15/22 1410   Order Status: Completed Specimen: Urine, indwelling catheter Updated: 12/16/22 1257    Specimen Description . INDWELLING CATH URINE    Culture NO SIGNIFICANT GROWTH   Culture, Blood 1 [[id number]] Collected: 12/10/22 1223   Order Status: Completed Specimen: Blood Updated: 12/15/22 1319    Specimen Description . BLOOD    Special Requests LEFT HAND 2.5 ML    Culture NO GROWTH 5 DAYS   Culture, Blood 1 [[id number]] Collected: 12/10/22 1230   Order Status: Completed Specimen: Blood Updated: 12/15/22 1319    Specimen Description . BLOOD    Special Requests RT HAND 2.5 ML    Culture NO GROWTH 5 DAYS   Culture, Respiratory [[id number]] (Abnormal)  Collected: 12/10/22 1130   Order Status: Completed Specimen: Sputum Induced Updated: 12/12/22 0987    Specimen Description . INDUCED SPUTUM    Direct Exam < 10 EPITHELIAL CELLS/LPF     <10 NEUTROPHILS/LPF     FEW GRAM POSITIVE COCCI IN CLUSTERS Abnormal     Culture METHICILLIN RESISTANT STAPHYLOCOCCUS AUREUS HEAVY GROWTH Abnormal      NO NORMAL JEFFREY   MRSA DNA Probe, Nasal [[id number]] (Abnormal) Collected: 12/10/22 1215   Order Status: Completed Specimen: Nasal Updated: 12/11/22 1027    Specimen Description . NASAL SWAB    MRSA, DNA, Nasal POSITIVE:  MRSA DNA detected by nucleic acid amplification. Abnormal     Comment:                                                    Results should be used as an adjunct to nosocomial control efforts to identify patients   needing enhanced precautions. The test is not intended to identify patients with staphylococcal infections. Results   should not be used to guide or monitor treatment for MRSA infections. Respiratory Panel, Molecular, with COVID-19 (Restricted: peds pts or suitable admitted adults) [[id number]] Collected: 12/10/22 1309   Order Status: Completed Specimen: Nasopharyngeal Swab Updated: 12/10/22 1451    Specimen Description . NASOPHARYNGEAL SWAB    Adenovirus PCR Not Detected    Coronavirus 229E PCR Not Detected    Coronavirus HKU1 PCR Not Detected    Coronavirus NL63 PCR Not Detected    Coronavirus OC43 PCR Not Detected    SARS-CoV-2, PCR Not Detected    Human Metapneumovirus PCR Not Detected    Rhino/Enterovirus PCR Not Detected    Influenza A by PCR Not Detected    Influenza B by PCR Not Detected    Parainfluenza 1 PCR Not Detected    Parainfluenza 2 PCR Not Detected    Parainfluenza 3 PCR Not Detected    Parainfluenza 4 PCR Not Detected    Resp Syncytial Virus PCR Not Detected    Bordetella Parapertussis Not Detected    B Pertussis by PCR Not Detected    Chlamydia pneumoniae By PCR Not Detected    Mycoplasma pneumo by PCR Not Detected    Comment: Performed by multiplexed nucleic acid assay. Respiratory Panel, Molecular, with COVID-19 (Restricted: peds pts or suitable admitted adults) [[id number]] Collected: 12/10/22 1130   Order Status: Canceled Specimen: Nasopharyngeal Swab    MRSA DNA Probe, Nasal [[id number]] Collected: 12/07/22 0200   Order Status: Completed Specimen: Nasal Updated: 12/07/22 1440    Specimen Description . NASAL SWAB    MRSA, DNA, Nasal NEGATIVE    Comment: NEGATIVE:  MRSA DNA not detected by nucleic acid amplification. Results should be used as an adjunct to nosocomial control efforts to identify patients   needing enhanced precautions. The test is not intended to identify patients with staphylococcal infections. Results   should not be used to guide or monitor treatment for MRSA infections. COVID-19, Rapid [[id number]] Collected: 12/06/22 2215   Order Status: Completed Specimen: Nasopharyngeal Swab Updated: 12/07/22 0207    Specimen Description . NASOPHARYNGEAL SWAB    SARS-CoV-2, Rapid Not Detected    Comment:        Rapid NAAT:  The specimen is NEGATIVE for SARS-CoV-2, the novel coronavirus associated with   COVID-19. The ID NOW COVID-19 assay is designed to detect the virus that causes COVID-19 in patients   with signs and symptoms of infection who are suspected of COVID-19. An individual without symptoms of COVID-19 and who is not shedding SARS-CoV-2 virus would   expect to have a negative (not detected) result in this assay. Negative results should be treated as presumptive and, if inconsistent with clinical signs   and symptoms or necessary for patient management,   should be tested with an alternative molecular assay.  Negative results do not preclude   SARS-CoV-2 infection and   should not be used as the sole basis for patient management decisions. Fact sheet for Healthcare Providers: http://www.mandeep.hollis/   Fact sheet for Patients: http://www.mandeep.hollis/         Methodology: Isothermal Nucleic Acid Amplification         Medications:      vancomycin  1,250 mg IntraVENous Q12H    guaiFENesin  200 mg Oral Q4H    furosemide  20 mg IntraVENous Daily    pantoprazole (PROTONIX) 40 mg injection  40 mg IntraVENous Daily    vancomycin (VANCOCIN) intermittent dosing (placeholder)   Other RX Placeholder    hydrocortisone  10 mg Oral BID    chlorhexidine  15 mL Mouth/Throat BID    levalbuterol  0.63 mg Nebulization 4x daily    enoxaparin  30 mg SubCUTAneous BID    sertraline  150 mg Oral Daily    lamoTRIgine  200 mg Oral Daily    levothyroxine  150 mcg Oral QAM AC    topiramate  75 mg Oral Daily    topiramate  100 mg Oral Nightly           [facility]  Montez Montgomery MD  Perfect [employer] messaging  OFFICE: [phone]      Electronically signed by Montez Montgomery MD on 12/17/2022 at 3:12 PM  Thank you for allowing us to participate in the care of this patient. Please call with questions. This note iscreated with the assistance of a speech recognition program.  While intending to generate a document that actually reflects the content of the visit, the document can still have some errors including those of syntax andsound a like substitutions which may escape proof reading. In such instances, actual meaning can be extrapolated by contextual diversion.

## 2022-12-17 NOTE — PLAN OF CARE
Problem: Respiratory - Adult  Goal: Achieves optimal ventilation and oxygenation  12/17/2022 0843 by Jose Ghosh RCP  Outcome: Progressing  12/16/2022 2026 by Perlita Tsang RN  Outcome: Progressing   BRONCHOSPASM/BRONCHOCONSTRICTION     [x]         IMPROVE AERATION/BREATH SOUNDS  [x]   ADMINISTER BRONCHODILATOR THERAPY AS APPROPRIATE  [x]   ASSESS BREATH SOUNDS  []   IMPLEMENT AEROSOL/MDI PROTOCOL  [x]   PATIENT EDUCATION AS NEEDED

## 2022-12-17 NOTE — PROGRESS NOTES
INTENSIVE CARE UNIT  Resident Physician Progress Note    Patient - Bethany Pea  Date of Admission -  12/5/2022 11:39 PM  Date of Evaluation -  12/17/2022  Room and Bed Number -  3021/3021-01   Hospital Day - 11    HPI:     72-year-old female initially presented to hospital on 12/5 after a fall. Patient is developmentally delayed and has a history of brain tumor as a child with a lot of radiation. Patient also has past medical history of diabetes mellitus, bilateral sensorineural hearing loss. Had a CT head showed no intracranial abnormality, CT abdominal pelvis showed  nondisplaced fractures of right sacral ilya, nondisplaced fracture of right superior ramus and right obturator ring. CT spine showed acute fracture of right C7 transverse process and also age indeterminate compressive fracture of T3. Neurosurgery for cervical fractures was consulted, who recommended no neurosurgical intervention. Orthopedic surgery for pelvic fractures was also consulted, no surgical intervention but medical management and also recommended to follow-up in orthopedic clinic after discharge. Patient was started on BiPAP for increased work of breathing. Patient was oriented x4 at day of presentation. 12/06 - patient had a decline in mental status with tachypnea, respiratory rate of 30s to 40 with heart rate of 105 patient was intubated emergently     12/7- Neurology was consulted because of history of migraines, seizure disorder, patient was started on her home antiepileptic drugs including Topamax and Lamictal, EEG was ordered to rule out any subclinical seizure activity, MRI was also ordered to rule out any CVA. Patient was doing fine, arousable and following commands. Pulmonology was consulted, recommended to extubate and patient was extubated.      12/10 -rapid was called today due to impending respiratory failure, patient was intubated again and was sent to ICU      12/12 -respiratory culture came back positive for staph aureus , nasal respiratory swab came out positive for MRSA DNA, patient started on vancomycin       : Tachycardia improved to normal heart rate, patient remained afebrile, WBC count trending down. Sedation changed to precedex     12/15: Precedex discontinued. SUBJECTIVE:     OVERNIGHT EVENTS:    Off sedation.  Continued yellow frothy secretions from ETT    AWAKE & FOLLOWING COMMANDS:  [] No   [x] Yes    SECRETIONS Amount:  [] Small [] Moderate  [x] Large  [] None  Color:     [] White [x] Colored - yellow  [] Bloody    SEDATION:  RAAS Score:  [] Propofol gtt  [] Versed gtt  [] Ativan gtt   [x] No Sedation    PARALYZED:  [x] No    [] Yes    VASOPRESSORS:  [x] No    [] Yes  [] Levophed [] Dopamine [] Vasopressin  [] Dobutamine [] Phenylephrine [] Epinephrine      OBJECTIVE:     VITAL SIGNS:  BP (!) 99/56   Pulse 65   Temp 100.4 °F (38 °C) (Bladder)   Resp 23   Ht 5' 1\" (1.549 m)   Wt 270 lb 8.1 oz (122.7 kg)   SpO2 93%   BMI 51.11 kg/m²   Tmax over 24 hours:  Temp (24hrs), Av.5 °F (38.1 °C), Min:100.4 °F (38 °C), Max:100.8 °F (38.2 °C)      Patient Vitals for the past 8 hrs:   BP Temp Temp src Pulse Resp SpO2 Weight   22 0900 (!) 99/56 -- -- 65 23 93 % --   22 0843 -- -- -- 67 18 95 % --   22 0837 -- -- -- 64 17 94 % --   22 0835 -- -- -- 65 17 93 % --   22 0800 101/61 100.4 °F (38 °C) Bladder 67 16 94 % --   22 0700 101/62 -- -- 63 23 94 % --   22 0630 -- -- -- 69 21 92 % --   22 0600 100/61 100.4 °F (38 °C) Bladder 76 27 92 % 270 lb 8.1 oz (122.7 kg)   22 0530 -- -- -- 71 27 93 % --   22 0500 114/75 (!) 100.6 °F (38.1 °C) Bladder 66 24 94 % --   22 0430 -- -- -- 66 22 95 % --   22 0400 104/75 100.4 °F (38 °C) Bladder 81 14 96 % --   22 0330 -- -- -- 70 20 97 % --   22 0308 -- -- -- 62 23 92 % --   22 0300 (!) 98/52 (!) 100.6 °F (38.1 °C) Bladder 66 21 93 % --   22 0230 -- -- -- 68 24 92 % --   22 0200 112/72 100.4 °F (38 °C) Bladder 73 21 92 % --         Intake/Output Summary (Last 24 hours) at 12/17/2022 0947  Last data filed at 12/17/2022 0900  Gross per 24 hour   Intake 2700.02 ml   Output 4400 ml   Net -1699.98 ml     Date 12/17/22 0000 - 12/17/22 2359   Shift 6165-1631 6132-5960 5636-1454 24 Hour Total   INTAKE   I.V.(mL/kg) 104.8(0.9) 5(0)  109.8(0.9)   NG/GT(mL/kg) 845(6.9) 118(1)  963(7.8)   IV Piggyback(mL/kg) 248. 1(2)   248. 1(2)   Shift Total(mL/kg) 1197.9(9.8) 123(1)  1320. 9(10.8)   OUTPUT   Urine(mL/kg/hr) 725(0.7) 745  1470   Shift Total(mL/kg) 725(5.9) 745(6.1)  1470(12)   Weight (kg) 122.7 122.7 122.7 122.7     Wt Readings from Last 3 Encounters:   12/17/22 270 lb 8.1 oz (122.7 kg)   12/05/22 277 lb (125.6 kg)   11/07/22 279 lb (126.6 kg)     Body mass index is 51.11 kg/m². PHYSICAL EXAM:  GEN:  awake, alert, and cooperative  EYES:   pupils equal, round, and reactive to light  HEENT:  Normocepalic, without obvious abnormality  LUNGS:  No increased work of breathing, good air exchange.  Diffuse crackles  CV:    regular rate and rhythm and normal S1 and S2  ABDOMEN:   soft, non-distended, and non-tender  MSK:    there is no redness, warmth, or swelling of the joints  NEURO[de-identified]   Awake, alert, following commands  SKIN:   Normal skin color, texture, turgor  EXTREMITIES:  No pedal or leg edema, no calf tenderness/swelling, no erythema, distal pulses intact       MEDICATIONS:  Scheduled Meds:   vancomycin  1,250 mg IntraVENous Q12H    guaiFENesin  200 mg Oral Q4H    furosemide  20 mg IntraVENous Daily    pantoprazole (PROTONIX) 40 mg injection  40 mg IntraVENous Daily    vancomycin (VANCOCIN) intermittent dosing (placeholder)   Other RX Placeholder    hydrocortisone  10 mg Oral BID    chlorhexidine  15 mL Mouth/Throat BID    levalbuterol  0.63 mg Nebulization 4x daily    enoxaparin  30 mg SubCUTAneous BID    sertraline  150 mg Oral Daily    lamoTRIgine  200 mg Oral Daily    levothyroxine  150 mcg Oral QAM AC    topiramate  75 mg Oral Daily    topiramate  100 mg Oral Nightly     Continuous Infusions:   sodium chloride Stopped (12/12/22 0316)    sodium chloride 10 mL/hr at 12/17/22 0801     PRN Meds:   polyethylene glycol, 17 g, Daily PRN  oxyCODONE, 10 mg, Q4H PRN   Or  oxyCODONE, 5 mg, Q4H PRN  levalbuterol, 0.63 mg, Q4H PRN  sodium chloride, , PRN  fentanNYL, 50 mcg, Q2H PRN  acetaminophen, 650 mg, Q6H PRN  sodium chloride flush, 5-40 mL, PRN        SUPPORT DEVICES: [] Ventilator [] BIPAP  [] Nasal Cannula [] Room Air    VENT SETTINGS (Comprehensive) (if applicable): PRVC mode, FiO2 40%, PEEP 5, Respiratory Rate 18, Tidal Volume 380  Vent Information  Ventilator ID: [id number]  Equipment Changed: Expiratory Filter  Ventilator Initiate: Yes  Vent Mode: AC/PRVC  Additional Respiratory Assessments  Heart Rate: 65  Resp: 23  SpO2: 93 %  End Tidal CO2: 45 (%)  Position: Standing  Humidification Source: Heated wire  Humidification Temp: 36  Circuit Condensation: Drained  Cuff Pressure (cm H2O):  (mlt)  Skin Barrier Applied: No    ABGs:   Arterial Blood Gas result:  pH 7.406; pCO2 38.6; pO2 79.3; HCO3 24.2; O2 Sat 96.   Lab Results   Component Value Date/Time    FIO2 40.0 12/17/2022 04:47 AM         DATA:  Complete Blood Count:   Recent Labs     12/15/22  0534 12/16/22  0719 12/17/22  0451   WBC 7.6 10.7 11.3   RBC 3.62* 3.86* 3.97   HGB 10.1* 10.7* 10.9*   HCT 33.2* 34.9* 35.9*   MCV 91.7 90.4 90.4   MCH 27.9 27.7 27.5   MCHC 30.4 30.7 30.4   RDW 14.5* 14.6* 14.4    283 338   MPV 10.8 10.7 10.4        Last 3 Blood Glucose:   Recent Labs     12/15/22  0534 12/16/22  0719 12/17/22  0451   GLUCOSE 94 95 100*        PT/INR:    Lab Results   Component Value Date/Time    PROTIME 10.5 12/05/2022 05:35 PM    INR 1.0 12/05/2022 05:35 PM     PTT:    Lab Results   Component Value Date/Time    APTT 24.9 03/10/2015 11:25 AM       Comprehensive Metabolic Profile:   Recent Labs     12/15/22  0534 12/16/22  0719 12/17/22  0451    138 132*   K 3.7 3.9 4.1    106 101   CO2 20 21 21   BUN 28* 24* 19   CREATININE 0.67 0.62 0.60   GLUCOSE 94 95 100*   CALCIUM 8.1* 7.9* 8.2*      Magnesium:   Lab Results   Component Value Date/Time    MG 1.7 12/06/2022 10:12 PM     Phosphorus:   Lab Results   Component Value Date/Time    PHOS 2.9 12/06/2022 10:12 PM     Ionized Calcium: No results found for: CAION     Urinalysis:   Lab Results   Component Value Date/Time    NITRU NEGATIVE 12/06/2022 08:05 PM    COLORU Yellow 12/06/2022 08:05 PM    PHUR 5.5 12/06/2022 08:05 PM    WBCUA None 12/06/2022 08:05 PM    RBCUA None 12/06/2022 08:05 PM    MUCUS 1+ 03/27/2013 05:54 PM    TRICHOMONAS NOT REPORTED 03/27/2013 05:54 PM    YEAST NOT REPORTED 03/27/2013 05:54 PM    BACTERIA RARE 03/27/2013 05:54 PM    CLARITYU clear 11/11/2014 01:20 PM    SPECGRAV 1.035 12/06/2022 08:05 PM    LEUKOCYTESUR NEGATIVE 12/06/2022 08:05 PM    UROBILINOGEN Normal 12/06/2022 08:05 PM    BILIRUBINUR NEGATIVE 12/06/2022 08:05 PM    BILIRUBINUR neagtive 03/17/2017 04:30 PM    BLOODU negative 03/17/2017 04:30 PM    GLUCOSEU NEGATIVE 12/06/2022 08:05 PM    KETUA MODERATE 12/06/2022 08:05 PM    AMORPHOUS NOT REPORTED 03/27/2013 05:54 PM       HgBA1c:    Lab Results   Component Value Date/Time    LABA1C 5.3 04/26/2022 12:00 AM     TSH:    Lab Results   Component Value Date/Time    TSH <0.01 12/07/2022 03:57 AM     Lactic Acid: No results found for: LACTA   Troponin: No results for input(s): TROPONINI in the last 72 hours.    Microbiology:  Blood Culture: + S epidermidis x 1  Blood culture: NG 12 h      ASSESSMENT:     Patient Active Problem List    Diagnosis Date Noted    MRSA infection 12/14/2022    Acute lower respiratory tract infection 12/14/2022    Acute respiratory failure with hypoxia (HCC) 12/11/2022    Aspiration pneumonia (HCC) 12/10/2022    Multiple closed pelvic fractures without disruption of pelvic [place] (HCC) 12/09/2022    Lethargy 12/07/2022     Cervical transverse process fracture, initial encounter ([facility] 12/06/2022    Closed nondisplaced fracture of seventh cervical vertebra ([facility] 12/06/2022    Gastroesophageal reflux disease 08/12/2022    Irritable bowel syndrome with constipation 06/07/2022    Type 2 diabetes mellitus with stage 3a chronic kidney disease, without long-term current use of insulin ([facility] 11/12/2021    Moderate episode of recurrent major depressive disorder ([facility] 02/26/2020    Hypopituitarism ([facility] 08/24/2016    ROHIT on CPAP 05/08/2015    Hypothyroidism 08/06/2014    Asthma 08/06/2014    Morbid obesity ([facility] 08/06/2014    Seizure ([facility] 10/23/2013          PLAN:      Neuro  Intubated  Off precedex, off sedation  PRVC 18/380/5/40%  ABG 12/16 pH 7.406, pCO2 38.6, pO2 79.2, HCO3 24.2, SaO2 96  Lamotrigine 200  Topamax 75 daily, 100 nightly  Zoloft 150 mg     Cards  HR 78-80  MAP average 81 overnight  Lasix 20 daily     Pulmonary  Intubated for aspiration pneumonia  PRVC 18/380/5/40%  ABG 12/16 pH 7.406, pCO2 38.6, pO2 79.2, HCO3 24.2, SaO2 96  CXR 12/16 - no change in bilateral airspace disease  Pulmonary toilet  Prednisone 40 x 4 days  Hydrocortisone 10 mg BID  Xopenex nebulizer QID  Guanifenesen 200 mg q 4h     Renal  [place] placed 12/15 for retention  I/O 3875 overnight, 745 this morning  Lasix 20 mg daily     GI  Tube feeds 75 ml/hr, at goal  Protonix 40     ID  Vancomycin, course started 12/12  Temp 38  Respiratory culture (+) S Aureus  Repeat Blood culture (+) S epidermidis x 1, Ng1d x 1  Blood cultures (-) x 5 days  ID following     Endo  Synthroid 150 daily  Hydrocortisone 10 mg BID     DVT Ppx: Lovenox   GI Ppx: Protonix    ICU PROPHYLAXIS:  Stress ulcer:  [x] PPI Agent  [] [id number] [] Sucralfate  [] Other:  VTE:   [x] Enoxaparin  [] Unfract.  Heparin Subcut  [] EPC Cuffs    NUTRITION:  [] NPO [x] Tube Feeding (Specify: )  ADULT TUBE FEEDING; Orogastric; Standard without Fiber; Continuous; 45; Yes; 20; Q 4 hours; 75; 30; Q 4 hours   [] TPN  [] PO    HOME MEDS RECONCILED: [] No  [x] Yes    CONSULTATION NEEDED:  [] No  [x] Yes    FAMILY UPDATED:    [] No  [x] Yes    TRANSFER OUT OF ICU:   [x] No  [] Yes      Lexii Irwin M.D. Emergency Medicine Resident, PGY-2  12/17/2022 9:47 AM       Attending Physician Statement  I have discussed the care of Theresa Helton, including pertinent history and exam findings with the resident. I have reviewed the key elements of all parts of the encounter with the resident. I have seen and examined the patient with the resident. I agree with the assessment and plan and status of the problem list as documented. I seen the patient during around today, chart reviewed, labs and medications reviewed overnight events noted. Patient did not tolerate spontaneous breathing trial yesterday was placed again today. Ventilator setting PRVC/18/380/5/40 percent and ABG is 7.4 1/41/94/24. PO2 FiO2 ratio is better. Urine output is 387-last 24 hours she is on Lasix 20 mg daily and tolerating it well. She is currently on vancomycin for MRSA pneumonia. Respiratory secretion continue to be large with frothy secretions. Daily spontaneous breathing trial.  She is more awake able to follow commands better and off sedation. Discussed with nursing staff, treatment and plan discussed. Discussed with respiratory therapist.    Total critical care time caring for this patient with life threatening, unstable organ failure, including direct patient contact, management of life support systems, review of data including imaging and labs, discussions with other team members and physicians at least 27  Min so far today, excluding procedures. Please note that this chart was generated using voice recognition Dragon dictation software. Although every effort was made to ensure the accuracy of this automated transcription, some errors in transcription may have occurred.      Kamille John MD  12/17/2022 1:24 PM

## 2022-12-17 NOTE — PLAN OF CARE
Problem: Discharge Planning  Goal: Discharge to home or other facility with appropriate resources  12/16/2022 2026 by Callie Lopez RN  Outcome: Progressing  12/16/2022 0730 by Linnette Tavares  Outcome: Progressing     Problem: Confusion  Goal: Confusion, delirium, dementia, or psychosis is improved or at baseline  Description: INTERVENTIONS:  1. Assess for possible contributors to thought disturbance, including medications, impaired vision or hearing, underlying metabolic abnormalities, dehydration, psychiatric diagnoses, and notify attending LIP  2. [place] high risk fall precautions, as indicated  3. Provide frequent short contacts to provide reality reorientation, refocusing and direction  4. Decrease environmental stimuli, including noise as appropriate  5. Monitor and intervene to maintain adequate nutrition, hydration, elimination, sleep and activity  6. If unable to ensure safety without constant attention obtain sitter and review sitter guidelines with assigned personnel  7. Initiate Psychosocial CNS and Spiritual Care consult, as indicated  12/16/2022 2026 by Callie Lopez RN  Outcome: Progressing  12/16/2022 0730 by Linnette Tavares  Outcome: Progressing     Problem: Pain  Goal: Verbalizes/displays adequate comfort level or baseline comfort level  12/16/2022 2026 by Callie Lopez RN  Outcome: Progressing  12/16/2022 0730 by Linnette Tavares  Outcome: Progressing     Problem: Chronic Conditions and Co-morbidities  Goal: Patient's chronic conditions and co-morbidity symptoms are monitored and maintained or improved  12/16/2022 2026 by Callie Lopez RN  Outcome: Progressing  12/16/2022 0730 by Linnette Tavares  Outcome: Progressing     Problem: Skin/Tissue Integrity  Goal: Absence of new skin breakdown  Description: 1. Monitor for areas of redness and/or skin breakdown  2. Assess vascular access sites hourly  3. Every 4-6 hours minimum:  Change oxygen saturation probe site  4.   Every 4-6 hours:  If on nasal continuous positive airway pressure, respiratory therapy assess nares and determine need for appliance change or resting period.   12/16/2022 2026 by Tre Arellano RN  Outcome: Progressing  12/16/2022 0730 by Kev Cabral  Outcome: Progressing     Problem: Safety - Adult  Goal: Free from fall injury  12/16/2022 2026 by Tre Arellano RN  Outcome: Progressing  12/16/2022 0730 by Kev Cabral  Outcome: Progressing     Problem: ABCDS Injury Assessment  Goal: Absence of physical injury  12/16/2022 2026 by Tre Arellano RN  Outcome: Progressing  12/16/2022 0730 by Kev Cabral  Outcome: Progressing     Problem: Nutrition Deficit:  Goal: Optimize nutritional status  12/16/2022 2026 by Tre Arellano RN  Outcome: Progressing  12/16/2022 0730 by Kev Cabral  Outcome: Progressing     Problem: Respiratory - Adult  Goal: Achieves optimal ventilation and oxygenation  12/16/2022 2026 by Tre Arellano RN  Outcome: Progressing  12/16/2022 1009 by Lorna Obrien RCP  Outcome: Progressing  12/16/2022 0730 by Kev Cabral  Outcome: Progressing

## 2022-12-17 NOTE — PLAN OF CARE
Problem: Discharge Planning  Goal: Discharge to home or other facility with appropriate resources  Outcome: Progressing  Flowsheets (Taken 12/17/2022 0800)  Discharge to home or other facility with appropriate resources: Identify barriers to discharge with patient and caregiver     Problem: Confusion  Goal: Confusion, delirium, dementia, or psychosis is improved or at baseline  Description: INTERVENTIONS:  1. Assess for possible contributors to thought disturbance, including medications, impaired vision or hearing, underlying metabolic abnormalities, dehydration, psychiatric diagnoses, and notify attending LIP  2. [place] high risk fall precautions, as indicated  3. Provide frequent short contacts to provide reality reorientation, refocusing and direction  4. Decrease environmental stimuli, including noise as appropriate  5. Monitor and intervene to maintain adequate nutrition, hydration, elimination, sleep and activity  6. If unable to ensure safety without constant attention obtain sitter and review sitter guidelines with assigned personnel  7. Initiate Psychosocial CNS and Spiritual Care consult, as indicated  Outcome: Progressing     Problem: Pain  Goal: Verbalizes/displays adequate comfort level or baseline comfort level  Outcome: Progressing     Problem: Chronic Conditions and Co-morbidities  Goal: Patient's chronic conditions and co-morbidity symptoms are monitored and maintained or improved  Outcome: Progressing  Flowsheets (Taken 12/17/2022 0800)  Care Plan - Patient's Chronic Conditions and Co-Morbidity Symptoms are Monitored and Maintained or Improved:   Monitor and assess patient's chronic conditions and comorbid symptoms for stability, deterioration, or improvement   Collaborate with multidisciplinary team to address chronic and comorbid conditions and prevent exacerbation or deterioration     Problem: Skin/Tissue Integrity  Goal: Absence of new skin breakdown  Description: 1.   Monitor for areas of redness and/or skin breakdown  2. Assess vascular access sites hourly  3. Every 4-6 hours minimum:  Change oxygen saturation probe site  4. Every 4-6 hours:  If on nasal continuous positive airway pressure, respiratory therapy assess nares and determine need for appliance change or resting period.   Outcome: Progressing     Problem: Safety - Adult  Goal: Free from fall injury  Outcome: Progressing     Problem: ABCDS Injury Assessment  Goal: Absence of physical injury  Outcome: Progressing  Flowsheets (Taken 12/17/2022 0800)  Absence of Physical Injury: Implement safety measures based on patient assessment     Problem: Nutrition Deficit:  Goal: Optimize nutritional status  Outcome: Progressing     Problem: Respiratory - Adult  Goal: Achieves optimal ventilation and oxygenation  12/17/2022 1711 by Cherri Simental RN  Outcome: Progressing  12/17/2022 0843 by Craig Dhillon RCP  Outcome: Progressing

## 2022-12-18 ENCOUNTER — APPOINTMENT (OUTPATIENT)
Dept: GENERAL RADIOLOGY | Age: 47
DRG: 551 | End: 2022-12-18
Payer: MEDICARE

## 2022-12-18 LAB
ABSOLUTE EOS #: 0.64 K/UL (ref 0–0.44)
ABSOLUTE IMMATURE GRANULOCYTE: 0.2 K/UL (ref 0–0.3)
ABSOLUTE LYMPH #: 1.35 K/UL (ref 1.1–3.7)
ABSOLUTE MONO #: 0.63 K/UL (ref 0.1–1.2)
ANION GAP SERPL CALCULATED.3IONS-SCNC: 10 MMOL/L (ref 9–17)
BASOPHILS # BLD: 1 % (ref 0–2)
BASOPHILS ABSOLUTE: 0.06 K/UL (ref 0–0.2)
BUN BLDV-MCNC: 19 MG/DL (ref 6–20)
CALCIUM SERPL-MCNC: 7.9 MG/DL (ref 8.6–10.4)
CHLORIDE BLD-SCNC: 103 MMOL/L (ref 98–107)
CO2: 22 MMOL/L (ref 20–31)
CREAT SERPL-MCNC: 0.61 MG/DL (ref 0.5–0.9)
EOSINOPHILS RELATIVE PERCENT: 6 % (ref 1–4)
GFR SERPL CREATININE-BSD FRML MDRD: >60 ML/MIN/1.73M2
GLUCOSE BLD-MCNC: 112 MG/DL (ref 70–99)
GLUCOSE BLD-MCNC: 134 MG/DL (ref 74–100)
HCT VFR BLD CALC: 36.1 % (ref 36.3–47.1)
HEMOGLOBIN: 10.9 G/DL (ref 11.9–15.1)
IMMATURE GRANULOCYTES: 2 %
LYMPHOCYTES # BLD: 14 % (ref 24–43)
MCH RBC QN AUTO: 27.9 PG (ref 25.2–33.5)
MCHC RBC AUTO-ENTMCNC: 30.2 G/DL (ref 28.4–34.8)
MCV RBC AUTO: 92.3 FL (ref 82.6–102.9)
MONOCYTES # BLD: 6 % (ref 3–12)
NRBC AUTOMATED: 0 PER 100 WBC
PDW BLD-RTO: 14 % (ref 11.8–14.4)
PLATELET # BLD: 325 K/UL (ref 138–453)
PMV BLD AUTO: 10.4 FL (ref 8.1–13.5)
POC HCO3: 25.3 MMOL/L (ref 21–28)
POC O2 SATURATION: 97 % (ref 94–98)
POC PCO2: 38.2 MM HG (ref 35–48)
POC PH: 7.43 (ref 7.35–7.45)
POC PO2: 86.8 MM HG (ref 83–108)
POSITIVE BASE EXCESS, ART: 1 (ref 0–3)
POTASSIUM SERPL-SCNC: 3.8 MMOL/L (ref 3.7–5.3)
RBC # BLD: 3.91 M/UL (ref 3.95–5.11)
SEG NEUTROPHILS: 71 % (ref 36–65)
SEGMENTED NEUTROPHILS ABSOLUTE COUNT: 7.13 K/UL (ref 1.5–8.1)
SODIUM BLD-SCNC: 135 MMOL/L (ref 135–144)
WBC # BLD: 10 K/UL (ref 3.5–11.3)

## 2022-12-18 PROCEDURE — 94761 N-INVAS EAR/PLS OXIMETRY MLT: CPT

## 2022-12-18 PROCEDURE — 6360000002 HC RX W HCPCS: Performed by: STUDENT IN AN ORGANIZED HEALTH CARE EDUCATION/TRAINING PROGRAM

## 2022-12-18 PROCEDURE — 82803 BLOOD GASES ANY COMBINATION: CPT

## 2022-12-18 PROCEDURE — 2580000003 HC RX 258

## 2022-12-18 PROCEDURE — 6360000002 HC RX W HCPCS: Performed by: NURSE PRACTITIONER

## 2022-12-18 PROCEDURE — 2580000003 HC RX 258: Performed by: NURSE PRACTITIONER

## 2022-12-18 PROCEDURE — 99291 CRITICAL CARE FIRST HOUR: CPT | Performed by: INTERNAL MEDICINE

## 2022-12-18 PROCEDURE — 6370000000 HC RX 637 (ALT 250 FOR IP)

## 2022-12-18 PROCEDURE — 99232 SBSQ HOSP IP/OBS MODERATE 35: CPT | Performed by: INTERNAL MEDICINE

## 2022-12-18 PROCEDURE — 2500000003 HC RX 250 WO HCPCS: Performed by: HEALTH CARE PROVIDER

## 2022-12-18 PROCEDURE — 6360000002 HC RX W HCPCS: Performed by: INTERNAL MEDICINE

## 2022-12-18 PROCEDURE — 6370000000 HC RX 637 (ALT 250 FOR IP): Performed by: STUDENT IN AN ORGANIZED HEALTH CARE EDUCATION/TRAINING PROGRAM

## 2022-12-18 PROCEDURE — 94640 AIRWAY INHALATION TREATMENT: CPT

## 2022-12-18 PROCEDURE — 2000000000 HC ICU R&B

## 2022-12-18 PROCEDURE — 82947 ASSAY GLUCOSE BLOOD QUANT: CPT

## 2022-12-18 PROCEDURE — C9113 INJ PANTOPRAZOLE SODIUM, VIA: HCPCS

## 2022-12-18 PROCEDURE — 2580000003 HC RX 258: Performed by: STUDENT IN AN ORGANIZED HEALTH CARE EDUCATION/TRAINING PROGRAM

## 2022-12-18 PROCEDURE — 36415 COLL VENOUS BLD VENIPUNCTURE: CPT

## 2022-12-18 PROCEDURE — 2700000000 HC OXYGEN THERAPY PER DAY

## 2022-12-18 PROCEDURE — 36600 WITHDRAWAL OF ARTERIAL BLOOD: CPT

## 2022-12-18 PROCEDURE — 85025 COMPLETE CBC W/AUTO DIFF WBC: CPT

## 2022-12-18 PROCEDURE — 71045 X-RAY EXAM CHEST 1 VIEW: CPT

## 2022-12-18 PROCEDURE — 6370000000 HC RX 637 (ALT 250 FOR IP): Performed by: NURSE PRACTITIONER

## 2022-12-18 PROCEDURE — 94003 VENT MGMT INPAT SUBQ DAY: CPT

## 2022-12-18 PROCEDURE — 6360000002 HC RX W HCPCS

## 2022-12-18 PROCEDURE — 80048 BASIC METABOLIC PNL TOTAL CA: CPT

## 2022-12-18 RX ORDER — CALCIUM GLUCONATE 20 MG/ML
2000 INJECTION, SOLUTION INTRAVENOUS ONCE
Status: COMPLETED | OUTPATIENT
Start: 2022-12-18 | End: 2022-12-18

## 2022-12-18 RX ADMIN — HYDROCORTISONE 10 MG: 10 TABLET ORAL at 08:34

## 2022-12-18 RX ADMIN — LAMOTRIGINE 200 MG: 100 TABLET ORAL at 08:34

## 2022-12-18 RX ADMIN — SERTRALINE 150 MG: 50 TABLET, FILM COATED ORAL at 09:57

## 2022-12-18 RX ADMIN — Medication 1250 MG: at 12:35

## 2022-12-18 RX ADMIN — GUAIFENESIN 200 MG: 200 SOLUTION ORAL at 15:37

## 2022-12-18 RX ADMIN — SODIUM CHLORIDE: 9 INJECTION, SOLUTION INTRAVENOUS at 08:42

## 2022-12-18 RX ADMIN — GUAIFENESIN 200 MG: 200 SOLUTION ORAL at 03:08

## 2022-12-18 RX ADMIN — LEVALBUTEROL HYDROCHLORIDE 0.63 MG: 0.63 SOLUTION RESPIRATORY (INHALATION) at 12:05

## 2022-12-18 RX ADMIN — GUAIFENESIN 200 MG: 200 SOLUTION ORAL at 21:00

## 2022-12-18 RX ADMIN — GUAIFENESIN 200 MG: 200 SOLUTION ORAL at 23:48

## 2022-12-18 RX ADMIN — ACETAMINOPHEN 650 MG: 325 TABLET ORAL at 18:17

## 2022-12-18 RX ADMIN — LEVOTHYROXINE SODIUM 150 MCG: 75 TABLET ORAL at 05:06

## 2022-12-18 RX ADMIN — TOPIRAMATE 100 MG: 100 TABLET, FILM COATED ORAL at 21:00

## 2022-12-18 RX ADMIN — SODIUM CHLORIDE, PRESERVATIVE FREE 40 MG: 5 INJECTION INTRAVENOUS at 08:39

## 2022-12-18 RX ADMIN — GUAIFENESIN 200 MG: 200 SOLUTION ORAL at 08:33

## 2022-12-18 RX ADMIN — TOPIRAMATE 75 MG: 100 TABLET, FILM COATED ORAL at 08:34

## 2022-12-18 RX ADMIN — CALCIUM GLUCONATE 2000 MG: 20 INJECTION, SOLUTION INTRAVENOUS at 06:14

## 2022-12-18 RX ADMIN — LEVALBUTEROL HYDROCHLORIDE 0.63 MG: 0.63 SOLUTION RESPIRATORY (INHALATION) at 07:47

## 2022-12-18 RX ADMIN — GUAIFENESIN 200 MG: 200 SOLUTION ORAL at 12:36

## 2022-12-18 RX ADMIN — ENOXAPARIN SODIUM 30 MG: 100 INJECTION SUBCUTANEOUS at 21:00

## 2022-12-18 RX ADMIN — LEVALBUTEROL HYDROCHLORIDE 0.63 MG: 0.63 SOLUTION RESPIRATORY (INHALATION) at 20:00

## 2022-12-18 RX ADMIN — Medication 1250 MG: at 23:47

## 2022-12-18 RX ADMIN — HYDROCORTISONE 10 MG: 10 TABLET ORAL at 22:40

## 2022-12-18 RX ADMIN — LEVALBUTEROL HYDROCHLORIDE 0.63 MG: 0.63 SOLUTION RESPIRATORY (INHALATION) at 16:10

## 2022-12-18 RX ADMIN — SODIUM CHLORIDE, PRESERVATIVE FREE 5 ML: 5 INJECTION INTRAVENOUS at 08:38

## 2022-12-18 RX ADMIN — FUROSEMIDE 20 MG: 10 INJECTION, SOLUTION INTRAMUSCULAR; INTRAVENOUS at 08:34

## 2022-12-18 RX ADMIN — ENOXAPARIN SODIUM 30 MG: 100 INJECTION SUBCUTANEOUS at 08:36

## 2022-12-18 ASSESSMENT — PULMONARY FUNCTION TESTS
PIF_VALUE: 19
PIF_VALUE: 13
PIF_VALUE: 18
PIF_VALUE: 13
PIF_VALUE: 7
PIF_VALUE: 21
PIF_VALUE: 22
PIF_VALUE: 13
PIF_VALUE: 22
PIF_VALUE: 26

## 2022-12-18 NOTE — PLAN OF CARE
Problem: Discharge Planning  Goal: Discharge to home or other facility with appropriate resources  Outcome: Progressing     Problem: Confusion  Goal: Confusion, delirium, dementia, or psychosis is improved or at baseline  Description: INTERVENTIONS:  1. Assess for possible contributors to thought disturbance, including medications, impaired vision or hearing, underlying metabolic abnormalities, dehydration, psychiatric diagnoses, and notify attending LIP  2. [place] high risk fall precautions, as indicated  3. Provide frequent short contacts to provide reality reorientation, refocusing and direction  4. Decrease environmental stimuli, including noise as appropriate  5. Monitor and intervene to maintain adequate nutrition, hydration, elimination, sleep and activity  6. If unable to ensure safety without constant attention obtain sitter and review sitter guidelines with assigned personnel  7. Initiate Psychosocial CNS and Spiritual Care consult, as indicated  Outcome: Progressing     Problem: Pain  Goal: Verbalizes/displays adequate comfort level or baseline comfort level  Outcome: Progressing     Problem: Chronic Conditions and Co-morbidities  Goal: Patient's chronic conditions and co-morbidity symptoms are monitored and maintained or improved  Outcome: Progressing  Flowsheets (Taken 12/18/2022 0800)  Care Plan - Patient's Chronic Conditions and Co-Morbidity Symptoms are Monitored and Maintained or Improved:   Monitor and assess patient's chronic conditions and comorbid symptoms for stability, deterioration, or improvement   Collaborate with multidisciplinary team to address chronic and comorbid conditions and prevent exacerbation or deterioration     Problem: Skin/Tissue Integrity  Goal: Absence of new skin breakdown  Description: 1. Monitor for areas of redness and/or skin breakdown  2. Assess vascular access sites hourly  3. Every 4-6 hours minimum:  Change oxygen saturation probe site  4.   Every 4-6 hours: If on nasal continuous positive airway pressure, respiratory therapy assess nares and determine need for appliance change or resting period.   Outcome: Progressing     Problem: Safety - Adult  Goal: Free from fall injury  Outcome: Progressing     Problem: ABCDS Injury Assessment  Goal: Absence of physical injury  Outcome: Progressing     Problem: Nutrition Deficit:  Goal: Optimize nutritional status  Outcome: Progressing     Problem: Respiratory - Adult  Goal: Achieves optimal ventilation and oxygenation  12/18/2022 1654 by Yessica Middleton RN  Outcome: Progressing  12/18/2022 0838 by Jessica Salmeron RCP  Outcome: Progressing

## 2022-12-18 NOTE — PLAN OF CARE
Problem: Discharge Planning  Goal: Discharge to home or other facility with appropriate resources  12/17/2022 2251 by Susana Ott RN  Outcome: Progressing  12/17/2022 1711 by Enrike Rich RN  Outcome: Progressing  Flowsheets (Taken 12/17/2022 0800)  Discharge to home or other facility with appropriate resources: Identify barriers to discharge with patient and caregiver     Problem: Confusion  Goal: Confusion, delirium, dementia, or psychosis is improved or at baseline  Description: INTERVENTIONS:  1. Assess for possible contributors to thought disturbance, including medications, impaired vision or hearing, underlying metabolic abnormalities, dehydration, psychiatric diagnoses, and notify attending LIP  2. [place] high risk fall precautions, as indicated  3. Provide frequent short contacts to provide reality reorientation, refocusing and direction  4. Decrease environmental stimuli, including noise as appropriate  5. Monitor and intervene to maintain adequate nutrition, hydration, elimination, sleep and activity  6. If unable to ensure safety without constant attention obtain sitter and review sitter guidelines with assigned personnel  7.  Initiate Psychosocial CNS and Spiritual Care consult, as indicated  12/17/2022 2251 by Susana Ott RN  Outcome: Progressing  12/17/2022 1711 by Enrike Rich RN  Outcome: Progressing     Problem: Pain  Goal: Verbalizes/displays adequate comfort level or baseline comfort level  12/17/2022 2251 by Susana Ott RN  Outcome: Progressing  12/17/2022 1711 by Enrike Rich RN  Outcome: Progressing     Problem: Chronic Conditions and Co-morbidities  Goal: Patient's chronic conditions and co-morbidity symptoms are monitored and maintained or improved  12/17/2022 2251 by Susana Ott RN  Outcome: Progressing  12/17/2022 1711 by Enrike Rich RN  Outcome: Progressing  Flowsheets (Taken 12/17/2022 0800)  Care Plan - Patient's Chronic Conditions and Co-Morbidity Symptoms are Monitored and Maintained or Improved:   Monitor and assess patient's chronic conditions and comorbid symptoms for stability, deterioration, or improvement   Collaborate with multidisciplinary team to address chronic and comorbid conditions and prevent exacerbation or deterioration     Problem: Skin/Tissue Integrity  Goal: Absence of new skin breakdown  Description: 1.  Monitor for areas of redness and/or skin breakdown  2.  Assess vascular access sites hourly  3.  Every 4-6 hours minimum:  Change oxygen saturation probe site  4.  Every 4-6 hours:  If on nasal continuous positive airway pressure, respiratory therapy assess nares and determine need for appliance change or resting period.  12/17/2022 2251 by Isaiah Abel RN  Outcome: Progressing  12/17/2022 1711 by Kaitlin Mota RN  Outcome: Progressing     Problem: Safety - Adult  Goal: Free from fall injury  12/17/2022 2251 by Isaiah Abel RN  Outcome: Progressing  12/17/2022 1711 by Kaitlin Mota RN  Outcome: Progressing     Problem: ABCDS Injury Assessment  Goal: Absence of physical injury  12/17/2022 2251 by Isaiah Abel RN  Outcome: Progressing  12/17/2022 1711 by Kaitlin Mota RN  Outcome: Progressing  Flowsheets (Taken 12/17/2022 0800)  Absence of Physical Injury: Implement safety measures based on patient assessment     Problem: Nutrition Deficit:  Goal: Optimize nutritional status  12/17/2022 2251 by Isaiah Abel RN  Outcome: Progressing  12/17/2022 1711 by Kaitlin Mota RN  Outcome: Progressing     Problem: Respiratory - Adult  Goal: Achieves optimal ventilation and oxygenation  12/17/2022 2251 by Isaiah Abel RN  Outcome: Progressing  12/17/2022 1711 by Kaitlin Mota RN  Outcome: Progressing

## 2022-12-18 NOTE — PLAN OF CARE
Problem: Respiratory - Adult  Goal: Achieves optimal ventilation and oxygenation  12/18/2022 0838 by Grazyna Valentine RCP  Outcome: Progressing  12/17/2022 2251 by Marilia Loya RN  Outcome: Progressing

## 2022-12-18 NOTE — PROGRESS NOTES
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 Continue aggressive pulmonary toileting and we will follow her progress  The patient is following commands and overall clinically is improved    Infection Control Recommendations:   Contact precautions    Discharge Planning:   Estimated Length of IV antimicrobials: To be determined  Patient will need Midline Catheter Insertion/ PICC line Insertion: No  Patient will need: Home IV , [place],  SNF,  LTAC: Undetermined  Patient willneed outpatient wound care: No    Medical Decision making / Summary of Stay:   Den Nunez is a 41y.o.-year-old female who was initially admitted on 12/5/2022. Patient seen at the request of Dr. Adeola Bee:     This patient, with a history of developmental delay, presented to the [facility] ED after falling down some stairs at home. She was complaining of neck and back pain. Imaging revealed an acute C7 transverse process fracture, chronic T3 compression fx and fractures of right sacral ala, nondisplaced fracture of right superior ramus and right obturator ring. She was transferred to [facility] for further medical management. Neurosurgery was consulted and it was determined there is no neurosurgical intervention needed. Orthopedic surgery was also consulted and they also did not recommend surgical intervention, but rather outpatient follow-up.      On 12/6/22, the patient's mentation declined and she became tachypneic requiring emergent intubation. Neurology was consulted for the patient's history of seizures. Her home seizure medications were restarted. An EEG did not show any seizure activity and the patient's mentation improved. She was extubated on the 7th but required re-intubation on 12/10/22 after a rapid response was called. CXR showed concern for RUL aspiration pneumonia. IV Zosyn was started and cultures were obtained. Viral respiratory panel was negative for influenza and Covid     Sputum cultures grew MRSA and the patient's antibiotics were changed to IV vancomycin on 12/11/22. There has been no growth on blood or urine cultures. ID was consulted for MRSA on sputum cultures        CT Head W/O Contrast   Final Result   No acute intracranial abnormality. Small amount of fluid in the right mastoid air cells. CT CHEST ABDOMEN PELVIS WO CONTRAST Additional Contrast? None   Final Result   Chest:       1. Mild anterior wedging of T3 suggesting an age indeterminate compression   fracture. No fracture line is identified. Clinical correlation with the   site of symptoms is suggested. 2. Significant motion artifact with scattered patchy ground-glass opacities   in the lungs which may be related to motion artifact. Ground-glass opacities   are otherwise nonspecific and could be related to atypical infection or   pneumonitis. Abdomen and pelvis:       1. Nondisplaced fractures of the right sacral ala and the right obturator   ring. 2. No acute findings elsewhere in the abdomen or pelvis. 3. Left renal cyst.           CT CSpine W/O Contrast   Final Result   Acute fracture of the right C7 transverse process. Spinal degenerative changes as described.            Current evaluation:12/18/2022    BP (!) 113/59   Pulse 83   Temp 100 °F (37.8 °C) (Bladder)   Resp 25   Ht 5' 1\" (1.549 m)   Wt 268 lb 11.9 oz (121.9 kg)   SpO2 96%   BMI 50.78 kg/m²     Temperature Range: Temp: 100 °F (37.8 °C) Temp  Av.2 °F (37.9 °C)  Min: 99.9 °F (37.7 °C)  Max: 100.8 °F (38.2 °C)  The patient is seen and evaluated at bedside and remains on the ventilator 40% FiO2 5 of PEEP. She is awake and alert and seems to be describing some throat pain. Review of Systems   Unable to perform ROS: Intubated     Physical Examination :     Physical Exam  Constitutional:       Appearance: She is well-developed. She is obese. Interventions: She is sedated and intubated. HENT:      Head: Normocephalic and atraumatic. Cardiovascular:      Rate and Rhythm: Regular rhythm. Heart sounds: Normal heart sounds. Pulmonary:      Effort: Pulmonary effort is normal. She is intubated. Breath sounds: Normal breath sounds. Abdominal:      General: Bowel sounds are normal.      Palpations: Abdomen is soft. Skin:     General: Skin is warm and dry. Laboratory data:   I have independently reviewed the followinglabs:  CBC with Differential:   Recent Labs     221 22  0456   WBC 11.3 10.0   HGB 10.9* 10.9*   HCT 35.9* 36.1*    325   LYMPHOPCT 15* 14*   MONOPCT 7 6       BMP:   Recent Labs     22  0451 22  0456   * 135   K 4.1 3.8    103   CO2 21 22   BUN 19 19   CREATININE 0.60 0.61       Hepatic Function Panel: No results for input(s): PROT, LABALBU, BILIDIR, IBILI, BILITOT, ALKPHOS, ALT, AST in the last 72 hours.       No results found for: PROCAL  Lab Results   Component Value Date/Time    CRP 4.4 2018 06:46 PM     Lab Results   Component Value Date    SEDRATE 20 2018         Lab Results   Component Value Date/Time    DDIMER 0.33 07/15/2020 05:29 PM     No results found for: FERRITIN  No results found for: LDH  No results found for: FIBRINOGEN    Results in Past 30 Days  Result Component Current Result Ref Range Previous Result Ref Range   SARS-CoV-2, PCR Not Detected (12/10/2022) Not Detected Not Detected (2022) Not Detected     Lab Results   Component Value Date/Time    COVID19 Not Detected 12/10/2022 01:09 PM    COVID19 Not Detected 12/06/2022 10:15 PM       No results for input(s): VANCOTROUGH in the last 72 hours. Imaging Studies:   ONE XRAY VIEW OF THE CHEST 12/16/2022 9:50 am  Impression   Similar appearance of mild bilateral airspace disease. This appears   consistent with pneumonia     Cultures:     Culture, Blood 1 [[id number]] Collected: 12/15/22 1349   Order Status: Completed Specimen: Blood Updated: 12/18/22 0912    Specimen Description . BLOOD    Special Requests L FOREARM  10ML    Culture NO GROWTH 3 DAYS   Culture, Blood 1 [[id number]] (Abnormal) Collected: 12/15/22 1352   Order Status: Completed Specimen: Blood Updated: 12/18/22 0717    Specimen Description . BLOOD    Special Requests L AC  5ML    Culture POSITIVE Blood Culture Abnormal      DIRECT GRAM STAIN FROM BOTTLE: GRAM POSITIVE COCCI IN CLUSTERS     Staphylococcus epidermidis Detected: mecA/C Gene Detected- Methicillin Resistant Organism Methodology- Polymerase Chain Reaction (PCR)     STAPHYLOCOCCUS EPIDERMIDIS A single positive blood culture of coagulase negative Staphylocci, diphtheroids,micrococci, Cutibacterium, viridans Streptocci, Bacillus, or Lactobacillus species should be interpreted with caution and viewed as a likely skin contaminant. Abnormal      (NOTE) Direct Gram Stain from bottle and Polymerase Chain Reaction (PCR) results called to and read back by:JEANETTE RENE AT 1420 ON 12/16/22     Culture, Urine [[id number]] Collected: 12/15/22 1410   Order Status: Completed Specimen: Urine, indwelling catheter Updated: 12/16/22 1257    Specimen Description . INDWELLING CATH URINE    Culture NO SIGNIFICANT GROWTH   Culture, Blood 1 [[id number]] Collected: 12/10/22 1223   Order Status: Completed Specimen: Blood Updated: 12/15/22 1319    Specimen Description . BLOOD    Special Requests LEFT HAND 2.5 ML    Culture NO GROWTH 5 DAYS   Culture, Blood 1 [[id number]] Collected: 12/10/22 1230   Order Status: Completed Specimen: Blood Updated: 12/15/22 1319    Specimen Description . BLOOD    Special Requests RT HAND 2.5 ML    Culture NO GROWTH 5 DAYS   Culture, Respiratory [[id number]] (Abnormal)  Collected: 12/10/22 1130   Order Status: Completed Specimen: Sputum Induced Updated: 12/12/22 0934    Specimen Description . INDUCED SPUTUM    Direct Exam < 10 EPITHELIAL CELLS/LPF     <10 NEUTROPHILS/LPF     FEW GRAM POSITIVE COCCI IN CLUSTERS Abnormal     Culture METHICILLIN RESISTANT STAPHYLOCOCCUS AUREUS HEAVY GROWTH Abnormal      NO NORMAL JEFFREY   MRSA DNA Probe, Nasal [[id number]] (Abnormal) Collected: 12/10/22 1215   Order Status: Completed Specimen: Nasal Updated: 12/11/22 1027    Specimen Description . NASAL SWAB    MRSA, DNA, Nasal POSITIVE:  MRSA DNA detected by nucleic acid amplification. Abnormal     Comment:                                                    Results should be used as an adjunct to nosocomial control efforts to identify patients   needing enhanced precautions. The test is not intended to identify patients with staphylococcal infections. Results   should not be used to guide or monitor treatment for MRSA infections. Respiratory Panel, Molecular, with COVID-19 (Restricted: peds pts or suitable admitted adults) [[id number]] Collected: 12/10/22 1309   Order Status: Completed Specimen: Nasopharyngeal Swab Updated: 12/10/22 1451    Specimen Description . NASOPHARYNGEAL SWAB    Adenovirus PCR Not Detected    Coronavirus 229E PCR Not Detected    Coronavirus HKU1 PCR Not Detected    Coronavirus NL63 PCR Not Detected    Coronavirus OC43 PCR Not Detected    SARS-CoV-2, PCR Not Detected    Human Metapneumovirus PCR Not Detected    Rhino/Enterovirus PCR Not Detected    Influenza A by PCR Not Detected    Influenza B by PCR Not Detected    Parainfluenza 1 PCR Not Detected    Parainfluenza 2 PCR Not Detected    Parainfluenza 3 PCR Not Detected Parainfluenza 4 PCR Not Detected    Resp Syncytial Virus PCR Not Detected    Bordetella Parapertussis Not Detected    B Pertussis by PCR Not Detected    Chlamydia pneumoniae By PCR Not Detected    Mycoplasma pneumo by PCR Not Detected    Comment: Performed by multiplexed nucleic acid assay. Respiratory Panel, Molecular, with COVID-19 (Restricted: peds pts or suitable admitted adults) [[id number]] Collected: 12/10/22 1130   Order Status: Canceled Specimen: Nasopharyngeal Swab    MRSA DNA Probe, Nasal [[id number]] Collected: 12/07/22 0200   Order Status: Completed Specimen: Nasal Updated: 12/07/22 1440    Specimen Description . NASAL SWAB    MRSA, DNA, Nasal NEGATIVE    Comment: NEGATIVE:  MRSA DNA not detected by nucleic acid amplification. Results should be used as an adjunct to nosocomial control efforts to identify patients   needing enhanced precautions. The test is not intended to identify patients with staphylococcal infections. Results   should not be used to guide or monitor treatment for MRSA infections. COVID-19, Rapid [[id number]] Collected: 12/06/22 2215   Order Status: Completed Specimen: Nasopharyngeal Swab Updated: 12/07/22 0207    Specimen Description . NASOPHARYNGEAL SWAB    SARS-CoV-2, Rapid Not Detected    Comment:        Rapid NAAT:  The specimen is NEGATIVE for SARS-CoV-2, the novel coronavirus associated with   COVID-19. The ID NOW COVID-19 assay is designed to detect the virus that causes COVID-19 in patients   with signs and symptoms of infection who are suspected of COVID-19. An individual without symptoms of COVID-19 and who is not shedding SARS-CoV-2 virus would   expect to have a negative (not detected) result in this assay.    Negative results should be treated as presumptive and, if inconsistent with clinical signs   and symptoms or necessary for patient management,   should be tested with an alternative molecular assay. Negative results do not preclude   SARS-CoV-2 infection and   should not be used as the sole basis for patient management decisions. Fact sheet for Healthcare Providers: FindDrives.pl   Fact sheet for Patients: FindDrives[employer]pl         Methodology: Isothermal Nucleic Acid Amplification         Medications:      vancomycin  1,250 mg IntraVENous Q12H    guaiFENesin  200 mg Oral Q4H    furosemide  20 mg IntraVENous Daily    pantoprazole (PROTONIX) 40 mg injection  40 mg IntraVENous Daily    vancomycin (VANCOCIN) intermittent dosing (placeholder)   Other RX Placeholder    hydrocortisone  10 mg Oral BID    levalbuterol  0.63 mg Nebulization 4x daily    enoxaparin  30 mg SubCUTAneous BID    sertraline  150 mg Oral Daily    lamoTRIgine  200 mg Oral Daily    levothyroxine  150 mcg Oral QAM AC    topiramate  75 mg Oral Daily    topiramate  100 mg Oral Nightly           [facility]  Jet Helm MD  [employer] messaging  OFFICE: [phone]      Electronically signed by Jet Helm MD on 12/18/2022 at 9:26 AM  Thank you for allowing us to participate in the care of this patient. Please call with questions. This note iscreated with the assistance of a speech recognition program.  While intending to generate a document that actually reflects the content of the visit, the document can still have some errors including those of syntax andsound a like substitutions which may escape proof reading. In such instances, actual meaning can be extrapolated by contextual diversion.

## 2022-12-18 NOTE — PROGRESS NOTES
INTENSIVE CARE UNIT  Resident Physician Progress Note    Patient - Rubia Oates  Date of Admission -  12/5/2022 11:39 PM  Date of Evaluation -  12/18/2022  Room and Bed Number -  3021/3021-01   Hospital Day - 12    HPI:     59-year-old female initially presented to hospital on 12/5 after a fall. Patient is developmentally delayed and has a history of brain tumor as a child with a lot of radiation. Patient also has past medical history of diabetes mellitus, bilateral sensorineural hearing loss. Had a CT head showed no intracranial abnormality, CT abdominal pelvis showed  nondisplaced fractures of right sacral ilya, nondisplaced fracture of right superior ramus and right obturator ring. CT spine showed acute fracture of right C7 transverse process and also age indeterminate compressive fracture of T3. Neurosurgery for cervical fractures was consulted, who recommended no neurosurgical intervention. Orthopedic surgery for pelvic fractures was also consulted, no surgical intervention but medical management and also recommended to follow-up in orthopedic clinic after discharge. Patient was started on BiPAP for increased work of breathing. Patient was oriented x4 at day of presentation. 12/06 - patient had a decline in mental status with tachypnea, respiratory rate of 30s to 40 with heart rate of 105 patient was intubated emergently     12/7- Neurology was consulted because of history of migraines, seizure disorder, patient was started on her home antiepileptic drugs including Topamax and Lamictal, EEG was ordered to rule out any subclinical seizure activity, MRI was also ordered to rule out any CVA. Patient was doing fine, arousable and following commands. Pulmonology was consulted, recommended to extubate and patient was extubated.      12/10 -rapid was called today due to impending respiratory failure, patient was intubated again and was sent to ICU      12/12 -respiratory culture came back positive for staph aureus , nasal respiratory swab came out positive for MRSA DNA, patient started on vancomycin       : Tachycardia improved to normal heart rate, patient remained afebrile, WBC count trending down. Sedation changed to precedex     12/15: Precedex discontinued. SUBJECTIVE:     OVERNIGHT EVENTS:    Patient had fever overnight at 9 PM temperature was 100.8, Tylenol was given. Patient is currently afebrile, awake, alert, and answering questions. She is still having large amount of secretions they are are white, yellow frothy. WBC count: 10.0, patient is on vancomycin, ID on board. TODAY:   Spontaneous breathing trial this a.m., patient is tolerating well, will continue to monitor. Patient is hemodynamically stable. Temperature is 37.8, heart rate 71, blood pressure 99/56, respiration rate 19 saturating 96%. Total intake 2621, urine output 2980, net -358.9. Since admission: +2581    ABGs:   Arterial Blood Gas result:  pH 7.412; pCO2 38.2; pO2 86.8; HCO3 25.3; O2 Sat 97. VENT SETTINGS (Comprehensive) (if applicable):   PRVC mode, FiO2 40%, PEEP 5, Respiratory Rate 18, Tidal Volume 380      WBC count: 10.0  Hemoglobin: 10.9  Blood glucose: 112  BUN: 19  Creatinine: 0.61  Sodium: 135  Potassium: 3.8    AWAKE & FOLLOWING COMMANDS:  [] No   [x] Yes    SECRETIONS Amount:  [] Small [] Moderate  [x] Large  [] None  Color:     [x] White [x] Colored - yellow  [] Bloody    SEDATION:  RAAS Score:  [] Propofol gtt  [] Versed gtt  [] Ativan gtt   [x] No Sedation    PARALYZED:  [x] No    [] Yes    VASOPRESSORS:  [x] No    [] Yes  [] Levophed [] Dopamine [] Vasopressin  [] Dobutamine [] Phenylephrine [] Epinephrine      OBJECTIVE:     VITAL SIGNS:  BP (!) 99/56   Pulse 71   Temp 100 °F (37.8 °C) (Bladder)   Resp 20   Ht 5' 1\" (1.549 m)   Wt 268 lb 11.9 oz (121.9 kg)   SpO2 95%   BMI 50.78 kg/m²   Tmax over 24 hours:  Temp (24hrs), Av.2 °F (37.9 °C), Min:99.9 °F (37.7 °C), Max:100.8 °F (38.2 °C)      Patient Vitals for the past 8 hrs:   BP Temp Temp src Pulse Resp SpO2 Weight   12/18/22 0843 -- -- -- 71 20 95 % --   12/18/22 0800 (!) 99/56 100 °F (37.8 °C) Bladder 66 19 93 % --   12/18/22 0747 -- -- -- 65 25 95 % --   12/18/22 0745 -- -- -- 64 22 97 % --   12/18/22 0700 104/68 100.2 °F (37.9 °C) Bladder 70 14 96 % --   12/18/22 0630 -- -- -- 71 24 94 % --   12/18/22 0600 97/66 100.4 °F (38 °C) Bladder 64 22 94 % 268 lb 11.9 oz (121.9 kg)   12/18/22 0530 -- -- -- 71 24 92 % --   12/18/22 0500 93/61 100.2 °F (37.9 °C) Bladder 73 26 95 % --   12/18/22 0437 -- -- -- 66 23 93 % --   12/18/22 0430 -- -- -- 70 24 93 % --   12/18/22 0400 90/61 100 °F (37.8 °C) Bladder 62 24 92 % --   12/18/22 0330 -- -- -- 63 25 93 % --   12/18/22 0321 -- -- -- 68 26 91 % --   12/18/22 0300 101/62 99.9 °F (37.7 °C) Bladder 69 24 93 % --   12/18/22 0230 -- -- -- 70 19 92 % --   12/18/22 0200 107/67 100 °F (37.8 °C) Bladder 71 24 94 % --   12/18/22 0130 -- -- -- 61 21 97 % --   12/18/22 0100 109/63 -- -- 62 20 98 % --         Intake/Output Summary (Last 24 hours) at 12/18/2022 0848  Last data filed at 12/18/2022 1830  Gross per 24 hour   Intake 2555.64 ml   Output 2895 ml   Net -339.36 ml     Date 12/18/22 0000 - 12/18/22 2359   Shift 0332-4789 3009-9327 4150-2058 24 Hour Total   INTAKE   I.V.(mL/kg) 87.5(0.7) 5(0)  92.5(0.8)   NG/GT(mL/kg) 807(6.6)   807(6.6)   IV Piggyback(mL/kg) 287. 9(2.4) 62.3(0.5)  350.3(2.9)   Shift Total(mL/kg) 1182.4(9.7) 67.4(0.6)  1249. 8(10.3)   OUTPUT   Urine(mL/kg/hr) 745(0.8)   745   Shift Total(mL/kg) 745(6.1)   745(6.1)   Weight (kg) 121.9 121.9 121.9 121.9     Wt Readings from Last 3 Encounters:   12/18/22 268 lb 11.9 oz (121.9 kg)   12/05/22 277 lb (125.6 kg)   11/07/22 279 lb (126.6 kg)     Body mass index is 50.78 kg/m².         PHYSICAL EXAM:  GEN:  awake, alert, and cooperative  EYES:   pupils equal, round, and reactive to light  HEENT:  Normocepalic, without obvious abnormality  LUNGS:  No increased work of breathing, good air exchange. Rhonchorous in all lung fields. CV:    regular rate and rhythm and normal S1 and S2  ABDOMEN:   soft, non-distended, and non-tender  MSK:    there is no redness, warmth, or swelling of the joints  NEURO[de-identified]   Awake, alert, following commands  SKIN:   Normal skin color, texture, turgor  EXTREMITIES:  No pedal or leg edema, no calf tenderness/swelling, no erythema, distal pulses intact       MEDICATIONS:  Scheduled Meds:   vancomycin  1,250 mg IntraVENous Q12H    guaiFENesin  200 mg Oral Q4H    furosemide  20 mg IntraVENous Daily    pantoprazole (PROTONIX) 40 mg injection  40 mg IntraVENous Daily    vancomycin (VANCOCIN) intermittent dosing (placeholder)   Other RX Placeholder    hydrocortisone  10 mg Oral BID    levalbuterol  0.63 mg Nebulization 4x daily    enoxaparin  30 mg SubCUTAneous BID    sertraline  150 mg Oral Daily    lamoTRIgine  200 mg Oral Daily    levothyroxine  150 mcg Oral QAM AC    topiramate  75 mg Oral Daily    topiramate  100 mg Oral Nightly     Continuous Infusions:   sodium chloride Stopped (12/12/22 0316)    sodium chloride 10 mL/hr at 12/18/22 0842     PRN Meds:   polyethylene glycol, 17 g, Daily PRN  oxyCODONE, 10 mg, Q4H PRN   Or  oxyCODONE, 5 mg, Q4H PRN  levalbuterol, 0.63 mg, Q4H PRN  sodium chloride, , PRN  fentanNYL, 50 mcg, Q2H PRN  acetaminophen, 650 mg, Q6H PRN  sodium chloride flush, 5-40 mL, PRN      SUPPORT DEVICES: [x] Ventilator [] BIPAP  [] Nasal Cannula [] Room Air    VENT SETTINGS (Comprehensive) (if applicable):   PRVC mode, FiO2 40%, PEEP 5, Respiratory Rate 18, Tidal Volume 380  Vent Information  Ventilator ID: [id number]  Equipment Changed: Expiratory Filter  Ventilator Initiate: Yes  Vent Mode: AC/PRVC  Additional Respiratory Assessments  Heart Rate: 71  Resp: 20  SpO2: 95 %  End Tidal CO2: 38 (%)  Position: Semi-Love's  Humidification Source: Heated wire  Humidification Temp: 33.8  Circuit Condensation: Drained  Cuff Pressure (cm H2O):  (mlt)  Skin Barrier Applied: No    ABGs:   Arterial Blood Gas result:  pH 7.412; pCO2 38.2; pO2 86.8; HCO3 25.3; O2 Sat 97.   Lab Results   Component Value Date/Time    FIO2 40.0 12/17/2022 04:47 AM         DATA:  Complete Blood Count:   Recent Labs     12/16/22  0719 12/17/22 0451 12/18/22  0456   WBC 10.7 11.3 10.0   RBC 3.86* 3.97 3.91*   HGB 10.7* 10.9* 10.9*   HCT 34.9* 35.9* 36.1*   MCV 90.4 90.4 92.3   MCH 27.7 27.5 27.9   MCHC 30.7 30.4 30.2   RDW 14.6* 14.4 14.0    338 325   MPV 10.7 10.4 10.4        Last 3 Blood Glucose:   Recent Labs     12/16/22  0719 12/17/22 0451 12/18/22  0456   GLUCOSE 95 100* 112*        PT/INR:    Lab Results   Component Value Date/Time    PROTIME 10.5 12/05/2022 05:35 PM    INR 1.0 12/05/2022 05:35 PM     PTT:    Lab Results   Component Value Date/Time    APTT 24.9 03/10/2015 11:25 AM       Comprehensive Metabolic Profile:   Recent Labs     12/16/22 0719 12/17/22 0451 12/18/22  0456    132* 135   K 3.9 4.1 3.8    101 103   CO2 21 21 22   BUN 24* 19 19   CREATININE 0.62 0.60 0.61   GLUCOSE 95 100* 112*   CALCIUM 7.9* 8.2* 7.9*      Magnesium:   Lab Results   Component Value Date/Time    MG 1.7 12/06/2022 10:12 PM     Phosphorus:   Lab Results   Component Value Date/Time    PHOS 2.9 12/06/2022 10:12 PM     Ionized Calcium: No results found for: CAION     Urinalysis:   Lab Results   Component Value Date/Time    NITRU NEGATIVE 12/06/2022 08:05 PM    COLORU Yellow 12/06/2022 08:05 PM    PHUR 5.5 12/06/2022 08:05 PM    WBCUA None 12/06/2022 08:05 PM    RBCUA None 12/06/2022 08:05 PM    MUCUS 1+ 03/27/2013 05:54 PM    TRICHOMONAS NOT REPORTED 03/27/2013 05:54 PM    YEAST NOT REPORTED 03/27/2013 05:54 PM    BACTERIA RARE 03/27/2013 05:54 PM    CLARITYU clear 11/11/2014 01:20 PM    SPECGRAV 1.035 12/06/2022 08:05 PM    LEUKOCYTESUR NEGATIVE 12/06/2022 08:05 PM    UROBILINOGEN Normal 12/06/2022 08:05 PM    BILIRUBINUR NEGATIVE 12/06/2022 08:05 PM BILIRUBINUR neagtive 03/17/2017 04:30 PM    BLOODU negative 03/17/2017 04:30 PM    GLUCOSEU NEGATIVE 12/06/2022 08:05 PM    KETUA MODERATE 12/06/2022 08:05 PM    AMORPHOUS NOT REPORTED 03/27/2013 05:54 PM       HgBA1c:    Lab Results   Component Value Date/Time    LABA1C 5.3 04/26/2022 12:00 AM     TSH:    Lab Results   Component Value Date/Time    TSH <0.01 12/07/2022 03:57 AM     Lactic Acid: No results found for: LACTA   Troponin: No results for input(s): TROPONINI in the last 72 hours.     Microbiology:  Blood Culture: + S epidermidis x 1  Blood culture: NG 12 h      ASSESSMENT:     Patient Active Problem List    Diagnosis Date Noted    MRSA infection 12/14/2022    Acute lower respiratory tract infection 12/14/2022    Acute respiratory failure with hypoxia ([address] 12/11/2022    Aspiration pneumonia ([address] 12/10/2022    Multiple closed pelvic fractures without disruption of pelvic K[place] ([address] 12/09/2022    Lethargy 12/07/2022    Cervical transverse process fracture, initial encounter ([address] 12/06/2022    Closed nondisplaced fracture of seventh cervical vertebra ([address] 12/06/2022    Gastroesophageal reflux disease 08/12/2022    Irritable bowel syndrome with constipation 06/07/2022    Type 2 diabetes mellitus with stage 3a chronic kidney disease, without long-term current use of insulin ([address] 11/12/2021    Moderate episode of recurrent major depressive disorder ([address] 02/26/2020    Hypopituitarism ([address] 08/24/2016    ROHIT on CPAP 05/08/2015    Hypothyroidism 08/06/2014    Asthma 08/06/2014    Morbid obesity ([address] 08/06/2014    Seizure ([address] 10/23/2013          PLAN:      Neuro  Intubated  Off precedex, off sedation  PRVC 18/380/5/40%  Lamotrigine 200  Topamax 75 daily, 100 nightly  Zoloft 150 mg     Cards  HR 78-80  MAP average 81 overnight  Lasix 20 daily     Pulmonary  Intubated for aspiration pneumonia  PRVC 18/380/5/40%  CXR 12/16 - no change in bilateral airspace disease  Pulmonary toilet  Prednisone 40 x 4 days  Hydrocortisone 10 mg BID  Xopenex nebulizer QID  Guanifenesen 200 mg q 4h     Renal  Porras placed 12/15 for retention  Total intake 2621, urine output 2980, net -358.9. Since admission: +2581  Lasix 20 mg daily     GI  Tube feeds 75 ml/hr, at goal  Protonix 40     ID  Vancomycin, course started 12/12  Temp 37.8  Respiratory culture (+) S Aureus  Repeat Blood culture (+) S epidermidis x 1, Ng1d x 1  Blood cultures (-) x 5 days  ID following     Endo  Synthroid 150 daily  Hydrocortisone 10 mg BID     DVT Ppx: Lovenox   GI Ppx: Protonix    ICU PROPHYLAXIS:  Stress ulcer:  [x] PPI Agent  [] [id number] [] Sucralfate  [] Other:  VTE:   [x] Enoxaparin  [] Unfract. Heparin Subcut  [] EPC Cuffs    NUTRITION:  [] NPO [x] Tube Feeding (Specify: )  ADULT TUBE FEEDING; Orogastric; Standard without Fiber; Continuous; 45; Yes; 20; Q 4 hours; 75; 30; Q 4 hours   [] TPN  [] PO    HOME MEDS RECONCILED: [] No  [x] Yes    CONSULTATION NEEDED:  [] No  [x] Yes    FAMILY UPDATED:    [] No  [x] Yes    TRANSFER OUT OF ICU:   [x] No  [] Yes      Ethel Walters M.D. Internal Medicine Resident PGY-1  [facility],  [facility].  12/18/2022 8:48 AM       Attending Physician Statement  I have discussed the care of [address], including pertinent history and exam findings with the resident. I have reviewed the key elements of all parts of the encounter with the resident. I have seen and examined the patient with the resident. I agree with the assessment and plan and status of the problem list as documented. Present the patient during around today. Patient was febrile 100.8 no increase in WBC count. She is not on sedation she is much more alert and awake. Ventilator setting PRVC/18/380/5/40 percent ABG 7.4 3/38/87/25. She continues to have large amount of endotracheal secretions. She is on vancomycin followed with infectious disease.   And urine output urine output is 3 L last 24 hours she is on Lasix 20 mg once daily. She was placed on a spontaneous breathing trial this morning but became tachypneic later and had to be placed back on assist control she is gradually improving and much more awake mobilizing secretion continue daily spontaneous breathing trial follow-up endotracheal secretions. Discussed with nursing staff, treatment and plan discussed. Discussed with respiratory therapist.    Total critical care time caring for this patient with life threatening, unstable organ failure, including direct patient contact, management of life support systems, review of data including imaging and labs, discussions with other team members and physicians at least 27  Min so far today, excluding procedures. Please note that this chart was generated using voice recognition Dragon dictation software. Although every effort was made to ensure the accuracy of this automated transcription, some errors in transcription may have occurred.      Dominga Garg MD  12/18/2022 12:56 PM

## 2022-12-19 LAB
ABSOLUTE EOS #: 0.74 K/UL (ref 0–0.44)
ABSOLUTE IMMATURE GRANULOCYTE: 0.18 K/UL (ref 0–0.3)
ABSOLUTE LYMPH #: 0.97 K/UL (ref 1.1–3.7)
ABSOLUTE MONO #: 0.82 K/UL (ref 0.1–1.2)
ANION GAP SERPL CALCULATED.3IONS-SCNC: 11 MMOL/L (ref 9–17)
BASOPHILS # BLD: 1 % (ref 0–2)
BASOPHILS ABSOLUTE: 0.07 K/UL (ref 0–0.2)
BUN BLDV-MCNC: 19 MG/DL (ref 6–20)
CALCIUM SERPL-MCNC: 8.2 MG/DL (ref 8.6–10.4)
CHLORIDE BLD-SCNC: 99 MMOL/L (ref 98–107)
CO2: 22 MMOL/L (ref 20–31)
CREAT SERPL-MCNC: 0.64 MG/DL (ref 0.5–0.9)
EOSINOPHILS RELATIVE PERCENT: 7 % (ref 1–4)
GFR SERPL CREATININE-BSD FRML MDRD: >60 ML/MIN/1.73M2
GLUCOSE BLD-MCNC: 102 MG/DL (ref 70–99)
HCT VFR BLD CALC: 36.1 % (ref 36.3–47.1)
HEMOGLOBIN: 11.6 G/DL (ref 11.9–15.1)
IMMATURE GRANULOCYTES: 2 %
LYMPHOCYTES # BLD: 9 % (ref 24–43)
MCH RBC QN AUTO: 27.8 PG (ref 25.2–33.5)
MCHC RBC AUTO-ENTMCNC: 32.1 G/DL (ref 28.4–34.8)
MCV RBC AUTO: 86.4 FL (ref 82.6–102.9)
MONOCYTES # BLD: 8 % (ref 3–12)
NRBC AUTOMATED: 0 PER 100 WBC
PDW BLD-RTO: 13.9 % (ref 11.8–14.4)
PLATELET # BLD: ABNORMAL K/UL (ref 138–453)
PLATELET, FLUORESCENCE: NORMAL K/UL (ref 138–453)
POTASSIUM SERPL-SCNC: 4.7 MMOL/L (ref 3.7–5.3)
RBC # BLD: 4.18 M/UL (ref 3.95–5.11)
SEG NEUTROPHILS: 73 % (ref 36–65)
SEGMENTED NEUTROPHILS ABSOLUTE COUNT: 7.64 K/UL (ref 1.5–8.1)
SODIUM BLD-SCNC: 132 MMOL/L (ref 135–144)
WBC # BLD: 10.4 K/UL (ref 3.5–11.3)

## 2022-12-19 PROCEDURE — 97530 THERAPEUTIC ACTIVITIES: CPT

## 2022-12-19 PROCEDURE — C9113 INJ PANTOPRAZOLE SODIUM, VIA: HCPCS

## 2022-12-19 PROCEDURE — 6360000002 HC RX W HCPCS: Performed by: INTERNAL MEDICINE

## 2022-12-19 PROCEDURE — 6360000002 HC RX W HCPCS: Performed by: NURSE PRACTITIONER

## 2022-12-19 PROCEDURE — 2580000003 HC RX 258

## 2022-12-19 PROCEDURE — 94761 N-INVAS EAR/PLS OXIMETRY MLT: CPT

## 2022-12-19 PROCEDURE — 6360000002 HC RX W HCPCS

## 2022-12-19 PROCEDURE — 6370000000 HC RX 637 (ALT 250 FOR IP): Performed by: STUDENT IN AN ORGANIZED HEALTH CARE EDUCATION/TRAINING PROGRAM

## 2022-12-19 PROCEDURE — 99291 CRITICAL CARE FIRST HOUR: CPT | Performed by: INTERNAL MEDICINE

## 2022-12-19 PROCEDURE — 94640 AIRWAY INHALATION TREATMENT: CPT

## 2022-12-19 PROCEDURE — 85025 COMPLETE CBC W/AUTO DIFF WBC: CPT

## 2022-12-19 PROCEDURE — 2700000000 HC OXYGEN THERAPY PER DAY

## 2022-12-19 PROCEDURE — 97535 SELF CARE MNGMENT TRAINING: CPT

## 2022-12-19 PROCEDURE — 36415 COLL VENOUS BLD VENIPUNCTURE: CPT

## 2022-12-19 PROCEDURE — 6370000000 HC RX 637 (ALT 250 FOR IP)

## 2022-12-19 PROCEDURE — 97110 THERAPEUTIC EXERCISES: CPT

## 2022-12-19 PROCEDURE — 2000000000 HC ICU R&B

## 2022-12-19 PROCEDURE — 85055 RETICULATED PLATELET ASSAY: CPT

## 2022-12-19 PROCEDURE — 94003 VENT MGMT INPAT SUBQ DAY: CPT

## 2022-12-19 PROCEDURE — 80048 BASIC METABOLIC PNL TOTAL CA: CPT

## 2022-12-19 PROCEDURE — 99233 SBSQ HOSP IP/OBS HIGH 50: CPT | Performed by: INTERNAL MEDICINE

## 2022-12-19 PROCEDURE — 6370000000 HC RX 637 (ALT 250 FOR IP): Performed by: NURSE PRACTITIONER

## 2022-12-19 PROCEDURE — 6360000002 HC RX W HCPCS: Performed by: STUDENT IN AN ORGANIZED HEALTH CARE EDUCATION/TRAINING PROGRAM

## 2022-12-19 PROCEDURE — 2580000003 HC RX 258: Performed by: NURSE PRACTITIONER

## 2022-12-19 PROCEDURE — 2500000003 HC RX 250 WO HCPCS: Performed by: HEALTH CARE PROVIDER

## 2022-12-19 RX ADMIN — FUROSEMIDE 20 MG: 10 INJECTION, SOLUTION INTRAMUSCULAR; INTRAVENOUS at 08:34

## 2022-12-19 RX ADMIN — ENOXAPARIN SODIUM 30 MG: 100 INJECTION SUBCUTANEOUS at 08:34

## 2022-12-19 RX ADMIN — TOPIRAMATE 100 MG: 100 TABLET, FILM COATED ORAL at 20:30

## 2022-12-19 RX ADMIN — HYDROCORTISONE 10 MG: 10 TABLET ORAL at 08:33

## 2022-12-19 RX ADMIN — LEVALBUTEROL HYDROCHLORIDE 0.63 MG: 0.63 SOLUTION RESPIRATORY (INHALATION) at 09:13

## 2022-12-19 RX ADMIN — GUAIFENESIN 200 MG: 200 SOLUTION ORAL at 21:08

## 2022-12-19 RX ADMIN — LAMOTRIGINE 200 MG: 100 TABLET ORAL at 08:33

## 2022-12-19 RX ADMIN — LEVALBUTEROL HYDROCHLORIDE 0.63 MG: 0.63 SOLUTION RESPIRATORY (INHALATION) at 12:12

## 2022-12-19 RX ADMIN — LEVALBUTEROL HYDROCHLORIDE 0.63 MG: 0.63 SOLUTION RESPIRATORY (INHALATION) at 21:09

## 2022-12-19 RX ADMIN — GUAIFENESIN 200 MG: 200 SOLUTION ORAL at 13:49

## 2022-12-19 RX ADMIN — HYDROCORTISONE SODIUM SUCCINATE 100 MG: 100 INJECTION, POWDER, FOR SOLUTION INTRAMUSCULAR; INTRAVENOUS at 13:49

## 2022-12-19 RX ADMIN — OXYCODONE 10 MG: 5 TABLET ORAL at 21:59

## 2022-12-19 RX ADMIN — GUAIFENESIN 200 MG: 200 SOLUTION ORAL at 08:33

## 2022-12-19 RX ADMIN — ENOXAPARIN SODIUM 30 MG: 100 INJECTION SUBCUTANEOUS at 20:31

## 2022-12-19 RX ADMIN — Medication 1250 MG: at 12:11

## 2022-12-19 RX ADMIN — Medication 1250 MG: at 23:07

## 2022-12-19 RX ADMIN — SERTRALINE 150 MG: 50 TABLET, FILM COATED ORAL at 08:33

## 2022-12-19 RX ADMIN — LEVOTHYROXINE SODIUM 150 MCG: 75 TABLET ORAL at 08:33

## 2022-12-19 RX ADMIN — HYDROCORTISONE SODIUM SUCCINATE 100 MG: 100 INJECTION, POWDER, FOR SOLUTION INTRAMUSCULAR; INTRAVENOUS at 20:31

## 2022-12-19 RX ADMIN — SODIUM CHLORIDE, PRESERVATIVE FREE 40 MG: 5 INJECTION INTRAVENOUS at 08:33

## 2022-12-19 RX ADMIN — LEVALBUTEROL HYDROCHLORIDE 0.63 MG: 0.63 SOLUTION RESPIRATORY (INHALATION) at 16:35

## 2022-12-19 RX ADMIN — GUAIFENESIN 200 MG: 200 SOLUTION ORAL at 04:00

## 2022-12-19 RX ADMIN — GUAIFENESIN 200 MG: 200 SOLUTION ORAL at 18:10

## 2022-12-19 RX ADMIN — TOPIRAMATE 75 MG: 100 TABLET, FILM COATED ORAL at 08:34

## 2022-12-19 RX ADMIN — ACETAMINOPHEN 650 MG: 325 TABLET ORAL at 18:11

## 2022-12-19 ASSESSMENT — PAIN SCALES - GENERAL
PAINLEVEL_OUTOF10: 0
PAINLEVEL_OUTOF10: 0

## 2022-12-19 ASSESSMENT — PULMONARY FUNCTION TESTS
PIF_VALUE: 23
PIF_VALUE: 24
PIF_VALUE: 19
PIF_VALUE: 18
PIF_VALUE: 13
PIF_VALUE: 22

## 2022-12-19 NOTE — PROGRESS NOTES
Comprehensive Nutrition Assessment    Type and Reason for Visit:  Reassess    Nutrition Recommendations/Plan:   Continue current TF as ordered  Will order new weight on patient  Continue to monitor weight, labs, and intake. Malnutrition Assessment:  Malnutrition Status: At risk for malnutrition (Comment) (12/07/22 1047)    Context:  Acute Illness     Findings of the 6 clinical characteristics of malnutrition:  Energy Intake:  Mild decrease in energy intake (Comment)  Weight Loss:  No significant weight loss     Body Fat Loss:  No significant body fat loss     Muscle Mass Loss:  No significant muscle mass loss    Fluid Accumulation:  No significant fluid accumulation     Strength:  Not Performed    Nutrition Assessment:    Patient seen for follow up. Patient remains on vent. Patient is currently tolerating their TF. Will order new weight. Labs: Na+: 132, Glu: 102, Ca2+: 8.2. Last BM 12/19. Nutrition Related Findings:    Meds/ labs reviewed. Wound Type: Deep Tissue Injury (to right buttocks)       Current Nutrition Intake & Therapies:    Average Meal Intake: NPO  Average Supplements Intake: NPO  ADULT TUBE FEEDING; Orogastric; Standard without Fiber; Continuous; 45; Yes; 20; Q 4 hours; 75; 30; Q 4 hours  Current Tube Feeding (TF) Orders:  Feeding Route: Orogastric  Formula: Immune Enhancing  Schedule: Continuous  Feeding Regimen: 45 mL/hr  Additives/Modulars:    Water Flushes:    Current TF & Flush Orders Provides: Immune Enhancing Formula at 45 mL/hr =1620 kcal and 101 g pro/day  Goal TF & Flush Orders Provides: Standard without Fiber Formula at 75 mL/hr would provide 2160 kcal and 100 g pro/day    Anthropometric Measures:  Height: 5' 1\" (154.9 cm)  Ideal Body Weight (IBW): 105 lbs (48 kg)    Admission Body Weight: 282 lb (127.9 kg)  Current Body Weight: 267 lb 13.7 oz (121.5 kg), 255.1 % IBW.  Weight Source: Bed Scale  Current BMI (kg/m2): 50.6  Usual Body Weight: 273 lb 12.8 oz (124.2 kg) (8/12/22 bed scale per chart review)  % Weight Change (Calculated): 3.8                    BMI Categories: Obese Class 3 (BMI 40.0 or greater)    Estimated Daily Nutrient Needs:  Energy Requirements Based On: Formula  Weight Used for Energy Requirements: Admission  Energy (kcal/day): 3552-6503  Weight Used for Protein Requirements: Ideal  Protein (g/day):  g pro/day  Method Used for Fluid Requirements: Other (Comment)  Fluid (ml/day): fluids per MD    Nutrition Diagnosis:   Inadequate oral intake related to impaired respiratory function as evidenced by NPO or clear liquid status due to medical condition, nutrition support - enteral nutrition    Nutrition Interventions:   Food and/or Nutrient Delivery: Continue Current Tube Feeding  Nutrition Education/Counseling: No recommendation at this time  Coordination of Nutrition Care: Continue to monitor while inpatient  Plan of Care discussed with: RN    Goals:  Previous Goal Met: Progressing toward Goal(s)  Goals: Meet at least 75% of estimated needs, prior to discharge       Nutrition Monitoring and Evaluation:   Behavioral-Environmental Outcomes: None Identified  Food/Nutrient Intake Outcomes: Enteral Nutrition Intake/Tolerance  Physical Signs/Symptoms Outcomes: Biochemical Data, Fluid Status or Edema, Nutrition Focused Physical Findings, Skin, Weight    Discharge Planning:     Too soon to determine     Mei Velazquez RD  Contact: [phone]

## 2022-12-19 NOTE — PLAN OF CARE
Problem: Discharge Planning  Goal: Discharge to home or other facility with appropriate resources  12/18/2022 1906 by Freda Reid RN  Outcome: Progressing  12/18/2022 1654 by Antonieta Granado RN  Outcome: Progressing     Problem: Confusion  Goal: Confusion, delirium, dementia, or psychosis is improved or at baseline  Description: INTERVENTIONS:  1. Assess for possible contributors to thought disturbance, including medications, impaired vision or hearing, underlying metabolic abnormalities, dehydration, psychiatric diagnoses, and notify attending LIP  2. [place] high risk fall precautions, as indicated  3. Provide frequent short contacts to provide reality reorientation, refocusing and direction  4. Decrease environmental stimuli, including noise as appropriate  5. Monitor and intervene to maintain adequate nutrition, hydration, elimination, sleep and activity  6. If unable to ensure safety without constant attention obtain sitter and review sitter guidelines with assigned personnel  7.  Initiate Psychosocial CNS and Spiritual Care consult, as indicated  12/18/2022 1906 by Frdea Reid RN  Outcome: Progressing  12/18/2022 1654 by Antonieta Granado RN  Outcome: Progressing     Problem: Pain  Goal: Verbalizes/displays adequate comfort level or baseline comfort level  12/18/2022 1906 by Freda Reid RN  Outcome: Progressing  12/18/2022 1654 by Antonieta Granado RN  Outcome: Progressing     Problem: Chronic Conditions and Co-morbidities  Goal: Patient's chronic conditions and co-morbidity symptoms are monitored and maintained or improved  12/18/2022 1906 by Freda Reid RN  Outcome: Progressing  12/18/2022 1654 by Antonieta Granado RN  Outcome: Progressing  Flowsheets (Taken 12/18/2022 0800)  Care Plan - Patient's Chronic Conditions and Co-Morbidity Symptoms are Monitored and Maintained or Improved:   Monitor and assess patient's chronic conditions and comorbid symptoms for stability, deterioration, or improvement Collaborate with multidisciplinary team to address chronic and comorbid conditions and prevent exacerbation or deterioration     Problem: Skin/Tissue Integrity  Goal: Absence of new skin breakdown  Description: 1. Monitor for areas of redness and/or skin breakdown  2. Assess vascular access sites hourly  3. Every 4-6 hours minimum:  Change oxygen saturation probe site  4. Every 4-6 hours:  If on nasal continuous positive airway pressure, respiratory therapy assess nares and determine need for appliance change or resting period.   12/18/2022 1906 by Hector Lamar RN  Outcome: Progressing  12/18/2022 1654 by Marylene Staples, RN  Outcome: Progressing     Problem: Safety - Adult  Goal: Free from fall injury  12/18/2022 1906 by Hector Lamar RN  Outcome: Progressing  12/18/2022 1654 by Marylene Staples, RN  Outcome: Progressing     Problem: ABCDS Injury Assessment  Goal: Absence of physical injury  12/18/2022 1906 by Hector Lamar RN  Outcome: Progressing  12/18/2022 1654 by Marylene Staples, RN  Outcome: Progressing     Problem: Nutrition Deficit:  Goal: Optimize nutritional status  12/18/2022 1906 by Hector Lamar RN  Outcome: Progressing  12/18/2022 1654 by Marylene Staples, RN  Outcome: Progressing     Problem: Respiratory - Adult  Goal: Achieves optimal ventilation and oxygenation  12/18/2022 1906 by Hector Lamar RN  Outcome: Progressing  12/18/2022 1654 by Marylene Staples, RN  Outcome: Progressing  12/18/2022 0838 by Skinny Leahy RCP  Outcome: Progressing

## 2022-12-19 NOTE — PROGRESS NOTES
Attempted to extubate patient after CPAP trial, patient had no cuff leak at this time. Fawn López made aware and patient placed back on full support.

## 2022-12-19 NOTE — PLAN OF CARE
Problem: Discharge Planning  Goal: Discharge to home or other facility with appropriate resources  Outcome: Progressing     Problem: Confusion  Goal: Confusion, delirium, dementia, or psychosis is improved or at baseline  Description: INTERVENTIONS:  1. Assess for possible contributors to thought disturbance, including medications, impaired vision or hearing, underlying metabolic abnormalities, dehydration, psychiatric diagnoses, and notify attending LIP  2. [place] high risk fall precautions, as indicated  3. Provide frequent short contacts to provide reality reorientation, refocusing and direction  4. Decrease environmental stimuli, including noise as appropriate  5. Monitor and intervene to maintain adequate nutrition, hydration, elimination, sleep and activity  6. If unable to ensure safety without constant attention obtain sitter and review sitter guidelines with assigned personnel  7. Initiate Psychosocial CNS and Spiritual Care consult, as indicated  Outcome: Progressing     Problem: Pain  Goal: Verbalizes/displays adequate comfort level or baseline comfort level  Outcome: Progressing     Problem: Chronic Conditions and Co-morbidities  Goal: Patient's chronic conditions and co-morbidity symptoms are monitored and maintained or improved  Outcome: Progressing     Problem: Skin/Tissue Integrity  Goal: Absence of new skin breakdown  Description: 1. Monitor for areas of redness and/or skin breakdown  2. Assess vascular access sites hourly  3. Every 4-6 hours minimum:  Change oxygen saturation probe site  4. Every 4-6 hours:  If on nasal continuous positive airway pressure, respiratory therapy assess nares and determine need for appliance change or resting period.   Outcome: Progressing     Problem: Safety - Adult  Goal: Free from fall injury  Outcome: Progressing     Problem: ABCDS Injury Assessment  Goal: Absence of physical injury  Outcome: Progressing     Problem: Nutrition Deficit:  Goal: Optimize nutritional status  Outcome: Progressing     Problem: Respiratory - Adult  Goal: Achieves optimal ventilation and oxygenation  Outcome: Progressing

## 2022-12-19 NOTE — PROGRESS NOTES
Physical Therapy  Facility/Department: [facility], [place] CAR 3- MICU  Physical Therapy daily treatment note    Name: Rj Rainey  : 1975  MRN: [id number]  Date of Service: 2022    Discharge Recommendations:  Further therapy recommended at discharge. The patient should be able to tolerate at least 3 hours of therapy per day over 5 days or 15 hours over 7 days. This patient may benefit from a Physical Medicine and Rehab consult. PT Equipment Recommendations  Equipment Needed: No      Patient Diagnosis(es): The primary encounter diagnosis was Other closed nondisplaced fracture of seventh cervical vertebra, initial encounter ([facility] [address] Diagnoses of Closed wedge compression fracture of T3 vertebra, initial encounter ([facility] [address] and Multiple closed fractures of pelvis without disruption of pelvic ring, initial encounter [facility]) were also pertinent to this visit. 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 Past Surgical History:  has a past surgical history that includes Brain Biopsy; Tonsillectomy; knee surgery; Cochlear implant (Left); Dental surgery; Cochlear implant (Right, 05/10/2022); Colonoscopy; Endoscopy, colon, diagnostic; Colonoscopy (N/A, 2022); and Upper gastrointestinal endoscopy (N/A, 2022). Assessment   Body Structures, Functions, Activity Limitations Requiring Skilled Therapeutic Intervention: Decreased functional mobility ; Decreased endurance;Decreased strength;Decreased balance; Increased pain  Assessment: Pt required MOD A x2 for bed mobility.  Tolerated sitting EOB x 15 min, up to MOD A to maintain. REcommend intense PT after d/c to address deficits. Therapy Prognosis: Good  Activity Tolerance  Activity Tolerance: Patient limited by endurance; Patient limited by pain; Other (comment)     Plan   Physcial Therapy Plan  General Plan:  (5-6x)  Current Treatment Recommendations: Strengthening, ROM, Balance training, Functional mobility training, Transfer training, Endurance training, Gait training, Stair training, Therapeutic activities, Patient/Caregiver education & training, Equipment evaluation, education, & procurement, Safety education & training, Wheelchair mobility training  Safety Devices  Type of Devices: All fall risk precautions in place, Call light within reach, Nurse notified, Left in bed  Restraints  Restraints Initially in Place: No     Restrictions  Restrictions/Precautions  Restrictions/Precautions: Fall Risk, Weight Bearing  Required Braces or Orthoses?: No (Per neurosurg, c collar no longer needed)  Lower Extremity Weight Bearing Restrictions  Right Lower Extremity Weight Bearing: Weight Bearing As Tolerated (Per Dr Rubio Myers, 12/14, [facility])  Required Braces or Orthoses  Cervical: c-collar  Position Activity Restriction  Other position/activity restrictions: up with assist, no intervention required for C7 or T3 fracture per Benge [place] via [employer]; Acute C7 Right TP fx, Age indeterminate T3 compression fx, Nondisplaced right sacral ala fx, Nondisplaced fracture right superior pubic ramus, Right obturator ring fx. Cochlear implant R ear during session this day. Subjective   General  Patient assessed for rehabilitation services?: Yes  Response To Previous Treatment: Patient with no complaints from previous session.   Family / Caregiver Present: Yes (father)  Follows Commands: Within Functional Limits  Subjective  Subjective: Pt resting in bed upon arrival, orally intubated, following all commands, agreeable to PT       Cognition   Orientation  Overall Orientation Status: Within Functional Limits     Objective      Bed mobility  Rolling to Right: Moderate assistance;2 Person assistance  Supine to Sit: Moderate assistance;2 Person assistance  Sit to Supine: Moderate assistance;2 Person assistance  Scooting:  Moderate assistance;2 Person assistance  Bed Mobility Comments: increased time to complete due to weakness, fatigue, vent  Transfers  Comment: unsafe to attempt due to poor sitting balance        Balance  Posture: Fair  Sitting - Static: Fair;-  Sitting - Dynamic: Fair;- (Pt sat EOB x 15 min, requiring MIN A to MOD A to maintain)   Exercise   Supine B LE AAROM in all planes x 10    AM-PAC Score  AM-PAC Inpatient Mobility Raw Score : 8 (12/19/22 1546)  AM-PAC Inpatient T-Scale Score : 28.52 (12/19/22 1546)  Mobility Inpatient CMS 0-100% Score: 86.62 (12/19/22 1546)  Mobility Inpatient CMS G-Code Modifier : CM (12/19/22 1546)     Goals  Short Term Goals  Time Frame for Short Term Goals: 14 visits  Short Term Goal 1: Perform bed mobility Mod I  Short Term Goal 2: Perform sit to stand with NWB RLE and Min A  Short Term Goal 3: Propel wheelchair 300ft with BUE and LLE and supervision  Short Term Goal 4: Ambulate 20ft with RW, NWB RLE and Min A       Therapy Time   Individual Concurrent Group Co-treatment   Time In 1118         Time Out 1208         Minutes 50         Timed Code Treatment Minutes: 36 Minutes       Leandra Verdin, [place]

## 2022-12-19 NOTE — PROGRESS NOTES
INTENSIVE CARE UNIT  Resident Physician Progress Note    Patient - Lily Harris  Date of Admission -  12/5/2022 11:39 PM  Date of Evaluation -  12/19/2022  Room and Bed Number -  3021/3021-01   Hospital Day - 13    HPI:     47-year-old female initially presented to hospital on 12/5 after a fall. Patient is developmentally delayed and has a history of brain tumor as a child with a lot of radiation. Patient also has past medical history of diabetes mellitus, bilateral sensorineural hearing loss. Had a CT head showed no intracranial abnormality, CT abdominal pelvis showed  nondisplaced fractures of right sacral ilya, nondisplaced fracture of right superior ramus and right obturator ring. CT spine showed acute fracture of right C7 transverse process and also age indeterminate compressive fracture of T3. Neurosurgery for cervical fractures was consulted, who recommended no neurosurgical intervention. Orthopedic surgery for pelvic fractures was also consulted, no surgical intervention but medical management and also recommended to follow-up in orthopedic clinic after discharge. Patient was started on BiPAP for increased work of breathing. Patient was oriented x4 at day of presentation. 12/06 - patient had a decline in mental status with tachypnea, respiratory rate of 30s to 40 with heart rate of 105 patient was intubated emergently     12/7- Neurology was consulted because of history of migraines, seizure disorder, patient was started on her home antiepileptic drugs including Topamax and Lamictal, EEG was ordered to rule out any subclinical seizure activity, MRI was also ordered to rule out any CVA. Patient was doing fine, arousable and following commands. Pulmonology was consulted, recommended to extubate and patient was extubated.      12/10 -rapid was called today due to impending respiratory failure, patient was intubated again and was sent to ICU      12/12 -respiratory culture came back positive for staph aureus , nasal respiratory swab came out positive for MRSA DNA, patient started on vancomycin      12/13 : Tachycardia improved to normal heart rate, patient remained afebrile, WBC count trending down. Sedation changed to precedex     12/15: Precedex discontinued. SUBJECTIVE:     OVERNIGHT EVENTS:    Patient had fever at 6 PM yesterday 100.8, received Tylenol. Patient is afebrile this morning. F.A.S.T. M. H.U.G.S. B.I.D. Feeding Diet: ADULT TUBE FEEDING; Orogastric; Standard without Fiber; Continuous; 45; Yes; 20; Q 4 hours; 75; 30; Q 4 hours  Fluids: Patient is on KVO fluids  Family: Updated  Analgesic: Tylenol 650 every 6 hours as needed, fentanyl 50 mcg every 2 hours as needed, Roxicodone 10 every 4 hours needed, Roxicodone 5 every 4 hours as needed. Sedation: None  Thrombo-prophylaxis: [x] Enoxaparin, [] Unfract. Heparin Subcutaneously, [] EPC Cuffs  Mobility: Up with assistance, PT OT ordered. Heads up: 30 degrees   Ulcer prophylaxis: [x] PPI Agent, [] H3Uuoxi, [] Sucralfate, [] Other:  Glycemic control: None, glucose 102   Spontaneous breathing trial: Today, patient had 2 spontaneous breathing trials yesterday, initially was tachypneic on the first 1, tolerated the second well per nursing. Shallow breathing index: 65  PF ratio: 217, 12/18s ABG. Bowel regimen/urine output: Protonix 40 IV daily, GlycoLax, patient receives 20 mg of Lasix daily/urine output approximately 2.5 L in the last 24 hours. Indwelling catheter/lines: Peripheral IV, OG tube, Porras catheter: 12/15/22, ET tube, wound right buttocks. De-escalation: Spontaneous breathing trials as tolerated. TODAY:   Spontaneous breathing trials as tolerated, PT OT to evaluate and treat.       Intake/Output Summary (Last 24 hours) at 12/19/2022 0746  Last data filed at 12/19/2022 8160  Gross per 24 hour   Intake 2433.31 ml   Output 2560 ml   Net -126.69 ml   Since admission: +2.4 L    ABGs:   Arterial Blood Gas result 12/18/2022:  pH 7.412; pCO2 38.2; pO2 86.8; HCO3 25.3; O2 Sat 97. VENT SETTINGS (Comprehensive) (if applicable):   PRVC mode, FiO2 40%, PEEP 5, Respiratory Rate 18, Tidal Volume 380    WBC: 10.4  Hemoglobin: 11.6  Blood glucose: 102   BUN: 19   creatinine: 0.64  Sodium: 132  Potassium: 4.7    AWAKE & FOLLOWING COMMANDS:  [] No   [x] Yes    SECRETIONS Amount:  [] Small [] Moderate  [x] Large  [] None  Color:     [x] White [x] Colored - yellow  [] Bloody    SEDATION:  RAAS Score:  [] Propofol gtt  [] Versed gtt  [] Ativan gtt   [x] No Sedation    PARALYZED:  [x] No    [] Yes    VASOPRESSORS:  [x] No    [] Yes  [] Levophed [] Dopamine [] Vasopressin  [] Dobutamine [] Phenylephrine [] Epinephrine      OBJECTIVE:     VITAL SIGNS:  BP 96/62   Pulse 68   Temp 100 °F (37.8 °C) (Bladder)   Resp 18   Ht 5' 1\" (1.549 m)   Wt 267 lb 13.7 oz (121.5 kg)   SpO2 94%   BMI 50.61 kg/m²   Tmax over 24 hours:  Temp (24hrs), Av.3 °F (37.9 °C), Min:100 °F (37.8 °C), Max:100.8 °F (38.2 °C)      Patient Vitals for the past 8 hrs:   BP Temp Temp src Pulse Resp SpO2 Weight   22 0600 96/62 100 °F (37.8 °C) Bladder 68 18 94 % --   22 0500 106/61 -- -- 67 23 95 % --   22 0444 -- -- -- -- -- -- 267 lb 13.7 oz (121.5 kg)   22 0400 (!) 99/55 100.2 °F (37.9 °C) Bladder 65 20 98 % --   22 0325 -- -- -- 66 24 93 % --   22 0300 110/72 -- -- 68 22 90 % --   22 0200 (!) 101/59 100.2 °F (37.9 °C) Bladder 72 23 92 % --   22 0100 114/64 -- -- 76 26 94 % --   22 0000 102/61 100.4 °F (38 °C) Bladder 71 21 94 % --         Intake/Output Summary (Last 24 hours) at 2022 0746  Last data filed at 2022 7205  Gross per 24 hour   Intake 2433.31 ml   Output 2560 ml   Net -126.69 ml     Date 22 0000 - 22 2359   Shift 5203-5651 3707-3353 3168-1270 24 Hour Total   INTAKE   I.V.(mL/kg) 93.6(0.8)   93.6(0.8)   NG/GT(mL/kg) 405(3.3)   405(3.3)   IV Piggyback(mL/kg) 247.8(2)   247.8(2)   Shift Total(mL/kg) 746. 4(6.1)   746. 4(6.1)   OUTPUT   Urine(mL/kg/hr) 405   405   Shift Total(mL/kg) 405(3.3)   405(3.3)   Weight (kg) 121.5 121.5 121.5 121.5     Wt Readings from Last 3 Encounters:   12/19/22 267 lb 13.7 oz (121.5 kg)   12/05/22 277 lb (125.6 kg)   11/07/22 279 lb (126.6 kg)     Body mass index is 50.61 kg/m². PHYSICAL EXAM:  GEN:  awake, alert, and cooperative  EYES:   pupils equal, round, and reactive to light  HEENT:  Normocepalic, without obvious abnormality  LUNGS:  No increased work of breathing, good air exchange. Rhonchorous in all lung fields.   CV:    regular rate and rhythm and normal S1 and S2  ABDOMEN:   soft, non-distended, and non-tender  MSK:    there is no redness, warmth, or swelling of the joints  NEURO[de-identified]   Awake, alert, following commands  SKIN:   Normal skin color, texture, turgor  EXTREMITIES:  No pedal or leg edema, no calf tenderness/swelling, no erythema, distal pulses intact       MEDICATIONS:  Scheduled Meds:   vancomycin  1,250 mg IntraVENous Q12H    guaiFENesin  200 mg Oral Q4H    furosemide  20 mg IntraVENous Daily    pantoprazole (PROTONIX) 40 mg injection  40 mg IntraVENous Daily    vancomycin (VANCOCIN) intermittent dosing (placeholder)   Other RX Placeholder    hydrocortisone  10 mg Oral BID    levalbuterol  0.63 mg Nebulization 4x daily    enoxaparin  30 mg SubCUTAneous BID    sertraline  150 mg Oral Daily    lamoTRIgine  200 mg Oral Daily    levothyroxine  150 mcg Oral QAM AC    topiramate  75 mg Oral Daily    topiramate  100 mg Oral Nightly     Continuous Infusions:   sodium chloride Stopped (12/12/22 0316)    sodium chloride 10 mL/hr at 12/19/22 0652     PRN Meds:   polyethylene glycol, 17 g, Daily PRN  oxyCODONE, 10 mg, Q4H PRN   Or  oxyCODONE, 5 mg, Q4H PRN  levalbuterol, 0.63 mg, Q4H PRN  sodium chloride, , PRN  fentanNYL, 50 mcg, Q2H PRN  acetaminophen, 650 mg, Q6H PRN  sodium chloride flush, 5-40 mL, PRN      SUPPORT DEVICES: [x] Ventilator [] BIPAP  [] Nasal Cannula [] Room Air    VENT SETTINGS (Comprehensive) (if applicable): PRVC mode, FiO2 40%, PEEP 5, Respiratory Rate 18, Tidal Volume 380  Vent Information  Ventilator ID: [id number]  Equipment Changed: Expiratory Filter  Ventilator Initiate: Yes  Vent Mode: AC/PRVC  Additional Respiratory Assessments  Heart Rate: 68  Resp: 18  SpO2: 94 %  End Tidal CO2: 33 (%)  Position: Semi-Love's  Humidification Source: Heated wire  Humidification Temp: 37  Circuit Condensation: Drained  Cuff Pressure (cm H2O):  (mlt)  Skin Barrier Applied: No    ABGs:   Arterial Blood Gas result:  pH 7.412; pCO2 38.2; pO2 86.8; HCO3 25.3; O2 Sat 97.   Lab Results   Component Value Date/Time    FIO2 40.0 12/17/2022 04:47 AM         DATA:  Complete Blood Count:   Recent Labs     12/17/22 0451 12/18/22 0456 12/19/22 0327   WBC 11.3 10.0 10.4   RBC 3.97 3.91* 4.18   HGB 10.9* 10.9* 11.6*   HCT 35.9* 36.1* 36.1*   MCV 90.4 92.3 86.4   MCH 27.5 27.9 27.8   MCHC 30.4 30.2 32.1   RDW 14.4 14.0 13.9    325 See Reflexed IPF Result   MPV 10.4 10.4  --         Last 3 Blood Glucose:   Recent Labs     12/17/22 0451 12/18/22 0456 12/19/22  0327   GLUCOSE 100* 112* 102*        PT/INR:    Lab Results   Component Value Date/Time    PROTIME 10.5 12/05/2022 05:35 PM    INR 1.0 12/05/2022 05:35 PM     PTT:    Lab Results   Component Value Date/Time    APTT 24.9 03/10/2015 11:25 AM       Comprehensive Metabolic Profile:   Recent Labs     12/17/22 0451 12/18/22  0456 12/19/22  0327   * 135 132*   K 4.1 3.8 4.7    103 99   CO2 21 22 22   BUN 19 19 19   CREATININE 0.60 0.61 0.64   GLUCOSE 100* 112* 102*   CALCIUM 8.2* 7.9* 8.2*      Magnesium:   Lab Results   Component Value Date/Time    MG 1.7 12/06/2022 10:12 PM     Phosphorus:   Lab Results   Component Value Date/Time    PHOS 2.9 12/06/2022 10:12 PM     Ionized Calcium: No results found for: VERNELL     Urinalysis:   Lab Results   Component Value Date/Time    NITRU NEGATIVE 12/06/2022 08:05 PM    COLORU Yellow 12/06/2022 08:05 PM    PHUR 5.5 12/06/2022 08:05 PM    WBCUA None 12/06/2022 08:05 PM    RBCUA None 12/06/2022 08:05 PM    MUCUS 1+ 03/27/2013 05:54 PM    TRICHOMONAS NOT REPORTED 03/27/2013 05:54 PM    YEAST NOT REPORTED 03/27/2013 05:54 PM    BACTERIA RARE 03/27/2013 05:54 PM    CLARITYU clear 11/11/2014 01:20 PM    SPECGRAV 1.035 12/06/2022 08:05 PM    LEUKOCYTESUR NEGATIVE 12/06/2022 08:05 PM    UROBILINOGEN Normal 12/06/2022 08:05 PM    BILIRUBINUR NEGATIVE 12/06/2022 08:05 PM    BILIRUBINUR neagtive 03/17/2017 04:30 PM    BLOODU negative 03/17/2017 04:30 PM    GLUCOSEU NEGATIVE 12/06/2022 08:05 PM    KETUA MODERATE 12/06/2022 08:05 PM    AMORPHOUS NOT REPORTED 03/27/2013 05:54 PM       HgBA1c:    Lab Results   Component Value Date/Time    LABA1C 5.3 04/26/2022 12:00 AM     TSH:    Lab Results   Component Value Date/Time    TSH <0.01 12/07/2022 03:57 AM     Lactic Acid: No results found for: LACTA   Troponin: No results for input(s): TROPONINI in the last 72 hours.     Microbiology:  Blood Culture: + S epidermidis x 1  Blood culture: NG 12 h      ASSESSMENT:     Patient Active Problem List    Diagnosis Date Noted    MRSA infection 12/14/2022    Acute lower respiratory tract infection 12/14/2022    Acute respiratory failure with hypoxia (HCC) 12/11/2022    Aspiration pneumonia ([facility] [address] 12/10/2022    Multiple closed pelvic fractures without disruption of pelvic [place] ([facility] [address] 12/09/2022    Lethargy 12/07/2022    Cervical transverse process fracture, initial encounter ([facility] [address] 12/06/2022    Closed nondisplaced fracture of seventh cervical vertebra ([facility] [address] 12/06/2022    Gastroesophageal reflux disease 08/12/2022    Irritable bowel syndrome with constipation 06/07/2022    Type 2 diabetes mellitus with stage 3a chronic kidney disease, without long-term current use of insulin ([facility] 11/12/2021    Moderate episode of recurrent major depressive disorder ([facility] 02/26/2020    Hypopituitarism ([facility] 08/24/2016    ROHIT on CPAP 05/08/2015    Hypothyroidism 08/06/2014    Asthma 08/06/2014    Morbid obesity ([facility] [address] 08/06/2014    Seizure ([facility] [address] 10/23/2013          PLAN:      Neuro  Intubated  Off precedex, off sedation  PRVC 18/380/5/40%  Lamotrigine 200  Topamax 75 daily, 100 nightly  Zoloft 150 mg  Per neurosurgery pt does not need C collar. Cards  HR 69  MAP 69  Lasix 20 daily     Pulmonary  Intubated for aspiration pneumonia  PRVC 18/380/5/40%  CXR 12/18 - no change in bilateral airspace disease  Pulmonary toilet  Hydrocortisone 10 mg BID  Xopenex nebulizer QID  Guanifenesen 200 mg q 4h     Renal  Porras placed 12/15 for retention  Total intake: 2433, urine output: 2560, net -358.9. Since admission: +2455  Lasix 20 mg daily     GI  Tube feeds 75 ml/hr, at goal  Protonix 40 daily   Glycolax     ID  Vancomycin, course started 12/12  Temp 37.9  Respiratory culture (+) S Aureus  Repeat Blood culture (+) S epidermidis x 1, Blood culture 2: No growth   Blood cultures (-) x 5 days  ID following     Endo  Synthroid 150 daily  Hydrocortisone 10 mg BID     DVT Ppx: Lovenox   GI Ppx: Protonix    ICU PROPHYLAXIS:  Stress ulcer:  [x] PPI Agent  [] [id number] [] Sucralfate  [] Other:  VTE:   [x] Enoxaparin  [] Unfract. Heparin Subcut  [] EPC Cuffs    NUTRITION:  [] NPO [x] Tube Feeding (Specify: )  ADULT TUBE FEEDING; Orogastric; Standard without Fiber; Continuous; 45; Yes; 20; Q 4 hours; 75; 30; Q 4 hours   [] TPN  [] PO    HOME MEDS RECONCILED: [] No  [x] Yes    CONSULTATION NEEDED:  [] No  [x] Yes    FAMILY UPDATED:    [] No  [x] Yes    TRANSFER OUT OF ICU:   [x] No  [] Yes      Paulie Ortega M.D.   Internal Medicine Resident PGY-1  [facility],  [facility].  12/19/2022 7:46 AM

## 2022-12-19 NOTE — PROGRESS NOTES
Patient placed on CPAP trial as ordered on pressure support of 8, tolerating well.       12/19/22 0924   NICU Vent Information   Vent Mode (S)  CPAP   Vt (Measured) 335 mL   Rate Measured 27 br/min   Minute Volume (L/min) 8.6 Liters   FiO2  40 %   Peak Inspiratory Pressure (cmH2O) 13 cmH2O   Sensitivity 3   PEEP/CPAP (cmH2O) 5   Mean Airway Pressure (cmH2O) 7.4 cmH20

## 2022-12-19 NOTE — PROGRESS NOTES
Occupational Therapy  Facility/Department: [facility] 3- MICU  Occupational Therapy Daily Treatment Note    Name: Afua Muro  : 1975  MRN: [id number]  Date of Service: 2022    Discharge Recommendations:  Patient would benefit from continued therapy after discharge    Patient Diagnosis(es): The primary encounter diagnosis was Other closed nondisplaced fracture of seventh cervical vertebra, initial encounter ([facility] Diagnoses of Closed wedge compression fracture of T3 vertebra, initial encounter ([facility] and Multiple closed fractures of pelvis without disruption of pelvic ring, initial encounter [facility]) were also pertinent to this visit. 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 Past Surgical History:  has a past surgical history that includes Brain Biopsy; Tonsillectomy; knee surgery; Cochlear implant (Left); Dental surgery; Cochlear implant (Right, 05/10/2022); Colonoscopy; Endoscopy, colon, diagnostic; Colonoscopy (N/A, 2022); and Upper gastrointestinal endoscopy (N/A, 2022). Assessment   Performance deficits / Impairments: Decreased ADL status; Decreased functional mobility ; Decreased ROM; Decreased strength;Decreased endurance;Decreased high-level IADLs;Decreased fine motor control;Decreased cognition;Decreased safe awareness;Decreased balance;Decreased posture  Assessment: Pt making good gains towards goal this session and was motivated for activity.   Prognosis: Fair  Activity Tolerance  Activity Tolerance: Patient limited by fatigue;Patient Tolerated treatment well;Treatment limited secondary to medical complications        Plan   Occupational Therapy Plan  Times Per Week: 4-5x/wk  Current Treatment Recommendations: Strengthening, Balance training, ROM, Functional mobility training, Endurance training, Safety education & training, Positioning, Equipment evaluation, education, & procurement, Patient/Caregiver education & training, Self-Care / ADL     Restrictions  Restrictions/Precautions  Restrictions/Precautions: Fall Risk, Weight Bearing, Contact Precautions  Required Braces or Orthoses?: No (Per Neuro sx, no c-collar no longer needed)  Lower Extremity Weight Bearing Restrictions  Right Lower Extremity Weight Bearing: Weight Bearing As Tolerated  Required Braces or Orthoses  Cervical: c-collar  Position Activity Restriction  Other position/activity restrictions: up with assist, no intervention required for C7 or T3 fracture per David Alfred via [employer]; Acute C7 Right TP fx, Age indeterminate T3 compression fx, Nondisplaced right sacral ala fx, Nondisplaced fracture right superior pubic ramus, Right obturator ring fx. Cochlear implant R ear during session this day. Subjective   General  Patient assessed for rehabilitation services?: Yes  Response to previous treatment: Patient with no complaints from previous session  Family / Caregiver Present: Yes (father)  General Comment  Comments: RN Ok'd patient for MIRELA/PTA session. Pt supine in bed upon PEOPLES arrival. Pt pleasant, cooperative and agreeable. Pt denied pain. Objective   Safety Devices  Type of Devices: All fall risk precautions in place;Call light within reach;Nurse notified; Left in bed  Restraints  Restraints Initially in Place: No  Balance  Sitting: With support (Support varied throughout MIN A to MOD A with min verbal cues to correct posture)  Transfer Training  Transfer Training: No (decreased sitting balance and tolerance this session)        ADL  Grooming: Stand by assistance  Grooming Skilled Clinical Factors: wash face and hands  LE Dressing: Dependent/Total;Increased time to complete;Verbal cueing;Setup  LE Dressing Skilled Clinical Factors: slipper socks  Additional Comments: Pt willing to attempt all tasks requested and completed while seated EOB ~20 minutes with reaching tasks in mid quadrant due to limitations from lines and vent. Activity Tolerance  Activity Tolerance: Patient limited by endurance; Patient limited by pain; Other (comment)  Bed mobility  Rolling to Left: Maximum assistance;2 Person assistance  Rolling to Right: Moderate assistance;2 Person assistance  Supine to Sit: Moderate assistance;2 Person assistance  Sit to Supine: Moderate assistance;2 Person assistance  Scooting: Moderate assistance;2 Person assistance  Bed Mobility Comments: increased time to complete due to weakness, fatigue, vent        Cognition  Overall Cognitive Status: Exceptions  Arousal/Alertness: Delayed responses to stimuli  Following Commands: Follows one step commands with increased time  Attention Span: Attends with cues to redirect  Safety Judgement: Decreased awareness of need for assistance;Decreased awareness of need for safety  Insights: Decreased awareness of deficits  Initiation: Requires cues for some  Sequencing: Requires cues for some  Orientation  Overall Orientation Status: Within Functional Limits     Education Given To: Patient; Family  Education Provided: Role of Therapy; Equipment;Precautions; Fall Prevention Strategies;Orientation; ADL Adaptive Strategies  Education Provided Comments: Body mechanics and safety  Education Method: Verbal;Demonstration  Barriers to Learning: Hearing;Cognition  Education Outcome: Verbalized understanding;Continued education needed;Demonstrated understanding    AM-PAC Score  AM-[facility] Inpatient Daily Activity Raw Score: 10 (12/19/22 1619)  AM-PAC Inpatient ADL T-Scale Score : 27.31 (12/19/22 1619)  ADL Inpatient CMS 0-100% Score: 74.7 (12/19/22 1619)  ADL Inpatient CMS G-Code Modifier : CL (12/19/22 5373)      Goals  Short Term Goals  Time Frame for Short Term Goals: Patient will, by discharge  Short Term Goal 1: demo self-feeding/grooming at Mod I using AE PRN  Short Term Goal 2: demo bed mobility at [address] A to promote OOB activity  Short Term Goal 3: demo UB ADLs at [address] Term Goal 4: demo 10+ min of dynamic sitting balance at [facility] to engage in ADLs safely  Short Term Goal 5: notify OTR to update POC as patient progresses       Therapy Time   Individual Concurrent Group Co-treatment   Time In 1118         Time Out 1208         Minutes 50         Timed Code Treatment Minutes: 38 Minutes (co-tx with PTA for safety and goal progression)       RICHELLE Dean/GIRMA

## 2022-12-19 NOTE — PLAN OF CARE
Problem: Respiratory - Adult  Goal: Achieves optimal ventilation and oxygenation  12/19/2022 1831 by Marcell Davis  Outcome: Progressing  Flowsheets (Taken 12/19/2022 1831)  Achieves optimal ventilation and oxygenation:   Assess for changes in respiratory status   Position to facilitate oxygenation and minimize respiratory effort   Assess the need for suctioning and aspirate as needed   Respiratory therapy support as indicated   Assess and instruct to report shortness of breath or any respiratory difficulty   Oxygen supplementation based on oxygen saturation or arterial blood gases   Assess for changes in mentation and behavior  12/19/2022 0923 by Linnette Tavares  Outcome: Progressing

## 2022-12-20 LAB
ABSOLUTE EOS #: <0.03 K/UL (ref 0–0.44)
ABSOLUTE IMMATURE GRANULOCYTE: 0.15 K/UL (ref 0–0.3)
ABSOLUTE LYMPH #: 1.04 K/UL (ref 1.1–3.7)
ABSOLUTE MONO #: 0.7 K/UL (ref 0.1–1.2)
ALLEN TEST: POSITIVE
ANION GAP SERPL CALCULATED.3IONS-SCNC: 11 MMOL/L (ref 9–17)
BASOPHILS # BLD: 0 % (ref 0–2)
BASOPHILS ABSOLUTE: 0.05 K/UL (ref 0–0.2)
BUN BLDV-MCNC: 19 MG/DL (ref 6–20)
CALCIUM SERPL-MCNC: 8.2 MG/DL (ref 8.6–10.4)
CHLORIDE BLD-SCNC: 99 MMOL/L (ref 98–107)
CO2: 23 MMOL/L (ref 20–31)
CREAT SERPL-MCNC: 0.62 MG/DL (ref 0.5–0.9)
CULTURE: NORMAL
EOSINOPHILS RELATIVE PERCENT: 0 % (ref 1–4)
FIO2: 40
GFR SERPL CREATININE-BSD FRML MDRD: >60 ML/MIN/1.73M2
GLUCOSE BLD-MCNC: 139 MG/DL (ref 70–99)
GLUCOSE BLD-MCNC: 150 MG/DL (ref 74–100)
HCT VFR BLD CALC: 37.5 % (ref 36.3–47.1)
HEMOGLOBIN: 11.4 G/DL (ref 11.9–15.1)
IMMATURE GRANULOCYTES: 1 %
LYMPHOCYTES # BLD: 7 % (ref 24–43)
Lab: NORMAL
MCH RBC QN AUTO: 27.7 PG (ref 25.2–33.5)
MCHC RBC AUTO-ENTMCNC: 30.4 G/DL (ref 28.4–34.8)
MCV RBC AUTO: 91.2 FL (ref 82.6–102.9)
MONOCYTES # BLD: 4 % (ref 3–12)
NRBC AUTOMATED: 0 PER 100 WBC
PATIENT TEMP: 37.5
PDW BLD-RTO: 13.3 % (ref 11.8–14.4)
PLATELET # BLD: 412 K/UL (ref 138–453)
PMV BLD AUTO: 10.4 FL (ref 8.1–13.5)
POC HCO3: 25.5 MMOL/L (ref 21–28)
POC O2 SATURATION: 96 % (ref 94–98)
POC PCO2 TEMP: 44 MM HG
POC PCO2: 42.9 MM HG (ref 35–48)
POC PH TEMP: 7.37
POC PH: 7.38 (ref 7.35–7.45)
POC PO2 TEMP: 91 MM HG
POC PO2: 87.8 MM HG (ref 83–108)
POSITIVE BASE EXCESS, ART: 0 (ref 0–3)
POTASSIUM SERPL-SCNC: 3.8 MMOL/L (ref 3.7–5.3)
RBC # BLD: 4.11 M/UL (ref 3.95–5.11)
SAMPLE SITE: NORMAL
SEG NEUTROPHILS: 88 % (ref 36–65)
SEGMENTED NEUTROPHILS ABSOLUTE COUNT: 14.06 K/UL (ref 1.5–8.1)
SODIUM BLD-SCNC: 133 MMOL/L (ref 135–144)
SPECIMEN DESCRIPTION: NORMAL
WBC # BLD: 16 K/UL (ref 3.5–11.3)

## 2022-12-20 PROCEDURE — 36600 WITHDRAWAL OF ARTERIAL BLOOD: CPT

## 2022-12-20 PROCEDURE — 51798 US URINE CAPACITY MEASURE: CPT

## 2022-12-20 PROCEDURE — 99291 CRITICAL CARE FIRST HOUR: CPT | Performed by: INTERNAL MEDICINE

## 2022-12-20 PROCEDURE — C9113 INJ PANTOPRAZOLE SODIUM, VIA: HCPCS

## 2022-12-20 PROCEDURE — 6360000002 HC RX W HCPCS

## 2022-12-20 PROCEDURE — 6370000000 HC RX 637 (ALT 250 FOR IP): Performed by: STUDENT IN AN ORGANIZED HEALTH CARE EDUCATION/TRAINING PROGRAM

## 2022-12-20 PROCEDURE — 82947 ASSAY GLUCOSE BLOOD QUANT: CPT

## 2022-12-20 PROCEDURE — 99233 SBSQ HOSP IP/OBS HIGH 50: CPT | Performed by: INTERNAL MEDICINE

## 2022-12-20 PROCEDURE — 6360000002 HC RX W HCPCS: Performed by: STUDENT IN AN ORGANIZED HEALTH CARE EDUCATION/TRAINING PROGRAM

## 2022-12-20 PROCEDURE — 6360000002 HC RX W HCPCS: Performed by: NURSE PRACTITIONER

## 2022-12-20 PROCEDURE — 82803 BLOOD GASES ANY COMBINATION: CPT

## 2022-12-20 PROCEDURE — 36415 COLL VENOUS BLD VENIPUNCTURE: CPT

## 2022-12-20 PROCEDURE — 94761 N-INVAS EAR/PLS OXIMETRY MLT: CPT

## 2022-12-20 PROCEDURE — 94003 VENT MGMT INPAT SUBQ DAY: CPT

## 2022-12-20 PROCEDURE — 80048 BASIC METABOLIC PNL TOTAL CA: CPT

## 2022-12-20 PROCEDURE — 85025 COMPLETE CBC W/AUTO DIFF WBC: CPT

## 2022-12-20 PROCEDURE — 2000000000 HC ICU R&B

## 2022-12-20 PROCEDURE — 6370000000 HC RX 637 (ALT 250 FOR IP): Performed by: NURSE PRACTITIONER

## 2022-12-20 PROCEDURE — 2580000003 HC RX 258

## 2022-12-20 PROCEDURE — 2580000003 HC RX 258: Performed by: NURSE PRACTITIONER

## 2022-12-20 PROCEDURE — 2700000000 HC OXYGEN THERAPY PER DAY

## 2022-12-20 PROCEDURE — 51701 INSERT BLADDER CATHETER: CPT

## 2022-12-20 PROCEDURE — 97530 THERAPEUTIC ACTIVITIES: CPT

## 2022-12-20 PROCEDURE — 97535 SELF CARE MNGMENT TRAINING: CPT

## 2022-12-20 PROCEDURE — 6360000002 HC RX W HCPCS: Performed by: INTERNAL MEDICINE

## 2022-12-20 PROCEDURE — 94640 AIRWAY INHALATION TREATMENT: CPT

## 2022-12-20 PROCEDURE — 2500000003 HC RX 250 WO HCPCS: Performed by: HEALTH CARE PROVIDER

## 2022-12-20 RX ORDER — ONDANSETRON 2 MG/ML
INJECTION INTRAMUSCULAR; INTRAVENOUS
Status: COMPLETED
Start: 2022-12-20 | End: 2022-12-20

## 2022-12-20 RX ORDER — ONDANSETRON 2 MG/ML
4 INJECTION INTRAMUSCULAR; INTRAVENOUS EVERY 6 HOURS PRN
Status: DISCONTINUED | OUTPATIENT
Start: 2022-12-20 | End: 2022-12-26

## 2022-12-20 RX ADMIN — TOPIRAMATE 75 MG: 100 TABLET, FILM COATED ORAL at 08:28

## 2022-12-20 RX ADMIN — HYDROCORTISONE SODIUM SUCCINATE 100 MG: 100 INJECTION, POWDER, FOR SOLUTION INTRAMUSCULAR; INTRAVENOUS at 05:34

## 2022-12-20 RX ADMIN — Medication 1250 MG: at 12:22

## 2022-12-20 RX ADMIN — LEVALBUTEROL HYDROCHLORIDE 0.63 MG: 0.63 SOLUTION RESPIRATORY (INHALATION) at 09:14

## 2022-12-20 RX ADMIN — TOPIRAMATE 100 MG: 100 TABLET, FILM COATED ORAL at 20:15

## 2022-12-20 RX ADMIN — FENTANYL CITRATE 50 MCG: 50 INJECTION, SOLUTION INTRAMUSCULAR; INTRAVENOUS at 20:23

## 2022-12-20 RX ADMIN — LEVOTHYROXINE SODIUM 150 MCG: 75 TABLET ORAL at 08:28

## 2022-12-20 RX ADMIN — GUAIFENESIN 200 MG: 200 SOLUTION ORAL at 05:35

## 2022-12-20 RX ADMIN — GUAIFENESIN 200 MG: 200 SOLUTION ORAL at 17:57

## 2022-12-20 RX ADMIN — GUAIFENESIN 200 MG: 200 SOLUTION ORAL at 22:28

## 2022-12-20 RX ADMIN — LAMOTRIGINE 200 MG: 100 TABLET ORAL at 08:28

## 2022-12-20 RX ADMIN — GUAIFENESIN 200 MG: 200 SOLUTION ORAL at 14:43

## 2022-12-20 RX ADMIN — FUROSEMIDE 20 MG: 10 INJECTION, SOLUTION INTRAMUSCULAR; INTRAVENOUS at 08:29

## 2022-12-20 RX ADMIN — ONDANSETRON 4 MG: 2 INJECTION INTRAMUSCULAR; INTRAVENOUS at 02:52

## 2022-12-20 RX ADMIN — ENOXAPARIN SODIUM 30 MG: 100 INJECTION SUBCUTANEOUS at 20:15

## 2022-12-20 RX ADMIN — Medication 1250 MG: at 23:33

## 2022-12-20 RX ADMIN — SODIUM CHLORIDE, PRESERVATIVE FREE 40 MG: 5 INJECTION INTRAVENOUS at 08:28

## 2022-12-20 RX ADMIN — ENOXAPARIN SODIUM 30 MG: 100 INJECTION SUBCUTANEOUS at 08:29

## 2022-12-20 RX ADMIN — GUAIFENESIN 200 MG: 200 SOLUTION ORAL at 10:05

## 2022-12-20 RX ADMIN — FENTANYL CITRATE 50 MCG: 50 INJECTION, SOLUTION INTRAMUSCULAR; INTRAVENOUS at 16:34

## 2022-12-20 RX ADMIN — HYDROCORTISONE SODIUM SUCCINATE 100 MG: 100 INJECTION, POWDER, FOR SOLUTION INTRAMUSCULAR; INTRAVENOUS at 20:15

## 2022-12-20 RX ADMIN — SERTRALINE 150 MG: 50 TABLET, FILM COATED ORAL at 08:28

## 2022-12-20 RX ADMIN — HYDROCORTISONE SODIUM SUCCINATE 100 MG: 100 INJECTION, POWDER, FOR SOLUTION INTRAMUSCULAR; INTRAVENOUS at 14:43

## 2022-12-20 RX ADMIN — GUAIFENESIN 200 MG: 200 SOLUTION ORAL at 02:30

## 2022-12-20 ASSESSMENT — PULMONARY FUNCTION TESTS
PIF_VALUE: 14
PIF_VALUE: 13
PIF_VALUE: 24
PIF_VALUE: 13
PIF_VALUE: 13
PIF_VALUE: 12
PIF_VALUE: 13
PIF_VALUE: 16

## 2022-12-20 ASSESSMENT — PAIN SCALES - GENERAL: PAINLEVEL_OUTOF10: 0

## 2022-12-20 ASSESSMENT — PAIN DESCRIPTION - LOCATION: LOCATION: BACK;THROAT

## 2022-12-20 NOTE — PROGRESS NOTES
Physical Therapy  Facility/Department: [facility] 3- MICU  Physical Therapy daily treatment note    Name: Benito Bentley  : 1975  MRN: [id number]  Date of Service: 2022    Discharge Recommendations:  Patient would benefit from continued therapy after discharge   PT Equipment Recommendations  Equipment Needed: No      Patient Diagnosis(es): The primary encounter diagnosis was Other closed nondisplaced fracture of seventh cervical vertebra, initial encounter ([facility] Diagnoses of Closed wedge compression fracture of T3 vertebra, initial encounter ([facility] and Multiple closed fractures of pelvis without disruption of pelvic ring, initial encounter [facility]) were also pertinent to this visit. 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 Past Surgical History:  has a past surgical history that includes Brain Biopsy; Tonsillectomy; knee surgery; Cochlear implant (Left); Dental surgery; Cochlear implant (Right, 05/10/2022); Colonoscopy; Endoscopy, colon, diagnostic; Colonoscopy (N/A, 2022); and Upper gastrointestinal endoscopy (N/A, 2022). Assessment   Body Structures, Functions, Activity Limitations Requiring Skilled Therapeutic Intervention: Decreased functional mobility ; Decreased endurance;Decreased strength;Decreased balance; Increased pain  Assessment: Pt required MOD A x2 for bed mobility. Tolerated sitting EOB x 10 min, up to MOD A to maintain. REcommend intense PT after d/c to address deficits.   Therapy Prognosis: Good  Activity Tolerance  Activity Tolerance: Patient limited by endurance; Patient limited by pain     Plan   Physcial Therapy Plan  General Plan:  (5-6x)  Current Treatment Recommendations: Strengthening, ROM, Balance training, Functional mobility training, Transfer training, Endurance training, Gait training, Stair training, Therapeutic activities, Patient/Caregiver education & training, Equipment evaluation, education, & procurement, Safety education & training, Wheelchair mobility training  Safety Devices  Type of Devices: All fall risk precautions in place, Call light within reach, Nurse notified, Left in bed  Restraints  Restraints Initially in Place: No     Restrictions  Restrictions/Precautions  Restrictions/Precautions: Fall Risk, Weight Bearing, Contact Precautions  Required Braces or Orthoses?: No  Lower Extremity Weight Bearing Restrictions  Right Lower Extremity Weight Bearing: Weight Bearing As Tolerated (per ortho)  Required Braces or Orthoses  Cervical:  (Per neurosurg, C collar no longer needed)  Position Activity Restriction  Other position/activity restrictions: up with assist, no intervention required for C7 or T3 fracture per Jose M Champion via [employer]; Acute C7 Right TP fx, Age indeterminate T3 compression fx, Nondisplaced right sacral ala fx, Nondisplaced fracture right superior pubic ramus, Right obturator ring fx. Cochlear implant R ear during session this day. Subjective   General  Patient assessed for rehabilitation services?: Yes  Response To Previous Treatment: Patient with no complaints from previous session.   Family / Caregiver Present: Yes (dad)  Follows Commands: Within Functional Limits  Subjective  Subjective: Pt resting in bed upon arrival, orally intubated, following all commands, agreeable to PT        Cognition   Orientation  Overall Orientation Status: Within Functional Limits     Objective   Bed mobility  Rolling to Left: Moderate assistance;2 Person assistance  Rolling to Right: Moderate assistance;2 Person assistance  Supine to Sit: Moderate assistance;2 Person assistance  Sit to Supine: Moderate assistance;2 Person assistance  Scooting:  Moderate assistance;2 Person assistance  Bed Mobility Comments: increased time to complete due to weakness, fatigue, vent/CPAP  Transfers  Sit to Stand: Unable to assess  Ambulation  More Ambulation?: No (GILDARDO)     Balance  Posture: Fair  Sitting - Static: Fair;-  Sitting - Dynamic: Fair;-  Comments: Pt tolerated sitting EOB x ~10 min, slightly decreased tolerance to sitting EOB this date due to c/o B LE pain   Exercise  Deferred this date due to c/o pain and fatigue after sitting EOB      AM-PAC Score  AM-PAC Inpatient Mobility Raw Score : 8 (12/19/22 1546)  AM-PAC Inpatient T-Scale Score : 28.52 (12/19/22 1546)  Mobility Inpatient CMS 0-100% Score: 86.62 (12/19/22 1546)  Mobility Inpatient CMS G-Code Modifier : CM (12/19/22 1546)          Goals  Short Term Goals  Time Frame for Short Term Goals: 14 visits  Short Term Goal 1: Perform bed mobility Mod I  Short Term Goal 2: Perform sit to stand with NWB RLE and Min A  Short Term Goal 3: Propel wheelchair 300ft with BUE and LLE and supervision  Short Term Goal 4: Ambulate 20ft with RW, NWB RLE and Min A       Education         Therapy Time   Individual Concurrent Group Co-treatment   Time In 1140         Time Out 1204         Minutes 24         Timed Code Treatment Minutes: 24 Minutes       Luis Siddiqui PTA

## 2022-12-20 NOTE — PROGRESS NOTES
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 Continue aggressive pulmonary toileting and we will follow her progress  The patient is following commands and overall clinically is improved    Infection Control Recommendations:   Contact precautions    Discharge Planning:   Estimated Length of IV antimicrobials: To be determined  Patient will need Midline Catheter Insertion/ PICC line Insertion: No  Patient will need: Home IV , [place],  SNF,  LTAC: Undetermined  Patient willneed outpatient wound care: No    Medical Decision making / Summary of Stay:   Gina Cespedes is a 41y.o.-year-old female who was initially admitted on 12/5/2022. Patient seen at the request of Dr. Nicolette Lovelace:     This patient, with a history of developmental delay, presented to the [facility] ED after falling down some stairs at home. She was complaining of neck and back pain. Imaging revealed an acute C7 transverse process fracture, chronic T3 compression fx and fractures of right sacral ala, nondisplaced fracture of right superior ramus and right obturator ring. She was transferred to [facility] for further medical management. Neurosurgery was consulted and it was determined there is no neurosurgical intervention needed. Orthopedic surgery was also consulted and they also did not recommend surgical intervention, but rather outpatient follow-up.      On 12/6/22, the patient's mentation declined and she became tachypneic requiring emergent intubation. Neurology was consulted for the patient's history of seizures. Her home seizure medications were restarted. An EEG did not show any seizure activity and the patient's mentation improved. She was extubated on the 7th but required re-intubation on 12/10/22 after a rapid response was called. CXR showed concern for RUL aspiration pneumonia. IV Zosyn was started and cultures were obtained. Viral respiratory panel was negative for influenza and Covid     Sputum cultures grew MRSA and the patient's antibiotics were changed to IV vancomycin on 12/11/22. There has been no growth on blood or urine cultures. ID was consulted for MRSA on sputum cultures        CT Head W/O Contrast   Final Result   No acute intracranial abnormality. Small amount of fluid in the right mastoid air cells. CT CHEST ABDOMEN PELVIS WO CONTRAST Additional Contrast? None   Final Result   Chest:       1. Mild anterior wedging of T3 suggesting an age indeterminate compression   fracture. No fracture line is identified. Clinical correlation with the   site of symptoms is suggested. 2. Significant motion artifact with scattered patchy ground-glass opacities   in the lungs which may be related to motion artifact. Ground-glass opacities   are otherwise nonspecific and could be related to atypical infection or   pneumonitis. Abdomen and pelvis:       1. Nondisplaced fractures of the right sacral ala and the right obturator   ring. 2. No acute findings elsewhere in the abdomen or pelvis. 3. Left renal cyst.           CT CSpine W/O Contrast   Final Result   Acute fracture of the right C7 transverse process. Spinal degenerative changes as described.            Current evaluation:12/19/2022    /63   Pulse 90   Temp 100 °F (37.8 °C) (Oral)   Resp 22   Ht 5' 1\" (1.549 m)   Wt 267 lb 13.7 oz (121.5 kg)   SpO2 96%   BMI 50.61 kg/m²     Temperature Range: Temp: 100 °F (37.8 °C) Temp  Av °F (37.8 °C)  Min: 98.3 °F (36.8 °C)  Max: 100.6 °F (38.1 °C)  The patient is seen and evaluated at bedside and remains on the ventilator 40% FiO2 5 of PEEP. She is awake and alert and seems to be describing some throat pain. Review of Systems   Unable to perform ROS: Intubated     Physical Examination :     Physical Exam  Constitutional:       Appearance: She is well-developed. She is obese. Interventions: She is sedated and intubated. HENT:      Head: Normocephalic and atraumatic. Cardiovascular:      Rate and Rhythm: Regular rhythm. Heart sounds: Normal heart sounds. Pulmonary:      Effort: Pulmonary effort is normal. She is intubated. Breath sounds: Normal breath sounds. Abdominal:      General: Bowel sounds are normal.      Palpations: Abdomen is soft. Skin:     General: Skin is warm and dry. Laboratory data:   I have independently reviewed the followinglabs:  CBC with Differential:   Recent Labs     226 22   WBC 10.0 10.4   HGB 10.9* 11.6*   HCT 36.1* 36.1*    See Reflexed IPF Result   LYMPHOPCT 14* 9*   MONOPCT 6 8       BMP:   Recent Labs     226 22    132*   K 3.8 4.7    99   CO2 22 22   BUN 19 19   CREATININE 0.61 0.64       Hepatic Function Panel: No results for input(s): PROT, LABALBU, BILIDIR, IBILI, BILITOT, ALKPHOS, ALT, AST in the last 72 hours.       No results found for: PROCAL  Lab Results   Component Value Date/Time    CRP 4.4 2018 06:46 PM     Lab Results   Component Value Date    SEDRATE 20 2018         Lab Results   Component Value Date/Time    DDIMER 0.33 07/15/2020 05:29 PM     No results found for: FERRITIN  No results found for: LDH  No results found for: FIBRINOGEN    Results in Past 30 Days  Result Component Current Result Ref Range Previous Result Ref Range   SARS-CoV-2, PCR Not Detected (12/10/2022) Not Detected Not Detected (2022) Not Detected     Lab Results   Component Value Date/Time    COVID19 Not Detected 12/10/2022 01:09 PM    COVID19 Not Detected 12/06/2022 10:15 PM       No results for input(s): VANCOTROUGH in the last 72 hours. Imaging Studies:   ONE XRAY VIEW OF THE CHEST 12/16/2022 9:50 am  Impression   Similar appearance of mild bilateral airspace disease. This appears   consistent with pneumonia     Cultures:     Culture, Blood 1 [[id number]] Collected: 12/15/22 1349   Order Status: Completed Specimen: Blood Updated: 12/18/22 0912    Specimen Description . BLOOD    Special Requests L FOREARM  10ML    Culture NO GROWTH 3 DAYS   Culture, Blood 1 [[id number]] (Abnormal) Collected: 12/15/22 1352   Order Status: Completed Specimen: Blood Updated: 12/18/22 0717    Specimen Description . BLOOD    Special Requests L AC  5ML    Culture POSITIVE Blood Culture Abnormal      DIRECT GRAM STAIN FROM BOTTLE: GRAM POSITIVE COCCI IN CLUSTERS     Staphylococcus epidermidis Detected: mecA/C Gene Detected- Methicillin Resistant Organism Methodology- Polymerase Chain Reaction (PCR)     STAPHYLOCOCCUS EPIDERMIDIS A single positive blood culture of coagulase negative Staphylocci, diphtheroids,micrococci, Cutibacterium, viridans Streptocci, Bacillus, or Lactobacillus species should be interpreted with caution and viewed as a likely skin contaminant. Abnormal      (NOTE) Direct Gram Stain from bottle and Polymerase Chain Reaction (PCR) results called to and read back by:JEANETTE RENE AT 1420 ON 12/16/22     Culture, Urine [[id number]] Collected: 12/15/22 1410   Order Status: Completed Specimen: Urine, indwelling catheter Updated: 12/16/22 1257    Specimen Description . INDWELLING CATH URINE    Culture NO SIGNIFICANT GROWTH   Culture, Blood 1 [[id number]] Collected: 12/10/22 1223   Order Status: Completed Specimen: Blood Updated: 12/15/22 1319    Specimen Description . BLOOD    Special Requests LEFT HAND 2.5 ML    Culture NO GROWTH 5 DAYS   Culture, Blood 1 [[id number]] Collected: 12/10/22 1230   Order Status: Completed Specimen: Blood Updated: 12/15/22 1319    Specimen Description . BLOOD    Special Requests RT HAND 2.5 ML    Culture NO GROWTH 5 DAYS   Culture, Respiratory [[id number]] (Abnormal)  Collected: 12/10/22 1130   Order Status: Completed Specimen: Sputum Induced Updated: 12/12/22 0953    Specimen Description . INDUCED SPUTUM    Direct Exam < 10 EPITHELIAL CELLS/LPF     <10 NEUTROPHILS/LPF     FEW GRAM POSITIVE COCCI IN CLUSTERS Abnormal     Culture METHICILLIN RESISTANT STAPHYLOCOCCUS AUREUS HEAVY GROWTH Abnormal      NO NORMAL JEFFREY   MRSA DNA Probe, Nasal [[id number]] (Abnormal) Collected: 12/10/22 1215   Order Status: Completed Specimen: Nasal Updated: 12/11/22 1027    Specimen Description . NASAL SWAB    MRSA, DNA, Nasal POSITIVE:  MRSA DNA detected by nucleic acid amplification. Abnormal     Comment:                                                    Results should be used as an adjunct to nosocomial control efforts to identify patients   needing enhanced precautions. The test is not intended to identify patients with staphylococcal infections. Results   should not be used to guide or monitor treatment for MRSA infections. Respiratory Panel, Molecular, with COVID-19 (Restricted: peds pts or suitable admitted adults) [[id number]] Collected: 12/10/22 1309   Order Status: Completed Specimen: Nasopharyngeal Swab Updated: 12/10/22 1451    Specimen Description . NASOPHARYNGEAL SWAB    Adenovirus PCR Not Detected    Coronavirus 229E PCR Not Detected    Coronavirus HKU1 PCR Not Detected    Coronavirus NL63 PCR Not Detected    Coronavirus OC43 PCR Not Detected    SARS-CoV-2, PCR Not Detected    Human Metapneumovirus PCR Not Detected    Rhino/Enterovirus PCR Not Detected    Influenza A by PCR Not Detected    Influenza B by PCR Not Detected    Parainfluenza 1 PCR Not Detected    Parainfluenza 2 PCR Not Detected    Parainfluenza 3 PCR Not Detected Parainfluenza 4 PCR Not Detected    Resp Syncytial Virus PCR Not Detected    Bordetella Parapertussis Not Detected    B Pertussis by PCR Not Detected    Chlamydia pneumoniae By PCR Not Detected    Mycoplasma pneumo by PCR Not Detected    Comment: Performed by multiplexed nucleic acid assay. Respiratory Panel, Molecular, with COVID-19 (Restricted: peds pts or suitable admitted adults) [[id number]] Collected: 12/10/22 1130   Order Status: Canceled Specimen: Nasopharyngeal Swab    MRSA DNA Probe, Nasal [[id number]] Collected: 12/07/22 0200   Order Status: Completed Specimen: Nasal Updated: 12/07/22 1440    Specimen Description . NASAL SWAB    MRSA, DNA, Nasal NEGATIVE    Comment: NEGATIVE:  MRSA DNA not detected by nucleic acid amplification. Results should be used as an adjunct to nosocomial control efforts to identify patients   needing enhanced precautions. The test is not intended to identify patients with staphylococcal infections. Results   should not be used to guide or monitor treatment for MRSA infections. COVID-19, Rapid [[id number]] Collected: 12/06/22 2215   Order Status: Completed Specimen: Nasopharyngeal Swab Updated: 12/07/22 0207    Specimen Description . NASOPHARYNGEAL SWAB    SARS-CoV-2, Rapid Not Detected    Comment:        Rapid NAAT:  The specimen is NEGATIVE for SARS-CoV-2, the novel coronavirus associated with   COVID-19. The ID NOW COVID-19 assay is designed to detect the virus that causes COVID-19 in patients   with signs and symptoms of infection who are suspected of COVID-19. An individual without symptoms of COVID-19 and who is not shedding SARS-CoV-2 virus would   expect to have a negative (not detected) result in this assay.    Negative results should be treated as presumptive and, if inconsistent with clinical signs   and symptoms or necessary for patient management,   should be tested with an alternative molecular assay. Negative results do not preclude   SARS-CoV-2 infection and   should not be used as the sole basis for patient management decisions. Fact sheet for Healthcare Providers: http://www.mandeep.hollis/   Fact sheet for Patients: http://www.mandeep.hollis/         Methodology: Isothermal Nucleic Acid Amplification         Medications:      hydrocortisone sodium succinate PF  100 mg IntraVENous Q8H    vancomycin  1,250 mg IntraVENous Q12H    guaiFENesin  200 mg Oral Q4H    furosemide  20 mg IntraVENous Daily    pantoprazole (PROTONIX) 40 mg injection  40 mg IntraVENous Daily    vancomycin (VANCOCIN) intermittent dosing (placeholder)   Other RX Placeholder    levalbuterol  0.63 mg Nebulization 4x daily    enoxaparin  30 mg SubCUTAneous BID    sertraline  150 mg Oral Daily    lamoTRIgine  200 mg Oral Daily    levothyroxine  150 mcg Oral QAM AC    topiramate  75 mg Oral Daily    topiramate  100 mg Oral Nightly           [facility]  Archie Mccain MD  [employer] messaging  OFFICE: [phone]      Electronically signed by Archie Mccain MD on 12/19/2022 at 10:23 PM  Thank you for allowing us to participate in the care of this patient. Please call with questions. This note iscreated with the assistance of a speech recognition program.  While intending to generate a document that actually reflects the content of the visit, the document can still have some errors including those of syntax andsound a like substitutions which may escape proof reading. In such instances, actual meaning can be extrapolated by contextual diversion.

## 2022-12-20 NOTE — PLAN OF CARE
Problem: Discharge Planning  Goal: Discharge to home or other facility with appropriate resources  12/19/2022 2054 by Jeri Ross RN  Outcome: Progressing  12/19/2022 0923 by Blake Grimm  Outcome: Progressing     Problem: Confusion  Goal: Confusion, delirium, dementia, or psychosis is improved or at baseline  Description: INTERVENTIONS:  1. Assess for possible contributors to thought disturbance, including medications, impaired vision or hearing, underlying metabolic abnormalities, dehydration, psychiatric diagnoses, and notify attending LIP  2. [place] high risk fall precautions, as indicated  3. Provide frequent short contacts to provide reality reorientation, refocusing and direction  4. Decrease environmental stimuli, including noise as appropriate  5. Monitor and intervene to maintain adequate nutrition, hydration, elimination, sleep and activity  6. If unable to ensure safety without constant attention obtain sitter and review sitter guidelines with assigned personnel  7. Initiate Psychosocial CNS and Spiritual Care consult, as indicated  12/19/2022 2054 by Jeri Ross RN  Outcome: Progressing  12/19/2022 0923 by Blake Grimm  Outcome: Progressing     Problem: Pain  Goal: Verbalizes/displays adequate comfort level or baseline comfort level  12/19/2022 2054 by Jeri Ross RN  Outcome: Progressing  12/19/2022 0923 by Blake Grimm  Outcome: Progressing     Problem: Chronic Conditions and Co-morbidities  Goal: Patient's chronic conditions and co-morbidity symptoms are monitored and maintained or improved  12/19/2022 2054 by Jeri Ross RN  Outcome: Progressing  12/19/2022 0923 by Blake Grimm  Outcome: Progressing     Problem: Skin/Tissue Integrity  Goal: Absence of new skin breakdown  Description: 1. Monitor for areas of redness and/or skin breakdown  2. Assess vascular access sites hourly  3. Every 4-6 hours minimum:  Change oxygen saturation probe site  4.   Every 4-6 hours: If on nasal continuous positive airway pressure, respiratory therapy assess nares and determine need for appliance change or resting period.   12/19/2022 2054 by Freda Reid RN  Outcome: Progressing  12/19/2022 0923 by Aaliyah Gardner  Outcome: Progressing     Problem: Safety - Adult  Goal: Free from fall injury  12/19/2022 2054 by Freda Reid RN  Outcome: Progressing  12/19/2022 0923 by Aaliyah Gardner  Outcome: Progressing     Problem: ABCDS Injury Assessment  Goal: Absence of physical injury  12/19/2022 2054 by Freda Reid RN  Outcome: Progressing  12/19/2022 0923 by Aaliyah Gardner  Outcome: Progressing     Problem: Nutrition Deficit:  Goal: Optimize nutritional status  12/19/2022 2054 by Freda Reid RN  Outcome: Progressing  12/19/2022 0923 by Aaliyah Gardner  Outcome: Progressing     Problem: Respiratory - Adult  Goal: Achieves optimal ventilation and oxygenation  12/19/2022 2054 by Freda Reid RN  Outcome: Progressing  12/19/2022 1831 by Maria Luz Irvin  Outcome: Progressing  Flowsheets (Taken 12/19/2022 1831)  Achieves optimal ventilation and oxygenation:   Assess for changes in respiratory status   Position to facilitate oxygenation and minimize respiratory effort   Assess the need for suctioning and aspirate as needed   Respiratory therapy support as indicated   Assess and instruct to report shortness of breath or any respiratory difficulty   Oxygen supplementation based on oxygen saturation or arterial blood gases   Assess for changes in mentation and behavior  12/19/2022 0923 by Aaliyah Gardner  Outcome: Progressing

## 2022-12-20 NOTE — PLAN OF CARE
Problem: Discharge Planning  Goal: Discharge to home or other facility with appropriate resources  12/20/2022 0724 by Wade Gonsalves  Outcome: Progressing  12/19/2022 2054 by Erica Dos Santos RN  Outcome: Progressing     Problem: Confusion  Goal: Confusion, delirium, dementia, or psychosis is improved or at baseline  Description: INTERVENTIONS:  1. Assess for possible contributors to thought disturbance, including medications, impaired vision or hearing, underlying metabolic abnormalities, dehydration, psychiatric diagnoses, and notify attending LIP  2. [place] high risk fall precautions, as indicated  3. Provide frequent short contacts to provide reality reorientation, refocusing and direction  4. Decrease environmental stimuli, including noise as appropriate  5. Monitor and intervene to maintain adequate nutrition, hydration, elimination, sleep and activity  6. If unable to ensure safety without constant attention obtain sitter and review sitter guidelines with assigned personnel  7. Initiate Psychosocial CNS and Spiritual Care consult, as indicated  12/20/2022 0724 by Wade Gonsalves  Outcome: Progressing  12/19/2022 2054 by Erica Dos Santos RN  Outcome: Progressing     Problem: Pain  Goal: Verbalizes/displays adequate comfort level or baseline comfort level  12/20/2022 0724 by Wade Gonsalves  Outcome: Progressing  12/19/2022 2054 by Erica Dos Santos RN  Outcome: Progressing     Problem: Chronic Conditions and Co-morbidities  Goal: Patient's chronic conditions and co-morbidity symptoms are monitored and maintained or improved  12/20/2022 0724 by Wade Gonsalves  Outcome: Progressing  12/19/2022 2054 by Erica Dos Santos RN  Outcome: Progressing     Problem: Skin/Tissue Integrity  Goal: Absence of new skin breakdown  Description: 1. Monitor for areas of redness and/or skin breakdown  2. Assess vascular access sites hourly  3. Every 4-6 hours minimum:  Change oxygen saturation probe site  4.   Every 4-6 hours: If on nasal continuous positive airway pressure, respiratory therapy assess nares and determine need for appliance change or resting period.   12/20/2022 0724 by Wade Gonsalves  Outcome: Progressing  12/19/2022 2054 by Erica Dos Santos RN  Outcome: Progressing     Problem: Safety - Adult  Goal: Free from fall injury  12/20/2022 0724 by Wade Gonsalves  Outcome: Progressing  12/19/2022 2054 by Erica Dos Santos RN  Outcome: Progressing     Problem: ABCDS Injury Assessment  Goal: Absence of physical injury  12/20/2022 0724 by Wade Gonsalves  Outcome: Progressing  12/19/2022 2054 by Erica Dos Santos RN  Outcome: Progressing     Problem: Nutrition Deficit:  Goal: Optimize nutritional status  12/20/2022 0724 by Wade Gonsalves  Outcome: Progressing  12/19/2022 2054 by Erica Dos Santos RN  Outcome: Progressing     Problem: Respiratory - Adult  Goal: Achieves optimal ventilation and oxygenation  12/20/2022 0724 by Wade Gonsalves  Outcome: Progressing  12/19/2022 2110 by Dannie Fontenot RCP  Outcome: Progressing  Flowsheets (Taken 12/19/2022 2110)  Achieves optimal ventilation and oxygenation:   Assess for changes in respiratory status   Respiratory therapy support as indicated   Assess the need for suctioning and aspirate as needed   Assess for changes in mentation and behavior   Oxygen supplementation based on oxygen saturation or arterial blood gases  12/19/2022 2054 by Erica Dos Santos RN  Outcome: Progressing  12/19/2022 1831 by Luis Felipe Toscano  Outcome: Progressing  Flowsheets (Taken 12/19/2022 1831)  Achieves optimal ventilation and oxygenation:   Assess for changes in respiratory status   Position to facilitate oxygenation and minimize respiratory effort   Assess the need for suctioning and aspirate as needed   Respiratory therapy support as indicated   Assess and instruct to report shortness of breath or any respiratory difficulty   Oxygen supplementation based on oxygen saturation or arterial blood gases   Assess for changes in mentation and behavior

## 2022-12-20 NOTE — PROGRESS NOTES
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 Continue aggressive pulmonary toileting and we will follow her progress  The patient is following commands and overall clinically is improved    Infection Control Recommendations:   Contact precautions    Discharge Planning:   Estimated Length of IV antimicrobials: To be determined  Patient will need Midline Catheter Insertion/ PICC line Insertion: No  Patient will need: Home IV , [place],  SNF,  LTAC: Undetermined  Patient willneed outpatient wound care: No    Medical Decision making / Summary of Stay:   Louise Perkins is a 41y.o.-year-old female who was initially admitted on 12/5/2022. Patient seen at the request of Dr. Margart Bumpers:     This patient, with a history of developmental delay, presented to the [facility] ED after falling down some stairs at home. She was complaining of neck and back pain. Imaging revealed an acute C7 transverse process fracture, chronic T3 compression fx and fractures of right sacral ala, nondisplaced fracture of right superior ramus and right obturator ring. She was transferred to [facility] for further medical management. Neurosurgery was consulted and it was determined there is no neurosurgical intervention needed. Orthopedic surgery was also consulted and they also did not recommend surgical intervention, but rather outpatient follow-up.      On 12/6/22, the patient's mentation declined and she became tachypneic requiring emergent intubation. Neurology was consulted for the patient's history of seizures. Her home seizure medications were restarted. An EEG did not show any seizure activity and the patient's mentation improved. She was extubated on the 7th but required re-intubation on 12/10/22 after a rapid response was called. CXR showed concern for RUL aspiration pneumonia. IV Zosyn was started and cultures were obtained. Viral respiratory panel was negative for influenza and Covid     Sputum cultures grew MRSA and the patient's antibiotics were changed to IV vancomycin on 12/11/22. There has been no growth on blood or urine cultures. ID was consulted for MRSA on sputum cultures        CT Head W/O Contrast   Final Result   No acute intracranial abnormality. Small amount of fluid in the right mastoid air cells. CT CHEST ABDOMEN PELVIS WO CONTRAST Additional Contrast? None   Final Result   Chest:       1. Mild anterior wedging of T3 suggesting an age indeterminate compression   fracture. No fracture line is identified. Clinical correlation with the   site of symptoms is suggested. 2. Significant motion artifact with scattered patchy ground-glass opacities   in the lungs which may be related to motion artifact. Ground-glass opacities   are otherwise nonspecific and could be related to atypical infection or   pneumonitis. Abdomen and pelvis:       1. Nondisplaced fractures of the right sacral ala and the right obturator   ring. 2. No acute findings elsewhere in the abdomen or pelvis. 3. Left renal cyst.           CT CSpine W/O Contrast   Final Result   Acute fracture of the right C7 transverse process. Spinal degenerative changes as described.            Current evaluation:12/20/2022    /64   Pulse 71   Temp 99.5 °F (37.5 °C) (Bladder)   Resp 24   Ht 5' 1\" (1.549 m)   Wt 266 lb 15.6 oz (121.1 kg)   SpO2 94%   BMI 50.44 kg/m²     Temperature Range: Temp: 99.5 °F (37.5 °C) Temp  Av.9 °F (37.7 °C)  Min: 99.1 °F (37.3 °C)  Max: 100.6 °F (38.1 °C)    Patient evaluated and examined in the ICU. Afebrile to low grade fevers  VS stable    Patient is stable  No complaints  No new issues per RN    On the ventilator   FiO2 40  PEEP 5  RR 24  02 sat 94    CXR  22:       22:    Review of Systems   Unable to perform ROS: Intubated     Physical Examination :     Physical Exam  Constitutional:       Appearance: She is well-developed. She is obese. Interventions: She is sedated and intubated. HENT:      Head: Normocephalic and atraumatic. Cardiovascular:      Rate and Rhythm: Regular rhythm. Heart sounds: Normal heart sounds. Pulmonary:      Effort: Pulmonary effort is normal. She is intubated. Breath sounds: Normal breath sounds. Abdominal:      General: Bowel sounds are normal.      Palpations: Abdomen is soft. Skin:     General: Skin is warm and dry. Laboratory data:   I have independently reviewed the followinglabs:  CBC with Differential:   Recent Labs     22  0327 22  0543   WBC 10.4 16.0*   HGB 11.6* 11.4*   HCT 36.1* 37.5   PLT See Reflexed IPF Result 412   LYMPHOPCT 9* 7*   MONOPCT 8 4       BMP:   Recent Labs     22  0327 22  0543   * 133*   K 4.7 3.8   CL 99 99   CO2 22 23   BUN  19   CREATININE 0.64 0.62       Hepatic Function Panel: No results for input(s): PROT, LABALBU, BILIDIR, IBILI, BILITOT, ALKPHOS, ALT, AST in the last 72 hours.       No results found for: PROCAL  Lab Results   Component Value Date/Time    CRP 4.4 2018 06:46 PM     Lab Results   Component Value Date    SEDRATE 20 2018         Lab Results   Component Value Date/Time    DDIMER 0.33 07/15/2020 05:29 PM     No results found for: FERRITIN  No results found for: LDH  No results found for: FIBRINOGEN    Results in Past 30 Days  Result Component Current Result Ref Range Previous Result Ref Range   SARS-CoV-2, PCR Not Detected (12/10/2022) Not Detected Not Detected (12/6/2022) Not Detected     Lab Results   Component Value Date/Time    COVID19 Not Detected 12/10/2022 01:09 PM    COVID19 Not Detected 12/06/2022 10:15 PM       No results for input(s): VANCOTROUGH in the last 72 hours.    Imaging Studies:   ONE XRAY VIEW OF THE CHEST 12/16/2022 9:50 am  Impression   Similar appearance of mild bilateral airspace disease.  This appears   consistent with pneumonia     Cultures:     Culture, Blood 1 [[id number]] Collected: 12/15/22 1349   Order Status: Completed Specimen: Blood Updated: 12/18/22 0912    Specimen Description .BLOOD    Special Requests L FOREARM  10ML    Culture NO GROWTH 3 DAYS   Culture, Blood 1 [[id number]] (Abnormal) Collected: 12/15/22 1352   Order Status: Completed Specimen: Blood Updated: 12/18/22 0717    Specimen Description .BLOOD    Special Requests L AC  5ML    Culture POSITIVE Blood Culture Abnormal      DIRECT GRAM STAIN FROM BOTTLE: GRAM POSITIVE COCCI IN CLUSTERS     Staphylococcus epidermidis Detected: mecA/C Gene Detected- Methicillin Resistant Organism Methodology- Polymerase Chain Reaction (PCR)     STAPHYLOCOCCUS EPIDERMIDIS A single positive blood culture of coagulase negative Staphylocci, diphtheroids,micrococci, Cutibacterium, viridans Streptocci, Bacillus, or Lactobacillus species should be interpreted with caution and viewed as a likely skin contaminant.    Abnormal      (NOTE) Direct Gram Stain from bottle and Polymerase Chain Reaction (PCR) results called to and read back by:JEANETTE RENE AT 1420 ON 12/16/22     Culture, Urine [[id number]] Collected: 12/15/22 1410   Order Status: Completed Specimen: Urine, indwelling catheter Updated: 12/16/22 1257    Specimen Description .INDWELLING CATH URINE    Culture NO SIGNIFICANT GROWTH   Culture, Blood 1 [[id number]] Collected: 12/10/22 1223   Order Status: Completed Specimen: Blood Updated: 12/15/22 1319    Specimen Description  Shaggy [place] BLOOD    Special Requests LEFT HAND 2.5 ML    Culture NO GROWTH 5 DAYS   Culture, Blood 1 [[id number]] Collected: 12/10/22 1230   Order Status: Completed Specimen: Blood Updated: 12/15/22 1319    Specimen Description . BLOOD    Special Requests RT HAND 2.5 ML    Culture NO GROWTH 5 DAYS   Culture, Respiratory [[id number]] (Abnormal)  Collected: 12/10/22 1130   Order Status: Completed Specimen: Sputum Induced Updated: 12/12/22 0953    Specimen Description . INDUCED SPUTUM    Direct Exam < 10 EPITHELIAL CELLS/LPF     <10 NEUTROPHILS/LPF     FEW GRAM POSITIVE COCCI IN CLUSTERS Abnormal     Culture METHICILLIN RESISTANT STAPHYLOCOCCUS AUREUS HEAVY GROWTH Abnormal      NO NORMAL JEFFREY   MRSA DNA Probe, Nasal [[id number]] (Abnormal) Collected: 12/10/22 1215   Order Status: Completed Specimen: Nasal Updated: 12/11/22 1027    Specimen Description . NASAL SWAB    MRSA, DNA, Nasal POSITIVE:  MRSA DNA detected by nucleic acid amplification. Abnormal     Comment:                                                    Results should be used as an adjunct to nosocomial control efforts to identify patients   needing enhanced precautions. The test is not intended to identify patients with staphylococcal infections. Results   should not be used to guide or monitor treatment for MRSA infections. Respiratory Panel, Molecular, with COVID-19 (Restricted: peds pts or suitable admitted adults) [[id number]] Collected: 12/10/22 1309   Order Status: Completed Specimen: Nasopharyngeal Swab Updated: 12/10/22 1451    Specimen Description . NASOPHARYNGEAL SWAB    Adenovirus PCR Not Detected    Coronavirus 229E PCR Not Detected    Coronavirus HKU1 PCR Not Detected    Coronavirus NL63 PCR Not Detected    Coronavirus OC43 PCR Not Detected    SARS-CoV-2, PCR Not Detected    Human Metapneumovirus PCR Not Detected    Rhino/Enterovirus PCR Not Detected    Influenza A by PCR Not Detected    Influenza B by PCR Not Detected    Parainfluenza 1 PCR Not Detected    Parainfluenza 2 PCR Not Detected    Parainfluenza 3 PCR Not Detected    Parainfluenza 4 PCR Not Detected    Resp Syncytial Virus PCR Not Detected    Bordetella Parapertussis Not Detected    B Pertussis by PCR Not Detected    Chlamydia pneumoniae By PCR Not Detected    Mycoplasma pneumo by PCR Not Detected    Comment: Performed by multiplexed nucleic acid assay. Respiratory Panel, Molecular, with COVID-19 (Restricted: peds pts or suitable admitted adults) [[id number]] Collected: 12/10/22 1130   Order Status: Canceled Specimen: Nasopharyngeal Swab    MRSA DNA Probe, Nasal [[id number]] Collected: 12/07/22 0200   Order Status: Completed Specimen: Nasal Updated: 12/07/22 1440    Specimen Description . NASAL SWAB    MRSA, DNA, Nasal NEGATIVE    Comment: NEGATIVE:  MRSA DNA not detected by nucleic acid amplification. Results should be used as an adjunct to nosocomial control efforts to identify patients   needing enhanced precautions. The test is not intended to identify patients with staphylococcal infections. Results   should not be used to guide or monitor treatment for MRSA infections. COVID-19, Rapid [[id number]] Collected: 12/06/22 2215   Order Status: Completed Specimen: Nasopharyngeal Swab Updated: 12/07/22 0207    Specimen Description . NASOPHARYNGEAL SWAB    SARS-CoV-2, Rapid Not Detected    Comment:        Rapid NAAT:  The specimen is NEGATIVE for SARS-CoV-2, the novel coronavirus associated with   COVID-19. The ID NOW COVID-19 assay is designed to detect the virus that causes COVID-19 in patients   with signs and symptoms of infection who are suspected of COVID-19. An individual without symptoms of COVID-19 and who is not shedding SARS-CoV-2 virus would   expect to have a negative (not detected) result in this assay.    Negative results should be treated as presumptive and, if inconsistent with clinical signs   and symptoms or necessary for patient management,   should be tested with an alternative molecular assay. Negative results do not preclude   SARS-CoV-2 infection and   should not be used as the sole basis for patient management decisions. Fact sheet for Healthcare Providers: http://www.mandeep.hollis/   Fact sheet for Patients: http://www.mandeep.hollis/         Methodology: Isothermal Nucleic Acid Amplification         Medications:      hydrocortisone sodium succinate PF  100 mg IntraVENous Q8H    vancomycin  1,250 mg IntraVENous Q12H    guaiFENesin  200 mg Oral Q4H    furosemide  20 mg IntraVENous Daily    pantoprazole (PROTONIX) 40 mg injection  40 mg IntraVENous Daily    vancomycin (VANCOCIN) intermittent dosing (placeholder)   Other RX Placeholder    levalbuterol  0.63 mg Nebulization 4x daily    enoxaparin  30 mg SubCUTAneous BID    sertraline  150 mg Oral Daily    lamoTRIgine  200 mg Oral Daily    levothyroxine  150 mcg Oral QAM AC    topiramate  75 mg Oral Daily    topiramate  100 mg Oral Nightly           [facility]  Mark Urena MD  [employer] messaging  OFFICE: [phone]      Electronically signed by Mark Urena MD on 12/20/2022 at 10:30 AM  Thank you for allowing us to participate in the care of this patient. Please call with questions. This note iscreated with the assistance of a speech recognition program.  While intending to generate a document that actually reflects the content of the visit, the document can still have some errors including those of syntax andsound a like substitutions which may escape proof reading. In such instances, actual meaning can be extrapolated by contextual diversion.

## 2022-12-20 NOTE — PLAN OF CARE
Problem: Respiratory - Adult  Goal: Achieves optimal ventilation and oxygenation  12/19/2022 2110 by Joyce Fontenot RCP  Outcome: Progressing  Flowsheets (Taken 12/19/2022 2110)  Achieves optimal ventilation and oxygenation:   Assess for changes in respiratory status   Respiratory therapy support as indicated   Assess the need for suctioning and aspirate as needed   Assess for changes in mentation and behavior   Oxygen supplementation based on oxygen saturation or arterial blood gases

## 2022-12-20 NOTE — PROGRESS NOTES
INTENSIVE CARE UNIT  Resident Physician Progress Note    Patient - Benito Bentley  Date of Admission -  12/5/2022 11:39 PM  Date of Evaluation -  12/20/2022  Room and Bed Number -  3021/3021-01   Hospital Day - 14    HPI:     74-year-old female initially presented to hospital on 12/5 after a fall. Patient is developmentally delayed and has a history of brain tumor as a child with a lot of radiation. Patient also has past medical history of diabetes mellitus, bilateral sensorineural hearing loss. Had a CT head showed no intracranial abnormality, CT abdominal pelvis showed  nondisplaced fractures of right sacral ilya, nondisplaced fracture of right superior ramus and right obturator ring. CT spine showed acute fracture of right C7 transverse process and also age indeterminate compressive fracture of T3. Neurosurgery for cervical fractures was consulted, who recommended no neurosurgical intervention. Orthopedic surgery for pelvic fractures was also consulted, no surgical intervention but medical management and also recommended to follow-up in orthopedic clinic after discharge. Patient was started on BiPAP for increased work of breathing. Patient was oriented x4 at day of presentation. 12/06 - patient had a decline in mental status with tachypnea, respiratory rate of 30s to 40 with heart rate of 105 patient was intubated emergently     12/7- Neurology was consulted because of history of migraines, seizure disorder, patient was started on her home antiepileptic drugs including Topamax and Lamictal, EEG was ordered to rule out any subclinical seizure activity, MRI was also ordered to rule out any CVA. Patient was doing fine, arousable and following commands. Pulmonology was consulted, recommended to extubate and patient was extubated.      12/10 -rapid was called today due to impending respiratory failure, patient was intubated again and was sent to ICU      12/12 -respiratory culture came back positive for staph aureus , nasal respiratory swab came out positive for MRSA DNA, patient started on vancomycin      12/13 : Tachycardia improved to normal heart rate, patient remained afebrile, WBC count trending down. Sedation changed to precedex     12/15: Precedex discontinued. SUBJECTIVE:     OVERNIGHT EVENTS: No acute events overnight, patient was afebrile overnight, saturating fine during the CPAP. Patient is awake and alert. Patient still has no cuff leak we will continue to monitor and continue hydrocortisone. F.A.S.T. M. H.U.G.S. B.I.D. Feeding Diet: ADULT TUBE FEEDING; Orogastric; Standard without Fiber; Continuous; 45; Yes; 20; Q 4 hours; 75; 30; Q 4 hours  Fluids: Patient is on KVO fluids  Family: Updated  Analgesic: Tylenol 650 every 6 hours as needed, fentanyl 50 mcg every 2 hours as needed, Roxicodone 10 every 4 hours needed, Roxicodone 5 every 4 hours as needed. Sedation: None  Thrombo-prophylaxis: [x] Enoxaparin, [] Unfract. Heparin Subcutaneously, [] EPC Cuffs  Mobility: Up with assistance, PT OT ordered. Heads up: 30 degrees   Ulcer prophylaxis: [x] PPI Agent, [] [id number], [] Sucralfate, [] Other:  Glycemic control: None, glucose 102   Spontaneous breathing trial: Today, patient had 2 spontaneous breathing trials yesterday, initially was tachypneic on the first 1, tolerated the second well per nursing. Shallow breathing index: 60s   Bowel regimen/urine output: Protonix 40 IV daily, GlycoLax, patient receives 20 mg of Lasix daily/urine output approximately 2.3 L in the last 24 hours. Indwelling catheter/lines: Peripheral IV, OG tube, Porras catheter: 12/15/22, ET tube, wound right buttocks. De-escalation: Spontaneous breathing trials as tolerated. Discontinue Porras, start external Porras catheter. TODAY:   Spontaneous breathing trials as tolerated, PT OT to evaluate and treat. Discontinue Porras, start external Porras catheter. Continue hydrocortisone due to no cuff leak.   Will reevaluate cuff leak tomorrow. ABGs:   Arterial Blood Gas result 2022:  pH 7.42 ; pCO2 42.9; pO2 87.8; HCO3 25.5; O2 Sat 96. VENT SETTINGS (Comprehensive) (if applicable): PRVC mode, FiO2 40%, PEEP 5, Respiratory Rate 18, Tidal Volume 380    WBC: 16  Hemoglobin: 11.4  Blood glucose: 139  BUN: 19   creatinine: 0.62  Sodium: 133  Potassium: 4.7    AWAKE & FOLLOWING COMMANDS:  [] No   [x] Yes    SECRETIONS Amount:  [] Small [] Moderate  [x] Large  [] None  Color:     [x] White [x] Colored - yellow  [] Bloody    SEDATION:  RAAS Score:  [] Propofol gtt  [] Versed gtt  [] Ativan gtt   [x] No Sedation    PARALYZED:  [x] No    [] Yes    VASOPRESSORS:  [x] No    [] Yes  [] Levophed [] Dopamine [] Vasopressin  [] Dobutamine [] Phenylephrine [] Epinephrine      OBJECTIVE:     VITAL SIGNS:  /64   Pulse 71   Temp 99.5 °F (37.5 °C) (Bladder)   Resp 24   Ht 5' 1\" (1.549 m)   Wt 266 lb 15.6 oz (121.1 kg)   SpO2 94%   BMI 50.44 kg/m²   Tmax over 24 hours:  Temp (24hrs), Av.8 °F (37.7 °C), Min:99.1 °F (37.3 °C), Max:100.6 °F (38.1 °C)      Patient Vitals for the past 8 hrs:   BP Temp Temp src Pulse Resp SpO2   22 0925 -- -- -- 71 24 94 %   22 0920 -- -- -- 74 17 97 %   22 0916 -- -- -- 65 23 94 %   22 0600 104/64 99.5 °F (37.5 °C) Bladder 64 10 94 %   22 0505 -- -- -- -- -- 94 %   22 0500 108/68 -- -- 68 21 96 %   22 0459 -- -- -- 67 22 95 %         Intake/Output Summary (Last 24 hours) at 2022 1232  Last data filed at 2022 1200  Gross per 24 hour   Intake 2618.86 ml   Output 1330 ml   Net 1288.86 ml     Date 22 0000 - 22 2359   Shift 6002-8382 3931-7150 5112-5972 24 Hour Total   INTAKE   I.V.(mL/kg) 91.9(0.8) 15.3(0.1)  107.3(0.9)   NG/GT(mL/kg) 728(6) 591(4.9)  1319(10.9)   IV Piggyback(mL/kg) 248(2)   248(2)   Shift Total(mL/kg) 9535(1.9) 606. 3(5)  [id number]. 3(13.8)   OUTPUT   Urine(mL/kg/hr) 345(0.4) 385  730   Shift Total(mL/kg) 345(2.8) 385(3.2)  730(6) Weight (kg) 121.1 121.1 121.1 121. 1     Wt Readings from Last 3 Encounters:   12/20/22 266 lb 15.6 oz (121.1 kg)   12/05/22 277 lb (125.6 kg)   11/07/22 279 lb (126.6 kg)     Body mass index is 50.44 kg/m². PHYSICAL EXAM:  GEN:  awake, alert, and cooperative  EYES:   pupils equal, round, and reactive to light  HEENT:  Normocepalic, without obvious abnormality  LUNGS:  No increased work of breathing, good air exchange. Rhonchorous in all lung fields.   CV:    regular rate and rhythm and normal S1 and S2  ABDOMEN:   soft, non-distended, and non-tender  MSK:    there is no redness, warmth, or swelling of the joints  NEURO[de-identified]   Awake, alert, following commands  SKIN:   Normal skin color, texture, turgor  EXTREMITIES:  No pedal or leg edema, no calf tenderness/swelling, no erythema, distal pulses intact       MEDICATIONS:  Scheduled Meds:   hydrocortisone sodium succinate PF  100 mg IntraVENous Q8H    vancomycin  1,250 mg IntraVENous Q12H    guaiFENesin  200 mg Oral Q4H    furosemide  20 mg IntraVENous Daily    pantoprazole (PROTONIX) 40 mg injection  40 mg IntraVENous Daily    vancomycin (VANCOCIN) intermittent dosing (placeholder)   Other RX Placeholder    levalbuterol  0.63 mg Nebulization 4x daily    enoxaparin  30 mg SubCUTAneous BID    sertraline  150 mg Oral Daily    lamoTRIgine  200 mg Oral Daily    levothyroxine  150 mcg Oral QAM AC    topiramate  75 mg Oral Daily    topiramate  100 mg Oral Nightly     Continuous Infusions:   sodium chloride Stopped (12/12/22 0316)    sodium chloride 10 mL/hr at 12/20/22 0823     PRN Meds:   ondansetron, 4 mg, Q6H PRN  polyethylene glycol, 17 g, Daily PRN  oxyCODONE, 10 mg, Q4H PRN   Or  oxyCODONE, 5 mg, Q4H PRN  levalbuterol, 0.63 mg, Q4H PRN  sodium chloride, , PRN  fentanNYL, 50 mcg, Q2H PRN  acetaminophen, 650 mg, Q6H PRN  sodium chloride flush, 5-40 mL, PRN      SUPPORT DEVICES: [x] Ventilator [] BIPAP  [] Nasal Cannula [] Room Air    VENT SETTINGS (Comprehensive) (if applicable): PRVC mode, FiO2 40%, PEEP 5, Respiratory Rate 18, Tidal Volume 380  Vent Information  Ventilator ID: [id number]  Equipment Changed: HME, Expiratory Filter  Ventilator Initiate: Yes  Vent Mode: AC/PRVC  Additional Respiratory Assessments  Heart Rate: 71  Resp: 24  SpO2: 94 %  End Tidal CO2: 44 (%)  Position: Semi-Love's  Humidification Source: HME  Humidification Temp: 36.5  Circuit Condensation: Drained  Cuff Pressure (cm H2O):  (mlt)  Skin Barrier Applied: No    ABGs:   Arterial Blood Gas result:  pH 7.412; pCO2 38.2; pO2 86.8; HCO3 25.3; O2 Sat 97.   Lab Results   Component Value Date/Time    FIO2 40.0 12/20/2022 04:35 AM         DATA:  Complete Blood Count:   Recent Labs     12/18/22 0456 12/19/22 0327 12/20/22  0543   WBC 10.0 10.4 16.0*   RBC 3.91* 4.18 4.11   HGB 10.9* 11.6* 11.4*   HCT 36.1* 36.1* 37.5   MCV 92.3 86.4 91.2   MCH 27.9 27.8 27.7   MCHC 30.2 32.1 30.4   RDW 14.0 13.9 13.3    See Reflexed IPF Result 412   MPV 10.4  --  10.4        Last 3 Blood Glucose:   Recent Labs     12/18/22 0456 12/19/22 0327 12/20/22  0543   GLUCOSE 112* 102* 139*        PT/INR:    Lab Results   Component Value Date/Time    PROTIME 10.5 12/05/2022 05:35 PM    INR 1.0 12/05/2022 05:35 PM     PTT:    Lab Results   Component Value Date/Time    APTT 24.9 03/10/2015 11:25 AM       Comprehensive Metabolic Profile:   Recent Labs     12/18/22 0456 12/19/22 0327 12/20/22  0543    132* 133*   K 3.8 4.7 3.8    99 99   CO2 22 22 23   BUN 19 19 19   CREATININE 0.61 0.64 0.62   GLUCOSE 112* 102* 139*   CALCIUM 7.9* 8.2* 8.2*      Magnesium:   Lab Results   Component Value Date/Time    MG 1.7 12/06/2022 10:12 PM     Phosphorus:   Lab Results   Component Value Date/Time    PHOS 2.9 12/06/2022 10:12 PM     Ionized Calcium: No results found for: VERNELL     Urinalysis:   Lab Results   Component Value Date/Time    NITRU NEGATIVE 12/06/2022 08:05 PM    COLORU Yellow 12/06/2022 08:05 PM    PHUR 5.5 12/06/2022 08:05 PM    WBCUA None 12/06/2022 08:05 PM    RBCUA None 12/06/2022 08:05 PM    MUCUS 1+ 03/27/2013 05:54 PM    TRICHOMONAS NOT REPORTED 03/27/2013 05:54 PM    YEAST NOT REPORTED 03/27/2013 05:54 PM    BACTERIA RARE 03/27/2013 05:54 PM    CLARITYU clear 11/11/2014 01:20 PM    SPECGRAV 1.035 12/06/2022 08:05 PM    LEUKOCYTESUR NEGATIVE 12/06/2022 08:05 PM    UROBILINOGEN Normal 12/06/2022 08:05 PM    BILIRUBINUR NEGATIVE 12/06/2022 08:05 PM    BILIRUBINUR neagtive 03/17/2017 04:30 PM    BLOODU negative 03/17/2017 04:30 PM    GLUCOSEU NEGATIVE 12/06/2022 08:05 PM    KETUA MODERATE 12/06/2022 08:05 PM    AMORPHOUS NOT REPORTED 03/27/2013 05:54 PM       HgBA1c:    Lab Results   Component Value Date/Time    LABA1C 5.3 04/26/2022 12:00 AM     TSH:    Lab Results   Component Value Date/Time    TSH <0.01 12/07/2022 03:57 AM     Lactic Acid: No results found for: LACTA   Troponin: No results for input(s): TROPONINI in the last 72 hours.     Microbiology:  Blood Culture: + S epidermidis x 1  Blood culture: NG 12 h      ASSESSMENT:     Patient Active Problem List    Diagnosis Date Noted    MRSA infection 12/14/2022    Acute lower respiratory tract infection 12/14/2022    Acute respiratory failure with hypoxia ([address] 12/11/2022    Aspiration pneumonia ([address] 12/10/2022    Multiple closed pelvic fractures without disruption of pelvic [place] ([address] 12/09/2022    Lethargy 12/07/2022    Cervical transverse process fracture, initial encounter ([address] 12/06/2022    Closed nondisplaced fracture of seventh cervical vertebra ([address] 12/06/2022    Gastroesophageal reflux disease 08/12/2022    Irritable bowel syndrome with constipation 06/07/2022    Type 2 diabetes mellitus with stage 3a chronic kidney disease, without long-term current use of insulin ([address] 11/12/2021    Moderate episode of recurrent major depressive disorder ([facility] 02/26/2020    Hypopituitarism ([facility] 08/24/2016    ROHIT on CPAP 05/08/2015    Hypothyroidism 08/06/2014 Asthma 08/06/2014    Morbid obesity ([facility] 08/06/2014    Seizure ([facility] 10/23/2013          PLAN:      Neuro  Intubated  Off precedex, off sedation  Lamotrigine 200  Topamax 75 daily, 100 nightly  Zoloft 150 mg  Per neurosurgery pt does not need C collar. Cards  HR 71  MAP stable   Lasix 20 daily     Pulmonary  Intubated for aspiration pneumonia  PRVC 18/380/5/40%  CXR 12/18 - no change in bilateral airspace disease  Pulmonary toilet  Hydrocortisone 10 mg BID  Xopenex nebulizer QID  Guanifenesen 200 mg q 4h     Renal  Porras placed 12/15 for retention  Intake/Output Summary (Last 24 hours) at 12/20/2022 1232  Last data filed at 12/20/2022 1200  Gross per 24 hour   Intake 2618.86 ml   Output 1330 ml   Net 1288.86 ml     Lasix 20 mg daily     GI  Tube feeds 75 ml/hr, at goal  Protonix 40 daily   Glycolax     ID  Vancomycin, course started 12/12  Temp 37.5  Respiratory culture (+) S Aureus  Repeat Blood culture (+) S epidermidis x 1, Blood culture 2: No growth   Blood cultures (-) x 5 days  ID following     Endo  Synthroid 150 daily  Hydrocortisone 10 mg BID     DVT Ppx: Lovenox   GI Ppx: Protonix    ICU PROPHYLAXIS:  Stress ulcer:  [x] PPI Agent  [] [id number] [] Sucralfate  [] Other:  VTE:   [x] Enoxaparin  [] Unfract. Heparin Subcut  [] EPC Cuffs    NUTRITION:  [] NPO [x] Tube Feeding (Specify: )  ADULT TUBE FEEDING; Orogastric; Standard without Fiber; Continuous; 45; Yes; 20; Q 4 hours; 75; 30; Q 4 hours   [] TPN  [] PO    HOME MEDS RECONCILED: [] No  [x] Yes    CONSULTATION NEEDED:  [] No  [x] Yes    FAMILY UPDATED:    [] No  [x] Yes    TRANSFER OUT OF ICU:   [x] No  [] Yes      Marshall Snellen, M.D.   Internal Medicine Resident PGY-1  [id number].  12/20/2022 12:32 PM

## 2022-12-20 NOTE — PLAN OF CARE
Problem: Respiratory - Adult  Goal: Achieves optimal ventilation and oxygenation  12/20/2022 1538 by Nicholas Capone RCP  Flowsheets (Taken 12/20/2022 1538)  Achieves optimal ventilation and oxygenation:   Assess for changes in respiratory status   Assess the need for suctioning and aspirate as needed   Respiratory therapy support as indicated   Assess and instruct to report shortness of breath or any respiratory difficulty

## 2022-12-20 NOTE — PROGRESS NOTES
Occupational Therapy  Facility/Department: [facility] CAR 3- MICU  Occupational Therapy Daily Treatment Note    Name: Rubia Oates  : 1975  MRN: [id number]  Date of Service: 2022    Discharge Recommendations:  Patient would benefit from continued therapy after discharge    Patient Diagnosis(es): The primary encounter diagnosis was Other closed nondisplaced fracture of seventh cervical vertebra, initial encounter ([facility] Diagnoses of Closed wedge compression fracture of T3 vertebra, initial encounter ([facility] and Multiple closed fractures of pelvis without disruption of pelvic ring, initial encounter [facility]) were also pertinent to this visit. 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 Past Surgical History:  has a past surgical history that includes Brain Biopsy; Tonsillectomy; knee surgery; Cochlear implant (Left); Dental surgery; Cochlear implant (Right, 05/10/2022); Colonoscopy; Endoscopy, colon, diagnostic; Colonoscopy (N/A, 2022); and Upper gastrointestinal endoscopy (N/A, 2022). Assessment   Performance deficits / Impairments: Decreased ADL status; Decreased functional mobility ; Decreased ROM; Decreased strength;Decreased endurance;Decreased high-level IADLs;Decreased fine motor control;Decreased cognition;Decreased safe awareness;Decreased balance;Decreased posture  Assessment: Pt participating in EOB activity with slow, steady progress this session.   Prognosis: Fair  Activity Tolerance  Activity Tolerance: Patient limited by fatigue;Patient Tolerated treatment well;Treatment limited secondary to medical complications        Plan   Occupational Therapy Plan  Times Per Week: 4-5x/wk  Current Treatment Recommendations: Strengthening, Balance training, ROM, Functional mobility training, Endurance training, Safety education & training, Positioning, Equipment evaluation, education, & procurement, Patient/Caregiver education & training, Self-Care / ADL     Restrictions  Restrictions/Precautions  Restrictions/Precautions: Fall Risk, Weight Bearing, Contact Precautions  Required Braces or Orthoses?: No  Lower Extremity Weight Bearing Restrictions  Right Lower Extremity Weight Bearing: Weight Bearing As Tolerated  Required Braces or Orthoses  Cervical: c-collar  Position Activity Restriction  Other position/activity restrictions: up with assist, no intervention required for C7 or T3 fracture per Lamberto Larkin via [employer]; Acute C7 Right TP fx, Age indeterminate T3 compression fx, Nondisplaced right sacral ala fx, Nondisplaced fracture right superior pubic ramus, Right obturator ring fx. Cochlear implant R ear during session this day. Subjective   General  Patient assessed for rehabilitation services?: Yes  Response to previous treatment: Patient with no complaints from previous session  Family / Caregiver Present: Yes (father)  General Comment  Comments: RN Ok'd patient for MIRELA/PTA session. Pt supine in bed upon PEOPLES arrival. Pt on CPAP at this time. Pt denied pain only stating she is hot       Objective   Safety Devices  Type of Devices: All fall risk precautions in place;Call light within reach;Nurse notified; Left in bed  Restraints  Restraints Initially in Place: No  Balance  Sitting: With support (Pt sat EOB ~10 minutes with overall MIN>MOD A and occasional CGA with noted effort by patient to maintain)  Standing:  (Pt remians with decreased sitting balance and remains on CPAP)        ADL  Feeding Skilled Clinical Factors: Patient on CPAP with feeding tube present  Grooming: Stand by assistance  Grooming Skilled Clinical Factors: wash face and hands  LE Dressing: Dependent/Total;Increased time to complete;Verbal cueing;Setup  LE Dressing Skilled Clinical Factors: slipper socks  Additional Comments: Pt willing to attempt all tasks requested and completed while seated EOB with reaching tasks in mid quadrant due to limitations from lines and vent. Bed mobility  Supine to Sit: Moderate assistance;2 Person assistance  Sit to Supine: Moderate assistance;2 Person assistance  Scooting: Moderate assistance;2 Person assistance  Bed Mobility Comments: increased time to complete due to weakness, fatigue, vent/CPAP        Cognition  Arousal/Alertness: Delayed responses to stimuli  Following Commands: Follows one step commands with increased time  Attention Span: Attends with cues to redirect  Safety Judgement: Decreased awareness of need for assistance;Decreased awareness of need for safety  Insights: Decreased awareness of deficits  Initiation: Requires cues for some  Sequencing: Requires cues for some  Cognition Comment: pt able to read lips and follow commands with demonstrations, [place]  Orientation  Overall Orientation Status: Within Functional Limits (Pt able to mouth words appropriately)     Education Given To: Patient  Education Provided: Role of Therapy; Equipment;Precautions; Fall Prevention Strategies;Orientation; ADL Adaptive Strategies  Education Provided Comments: Body mechanics and safety  Education Method: Verbal;Demonstration  Barriers to Learning: Hearing;Cognition  Education Outcome: Verbalized understanding;Continued education needed;Demonstrated understanding    AM-PAC Score  AM-[facility] Inpatient Daily Activity Raw Score: 10 (12/20/22 1453)  AM-PAC Inpatient ADL T-Scale Score : 27.31 (12/20/22 1453)  ADL Inpatient CMS 0-100% Score: 74.7 (12/20/22 1453)  ADL Inpatient CMS G-Code Modifier : CL (12/20/22 1453)       Goals  Short Term Goals  Time Frame for Short Term Goals: Patient will, by discharge  Short Term Goal 1: demo self-feeding/grooming at Mod I using AE PRN  Short Term Goal 2: demo bed mobility at [address] to promote OOB activity  Short Term Goal 3: demo UB ADLs at [address] Term Goal 4: demo 10+ min of dynamic sitting balance at CGA to engage in ADLs safely  Short Term Goal 5: notify OTR to update POC as patient progresses       Therapy Time   Individual Concurrent Group Co-treatment   Time In 1140         Time Out 1204         Minutes 24         Timed Code Treatment Minutes: 24 Minutes (co-tx with PTA for safety and requires 2 skilled for goal progression)       RICHELLE Kruger/GIRMA

## 2022-12-21 LAB
ABSOLUTE EOS #: <0.03 K/UL (ref 0–0.44)
ABSOLUTE IMMATURE GRANULOCYTE: 0.12 K/UL (ref 0–0.3)
ABSOLUTE LYMPH #: 1.61 K/UL (ref 1.1–3.7)
ABSOLUTE MONO #: 0.89 K/UL (ref 0.1–1.2)
ALLEN TEST: POSITIVE
ANION GAP SERPL CALCULATED.3IONS-SCNC: 11 MMOL/L (ref 9–17)
BASOPHILS # BLD: 1 % (ref 0–2)
BASOPHILS ABSOLUTE: 0.06 K/UL (ref 0–0.2)
BUN BLDV-MCNC: 24 MG/DL (ref 6–20)
CALCIUM SERPL-MCNC: 8.4 MG/DL (ref 8.6–10.4)
CHLORIDE BLD-SCNC: 102 MMOL/L (ref 98–107)
CO2: 26 MMOL/L (ref 20–31)
CREAT SERPL-MCNC: 0.72 MG/DL (ref 0.5–0.9)
EOSINOPHILS RELATIVE PERCENT: 0 % (ref 1–4)
FIO2: 40
GFR SERPL CREATININE-BSD FRML MDRD: >60 ML/MIN/1.73M2
GLUCOSE BLD-MCNC: 127 MG/DL (ref 74–100)
GLUCOSE BLD-MCNC: 94 MG/DL (ref 70–99)
HCT VFR BLD CALC: 32.8 % (ref 36.3–47.1)
HEMOGLOBIN: 10.4 G/DL (ref 11.9–15.1)
IMMATURE GRANULOCYTES: 1 %
LYMPHOCYTES # BLD: 13 % (ref 24–43)
MAGNESIUM: 2.3 MG/DL (ref 1.6–2.6)
MCH RBC QN AUTO: 28 PG (ref 25.2–33.5)
MCHC RBC AUTO-ENTMCNC: 31.7 G/DL (ref 28.4–34.8)
MCV RBC AUTO: 88.2 FL (ref 82.6–102.9)
MODE: NORMAL
MONOCYTES # BLD: 7 % (ref 3–12)
NRBC AUTOMATED: 0 PER 100 WBC
O2 DEVICE/FLOW/%: NORMAL
PDW BLD-RTO: 13.6 % (ref 11.8–14.4)
PLATELET # BLD: 352 K/UL (ref 138–453)
PMV BLD AUTO: 10.9 FL (ref 8.1–13.5)
POC HCO3: 26.7 MMOL/L (ref 21–28)
POC O2 SATURATION: 97 % (ref 94–98)
POC PCO2: 43.2 MM HG (ref 35–48)
POC PH: 7.4 (ref 7.35–7.45)
POC PO2: 87 MM HG (ref 83–108)
POSITIVE BASE EXCESS, ART: 2 (ref 0–3)
POTASSIUM SERPL-SCNC: 3.3 MMOL/L (ref 3.7–5.3)
RBC # BLD: 3.72 M/UL (ref 3.95–5.11)
SAMPLE SITE: NORMAL
SEG NEUTROPHILS: 78 % (ref 36–65)
SEGMENTED NEUTROPHILS ABSOLUTE COUNT: 10.03 K/UL (ref 1.5–8.1)
SODIUM BLD-SCNC: 139 MMOL/L (ref 135–144)
WBC # BLD: 12.7 K/UL (ref 3.5–11.3)

## 2022-12-21 PROCEDURE — 82947 ASSAY GLUCOSE BLOOD QUANT: CPT

## 2022-12-21 PROCEDURE — 94640 AIRWAY INHALATION TREATMENT: CPT

## 2022-12-21 PROCEDURE — 83735 ASSAY OF MAGNESIUM: CPT

## 2022-12-21 PROCEDURE — 94761 N-INVAS EAR/PLS OXIMETRY MLT: CPT

## 2022-12-21 PROCEDURE — 6370000000 HC RX 637 (ALT 250 FOR IP): Performed by: STUDENT IN AN ORGANIZED HEALTH CARE EDUCATION/TRAINING PROGRAM

## 2022-12-21 PROCEDURE — 51798 US URINE CAPACITY MEASURE: CPT

## 2022-12-21 PROCEDURE — 94003 VENT MGMT INPAT SUBQ DAY: CPT

## 2022-12-21 PROCEDURE — 6360000002 HC RX W HCPCS: Performed by: STUDENT IN AN ORGANIZED HEALTH CARE EDUCATION/TRAINING PROGRAM

## 2022-12-21 PROCEDURE — 6360000002 HC RX W HCPCS

## 2022-12-21 PROCEDURE — 82803 BLOOD GASES ANY COMBINATION: CPT

## 2022-12-21 PROCEDURE — 2580000003 HC RX 258: Performed by: NURSE PRACTITIONER

## 2022-12-21 PROCEDURE — 99232 SBSQ HOSP IP/OBS MODERATE 35: CPT | Performed by: NURSE PRACTITIONER

## 2022-12-21 PROCEDURE — 6360000002 HC RX W HCPCS: Performed by: NURSE PRACTITIONER

## 2022-12-21 PROCEDURE — 6370000000 HC RX 637 (ALT 250 FOR IP): Performed by: NURSE PRACTITIONER

## 2022-12-21 PROCEDURE — 2500000003 HC RX 250 WO HCPCS: Performed by: HEALTH CARE PROVIDER

## 2022-12-21 PROCEDURE — 2700000000 HC OXYGEN THERAPY PER DAY

## 2022-12-21 PROCEDURE — 99233 SBSQ HOSP IP/OBS HIGH 50: CPT | Performed by: INTERNAL MEDICINE

## 2022-12-21 PROCEDURE — 85025 COMPLETE CBC W/AUTO DIFF WBC: CPT

## 2022-12-21 PROCEDURE — 2580000003 HC RX 258

## 2022-12-21 PROCEDURE — 2500000003 HC RX 250 WO HCPCS: Performed by: INTERNAL MEDICINE

## 2022-12-21 PROCEDURE — 93005 ELECTROCARDIOGRAM TRACING: CPT | Performed by: INTERNAL MEDICINE

## 2022-12-21 PROCEDURE — 80048 BASIC METABOLIC PNL TOTAL CA: CPT

## 2022-12-21 PROCEDURE — 36415 COLL VENOUS BLD VENIPUNCTURE: CPT

## 2022-12-21 PROCEDURE — 36600 WITHDRAWAL OF ARTERIAL BLOOD: CPT

## 2022-12-21 PROCEDURE — 51701 INSERT BLADDER CATHETER: CPT

## 2022-12-21 PROCEDURE — 6370000000 HC RX 637 (ALT 250 FOR IP)

## 2022-12-21 PROCEDURE — C9113 INJ PANTOPRAZOLE SODIUM, VIA: HCPCS

## 2022-12-21 PROCEDURE — 2000000000 HC ICU R&B

## 2022-12-21 PROCEDURE — 99291 CRITICAL CARE FIRST HOUR: CPT | Performed by: INTERNAL MEDICINE

## 2022-12-21 RX ORDER — HYDROCORTISONE 10 MG/1
10 TABLET ORAL 2 TIMES DAILY
Status: DISCONTINUED | OUTPATIENT
Start: 2022-12-21 | End: 2022-12-21

## 2022-12-21 RX ORDER — LIDOCAINE HYDROCHLORIDE 40 MG/ML
4 INJECTION, SOLUTION RETROBULBAR; TOPICAL ONCE
Status: COMPLETED | OUTPATIENT
Start: 2022-12-21 | End: 2022-12-21

## 2022-12-21 RX ORDER — LIDOCAINE HYDROCHLORIDE 40 MG/ML
4 INJECTION, SOLUTION RETROBULBAR; TOPICAL ONCE
Status: DISCONTINUED | OUTPATIENT
Start: 2022-12-21 | End: 2022-12-21

## 2022-12-21 RX ORDER — FENTANYL CITRATE 50 UG/ML
100 INJECTION, SOLUTION INTRAMUSCULAR; INTRAVENOUS ONCE
Status: COMPLETED | OUTPATIENT
Start: 2022-12-21 | End: 2022-12-21

## 2022-12-21 RX ORDER — SODIUM CHLORIDE FOR INHALATION 0.9 %
3 VIAL, NEBULIZER (ML) INHALATION EVERY 4 HOURS PRN
Status: DISCONTINUED | OUTPATIENT
Start: 2022-12-21 | End: 2022-12-31 | Stop reason: HOSPADM

## 2022-12-21 RX ADMIN — OXYCODONE 10 MG: 5 TABLET ORAL at 00:14

## 2022-12-21 RX ADMIN — TOPIRAMATE 75 MG: 100 TABLET, FILM COATED ORAL at 10:11

## 2022-12-21 RX ADMIN — FENTANYL CITRATE 50 MCG: 50 INJECTION, SOLUTION INTRAMUSCULAR; INTRAVENOUS at 03:45

## 2022-12-21 RX ADMIN — HYDROCORTISONE 10 MG: 10 TABLET ORAL at 11:40

## 2022-12-21 RX ADMIN — SODIUM CHLORIDE, PRESERVATIVE FREE 40 MG: 5 INJECTION INTRAVENOUS at 10:11

## 2022-12-21 RX ADMIN — LIDOCAINE HYDROCHLORIDE 4 ML: 40 INJECTION, SOLUTION RETROBULBAR; TOPICAL at 15:15

## 2022-12-21 RX ADMIN — LAMOTRIGINE 200 MG: 100 TABLET ORAL at 10:12

## 2022-12-21 RX ADMIN — SERTRALINE 150 MG: 50 TABLET, FILM COATED ORAL at 10:11

## 2022-12-21 RX ADMIN — RACEPINEPHRINE HYDROCHLORIDE 11.25 MG: 11.25 SOLUTION RESPIRATORY (INHALATION) at 19:01

## 2022-12-21 RX ADMIN — Medication 1250 MG: at 11:40

## 2022-12-21 RX ADMIN — ENOXAPARIN SODIUM 30 MG: 100 INJECTION SUBCUTANEOUS at 10:12

## 2022-12-21 RX ADMIN — GUAIFENESIN 200 MG: 200 SOLUTION ORAL at 10:12

## 2022-12-21 RX ADMIN — ENOXAPARIN SODIUM 30 MG: 100 INJECTION SUBCUTANEOUS at 21:56

## 2022-12-21 RX ADMIN — POTASSIUM BICARBONATE 40 MEQ: 782 TABLET, EFFERVESCENT ORAL at 15:01

## 2022-12-21 RX ADMIN — FENTANYL CITRATE 100 MCG: 50 INJECTION, SOLUTION INTRAMUSCULAR; INTRAVENOUS at 19:00

## 2022-12-21 RX ADMIN — FENTANYL CITRATE 50 MCG: 50 INJECTION, SOLUTION INTRAMUSCULAR; INTRAVENOUS at 16:55

## 2022-12-21 RX ADMIN — GUAIFENESIN 200 MG: 200 SOLUTION ORAL at 14:00

## 2022-12-21 RX ADMIN — GUAIFENESIN 200 MG: 200 SOLUTION ORAL at 05:59

## 2022-12-21 RX ADMIN — LEVOTHYROXINE SODIUM 150 MCG: 75 TABLET ORAL at 07:42

## 2022-12-21 RX ADMIN — FENTANYL CITRATE 50 MCG: 50 INJECTION, SOLUTION INTRAMUSCULAR; INTRAVENOUS at 23:40

## 2022-12-21 RX ADMIN — FENTANYL CITRATE 50 MCG: 50 INJECTION, SOLUTION INTRAMUSCULAR; INTRAVENOUS at 21:27

## 2022-12-21 RX ADMIN — HYDROCORTISONE SODIUM SUCCINATE 100 MG: 100 INJECTION, POWDER, FOR SOLUTION INTRAMUSCULAR; INTRAVENOUS at 17:52

## 2022-12-21 RX ADMIN — FUROSEMIDE 20 MG: 10 INJECTION, SOLUTION INTRAMUSCULAR; INTRAVENOUS at 10:11

## 2022-12-21 RX ADMIN — GUAIFENESIN 200 MG: 200 SOLUTION ORAL at 01:58

## 2022-12-21 ASSESSMENT — PAIN SCALES - GENERAL
PAINLEVEL_OUTOF10: 9
PAINLEVEL_OUTOF10: 9
PAINLEVEL_OUTOF10: 8
PAINLEVEL_OUTOF10: 5
PAINLEVEL_OUTOF10: 5
PAINLEVEL_OUTOF10: 4
PAINLEVEL_OUTOF10: 3
PAINLEVEL_OUTOF10: 8
PAINLEVEL_OUTOF10: 8
PAINLEVEL_OUTOF10: 4

## 2022-12-21 ASSESSMENT — PAIN DESCRIPTION - ORIENTATION
ORIENTATION: UPPER

## 2022-12-21 ASSESSMENT — PULMONARY FUNCTION TESTS
PIF_VALUE: 25
PIF_VALUE: 13
PIF_VALUE: 22
PIF_VALUE: 19
PIF_VALUE: 13

## 2022-12-21 ASSESSMENT — PAIN DESCRIPTION - LOCATION
LOCATION: BACK
LOCATION: CHEST;BACK
LOCATION: BACK;THROAT;HEAD
LOCATION: BACK
LOCATION: BACK

## 2022-12-21 ASSESSMENT — PAIN DESCRIPTION - DESCRIPTORS
DESCRIPTORS: ACHING;DISCOMFORT;NUMBNESS
DESCRIPTORS: ACHING;NUMBNESS;SHARP
DESCRIPTORS: SHARP
DESCRIPTORS: ACHING;HEAVINESS

## 2022-12-21 NOTE — PROGRESS NOTES
Patient placed on CPAP as ordered on pressure support of 8, tolerating well.       12/21/22 0823   NICU Vent Information   Vent Type Servo i   Vent Mode (S)  CPAP   Vt (Set, mL) 380 mL   Vt (Measured) 477 mL   Resp Rate (Set) 18 bmp   Rate Measured 23 br/min   Minute Volume (L/min) 9.8 Liters   Pressure Support (S)  8 cmH20   FiO2  40 %   Peak Inspiratory Pressure (cmH2O) 13 cmH2O   I:E Ratio 1:1.22   Sensitivity 3   PEEP/CPAP (cmH2O) 5   I Time/ I Time % 0.8 s   Mean Airway Pressure (cmH2O) 7.3 cmH20

## 2022-12-21 NOTE — PROGRESS NOTES
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 Continue aggressive pulmonary toileting and we will follow her progress  The patient is following commands and overall clinically is improved    Infection Control Recommendations:   Contact precautions    Discharge Planning:   Estimated Length of IV antimicrobials: To be determined  Patient will need Midline Catheter Insertion/ PICC line Insertion: No  Patient will need: Home IV , [place],  SNF,  LTAC: Undetermined  Patient willneed outpatient wound care: No    Medical Decision making / Summary of Stay:   Piyush Martins is a 41y.o.-year-old female who was initially admitted on 12/5/2022. Patient seen at the request of Dr. Nghia Pickett:     This patient, with a history of developmental delay, presented to the [facility] ED after falling down some stairs at home. She was complaining of neck and back pain. Imaging revealed an acute C7 transverse process fracture, chronic T3 compression fx and fractures of right sacral ala, nondisplaced fracture of right superior ramus and right obturator ring. She was transferred to [facility] for further medical management. Neurosurgery was consulted and it was determined there is no neurosurgical intervention needed. Orthopedic surgery was also consulted and they also did not recommend surgical intervention, but rather outpatient follow-up.      On 12/6/22, the patient's mentation declined and she became tachypneic requiring emergent intubation. Neurology was consulted for the patient's history of seizures. Her home seizure medications were restarted. An EEG did not show any seizure activity and the patient's mentation improved. She was extubated on the 7th but required re-intubation on 12/10/22 after a rapid response was called. CXR showed concern for RUL aspiration pneumonia. IV Zosyn was started and cultures were obtained. Viral respiratory panel was negative for influenza and Covid     Sputum cultures grew MRSA and the patient's antibiotics were changed to IV vancomycin on 12/11/22. There has been no growth on blood or urine cultures. ID was consulted for MRSA on sputum cultures        CT Head W/O Contrast   Final Result   No acute intracranial abnormality. Small amount of fluid in the right mastoid air cells. CT CHEST ABDOMEN PELVIS WO CONTRAST Additional Contrast? None   Final Result   Chest:       1. Mild anterior wedging of T3 suggesting an age indeterminate compression   fracture. No fracture line is identified. Clinical correlation with the   site of symptoms is suggested. 2. Significant motion artifact with scattered patchy ground-glass opacities   in the lungs which may be related to motion artifact. Ground-glass opacities   are otherwise nonspecific and could be related to atypical infection or   pneumonitis. Abdomen and pelvis:       1. Nondisplaced fractures of the right sacral ala and the right obturator   ring. 2. No acute findings elsewhere in the abdomen or pelvis. 3. Left renal cyst.           CT CSpine W/O Contrast   Final Result   Acute fracture of the right C7 transverse process. Spinal degenerative changes as described. Current evaluation:12/21/2022  Patient evaluated and examined in the ICU.   Afebrile to low grade fevers  VS stable  Patient is stable  No complaints  On non-rebreather    /68   Pulse (!) 49   Temp 98.6 °F (37 °C) (Axillary)   Resp 14   Ht 5' 1\" (1.549 m)   Wt 266 lb 12.1 oz (121 kg)   SpO2 94%   BMI 50.40 kg/m²     Temperature Range: Temp: 98.6 °F (37 °C) Temp  Av.4 °F (36.9 °C)  Min: 97.5 °F (36.4 °C)  Max: 99.2 °F (37.3 °C)      Labs:  Bun 24  Creat 0.72  WBC 10.4-16.0-12.7  Hgb 116-11.4-10.4  Plt 223-031    Micro:   COVID-19 - not detected   MRSA nasal probe - negative  12/10 RVP - negative  12/10 MRSA nasal probe - positive  12/10 Resp Cx - MRSA heavy growth  12/10 Blood Cx x2 - no growth to date  12/15 Urune Cx - no growth  12/15 Blood Cx x2 - / Staph epi    Imagin/18 CXR  No change from prior study. Tubes and lines as above. Endotracheal tube is somewhat low lying but unchanged.  CXR  Similar appearance of mild bilateral airspace disease. This appears consistent with pneumonia   12/15 CXR  1. No significant change in bilateral airspace disease. 2.  Endotracheal tube terminates above the manny. 3.  Enteric tube terminates at the level of the body of the stomach.  CXR  No significant interval change. Patchy perihilar airspace opacities.  XR Pelvis   No acute abnormality of the pelvis. Known right superior ramus fracture is not well seen radiographically. Review of Systems   Unable to perform ROS: Intubated     Physical Examination :     Physical Exam  Vitals and nursing note reviewed. Constitutional:       Appearance: She is well-developed. She is obese. Interventions: She is sedated and intubated. HENT:      Head: Normocephalic and atraumatic. Mouth/Throat:      Mouth: Mucous membranes are moist.      Pharynx: Oropharynx is clear. Eyes:      Conjunctiva/sclera: Conjunctivae normal.   Cardiovascular:      Rate and Rhythm: Normal rate and regular rhythm. Heart sounds: Normal heart sounds. Pulmonary:      Effort: Pulmonary effort is normal. No respiratory distress. She is intubated.       Breath sounds: Normal breath sounds. Abdominal:      General: Bowel sounds are normal. There is no distension. Palpations: Abdomen is soft. Tenderness: There is no abdominal tenderness. Musculoskeletal:      Cervical back: Normal range of motion. No rigidity. Right lower leg: No edema. Left lower leg: No edema. Skin:     General: Skin is warm and dry. Neurological:      Mental Status: She is alert and oriented to person, place, and time. Laboratory data:   I have independently reviewed the followinglabs:  CBC with Differential:   Recent Labs     12/20/22  0543 12/21/22  0756   WBC 16.0* 12.7*   HGB 11.4* 10.4*   HCT 37.5 32.8*    352   LYMPHOPCT 7* 13*   MONOPCT 4 7       BMP:   Recent Labs     12/20/22  0543 12/21/22  0756   * 139   K 3.8 3.3*   CL 99 102   CO2 23 26   BUN 19 24*   CREATININE 0.62 0.72   MG  --  2.3       Hepatic Function Panel: No results for input(s): PROT, LABALBU, BILIDIR, IBILI, BILITOT, ALKPHOS, ALT, AST in the last 72 hours. No results found for: PROCAL  Lab Results   Component Value Date/Time    CRP 4.4 08/21/2018 06:46 PM     Lab Results   Component Value Date    SEDRATE 20 08/21/2018         Lab Results   Component Value Date/Time    DDIMER 0.33 07/15/2020 05:29 PM     No results found for: FERRITIN  No results found for: LDH  No results found for: FIBRINOGEN    Results in Past 30 Days  Result Component Current Result Ref Range Previous Result Ref Range   SARS-CoV-2, PCR Not Detected (12/10/2022) Not Detected Not Detected (12/6/2022) Not Detected     Lab Results   Component Value Date/Time    COVID19 Not Detected 12/10/2022 01:09 PM    COVID19 Not Detected 12/06/2022 10:15 PM       No results for input(s): VANCOTROUGH in the last 72 hours. Imaging Studies:   ONE XRAY VIEW OF THE CHEST 12/16/2022 9:50 am  Impression   Similar appearance of mild bilateral airspace disease.   This appears   consistent with pneumonia     Cultures:     Culture, Blood 1 [[id number]] Collected: 12/15/22 1349   Order Status: Completed Specimen: Blood Updated: 12/18/22 0912    Specimen Description . BLOOD    Special Requests L FOREARM  10ML    Culture NO GROWTH 3 DAYS   Culture, Blood 1 [[id number]] (Abnormal) Collected: 12/15/22 1352   Order Status: Completed Specimen: Blood Updated: 12/18/22 0717    Specimen Description . BLOOD    Special Requests L AC  5ML    Culture POSITIVE Blood Culture Abnormal      DIRECT GRAM STAIN FROM BOTTLE: GRAM POSITIVE COCCI IN CLUSTERS     Staphylococcus epidermidis Detected: mecA/C Gene Detected- Methicillin Resistant Organism Methodology- Polymerase Chain Reaction (PCR)     STAPHYLOCOCCUS EPIDERMIDIS A single positive blood culture of coagulase negative Staphylocci, diphtheroids,micrococci, Cutibacterium, viridans Streptocci, Bacillus, or Lactobacillus species should be interpreted with caution and viewed as a likely skin contaminant. Abnormal      (NOTE) Direct Gram Stain from bottle and Polymerase Chain Reaction (PCR) results called to and read back by:JEANETTE RENE AT 1420 ON 12/16/22     Culture, Urine [[id number]] Collected: 12/15/22 1410   Order Status: Completed Specimen: Urine, indwelling catheter Updated: 12/16/22 1257    Specimen Description . INDWELLING CATH URINE    Culture NO SIGNIFICANT GROWTH   Culture, Blood 1 [[id number]] Collected: 12/10/22 1223   Order Status: Completed Specimen: Blood Updated: 12/15/22 1319    Specimen Description . BLOOD    Special Requests LEFT HAND 2.5 ML    Culture NO GROWTH 5 DAYS   Culture, Blood 1 [[id number]] Collected: 12/10/22 1230   Order Status: Completed Specimen: Blood Updated: 12/15/22 1319    Specimen Description . BLOOD    Special Requests RT HAND 2.5 ML    Culture NO GROWTH 5 DAYS   Culture, Respiratory [[id number]] (Abnormal)  Collected: 12/10/22 1130   Order Status: Completed Specimen: Sputum Induced Updated: 12/12/22 0953    Specimen Description . INDUCED SPUTUM    Direct Exam < 10 EPITHELIAL CELLS/LPF     <10 NEUTROPHILS/LPF     FEW GRAM POSITIVE COCCI IN CLUSTERS Abnormal     Culture METHICILLIN RESISTANT STAPHYLOCOCCUS AUREUS HEAVY GROWTH Abnormal      NO NORMAL JEFFREY   MRSA DNA Probe, Nasal [[id number]] (Abnormal) Collected: 12/10/22 1215   Order Status: Completed Specimen: Nasal Updated: 12/11/22 1027    Specimen Description . NASAL SWAB    MRSA, DNA, Nasal POSITIVE:  MRSA DNA detected by nucleic acid amplification. Abnormal     Comment:                                                    Results should be used as an adjunct to nosocomial control efforts to identify patients   needing enhanced precautions. The test is not intended to identify patients with staphylococcal infections. Results   should not be used to guide or monitor treatment for MRSA infections. Respiratory Panel, Molecular, with COVID-19 (Restricted: peds pts or suitable admitted adults) [[id number]] Collected: 12/10/22 1309   Order Status: Completed Specimen: Nasopharyngeal Swab Updated: 12/10/22 1451    Specimen Description . NASOPHARYNGEAL SWAB    Adenovirus PCR Not Detected    Coronavirus 229E PCR Not Detected    Coronavirus HKU1 PCR Not Detected    Coronavirus NL63 PCR Not Detected    Coronavirus OC43 PCR Not Detected    SARS-CoV-2, PCR Not Detected    Human Metapneumovirus PCR Not Detected    Rhino/Enterovirus PCR Not Detected    Influenza A by PCR Not Detected    Influenza B by PCR Not Detected    Parainfluenza 1 PCR Not Detected    Parainfluenza 2 PCR Not Detected    Parainfluenza 3 PCR Not Detected    Parainfluenza 4 PCR Not Detected    Resp Syncytial Virus PCR Not Detected    Bordetella Parapertussis Not Detected    B Pertussis by PCR Not Detected    Chlamydia pneumoniae By PCR Not Detected    Mycoplasma pneumo by PCR Not Detected    Comment: Performed by multiplexed nucleic acid assay.       Respiratory Panel, Molecular, with COVID-19 (Restricted: peds pts or suitable admitted adults) [[id number]] Collected: 12/10/22 1130   Order Status: Canceled Specimen: Nasopharyngeal Swab    MRSA DNA Probe, Nasal [[id number]] Collected: 12/07/22 0200   Order Status: Completed Specimen: Nasal Updated: 12/07/22 1440    Specimen Description . NASAL SWAB    MRSA, DNA, Nasal NEGATIVE    Comment: NEGATIVE:  MRSA DNA not detected by nucleic acid amplification. Results should be used as an adjunct to nosocomial control efforts to identify patients   needing enhanced precautions. The test is not intended to identify patients with staphylococcal infections. Results   should not be used to guide or monitor treatment for MRSA infections. COVID-19, Rapid [[id number]] Collected: 12/06/22 2215   Order Status: Completed Specimen: Nasopharyngeal Swab Updated: 12/07/22 0207    Specimen Description . NASOPHARYNGEAL SWAB    SARS-CoV-2, Rapid Not Detected    Comment:        Rapid NAAT:  The specimen is NEGATIVE for SARS-CoV-2, the novel coronavirus associated with   COVID-19. The ID NOW COVID-19 assay is designed to detect the virus that causes COVID-19 in patients   with signs and symptoms of infection who are suspected of COVID-19. An individual without symptoms of COVID-19 and who is not shedding SARS-CoV-2 virus would   expect to have a negative (not detected) result in this assay. Negative results should be treated as presumptive and, if inconsistent with clinical signs   and symptoms or necessary for patient management,   should be tested with an alternative molecular assay. Negative results do not preclude   SARS-CoV-2 infection and   should not be used as the sole basis for patient management decisions.          Fact sheet for Healthcare Providers: http://www.mandeep.hollis/   Fact sheet for Patients: http://www.mandeep.hollis/         Methodology: Isothermal Nucleic Acid Amplification         Medications:      hydrocortisone sodium succinate PF  100 mg IntraVENous Q8H    vancomycin  1,250 mg IntraVENous Q12H    guaiFENesin  200 mg Oral Q4H    furosemide  20 mg IntraVENous Daily    pantoprazole (PROTONIX) 40 mg injection  40 mg IntraVENous Daily    vancomycin (VANCOCIN) intermittent dosing (placeholder)   Other RX Placeholder    enoxaparin  30 mg SubCUTAneous BID    sertraline  150 mg Oral Daily    lamoTRIgine  200 mg Oral Daily    levothyroxine  150 mcg Oral QAM AC    topiramate  75 mg Oral Daily    topiramate  100 mg Oral Nightly           [facility]  ADELA Lamb CNP  Perfect [employer] messaging  OFFICE: [phone]      Electronically signed by ADELA Lamb CNP on 12/21/2022 at 5:56 PM  Thank you for allowing us to participate in the care of this patient. Please call with questions. ATTESTATION:    I have discussed the case, including pertinent history and exam findings with the APRN. I have evaluated the  History, physical findings and pictures of the patient and the key elements of the encounter have been performed by me. I have reviewed the laboratory data, other diagnostic studies and discussed them with the APRN. I have updated the medical record where necessary. I agree with the assessment, plan and orders as documented by the APRN. Kath Mathews MD.      This note iscreated with the assistance of a speech recognition program.  While intending to generate a document that actually reflects the content of the visit, the document can still have some errors including those of syntax andsound a like substitutions which may escape proof reading. In such instances, actual meaning can be extrapolated by contextual diversion.

## 2022-12-21 NOTE — PROGRESS NOTES
INTENSIVE CARE UNIT  Resident Physician Progress Note    Patient - Nakul Petersen  Date of Admission -  12/5/2022 11:39 PM  Date of Evaluation -  12/21/2022  Room and Bed Number -  3021/3021-01   Hospital Day - 15    HPI:     33-year-old female initially presented to hospital on 12/5 after a fall. Patient is developmentally delayed and has a history of brain tumor as a child with a lot of radiation. Patient also has past medical history of diabetes mellitus, bilateral sensorineural hearing loss. Had a CT head showed no intracranial abnormality, CT abdominal pelvis showed  nondisplaced fractures of right sacral ilya, nondisplaced fracture of right superior ramus and right obturator ring. CT spine showed acute fracture of right C7 transverse process and also age indeterminate compressive fracture of T3. Neurosurgery for cervical fractures was consulted, who recommended no neurosurgical intervention. Orthopedic surgery for pelvic fractures was also consulted, no surgical intervention but medical management and also recommended to follow-up in orthopedic clinic after discharge. Patient was started on BiPAP for increased work of breathing. Patient was oriented x4 at day of presentation. 12/06 - patient had a decline in mental status with tachypnea, respiratory rate of 30s to 40 with heart rate of 105 patient was intubated emergently     12/7- Neurology was consulted because of history of migraines, seizure disorder, patient was started on her home antiepileptic drugs including Topamax and Lamictal, EEG was ordered to rule out any subclinical seizure activity, MRI was also ordered to rule out any CVA. Patient was doing fine, arousable and following commands. Pulmonology was consulted, recommended to extubate and patient was extubated.      12/10 -rapid was called today due to impending respiratory failure, patient was intubated again and was sent to ICU      12/12 -respiratory culture came back positive for staph aureus , nasal respiratory swab came out positive for MRSA DNA, patient started on vancomycin      12/13 : Tachycardia improved to normal heart rate, patient remained afebrile, WBC count trending down. Sedation changed to precedex     12/15: Precedex discontinued. SUBJECTIVE:     OVERNIGHT EVENTS: No acute events overnight, patient was afebrile overnight. F.A.S.T. M. H.U.G.S. B.I.D. Feeding Diet: ADULT TUBE FEEDING; Orogastric; Standard without Fiber; Continuous; 45; Yes; 20; Q 4 hours; 75; 30; Q 4 hours  Fluids: Patient is on KVO fluids  Family: Updated  Analgesic: Tylenol 650 every 6 hours as needed, fentanyl 50 mcg every 2 hours as needed, Roxicodone 10 every 4 hours needed, Roxicodone 5 every 4 hours as needed. Sedation: None  Thrombo-prophylaxis: [x] Enoxaparin, [] Unfract. Heparin Subcutaneously, [] EPC Cuffs  Mobility: Up with assistance, PT OT ordered. Heads up: 30 degrees   Ulcer prophylaxis: [x] PPI Agent, [] V5Qcemw, [] Sucralfate, [] Other:  Glycemic control: None, glucose 127  Spontaneous breathing trial: as tolerated today and possible extubation. Shallow breathing index: 60s   Bowel regimen/urine output: Protonix 40 IV daily, GlycoLax, patient receives 20 mg of Lasix daily/urine output approximately 2 L in the last 24 hours. Indwelling catheter/lines: Peripheral IV, OG tube, ET tube, wound right buttocks. De-escalation: Spontaneous breathing trials as tolerated. Possible extubation       TODAY:   Spontaneous breathing trials as tolerated, PT OT to evaluate and treat.  external [place] catheter. Continue hydrocortisone due to no cuff leak. Will reevaluate cuff leak tomorrow. ABGs:   Arterial Blood Gas result 12/18/2022:  pH 7.399 ; pCO2 43.2 pO2 87; HCO3 26.7 O2 Sat 97. VENT SETTINGS (Comprehensive) (if applicable):   PRVC mode, FiO2 40%, PEEP 5, Respiratory Rate 18, Tidal Volume 380    WBC: 12.7  Hemoglobin: 10.4  Blood glucose: 94  BUN: 24  creatinine: 0.72  Sodium: 139  Potassium: 3.3 replaced     NEW LABS PENDING     AWAKE & FOLLOWING COMMANDS:  [] No   [x] Yes    SECRETIONS Amount:  [] Small [] Moderate  [x] Large  [] None  Color:     [x] White [x] Colored - yellow  [] Bloody    SEDATION:  RAAS Score:  [] Propofol gtt  [] Versed gtt  [] Ativan gtt   [x] No Sedation    PARALYZED:  [x] No    [] Yes    VASOPRESSORS:  [x] No    [] Yes  [] Levophed [] Dopamine [] Vasopressin  [] Dobutamine [] Phenylephrine [] Epinephrine      OBJECTIVE:     VITAL SIGNS:  /68   Pulse (!) 49   Temp 98.6 °F (37 °C) (Oral)   Resp 14   Ht 5' 1\" (1.549 m)   Wt 266 lb 12.1 oz (121 kg)   SpO2 96%   BMI 50.40 kg/m²   Tmax over 24 hours:  Temp (24hrs), Av.5 °F (36.9 °C), Min:97.7 °F (36.5 °C), Max:99.2 °F (37.3 °C)      Patient Vitals for the past 8 hrs:   BP Temp Temp src Pulse Resp SpO2   22 1143 -- -- -- (!) 49 14 96 %   22 1000 -- 98.6 °F (37 °C) Oral -- -- --   22 0823 -- -- -- 56 16 96 %   22 0822 -- -- -- 57 21 97 %   22 0800 104/68 97.7 °F (36.5 °C) Axillary 58 15 95 %   22 0700 (!) 98/57 -- -- 58 20 94 %   22 0600 102/60 98.6 °F (37 °C) Oral 52 20 95 %         Intake/Output Summary (Last 24 hours) at 2022 1320  Last data filed at 2022 1140  Gross per 24 hour   Intake 2102.03 ml   Output 900 ml   Net 1202.03 ml     Date 22 0000 - 22 2359   Shift 1901-7809 2339-1119 9551-1282 24 Hour Total   INTAKE   I.V.(mL/kg) 64.9(0.5) 73.1(0.6)  138(1.1)   NG/GT(mL/kg) 570(4.7) 100(0.8)  670(5.5)   IV Piggyback(mL/kg) 249.7(2.1)   249.7(2.1)   Shift Total(mL/kg) 884. 6(7.3) 173.1(1.4)  1057. 7(8.7)   OUTPUT   Urine(mL/kg/hr) 300(0.3)   300   Shift Total(mL/kg) 300(2.5)   300(2.5)   Weight (kg) 121 121 121 121     Wt Readings from Last 3 Encounters:   22 266 lb 12.1 oz (121 kg)   22 277 lb (125.6 kg)   22 279 lb (126.6 kg)     Body mass index is 50.4 kg/m².         PHYSICAL EXAM:  GEN:  awake, alert, and cooperative  EYES:   pupils equal, round, and reactive to light  HEENT:  Normocepalic, without obvious abnormality  LUNGS:  No increased work of breathing, good air exchange. Rhonchorous in all lung fields. CV:    regular rate and rhythm and normal S1 and S2  ABDOMEN:   soft, non-distended, and non-tender  MSK:    there is no redness, warmth, or swelling of the joints  NEURO[de-identified]   Awake, alert, following commands  SKIN:   Normal skin color, texture, turgor  EXTREMITIES:  No pedal or leg edema, no calf tenderness/swelling, no erythema, distal pulses intact       MEDICATIONS:  Scheduled Meds:   hydrocortisone  10 mg Oral BID    hydrocortisone sodium succinate PF  100 mg IntraVENous Q8H    vancomycin  1,250 mg IntraVENous Q12H    guaiFENesin  200 mg Oral Q4H    furosemide  20 mg IntraVENous Daily    pantoprazole (PROTONIX) 40 mg injection  40 mg IntraVENous Daily    vancomycin (VANCOCIN) intermittent dosing (placeholder)   Other RX Placeholder    enoxaparin  30 mg SubCUTAneous BID    sertraline  150 mg Oral Daily    lamoTRIgine  200 mg Oral Daily    levothyroxine  150 mcg Oral QAM AC    topiramate  75 mg Oral Daily    topiramate  100 mg Oral Nightly     Continuous Infusions:   sodium chloride Stopped (12/12/22 0316)    sodium chloride 10 mL/hr at 12/21/22 1140     PRN Meds:   ondansetron, 4 mg, Q6H PRN  polyethylene glycol, 17 g, Daily PRN  oxyCODONE, 10 mg, Q4H PRN   Or  oxyCODONE, 5 mg, Q4H PRN  levalbuterol, 0.63 mg, Q4H PRN  sodium chloride, , PRN  fentanNYL, 50 mcg, Q2H PRN  acetaminophen, 650 mg, Q6H PRN  sodium chloride flush, 5-40 mL, PRN      SUPPORT DEVICES: [x] Ventilator [] BIPAP  [] Nasal Cannula [] Room Air    VENT SETTINGS (Comprehensive) (if applicable):   PRVC mode, FiO2 40%, PEEP 5, Respiratory Rate 18, Tidal Volume 380  Vent Information  Ventilator ID: [id number]  Equipment Changed: HME, Expiratory Filter, Suction catheter  Ventilator Initiate: Yes  Vent Mode: AC/PRVC  Additional Respiratory Assessments  Heart Rate: (!) 49  Resp: 14  SpO2: 96 %  End Tidal CO2: 36 (%)  Position: Semi-Love's  Humidification Source: Heated wire  Humidification Temp: 37  Circuit Condensation: Drained  Cuff Pressure (cm H2O):  (mlt)  Skin Barrier Applied: No    ABGs:   Arterial Blood Gas result:  pH 7.412; pCO2 38.2; pO2 86.8; HCO3 25.3; O2 Sat 97.   Lab Results   Component Value Date/Time    FIO2 40.0 12/21/2022 04:47 AM         DATA:  Complete Blood Count:   Recent Labs     12/19/22 0327 12/20/22  0543 12/21/22  0756   WBC 10.4 16.0* 12.7*   RBC 4.18 4.11 3.72*   HGB 11.6* 11.4* 10.4*   HCT 36.1* 37.5 32.8*   MCV 86.4 91.2 88.2   MCH 27.8 27.7 28.0   MCHC 32.1 30.4 31.7   RDW 13.9 13.3 13.6   PLT See Reflexed IPF Result 412 352   MPV  --  10.4 10.9        Last 3 Blood Glucose:   Recent Labs     12/19/22 0327 12/20/22  0543 12/21/22  0756   GLUCOSE 102* 139* 94        PT/INR:    Lab Results   Component Value Date/Time    PROTIME 10.5 12/05/2022 05:35 PM    INR 1.0 12/05/2022 05:35 PM     PTT:    Lab Results   Component Value Date/Time    APTT 24.9 03/10/2015 11:25 AM       Comprehensive Metabolic Profile:   Recent Labs     12/19/22 0327 12/20/22  0543 12/21/22  0756   * 133* 139   K 4.7 3.8 3.3*   CL 99 99 102   CO2 22 23 26   BUN 19 19 24*   CREATININE 0.64 0.62 0.72   GLUCOSE 102* 139* 94   CALCIUM 8.2* 8.2* 8.4*      Magnesium:   Lab Results   Component Value Date/Time    MG 2.3 12/21/2022 07:56 AM    MG 1.7 12/06/2022 10:12 PM     Phosphorus:   Lab Results   Component Value Date/Time    PHOS 2.9 12/06/2022 10:12 PM     Ionized Calcium: No results found for: CAION     Urinalysis:   Lab Results   Component Value Date/Time    NITRU NEGATIVE 12/06/2022 08:05 PM    COLORU Yellow 12/06/2022 08:05 PM    PHUR 5.5 12/06/2022 08:05 PM    WBCUA None 12/06/2022 08:05 PM    RBCUA None 12/06/2022 08:05 PM    MUCUS 1+ 03/27/2013 05:54 PM    TRICHOMONAS NOT REPORTED 03/27/2013 05:54 PM    YEAST NOT REPORTED 03/27/2013 05:54 PM    BACTERIA RARE 03/27/2013 05:54 PM    CLARITYU clear 11/11/2014 01:20 PM    SPECGRAV 1.035 12/06/2022 08:05 PM    LEUKOCYTESUR NEGATIVE 12/06/2022 08:05 PM    UROBILINOGEN Normal 12/06/2022 08:05 PM    BILIRUBINUR NEGATIVE 12/06/2022 08:05 PM    BILIRUBINUR neagtive 03/17/2017 04:30 PM    BLOODU negative 03/17/2017 04:30 PM    GLUCOSEU NEGATIVE 12/06/2022 08:05 PM    KETUA MODERATE 12/06/2022 08:05 PM    AMORPHOUS NOT REPORTED 03/27/2013 05:54 PM       HgBA1c:    Lab Results   Component Value Date/Time    LABA1C 5.3 04/26/2022 12:00 AM     TSH:    Lab Results   Component Value Date/Time    TSH <0.01 12/07/2022 03:57 AM     Lactic Acid: No results found for: LACTA   Troponin: No results for input(s): TROPONINI in the last 72 hours.     Microbiology:  Blood Culture: + S epidermidis x 1  Blood culture: NG 12 h      ASSESSMENT:     Patient Active Problem List    Diagnosis Date Noted    MRSA infection 12/14/2022    Acute lower respiratory tract infection 12/14/2022    Acute respiratory failure with hypoxia ([address] 12/11/2022    Aspiration pneumonia ([address] 12/10/2022    Multiple closed pelvic fractures without disruption of pelvic [place] ([address] 12/09/2022    Lethargy 12/07/2022    Cervical transverse process fracture, initial encounter ([address] 12/06/2022    Closed nondisplaced fracture of seventh cervical vertebra ([address] 12/06/2022    Gastroesophageal reflux disease 08/12/2022    Irritable bowel syndrome with constipation 06/07/2022    Type 2 diabetes mellitus with stage 3a chronic kidney disease, without long-term current use of insulin ([address] 11/12/2021    Moderate episode of recurrent major depressive disorder ([address] 02/26/2020    Hypopituitarism ([address] 08/24/2016    ROHIT on CPAP 05/08/2015    Hypothyroidism 08/06/2014    Asthma 08/06/2014    Morbid obesity ([address] 08/06/2014    Seizure ([address] 10/23/2013          PLAN:      Neuro  Intubated  Off precedex, off sedation  Lamotrigine 200  Topamax 75 daily, 100 nightly  Zoloft 150 mg  Per neurosurgery pt does not need C collar. Cards  HR 54  MAP stable   Lasix 20 daily     Pulmonary  Intubated for aspiration pneumonia  PRVC 18/380/5/40%  CXR 12/18 - no change in bilateral airspace disease  Pulmonary toilet  Hydrocortisone 10 mg BID  Xopenex nebulizer QID  Guanifenesen 200 mg q 4h     Renal  External suresh   Intake/Output Summary    Intake/Output Summary (Last 24 hours) at 12/21/2022 0745  Last data filed at 12/21/2022 0530  Gross per 24 hour   Intake 2535.25 ml   Output 1935 ml   Net 600.25 ml     Lasix 20 mg daily     GI  Tube feeds 75 ml/hr, at goal  Protonix 40 daily   Glycolax     ID  Vancomycin, course started 12/12  Temp 37  Respiratory culture (+) S Aureus  Repeat Blood culture (+) S epidermidis x 1, Blood culture 2: No growth   Blood cultures (-) x 5 days  ID following     Endo  Synthroid 150 daily  Hydrocortisone 10 mg BID     DVT Ppx: Lovenox   GI Ppx: Protonix    ICU PROPHYLAXIS:  Stress ulcer:  [x] PPI Agent  [] T5Qomfp [] Sucralfate  [] Other:  VTE:   [x] Enoxaparin  [] Unfract. Heparin Subcut  [] EPC Cuffs    NUTRITION:  [] NPO [x] Tube Feeding (Specify: )  ADULT TUBE FEEDING; Orogastric; Standard without Fiber; Continuous; 45; Yes; 20; Q 4 hours; 75; 30; Q 4 hours   [] TPN  [] PO    HOME MEDS RECONCILED: [] No  [x] Yes    CONSULTATION NEEDED:  [] No  [x] Yes    FAMILY UPDATED:    [] No  [x] Yes    TRANSFER OUT OF ICU:   [x] No  [] Yes      Marshall Snellen, M.D.   Internal Medicine Resident PGY-1  [facility].  12/21/2022 1:20 PM

## 2022-12-21 NOTE — PLAN OF CARE
Problem: Discharge Planning  Goal: Discharge to home or other facility with appropriate resources  12/20/2022 2012 by Ramos Ansari RN  Outcome: Progressing  12/20/2022 0724 by Ho Mora  Outcome: Progressing     Problem: Confusion  Goal: Confusion, delirium, dementia, or psychosis is improved or at baseline  Description: INTERVENTIONS:  1. Assess for possible contributors to thought disturbance, including medications, impaired vision or hearing, underlying metabolic abnormalities, dehydration, psychiatric diagnoses, and notify attending LIP  2. [place] high risk fall precautions, as indicated  3. Provide frequent short contacts to provide reality reorientation, refocusing and direction  4. Decrease environmental stimuli, including noise as appropriate  5. Monitor and intervene to maintain adequate nutrition, hydration, elimination, sleep and activity  6. If unable to ensure safety without constant attention obtain sitter and review sitter guidelines with assigned personnel  7. Initiate Psychosocial CNS and Spiritual Care consult, as indicated  12/20/2022 2012 by Ramos Ansari RN  Outcome: Progressing  12/20/2022 0724 by Ho Mora  Outcome: Progressing     Problem: Pain  Goal: Verbalizes/displays adequate comfort level or baseline comfort level  12/20/2022 2012 by Ramos Ansari RN  Outcome: Progressing  12/20/2022 0724 by Ho Mora  Outcome: Progressing     Problem: Chronic Conditions and Co-morbidities  Goal: Patient's chronic conditions and co-morbidity symptoms are monitored and maintained or improved  12/20/2022 2012 by Ramos Ansari RN  Outcome: Progressing  12/20/2022 0724 by Ho Mora  Outcome: Progressing     Problem: Skin/Tissue Integrity  Goal: Absence of new skin breakdown  Description: 1. Monitor for areas of redness and/or skin breakdown  2. Assess vascular access sites hourly  3. Every 4-6 hours minimum:  Change oxygen saturation probe site  4.   Every 4-6 hours: If on nasal continuous positive airway pressure, respiratory therapy assess nares and determine need for appliance change or resting period.   12/20/2022 2012 by Rui Lombardo RN  Outcome: Progressing  12/20/2022 0724 by Kev Cabral  Outcome: Progressing     Problem: Safety - Adult  Goal: Free from fall injury  12/20/2022 2012 by Rui Lombardo RN  Outcome: Progressing  12/20/2022 0724 by Kev Cabral  Outcome: Progressing     Problem: ABCDS Injury Assessment  Goal: Absence of physical injury  12/20/2022 2012 by Rui Lombardo RN  Outcome: Progressing  12/20/2022 0724 by Kev Cabral  Outcome: Progressing     Problem: Nutrition Deficit:  Goal: Optimize nutritional status  12/20/2022 2012 by Rui Lombardo RN  Outcome: Progressing  12/20/2022 0724 by Kev Cabral  Outcome: Progressing     Problem: Respiratory - Adult  Goal: Achieves optimal ventilation and oxygenation  12/20/2022 2012 by Rui Lombardo RN  Outcome: Progressing  12/20/2022 1538 by Cheko Cook RCP  Flowsheets (Taken 12/20/2022 1538)  Achieves optimal ventilation and oxygenation:   Assess for changes in respiratory status   Assess the need for suctioning and aspirate as needed   Respiratory therapy support as indicated   Assess and instruct to report shortness of breath or any respiratory difficulty  12/20/2022 0724 by Kev Cabral  Outcome: Progressing

## 2022-12-21 NOTE — PLAN OF CARE
Problem: Respiratory - Adult  Goal: Achieves optimal ventilation and oxygenation  12/21/2022 0512 by Claudie Councilman Drummonds, RCP  Outcome: Progressing  Flowsheets (Taken 12/21/2022 0512)  Achieves optimal ventilation and oxygenation:   Assess for changes in respiratory status   Assess the need for suctioning and aspirate as needed   Respiratory therapy support as indicated   Assess for changes in mentation and behavior   Oxygen supplementation based on oxygen saturation or arterial blood gases

## 2022-12-21 NOTE — FLOWSHEET NOTE
1750H- Tried to insert NGT twice yet patient refused. Failed bedside swallow. Will Continue to monitor and retry.

## 2022-12-21 NOTE — PROGRESS NOTES
Patient extubated with Dr. Tejada at bedside. Patient extubated with a cook catheter and placed on 4L NC. Patient placed on Heliox 80/20 per  at bedside for stridor. Patient tolerating well saturating 94%. No distress noted.

## 2022-12-21 NOTE — PLAN OF CARE
Problem: Discharge Planning  Goal: Discharge to home or other facility with appropriate resources  12/21/2022 0918 by Viviane Chapman RN  Outcome: Progressing     Problem: Confusion  Goal: Confusion, delirium, dementia, or psychosis is improved or at baseline  Description: INTERVENTIONS:  1. Assess for possible contributors to thought disturbance, including medications, impaired vision or hearing, underlying metabolic abnormalities, dehydration, psychiatric diagnoses, and notify attending LIP  2. [place] high risk fall precautions, as indicated  3. Provide frequent short contacts to provide reality reorientation, refocusing and direction  4. Decrease environmental stimuli, including noise as appropriate  5. Monitor and intervene to maintain adequate nutrition, hydration, elimination, sleep and activity  6. If unable to ensure safety without constant attention obtain sitter and review sitter guidelines with assigned personnel  7. Initiate Psychosocial CNS and Spiritual Care consult, as indicated  12/21/2022 9739 by Viviane Chapman RN  Outcome: Progressing     Problem: Pain  Goal: Verbalizes/displays adequate comfort level or baseline comfort level  12/21/2022 0918 by Viviane Chapman RN  Outcome: Progressing     Problem: Chronic Conditions and Co-morbidities  Goal: Patient's chronic conditions and co-morbidity symptoms are monitored and maintained or improved  12/21/2022 0918 by Viviane Chapman RN  Outcome: Progressing     Problem: Skin/Tissue Integrity  Goal: Absence of new skin breakdown  Description: 1. Monitor for areas of redness and/or skin breakdown  2. Assess vascular access sites hourly  3. Every 4-6 hours minimum:  Change oxygen saturation probe site  4. Every 4-6 hours:  If on nasal continuous positive airway pressure, respiratory therapy assess nares and determine need for appliance change or resting period.   12/21/2022 8653 by Sofia Valencia RN  Outcome: Progressing     Problem: Safety - Adult  Goal: Free from fall injury  12/21/2022 0918 by Mallory Mcclellan RN  Outcome: Progressing     Problem: ABCDS Injury Assessment  Goal: Absence of physical injury  12/21/2022 6138 by Mallory Mcclellan RN  Outcome: Progressing     Problem: Nutrition Deficit:  Goal: Optimize nutritional status  12/21/2022 0918 by Mallory Mcclellan RN  Outcome: Progressing     Problem: Respiratory - Adult  Goal: Achieves optimal ventilation and oxygenation  12/21/2022 0918 by Mallory Mcclellan RN  Outcome: Progressing

## 2022-12-21 NOTE — CARE COORDINATION
Transitional planning. Intubated FiO2 40%, PEEP of 5, follows commands, has cochlear implants. [facility] following pt.  [facility] is now 1st choice for SNF)

## 2022-12-22 ENCOUNTER — APPOINTMENT (OUTPATIENT)
Dept: GENERAL RADIOLOGY | Age: 47
DRG: 551 | End: 2022-12-22
Payer: MEDICARE

## 2022-12-22 LAB
ABSOLUTE EOS #: <0.03 K/UL (ref 0–0.44)
ABSOLUTE IMMATURE GRANULOCYTE: 0.08 K/UL (ref 0–0.3)
ABSOLUTE LYMPH #: 0.86 K/UL (ref 1.1–3.7)
ABSOLUTE MONO #: 0.46 K/UL (ref 0.1–1.2)
ANION GAP SERPL CALCULATED.3IONS-SCNC: 9 MMOL/L (ref 9–17)
BASOPHILS # BLD: 0 % (ref 0–2)
BASOPHILS ABSOLUTE: 0.03 K/UL (ref 0–0.2)
BUN BLDV-MCNC: 23 MG/DL (ref 6–20)
CALCIUM SERPL-MCNC: 8.6 MG/DL (ref 8.6–10.4)
CHLORIDE BLD-SCNC: 104 MMOL/L (ref 98–107)
CO2: 26 MMOL/L (ref 20–31)
CREAT SERPL-MCNC: 0.69 MG/DL (ref 0.5–0.9)
EKG ATRIAL RATE: 59 BPM
EKG P AXIS: 35 DEGREES
EKG P-R INTERVAL: 162 MS
EKG Q-T INTERVAL: 446 MS
EKG QRS DURATION: 94 MS
EKG QTC CALCULATION (BAZETT): 441 MS
EKG R AXIS: 31 DEGREES
EKG T AXIS: 34 DEGREES
EKG VENTRICULAR RATE: 59 BPM
EOSINOPHILS RELATIVE PERCENT: 0 % (ref 1–4)
GFR SERPL CREATININE-BSD FRML MDRD: >60 ML/MIN/1.73M2
GLUCOSE BLD-MCNC: 107 MG/DL (ref 70–99)
HCT VFR BLD CALC: 37.9 % (ref 36.3–47.1)
HEMOGLOBIN: 11.6 G/DL (ref 11.9–15.1)
IMMATURE GRANULOCYTES: 1 %
LYMPHOCYTES # BLD: 8 % (ref 24–43)
MCH RBC QN AUTO: 27.9 PG (ref 25.2–33.5)
MCHC RBC AUTO-ENTMCNC: 30.6 G/DL (ref 28.4–34.8)
MCV RBC AUTO: 91.1 FL (ref 82.6–102.9)
MONOCYTES # BLD: 4 % (ref 3–12)
NRBC AUTOMATED: 0 PER 100 WBC
PDW BLD-RTO: 13.5 % (ref 11.8–14.4)
PLATELET # BLD: 346 K/UL (ref 138–453)
PMV BLD AUTO: 10.6 FL (ref 8.1–13.5)
POTASSIUM SERPL-SCNC: 4 MMOL/L (ref 3.7–5.3)
RBC # BLD: 4.16 M/UL (ref 3.95–5.11)
SEG NEUTROPHILS: 87 % (ref 36–65)
SEGMENTED NEUTROPHILS ABSOLUTE COUNT: 10.01 K/UL (ref 1.5–8.1)
SODIUM BLD-SCNC: 139 MMOL/L (ref 135–144)
WBC # BLD: 11.4 K/UL (ref 3.5–11.3)

## 2022-12-22 PROCEDURE — 6360000002 HC RX W HCPCS: Performed by: NURSE PRACTITIONER

## 2022-12-22 PROCEDURE — 80048 BASIC METABOLIC PNL TOTAL CA: CPT

## 2022-12-22 PROCEDURE — C9113 INJ PANTOPRAZOLE SODIUM, VIA: HCPCS

## 2022-12-22 PROCEDURE — 6360000002 HC RX W HCPCS

## 2022-12-22 PROCEDURE — 97110 THERAPEUTIC EXERCISES: CPT

## 2022-12-22 PROCEDURE — 74230 X-RAY XM SWLNG FUNCJ C+: CPT

## 2022-12-22 PROCEDURE — 51701 INSERT BLADDER CATHETER: CPT

## 2022-12-22 PROCEDURE — 6360000002 HC RX W HCPCS: Performed by: STUDENT IN AN ORGANIZED HEALTH CARE EDUCATION/TRAINING PROGRAM

## 2022-12-22 PROCEDURE — 51798 US URINE CAPACITY MEASURE: CPT

## 2022-12-22 PROCEDURE — 99232 SBSQ HOSP IP/OBS MODERATE 35: CPT | Performed by: NURSE PRACTITIONER

## 2022-12-22 PROCEDURE — 97530 THERAPEUTIC ACTIVITIES: CPT

## 2022-12-22 PROCEDURE — 85025 COMPLETE CBC W/AUTO DIFF WBC: CPT

## 2022-12-22 PROCEDURE — 2580000003 HC RX 258

## 2022-12-22 PROCEDURE — 92611 MOTION FLUOROSCOPY/SWALLOW: CPT

## 2022-12-22 PROCEDURE — 2700000000 HC OXYGEN THERAPY PER DAY

## 2022-12-22 PROCEDURE — 97535 SELF CARE MNGMENT TRAINING: CPT

## 2022-12-22 PROCEDURE — 94761 N-INVAS EAR/PLS OXIMETRY MLT: CPT

## 2022-12-22 PROCEDURE — 99291 CRITICAL CARE FIRST HOUR: CPT | Performed by: INTERNAL MEDICINE

## 2022-12-22 PROCEDURE — 2000000000 HC ICU R&B

## 2022-12-22 PROCEDURE — 36415 COLL VENOUS BLD VENIPUNCTURE: CPT

## 2022-12-22 PROCEDURE — 6370000000 HC RX 637 (ALT 250 FOR IP): Performed by: STUDENT IN AN ORGANIZED HEALTH CARE EDUCATION/TRAINING PROGRAM

## 2022-12-22 PROCEDURE — 99233 SBSQ HOSP IP/OBS HIGH 50: CPT | Performed by: INTERNAL MEDICINE

## 2022-12-22 PROCEDURE — 2500000003 HC RX 250 WO HCPCS: Performed by: HEALTH CARE PROVIDER

## 2022-12-22 RX ORDER — MORPHINE SULFATE 2 MG/ML
2 INJECTION, SOLUTION INTRAMUSCULAR; INTRAVENOUS ONCE
Status: COMPLETED | OUTPATIENT
Start: 2022-12-22 | End: 2022-12-22

## 2022-12-22 RX ORDER — FENTANYL CITRATE 50 UG/ML
100 INJECTION, SOLUTION INTRAMUSCULAR; INTRAVENOUS
Status: DISCONTINUED | OUTPATIENT
Start: 2022-12-22 | End: 2022-12-23

## 2022-12-22 RX ADMIN — MORPHINE SULFATE 2 MG: 2 INJECTION, SOLUTION INTRAMUSCULAR; INTRAVENOUS at 05:06

## 2022-12-22 RX ADMIN — GUAIFENESIN 200 MG: 200 SOLUTION ORAL at 17:22

## 2022-12-22 RX ADMIN — HYDROCORTISONE SODIUM SUCCINATE 100 MG: 100 INJECTION, POWDER, FOR SOLUTION INTRAMUSCULAR; INTRAVENOUS at 00:03

## 2022-12-22 RX ADMIN — HYDROCORTISONE SODIUM SUCCINATE 100 MG: 100 INJECTION, POWDER, FOR SOLUTION INTRAMUSCULAR; INTRAVENOUS at 17:14

## 2022-12-22 RX ADMIN — SODIUM CHLORIDE, PRESERVATIVE FREE 40 MG: 5 INJECTION INTRAVENOUS at 08:21

## 2022-12-22 RX ADMIN — FENTANYL CITRATE 50 MCG: 50 INJECTION, SOLUTION INTRAMUSCULAR; INTRAVENOUS at 01:41

## 2022-12-22 RX ADMIN — GUAIFENESIN 200 MG: 200 SOLUTION ORAL at 21:42

## 2022-12-22 RX ADMIN — HYDROCORTISONE SODIUM SUCCINATE 100 MG: 100 INJECTION, POWDER, FOR SOLUTION INTRAMUSCULAR; INTRAVENOUS at 08:15

## 2022-12-22 RX ADMIN — Medication 1250 MG: at 00:07

## 2022-12-22 RX ADMIN — FENTANYL CITRATE 50 MCG: 50 INJECTION, SOLUTION INTRAMUSCULAR; INTRAVENOUS at 08:23

## 2022-12-22 RX ADMIN — FENTANYL CITRATE 100 MCG: 50 INJECTION, SOLUTION INTRAMUSCULAR; INTRAVENOUS at 18:53

## 2022-12-22 RX ADMIN — HYDROCORTISONE SODIUM SUCCINATE 100 MG: 100 INJECTION, POWDER, FOR SOLUTION INTRAMUSCULAR; INTRAVENOUS at 08:00

## 2022-12-22 RX ADMIN — FENTANYL CITRATE 100 MCG: 50 INJECTION, SOLUTION INTRAMUSCULAR; INTRAVENOUS at 21:54

## 2022-12-22 RX ADMIN — MORPHINE SULFATE 2 MG: 2 INJECTION, SOLUTION INTRAMUSCULAR; INTRAVENOUS at 12:30

## 2022-12-22 RX ADMIN — FUROSEMIDE 20 MG: 10 INJECTION, SOLUTION INTRAMUSCULAR; INTRAVENOUS at 08:22

## 2022-12-22 RX ADMIN — ENOXAPARIN SODIUM 30 MG: 100 INJECTION SUBCUTANEOUS at 08:22

## 2022-12-22 RX ADMIN — ENOXAPARIN SODIUM 30 MG: 100 INJECTION SUBCUTANEOUS at 21:42

## 2022-12-22 RX ADMIN — TOPIRAMATE 100 MG: 100 TABLET, FILM COATED ORAL at 21:40

## 2022-12-22 ASSESSMENT — PAIN DESCRIPTION - LOCATION
LOCATION: BACK

## 2022-12-22 ASSESSMENT — PAIN DESCRIPTION - DESCRIPTORS
DESCRIPTORS: ACHING;SHARP
DESCRIPTORS: SHARP
DESCRIPTORS: ACHING;NUMBNESS;PRESSURE
DESCRIPTORS: ACHING;SHARP
DESCRIPTORS: ACHING;SHARP
DESCRIPTORS: DISCOMFORT
DESCRIPTORS: ACHING;HEAVINESS;SHARP
DESCRIPTORS: ACHING;SPASM;SHARP
DESCRIPTORS: DISCOMFORT
DESCRIPTORS: PATIENT UNABLE TO DESCRIBE
DESCRIPTORS: ACHING;NUMBNESS;SHARP

## 2022-12-22 ASSESSMENT — PAIN SCALES - GENERAL
PAINLEVEL_OUTOF10: 5
PAINLEVEL_OUTOF10: 8
PAINLEVEL_OUTOF10: 5
PAINLEVEL_OUTOF10: 8
PAINLEVEL_OUTOF10: 4
PAINLEVEL_OUTOF10: 8
PAINLEVEL_OUTOF10: 8
PAINLEVEL_OUTOF10: 6
PAINLEVEL_OUTOF10: 3
PAINLEVEL_OUTOF10: 5

## 2022-12-22 ASSESSMENT — PAIN DESCRIPTION - ORIENTATION
ORIENTATION: ANTERIOR;UPPER
ORIENTATION: UPPER
ORIENTATION: ANTERIOR;UPPER
ORIENTATION: ANTERIOR;UPPER
ORIENTATION: UPPER

## 2022-12-22 NOTE — PLAN OF CARE
Problem: Discharge Planning  Goal: Discharge to home or other facility with appropriate resources  Outcome: Progressing  Flowsheets (Taken 12/22/2022 0800)  Discharge to home or other facility with appropriate resources: Identify barriers to discharge with patient and caregiver     Problem: Confusion  Goal: Confusion, delirium, dementia, or psychosis is improved or at baseline  Description: INTERVENTIONS:  1. Assess for possible contributors to thought disturbance, including medications, impaired vision or hearing, underlying metabolic abnormalities, dehydration, psychiatric diagnoses, and notify attending LIP  2. [place] high risk fall precautions, as indicated  3. Provide frequent short contacts to provide reality reorientation, refocusing and direction  4. Decrease environmental stimuli, including noise as appropriate  5. Monitor and intervene to maintain adequate nutrition, hydration, elimination, sleep and activity  6. If unable to ensure safety without constant attention obtain sitter and review sitter guidelines with assigned personnel  7.  Initiate Psychosocial CNS and Spiritual Care consult, as indicated  Outcome: Progressing  Flowsheets (Taken 12/22/2022 0800)  Effect of thought disturbance (confusion, delirium, dementia, or psychosis) are managed with adequate functional status: Assess for contributors to thought disturbance, including medications, impaired vision or hearing, underlying metabolic abnormalities, dehydration, psychiatric diagnoses, notify LIP     Problem: Pain  Goal: Verbalizes/displays adequate comfort level or baseline comfort level  Outcome: Progressing     Problem: Chronic Conditions and Co-morbidities  Goal: Patient's chronic conditions and co-morbidity symptoms are monitored and maintained or improved  Outcome: Progressing  Flowsheets (Taken 12/22/2022 0800)  Care Plan - Patient's Chronic Conditions and Co-Morbidity Symptoms are Monitored and Maintained or Improved: Monitor and assess patient's chronic conditions and comorbid symptoms for stability, deterioration, or improvement     Problem: Safety - Adult  Goal: Free from fall injury  Outcome: Progressing     Problem: ABCDS Injury Assessment  Goal: Absence of physical injury  Outcome: Progressing  Flowsheets (Taken 12/22/2022 1100)  Absence of Physical Injury: Implement safety measures based on patient assessment     Problem: Nutrition Deficit:  Goal: Optimize nutritional status  Outcome: Progressing     Problem: Respiratory - Adult  Goal: Achieves optimal ventilation and oxygenation  12/22/2022 1138 by Dave Mercedes RN  Outcome: Progressing

## 2022-12-22 NOTE — FLOWSHEET NOTE
0900H- Refused NGT insertion. Failed twice Bedside Swallow. For Barium Swallow study today, and for referral to SLP.

## 2022-12-22 NOTE — PLAN OF CARE
Problem: Respiratory - Adult  Goal: Achieves optimal ventilation and oxygenation  Outcome: Progressing  Flowsheets  Taken 12/22/2022 0916 by Chay Walker RCP  Achieves optimal ventilation and oxygenation:   Assess for changes in respiratory status   Respiratory therapy support as indicated   Assess and instruct to report shortness of breath or any respiratory difficulty

## 2022-12-22 NOTE — PROGRESS NOTES
Physical Therapy  Facility/Department: [facility] CAR 3- MICU  Physical Therapy daily treatment note    Name: Dipika Bales  : 1975  MRN: [id number]  Date of Service: 2022    Discharge Recommendations:  Patient would benefit from continued therapy after discharge   PT Equipment Recommendations  Equipment Needed: No      Patient Diagnosis(es): The primary encounter diagnosis was Other closed nondisplaced fracture of seventh cervical vertebra, initial encounter ([facility] [address] Diagnoses of Closed wedge compression fracture of T3 vertebra, initial encounter ([facility] [address] and Multiple closed fractures of pelvis without disruption of pelvic ring, initial encounter [facility]) were also pertinent to this visit. 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 Past Surgical History:  has a past surgical history that includes Brain Biopsy; Tonsillectomy; knee surgery; Cochlear implant (Left); Dental surgery; Cochlear implant (Right, 05/10/2022); Colonoscopy; Endoscopy, colon, diagnostic; Colonoscopy (N/A, 2022); and Upper gastrointestinal endoscopy (N/A, 2022). Assessment   Body Structures, Functions, Activity Limitations Requiring Skilled Therapeutic Intervention: Decreased functional mobility ; Decreased endurance;Decreased strength;Decreased balance; Increased pain  Assessment: Pt required MOD A x2 for bed mobility. Tolerated sitting EOB x 5 min, up to MIN A to maintain. REcommend intense PT after d/c to address deficits.   Therapy Prognosis: Good  Activity Tolerance  Activity Tolerance: Patient limited by endurance Plan   Physcial Therapy Plan  General Plan:  (5-6x)  Current Treatment Recommendations: Strengthening, ROM, Balance training, Functional mobility training, Transfer training, Endurance training, Gait training, Stair training, Therapeutic activities, Patient/Caregiver education & training, Equipment evaluation, education, & procurement, Safety education & training, Wheelchair mobility training  Safety Devices  Type of Devices: All fall risk precautions in place, Call light within reach, Nurse notified, Left in bed  Restraints  Restraints Initially in Place: No     Restrictions  Restrictions/Precautions  Restrictions/Precautions: Fall Risk, Weight Bearing, Contact Precautions  Required Braces or Orthoses?: No  Lower Extremity Weight Bearing Restrictions  Right Lower Extremity Weight Bearing: Weight Bearing As Tolerated  Required Braces or Orthoses  Cervical:  (per neurosurg, c collar no longer needed)  Position Activity Restriction  Other position/activity restrictions: up with assist, no intervention required for C7 or T3 fracture per Padmini Fall via PerfectServe; Acute C7 Right TP fx, Age indeterminate T3 compression fx, Nondisplaced right sacral ala fx, Nondisplaced fracture right superior pubic ramus, Right obturator ring fx. Cochlear implant R ear during session this day. Subjective   General  Patient assessed for rehabilitation services?: Yes  Response To Previous Treatment: Patient with no complaints from previous session.   Family / Caregiver Present: No  Follows Commands: Within Functional Limits  General Comment  Comments: Pt wtih O2 and Heliox donned throughout treatment  Subjective  Subjective: Pt resting in bed upon arrival, extubated 12/21, following all commands, agreeable to PT            Cognition   Orientation  Overall Orientation Status: Within Functional Limits     Objective   Bed mobility  Rolling to Left: Minimal assistance  Rolling to Right: Minimal assistance  Supine to Sit: Moderate assistance  Sit to Supine: Moderate assistance  Scooting:  Moderate assistance  Transfers  Sit to Stand: Unable to assess        Balance  Posture: Fair  Sitting - Static: Fair  Sitting - Dynamic: Fair;- (Pt tolerated sitting EOB x 4-5 min, limited as pt appears SOB with increased RR, vitals remained stable throughout)   Exercise  Supine B LE AAROM/AROM in all planes x10      AM-PAC Score  AM-PAC Inpatient Mobility Raw Score : 8 (12/19/22 1546)  AM-PAC Inpatient T-Scale Score : 28.52 (12/19/22 1546)  Mobility Inpatient CMS 0-100% Score: 86.62 (12/19/22 1546)  Mobility Inpatient CMS G-Code Modifier : CM (12/19/22 1546)     Goals  Short Term Goals  Time Frame for Short Term Goals: 14 visits  Short Term Goal 1: Perform bed mobility Mod I  Short Term Goal 2: Perform sit to stand with NWB RLE and Min A  Short Term Goal 3: Propel wheelchair 300ft with BUE and LLE and supervision  Short Term Goal 4: Ambulate 20ft with RW, NWB RLE and Min A   Therapy Time   Individual Concurrent Group Co-treatment   Time In 0924         Time Out 0948         Minutes 24         Timed Code Treatment Minutes: 24 Minutes       Kenji Viramontes PTA

## 2022-12-22 NOTE — PROGRESS NOTES
Occupational Therapy  Facility/Department: [facility] CAR 3- MICU  Occupational Therapy Daily Treatment Note    Name: North Vnan  : 1975  MRN: [id number]  Date of Service: 2022    Discharge Recommendations:  Patient would benefit from continued therapy after discharge    Patient Diagnosis(es): The primary encounter diagnosis was Other closed nondisplaced fracture of seventh cervical vertebra, initial encounter ([facility] Diagnoses of Closed wedge compression fracture of T3 vertebra, initial encounter ([facility] and Multiple closed fractures of pelvis without disruption of pelvic ring, initial encounter [facility]) were also pertinent to this visit. 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 Past Surgical History:  has a past surgical history that includes Brain Biopsy; Tonsillectomy; knee surgery; Cochlear implant (Left); Dental surgery; Cochlear implant (Right, 05/10/2022); Colonoscopy; Endoscopy, colon, diagnostic; Colonoscopy (N/A, 2022); and Upper gastrointestinal endoscopy (N/A, 2022). Assessment   Performance deficits / Impairments: Decreased ADL status; Decreased functional mobility ; Decreased ROM; Decreased strength;Decreased endurance;Decreased high-level IADLs;Decreased fine motor control;Decreased cognition;Decreased safe awareness;Decreased balance;Decreased posture  Assessment: Pt is now extubated and off CPAP  Prognosis: Fair  Activity Tolerance  Activity Tolerance: Treatment limited secondary to medical complications (free text)  Activity Tolerance Comments: Decreased SPO2 with bed mobility and sitting EOB        Plan   Occupational Therapy Plan  Times Per Week: 4-5x/wk  Current Treatment Recommendations: Strengthening, Balance training, ROM, Functional mobility training, Endurance training, Safety education & training, Positioning, Equipment evaluation, education, & procurement, Patient/Caregiver education & training, Self-Care / ADL     Restrictions  Restrictions/Precautions  Restrictions/Precautions: Fall Risk, Weight Bearing, Contact Precautions  Required Braces or Orthoses?: No  Lower Extremity Weight Bearing Restrictions  Right Lower Extremity Weight Bearing: Weight Bearing As Tolerated  Required Braces or Orthoses  Cervical:  (per neurosurg, c collar no longer needed)  Position Activity Restriction  Other position/activity restrictions: up with assist, no intervention required for C7 or T3 fracture per Dipika Stroud via PerfectServe; Acute C7 Right TP fx, Age indeterminate T3 compression fx, Nondisplaced right sacral ala fx, Nondisplaced fracture right superior pubic ramus, Right obturator ring fx. Cochlear implant R ear during session this day. Subjective   General  Patient assessed for rehabilitation services?: Yes  Response to previous treatment: Patient with no complaints from previous session  Family / Caregiver Present: No  General Comment  Comments: RN Ok'd patient for MIRELA/PTA session. Pt supine in bed upon PEOPLES arrival. Pt denied pain only statingshe needs to urinate and cannot. Pt also soiled with BM       Objective   Safety Devices  Type of Devices: All fall risk precautions in place;Call light within reach;Nurse notified; Left in bed  Restraints  Restraints Initially in Place: No  Balance  Sitting: With support (MOD Ax1-2 at EOB ~1-2 minutes only due to SPO2 dropping and recovers once returned to supine)  Standing:  (Not appropriate at this time)        ADL  UE Dressing: Maximum assistance  Toileting: Dependent/Total;Increased time to complete;Setup  Toileting Skilled Clinical Factors: Pt completed bridge for bedpan placement. Pt unable to urinate. Bedpan removed and PEOPLES provided assist for all [place] hygiene due to loose stools prior to arrival. RN notified patient asking to be straight cath for urination     Activity Tolerance  Activity Tolerance: Patient limited by endurance  Bed mobility  Rolling to Left: Minimal assistance (Simultaneous filing. User may not have seen previous data.)  Rolling to Right: Minimal assistance (Simultaneous filing. User may not have seen previous data.)  Supine to Sit: Moderate assistance (Simultaneous filing. User may not have seen previous data.)  Sit to Supine: Moderate assistance (Simultaneous filing. User may not have seen previous data.)  Bed Mobility Comments: Pt now off CPAP. Pt continues to require assist for bed mobility and SPO2 drops to 86% with sitting to EOB        Cognition  Overall Cognitive Status: Exceptions  Arousal/Alertness: Delayed responses to stimuli (Very "[employer]  Following Commands:  Follows one step commands with increased time  Attention Span: Attends with cues to redirect  Safety Judgement: Decreased awareness of need for assistance;Decreased awareness of need for safety  Insights: Decreased awareness of deficits  Initiation: Requires cues for some  Sequencing: Requires cues for some  Cognition Comment: pt able to read lips and follow commands with demonstrations, "[employer]  Orientation  Overall Orientation Status: Within Functional Limits  Orientation Level: Oriented to person;Oriented to place         Education Given To: Patient  Education Provided: ADL Adaptive Strategies  Education Provided Comments: Body mechanics and safety, rolling  Education Method: Verbal;Demonstration  Barriers to Learning: Hearing;Cognition  Education Outcome: Verbalized understanding;Continued education needed;Demonstrated understanding    AM-PAC Score  AM-PAC Inpatient Daily Activity Raw Score: 10 (12/22/22 1325)  AM-PAC Inpatient ADL T-Scale Score : 27.31 (12/22/22 1325)  ADL Inpatient CMS 0-100% Score: 74.7 (12/22/22 1325)  ADL Inpatient CMS G-Code Modifier : CL (12/22/22 1325)      Goals  Short Term Goals  Time Frame for Short Term Goals: Patient will, by discharge  Short Term Goal 1: demo self-feeding/grooming at Mod I using AE PRN  Short Term Goal 2: demo bed mobility at [facility] to promote OOB activity  Short Term Goal 3: demo UB ADLs at [address] [place] [address] Term Goal 4: demo 10+ min of dynamic sitting balance at [place] [address] to engage in ADLs safely  Short Term Goal 5: notify OTR to update POC as patient progresses       Therapy Time   Individual Concurrent Group Co-treatment   Time In 0913         Time Out 0936         Minutes 23         Timed Code Treatment Minutes: [place], PEOPLES/L

## 2022-12-22 NOTE — PROCEDURES
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 Past Surgical History:  has a past surgical history that includes Brain Biopsy; Tonsillectomy; knee surgery; Cochlear implant (Left); Dental surgery; Cochlear implant (Right, 05/10/2022); Colonoscopy; Endoscopy, colon, diagnostic; Colonoscopy (N/A, 2022); and Upper gastrointestinal endoscopy (N/A, 2022). Type of Study: Initial MBS         Patient Complaints/Reason for Referral:  Addison Hernandez was referred for a MBS to assess the efficiency of his/her swallow function, assess for aspiration, and to make recommendations regarding safe dietary consistencies, effective compensatory strategies, and safe eating environment. Onset of problem:      Addison Hernandez is a 41 y.o. female that presented to the Emergency Department following fall down stairs that occurred at 9am this morning. Patient tripped while walking up the stairs with a basket of laundry. She fell back, landing on her back and head. Patient now c/o pain in her chest, back, shoulders. Denies paresthesias or weakness. History limited due to developmental delay and deafness. Patient does normally wear a hearing aid that is currently out of batteries. Behavior/Cognition/Vision/Hearing:  Behavior/Cognition: Alert; Cooperative  Vision: Impaired  Hearing: Exceptions to [facility]    Impressions: Patient presents with  safe swallow for Dysphagia Pureed (Dysphagia I)  diet with NO liquids as evidenced by no observed aspiration noted with Puree.  + Silent aspiration noted with Nectar thick and Honey thick liquids. Weak vocal quality s/p extubation. Recommend crush meds as able in Puree. Recommend small sips and bites, only feed when alert and awake and upright at 90 degrees for all PO intake. Recommend close monitoring for overt/clinical s/s of aspiration and D/C PO intake and complete Modified Barium Swallow Study should they occur. Results and recommendations reported to RN. Patient Position: Lateral       Consistencies Administered: Pureed; Soft & Bite Sized;Mildly Thick teaspoon; Moderately Thickteaspoon      Dysphagia Outcome Severity Scale: Level 2: Moderate Severe dysphagia- Maximum assistance or maximum use of strategies with partial PO only  Penetration-Aspiration Scale (PAS): 8 - Material Enters the airway, passes below the vocal folds, and no effort is made to eject    Recommended Diet:  Solid consistency: Pureed  Liquid consistency: NPO (NO LIQUIDS)  Liquid administration via: Spoon    Medication administration: Meds in puree    Safe Swallow Protocol:  Supervision: 1:1  Compensatory Swallowing Strategies : Eat/Feed slowly;Upright as possible for all oral intake;Small bites/sips    Recommendations/Treatment  Requires SLP Intervention: Yes   Further therapy recommended at discharge. 3-5X/week  D/C Recommendations: Ongoing speech therapy is recommended during this hospitalization; Ongoing speech therapy is recommended at next level of care  Postural Changes and/or Swallow Maneuvers: Upright 90 degrees      Recommended Exercises:    Therapeutic Interventions: Diet tolerance monitoring; Laryngeal exercises         Education: Images and recommendations were reviewed with pt/RN following this exam.   Patient Education: yes  Patient Education Response: Needs reinforcement    Safety Devices  Restraints Initially in Place: No      Goals:    Long Term:    To Maximize safety with intake, optimize nutrition/hydration and minimize risk for aspiration. Short Term:     Goal 1: Trial O/M treatment program   Goal 2: The patient will tolerate recommended diet without observed clinical signs of aspiration      Oral Preparation / Oral Phase: WFL     Pt.  With adequate bolus formation and A-P transit with liquids and Puree: No mastication noted/swallowed peach whole      Pharyngeal Phase: WFL     Puree: No penetration no aspiration no stasis  Soft Solids: No penetration no aspiration no stasis  Thick tsp (Nectar): + penetration + aspiration no cough during swallow no stasis  Thick tsp (honey): + penetration + aspiration no cough during swallow no stasis    Esophageal Phase  Esophageal Screen: [facility]        Pain      Pain Level: 8  Pain Location: Back      Therapy Time:   Individual Concurrent Group Co-treatment   Time In  1400         Time Out  1415         Minutes  [address], SLP, 12/22/2022, 2:46 PM

## 2022-12-22 NOTE — CARE COORDINATION
Transitional planning. Extubated yesterday, 4 L NC, follows commands, has cochlear implants. PT/OT/SLP ordered. Faraz winn.

## 2022-12-22 NOTE — PROGRESS NOTES
INTENSIVE CARE UNIT  Resident Physician Progress Note    Patient - Nakul Petersen  Date of Admission -  12/5/2022 11:39 PM  Date of Evaluation -  12/22/2022  Room and Bed Number -  3021/3021-01   Hospital Day - 16    HPI:     72-year-old female initially presented to hospital on 12/5 after a fall. Patient is developmentally delayed and has a history of brain tumor as a child with a lot of radiation. Patient also has past medical history of diabetes mellitus, bilateral sensorineural hearing loss. Had a CT head showed no intracranial abnormality, CT abdominal pelvis showed  nondisplaced fractures of right sacral ilya, nondisplaced fracture of right superior ramus and right obturator ring. CT spine showed acute fracture of right C7 transverse process and also age indeterminate compressive fracture of T3. Neurosurgery for cervical fractures was consulted, who recommended no neurosurgical intervention. Orthopedic surgery for pelvic fractures was also consulted, no surgical intervention but medical management and also recommended to follow-up in orthopedic clinic after discharge. Patient was started on BiPAP for increased work of breathing. Patient was oriented x4 at day of presentation. 12/06 - patient had a decline in mental status with tachypnea, respiratory rate of 30s to 40 with heart rate of 105 patient was intubated emergently     12/7- Neurology was consulted because of history of migraines, seizure disorder, patient was started on her home antiepileptic drugs including Topamax and Lamictal, EEG was ordered to rule out any subclinical seizure activity, MRI was also ordered to rule out any CVA. Patient was doing fine, arousable and following commands. Pulmonology was consulted, recommended to extubate and patient was extubated.      12/10 -rapid was called today due to impending respiratory failure, patient was intubated again and was sent to ICU      12/12 -respiratory culture came back positive for staph aureus , nasal respiratory swab came out positive for MRSA DNA, patient started on vancomycin      12/13 : Tachycardia improved to normal heart rate, patient remained afebrile, WBC count trending down. Sedation changed to precedex     12/15: Precedex discontinued. 12/21: Patient was extubated. SUBJECTIVE:     OVERNIGHT EVENTS: Patient failed bedside swallow, was refusing NG tube for the night team.  Will reevaluate today. Patient has not been receiving her Lamictal, neurology is aware. F.A.S.T. M. H.U.G.S. B.I.D. Feeding Diet: Diet NPO Exceptions are: Sips of Water with Meds, patient failed bedside swallow. Fluids: Patient is on KVO fluids  Family: Updated  Analgesic: Tylenol 650 every 6 hours as needed, fentanyl 100 mcg every 2 hours as needed, she also received 2 of morphine x2, Unable to give Roxicodone 10 every 4 hours needed, Roxicodone 5 every 4 hours as needed due to NPO. Sedation: None  Thrombo-prophylaxis: [x] Enoxaparin, [] Unfract. Heparin Subcutaneously, [] EPC Cuffs  Mobility: Up with assistance, PT OT ordered. Heads up: 30 degrees   Ulcer prophylaxis: [x] PPI Agent, [] [id number], [] Sucralfate, [] Other:  Glycemic control: None, glucose 107  Bowel regimen/urine output: Protonix 40 IV daily, GlycoLax, patient receives 20 mg of Lasix daily/urine output approximately 1.5 L in the last 24 hours. Indwelling catheter/lines: Peripheral IV, wound right buttocks. De-escalation: Continue solu cortef, barium swallow       TODAY:   SLP eval for swallowing   external Porras catheter. Extubated on 12/21  Increased fentanyl dose to 100 Q2hr  Pt failed bedside swallow studies, pt refused NG. Unable to take oral anti seizure medications, neuro aware .       Intake/Output Summary (Last 24 hours) at 12/22/2022 0720  Last data filed at 12/22/2022 0100  Gross per 24 hour   Intake 423.12 ml   Output 1590 ml   Net -1166.88 ml     WBC: 11.4  Hemoglobin: 11.6  Blood glucose: 107  BUN: 23  creatinine: 0.69  Sodium: 139  Potassium: 4.0    NEW LABS PENDING     AWAKE & FOLLOWING COMMANDS:  [] No   [x] Yes    SECRETIONS Amount:  [x] Small [] Moderate  [x] Large  [] None  Color:     [x] White [] Colored - yellow  [] Bloody    SEDATION:  RAAS Score:  [] Propofol gtt  [] Versed gtt  [] Ativan gtt   [x] No Sedation    PARALYZED:  [x] No    [] Yes    VASOPRESSORS:  [x] No    [] Yes  [] Levophed [] Dopamine [] Vasopressin  [] Dobutamine [] Phenylephrine [] Epinephrine      OBJECTIVE:     VITAL SIGNS:  /66   Pulse 53   Temp 98.8 °F (37.1 °C) (Axillary)   Resp 14   Ht 5' 1\" (1.549 m)   Wt 265 lb 10 oz (120.5 kg)   SpO2 96%   BMI 50.19 kg/m²   Tmax over 24 hours:  Temp (24hrs), Av.5 °F (36.9 °C), Min:97.5 °F (36.4 °C), Max:99 °F (37.2 °C)      Patient Vitals for the past 8 hrs:   BP Temp Temp src Pulse Resp SpO2 Weight   22 0600 -- -- -- -- -- -- 265 lb 10 oz (120.5 kg)   22 0536 -- -- -- -- 14 -- --   22 0515 -- -- -- 53 20 96 % --   22 0500 102/66 -- -- 54 19 98 % --   22 0445 -- -- -- 57 22 97 % --   22 0430 108/69 -- -- 59 23 98 % --   22 0415 -- -- -- 58 21 97 % --   22 0400 (!) 98/58 -- -- 57 20 96 % --   22 0345 -- -- -- 53 19 97 % --   22 0337 -- -- -- 50 22 99 % --   22 0330 96/66 -- -- 55 20 97 % --   22 0315 -- -- -- 53 17 97 % --   22 0300 93/62 -- -- 57 20 98 % --   22 0245 -- -- -- 53 20 96 % --   22 0230 92/64 -- -- 59 20 96 % --   22 0215 -- -- -- 58 19 97 % --   22 0211 -- -- -- -- 16 -- --   22 0200 91/60 98.8 °F (37.1 °C) Axillary 53 24 96 % --   22 0145 -- -- -- 56 17 97 % --   22 0130 101/67 -- -- 60 21 97 % --   22 0115 -- -- -- 62 18 96 % --   22 0100 97/66 -- -- 56 21 97 % --   22 0045 -- -- -- 55 20 96 % --   22 0030 97/63 -- -- 63 20 93 % --   22 0015 -- -- -- 60 18 92 % --   22 0010 -- -- -- -- 16 -- --   22 0000 (!) 88/31 99 °F (37.2 °C) Axillary 58 20 92 % --   12/21/22 2345 -- -- -- 50 19 92 % --   12/21/22 2330 100/63 -- -- 61 21 95 % --         Intake/Output Summary (Last 24 hours) at 12/22/2022 0720  Last data filed at 12/22/2022 0100  Gross per 24 hour   Intake 423.12 ml   Output 1590 ml   Net -1166.88 ml     Date 12/22/22 0000 - 12/22/22 2359   Shift 4880-8114 2145-5148 2222-2007 24 Hour Total   INTAKE   Shift Total(mL/kg)       OUTPUT   Urine(mL/kg/hr) 410   410   Shift Total(mL/kg) 410(3.4)   410(3.4)   Weight (kg) 120.5 120.5 120.5 120.5     Wt Readings from Last 3 Encounters:   12/22/22 265 lb 10 oz (120.5 kg)   12/05/22 277 lb (125.6 kg)   11/07/22 279 lb (126.6 kg)     Body mass index is 50.19 kg/m². PHYSICAL EXAM:  GEN:  awake, alert, and cooperative  EYES:   pupils equal, round, and reactive to light  HEENT:  Normocepalic, without obvious abnormality  LUNGS:  No increased work of breathing, good air exchange. Rhonchorous in all lung fields.   CV:    regular rate and rhythm and normal S1 and S2  ABDOMEN:   soft, non-distended, and non-tender  MSK:    there is no redness, warmth, or swelling of the joints  NEURO[de-identified]   Awake, alert, following commands  SKIN:   Normal skin color, texture, turgor  EXTREMITIES:  No pedal or leg edema, no calf tenderness/swelling, no erythema, distal pulses intact       MEDICATIONS:  Scheduled Meds:   hydrocortisone sodium succinate PF  100 mg IntraVENous Q8H    guaiFENesin  200 mg Oral Q4H    furosemide  20 mg IntraVENous Daily    pantoprazole (PROTONIX) 40 mg injection  40 mg IntraVENous Daily    vancomycin (VANCOCIN) intermittent dosing (placeholder)   Other RX Placeholder    enoxaparin  30 mg SubCUTAneous BID    sertraline  150 mg Oral Daily    lamoTRIgine  200 mg Oral Daily    levothyroxine  150 mcg Oral QAM AC    topiramate  75 mg Oral Daily    topiramate  100 mg Oral Nightly     Continuous Infusions:   sodium chloride Stopped (12/12/22 0316)    sodium chloride 10 mL/hr at 12/21/22 1140     PRN Meds:   racepinephrine HCl, 11.25 mg, Q4H PRN  sodium chloride nebulizer, 3 mL, Q4H PRN  ondansetron, 4 mg, Q6H PRN  polyethylene glycol, 17 g, Daily PRN  oxyCODONE, 10 mg, Q4H PRN   Or  oxyCODONE, 5 mg, Q4H PRN  levalbuterol, 0.63 mg, Q4H PRN  sodium chloride, , PRN  fentanNYL, 50 mcg, Q2H PRN  acetaminophen, 650 mg, Q6H PRN  sodium chloride flush, 5-40 mL, PRN      SUPPORT DEVICES: [] Ventilator [] BIPAP  [] Nasal Cannula [] Room Air    Vent Information  Ventilator ID: [id number]  Equipment Changed: HME, Expiratory Filter, Suction catheter  Ventilator Initiate: Yes  Vent Mode: AC/PRVC  Additional Respiratory Assessments  Heart Rate: 53  Resp: 14  SpO2: 96 %  End Tidal CO2: 36 (%)  Position: Semi-Love's  Humidification Source: Heated wire  Humidification Temp: 37  Circuit Condensation: Drained  Cuff Pressure (cm H2O):  (mlt)  Skin Barrier Applied: No    Lab Results   Component Value Date/Time    FIO2 40.0 12/21/2022 04:47 AM         DATA:  Complete Blood Count:   Recent Labs     12/20/22  0543 12/21/22  0756 12/22/22  0613   WBC 16.0* 12.7* 11.4*   RBC 4.11 3.72* 4.16   HGB 11.4* 10.4* 11.6*   HCT 37.5 32.8* 37.9   MCV 91.2 88.2 91.1   MCH 27.7 28.0 27.9   MCHC 30.4 31.7 30.6   RDW 13.3 13.6 13.5    352 346   MPV 10.4 10.9 10.6        Last 3 Blood Glucose:   Recent Labs     12/20/22  0543 12/21/22  0756 12/22/22  0613   GLUCOSE 139* 94 107*        PT/INR:    Lab Results   Component Value Date/Time    PROTIME 10.5 12/05/2022 05:35 PM    INR 1.0 12/05/2022 05:35 PM     PTT:    Lab Results   Component Value Date/Time    APTT 24.9 03/10/2015 11:25 AM       Comprehensive Metabolic Profile:   Recent Labs     12/20/22  0543 12/21/22  0756 12/22/22  0613   * 139 139   K 3.8 3.3* 4.0   CL 99 102 104   CO2 23 26 26   BUN 19 24* 23*   CREATININE 0.62 0.72 0.69   GLUCOSE 139* 94 107*   CALCIUM 8.2* 8.4* 8.6      Magnesium:   Lab Results   Component Value Date/Time    MG 2.3 12/21/2022 07:56 AM    MG 1.7 12/06/2022 10:12 PM     Phosphorus:   Lab Results   Component Value Date/Time    PHOS 2.9 12/06/2022 10:12 PM     Ionized Calcium: No results found for: CAION     Urinalysis:   Lab Results   Component Value Date/Time    NITRU NEGATIVE 12/06/2022 08:05 PM    COLORU Yellow 12/06/2022 08:05 PM    PHUR 5.5 12/06/2022 08:05 PM    WBCUA None 12/06/2022 08:05 PM    RBCUA None 12/06/2022 08:05 PM    MUCUS 1+ 03/27/2013 05:54 PM    TRICHOMONAS NOT REPORTED 03/27/2013 05:54 PM    YEAST NOT REPORTED 03/27/2013 05:54 PM    BACTERIA RARE 03/27/2013 05:54 PM    CLARITYU clear 11/11/2014 01:20 PM    SPECGRAV 1.035 12/06/2022 08:05 PM    LEUKOCYTESUR NEGATIVE 12/06/2022 08:05 PM    UROBILINOGEN Normal 12/06/2022 08:05 PM    BILIRUBINUR NEGATIVE 12/06/2022 08:05 PM    BILIRUBINUR neagtive 03/17/2017 04:30 PM    BLOODU negative 03/17/2017 04:30 PM    GLUCOSEU NEGATIVE 12/06/2022 08:05 PM    KETUA MODERATE 12/06/2022 08:05 PM    AMORPHOUS NOT REPORTED 03/27/2013 05:54 PM       HgBA1c:    Lab Results   Component Value Date/Time    LABA1C 5.3 04/26/2022 12:00 AM     TSH:    Lab Results   Component Value Date/Time    TSH <0.01 12/07/2022 03:57 AM     Lactic Acid: No results found for: LACTA   Troponin: No results for input(s): TROPONINI in the last 72 hours.     Microbiology:  Blood Culture: + S epidermidis x 1  Blood culture: NG 12 h      ASSESSMENT:     Patient Active Problem List    Diagnosis Date Noted    MRSA infection 12/14/2022    Acute lower respiratory tract infection 12/14/2022    Acute respiratory failure with hypoxia ([address] 12/11/2022    Aspiration pneumonia ([address] 12/10/2022    Multiple closed pelvic fractures without disruption of pelvic [place] ([address] 12/09/2022    Lethargy 12/07/2022    Cervical transverse process fracture, initial encounter ([address] 12/06/2022    Closed nondisplaced fracture of seventh cervical vertebra (HCC) 12/06/2022    Gastroesophageal reflux disease 08/12/2022    Irritable bowel syndrome with constipation 06/07/2022    Type 2 diabetes mellitus with stage 3a chronic kidney disease, without long-term current use of insulin ([facility] 11/12/2021    Moderate episode of recurrent major depressive disorder ([facility] 02/26/2020    Hypopituitarism ([facility] 08/24/2016    ROHIT on CPAP 05/08/2015    Hypothyroidism 08/06/2014    Asthma 08/06/2014    Morbid obesity ([facility] 08/06/2014    Seizure ([facility] 10/23/2013          PLAN:      Neuro  Extubated 12/21  Off precedex, off sedation  Lamotrigine 200  Topamax 75 daily, 100 nightly  Zoloft 150 mg  Per neurosurgery pt does not need C collar. Cards  HR 53  MAP stable   Lasix 20 daily     Pulmonary  Intubated for aspiration pneumonia  PRVC 18/380/5/40%  CXR 12/18 - no change in bilateral airspace disease  Pulmonary toilet  Hydrocortisone 10 mg BID  Xopenex nebulizer QID  Guanifenesen 200 mg q 4h     Renal  External suresh   Intake/Output Summary    Lasix 20 mg daily     GI  Tube feeds 75 ml/hr, at goal  Protonix 40 daily   Glycolax     ID  Vancomycin, course started 12/12  Temp 37.1  Respiratory culture (+) S Aureus  Repeat Blood culture (+) S epidermidis x 1, Blood culture 2: No growth   Blood cultures (-) x 5 days  ID following     Endo  Synthroid 150 daily  Hydrocortisone 10 mg BID     DVT Ppx: Lovenox   GI Ppx: Protonix    ICU PROPHYLAXIS:  Stress ulcer:  [x] PPI Agent  [] [id number] [] Sucralfate  [] Other:  VTE:   [x] Enoxaparin  [] Unfract. Heparin Subcut  [] EPC Cuffs    NUTRITION:  [] NPO [x] Tube Feeding (Specify: )  Diet NPO Exceptions are: Sips of Water with Meds   [] TPN  [] PO    HOME MEDS RECONCILED: [] No  [x] Yes    CONSULTATION NEEDED:  [] No  [x] Yes    FAMILY UPDATED:    [] No  [x] Yes    TRANSFER OUT OF ICU:   [x] No  [] Yes      Emory Estevez M.D.   Internal Medicine Resident PGY-1  [facility].  12/22/2022 7:20 AM

## 2022-12-22 NOTE — PROGRESS NOTES
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 Continue aggressive pulmonary toileting and we will follow her progress  The patient is following commands and overall clinically is improved    Infection Control Recommendations:   Contact precautions    Discharge Planning:   Estimated Length of IV antimicrobials: To be determined  Patient will need Midline Catheter Insertion/ PICC line Insertion: No  Patient will need: Home IV , [place],  SNF,  LTAC: Undetermined  Patient willneed outpatient wound care: No    Medical Decision making / Summary of Stay:   Mukul Simpson is a 41y.o.-year-old female who was initially admitted on 12/5/2022. Patient seen at the request of Dr. Brady Courtney:     This patient, with a history of developmental delay, presented to the [facility] ED after falling down some stairs at home. She was complaining of neck and back pain. Imaging revealed an acute C7 transverse process fracture, chronic T3 compression fx and fractures of right sacral ala, nondisplaced fracture of right superior ramus and right obturator ring. She was transferred to [facility] for further medical management. Neurosurgery was consulted and it was determined there is no neurosurgical intervention needed. Orthopedic surgery was also consulted and they also did not recommend surgical intervention, but rather outpatient follow-up.      On 12/6/22, the patient's mentation declined and she became tachypneic requiring emergent intubation. Neurology was consulted for the patient's history of seizures. Her home seizure medications were restarted. An EEG did not show any seizure activity and the patient's mentation improved. She was extubated on the 7th but required re-intubation on 12/10/22 after a rapid response was called. CXR showed concern for RUL aspiration pneumonia. IV Zosyn was started and cultures were obtained. Viral respiratory panel was negative for influenza and Covid     Sputum cultures grew MRSA and the patient's antibiotics were changed to IV vancomycin on 12/11/22. There has been no growth on blood or urine cultures. ID was consulted for MRSA on sputum cultures        CT Head W/O Contrast   Final Result   No acute intracranial abnormality. Small amount of fluid in the right mastoid air cells. CT CHEST ABDOMEN PELVIS WO CONTRAST Additional Contrast? None   Final Result   Chest:       1. Mild anterior wedging of T3 suggesting an age indeterminate compression   fracture. No fracture line is identified. Clinical correlation with the   site of symptoms is suggested. 2. Significant motion artifact with scattered patchy ground-glass opacities   in the lungs which may be related to motion artifact. Ground-glass opacities   are otherwise nonspecific and could be related to atypical infection or   pneumonitis. Abdomen and pelvis:       1. Nondisplaced fractures of the right sacral ala and the right obturator   ring. 2. No acute findings elsewhere in the abdomen or pelvis. 3. Left renal cyst.           CT CSpine W/O Contrast   Final Result   Acute fracture of the right C7 transverse process. Spinal degenerative changes as described. Current evaluation:12/22/2022  Patient evaluated and examined in the ICU.   Afebrile to low grade fevers  VS stable, SpO2 97% 4L/nc  Patient is stable  No complaints      /66   Pulse 53   Temp 98.8 °F (37.1 °C) (Axillary)   Resp 14   Ht 5' 1\" (1.549 m)   Wt 265 lb 10 oz (120.5 kg)   SpO2 96%   BMI 50.19 kg/m²     Temperature Range: Temp: 98.8 °F (37.1 °C) Temp  Av.5 °F (36.9 °C)  Min: 97.5 °F (36.4 °C)  Max: 99 °F (37.2 °C)      Labs:  Bun 24-23  Creat 0.72-0.69  WBC 10.4-16.0-12.7-11.4  Hgb 116-11.4-10.4-11.6  Plt [id number]    Micro:   COVID-19 - not detected   MRSA nasal probe - negative  12/10 RVP - negative  12/10 MRSA nasal probe - positive  12/10 Resp Cx - MRSA heavy growth  12/10 Blood Cx x2 - no growth to date  12/15 Urune Cx - no growth  12/15 Blood Cx x2 - / Staph epi    Imagin/18 CXR  No change from prior study. Tubes and lines as above. Endotracheal tube is somewhat low lying but unchanged.  CXR  Similar appearance of mild bilateral airspace disease. This appears consistent with pneumonia   12/15 CXR  1. No significant change in bilateral airspace disease. 2.  Endotracheal tube terminates above the manny. 3.  Enteric tube terminates at the level of the body of the stomach.  CXR  No significant interval change. Patchy perihilar airspace opacities.  XR Pelvis   No acute abnormality of the pelvis. Known right superior ramus fracture is not well seen radiographically. Review of Systems   Unable to perform ROS: Intubated     Physical Examination :     Physical Exam  Vitals reviewed. Constitutional:       Appearance: She is well-developed. She is obese. Interventions: She is sedated and intubated. HENT:      Head: Normocephalic and atraumatic. Mouth/Throat:      Mouth: Mucous membranes are moist.      Pharynx: Oropharynx is clear. Eyes:      Conjunctiva/sclera: Conjunctivae normal.   Cardiovascular:      Rate and Rhythm: Normal rate and regular rhythm. Heart sounds: Normal heart sounds. Pulmonary:      Effort: Pulmonary effort is normal. No respiratory distress. She is intubated.       Breath sounds: Normal breath sounds. Abdominal:      General: Bowel sounds are normal. There is no distension. Palpations: Abdomen is soft. Tenderness: There is no abdominal tenderness. Musculoskeletal:      Cervical back: Normal range of motion. No rigidity. Right lower leg: No edema. Left lower leg: No edema. Skin:     General: Skin is warm and dry. Neurological:      Mental Status: She is alert and oriented to person, place, and time. Laboratory data:   I have independently reviewed the followinglabs:  CBC with Differential:   Recent Labs     12/21/22  0756 12/22/22 0613   WBC 12.7* 11.4*   HGB 10.4* 11.6*   HCT 32.8* 37.9    346   LYMPHOPCT 13* 8*   MONOPCT 7 4       BMP:   Recent Labs     12/21/22 0756 12/22/22 0613    139   K 3.3* 4.0    104   CO2 26 26   BUN 24* 23*   CREATININE 0.72 0.69   MG 2.3  --        Hepatic Function Panel: No results for input(s): PROT, LABALBU, BILIDIR, IBILI, BILITOT, ALKPHOS, ALT, AST in the last 72 hours. No results found for: PROCAL  Lab Results   Component Value Date/Time    CRP 4.4 08/21/2018 06:46 PM     Lab Results   Component Value Date    SEDRATE 20 08/21/2018         Lab Results   Component Value Date/Time    DDIMER 0.33 07/15/2020 05:29 PM     No results found for: FERRITIN  No results found for: LDH  No results found for: FIBRINOGEN    Results in Past 30 Days  Result Component Current Result Ref Range Previous Result Ref Range   SARS-CoV-2, PCR Not Detected (12/10/2022) Not Detected Not Detected (12/6/2022) Not Detected     Lab Results   Component Value Date/Time    COVID19 Not Detected 12/10/2022 01:09 PM    COVID19 Not Detected 12/06/2022 10:15 PM       No results for input(s): VANCOTROUGH in the last 72 hours. Imaging Studies:   ONE XRAY VIEW OF THE CHEST 12/16/2022 9:50 am  Impression   Similar appearance of mild bilateral airspace disease.   This appears   consistent with pneumonia     Cultures:     Culture, Blood 1 [[id number]] Collected: 12/15/22 1349   Order Status: Completed Specimen: Blood Updated: 12/18/22 0912    Specimen Description . BLOOD    Special Requests L FOREARM  10ML    Culture NO GROWTH 3 DAYS   Culture, Blood 1 [[id number]] (Abnormal) Collected: 12/15/22 1352   Order Status: Completed Specimen: Blood Updated: 12/18/22 0717    Specimen Description . BLOOD    Special Requests L AC  5ML    Culture POSITIVE Blood Culture Abnormal      DIRECT GRAM STAIN FROM BOTTLE: GRAM POSITIVE COCCI IN CLUSTERS     Staphylococcus epidermidis Detected: mecA/C Gene Detected- Methicillin Resistant Organism Methodology- Polymerase Chain Reaction (PCR)     STAPHYLOCOCCUS EPIDERMIDIS A single positive blood culture of coagulase negative Staphylocci, diphtheroids,micrococci, Cutibacterium, viridans Streptocci, Bacillus, or Lactobacillus species should be interpreted with caution and viewed as a likely skin contaminant. Abnormal      (NOTE) Direct Gram Stain from bottle and Polymerase Chain Reaction (PCR) results called to and read back by:JEANETTE RENE AT 1420 ON 12/16/22     Culture, Urine [[id number]] Collected: 12/15/22 1410   Order Status: Completed Specimen: Urine, indwelling catheter Updated: 12/16/22 1257    Specimen Description . INDWELLING CATH URINE    Culture NO SIGNIFICANT GROWTH   Culture, Blood 1 [[id number]] Collected: 12/10/22 1223   Order Status: Completed Specimen: Blood Updated: 12/15/22 1319    Specimen Description . BLOOD    Special Requests LEFT HAND 2.5 ML    Culture NO GROWTH 5 DAYS   Culture, Blood 1 [[id number]] Collected: 12/10/22 1230   Order Status: Completed Specimen: Blood Updated: 12/15/22 1319    Specimen Description . BLOOD    Special Requests RT HAND 2.5 ML    Culture NO GROWTH 5 DAYS   Culture, Respiratory [[id number]] (Abnormal)  Collected: 12/10/22 1130   Order Status: Completed Specimen: Sputum Induced Updated: 12/12/22 0953    Specimen Description . INDUCED SPUTUM    Direct Exam < 10 EPITHELIAL CELLS/LPF     <10 NEUTROPHILS/LPF     FEW GRAM POSITIVE COCCI IN CLUSTERS Abnormal     Culture METHICILLIN RESISTANT STAPHYLOCOCCUS AUREUS HEAVY GROWTH Abnormal      NO NORMAL JEFFREY   MRSA DNA Probe, Nasal [[id number]] (Abnormal) Collected: 12/10/22 1215   Order Status: Completed Specimen: Nasal Updated: 12/11/22 1027    Specimen Description . NASAL SWAB    MRSA, DNA, Nasal POSITIVE:  MRSA DNA detected by nucleic acid amplification. Abnormal     Comment:                                                    Results should be used as an adjunct to nosocomial control efforts to identify patients   needing enhanced precautions. The test is not intended to identify patients with staphylococcal infections. Results   should not be used to guide or monitor treatment for MRSA infections. Respiratory Panel, Molecular, with COVID-19 (Restricted: peds pts or suitable admitted adults) [[id number]] Collected: 12/10/22 1309   Order Status: Completed Specimen: Nasopharyngeal Swab Updated: 12/10/22 1451    Specimen Description . NASOPHARYNGEAL SWAB    Adenovirus PCR Not Detected    Coronavirus 229E PCR Not Detected    Coronavirus HKU1 PCR Not Detected    Coronavirus NL63 PCR Not Detected    Coronavirus OC43 PCR Not Detected    SARS-CoV-2, PCR Not Detected    Human Metapneumovirus PCR Not Detected    Rhino/Enterovirus PCR Not Detected    Influenza A by PCR Not Detected    Influenza B by PCR Not Detected    Parainfluenza 1 PCR Not Detected    Parainfluenza 2 PCR Not Detected    Parainfluenza 3 PCR Not Detected    Parainfluenza 4 PCR Not Detected    Resp Syncytial Virus PCR Not Detected    Bordetella Parapertussis Not Detected    B Pertussis by PCR Not Detected    Chlamydia pneumoniae By PCR Not Detected    Mycoplasma pneumo by PCR Not Detected    Comment: Performed by multiplexed nucleic acid assay.       Respiratory Panel, Molecular, with COVID-19 (Restricted: peds pts or suitable admitted adults) [[id number]] Collected: 12/10/22 1130   Order Status: Canceled Specimen: Nasopharyngeal Swab    MRSA DNA Probe, Nasal [[id number]] Collected: 12/07/22 0200   Order Status: Completed Specimen: Nasal Updated: 12/07/22 1440    Specimen Description . NASAL SWAB    MRSA, DNA, Nasal NEGATIVE    Comment: NEGATIVE:  MRSA DNA not detected by nucleic acid amplification. Results should be used as an adjunct to nosocomial control efforts to identify patients   needing enhanced precautions. The test is not intended to identify patients with staphylococcal infections. Results   should not be used to guide or monitor treatment for MRSA infections. COVID-19, Rapid [[id number]] Collected: 12/06/22 2215   Order Status: Completed Specimen: Nasopharyngeal Swab Updated: 12/07/22 0207    Specimen Description . NASOPHARYNGEAL SWAB    SARS-CoV-2, Rapid Not Detected    Comment:        Rapid NAAT:  The specimen is NEGATIVE for SARS-CoV-2, the novel coronavirus associated with   COVID-19. The ID NOW COVID-19 assay is designed to detect the virus that causes COVID-19 in patients   with signs and symptoms of infection who are suspected of COVID-19. An individual without symptoms of COVID-19 and who is not shedding SARS-CoV-2 virus would   expect to have a negative (not detected) result in this assay. Negative results should be treated as presumptive and, if inconsistent with clinical signs   and symptoms or necessary for patient management,   should be tested with an alternative molecular assay. Negative results do not preclude   SARS-CoV-2 infection and   should not be used as the sole basis for patient management decisions.          Fact sheet for Healthcare Providers: http://www.mandeep.hollis/   Fact sheet for Patients: http://www.mandeep.hollis/         Methodology: Isothermal Nucleic Acid Amplification         Medications:      hydrocortisone sodium succinate PF  100 mg IntraVENous Q8H    guaiFENesin  200 mg Oral Q4H    furosemide  20 mg IntraVENous Daily    pantoprazole (PROTONIX) 40 mg injection  40 mg IntraVENous Daily    vancomycin (VANCOCIN) intermittent dosing (placeholder)   Other RX Placeholder    enoxaparin  30 mg SubCUTAneous BID    sertraline  150 mg Oral Daily    lamoTRIgine  200 mg Oral Daily    levothyroxine  150 mcg Oral QAM AC    topiramate  75 mg Oral Daily    topiramate  100 mg Oral Nightly           [facility]  ADELA Gayle CNP  Perfect Serve messaging  OFFICE: [phone]      Electronically signed by ADELA Gayle CNP on 12/22/2022 at 7:57 AM  Thank you for allowing us to participate in the care of this patient. Please call with questions. ATTESTATION:    I have discussed the case, including pertinent history and exam findings with the APRN. I have evaluated the  History, physical findings and pictures of the patient and the key elements of the encounter have been performed by me. I have reviewed the laboratory data, other diagnostic studies and discussed them with the APRN. I have updated the medical record where necessary. I agree with the assessment, plan and orders as documented by the APRN. Blaise Mina MD.      This note iscreated with the assistance of a speech recognition program.  While intending to generate a document that actually reflects the content of the visit, the document can still have some errors including those of syntax andsound a like substitutions which may escape proof reading. In such instances, actual meaning can be extrapolated by contextual diversion.

## 2022-12-23 ENCOUNTER — APPOINTMENT (OUTPATIENT)
Dept: GENERAL RADIOLOGY | Age: 47
DRG: 551 | End: 2022-12-23
Payer: MEDICARE

## 2022-12-23 LAB
ABSOLUTE EOS #: <0.03 K/UL (ref 0–0.44)
ABSOLUTE IMMATURE GRANULOCYTE: 0.06 K/UL (ref 0–0.3)
ABSOLUTE LYMPH #: 1.03 K/UL (ref 1.1–3.7)
ABSOLUTE MONO #: 0.32 K/UL (ref 0.1–1.2)
ANION GAP SERPL CALCULATED.3IONS-SCNC: 10 MMOL/L (ref 9–17)
BASOPHILS # BLD: 0 % (ref 0–2)
BASOPHILS ABSOLUTE: 0.03 K/UL (ref 0–0.2)
BUN BLDV-MCNC: 27 MG/DL (ref 6–20)
CALCIUM SERPL-MCNC: 8.8 MG/DL (ref 8.6–10.4)
CHLORIDE BLD-SCNC: 103 MMOL/L (ref 98–107)
CO2: 28 MMOL/L (ref 20–31)
CREAT SERPL-MCNC: 0.78 MG/DL (ref 0.5–0.9)
EOSINOPHILS RELATIVE PERCENT: 0 % (ref 1–4)
GFR SERPL CREATININE-BSD FRML MDRD: >60 ML/MIN/1.73M2
GLUCOSE BLD-MCNC: 104 MG/DL (ref 70–99)
GLUCOSE BLD-MCNC: 93 MG/DL (ref 65–105)
HCT VFR BLD CALC: 38.3 % (ref 36.3–47.1)
HEMOGLOBIN: 12 G/DL (ref 11.9–15.1)
IMMATURE GRANULOCYTES: 1 %
LAMOTRIGINE LEVEL: 2.1 UG/ML (ref 3–15)
LYMPHOCYTES # BLD: 10 % (ref 24–43)
MAGNESIUM: 2.5 MG/DL (ref 1.6–2.6)
MCH RBC QN AUTO: 28.6 PG (ref 25.2–33.5)
MCHC RBC AUTO-ENTMCNC: 31.3 G/DL (ref 28.4–34.8)
MCV RBC AUTO: 91.2 FL (ref 82.6–102.9)
MONOCYTES # BLD: 3 % (ref 3–12)
NRBC AUTOMATED: 0 PER 100 WBC
PDW BLD-RTO: 13.7 % (ref 11.8–14.4)
PLATELET # BLD: 518 K/UL (ref 138–453)
PMV BLD AUTO: 11.8 FL (ref 8.1–13.5)
POTASSIUM SERPL-SCNC: 3.4 MMOL/L (ref 3.7–5.3)
RBC # BLD: 4.2 M/UL (ref 3.95–5.11)
SEG NEUTROPHILS: 86 % (ref 36–65)
SEGMENTED NEUTROPHILS ABSOLUTE COUNT: 8.78 K/UL (ref 1.5–8.1)
SODIUM BLD-SCNC: 141 MMOL/L (ref 135–144)
WBC # BLD: 10.2 K/UL (ref 3.5–11.3)

## 2022-12-23 PROCEDURE — 6370000000 HC RX 637 (ALT 250 FOR IP): Performed by: NURSE PRACTITIONER

## 2022-12-23 PROCEDURE — 6370000000 HC RX 637 (ALT 250 FOR IP)

## 2022-12-23 PROCEDURE — 99233 SBSQ HOSP IP/OBS HIGH 50: CPT | Performed by: INTERNAL MEDICINE

## 2022-12-23 PROCEDURE — 2580000003 HC RX 258

## 2022-12-23 PROCEDURE — 6370000000 HC RX 637 (ALT 250 FOR IP): Performed by: STUDENT IN AN ORGANIZED HEALTH CARE EDUCATION/TRAINING PROGRAM

## 2022-12-23 PROCEDURE — 6360000002 HC RX W HCPCS: Performed by: NURSE PRACTITIONER

## 2022-12-23 PROCEDURE — 82947 ASSAY GLUCOSE BLOOD QUANT: CPT

## 2022-12-23 PROCEDURE — 51701 INSERT BLADDER CATHETER: CPT

## 2022-12-23 PROCEDURE — 6360000002 HC RX W HCPCS

## 2022-12-23 PROCEDURE — 2060000000 HC ICU INTERMEDIATE R&B

## 2022-12-23 PROCEDURE — 80175 DRUG SCREEN QUAN LAMOTRIGINE: CPT

## 2022-12-23 PROCEDURE — 80048 BASIC METABOLIC PNL TOTAL CA: CPT

## 2022-12-23 PROCEDURE — 83735 ASSAY OF MAGNESIUM: CPT

## 2022-12-23 PROCEDURE — 92526 ORAL FUNCTION THERAPY: CPT

## 2022-12-23 PROCEDURE — 94660 CPAP INITIATION&MGMT: CPT

## 2022-12-23 PROCEDURE — 2500000003 HC RX 250 WO HCPCS: Performed by: HEALTH CARE PROVIDER

## 2022-12-23 PROCEDURE — 51798 US URINE CAPACITY MEASURE: CPT

## 2022-12-23 PROCEDURE — C9113 INJ PANTOPRAZOLE SODIUM, VIA: HCPCS

## 2022-12-23 PROCEDURE — 99291 CRITICAL CARE FIRST HOUR: CPT | Performed by: INTERNAL MEDICINE

## 2022-12-23 PROCEDURE — 36415 COLL VENOUS BLD VENIPUNCTURE: CPT

## 2022-12-23 PROCEDURE — 71045 X-RAY EXAM CHEST 1 VIEW: CPT

## 2022-12-23 PROCEDURE — 99232 SBSQ HOSP IP/OBS MODERATE 35: CPT | Performed by: NURSE PRACTITIONER

## 2022-12-23 PROCEDURE — 85025 COMPLETE CBC W/AUTO DIFF WBC: CPT

## 2022-12-23 PROCEDURE — 6360000002 HC RX W HCPCS: Performed by: STUDENT IN AN ORGANIZED HEALTH CARE EDUCATION/TRAINING PROGRAM

## 2022-12-23 RX ORDER — POTASSIUM CHLORIDE 20 MEQ/1
40 TABLET, EXTENDED RELEASE ORAL ONCE
Status: DISCONTINUED | OUTPATIENT
Start: 2022-12-23 | End: 2022-12-23

## 2022-12-23 RX ORDER — HYDROCORTISONE 10 MG/1
10 TABLET ORAL NIGHTLY
Status: DISCONTINUED | OUTPATIENT
Start: 2022-12-23 | End: 2022-12-31 | Stop reason: HOSPADM

## 2022-12-23 RX ORDER — HYDROCORTISONE 10 MG/1
10 TABLET ORAL NIGHTLY
Status: DISCONTINUED | OUTPATIENT
Start: 2022-12-23 | End: 2022-12-23

## 2022-12-23 RX ORDER — SODIUM CHLORIDE 9 MG/ML
INJECTION, SOLUTION INTRAVENOUS CONTINUOUS
Status: DISCONTINUED | OUTPATIENT
Start: 2022-12-23 | End: 2022-12-23

## 2022-12-23 RX ORDER — SODIUM CHLORIDE 9 MG/ML
INJECTION, SOLUTION INTRAVENOUS CONTINUOUS
Status: DISCONTINUED | OUTPATIENT
Start: 2022-12-23 | End: 2022-12-25

## 2022-12-23 RX ORDER — FENTANYL CITRATE 50 UG/ML
25 INJECTION, SOLUTION INTRAMUSCULAR; INTRAVENOUS
Status: DISCONTINUED | OUTPATIENT
Start: 2022-12-23 | End: 2022-12-23

## 2022-12-23 RX ORDER — FENTANYL CITRATE 50 UG/ML
50 INJECTION, SOLUTION INTRAMUSCULAR; INTRAVENOUS
Status: DISCONTINUED | OUTPATIENT
Start: 2022-12-23 | End: 2022-12-23

## 2022-12-23 RX ADMIN — ACETAMINOPHEN 650 MG: 325 TABLET ORAL at 21:22

## 2022-12-23 RX ADMIN — GUAIFENESIN 200 MG: 200 SOLUTION ORAL at 21:22

## 2022-12-23 RX ADMIN — FUROSEMIDE 20 MG: 10 INJECTION, SOLUTION INTRAMUSCULAR; INTRAVENOUS at 09:15

## 2022-12-23 RX ADMIN — HYDROCORTISONE SODIUM SUCCINATE 100 MG: 100 INJECTION, POWDER, FOR SOLUTION INTRAMUSCULAR; INTRAVENOUS at 01:17

## 2022-12-23 RX ADMIN — FENTANYL CITRATE 25 MCG: 50 INJECTION, SOLUTION INTRAMUSCULAR; INTRAVENOUS at 10:52

## 2022-12-23 RX ADMIN — ENOXAPARIN SODIUM 30 MG: 100 INJECTION SUBCUTANEOUS at 20:55

## 2022-12-23 RX ADMIN — LAMOTRIGINE 200 MG: 100 TABLET ORAL at 09:03

## 2022-12-23 RX ADMIN — FENTANYL CITRATE 25 MCG: 50 INJECTION, SOLUTION INTRAMUSCULAR; INTRAVENOUS at 14:53

## 2022-12-23 RX ADMIN — HYDROCORTISONE 10 MG: 10 TABLET ORAL at 20:55

## 2022-12-23 RX ADMIN — GUAIFENESIN 200 MG: 200 SOLUTION ORAL at 09:11

## 2022-12-23 RX ADMIN — FENTANYL CITRATE 25 MCG: 50 INJECTION, SOLUTION INTRAMUSCULAR; INTRAVENOUS at 08:14

## 2022-12-23 RX ADMIN — SODIUM CHLORIDE: 9 INJECTION, SOLUTION INTRAVENOUS at 17:12

## 2022-12-23 RX ADMIN — GUAIFENESIN 200 MG: 200 SOLUTION ORAL at 12:57

## 2022-12-23 RX ADMIN — POTASSIUM BICARBONATE 40 MEQ: 782 TABLET, EFFERVESCENT ORAL at 17:11

## 2022-12-23 RX ADMIN — SODIUM CHLORIDE: 9 INJECTION, SOLUTION INTRAVENOUS at 19:40

## 2022-12-23 RX ADMIN — GUAIFENESIN 200 MG: 200 SOLUTION ORAL at 06:07

## 2022-12-23 RX ADMIN — FENTANYL CITRATE 100 MCG: 50 INJECTION, SOLUTION INTRAMUSCULAR; INTRAVENOUS at 03:55

## 2022-12-23 RX ADMIN — TOPIRAMATE 75 MG: 100 TABLET, FILM COATED ORAL at 10:20

## 2022-12-23 RX ADMIN — GUAIFENESIN 200 MG: 200 SOLUTION ORAL at 18:11

## 2022-12-23 RX ADMIN — SODIUM CHLORIDE: 9 INJECTION, SOLUTION INTRAVENOUS at 09:31

## 2022-12-23 RX ADMIN — SERTRALINE 150 MG: 50 TABLET, FILM COATED ORAL at 09:03

## 2022-12-23 RX ADMIN — TOPIRAMATE 100 MG: 100 TABLET, FILM COATED ORAL at 20:55

## 2022-12-23 RX ADMIN — LEVOTHYROXINE SODIUM 150 MCG: 75 TABLET ORAL at 09:03

## 2022-12-23 RX ADMIN — OXYCODONE 10 MG: 5 TABLET ORAL at 18:07

## 2022-12-23 RX ADMIN — HYDROCORTISONE 15 MG: 10 TABLET ORAL at 12:55

## 2022-12-23 RX ADMIN — OXYCODONE 5 MG: 5 TABLET ORAL at 12:00

## 2022-12-23 RX ADMIN — SODIUM CHLORIDE, PRESERVATIVE FREE 40 MG: 5 INJECTION INTRAVENOUS at 09:15

## 2022-12-23 RX ADMIN — ENOXAPARIN SODIUM 30 MG: 100 INJECTION SUBCUTANEOUS at 09:15

## 2022-12-23 ASSESSMENT — PAIN SCALES - GENERAL
PAINLEVEL_OUTOF10: 9
PAINLEVEL_OUTOF10: 8
PAINLEVEL_OUTOF10: 6
PAINLEVEL_OUTOF10: 7
PAINLEVEL_OUTOF10: 8
PAINLEVEL_OUTOF10: 8
PAINLEVEL_OUTOF10: 5
PAINLEVEL_OUTOF10: 4

## 2022-12-23 ASSESSMENT — PAIN DESCRIPTION - LOCATION
LOCATION: BACK

## 2022-12-23 ASSESSMENT — PAIN DESCRIPTION - ORIENTATION
ORIENTATION: UPPER

## 2022-12-23 ASSESSMENT — ENCOUNTER SYMPTOMS
RESPIRATORY NEGATIVE: 1
GASTROINTESTINAL NEGATIVE: 1
BACK PAIN: 1
SHORTNESS OF BREATH: 0

## 2022-12-23 ASSESSMENT — PAIN DESCRIPTION - DESCRIPTORS
DESCRIPTORS: ACHING;NUMBNESS
DESCRIPTORS: ACHING;NUMBNESS;HEAVINESS
DESCRIPTORS: ACHING;NUMBNESS
DESCRIPTORS: ACHING;NUMBNESS
DESCRIPTORS: ACHING;NUMBNESS;SHARP
DESCRIPTORS: ACHING;SHARP
DESCRIPTORS: SORE;ACHING

## 2022-12-23 NOTE — PROGRESS NOTES
Critical care team - Resident sign-out to medicine service      Date and time: 12/23/2022 1:41 PM  Patient's name:  Phyllis Chao Record Number: [id number]  Patient's account/billing number: [de-identified]  Patient's YOB: 1975  Age: 41 y.o. Date of Admission: 12/5/2022 11:39 PM  Length of stay during current admission: 17    Primary Care Physician: Esha Gómez DO    Code Status: Full Code    Mode of physician to physician communication:        [x] Via telephone   [] In person     Date and time of sign-out: 12/23/2022 1:41 PM    Accepting Internal Medicine resident: Dr. Vasile Szymanski    Accepting Medicine team: IM Team 3     Accepting team's attending: Dr. Florina Chavez    Patient's current ICU Bed:  5732     Patient's assigned bed on floor:  426        [] Med-Surg Monitored [x] Step-down       [] Psychiatry ICU       [] Psych floor     Reason for ICU admission:     Acute respiratory distress     ICU course summary:     79-year-old female initially presented to hospital on 12/5 after a fall. Patient is developmentally delayed and has a history of brain tumor as a child with a lot of radiation. Patient also has past medical history of diabetes mellitus, bilateral sensorineural hearing loss. Had a CT head showed no intracranial abnormality, CT abdominal pelvis showed  nondisplaced fractures of right sacral ilya, nondisplaced fracture of right superior ramus and right obturator ring. CT spine showed acute fracture of right C7 transverse process and also age indeterminate compressive fracture of T3. Neurosurgery for cervical fractures was consulted, who recommended no neurosurgical intervention. Orthopedic surgery for pelvic fractures was also consulted, no surgical intervention but medical management and also recommended to follow-up in orthopedic clinic after discharge. Patient was started on BiPAP for increased work of breathing. Patient was oriented x4 at day of presentation.    12/06 - patient had a decline in mental status with tachypnea, respiratory rate of 30s to 40 with heart rate of 105 patient was intubated emergently     12/7- Neurology was consulted because of history of migraines, seizure disorder, patient was started on her home antiepileptic drugs including Topamax and Lamictal, EEG was ordered to rule out any subclinical seizure activity, MRI was also ordered to rule out any CVA. Patient was doing fine, arousable and following commands. Pulmonology was consulted, recommended to extubate and patient was extubated. 12/10 -rapid was called today due to impending respiratory failure, patient was intubated again and was sent to ICU      12/12 -respiratory culture came back positive for staph aureus , nasal respiratory swab came out positive for MRSA DNA, patient started on vancomycin      12/13 : Tachycardia improved to normal heart rate, patient remained afebrile, WBC count trending down. Sedation changed to precedex     12/15: Precedex discontinued. 12/21: Patient was extubated. 12/23: Pt was a febrile overnight, saturating well on NC. Procedures during patient's ICU stay:     Intubation and extubation     Current Vitals:     /69   Pulse 52   Temp 98.8 °F (37.1 °C) (Oral)   Resp 20   Ht 5' 1\" (1.549 m)   Wt 265 lb (120.2 kg)   SpO2 94%   BMI 50.07 kg/m²       Cultures:     Respiratory culture came back positive for staph aureus , nasal respiratory swab came out positive for MRSA DNA, patient started on vancomycin       Consults:     1.  ID    Assessment:     Patient Active Problem List    Diagnosis Date Noted    MRSA infection 12/14/2022    Acute lower respiratory tract infection 12/14/2022    Acute respiratory failure with hypoxia ([address] 12/11/2022    Aspiration pneumonia ([address] 12/10/2022    Multiple closed pelvic fractures without disruption of pelvic [place] ([address] 12/09/2022    Lethargy 12/07/2022    Cervical transverse process fracture, initial encounter ([address] 12/06/2022 Closed nondisplaced fracture of seventh cervical vertebra (HCC) 12/06/2022    Gastroesophageal reflux disease 08/12/2022    Irritable bowel syndrome with constipation 06/07/2022    Type 2 diabetes mellitus with stage 3a chronic kidney disease, without long-term current use of insulin ([facility] 11/12/2021    Moderate episode of recurrent major depressive disorder ([facility] 02/26/2020    Hypopituitarism ([facility] 08/24/2016    ROHIT on CPAP 05/08/2015    Hypothyroidism 08/06/2014    Asthma 08/06/2014    Morbid obesity ([address] 08/06/2014    Seizure ([facility] 10/23/2013     Recommended Follow-up:     Restarted home prednisone  Continue thyroid medications  Start CPAP nightly and as needed  Monitor respiratory status   HR has been in upper 40s and 50s   Pain control         Above mentioned assessment and plan was discussed by me with the admitting medicine resident. The medicine team assigned to the patient by medicine admitting resident will be following up the patient from now onwards on the floor.      Anisa Smith MD  Critical care resident  Department of Internal Medicine/ Critical care  [facility] ([place]             12/23/2022, 1:41 PM

## 2022-12-23 NOTE — FLOWSHEET NOTE
1600h - Report Given to Jose Trejo. Transferred patient to [address] [place] [address], in room 426. Vitally stable, orientated and cooperative. On NC at 4LPM. Cpap at night. Belongings were given to PAU Cruz and instructed about the patient cochlear implant battery change every 12 hours. Parent Efra updated about the patient transfer.

## 2022-12-23 NOTE — PROGRESS NOTES
[address] [place] [address]     Department of Internal Medicine - Staff Internal Medicine Service   ICU PATIENT TRANSFER NOTE        Patient:  Piyush Martins  YOB: 1975  MRN: [id number]     Acct: [de-identified]     Admit date: 12/5/2022    Code Status:-  Full code     Reason for ICU Admission:-       SUPPORT DEVICES: [] Ventilator [] BIPAP  [x] Nasal Cannula [] Room Air    Consultations:- [] Cardiology [] Nephrology  [] Hemo onco  [] GI                               [x] ID [] ENT  [] Rheum [] Endo   []Physiotherapy                                 Others:-     NUTRITION:  [] NPO [] Tube Feeding (Specify: ) [] TPN  [x] PO    Central Lines:- [x] No   [] Yes           If yes - Days/Date of Insertion. Pt seen,examined and Chart reviewed. ICU COURSE:    28-year-old female initially presented to hospital on 12/5 after a fall. 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 Patient also has past medical history of diabetes mellitus, bilateral sensorineural hearing loss. Had a CT head showed no intracranial abnormality, CT abdominal pelvis showed  nondisplaced fractures of right sacral ilya, nondisplaced fracture of right superior ramus and right obturator ring. CT spine showed acute fracture of right C7 transverse process and also age indeterminate compressive fracture of T3. Patient admitted under trauma surgery-neurosurgery for cervical fractures was consulted, who recommended no neurosurgical intervention. Orthopedic surgery for pelvic fractures was also consulted, no surgical intervention but medical management and also recommended to follow-up in orthopedic clinic after discharge. Patient was started on BiPAP for increased work of breathing. Patient was oriented x4 at day of presentation.    12/06 - patient had a decline in mental status with tachypnea, respiratory rate of 30s to 40 with heart rate of 105 patient was intubated emergently     12/7- Neurology was consulted because of history of migraines, seizure disorder, patient was started on her home antiepileptic drugs including Topamax and Lamictal, EEG was ordered to rule out any subclinical seizure activity, MRI was also ordered to rule out any CVA. Patient was doing fine, arousable and following commands. Pulmonology was consulted, recommended to extubate and patient was extubated. 12/10 -rapid was called today due to impending respiratory failure, patient was intubated again and was sent to ICU      12/12 -respiratory culture came back positive for staph aureus , nasal respiratory swab came out positive for MRSA DNA, patient started on vancomycin      12/13 : Tachycardia improved to normal heart rate, patient remained afebrile, WBC count trending down. Sedation changed to precedex     12/15: Precedex discontinued. 12/21: Patient was extubated. 12/23: Pt was a febrile overnight, saturating well on NC. Physical Exam:   Vitals: /69   Pulse 52   Temp 98.8 °F (37.1 °C) (Oral)   Resp 20   Ht 5' 1\" (1.549 m)   Wt 265 lb (120.2 kg)   SpO2 94%   BMI 50.07 kg/m²   24 hour intake/output:  Intake/Output Summary (Last 24 hours) at 12/23/2022 1458  Last data filed at 12/23/2022 1200  Gross per 24 hour   Intake --   Output 2050 ml   Net -2050 ml     Last 3 weights: Wt Readings from Last 3 Encounters:   12/23/22 265 lb (120.2 kg)   12/05/22 277 lb (125.6 kg)   11/07/22 279 lb (126.6 kg)       General appearance - alert, in no distress  Mental status - alert, oriented to person, place, and time  Eyes - pupils equal and reactive, extraocular eye movements intact. Some hearing loss.   Mouth - mucous membranes moist, pharynx normal without lesions  Neck - supple, no significant adenopathy  Chest - clear to auscultation, no wheezes  Heart - normal rate, regular rhythm, normal S1, S2  Abdomen - soft, nontender, nondistended  Neurological - alert, oriented, normal speech, no focal deficits   Extremities - peripheral pulses normal, no pedal edema  Skin - normal coloration and turgor, no rashes      Medications:Current Inpatient  Scheduled Meds:   hydrocortisone  10 mg Oral Nightly    hydrocortisone  15 mg Oral QAM    guaiFENesin  200 mg Oral Q4H    furosemide  20 mg IntraVENous Daily    pantoprazole (PROTONIX) 40 mg injection  40 mg IntraVENous Daily    enoxaparin  30 mg SubCUTAneous BID    sertraline  150 mg Oral Daily    lamoTRIgine  200 mg Oral Daily    levothyroxine  150 mcg Oral QAM AC    topiramate  75 mg Oral Daily    topiramate  100 mg Oral Nightly     Continuous Infusions:   sodium chloride 100 mL/hr at 12/23/22 0931    sodium chloride Stopped (12/12/22 0316)    sodium chloride 10 mL/hr at 12/21/22 1140     PRN Meds:fentanNYL, racepinephrine HCl, sodium chloride nebulizer, ondansetron, polyethylene glycol, oxyCODONE **OR** oxyCODONE, levalbuterol, sodium chloride, acetaminophen, sodium chloride flush    Objective:    CBC:   Recent Labs     12/21/22  0756 12/22/22  0613 12/23/22  0638   WBC 12.7* 11.4* 10.2   HGB 10.4* 11.6* 12.0    346 518*     BMP:    Recent Labs     12/21/22  0756 12/22/22  0613 12/23/22  0638    139 141   K 3.3* 4.0 3.4*    104 103   CO2 26 26 28   BUN 24* 23* 27*   CREATININE 0.72 0.69 0.78   GLUCOSE 94 107* 104*     Calcium:  Recent Labs     12/23/22  0638   CALCIUM 8.8     Ionized Calcium:No results for input(s): IONCA in the last 72 hours. Magnesium:  Recent Labs     12/23/22  0638   MG 2.5     Phosphorus:No results for input(s): PHOS in the last 72 hours. BNP:No results for input(s): BNP in the last 72 hours. Glucose:  Recent Labs     12/21/22  0447 12/23/22  0813   POCGLU 127* 93     HgbA1C: No results for input(s): LABA1C in the last 72 hours. INR: No results for input(s): INR in the last 72 hours.   Hepatic: No results for input(s): ALKPHOS, ALT, AST, PROT, BILITOT, BILIDIR, LABALBU in the last 72 hours. Amylase and Lipase:No results for input(s): LACTA, AMYLASE in the last 72 hours. Lactic Acid: No results for input(s): LACTA in the last 72 hours. CARDIAC ENZYMES:No results for input(s): CKTOTAL, CKMB, CKMBINDEX, TROPONINI in the last 72 hours. BNP: No results for input(s): BNP in the last 72 hours. Lipids: No results for input(s): CHOL, TRIG, HDL, LDL, LDLCALC in the last 72 hours. ABGs: No results found for: PH, PCO2, PO2, HCO3, O2SAT  Thyroid:   Lab Results   Component Value Date/Time    TSH <0.01 12/07/2022 03:57 AM        Urinalysis: No results for input(s): BACTERIA, BLOODU, CLARITYU, COLORU, PHUR, PROTEINU, RBCUA, SPECGRAV, BILIRUBINUR, NITRU, WBCUA, LEUKOCYTESUR, GLUCOSEU in the last 72 hours. Assessment:  Principal Problem:    Cervical transverse process fracture, initial encounter [facility])  Active Problems:    Closed nondisplaced fracture of seventh cervical vertebra (HCC)    Lethargy    Multiple closed pelvic fractures without disruption of pelvic [place] (HCC)    Aspiration pneumonia (HCC)    Acute respiratory failure with hypoxia (HCC)    MRSA infection    Acute lower respiratory tract infection    Seizure ([facility] [address]    Hypothyroidism    Asthma    Hypopituitarism ([facility]  Resolved Problems:    * No resolved hospital problems. *          Plan:    Acute hypoxic respiratory failure likely secondary to pneumonia-completed vancomycin for MRSA pneumonia 12/22  -Xopenex, Robitussin, racemic epinephrine, sodium chloride nebulizer as needed  -On nasal cannula during the day with CPAP in the night. Maintain oxygen saturation above 92%. Continue to wean down as tolerated. Sinus bradycardia - asymptomatic  Will monitor. History of seizures  EEG this admission-moderate nonspecific encephalopathy-no epileptic discharges. MRI could not be done due to cochlear implants. - Continue home lamotrigine 200 mg daily, topiramate 100 mg nightly, 75 mg in the morning.     C7 transverse process fracture and T3 chronic compression fracture-neurosurgery was consulted-recommended no intervention    Right sacral ilya fracture, right superior pubic ramus fracture-orthopedic was consulted-recommended nonoperative treatment with close clinic follow-up in 2 weeks after discharge as there is minimal chance of displacement and recommended weightbearing as tolerated. Hypothyroidism  -Continue home levothyroxine 150 mcg    Hypopituitarism  -Continue hydrocortisone 10 mg in the night and 15 mg in the morning. Home dose-10 mg twice daily. Hypokalemia-will be replaced    Due to prophylaxis - Lovenox  GI prophylaxis - Protonix    PT/OT-has been working   - on board - we will follow-up.     Jan Abrams MD             Department of Internal Medicine  [address] [facility]           12/23/2022, 2:58 PM

## 2022-12-23 NOTE — PLAN OF CARE
Problem: Discharge Planning  Goal: Discharge to home or other facility with appropriate resources  12/23/2022 1240 by Breanna De Santiago RN  Outcome: Progressing  Flowsheets (Taken 12/23/2022 0800)  Discharge to home or other facility with appropriate resources: Identify barriers to discharge with patient and caregiver     Problem: Confusion  Goal: Confusion, delirium, dementia, or psychosis is improved or at baseline  Description: INTERVENTIONS:  1. Assess for possible contributors to thought disturbance, including medications, impaired vision or hearing, underlying metabolic abnormalities, dehydration, psychiatric diagnoses, and notify attending LIP  2. [place] high risk fall precautions, as indicated  3. Provide frequent short contacts to provide reality reorientation, refocusing and direction  4. Decrease environmental stimuli, including noise as appropriate  5. Monitor and intervene to maintain adequate nutrition, hydration, elimination, sleep and activity  6. If unable to ensure safety without constant attention obtain sitter and review sitter guidelines with assigned personnel  7.  Initiate Psychosocial CNS and Spiritual Care consult, as indicated  12/23/2022 1240 by Breanna De Santiago RN  Outcome: Progressing     Problem: Pain  Goal: Verbalizes/displays adequate comfort level or baseline comfort level  12/23/2022 1240 by Breanna De Santiago RN  Outcome: Progressing     Problem: Chronic Conditions and Co-morbidities  Goal: Patient's chronic conditions and co-morbidity symptoms are monitored and maintained or improved  12/23/2022 1240 by Breanna De Santiago RN  Outcome: Progressing  Flowsheets (Taken 12/23/2022 0800)  Care Plan - Patient's Chronic Conditions and Co-Morbidity Symptoms are Monitored and Maintained or Improved: Monitor and assess patient's chronic conditions and comorbid symptoms for stability, deterioration, or improvement     Problem: Skin/Tissue Integrity  Goal: Absence of new skin breakdown  Description: 1. Monitor for areas of redness and/or skin breakdown  2. Assess vascular access sites hourly  3. Every 4-6 hours minimum:  Change oxygen saturation probe site  4. Every 4-6 hours:  If on nasal continuous positive airway pressure, respiratory therapy assess nares and determine need for appliance change or resting period.   12/23/2022 1240 by Cj Granado RN  Outcome: Progressing     Problem: Safety - Adult  Goal: Free from fall injury  12/23/2022 1240 by Cj Granado RN  Outcome: Progressing     Problem: ABCDS Injury Assessment  Goal: Absence of physical injury  12/23/2022 1240 by Cj Granado RN  Outcome: Progressing     Problem: Nutrition Deficit:  Goal: Optimize nutritional status  12/23/2022 1240 by Cj Granado RN  Outcome: Progressing     Problem: Respiratory - Adult  Goal: Achieves optimal ventilation and oxygenation  12/23/2022 1240 by Cj Granado RN  Outcome: Progressing

## 2022-12-23 NOTE — PLAN OF CARE
Problem: Discharge Planning  Goal: Discharge to home or other facility with appropriate resources  Outcome: Progressing  Flowsheets (Taken 12/22/2022 2000)  Discharge to home or other facility with appropriate resources: Identify barriers to discharge with patient and caregiver     Problem: Confusion  Goal: Confusion, delirium, dementia, or psychosis is improved or at baseline  Description: INTERVENTIONS:  1. Assess for possible contributors to thought disturbance, including medications, impaired vision or hearing, underlying metabolic abnormalities, dehydration, psychiatric diagnoses, and notify attending LIP  2. [place] high risk fall precautions, as indicated  3. Provide frequent short contacts to provide reality reorientation, refocusing and direction  4. Decrease environmental stimuli, including noise as appropriate  5. Monitor and intervene to maintain adequate nutrition, hydration, elimination, sleep and activity  6. If unable to ensure safety without constant attention obtain sitter and review sitter guidelines with assigned personnel  7.  Initiate Psychosocial CNS and Spiritual Care consult, as indicated  Outcome: Progressing  Flowsheets (Taken 12/22/2022 2000)  Effect of thought disturbance (confusion, delirium, dementia, or psychosis) are managed with adequate functional status: Assess for contributors to thought disturbance, including medications, impaired vision or hearing, underlying metabolic abnormalities, dehydration, psychiatric diagnoses, notify LIP     Problem: Pain  Goal: Verbalizes/displays adequate comfort level or baseline comfort level  Outcome: Progressing  Flowsheets  Taken 12/23/2022 0400  Verbalizes/displays adequate comfort level or baseline comfort level: Encourage patient to monitor pain and request assistance  Taken 12/22/2022 2000  Verbalizes/displays adequate comfort level or baseline comfort level: Encourage patient to monitor pain and request assistance     Problem: Chronic Conditions and Co-morbidities  Goal: Patient's chronic conditions and co-morbidity symptoms are monitored and maintained or improved  Outcome: Progressing  Flowsheets (Taken 12/22/2022 2000)  Care Plan - Patient's Chronic Conditions and Co-Morbidity Symptoms are Monitored and Maintained or Improved: Monitor and assess patient's chronic conditions and comorbid symptoms for stability, deterioration, or improvement     Problem: Skin/Tissue Integrity  Goal: Absence of new skin breakdown  Description: 1. Monitor for areas of redness and/or skin breakdown  2. Assess vascular access sites hourly  3. Every 4-6 hours minimum:  Change oxygen saturation probe site  4. Every 4-6 hours:  If on nasal continuous positive airway pressure, respiratory therapy assess nares and determine need for appliance change or resting period.   Outcome: Progressing     Problem: Safety - Adult  Goal: Free from fall injury  Outcome: Progressing     Problem: ABCDS Injury Assessment  Goal: Absence of physical injury  Outcome: Progressing     Problem: Nutrition Deficit:  Goal: Optimize nutritional status  Outcome: Progressing     Problem: Respiratory - Adult  Goal: Achieves optimal ventilation and oxygenation  Outcome: Progressing

## 2022-12-23 NOTE — PROGRESS NOTES
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  Developmental delay  Morbid obesity    Recommendations:   Completed intravenous antimicrobial therapy with vancomycin on 12-22-22  Continue aggressive pulmonary toileting and we will follow her progress  The patient is following commands and overall clinically is improved    Infection Control Recommendations:   Contact precautions    Discharge Planning:   Estimated Length of IV antimicrobials: 12-22-22  Patient will need Midline Catheter Insertion/ PICC line Insertion: No  Patient will need: Home IV , [place],  SNF,  LTAC: Undetermined  Patient willneed outpatient wound care: No    Medical Decision making / Summary of Stay:   North Vann is a 41y.o.-year-old female who was initially admitted on 12/5/2022. Patient seen at the request of Dr. Toyin Sena:     This patient, with a history of developmental delay, presented to the [facility] ED after falling down some stairs at home. She was complaining of neck and back pain. Imaging revealed an acute C7 transverse process fracture, chronic T3 compression fx and fractures of right sacral ala, nondisplaced fracture of right superior ramus and right obturator ring. She was transferred to [facility] for further medical management. Neurosurgery was consulted and it was determined there is no neurosurgical intervention needed. Orthopedic surgery was also consulted and they also did not recommend surgical intervention, but rather outpatient follow-up.      On 12/6/22, the patient's mentation declined and she became tachypneic requiring emergent intubation. Neurology was consulted for the patient's history of seizures. Her home seizure medications were restarted. An EEG did not show any seizure activity and the patient's mentation improved. She was extubated on the 7th but required re-intubation on 12/10/22 after a rapid response was called. CXR showed concern for RUL aspiration pneumonia. IV Zosyn was started and cultures were obtained. Viral respiratory panel was negative for influenza and Covid     Sputum cultures grew MRSA and the patient's antibiotics were changed to IV vancomycin on 12/11/22. There has been no growth on blood or urine cultures. ID was consulted for MRSA on sputum cultures        CT Head W/O Contrast   Final Result   No acute intracranial abnormality. Small amount of fluid in the right mastoid air cells. CT CHEST ABDOMEN PELVIS WO CONTRAST Additional Contrast? None   Final Result   Chest:       1. Mild anterior wedging of T3 suggesting an age indeterminate compression   fracture. No fracture line is identified. Clinical correlation with the   site of symptoms is suggested. 2. Significant motion artifact with scattered patchy ground-glass opacities   in the lungs which may be related to motion artifact. Ground-glass opacities   are otherwise nonspecific and could be related to atypical infection or   pneumonitis. Abdomen and pelvis:       1. Nondisplaced fractures of the right sacral ala and the right obturator   ring. 2. No acute findings elsewhere in the abdomen or pelvis. 3. Left renal cyst.           CT CSpine W/O Contrast   Final Result   Acute fracture of the right C7 transverse process. Spinal degenerative changes as described. Current evaluation:12/23/2022    Patient evaluated and examined in the ICU.     Afebrile   VS stable, SpO2 93% 4L/nc  She was extubated on 12-21-22  Maintaining good 02 sat on nasal 02    Patient is stable  No complaints  Continuing respiratory toilet        /64   Pulse 50   Temp 98 °F (36.7 °C) (Oral)   Resp 19   Ht 5' 1\" (1.549 m)   Wt 265 lb (120.2 kg)   SpO2 95%   BMI 50.07 kg/m²     Temperature Range: Temp: 98 °F (36.7 °C) Temp  Av.4 °F (36.9 °C)  Min: 98 °F (36.7 °C)  Max: 99.5 °F (37.5 °C)      Labs: 2022    Bun 24-23-27  Creat 0.72-0.69-0.78  WBC 10.4-16.0-12.7-11.4-10.2  Hgb 116-11.4-10.4-11.6-12.0  Plt [phone]    Micro:   COVID-19 - not detected   MRSA nasal probe - negative  12/10 RVP - negative  12/10 MRSA nasal probe - positive  12/10 Resp Cx - MRSA heavy growth  12/10 Blood Cx x2 - no growth to date  12/15 Urune Cx - no growth  12/15 Blood Cx x2 - 1/2 Staph epi    Imagin/18 CXR  No change from prior study. Tubes and lines as above. Endotracheal tube is somewhat low lying but unchanged.  CXR  Similar appearance of mild bilateral airspace disease. This appears consistent with pneumonia   12/15 CXR  1. No significant change in bilateral airspace disease. 2.  Endotracheal tube terminates above the manny. 3.  Enteric tube terminates at the level of the body of the stomach.  CXR  No significant interval change. Patchy perihilar airspace opacities.  XR Pelvis   No acute abnormality of the pelvis. Known right superior ramus fracture is not well seen radiographically. Review of Systems   Constitutional: Negative. HENT: Negative. Respiratory: Negative. Negative for shortness of breath. Cardiovascular: Negative. Negative for chest pain. Gastrointestinal: Negative. Genitourinary: Negative. Musculoskeletal:  Positive for back pain. Neurological: Negative. Psychiatric/Behavioral: Negative. Physical Examination :     Physical Exam  Vitals reviewed. Constitutional:       General: She is not in acute distress. Appearance: She is well-developed. She is obese. Interventions: She is sedated.    HENT: Head: Normocephalic and atraumatic. Mouth/Throat:      Mouth: Mucous membranes are moist.      Pharynx: Oropharynx is clear. Eyes:      Conjunctiva/sclera: Conjunctivae normal.   Cardiovascular:      Rate and Rhythm: Regular rhythm. Bradycardia present. Heart sounds: Normal heart sounds. Pulmonary:      Effort: Pulmonary effort is normal. No respiratory distress. Breath sounds: Normal breath sounds. Abdominal:      General: Bowel sounds are normal. There is no distension. Palpations: Abdomen is soft. Tenderness: There is no abdominal tenderness. Musculoskeletal:      Cervical back: Normal range of motion. No rigidity. Right lower leg: No edema. Left lower leg: No edema. Skin:     General: Skin is warm and dry. Neurological:      Mental Status: She is alert and oriented to person, place, and time. Psychiatric:         Behavior: Behavior normal.       Laboratory data:   I have independently reviewed the followinglabs:  CBC with Differential:   Recent Labs     12/22/22  0613 12/23/22  0638   WBC 11.4* 10.2   HGB 11.6* 12.0   HCT 37.9 38.3    518*   LYMPHOPCT 8* 10*   MONOPCT 4 3       BMP:   Recent Labs     12/21/22  0756 12/22/22  0613 12/23/22  0638    139 141   K 3.3* 4.0 3.4*    104 103   CO2 26 26 28   BUN 24* 23* 27*   CREATININE 0.72 0.69 0.78   MG 2.3  --  2.5       Hepatic Function Panel: No results for input(s): PROT, LABALBU, BILIDIR, IBILI, BILITOT, ALKPHOS, ALT, AST in the last 72 hours.       No results found for: PROCAL  Lab Results   Component Value Date/Time    CRP 4.4 08/21/2018 06:46 PM     Lab Results   Component Value Date    SEDRATE 20 08/21/2018         Lab Results   Component Value Date/Time    DDIMER 0.33 07/15/2020 05:29 PM     No results found for: FERRITIN  No results found for: LDH  No results found for: FIBRINOGEN    Results in Past 30 Days  Result Component Current Result Ref Range Previous Result Ref Range SARS-CoV-2, PCR Not Detected (12/10/2022) Not Detected Not Detected (12/6/2022) Not Detected     Lab Results   Component Value Date/Time    COVID19 Not Detected 12/10/2022 01:09 PM    COVID19 Not Detected 12/06/2022 10:15 PM       No results for input(s): [address] in the last 72 hours. Imaging Studies:   ONE XRAY VIEW OF THE CHEST 12/16/2022 9:50 am  Impression   Similar appearance of mild bilateral airspace disease. This appears   consistent with pneumonia     Cultures:     Culture, Blood 1 [[id number]] Collected: 12/15/22 1349   Order Status: Completed Specimen: Blood Updated: 12/18/22 0912    Specimen Description . BLOOD    Special Requests L FOREARM  10ML    Culture NO GROWTH 3 DAYS   Culture, Blood 1 [[id number]] (Abnormal) Collected: 12/15/22 1352   Order Status: Completed Specimen: Blood Updated: 12/18/22 0717    Specimen Description . BLOOD    Special Requests L AC  5ML    Culture POSITIVE Blood Culture Abnormal      DIRECT GRAM STAIN FROM BOTTLE: GRAM POSITIVE COCCI IN CLUSTERS     Staphylococcus epidermidis Detected: mecA/C Gene Detected- Methicillin Resistant Organism Methodology- Polymerase Chain Reaction (PCR)     STAPHYLOCOCCUS EPIDERMIDIS A single positive blood culture of coagulase negative Staphylocci, diphtheroids,micrococci, Cutibacterium, viridans Streptocci, Bacillus, or Lactobacillus species should be interpreted with caution and viewed as a likely skin contaminant. Abnormal      (NOTE) Direct Gram Stain from bottle and Polymerase Chain Reaction (PCR) results called to and read back by:JEANETTE RENE AT 1420 ON 12/16/22     Culture, Urine [[id number]] Collected: 12/15/22 1410   Order Status: Completed Specimen: Urine, indwelling catheter Updated: 12/16/22 1257    Specimen Description . INDWELLING CATH URINE    Culture NO SIGNIFICANT GROWTH   Culture, Blood 1 [[id number]] Collected: 12/10/22 1223   Order Status: Completed Specimen: Blood Updated: 12/15/22 1319    Specimen Description Farzaneh Gip BLOOD    Special Requests LEFT HAND 2.5 ML    Culture NO GROWTH 5 DAYS   Culture, Blood 1 [[id number]] Collected: 12/10/22 1230   Order Status: Completed Specimen: Blood Updated: 12/15/22 1319    Specimen Description . BLOOD    Special Requests RT HAND 2.5 ML    Culture NO GROWTH 5 DAYS   Culture, Respiratory [[id number]] (Abnormal)  Collected: 12/10/22 1130   Order Status: Completed Specimen: Sputum Induced Updated: 12/12/22 0953    Specimen Description . INDUCED SPUTUM    Direct Exam < 10 EPITHELIAL CELLS/LPF     <10 NEUTROPHILS/LPF     FEW GRAM POSITIVE COCCI IN CLUSTERS Abnormal     Culture METHICILLIN RESISTANT STAPHYLOCOCCUS AUREUS HEAVY GROWTH Abnormal      NO NORMAL JEFFREY   MRSA DNA Probe, Nasal [[id number]] (Abnormal) Collected: 12/10/22 1215   Order Status: Completed Specimen: Nasal Updated: 12/11/22 1027    Specimen Description . NASAL SWAB    MRSA, DNA, Nasal POSITIVE:  MRSA DNA detected by nucleic acid amplification. Abnormal     Comment:                                                    Results should be used as an adjunct to nosocomial control efforts to identify patients   needing enhanced precautions. The test is not intended to identify patients with staphylococcal infections. Results   should not be used to guide or monitor treatment for MRSA infections. Respiratory Panel, Molecular, with COVID-19 (Restricted: peds pts or suitable admitted adults) [[id number]] Collected: 12/10/22 1309   Order Status: Completed Specimen: Nasopharyngeal Swab Updated: 12/10/22 1451    Specimen Description . NASOPHARYNGEAL SWAB    Adenovirus PCR Not Detected    Coronavirus 229E PCR Not Detected    Coronavirus HKU1 PCR Not Detected    Coronavirus NL63 PCR Not Detected    Coronavirus OC43 PCR Not Detected    SARS-CoV-2, PCR Not Detected    Human Metapneumovirus PCR Not Detected    Rhino/Enterovirus PCR Not Detected    Influenza A by PCR Not Detected    Influenza B by PCR Not Detected    Parainfluenza 1 PCR Not Detected    Parainfluenza 2 PCR Not Detected    Parainfluenza 3 PCR Not Detected    Parainfluenza 4 PCR Not Detected    Resp Syncytial Virus PCR Not Detected    Bordetella Parapertussis Not Detected    B Pertussis by PCR Not Detected    Chlamydia pneumoniae By PCR Not Detected    Mycoplasma pneumo by PCR Not Detected    Comment: Performed by multiplexed nucleic acid assay. Respiratory Panel, Molecular, with COVID-19 (Restricted: peds pts or suitable admitted adults) [[id number]] Collected: 12/10/22 1130   Order Status: Canceled Specimen: Nasopharyngeal Swab    MRSA DNA Probe, Nasal [[id number]] Collected: 12/07/22 0200   Order Status: Completed Specimen: Nasal Updated: 12/07/22 1440    Specimen Description . NASAL SWAB    MRSA, DNA, Nasal NEGATIVE    Comment: NEGATIVE:  MRSA DNA not detected by nucleic acid amplification. Results should be used as an adjunct to nosocomial control efforts to identify patients   needing enhanced precautions. The test is not intended to identify patients with staphylococcal infections. Results   should not be used to guide or monitor treatment for MRSA infections. COVID-19, Rapid [[id number]] Collected: 12/06/22 2215   Order Status: Completed Specimen: Nasopharyngeal Swab Updated: 12/07/22 0207    Specimen Description . NASOPHARYNGEAL SWAB    SARS-CoV-2, Rapid Not Detected    Comment:        Rapid NAAT:  The specimen is NEGATIVE for SARS-CoV-2, the novel coronavirus associated with   COVID-19. The ID NOW COVID-19 assay is designed to detect the virus that causes COVID-19 in patients   with signs and symptoms of infection who are suspected of COVID-19. An individual without symptoms of COVID-19 and who is not shedding SARS-CoV-2 virus would   expect to have a negative (not detected) result in this assay.    Negative results should be treated as presumptive and, if inconsistent with clinical signs   and symptoms or necessary for patient management,   should be tested with an alternative molecular assay. Negative results do not preclude   SARS-CoV-2 infection and   should not be used as the sole basis for patient management decisions. Fact sheet for Healthcare Providers: http://www.mandeep.hollis/   Fact sheet for Patients: http://www.mandeep.hollis/         Methodology: Isothermal Nucleic Acid Amplification         Medications:      hydrocortisone  10 mg Oral Nightly    hydrocortisone  15 mg Oral QAM    guaiFENesin  200 mg Oral Q4H    furosemide  20 mg IntraVENous Daily    pantoprazole (PROTONIX) 40 mg injection  40 mg IntraVENous Daily    enoxaparin  30 mg SubCUTAneous BID    sertraline  150 mg Oral Daily    lamoTRIgine  200 mg Oral Daily    levothyroxine  150 mcg Oral QAM AC    topiramate  75 mg Oral Daily    topiramate  100 mg Oral Nightly           [facility]  ADELA Eli CNP  Perfect Serve messaging  OFFICE: [phone]      Electronically signed by ADELA Eli CNP on 12/23/2022 at 11:02 AM  Thank you for allowing us to participate in the care of this patient. Please call with questions. ATTESTATION:    I have discussed the case, including pertinent history and exam findings with the APRN. I have evaluated the  History, physical findings and pictures of the patient and the key elements of the encounter have been performed by me. I have reviewed the laboratory data, other diagnostic studies and discussed them with the APRN. I have updated the medical record where necessary. I agree with the assessment, plan and orders as documented by the APRN.     Medardo Haque MD.      This note iscreated with the assistance of a speech recognition program.  While intending to generate a document that actually reflects the content of the visit, the document can still have some errors including those of syntax andsound a like substitutions which may escape proof reading. In such instances, actual meaning can be extrapolated by contextual diversion.

## 2022-12-23 NOTE — PROGRESS NOTES
Speech Language Pathology  Speech Language Pathology  [facility]    Dysphagia Treatment Note    Date: 12/23/2022  Patients Name: Shannon Lorenz  MRN: [id number]  Diagnosis:   Patient Active Problem List   Diagnosis Code    Seizure [facility]) R56.9    Hypothyroidism E03.9    Asthma J45.909    Morbid obesity ([facility] E66.01    ROHIT on CPAP G47.33, Z99.89    Hypopituitarism ([facility]) E23.0    Moderate episode of recurrent major depressive disorder ([facility] F33.1    Type 2 diabetes mellitus with stage 3a chronic kidney disease, without long-term current use of insulin ([facility]) E11.22, N18.31    Irritable bowel syndrome with constipation K58.1    Gastroesophageal reflux disease K21.9    Cervical transverse process fracture, initial encounter ([facility] S12. 9XXA    Closed nondisplaced fracture of seventh cervical vertebra ([facility]) S12.601A    Lethargy R53.83    Multiple closed pelvic fractures without disruption of pelvic [place] ([facility]) S32.82XA    Aspiration pneumonia ([facility]) J69.0    Acute respiratory failure with hypoxia ([facility]) J96.01    MRSA infection A49.02    Acute lower respiratory tract infection J22       Pain: pt denies    Dysphagia Treatment  Treatment time:     Subjective: [x] Alert [x] Cooperative     [] Confused     [] Agitated    [] Lethargic    Objective/Assessment:    Pt. Seen for O/M treatment program for dysphagia. Pt. Completed O/M exercises X 5 X 1 sets with mod verbal and visual cues. Pt. Completed matthew maneuver X 3 reps with mod cues. Education provided and exercises left at bedside. Plan:  [x] Continue ST services    [] Discharge from ST:        Discharge recommendations: []  Further therapy recommended at discharge. The patient should be able to tolerate at least 3 hours of therapy per day over 5 days or 15 hours over 7 days. [x] Further therapy recommended at discharge. [] No therapy recommended at discharge.          Treatment completed by: Allen Cox, SLP, M.S. [facility]

## 2022-12-23 NOTE — PROGRESS NOTES
Comprehensive Nutrition Assessment    Type and Reason for Visit:  Reassess    Nutrition Recommendations/Plan:   Continue current Dysphagia Pureed diet with No Fluids per Speech Therapy recommendations. Encourage/monitor PO intakes as tolerated. Will provide Boost pudding oral supplements x 2 per day. Monitor labs, weights, and plan of care. Malnutrition Assessment:  Malnutrition Status: At risk for malnutrition (12/07/22 1047)    Context:  Acute Illness     Findings of the 6 clinical characteristics of malnutrition:  Energy Intake:  Mild decrease in energy intake   Weight Loss:  No significant weight loss     Body Fat Loss:  No significant body fat loss   Muscle Mass Loss:  No significant muscle mass loss  Fluid Accumulation:  No significant fluid accumulation   Strength:  Not Performed    Nutrition Assessment:    Pt extubated on 12/21. Pt failed a bedside swallow study and refusing NG placement. Speech completed a MBSS on 12/22 which pt passed for a Dysphagia Pureed diet with no liquids and meds in puree. RN reports pt has been eating purees well - ate more than 50% of breakfast this morning. RN states pt has still been receiving liquids on tray despite not being allowed to have liquids per Speech recommendations. Modified diet to \"provide no fluids on tray\" and spoke with Dietary Manager about not providing liquids on meal trays. Discussed with RN about providing oral supplements with meals to boost PO intakes. Last BM 12/22. Labs reviewed: K 3.4 mmol/L. Meds include: Lasix. Nutrition Related Findings:    Meds/Labs reviewed. Last BM: 12/22. Wound Type: Pressure Injury, Stage I (to right buttocks)       Current Nutrition Intake & Therapies:    Average Meal Intake: 51-75%  Average Supplements Intake: None Ordered  ADULT DIET; Dysphagia - Pureed;  No Fluids on Tray  Additional Calorie Sources:  None    Anthropometric Measures:  Height: 5' 1\" (154.9 cm)  Ideal Body Weight (IBW): 105 lbs (48 kg)    Admission Body Weight: 282 lb (127.9 kg)  Current Body Weight: 265 lb (120.2 kg), 252.4 % IBW. Weight Source: Bed Scale  Current BMI (kg/m2): 50.1  Usual Body Weight: 273 lb 12.8 oz (124.2 kg) (8/12/22 bed scale per chart review)  % Weight Change (Calculated): 3.8                    BMI Categories: Obese Class 3 (BMI 40.0 or greater)    Estimated Daily Nutrient Needs:  Energy Requirements Based On: Formula  Weight Used for Energy Requirements: Admission  Energy (kcal/day): 4639-6727 kcals/day  Weight Used for Protein Requirements: Ideal  Protein (g/day):  g pro/day  Method Used for Fluid Requirements: Other (Comment)  Fluid (ml/day): fluids per MD    Nutrition Diagnosis:   Inadequate oral intake related to swallowing difficulty (recent extubation) as evidenced by swallow study results, intake 51-75% (need for pureed diet with no liquids; need for oral supplements)    Nutrition Interventions:   Food and/or Nutrient Delivery: Continue Current Diet, Start Oral Nutrition Supplement (Provide Boost pudding oral supplements x 2 per day.)  Nutrition Education/Counseling: No recommendation at this time, Education not indicated  Coordination of Nutrition Care: Continue to monitor while inpatient, Speech Therapy, Swallow Evaluation  Plan of Care discussed with: RN; Dietary Manager    Goals:  Previous Goal Met: Progressing toward Goal(s)  Goals: Meet at least 75% of estimated needs, prior to discharge       Nutrition Monitoring and Evaluation:   Behavioral-Environmental Outcomes: None Identified  Food/Nutrient Intake Outcomes: Food and Nutrient Intake, Supplement Intake  Physical Signs/Symptoms Outcomes: Biochemical Data, Chewing or Swallowing, GI Status, Fluid Status or Edema, Nutrition Focused Physical Findings, Skin, Weight    Discharge Planning:     Too soon to determine     Divine Sánchez RD, LD  Contact: [phone]

## 2022-12-23 NOTE — PROGRESS NOTES
INTENSIVE CARE UNIT  Resident Physician Progress Note    Patient - Ant Cover  Date of Admission -  12/5/2022 11:39 PM  Date of Evaluation -  12/23/2022  Room and Bed Number -  3021/3021-01   Hospital Day - 17    HPI:     44-year-old female initially presented to hospital on 12/5 after a fall. Patient is developmentally delayed and has a history of brain tumor as a child with a lot of radiation. Patient also has past medical history of diabetes mellitus, bilateral sensorineural hearing loss. Had a CT head showed no intracranial abnormality, CT abdominal pelvis showed  nondisplaced fractures of right sacral ilya, nondisplaced fracture of right superior ramus and right obturator ring. CT spine showed acute fracture of right C7 transverse process and also age indeterminate compressive fracture of T3. Neurosurgery for cervical fractures was consulted, who recommended no neurosurgical intervention. Orthopedic surgery for pelvic fractures was also consulted, no surgical intervention but medical management and also recommended to follow-up in orthopedic clinic after discharge. Patient was started on BiPAP for increased work of breathing. Patient was oriented x4 at day of presentation. 12/06 - patient had a decline in mental status with tachypnea, respiratory rate of 30s to 40 with heart rate of 105 patient was intubated emergently     12/7- Neurology was consulted because of history of migraines, seizure disorder, patient was started on her home antiepileptic drugs including Topamax and Lamictal, EEG was ordered to rule out any subclinical seizure activity, MRI was also ordered to rule out any CVA. Patient was doing fine, arousable and following commands. Pulmonology was consulted, recommended to extubate and patient was extubated.      12/10 -rapid was called today due to impending respiratory failure, patient was intubated again and was sent to ICU      12/12 -respiratory culture came back positive for staph aureus , nasal respiratory swab came out positive for MRSA DNA, patient started on vancomycin      12/13 : Tachycardia improved to normal heart rate, patient remained afebrile, WBC count trending down. Sedation changed to precedex     12/15: Precedex discontinued. 12/21: Patient was extubated. SUBJECTIVE:     OVERNIGHT EVENTS:   Patient had no acute events overnight. Per nursing her heart rate has been in the 40s to low 50s. Decrease fentanyl dose to 25 every 2 as needed. Today:  Patient passed barium swallow for dysphagia diet's yesterday. Patient failed liquid and nectar thick portion. She was placed on 100 mL/h normal saline for fluid maintenance due to being negative approximately 2 L. F.A.S.T. M. H.U.G.S. B.I.D. Feeding Diet: ADULT DIET; Dysphagia - Pureed, patient failed bedside swallow. Fluids: 100 mL/h normal saline  Family: Updated  Analgesic: Tylenol 650 every 6 hours as needed, fentanyl 25 mcg every 2 hours as needed Roxicodone 10 every 4 hours needed, Roxicodone 5 every 4 hours as needed. Sedation: None  Thrombo-prophylaxis: [x] Enoxaparin, [] Unfract. Heparin Subcutaneously, [] EPC Cuffs  Mobility: Up with assistance, PT OT ordered. Heads up: 30 degrees   Ulcer prophylaxis: [x] PPI Agent, [] [id number], [] Sucralfate, [] Other:  Glycemic control: None, glucose 104  Bowel regimen/urine output: Protonix 40 IV daily, GlycoLax, patient receives 20 mg of Lasix daily/urine output approximately 1.75 L in the last 24 hours. Indwelling catheter/lines: Peripheral IV, wound right buttocks. De-escalation: Patient's possible transfer out of the unit, saturating well on 4 L nasal cannula. Only concern is heart rate currently in the 50s however sometimes drops to upper 40s. TODAY:   external [place] catheter. Extubated on 12/21  fentanyl 25 to 2 hours for back pain, was decreased from 100 due to bradycardia. Patient passed barium swallow for dysphagia diet's yesterday.   Patient failed liquid and nectar thick portion. She was placed on 100 mL/h normal saline for fluid maintenance due to being negative approximately 2 L. Started 100 mL/h normal saline due to inability to drink liquids. ID recs continue vancomycin as of yesterday. Follow-up on potassium from this morning's BMP  We will continue to monitor, possible transfer out of the unit. WBC: 10.2  Hemoglobin: 12.0  Blood glucose: 104  BUN: 27  creatinine: 0.79  Sodium: 141  Potassium: Was pending?     AWAKE & FOLLOWING COMMANDS:  [] No   [x] Yes    SECRETIONS Amount:  [] Small [] Moderate  [x] Large  [x] None  Color:     [] White [] Colored - yellow  [] Bloody    SEDATION:  RAAS Score:  [] Propofol gtt  [] Versed gtt  [] Ativan gtt   [x] No Sedation    PARALYZED:  [x] No    [] Yes    VASOPRESSORS:  [x] No    [] Yes  [] Levophed [] Dopamine [] Vasopressin  [] Dobutamine [] Phenylephrine [] Epinephrine      OBJECTIVE:     VITAL SIGNS:  /64   Pulse 50   Temp 98 °F (36.7 °C) (Oral)   Resp 19   Ht 5' 1\" (1.549 m)   Wt 265 lb (120.2 kg)   SpO2 95%   BMI 50.07 kg/m²   Tmax over 24 hours:  Temp (24hrs), Av.4 °F (36.9 °C), Min:98 °F (36.7 °C), Max:99.5 °F (37.5 °C)      Patient Vitals for the past 8 hrs:   BP Temp Temp src Pulse Resp SpO2 Weight   22 0615 -- -- -- 50 19 95 % --   22 0600 100/64 98 °F (36.7 °C) Oral 52 18 95 % 265 lb (120.2 kg)   22 0545 -- -- -- 53 20 95 % --   22 0531 -- -- -- (!) 49 -- 93 % --   22 0530 -- -- -- 55 20 95 % --   22 0515 -- -- -- 52 18 95 % --   22 0500 101/62 -- -- 57 20 95 % --   12445 -- -- -- 52 20 94 % --   220 -- -- -- 50 25 93 % --   22 -- -- -- -- 22 -- --   22 -- -- -- 54 21 92 % --   22 111/68 98.6 °F (37 °C) Oral (!) 43 19 93 % --   22 -- -- -- (!) 49 22 93 % --   22 -- -- -- (!) 44 20 93 % --   22 -- -- -- (!) 40 19 92 % --   22 0300 104/63 -- -- (!) 49 20 91 % --   12/23/22 0245 -- -- -- 53 20 94 % --   12/23/22 0230 -- -- -- 62 19 93 % --   12/23/22 0215 -- -- -- 72 18 93 % --   12/23/22 0200 103/62 98.5 °F (36.9 °C) Oral 55 17 95 % --   12/23/22 0145 -- -- -- 57 18 96 % --   12/23/22 0130 -- -- -- (!) 48 17 95 % --   12/23/22 0115 -- -- -- (!) 49 16 95 % --   12/23/22 0100 99/62 -- -- (!) 49 19 95 % --   12/23/22 0045 -- -- -- (!) 49 18 94 % --   12/23/22 0030 -- -- -- 51 19 94 % --   12/23/22 0015 -- -- -- (!) 48 16 93 % --   12/23/22 0000 (!) 91/55 98.1 °F (36.7 °C) Oral (!) 48 18 93 % --         Intake/Output Summary (Last 24 hours) at 12/23/2022 0742  Last data filed at 12/23/2022 0200  Gross per 24 hour   Intake --   Output 1750 ml   Net -1750 ml     Date 12/23/22 0000 - 12/23/22 2359   Shift 2162-4515 4481-2057 7614-6232 24 Hour Total   INTAKE   Shift Total(mL/kg)       OUTPUT   Urine(mL/kg/hr) 300   300   Shift Total(mL/kg) 300(2.5)   300(2.5)   Weight (kg) 120.2 120.2 120.2 120.2     Wt Readings from Last 3 Encounters:   12/23/22 265 lb (120.2 kg)   12/05/22 277 lb (125.6 kg)   11/07/22 279 lb (126.6 kg)     Body mass index is 50.07 kg/m². PHYSICAL EXAM:  GEN:  awake, alert, and cooperative  EYES:   pupils equal, round, and reactive to light  HEENT:  Normocepalic, without obvious abnormality  LUNGS:  No increased work of breathing, good air exchange. Rhonchorous in all lung fields. CV:    regular rate and rhythm and normal S1 and S2  ABDOMEN:   soft, non-distended, and non-tender  MSK:    Patient complains of back pain, no other acute concerns.   NEURO[de-identified]   Awake, alert, following commands  SKIN:   Normal skin color, texture, turgor  EXTREMITIES:  No pedal or leg edema, no calf tenderness/swelling, no erythema, distal pulses intact       MEDICATIONS:  Scheduled Meds:   guaiFENesin  200 mg Oral Q4H    furosemide  20 mg IntraVENous Daily    pantoprazole (PROTONIX) 40 mg injection  40 mg IntraVENous Daily    enoxaparin  30 mg SubCUTAneous BID sertraline  150 mg Oral Daily    lamoTRIgine  200 mg Oral Daily    levothyroxine  150 mcg Oral QAM AC    topiramate  75 mg Oral Daily    topiramate  100 mg Oral Nightly     Continuous Infusions:   sodium chloride      sodium chloride Stopped (12/12/22 0316)    sodium chloride 10 mL/hr at 12/21/22 1140     PRN Meds:   fentanNYL, 25 mcg, Q2H PRN  racepinephrine HCl, 11.25 mg, Q4H PRN  sodium chloride nebulizer, 3 mL, Q4H PRN  ondansetron, 4 mg, Q6H PRN  polyethylene glycol, 17 g, Daily PRN  oxyCODONE, 10 mg, Q4H PRN   Or  oxyCODONE, 5 mg, Q4H PRN  levalbuterol, 0.63 mg, Q4H PRN  sodium chloride, , PRN  acetaminophen, 650 mg, Q6H PRN  sodium chloride flush, 5-40 mL, PRN      SUPPORT DEVICES: [] Ventilator [] BIPAP  [x] Nasal Cannula [] Room Air    Vent Information  Ventilator ID: [id number]  Equipment Changed: HME, Expiratory Filter, Suction catheter  Ventilator Initiate: Yes  Vent Mode: AC/PRVC  Additional Respiratory Assessments  Heart Rate: 50  Resp: 19  SpO2: 95 %  End Tidal CO2: 36 (%)  Position: Semi-Love's  Humidification Source: Heated wire  Humidification Temp: 37  Circuit Condensation: Drained  Cuff Pressure (cm H2O):  (mlt)  Skin Barrier Applied: No    Lab Results   Component Value Date/Time    FIO2 40.0 12/21/2022 04:47 AM         DATA:  Complete Blood Count:   Recent Labs     12/21/22  0756 12/22/22  0613 12/23/22  0638   WBC 12.7* 11.4* 10.2   RBC 3.72* 4.16 4.20   HGB 10.4* 11.6* 12.0   HCT 32.8* 37.9 38.3   MCV 88.2 91.1 91.2   MCH 28.0 27.9 28.6   MCHC 31.7 30.6 31.3   RDW 13.6 13.5 13.7    346 518*   MPV 10.9 10.6 11.8        Last 3 Blood Glucose:   Recent Labs     12/21/22  0756 12/22/22  0613 12/23/22  0638   GLUCOSE 94 107* 104*        PT/INR:    Lab Results   Component Value Date/Time    PROTIME 10.5 12/05/2022 05:35 PM    INR 1.0 12/05/2022 05:35 PM     PTT:    Lab Results   Component Value Date/Time    APTT 24.9 03/10/2015 11:25 AM       Comprehensive Metabolic Profile:   Recent Labs 12/21/22  0756 12/22/22  0613 12/23/22  0638    139 141   K 3.3* 4.0 PENDING    104 103   CO2 26 26 28   BUN 24* 23* 27*   CREATININE 0.72 0.69 0.78   GLUCOSE 94 107* 104*   CALCIUM 8.4* 8.6 8.8      Magnesium:   Lab Results   Component Value Date/Time    MG 2.3 12/21/2022 07:56 AM    MG 1.7 12/06/2022 10:12 PM     Phosphorus:   Lab Results   Component Value Date/Time    PHOS 2.9 12/06/2022 10:12 PM     Ionized Calcium: No results found for: CAION     Urinalysis:   Lab Results   Component Value Date/Time    NITRU NEGATIVE 12/06/2022 08:05 PM    COLORU Yellow 12/06/2022 08:05 PM    PHUR 5.5 12/06/2022 08:05 PM    WBCUA None 12/06/2022 08:05 PM    RBCUA None 12/06/2022 08:05 PM    MUCUS 1+ 03/27/2013 05:54 PM    TRICHOMONAS NOT REPORTED 03/27/2013 05:54 PM    YEAST NOT REPORTED 03/27/2013 05:54 PM    BACTERIA RARE 03/27/2013 05:54 PM    CLARITYU clear 11/11/2014 01:20 PM    SPECGRAV 1.035 12/06/2022 08:05 PM    LEUKOCYTESUR NEGATIVE 12/06/2022 08:05 PM    UROBILINOGEN Normal 12/06/2022 08:05 PM    BILIRUBINUR NEGATIVE 12/06/2022 08:05 PM    BILIRUBINUR neagtive 03/17/2017 04:30 PM    BLOODU negative 03/17/2017 04:30 PM    GLUCOSEU NEGATIVE 12/06/2022 08:05 PM    KETUA MODERATE 12/06/2022 08:05 PM    AMORPHOUS NOT REPORTED 03/27/2013 05:54 PM       HgBA1c:    Lab Results   Component Value Date/Time    LABA1C 5.3 04/26/2022 12:00 AM     TSH:    Lab Results   Component Value Date/Time    TSH <0.01 12/07/2022 03:57 AM     Lactic Acid: No results found for: LACTA   Troponin: No results for input(s): TROPONINI in the last 72 hours.     Microbiology:  Blood Culture: + S epidermidis x 1  Blood culture: NG 12 h      ASSESSMENT:     Patient Active Problem List    Diagnosis Date Noted    MRSA infection 12/14/2022    Acute lower respiratory tract infection 12/14/2022    Acute respiratory failure with hypoxia (HCC) 12/11/2022    Aspiration pneumonia ([facility] 12/10/2022    Multiple closed pelvic fractures without disruption of pelvic Platinum ([facility] [address] 12/09/2022    Lethargy 12/07/2022    Cervical transverse process fracture, initial encounter ([facility] 12/06/2022    Closed nondisplaced fracture of seventh cervical vertebra ([facility] [address] 12/06/2022    Gastroesophageal reflux disease 08/12/2022    Irritable bowel syndrome with constipation 06/07/2022    Type 2 diabetes mellitus with stage 3a chronic kidney disease, without long-term current use of insulin ([facility] [address] 11/12/2021    Moderate episode of recurrent major depressive disorder ([facility] [address] 02/26/2020    Hypopituitarism ([facility] [address] 08/24/2016    ROHIT on CPAP 05/08/2015    Hypothyroidism 08/06/2014    Asthma 08/06/2014    Morbid obesity ([facility] [address] 08/06/2014    Seizure ([facility] 10/23/2013          PLAN:      Neuro  Extubated 12/21  Off precedex, off sedation  Lamotrigine 200  Topamax 75 daily, 100 nightly  Zoloft 150 mg  Per neurosurgery pt does not need C collar. Cards  HR 56  MAP stable   Lasix 20 daily     Pulmonary  CXR 12/18 - no change in bilateral airspace disease  Pulmonary toilet  Hydrocortisone 10 mg BID  Xopenex nebulizer QID  Guanifenesen 200 mg q 4h     Renal  External suresh   Intake/Output Summary  Lasix 20 mg daily     GI  Tube feeds 75 ml/hr, at goal  Protonix 40 daily   Glycolax  IV fluids normal saline 100 and mils per hour. ID  Vancomycin, course started 12/12  Temp 36.7  Respiratory culture (+) S Aureus  Repeat Blood culture (+) S epidermidis x 1, Blood culture 2: No growth   Blood cultures (-) x 5 days  ID following     Endo  Synthroid 150 daily  Hydrocortisone 10 mg BID     DVT Ppx: Lovenox   GI Ppx: Protonix    ICU PROPHYLAXIS:  Stress ulcer:  [x] PPI Agent  [] [id number] [] Sucralfate  [] Other:  VTE:   [x] Enoxaparin  [] Unfract. Heparin Subcut  [] EPC Cuffs    NUTRITION:  [] NPO [x] Tube Feeding (Specify: )  ADULT DIET;  Dysphagia - Pureed   [] TPN  [] PO    HOME MEDS RECONCILED: [] No  [x] Yes    CONSULTATION NEEDED:  [] No  [x] Yes    FAMILY UPDATED:    [] No  [x] Yes    TRANSFER OUT OF ICU: [x] No  [] Yes      Elisha Kang M.D.   Internal Medicine Resident PGY-1  [place] [id number],  [facility].  12/23/2022 7:42 AM

## 2022-12-24 LAB
ABSOLUTE EOS #: 0.09 K/UL (ref 0–0.44)
ABSOLUTE IMMATURE GRANULOCYTE: 0.05 K/UL (ref 0–0.3)
ABSOLUTE LYMPH #: 2.09 K/UL (ref 1.1–3.7)
ABSOLUTE MONO #: 0.68 K/UL (ref 0.1–1.2)
ANION GAP SERPL CALCULATED.3IONS-SCNC: 10 MMOL/L (ref 9–17)
BASOPHILS # BLD: 1 % (ref 0–2)
BASOPHILS ABSOLUTE: 0.09 K/UL (ref 0–0.2)
BUN BLDV-MCNC: 29 MG/DL (ref 6–20)
CALCIUM SERPL-MCNC: 8.5 MG/DL (ref 8.6–10.4)
CHLORIDE BLD-SCNC: 105 MMOL/L (ref 98–107)
CO2: 28 MMOL/L (ref 20–31)
CREAT SERPL-MCNC: 0.97 MG/DL (ref 0.5–0.9)
EOSINOPHILS RELATIVE PERCENT: 1 % (ref 1–4)
GFR SERPL CREATININE-BSD FRML MDRD: >60 ML/MIN/1.73M2
GLUCOSE BLD-MCNC: 82 MG/DL (ref 70–99)
HCT VFR BLD CALC: 40.7 % (ref 36.3–47.1)
HEMOGLOBIN: 12.3 G/DL (ref 11.9–15.1)
IMMATURE GRANULOCYTES: 1 %
LYMPHOCYTES # BLD: 23 % (ref 24–43)
MAGNESIUM: 2.6 MG/DL (ref 1.6–2.6)
MCH RBC QN AUTO: 27.8 PG (ref 25.2–33.5)
MCHC RBC AUTO-ENTMCNC: 30.2 G/DL (ref 28.4–34.8)
MCV RBC AUTO: 91.9 FL (ref 82.6–102.9)
MONOCYTES # BLD: 7 % (ref 3–12)
NRBC AUTOMATED: 0 PER 100 WBC
PDW BLD-RTO: 13.5 % (ref 11.8–14.4)
PLATELET # BLD: 390 K/UL (ref 138–453)
PMV BLD AUTO: 10.9 FL (ref 8.1–13.5)
POTASSIUM SERPL-SCNC: 3.6 MMOL/L (ref 3.7–5.3)
RBC # BLD: 4.43 M/UL (ref 3.95–5.11)
SEG NEUTROPHILS: 67 % (ref 36–65)
SEGMENTED NEUTROPHILS ABSOLUTE COUNT: 6.17 K/UL (ref 1.5–8.1)
SODIUM BLD-SCNC: 143 MMOL/L (ref 135–144)
WBC # BLD: 9.2 K/UL (ref 3.5–11.3)

## 2022-12-24 PROCEDURE — 6370000000 HC RX 637 (ALT 250 FOR IP): Performed by: STUDENT IN AN ORGANIZED HEALTH CARE EDUCATION/TRAINING PROGRAM

## 2022-12-24 PROCEDURE — 99223 1ST HOSP IP/OBS HIGH 75: CPT | Performed by: INTERNAL MEDICINE

## 2022-12-24 PROCEDURE — 99232 SBSQ HOSP IP/OBS MODERATE 35: CPT | Performed by: INTERNAL MEDICINE

## 2022-12-24 PROCEDURE — 36415 COLL VENOUS BLD VENIPUNCTURE: CPT

## 2022-12-24 PROCEDURE — 2580000003 HC RX 258

## 2022-12-24 PROCEDURE — 51798 US URINE CAPACITY MEASURE: CPT

## 2022-12-24 PROCEDURE — 51701 INSERT BLADDER CATHETER: CPT

## 2022-12-24 PROCEDURE — 6370000000 HC RX 637 (ALT 250 FOR IP)

## 2022-12-24 PROCEDURE — 6360000002 HC RX W HCPCS: Performed by: NURSE PRACTITIONER

## 2022-12-24 PROCEDURE — 2060000000 HC ICU INTERMEDIATE R&B

## 2022-12-24 PROCEDURE — 80048 BASIC METABOLIC PNL TOTAL CA: CPT

## 2022-12-24 PROCEDURE — 2700000000 HC OXYGEN THERAPY PER DAY

## 2022-12-24 PROCEDURE — 85025 COMPLETE CBC W/AUTO DIFF WBC: CPT

## 2022-12-24 PROCEDURE — 99222 1ST HOSP IP/OBS MODERATE 55: CPT | Performed by: PSYCHIATRY & NEUROLOGY

## 2022-12-24 PROCEDURE — 83735 ASSAY OF MAGNESIUM: CPT

## 2022-12-24 PROCEDURE — 94761 N-INVAS EAR/PLS OXIMETRY MLT: CPT

## 2022-12-24 PROCEDURE — 2500000003 HC RX 250 WO HCPCS: Performed by: HEALTH CARE PROVIDER

## 2022-12-24 PROCEDURE — 6360000002 HC RX W HCPCS

## 2022-12-24 PROCEDURE — 99233 SBSQ HOSP IP/OBS HIGH 50: CPT | Performed by: INTERNAL MEDICINE

## 2022-12-24 PROCEDURE — 31720 CLEARANCE OF AIRWAYS: CPT

## 2022-12-24 PROCEDURE — 6370000000 HC RX 637 (ALT 250 FOR IP): Performed by: NURSE PRACTITIONER

## 2022-12-24 PROCEDURE — 94640 AIRWAY INHALATION TREATMENT: CPT

## 2022-12-24 RX ORDER — POTASSIUM CHLORIDE 20 MEQ/1
40 TABLET, EXTENDED RELEASE ORAL ONCE
Status: COMPLETED | OUTPATIENT
Start: 2022-12-24 | End: 2022-12-24

## 2022-12-24 RX ORDER — ARIPIPRAZOLE 10 MG/1
10 TABLET ORAL DAILY
Status: DISCONTINUED | OUTPATIENT
Start: 2022-12-24 | End: 2022-12-31 | Stop reason: HOSPADM

## 2022-12-24 RX ORDER — OXYCODONE HYDROCHLORIDE 5 MG/1
5 TABLET ORAL EVERY 4 HOURS PRN
Status: DISCONTINUED | OUTPATIENT
Start: 2022-12-24 | End: 2022-12-31 | Stop reason: HOSPADM

## 2022-12-24 RX ORDER — GABAPENTIN 100 MG/1
100 CAPSULE ORAL 3 TIMES DAILY
Status: DISCONTINUED | OUTPATIENT
Start: 2022-12-24 | End: 2022-12-31 | Stop reason: HOSPADM

## 2022-12-24 RX ORDER — OXYCODONE HYDROCHLORIDE 5 MG/1
10 TABLET ORAL EVERY 12 HOURS PRN
Status: DISCONTINUED | OUTPATIENT
Start: 2022-12-24 | End: 2022-12-31 | Stop reason: HOSPADM

## 2022-12-24 RX ADMIN — GUAIFENESIN 200 MG: 200 SOLUTION ORAL at 10:12

## 2022-12-24 RX ADMIN — TOPIRAMATE 100 MG: 100 TABLET, FILM COATED ORAL at 22:09

## 2022-12-24 RX ADMIN — ACETAMINOPHEN 650 MG: 325 TABLET ORAL at 04:50

## 2022-12-24 RX ADMIN — LEVALBUTEROL HYDROCHLORIDE 0.63 MG: 0.63 SOLUTION RESPIRATORY (INHALATION) at 12:33

## 2022-12-24 RX ADMIN — ACETAMINOPHEN 650 MG: 325 TABLET ORAL at 18:02

## 2022-12-24 RX ADMIN — HYDROCORTISONE 15 MG: 10 TABLET ORAL at 12:27

## 2022-12-24 RX ADMIN — GABAPENTIN 100 MG: 100 CAPSULE ORAL at 16:21

## 2022-12-24 RX ADMIN — GUAIFENESIN 200 MG: 200 SOLUTION ORAL at 20:46

## 2022-12-24 RX ADMIN — RACEPINEPHRINE HYDROCHLORIDE 11.25 MG: 11.25 SOLUTION RESPIRATORY (INHALATION) at 20:33

## 2022-12-24 RX ADMIN — SODIUM CHLORIDE: 9 INJECTION, SOLUTION INTRAVENOUS at 16:21

## 2022-12-24 RX ADMIN — GUAIFENESIN 200 MG: 200 SOLUTION ORAL at 16:28

## 2022-12-24 RX ADMIN — LEVOTHYROXINE SODIUM 150 MCG: 75 TABLET ORAL at 07:24

## 2022-12-24 RX ADMIN — RACEPINEPHRINE HYDROCHLORIDE 11.25 MG: 11.25 SOLUTION RESPIRATORY (INHALATION) at 16:37

## 2022-12-24 RX ADMIN — GABAPENTIN 100 MG: 100 CAPSULE ORAL at 22:09

## 2022-12-24 RX ADMIN — ENOXAPARIN SODIUM 30 MG: 100 INJECTION SUBCUTANEOUS at 22:10

## 2022-12-24 RX ADMIN — GUAIFENESIN 200 MG: 200 SOLUTION ORAL at 03:44

## 2022-12-24 RX ADMIN — LAMOTRIGINE 200 MG: 100 TABLET ORAL at 10:11

## 2022-12-24 RX ADMIN — ENOXAPARIN SODIUM 30 MG: 100 INJECTION SUBCUTANEOUS at 10:16

## 2022-12-24 RX ADMIN — OXYCODONE 10 MG: 5 TABLET ORAL at 07:24

## 2022-12-24 RX ADMIN — OXYCODONE 5 MG: 5 TABLET ORAL at 16:21

## 2022-12-24 RX ADMIN — POTASSIUM CHLORIDE 40 MEQ: 1500 TABLET, EXTENDED RELEASE ORAL at 10:08

## 2022-12-24 RX ADMIN — LEVALBUTEROL HYDROCHLORIDE 0.63 MG: 0.63 SOLUTION RESPIRATORY (INHALATION) at 20:33

## 2022-12-24 RX ADMIN — TOPIRAMATE 75 MG: 100 TABLET, FILM COATED ORAL at 10:08

## 2022-12-24 RX ADMIN — HYDROCORTISONE 10 MG: 10 TABLET ORAL at 23:41

## 2022-12-24 RX ADMIN — ACETAMINOPHEN 650 MG: 325 TABLET ORAL at 12:27

## 2022-12-24 RX ADMIN — ARIPIPRAZOLE 10 MG: 10 TABLET ORAL at 16:21

## 2022-12-24 RX ADMIN — SERTRALINE 150 MG: 50 TABLET, FILM COATED ORAL at 10:10

## 2022-12-24 RX ADMIN — OXYCODONE 5 MG: 5 TABLET ORAL at 12:27

## 2022-12-24 ASSESSMENT — PAIN DESCRIPTION - LOCATION: LOCATION: BACK

## 2022-12-24 ASSESSMENT — ENCOUNTER SYMPTOMS
SINUS PRESSURE: 0
SHORTNESS OF BREATH: 0
GASTROINTESTINAL NEGATIVE: 1
EYE PAIN: 0
SORE THROAT: 0
COUGH: 1
PHOTOPHOBIA: 0
DIARRHEA: 0
VOICE CHANGE: 0
SHORTNESS OF BREATH: 1
EYE REDNESS: 0
RESPIRATORY NEGATIVE: 1
VOMITING: 0
TROUBLE SWALLOWING: 0
ABDOMINAL PAIN: 0
BACK PAIN: 1

## 2022-12-24 ASSESSMENT — PAIN SCALES - GENERAL: PAINLEVEL_OUTOF10: 6

## 2022-12-24 NOTE — CONSULTS
[facility]   [address] [place] [address]    Neurology Consult Note            Date:   12/24/2022  Patient name:  Catrachita Marmolejo  Date of admission:  12/5/2022 11:39 PM  MRN:   [id number]  Account:  [de-identified]  YOB: 1975  PCP:    Lauren Ledezma DO  Room:   [address] [place] [address]  Code Status:    Full Code    Chief Complaint:     Chief Complaint   Patient presents with    Fall       History Obtained From:     patient    History of Present Illness:     68-year-old female with significant past medical history of optic glioma status post radiation, headache, morbid obesity (current BMI 52.3), epilepsy (follows with dr Tyrese Majano),, type 2 diabetes mellitus, ROHIT on CPAP, GERD, non-smoker, IBS with constipation, hip and knee arthritis, limited by, recurrent major depression, fibromyalgia, bilateral sensorineural hearing loss status post bilateral cochlear implant placement, childhood CNS neoplasm at age 5 with reportedly mild developmental delay presents as a transfer from [facility] for trauma evaluation after a fall. Reportedly going upstairs with vascular neurology when she tripped and fell backwards hitting the back of her head. On arrival to [facility] patient initially maintaining oxygen saturation on 2 L by nasal cannula but progressively declined with increased oxygen needs after 1 L. Patient was transferred from ICU and was intubated for respiratory failure. Neurology was consulted on 12/7 for evaluation of AMS with history of seizures. Her OP neurologist on 1/22 patient epilepsy had been well controlled on current AED regimen (Topamax 25 mg daily + lamotrigine 200 mg at bedtime) with patient's last seizure being in 2000    EEG awake and drowsy did not reveal any subclinical seizure activity. MRI brain could not be done because of cochlear implants. Initial CT head without contrast was unremarkable. Recommend continue same regimen of topamax  and lamotrigine.     In between, Pt was found to be positive for MRSA and started on MRSA. Precedex discontinued. Neurology consulted on 12/24 for low levels of lamotrigine. Given no concern of seizures and patient is neurologically stable. No change in plan. Cotninue same dose of lamotrigine for now. Patient didn't have 1 dose of lamotrigine 2 days ago as being NPO but less likely it would be significant. Followup outpatient with Dr. Stone Griggs. Past Medical History:     Past Medical History:   Diagnosis Date    Acquired acanthosis nigricans     Anemia     Arthritis     Asthma     Brain cancer ([facility] [address]     Brain tumor [facility])     age 5    Contact dermatitis and other eczema, due to unspecified cause     COVID-19 01/26/2021    Diabetes mellitus [facility])     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)     Fibromyalgia     Herpes zoster without mention of complication     Hypothyroidism     Migraine     Seizures ([facility] [address]     last seizure in 2000    Sleep apnea     CPAP    TIA (transient ischemic attack) 2000        Past Surgical History:     Past Surgical History:   Procedure Laterality Date    BRAIN BIOPSY      tumor biopsy    COCHLEAR IMPLANT Left     COCHLEAR IMPLANT Right 05/10/2022    COLONOSCOPY      COLONOSCOPY N/A 8/12/2022    COLONOSCOPY WITH BIOPSY performed by Radu Bradley MD at [address] [place] [address] 1/22    ENDOSCOPY, COLON, DIAGNOSTIC      KNEE SURGERY      scope    TONSILLECTOMY      UPPER GASTROINTESTINAL ENDOSCOPY N/A 8/12/2022    EGD BIOPSY performed by Radu Bradley MD at [address] [place] [address]        Medications Prior to Admission:     Prior to Admission medications    Medication Sig Start Date End Date Taking?  Authorizing Provider   SUMAtriptan (IMITREX) 100 MG tablet Take 100 mg by mouth once as needed for Migraine 1.5 tabs qam, 2 tabs qpm   Yes Historical Provider, MD   hydrocortisone (CORTEF) 10 MG tablet Take 10 mg by mouth 2 times daily   Yes Historical Provider, MD   topiramate (TOPAMAX) 50 MG tablet TAKE 1 AND 1/2 TABLETS IN THE MORNING AND 2 TABLETS AT NIGHT. 9/7/22   Krissy Shanks MD   KLOR-CON M10 10 MEQ extended release tablet TAKE 1 TABLET BY MOUTH TWICE A DAY 7/25/22   Latoya Choi DO   metFORMIN (GLUCOPHAGE) 500 MG tablet TAKE 1 TABLET BY MOUTH TWICE A DAY WITH MEALS 6/10/21   Edel Cruz DO   Handicap Placard MISC by Does not apply route EXPIRES IN 5 YEARS FROM ORIGINAL SCRIPT DATE 4/28/21   Edel Cruz DO   buPROPion (WELLBUTRIN XL) 150 MG extended release tablet Take 300 mg by mouth daily  12/16/20   Historical Provider, MD   ARIPiprazole (ABILIFY) 20 MG tablet Take 1 tablet by mouth daily 11/23/20   Historical Provider, MD   levothyroxine (SYNTHROID) 150 MCG tablet Take 1 tablet by mouth every morning (before breakfast) 11/20/20   Historical Provider, MD   albuterol sulfate  (90 Base) MCG/ACT inhaler INHALE 2 PUFFS BY MOUTH EVERY 6 HOURS AS NEEDED FOR WHEEZING 7/13/20   Latoya Choi DO   EPINEPHrine (EPIPEN 2-MINNIE) 0.3 MG/0.3ML SOAJ injection Inject 0.3 mLs into the muscle once for 1 dose Use as directed for allergic reaction 7/8/20 8/12/22  Edel Cruz DO   lamoTRIgine (LAMICTAL) 150 MG tablet Take 150 mg by mouth daily    Historical Provider, MD   Calcium-Magnesium-Vitamin D (CALCIUM 1200+D3 PO) Take 2 tablets by mouth every morning    Historical Provider, MD   LORazepam (ATIVAN) 1 MG tablet 1 mg daily as needed. 1/29/16   Historical Provider, MD   sertraline (ZOLOFT) 100 MG tablet Take 150 mg by mouth daily     Historical Provider, MD   Ergocalciferol (VITAMIN D2 PO)   Take 1.25 mg by mouth daily     Historical Provider, MD        Allergies:     Bactrim [sulfamethoxazole-trimethoprim], Bee venom, Ciprofloxacin, and Milk-related compounds    Social History:     Tobacco:    reports that she has never smoked. She has never used smokeless tobacco.  Alcohol:      reports current alcohol use. Drug Use:  reports no history of drug use.     Family History:     Family History   Problem Relation Age of Onset    Cancer Mother 50        breast    Migraines Mother     Diabetes Father     High Blood Pressure Father     High Cholesterol Father     Migraines Sister     High Blood Pressure Maternal Grandmother     Cancer Paternal Grandmother 70        colon cancer    Cancer Paternal Uncle         esophageal       Review of Systems:     Review of Systems   Constitutional:  Negative for fever. HENT:  Positive for hearing loss. Eyes:  Negative for photophobia and visual disturbance. Respiratory:  Positive for shortness of breath. Cardiovascular:  Negative for palpitations. Genitourinary:  Negative for dysuria and urgency. Neurological:  Negative for seizures, facial asymmetry, speech difficulty and headaches.        Physical Exam:   /65   Pulse 61   Temp 98.5 °F (36.9 °C) (Temporal)   Resp 25   Ht 5' 1\" (1.549 m)   Wt 260 lb (117.9 kg)   SpO2 95%   BMI 49.13 kg/m²   Temp (24hrs), Av.4 °F (36.9 °C), Min:97.6 °F (36.4 °C), Max:99 °F (37.2 °C)    Recent Labs     22  0813   POCGLU 93       Intake/Output Summary (Last 24 hours) at 2022 1414  Last data filed at 2022 0345  Gross per 24 hour   Intake --   Output 446 ml   Net -446 ml         Neurologic Exam    GENERAL  Appears comfortable and in mild distress   HEENT  NC/ AT   HEART  S1 and S2 heard; palpation of pulses: radial pulse    NECK  Supple and no bruits heard   MENTAL STATUS:  Alert, oriented, intact memory, no confusion, normal speech, normal language, no hallucination or delusion   CRANIAL NERVES: II     -      Visual fields intact to confrontation  III,IV,VI -  PERR, EOMs full, no ptosis  V     -     Normal facial sensation   VII    -     Normal facial symmetry  VIII   -     significant hearing loss  IX,X -     Symmetrical palate  XI    -     Symmetrical shoulder shrug  XII   -     Midline tongue, no atrophy    MOTOR FUNCTION: RUE: Significant for good strength of grade 4/5 in proximal and distal muscle groups   LUE: Significant for good strength of grade 4/5 in proximal and distal muscle groups   RLE: Significant for good strength of grade 4/5 in proximal and distal muscle groups   LLE: Significant for good strength of grade 4/5 in proximal and distal muscle groups      Normal bulk, normal tone and no involuntary movements, no tremor   SENSORY FUNCTION:  Normal touch, normal pinprick, normal vibration, normal proprioception   CEREBELLAR FUNCTION:  Intact fine motor control over upper limbs and lower limbs   REFLEX FUNCTION:  Symmetric in upper and lower extremities, no Babinski sign   STATION and GAIT deffered       Investigations:      Laboratory Testing:  Recent Results (from the past 24 hour(s))   Lamotrigine Level    Collection Time: 12/23/22  9:54 PM   Result Value Ref Range    Lamotrigine Lvl 2.1 (L) 3.0 - 15.0 ug/mL   CBC with Auto Differential    Collection Time: 12/24/22  4:02 AM   Result Value Ref Range    WBC 9.2 3.5 - 11.3 k/uL    RBC 4.43 3.95 - 5.11 m/uL    Hemoglobin 12.3 11.9 - 15.1 g/dL    Hematocrit 40.7 36.3 - 47.1 %    MCV 91.9 82.6 - 102.9 fL    MCH 27.8 25.2 - 33.5 pg    MCHC 30.2 28.4 - 34.8 g/dL    RDW 13.5 11.8 - 14.4 %    Platelets 762 050 - 790 k/uL    MPV 10.9 8.1 - 13.5 fL    NRBC Automated 0.0 0.0 per 100 WBC    Seg Neutrophils 67 (H) 36 - 65 %    Lymphocytes 23 (L) 24 - 43 %    Monocytes 7 3 - 12 %    Eosinophils % 1 1 - 4 %    Basophils 1 0 - 2 %    Immature Granulocytes 1 (H) 0 %    Segs Absolute 6.17 1.50 - 8.10 k/uL    Absolute Lymph # 2.09 1.10 - 3.70 k/uL    Absolute Mono # 0.68 0.10 - 1.20 k/uL    Absolute Eos # 0.09 0.00 - 0.44 k/uL    Basophils Absolute 0.09 0.00 - 0.20 k/uL    Absolute Immature Granulocyte 0.05 0.00 - 0.30 k/uL   Basic Metabolic Panel w/ Reflex to MG    Collection Time: 12/24/22  4:02 AM   Result Value Ref Range    Glucose 82 70 - 99 mg/dL    BUN 29 (H) 6 - 20 mg/dL    Creatinine 0.97 (H) 0.50 - 0.90 mg/dL    Est, Glom Filt Rate >60 >60 mL/min/1.73m2    Calcium 8.5 (L) 8.6 - 10.4 mg/dL    Sodium 143 135 - 144 mmol/L    Potassium 3.6 (L) 3.7 - 5.3 mmol/L    Chloride 105 98 - 107 mmol/L    CO2 28 20 - 31 mmol/L    Anion Gap 10 9 - 17 mmol/L   Magnesium    Collection Time: 12/24/22  4:02 AM   Result Value Ref Range    Magnesium 2.6 1.6 - 2.6 mg/dL         Assessment :      Primary Problem  Cervical transverse process fracture, initial encounter [facility])    Active Hospital Problems    Diagnosis Date Noted    MRSA infection [A49.02] 12/14/2022     Priority: Medium    Acute lower respiratory tract infection [J22] 12/14/2022     Priority: Medium    Acute respiratory failure with hypoxia ([address] [J96.01] 12/11/2022     Priority: Medium    Aspiration pneumonia ([address] [J69.0] 12/10/2022     Priority: Medium    Multiple closed pelvic fractures without disruption of pelvic [place] ([address] [S32.82XA] 12/09/2022     Priority: Medium    Lethargy [R53.83] 12/07/2022     Priority: Medium    Cervical transverse process fracture, initial encounter ([address] [S12. 9XXA] 12/06/2022     Priority: Medium    Closed nondisplaced fracture of seventh cervical vertebra ([address] [S12.601A] 12/06/2022     Priority: Medium    Hypopituitarism ([address] [E23.0] 08/24/2016    Hypothyroidism [E03.9] 08/06/2014    Asthma [J45.909] 08/06/2014    Seizure ([address] [R56.9] 10/23/2013       29-year-old female with significant past medical history of optic glioma status post radiation, headache, morbid obesity (current BMI 52.3), epilepsy (follows with dr Tyrese Majano),, type 2 diabetes mellitus, ROHIT on CPAP, GERD, non-smoker, IBS with constipation, hip and knee arthritis, limited by, recurrent major depression, fibromyalgia, bilateral sensorineural hearing loss status post bilateral cochlear implant placement, childhood CNS neoplasm at age 5 with reportedly mild developmental delay admitted for a fall. Worsening respiratory failure s/p intubation > extubation.  Neurology consulted earlier but negative EEG and no clinical seizure then signed off. Re-consult for low lamotrigine levels. 1. MRSA pneumonia  2. Prolong hospitalization  3. Hx of seizure. 4. Low lamotrigine levels. Plan:       Given no concern of seizures and patient is neurologically stable. Continue lamotrigine 200 mg QD and topamax 100 mg nighlty. No additional workup recommended. Followup outpatient with Dr. Yunier Mcgovern office. Consultations:   IP CONSULT TO TRAUMA SURGERY  IP CONSULT TO NEUROSURGERY  IP CONSULT TO ORTHOPEDIC SURGERY  IP CONSULT TO DIETITIAN  IP CONSULT TO NEUROLOGY  IP CONSULT TO PHYSICAL MEDICINE REHAB  IP CONSULT TO CRITICAL CARE  IP CONSULT TO DIETITIAN  IP CONSULT TO INFECTIOUS DISEASES  IP CONSULT TO IV TEAM  IP CONSULT TO IV TEAM  IP CONSULT TO IV TEAM  IP CONSULT TO IV TEAM  IP CONSULT TO NEUROLOGY      Follow-up further recommendations after discussing the case with attending  The plan was discussed with the patient, patient's family and the medical staff. Patient is admitted as inpatient status because of co-morbidities listed above, severity of signs and symptoms as outlined, requirement for current medical therapies and most importantly because of direct risk to patient if care not provided in a hospital setting.     Wong Danielle MD  Neurology Resident PGY-3   12/24/2022  2:14 PM    Copy sent to Dr. Georgie Goel DO

## 2022-12-24 NOTE — PROGRESS NOTES
PULMONARY & CRITICAL CARE MEDICINE PROGRESS  NOTE     Patient:  Rj Rainey  MRN: [id number]  Admit date: 12/5/2022  Primary Care Physician: Edel Cruz DO  Consulting Physician: Bhaskar Geller MD  CODE Status: Full Code  LOS: 18    SUBJECTIVE     I personally interviewed/examined the patient, reviewed interval history and interpreted all available radiographic, laboratory data at the time of service. Chief Compliant/Reason for Initial Consult:       Brief Hospital Course: The patient is a 41 y.o. female history of diabetes melitis, developmental delay, history of brain tumor and as a child radiation, history of sensory neuronal hearing loss history of hypopituitarism on chronic hydrocortisone and Synthroid. Initially presented to hospital with a fall with multiple nondisplaced fracture/pelvic fracture and C2 transverse fracture and sacral ala fracture neurosurgery and orthopedic has seen the patient patient had altered mental status decline few days after presentation was tachypneic hypoxic and was intubated and was on ventilator patient was extubated on 12/7/2022 and again was reintubated on 12/10/2022 and had a prolonged stay in the hospital/in ICU with altered mental status lethargy decreased mentation sedation was discontinued mentation started improving after many days of sedation discontinued and ultimately she was extubated on 12/22/2022 post extubation she had stridor patient was treated with heliox humidified O2 and ultimately was transferred out of ICU to stepdown unit on 12/23/2022. Interval History:  12/24/22  Overnight events noted chart reviewed, labs seen and medications reviewed. Patient is afebrile overnight T-max is 99 respiratory rate is in low 20s to mid 20 she is hemodynamically stable.   Patient is on 4 L nasal cannula maintaining saturation 93% to 95% but patient does have expiratory sounds upper airway sounds not kecia stridor look comfortable not in distress. Apparently did use BiPAP 10/5 overnight with 45% FiO2. Patient does complain of cough denies shortness of breath at rest does have sputum production able to expectorate. She is on dysphagia diet currently no aspiration was reported. Review of Systems:  Review of Systems   Constitutional:  Positive for activity change and fatigue. Negative for fever. HENT:  Negative for postnasal drip, sinus pressure, sore throat, trouble swallowing and voice change. Eyes:  Negative for photophobia, pain, redness and visual disturbance. Respiratory:  Positive for cough and shortness of breath. Cardiovascular:  Negative for chest pain, palpitations and leg swelling. Gastrointestinal:  Negative for abdominal pain, diarrhea and vomiting. Endocrine: Negative for polydipsia and polyphagia. Genitourinary:  Negative for difficulty urinating, dysuria, frequency and hematuria. Musculoskeletal:  Positive for arthralgias, back pain and gait problem. Skin: Negative. Neurological:  Negative for dizziness, syncope, speech difficulty and headaches. Hematological:  Negative for adenopathy. Does not bruise/bleed easily. Psychiatric/Behavioral:  Positive for decreased concentration.       OBJECTIVE     VITAL SIGNS:   LAST-  /65   Pulse 61   Temp 98.5 °F (36.9 °C) (Temporal)   Resp 25   Ht 5' 1\" (1.549 m)   Wt 260 lb (117.9 kg)   SpO2 95%   BMI 49.13 kg/m²   8-24 HR RANGE-  TEMP Temp  Av.4 °F (36.9 °C)  Min: 97.6 °F (36.4 °C)  Max: 99 °F (64.1 °C)   BP Systolic (88ZJH), TCD:326 , Min:102 , BJP:989      Diastolic (61TPL), RRV:75, Min:60, Max:79     PULSE Pulse  Av.1  Min: 17  Max: 73   RR Resp  Av  Min: 17  Max: 25   O2 SAT SpO2  Av.3 %  Min: 93 %  Max: 95 %   OXYGEN DELIVERY O2 Flow Rate (L/min)  Av L/min  Min: 4 L/min  Max: 4 L/min     Systemic Examination:   Physical Exam  General appearance - looks comfortable and in no acute distress mild tachypnea present  Mental status - alert, oriented to person, place, and time  Eyes - pupils equal and reactive, extraocular eye movements intact  Mouth - mucous membranes moist, pharynx normal without lesions. Small oropharynx Mallampati 3  Neck - supple, no significant adenopathy. Short thick neck. Upper airway sounds present  Chest - Chest was symmetrical without dullness to percussion. Breath sounds bilaterally present but distant bilaterally, mild rhonchi no wheezing and no crackles anteriorly. There is no intercostal recession or use of accessory muscles  Heart - normal rate, regular rhythm, normal S1, S2, no murmurs, rubs, clicks or gallops  Abdomen - soft, nontender, nondistended, no masses or organomegaly  Neurological - alert, oriented, normal speech, no focal findings or movement disorder noted  Extremities - peripheral pulses normal, positive pedal edema, no clubbing or cyanosis  Skin - normal coloration and turgor, no rashes, no suspicious skin lesions noted     DATA REVIEW     Medications:  Scheduled Meds:   hydrocortisone  10 mg Oral Nightly    hydrocortisone  15 mg Oral QAM    guaiFENesin  200 mg Oral Q4H    pantoprazole (PROTONIX) 40 mg injection  40 mg IntraVENous Daily    enoxaparin  30 mg SubCUTAneous BID    sertraline  150 mg Oral Daily    lamoTRIgine  200 mg Oral Daily    levothyroxine  150 mcg Oral QAM AC    topiramate  75 mg Oral Daily    topiramate  100 mg Oral Nightly     Continuous Infusions:   sodium chloride 75 mL/hr at 12/23/22 1940    sodium chloride Stopped (12/12/22 0316)     LABS:-  ABG:   No results for input(s): POCPH, POCPCO2, POCPO2, POCHCO3, ZEKW3KUM in the last 72 hours.   CBC:   Recent Labs     12/22/22  0613 12/23/22  0638 12/24/22  0402   WBC 11.4* 10.2 9.2   HGB 11.6* 12.0 12.3   HCT 37.9 38.3 40.7   MCV 91.1 91.2 91.9    518* 390   LYMPHOPCT 8* 10* 23*   RBC 4.16 4.20 4.43   MCH 27.9 28.6 27.8   MCHC 30.6 31.3 30.2   RDW 13.5 13.7 13.5     BMP: Recent Labs     12/22/22  0613 12/23/22  0638 12/24/22  0402    141 143   K 4.0 3.4* 3.6*    103 105   CO2 26 28 28   BUN 23* 27* 29*   CREATININE 0.69 0.78 0.97*   GLUCOSE 107* 104* 82     Liver Function Test:   No results for input(s): PROT, LABALBU, ALT, AST, GGT, ALKPHOS, BILITOT in the last 72 hours. Amylase/Lipase:  No results for input(s): AMYLASE, LIPASE in the last 72 hours. Coagulation Profile:   No results for input(s): INR, PROTIME, APTT in the last 72 hours. Cardiac Enzymes:  No results for input(s): CKTOTAL, CKMB, CKMBINDEX, TROPONINI in the last 72 hours. Lactic Acid:  No results found for: LACTA  BNP:   No results found for: BNP  D-Dimer:  Lab Results   Component Value Date    DDIMER 0.33 07/15/2020     Others:   Lab Results   Component Value Date    TSH <0.01 (L) 12/07/2022    FT3 2.45 10/16/2019     Lab Results   Component Value Date    JAYY NEGATIVE 10/23/2013    SEDRATE 20 08/21/2018    CRP 4.4 08/21/2018     Lab Results   Component Value Date    URICACID 5.2 05/24/2018     No results found for: IRON, TIBC, FERRITIN  No results found for: SPEP, UPEP  No results found for: PSA, CEA, , LQ0816,     Input/Output:    Intake/Output Summary (Last 24 hours) at 12/24/2022 1304  Last data filed at 12/24/2022 0345  Gross per 24 hour   Intake --   Output 446 ml   Net -446 ml       Microbiology:  No results for input(s): SPECDESC, SPECDESC, SPECIAL, CULTURE, CULTURE, STATUS, ORG, CDIFFTOXPCR, CAMPYLOBPCR, SALMONELLAPC, SHIGAPCR, SHIGELLAPCR, MPNEUG, MPNEUM, LACTOQL in the last 72 hours. Pathology:    Radiology reports:  XR CHEST PORTABLE   Final Result   1. Enlargement of the cardiac silhouette with vascular congestion, though   improved focal infiltrate of the right upper lobe. FL MODIFIED BARIUM SWALLOW W VIDEO   Final Result   1. Penetration followed by aspiration without coughing with the nectar thick   and honey thick substances.    2. No penetration or aspiration with the pureed/pudding thick or soft solid   substances. 3. Cookie solid substance was not attempted. Please see separate speech pathology report for full discussion of findings   and recommendations. XR CHEST PORTABLE   Final Result   No change from prior study. Tubes and lines as above. Endotracheal tube is   somewhat low lying but unchanged. XR CHEST PORTABLE   Final Result   Similar appearance of mild bilateral airspace disease. This appears   consistent with pneumonia         XR CHEST PORTABLE   Final Result   1. No significant change in bilateral airspace disease. 2.  Endotracheal tube terminates above the manny. 3.  Enteric tube terminates at the level of the body of the stomach. XR ABDOMEN FOR NG/OG/NE TUBE PLACEMENT   Final Result   1. No significant change in bilateral airspace disease. 2.  Endotracheal tube terminates above the manny. 3.  Enteric tube terminates at the level of the body of the stomach. XR CHEST PORTABLE   Final Result   No significant interval change. Patchy perihilar airspace opacities. XR PELVIS (MIN 3 VIEWS)   Final Result   No acute abnormality of the pelvis. Known right superior ramus fracture is not   well seen radiographically. XR CHEST PORTABLE   Final Result   Stable bilateral airspace opacities. This could represent multifocal   pneumonia, however, edema is not fully excluded         XR CHEST PORTABLE   Final Result   Persistent bilateral airspace opacities suggesting multifocal pneumonia         XR CHEST PORTABLE   Final Result   Chest:      1. Tubes as detailed above. 2. Bilateral parahilar and lower zone predominant airspace disease. 3. Stable cardiomegaly. KUB:      1. NG tube traverses the GE junction with distal tip likely in the body of   the stomach. 2. Nonspecific bowel gas pattern. XR ABDOMEN FOR NG/OG/NE TUBE PLACEMENT   Final Result   Chest:      1.  Tubes as detailed above.   2. Bilateral parahilar and lower zone predominant airspace disease. 3. Stable cardiomegaly. KUB:      1. NG tube traverses the GE junction with distal tip likely in the body of   the stomach. 2. Nonspecific bowel gas pattern. XR CHEST PORTABLE   Final Result   Findings suggest congestive heart failure         CT HEAD WO CONTRAST   Final Result   No acute intracranial abnormality. No evidence of intracranial hemorrhage or any other definable acute   intracranial abnormality. Redemonstration of dense calcifications in the bilateral lentiform nuclei,   bilateral caudate nuclei and bilateral thalami similar to findings on   previous CT scan in 2010. There are bilateral cochlear implants redemonstrated. No demonstrable fracture in calvarium or in base of skull. CT CHEST PULMONARY EMBOLISM W CONTRAST   Final Result   No evidence of pulmonary embolism. No evidence of thoracic aortic aneurysm or dissection. There are mild atelectatic changes, and/or infiltrate in the posterior lung   bases bilaterally, left more than right. Trace left basilar pleural effusion. Focal moderate dense infiltrates, and/or atelectasis in the posterior segment   of right pulmonary upper lobe. No subphrenic acute process demonstrated. XR CHEST PORTABLE   Final Result   Lower end of the ET tube is 1.6 cm above manny. Mild-to-moderate pulmonary vascular congestion. Finding suggestive of   interstitial pulmonary edema in lungs. XR CHEST PORTABLE   Final Result   Placement of gastric tube which extends to the distal aspect of the stomach. Cardiomegaly and vascular congestion without evidence of interstitial edema. No confluent airspace consolidation. XR ABDOMEN FOR NG/OG/NE TUBE PLACEMENT   Final Result   Gastric tube in place with the tip and side-port in the stomach.          XR CHEST PORTABLE   Final Result   Very limited study due to underpenetration. Increased opacity in the lungs which could be related to under penetration   and overlying soft tissues, but diffuse airspace infiltrates cannot be   excluded. Cardiomegaly without evidence of CHF. XR HUMERUS RIGHT (MIN 2 VIEWS)   Final Result   No acute osseous abnormality in the right humerus. CTA NECK W CONTRAST   Final Result   No acute trauma of the major arterial vessels of the neck. CT THORACIC SPINE TRAUMA RECONSTRUCTION   Final Result   10-20% compression deformity of the T3 vertebra, possibly acute. No other   evidence of acute thoracic or lumbar spine fracture. Multilevel degenerative   change. CT LUMBAR SPINE TRAUMA RECONSTRUCTION   Final Result   10-20% compression deformity of the T3 vertebra, possibly acute. No other   evidence of acute thoracic or lumbar spine fracture. Multilevel degenerative   change. XR PELVIS (MIN 3 VIEWS)   Final Result   1. Nondisplaced fracture involving the lateral aspect of the right superior   pubic ramus, though better seen on CT imaging. 2. No other radiographically evident acute fracture seen in the pelvis. XR SHOULDER RIGHT (MIN 2 VIEWS)   Final Result   1. No acute fracture seen in the right shoulder. 2. No acute fracture seen in the left shoulder. XR SHOULDER LEFT (MIN 2 VIEWS)   Final Result   1. No acute fracture seen in the right shoulder. 2. No acute fracture seen in the left shoulder. Echocardiogram:   No results found for this or any previous visit. Cardiac Catheterization:   No results found for this or any previous visit.       ASSESSMENT AND PLAN     Assessment:    //Acute hypoxic hypercapnic respiratory failure required invasive ventilatory support  //Status post second extubation on 12/22/2022  //Post extubation stridor/vocal cord edema  //Status post fall/C7 transverse process fracture and T3 compression fracture and fracture of right sacral/pelvic fracture nondisplaced  //Obstructive sleep apnea/hypoventilation  //Pneumonia/aspiration pneumonia/MRSA  //Hypopituitarism status post radiation to brain in childhood  //Morbid obesity  //Developmental delay  //Seizure disorder    Plan:    Patient currently has inspiratory sound on breathing does not have kecia stridor. She is currently on 4 L nasal cannula will use humidified oxygen if possible with mask for humidification of airway. Continue with BiPAP at night Will increase BiPAP setting from 10/5-12/6. Will use BiPAP during naps also, nocturnally and as needed. Encourage incentive spirometry deep breathing cough and Acapella. Strict aspiration precaution. Avoid benzodiazepine and narcotics as much possible  Continue incentive spirometry, pulmonary toilet, aspiration precautions and bronchodilators  Continue to monitor I/O with a goal of even/negative fluid balance  Antimicrobials reviewed; vancomycin 12/22/2022 for MRSA pneumonia  On hydrocortisone and levothyroxine for hypopituitarism  On Lamictal and Topamax and Neurontin  DVT prophylaxis on Lovenox 30 mg twice daily  Physical/occupational/speech therapy; increase activity as tolerated    I updated the patient regarding the current clinical condition, provisional diagnosis and management plan. I addressed concerns and answered all questions to the best of my abilities. It was my pleasure to evaluate Nasrin Yuen today. We will continue to follow. I would like to thank you for allowing me to participate in the care of this patient. Please feel free to call with any further questions or concerns. Natali Perkins MD, M.D. Pulmonary and Critical Care Medicine           12/24/2022, 1:04 PM    Please note that this chart was generated using voice recognition Dragon dictation software. Although every effort was made to ensure the accuracy of this automated transcription, some errors in transcription may have occurred.

## 2022-12-24 NOTE — PROGRESS NOTES
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  Developmental delay  Morbid obesity    Recommendations:   Completed intravenous antimicrobial therapy with vancomycin on 12-22-22  Continue aggressive pulmonary toileting and we will follow her progress  The patient is following commands and overall clinically is improved    Infection Control Recommendations:   Contact precautions    Discharge Planning:   Estimated Length of IV antimicrobials: 12-22-22  Patient will need Midline Catheter Insertion/ PICC line Insertion: No  Patient will need: Home IV , [place],  SNF,  LTAC: Undetermined  Patient willneed outpatient wound care: No    Medical Decision making / Summary of Stay:   Fabian Eisenmenger is a 41y.o.-year-old female who was initially admitted on 12/5/2022. Patient seen at the request of Dr. Leatha Leo:     This patient, with a history of developmental delay, presented to the [facility] ED after falling down some stairs at home. She was complaining of neck and back pain. Imaging revealed an acute C7 transverse process fracture, chronic T3 compression fx and fractures of right sacral ala, nondisplaced fracture of right superior ramus and right obturator ring. She was transferred to [facility] for further medical management. Neurosurgery was consulted and it was determined there is no neurosurgical intervention needed. Orthopedic surgery was also consulted and they also did not recommend surgical intervention, but rather outpatient follow-up.      On 12/6/22, the patient's mentation declined and she became tachypneic requiring emergent intubation. Neurology was consulted for the patient's history of seizures. Her home seizure medications were restarted. An EEG did not show any seizure activity and the patient's mentation improved. She was extubated on the 7th but required re-intubation on 12/10/22 after a rapid response was called. CXR showed concern for RUL aspiration pneumonia. IV Zosyn was started and cultures were obtained. Viral respiratory panel was negative for influenza and Covid     Sputum cultures grew MRSA and the patient's antibiotics were changed to IV vancomycin on 12/11/22. There has been no growth on blood or urine cultures. ID was consulted for MRSA on sputum cultures        CT Head W/O Contrast   Final Result   No acute intracranial abnormality. Small amount of fluid in the right mastoid air cells. CT CHEST ABDOMEN PELVIS WO CONTRAST Additional Contrast? None   Final Result   Chest:       1. Mild anterior wedging of T3 suggesting an age indeterminate compression   fracture. No fracture line is identified. Clinical correlation with the   site of symptoms is suggested. 2. Significant motion artifact with scattered patchy ground-glass opacities   in the lungs which may be related to motion artifact. Ground-glass opacities   are otherwise nonspecific and could be related to atypical infection or   pneumonitis. Abdomen and pelvis:       1. Nondisplaced fractures of the right sacral ala and the right obturator   ring. 2. No acute findings elsewhere in the abdomen or pelvis. 3. Left renal cyst.           CT CSpine W/O Contrast   Final Result   Acute fracture of the right C7 transverse process. Spinal degenerative changes as described.            Interval Changes: 12/24/2022     /73   Pulse 53   Temp 97.6 °F (36.4 °C) (Temporal)   Resp 18   Ht 5' 1\" (1.549 m)   Wt 260 lb (117.9 kg)   SpO2 95%   BMI 49.13 kg/m²     Temperature Range: Temp: 97.6 °F (36.4 °C) Temp  Av.5 °F (36.9 °C)  Min: 97.6 °F (36.4 °C)  Max: 99 °F (37.2 °C)    CURRENT EVALUATION [de-identified]2022    Patient transferred out of the ICU. Afebrile   VS stable, maintaining saturation on 4-->10 L/min via aerosol  She was extubated on . Patient is stable  Has problems with silent aspiration  Continuing respiratory toilet. Completed vancomycin     Labs: 2022    Bun ---  Creat 0.69-0.78-> 0.97    WBC 16.0-12.7->10.2> 9.2  Hgb  11.6-12.0> 12.3  Plt  465-589->154    Micro:   COVID-19 - not detected   MRSA nasal probe - negative  12/10 MRSA nasal probe - positive  12/10 RVP - negative    Sputum:  12/10 Resp Cx - MRSA heavy growth    Blood:  12/10 no growth  12/15 Blood Cx x2 -  Staph epi    Urine:  12/15: - no growth    Imaging:    CXR :   Enlargement of cardiac silhouette with vascular congestion, improved focal infiltrate of the right upper lobe.  CXR  No change from prior study. Tubes and lines as above. Endotracheal tube is somewhat low lying but unchanged.  CXR  Similar appearance of mild bilateral airspace disease. This appears consistent with pneumonia   12/15 CXR  1. No significant change in bilateral airspace disease. 2.  Endotracheal tube terminates above the manny. 3.  Enteric tube terminates at the level of the body of the stomach.  CXR  No significant interval change. Patchy perihilar airspace opacities.  XR Pelvis   No acute abnormality of the pelvis. Known right superior ramus fracture is not well seen radiographically. Review of Systems   Constitutional: Negative. HENT: Negative. Respiratory: Negative. Negative for shortness of breath. Cardiovascular: Negative. Negative for chest pain. Gastrointestinal: Negative. Genitourinary: Negative. Musculoskeletal:  Positive for back pain. Neurological: Negative. Psychiatric/Behavioral: Negative.        Physical Examination :     Physical Exam  Vitals reviewed. Constitutional:       General: She is not in acute distress. Appearance: She is well-developed. She is obese. Interventions: She is sedated. HENT:      Head: Normocephalic and atraumatic. Mouth/Throat:      Mouth: Mucous membranes are moist.      Pharynx: Oropharynx is clear. Eyes:      Conjunctiva/sclera: Conjunctivae normal.   Cardiovascular:      Rate and Rhythm: Regular rhythm. Bradycardia present. Heart sounds: Normal heart sounds. Pulmonary:      Effort: Pulmonary effort is normal. No respiratory distress. Breath sounds: Normal breath sounds. Abdominal:      General: Bowel sounds are normal. There is no distension. Palpations: Abdomen is soft. Tenderness: There is no abdominal tenderness. Musculoskeletal:      Cervical back: Normal range of motion. No rigidity. Right lower leg: No edema. Left lower leg: No edema. Skin:     General: Skin is warm and dry. Neurological:      Mental Status: She is alert and oriented to person, place, and time. Psychiatric:         Behavior: Behavior normal.       Laboratory data:   I have independently reviewed the followinglabs:  CBC with Differential:   Recent Labs     12/23/22  0638 12/24/22  0402   WBC 10.2 9.2   HGB 12.0 12.3   HCT 38.3 40.7   * 390   LYMPHOPCT 10* 23*   MONOPCT 3 7       BMP:   Recent Labs     12/23/22  0638 12/24/22  0402    143   K 3.4* 3.6*    105   CO2 28 28   BUN 27* 29*   CREATININE 0.78 0.97*   MG 2.5 2.6       Hepatic Function Panel: No results for input(s): PROT, LABALBU, BILIDIR, IBILI, BILITOT, ALKPHOS, ALT, AST in the last 72 hours.       No results found for: PROCAL  Lab Results   Component Value Date/Time    CRP 4.4 08/21/2018 06:46 PM     Lab Results   Component Value Date    SEDRATE 20 08/21/2018         Lab Results   Component Value Date/Time    DDIMER 0.33 07/15/2020 05:29 PM     No results found for: FERRITIN  No results found for: LDH  No results found for: FIBRINOGEN    Results in Past 30 Days  Result Component Current Result Ref Range Previous Result Ref Range   SARS-CoV-2, PCR Not Detected (12/10/2022) Not Detected Not Detected (12/6/2022) Not Detected     Lab Results   Component Value Date/Time    COVID19 Not Detected 12/10/2022 01:09 PM    COVID19 Not Detected 12/06/2022 10:15 PM       No results for input(s): VANCOTROUGH in the last 72 hours. Imaging Studies:   ONE XRAY VIEW OF THE CHEST 12/16/2022 9:50 am  Impression   Similar appearance of mild bilateral airspace disease. This appears   consistent with pneumonia     Cultures:     Culture, Blood 1 [[id number]] Collected: 12/15/22 1349   Order Status: Completed Specimen: Blood Updated: 12/18/22 0912    Specimen Description . BLOOD    Special Requests L FOREARM  10ML    Culture NO GROWTH 3 DAYS   Culture, Blood 1 [[id number]] (Abnormal) Collected: 12/15/22 1352   Order Status: Completed Specimen: Blood Updated: 12/18/22 0717    Specimen Description . BLOOD    Special Requests L AC  5ML    Culture POSITIVE Blood Culture Abnormal      DIRECT GRAM STAIN FROM BOTTLE: GRAM POSITIVE COCCI IN CLUSTERS     Staphylococcus epidermidis Detected: mecA/C Gene Detected- Methicillin Resistant Organism Methodology- Polymerase Chain Reaction (PCR)     STAPHYLOCOCCUS EPIDERMIDIS A single positive blood culture of coagulase negative Staphylocci, diphtheroids,micrococci, Cutibacterium, viridans Streptocci, Bacillus, or Lactobacillus species should be interpreted with caution and viewed as a likely skin contaminant. Abnormal      (NOTE) Direct Gram Stain from bottle and Polymerase Chain Reaction (PCR) results called to and read back by:JEANETTE RENE AT 1420 ON 12/16/22     Culture, Urine [[id number]] Collected: 12/15/22 1410   Order Status: Completed Specimen: Urine, indwelling catheter Updated: 12/16/22 1257    Specimen Description . INDWELLING CATH URINE    Culture NO SIGNIFICANT GROWTH   Culture, Blood 1 [[id number]] Collected: 12/10/22 1223   Order Status: Completed Specimen: Blood Updated: 12/15/22 1319    Specimen Description . BLOOD    Special Requests LEFT HAND 2.5 ML    Culture NO GROWTH 5 DAYS   Culture, Blood 1 [[id number]] Collected: 12/10/22 1230   Order Status: Completed Specimen: Blood Updated: 12/15/22 1319    Specimen Description . BLOOD    Special Requests RT HAND 2.5 ML    Culture NO GROWTH 5 DAYS   Culture, Respiratory [[id number]] (Abnormal)  Collected: 12/10/22 1130   Order Status: Completed Specimen: Sputum Induced Updated: 12/12/22 0953    Specimen Description . INDUCED SPUTUM    Direct Exam < 10 EPITHELIAL CELLS/LPF     <10 NEUTROPHILS/LPF     FEW GRAM POSITIVE COCCI IN CLUSTERS Abnormal     Culture METHICILLIN RESISTANT STAPHYLOCOCCUS AUREUS HEAVY GROWTH Abnormal      NO NORMAL JEFFREY   MRSA DNA Probe, Nasal [[id number]] (Abnormal) Collected: 12/10/22 1215   Order Status: Completed Specimen: Nasal Updated: 12/11/22 1027    Specimen Description . NASAL SWAB    MRSA, DNA, Nasal POSITIVE:  MRSA DNA detected by nucleic acid amplification. Abnormal     Comment:                                                    Results should be used as an adjunct to nosocomial control efforts to identify patients   needing enhanced precautions. The test is not intended to identify patients with staphylococcal infections. Results   should not be used to guide or monitor treatment for MRSA infections. Respiratory Panel, Molecular, with COVID-19 (Restricted: peds pts or suitable admitted adults) [[id number]] Collected: 12/10/22 1309   Order Status: Completed Specimen: Nasopharyngeal Swab Updated: 12/10/22 1451    Specimen Description . NASOPHARYNGEAL SWAB    Adenovirus PCR Not Detected    Coronavirus 229E PCR Not Detected    Coronavirus HKU1 PCR Not Detected    Coronavirus NL63 PCR Not Detected    Coronavirus OC43 PCR Not Detected    SARS-CoV-2, PCR Not Detected    Human Metapneumovirus PCR Not Detected    Rhino/Enterovirus PCR Not Detected    Influenza A by PCR Not Detected    Influenza B by PCR Not Detected    Parainfluenza 1 PCR Not Detected    Parainfluenza 2 PCR Not Detected    Parainfluenza 3 PCR Not Detected    Parainfluenza 4 PCR Not Detected    Resp Syncytial Virus PCR Not Detected    Bordetella Parapertussis Not Detected    B Pertussis by PCR Not Detected    Chlamydia pneumoniae By PCR Not Detected    Mycoplasma pneumo by PCR Not Detected    Comment: Performed by multiplexed nucleic acid assay. Respiratory Panel, Molecular, with COVID-19 (Restricted: peds pts or suitable admitted adults) [[id number]] Collected: 12/10/22 1130   Order Status: Canceled Specimen: Nasopharyngeal Swab    MRSA DNA Probe, Nasal [[id number]] Collected: 12/07/22 0200   Order Status: Completed Specimen: Nasal Updated: 12/07/22 1440    Specimen Description . NASAL SWAB    MRSA, DNA, Nasal NEGATIVE    Comment: NEGATIVE:  MRSA DNA not detected by nucleic acid amplification. Results should be used as an adjunct to nosocomial control efforts to identify patients   needing enhanced precautions. The test is not intended to identify patients with staphylococcal infections. Results   should not be used to guide or monitor treatment for MRSA infections. COVID-19, Rapid [[id number]] Collected: 12/06/22 2215   Order Status: Completed Specimen: Nasopharyngeal Swab Updated: 12/07/22 0207    Specimen Description . NASOPHARYNGEAL SWAB    SARS-CoV-2, Rapid Not Detected    Comment:        Rapid NAAT:  The specimen is NEGATIVE for SARS-CoV-2, the novel coronavirus associated with   COVID-19. The ID NOW COVID-19 assay is designed to detect the virus that causes COVID-19 in patients   with signs and symptoms of infection who are suspected of COVID-19.    An individual without symptoms of COVID-19 and who is not shedding SARS-CoV-2 virus would expect to have a negative (not detected) result in this assay. Negative results should be treated as presumptive and, if inconsistent with clinical signs   and symptoms or necessary for patient management,   should be tested with an alternative molecular assay. Negative results do not preclude   SARS-CoV-2 infection and   should not be used as the sole basis for patient management decisions. Fact sheet for Healthcare Providers: FindDrives.pl   Fact sheet for Patients: FindDrives.pl         Methodology: Isothermal Nucleic Acid Amplification         Medications:      potassium chloride  40 mEq Oral Once    hydrocortisone  10 mg Oral Nightly    hydrocortisone  15 mg Oral QAM    guaiFENesin  200 mg Oral Q4H    pantoprazole (PROTONIX) 40 mg injection  40 mg IntraVENous Daily    enoxaparin  30 mg SubCUTAneous BID    sertraline  150 mg Oral Daily    lamoTRIgine  200 mg Oral Daily    levothyroxine  150 mcg Oral QAM AC    topiramate  75 mg Oral Daily    topiramate  100 mg Oral Nightly           [facility]  Yaima Rios MD  [employer] messaging  OFFICE: [phone]      Electronically signed by Yaima Rios MD on 12/24/2022 at 9:01 AM  Thank you for allowing us to participate in the care of this patient. Please call with questions. ATTESTATION:    I have discussed the case, including pertinent history and exam findings with the APRN. I have evaluated the  History, physical findings and pictures of the patient and the key elements of the encounter have been performed by me. I have reviewed the laboratory data, other diagnostic studies and discussed them with the APRN. I have updated the medical record where necessary. I agree with the assessment, plan and orders as documented by the APRN.     Betty Watkins MD.      This note iscreated with the assistance of a speech recognition program.  While intending to generate a document that actually reflects the content of the visit, the document can still have some errors including those of syntax andsound a like substitutions which may escape proof reading. In such instances, actual meaning can be extrapolated by contextual diversion.

## 2022-12-24 NOTE — PLAN OF CARE
Patient seen and examined at bedside this morning    Febrile, hemodynamically stable, hard of hearing, patient has cochlear implants  Admitted for secondary to cervical transverse process fracture, not a surgical candidate, history of hypopituitarism,  MRSA pneumonia, morbid obesity    Plan:    -Patient is silent aspiration, assessed by speech, no liquids,  Unable to speak with speech today to increase the liquid content in the food  Advise CPAP overnight  Decrease the dose of Roxicodone  Resume home medication with gabapentin 100, Abilify at 10, concern seizures we will hold off on Wellbutrin for now  PT OT assessment  Discharge planning to SNF  Family was present patient father and mother was updated about care    Gerard Oliver MD  Internal Medicine Resident, PGY- [facility];  [place], [place]  12/24/2022, 3:01 PM

## 2022-12-24 NOTE — PLAN OF CARE
Problem: Discharge Planning  Goal: Discharge to home or other facility with appropriate resources  Outcome: Progressing  Flowsheets (Taken 12/23/2022 2000)  Discharge to home or other facility with appropriate resources: Identify barriers to discharge with patient and caregiver     Problem: Confusion  Goal: Confusion, delirium, dementia, or psychosis is improved or at baseline  Description: INTERVENTIONS:  1. Assess for possible contributors to thought disturbance, including medications, impaired vision or hearing, underlying metabolic abnormalities, dehydration, psychiatric diagnoses, and notify attending LIP  2. [place] high risk fall precautions, as indicated  3. Provide frequent short contacts to provide reality reorientation, refocusing and direction  4. Decrease environmental stimuli, including noise as appropriate  5. Monitor and intervene to maintain adequate nutrition, hydration, elimination, sleep and activity  6. If unable to ensure safety without constant attention obtain sitter and review sitter guidelines with assigned personnel  7.  Initiate Psychosocial CNS and Spiritual Care consult, as indicated  Outcome: Progressing  Flowsheets (Taken 12/23/2022 2000)  Effect of thought disturbance (confusion, delirium, dementia, or psychosis) are managed with adequate functional status: Assess for contributors to thought disturbance, including medications, impaired vision or hearing, underlying metabolic abnormalities, dehydration, psychiatric diagnoses, notify LIP     Problem: Pain  Goal: Verbalizes/displays adequate comfort level or baseline comfort level  Outcome: Progressing     Problem: Chronic Conditions and Co-morbidities  Goal: Patient's chronic conditions and co-morbidity symptoms are monitored and maintained or improved  Outcome: Progressing  Flowsheets (Taken 12/23/2022 2000)  Care Plan - Patient's Chronic Conditions and Co-Morbidity Symptoms are Monitored and Maintained or Improved: Monitor and assess patient's chronic conditions and comorbid symptoms for stability, deterioration, or improvement     Problem: Skin/Tissue Integrity  Goal: Absence of new skin breakdown  Description: 1. Monitor for areas of redness and/or skin breakdown  2. Assess vascular access sites hourly  3. Every 4-6 hours minimum:  Change oxygen saturation probe site  4. Every 4-6 hours:  If on nasal continuous positive airway pressure, respiratory therapy assess nares and determine need for appliance change or resting period.   Outcome: Progressing     Problem: Safety - Adult  Goal: Free from fall injury  Outcome: Progressing  Flowsheets (Taken 12/24/2022 0134)  Free From Fall Injury: Instruct family/caregiver on patient safety     Problem: ABCDS Injury Assessment  Goal: Absence of physical injury  Outcome: Progressing  Flowsheets (Taken 12/24/2022 0134)  Absence of Physical Injury: Implement safety measures based on patient assessment     Problem: Nutrition Deficit:  Goal: Optimize nutritional status  Outcome: Progressing     Problem: Respiratory - Adult  Goal: Achieves optimal ventilation and oxygenation  Outcome: Progressing  Flowsheets (Taken 12/23/2022 2000)  Achieves optimal ventilation and oxygenation: Assess for changes in respiratory status

## 2022-12-24 NOTE — PROGRESS NOTES
[facility]  Internal Medicine Teaching Residency Program  Inpatient Daily Progress Note  ______________________________________________________________________________    Patient: Lily Harris  YOB: 1975   SXM:[id number]    Acct: [de-identified]     Room: 26/1-12  Admit date: 12/5/2022  Today's date: 12/24/22  Number of days in the hospital: 18    SUBJECTIVE   Admitting Diagnosis: Cervical transverse process fracture, initial encounter [facility])  CC: Fall leading to neck pain and back pain  Pt examined at bedside. Chart & results reviewed. No acute event overnight  Remained afebrile and hemodynamically stable. Bradycardic with heart rate above 45/min. - Asymptomatic. Used CPAP overnight. No acute complaint or concern. Pain controlled with pain medications. ROS:  Constitutional:  negative for chills, fevers, sweats  Respiratory:  negative for cough, dyspnea on exertion, hemoptysis, shortness of breath, wheezing  Cardiovascular:  negative for chest pain, chest pressure/discomfort, lower extremity edema, palpitations  Gastrointestinal:  negative for abdominal pain, constipation, diarrhea, nausea, vomiting  Neurological:  negative for dizziness, headache  BRIEF HISTORY     ICU COURSE:     71-year-old female initially presented to hospital on 12/5 after a fall. 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 Patient also has past medical history of diabetes mellitus, bilateral sensorineural hearing loss. Had a CT head showed no intracranial abnormality, CT abdominal pelvis showed  nondisplaced fractures of right sacral ilya, nondisplaced fracture of right superior ramus and right obturator ring. CT spine showed acute fracture of right C7 transverse process and also age indeterminate compressive fracture of T3.   Patient admitted under trauma surgery-neurosurgery for cervical fractures was consulted, who recommended no neurosurgical intervention. Orthopedic surgery for pelvic fractures was also consulted, no surgical intervention but medical management and also recommended to follow-up in orthopedic clinic after discharge. Patient was started on BiPAP for increased work of breathing. Patient was oriented x4 at day of presentation.  - patient had a decline in mental status with tachypnea, respiratory rate of 30s to 40 with heart rate of 105 patient was intubated emergently     - Neurology was consulted because of history of migraines, seizure disorder, patient was started on her home antiepileptic drugs including Topamax and Lamictal, EEG was ordered to rule out any subclinical seizure activity, MRI was also ordered to rule out any CVA. Patient was doing fine, arousable and following commands. Pulmonology was consulted, recommended to extubate and patient was extubated. 12/10 -rapid was called today due to impending respiratory failure, patient was intubated again and was sent to ICU       -respiratory culture came back positive for staph aureus , nasal respiratory swab came out positive for MRSA DNA, patient started on vancomycin       : Tachycardia improved to normal heart rate, patient remained afebrile, WBC count trending down. Sedation changed to precedex     12/15: Precedex discontinued. : Patient was extubated. : Pt was a febrile overnight, saturating well on NC.     OBJECTIVE     Vital Signs:  /73   Pulse 53   Temp 97.6 °F (36.4 °C) (Temporal)   Resp 18   Ht 5' 1\" (1.549 m)   Wt 260 lb (117.9 kg)   SpO2 95%   BMI 49.13 kg/m²     Temp (24hrs), Av.4 °F (36.9 °C), Min:97.6 °F (36.4 °C), Max:99 °F (37.2 °C)    In: -   Out: 1596 [Urine:1596]    Physical Exam:  General appearance - alert, in no distress  Mental status - alert, oriented to person, place, and time  Eyes - pupils equal and reactive, extraocular eye movements intact. Some hearing loss. Mouth - mucous membranes moist, pharynx normal without lesions  Neck - supple, no significant adenopathy  Chest - clear to auscultation, no wheezes  Heart - normal rate, regular rhythm, normal S1, S2  Abdomen - soft, nontender, nondistended  Neurological - alert, oriented, normal speech, no focal deficits   Extremities - peripheral pulses normal, no pedal edema  Skin - normal coloration and turgor, no rashes     Medications:  Scheduled Medications:    hydrocortisone  10 mg Oral Nightly    hydrocortisone  15 mg Oral QAM    guaiFENesin  200 mg Oral Q4H    pantoprazole (PROTONIX) 40 mg injection  40 mg IntraVENous Daily    enoxaparin  30 mg SubCUTAneous BID    sertraline  150 mg Oral Daily    lamoTRIgine  200 mg Oral Daily    levothyroxine  150 mcg Oral QAM AC    topiramate  75 mg Oral Daily    topiramate  100 mg Oral Nightly     Continuous Infusions:    sodium chloride 75 mL/hr at 12/23/22 1940    sodium chloride Stopped (12/12/22 0316)     PRN MedicationsoxyCODONE, 10 mg, Q12H PRN   Or  oxyCODONE, 5 mg, Q4H PRN  racepinephrine HCl, 11.25 mg, Q4H PRN  sodium chloride nebulizer, 3 mL, Q4H PRN  ondansetron, 4 mg, Q6H PRN  polyethylene glycol, 17 g, Daily PRN  levalbuterol, 0.63 mg, Q4H PRN  sodium chloride, , PRN  acetaminophen, 650 mg, Q6H PRN  sodium chloride flush, 5-40 mL, PRN        Diagnostic Labs:  CBC:   Recent Labs     12/22/22  0613 12/23/22  0638 12/24/22  0402   WBC 11.4* 10.2 9.2   RBC 4.16 4.20 4.43   HGB 11.6* 12.0 12.3   HCT 37.9 38.3 40.7   MCV 91.1 91.2 91.9   RDW 13.5 13.7 13.5    518* 390     BMP:   Recent Labs     12/22/22  0613 12/23/22  0638 12/24/22  0402    141 143   K 4.0 3.4* 3.6*    103 105   CO2 26 28 28   BUN 23* 27* 29*   CREATININE 0.69 0.78 0.97*     BNP: No results for input(s): BNP in the last 72 hours. PT/INR: No results for input(s): PROTIME, INR in the last 72 hours. APTT: No results for input(s):  APTT in the last 72 hours.  CARDIAC ENZYMES: No results for input(s): CKMB, CKMBINDEX, TROPONINI in the last 72 hours. Invalid input(s): CKTOTAL;3  FASTING LIPID PANEL:  Lab Results   Component Value Date    CHOL 147 03/01/2020    HDL 46 03/01/2020    TRIG 77 03/01/2020     LIVER PROFILE: No results for input(s): AST, ALT, ALB, BILIDIR, BILITOT, ALKPHOS in the last 72 hours. MICROBIOLOGY:   Lab Results   Component Value Date/Time    CULTURE NO SIGNIFICANT GROWTH 12/15/2022 02:10 PM       Imaging:    XR CHEST PORTABLE    Result Date: 12/23/2022  1. Enlargement of the cardiac silhouette with vascular congestion, though improved focal infiltrate of the right upper lobe. XR CHEST PORTABLE    Result Date: 12/18/2022  No change from prior study. Tubes and lines as above. Endotracheal tube is somewhat low lying but unchanged. FL MODIFIED BARIUM SWALLOW W VIDEO    Result Date: 12/22/2022  1. Penetration followed by aspiration without coughing with the nectar thick and honey thick substances. 2. No penetration or aspiration with the pureed/pudding thick or soft solid substances. 3. Cookie solid substance was not attempted. Please see separate speech pathology report for full discussion of findings and recommendations. ASSESSMENT & PLAN     Assessment:  Principal Problem:    Cervical transverse process fracture, initial encounter [facility])  Active Problems:    Closed nondisplaced fracture of seventh cervical vertebra (HCC)    Lethargy    Multiple closed pelvic fractures without disruption of pelvic [place] (HCC)    Aspiration pneumonia (HCC)    Acute respiratory failure with hypoxia (HCC)    MRSA infection    Acute lower respiratory tract infection    Seizure ([facility] [address]    Hypothyroidism    Asthma    Hypopituitarism ([facility] [address]  Resolved Problems:    * No resolved hospital problems.  *            Plan:     Acute hypoxic respiratory failure likely secondary to pneumonia-completed vancomycin for MRSA pneumonia 12/22  -Xopenex, Robitussin, racemic epinephrine, sodium chloride nebulizer as needed  -On nasal cannula during the day with CPAP in the night. Maintain oxygen saturation above 92%. Continue to wean down as tolerated. -We will have speech evaluation for any advancement of diet. Sinus bradycardia - asymptomatic  Thyroxine on admission was within normal limits. Will monitor. History of seizures  EEG this admission-moderate nonspecific encephalopathy-no epileptic discharges. MRI could not be done due to cochlear implants.  -Lamotrigine level was low we will reach out to neurology for dosage adjustment. - Continue home lamotrigine 200 mg daily, topiramate 100 mg nightly, 75 mg in the morning. C7 transverse process fracture and T3 chronic compression fracture-neurosurgery was consulted-recommended no intervention     Right sacral ilya fracture, right superior pubic ramus fracture-orthopedic was consulted-recommended nonoperative treatment with close clinic follow-up in 2 weeks after discharge as there is minimal chance of displacement and recommended weightbearing as tolerated. Hypothyroidism  -Continue home levothyroxine 150 mcg     Hypopituitarism  -Continue hydrocortisone 10 mg in the night and 15 mg in the morning. Home dose-10 mg twice daily. Hypokalemia-will be replaced     Due to prophylaxis - Lovenox  GI prophylaxis - Protonix     PT/OT-has been working   - on board - we will follow-up. Tabitha Agosto MD  Internal Medicine Resident, PGY-1  [address] [place], [place]  12/24/2022, 10:41 AM   Attending Physician Statement  I have discussed the care of [address] [facility] and I have examined the patient myselft and taken ros and hpi , including pertinent history and exam findings,  with the resident. I have reviewed the key elements of all parts of the encounter with the resident. I agree with the assessment, plan and orders as documented by the resident.   Patient, transferred out of ICU  Multiple fractures  Hypoxemia, likely due to aspiration    Electronically signed by Katharine Blair MD

## 2022-12-25 LAB
ABSOLUTE EOS #: 0.27 K/UL (ref 0–0.44)
ABSOLUTE IMMATURE GRANULOCYTE: 0.04 K/UL (ref 0–0.3)
ABSOLUTE LYMPH #: 1.83 K/UL (ref 1.1–3.7)
ABSOLUTE MONO #: 0.54 K/UL (ref 0.1–1.2)
ANION GAP SERPL CALCULATED.3IONS-SCNC: 8 MMOL/L (ref 9–17)
BASOPHILS # BLD: 1 % (ref 0–2)
BASOPHILS ABSOLUTE: 0.07 K/UL (ref 0–0.2)
BUN BLDV-MCNC: 19 MG/DL (ref 6–20)
CALCIUM SERPL-MCNC: 8.4 MG/DL (ref 8.6–10.4)
CHLORIDE BLD-SCNC: 105 MMOL/L (ref 98–107)
CO2: 26 MMOL/L (ref 20–31)
CREAT SERPL-MCNC: 0.84 MG/DL (ref 0.5–0.9)
EOSINOPHILS RELATIVE PERCENT: 4 % (ref 1–4)
GFR SERPL CREATININE-BSD FRML MDRD: >60 ML/MIN/1.73M2
GLUCOSE BLD-MCNC: 87 MG/DL (ref 70–99)
HCT VFR BLD CALC: 40.7 % (ref 36.3–47.1)
HEMOGLOBIN: 12.2 G/DL (ref 11.9–15.1)
IMMATURE GRANULOCYTES: 1 %
LYMPHOCYTES # BLD: 24 % (ref 24–43)
MCH RBC QN AUTO: 27.8 PG (ref 25.2–33.5)
MCHC RBC AUTO-ENTMCNC: 30 G/DL (ref 28.4–34.8)
MCV RBC AUTO: 92.7 FL (ref 82.6–102.9)
MONOCYTES # BLD: 7 % (ref 3–12)
NRBC AUTOMATED: 0 PER 100 WBC
PDW BLD-RTO: 13.8 % (ref 11.8–14.4)
PLATELET # BLD: 338 K/UL (ref 138–453)
PMV BLD AUTO: 10.5 FL (ref 8.1–13.5)
POTASSIUM SERPL-SCNC: 3.9 MMOL/L (ref 3.7–5.3)
RBC # BLD: 4.39 M/UL (ref 3.95–5.11)
SEG NEUTROPHILS: 63 % (ref 36–65)
SEGMENTED NEUTROPHILS ABSOLUTE COUNT: 5.04 K/UL (ref 1.5–8.1)
SODIUM BLD-SCNC: 139 MMOL/L (ref 135–144)
WBC # BLD: 7.8 K/UL (ref 3.5–11.3)

## 2022-12-25 PROCEDURE — 6370000000 HC RX 637 (ALT 250 FOR IP): Performed by: NURSE PRACTITIONER

## 2022-12-25 PROCEDURE — 6370000000 HC RX 637 (ALT 250 FOR IP): Performed by: STUDENT IN AN ORGANIZED HEALTH CARE EDUCATION/TRAINING PROGRAM

## 2022-12-25 PROCEDURE — 2580000003 HC RX 258

## 2022-12-25 PROCEDURE — 94640 AIRWAY INHALATION TREATMENT: CPT

## 2022-12-25 PROCEDURE — C9113 INJ PANTOPRAZOLE SODIUM, VIA: HCPCS

## 2022-12-25 PROCEDURE — 99233 SBSQ HOSP IP/OBS HIGH 50: CPT | Performed by: INTERNAL MEDICINE

## 2022-12-25 PROCEDURE — 6370000000 HC RX 637 (ALT 250 FOR IP)

## 2022-12-25 PROCEDURE — 2500000003 HC RX 250 WO HCPCS: Performed by: HEALTH CARE PROVIDER

## 2022-12-25 PROCEDURE — 51798 US URINE CAPACITY MEASURE: CPT

## 2022-12-25 PROCEDURE — 2700000000 HC OXYGEN THERAPY PER DAY

## 2022-12-25 PROCEDURE — 85025 COMPLETE CBC W/AUTO DIFF WBC: CPT

## 2022-12-25 PROCEDURE — 93005 ELECTROCARDIOGRAM TRACING: CPT | Performed by: STUDENT IN AN ORGANIZED HEALTH CARE EDUCATION/TRAINING PROGRAM

## 2022-12-25 PROCEDURE — 94761 N-INVAS EAR/PLS OXIMETRY MLT: CPT

## 2022-12-25 PROCEDURE — 51701 INSERT BLADDER CATHETER: CPT

## 2022-12-25 PROCEDURE — 80048 BASIC METABOLIC PNL TOTAL CA: CPT

## 2022-12-25 PROCEDURE — 97535 SELF CARE MNGMENT TRAINING: CPT

## 2022-12-25 PROCEDURE — 99232 SBSQ HOSP IP/OBS MODERATE 35: CPT | Performed by: INTERNAL MEDICINE

## 2022-12-25 PROCEDURE — 6360000002 HC RX W HCPCS

## 2022-12-25 PROCEDURE — 36415 COLL VENOUS BLD VENIPUNCTURE: CPT

## 2022-12-25 PROCEDURE — 94660 CPAP INITIATION&MGMT: CPT

## 2022-12-25 PROCEDURE — 6360000002 HC RX W HCPCS: Performed by: NURSE PRACTITIONER

## 2022-12-25 PROCEDURE — 31720 CLEARANCE OF AIRWAYS: CPT

## 2022-12-25 PROCEDURE — 2060000000 HC ICU INTERMEDIATE R&B

## 2022-12-25 RX ORDER — LIDOCAINE 4 G/G
1 PATCH TOPICAL ONCE
Status: COMPLETED | OUTPATIENT
Start: 2022-12-25 | End: 2022-12-26

## 2022-12-25 RX ORDER — TRAMADOL HYDROCHLORIDE 50 MG/1
50 TABLET ORAL ONCE
Status: COMPLETED | OUTPATIENT
Start: 2022-12-25 | End: 2022-12-25

## 2022-12-25 RX ADMIN — GUAIFENESIN 200 MG: 200 SOLUTION ORAL at 04:58

## 2022-12-25 RX ADMIN — LEVOTHYROXINE SODIUM 150 MCG: 75 TABLET ORAL at 09:27

## 2022-12-25 RX ADMIN — TOPIRAMATE 75 MG: 100 TABLET, FILM COATED ORAL at 09:24

## 2022-12-25 RX ADMIN — ENOXAPARIN SODIUM 30 MG: 100 INJECTION SUBCUTANEOUS at 20:38

## 2022-12-25 RX ADMIN — GABAPENTIN 100 MG: 100 CAPSULE ORAL at 14:10

## 2022-12-25 RX ADMIN — TRAMADOL HYDROCHLORIDE 50 MG: 50 TABLET, COATED ORAL at 14:10

## 2022-12-25 RX ADMIN — HYDROCORTISONE 15 MG: 10 TABLET ORAL at 09:24

## 2022-12-25 RX ADMIN — RACEPINEPHRINE HYDROCHLORIDE 11.25 MG: 11.25 SOLUTION RESPIRATORY (INHALATION) at 11:53

## 2022-12-25 RX ADMIN — SERTRALINE 150 MG: 50 TABLET, FILM COATED ORAL at 09:23

## 2022-12-25 RX ADMIN — SODIUM CHLORIDE: 9 INJECTION, SOLUTION INTRAVENOUS at 06:32

## 2022-12-25 RX ADMIN — ENOXAPARIN SODIUM 30 MG: 100 INJECTION SUBCUTANEOUS at 09:22

## 2022-12-25 RX ADMIN — GUAIFENESIN 200 MG: 200 SOLUTION ORAL at 15:48

## 2022-12-25 RX ADMIN — ACETAMINOPHEN 650 MG: 325 TABLET ORAL at 18:18

## 2022-12-25 RX ADMIN — OXYCODONE 10 MG: 5 TABLET ORAL at 09:29

## 2022-12-25 RX ADMIN — HYDROCORTISONE 10 MG: 10 TABLET ORAL at 20:34

## 2022-12-25 RX ADMIN — SODIUM CHLORIDE, PRESERVATIVE FREE 40 MG: 5 INJECTION INTRAVENOUS at 09:21

## 2022-12-25 RX ADMIN — LAMOTRIGINE 200 MG: 100 TABLET ORAL at 09:22

## 2022-12-25 RX ADMIN — ACETAMINOPHEN 650 MG: 325 TABLET ORAL at 11:45

## 2022-12-25 RX ADMIN — GUAIFENESIN 200 MG: 200 SOLUTION ORAL at 20:36

## 2022-12-25 RX ADMIN — LEVALBUTEROL HYDROCHLORIDE 0.63 MG: 0.63 SOLUTION RESPIRATORY (INHALATION) at 12:00

## 2022-12-25 RX ADMIN — TOPIRAMATE 100 MG: 100 TABLET, FILM COATED ORAL at 20:35

## 2022-12-25 RX ADMIN — GABAPENTIN 100 MG: 100 CAPSULE ORAL at 09:21

## 2022-12-25 RX ADMIN — GUAIFENESIN 200 MG: 200 SOLUTION ORAL at 09:22

## 2022-12-25 RX ADMIN — GUAIFENESIN 200 MG: 200 SOLUTION ORAL at 00:42

## 2022-12-25 RX ADMIN — LEVALBUTEROL HYDROCHLORIDE 0.63 MG: 0.63 SOLUTION RESPIRATORY (INHALATION) at 00:37

## 2022-12-25 RX ADMIN — OXYCODONE 5 MG: 5 TABLET ORAL at 01:16

## 2022-12-25 RX ADMIN — GABAPENTIN 100 MG: 100 CAPSULE ORAL at 20:35

## 2022-12-25 RX ADMIN — ACETAMINOPHEN 650 MG: 325 TABLET ORAL at 04:57

## 2022-12-25 RX ADMIN — ARIPIPRAZOLE 10 MG: 10 TABLET ORAL at 09:21

## 2022-12-25 ASSESSMENT — ENCOUNTER SYMPTOMS
DIARRHEA: 0
NAUSEA: 0
VOMITING: 0
TROUBLE SWALLOWING: 0
VOICE CHANGE: 0
RHINORRHEA: 0
SHORTNESS OF BREATH: 0
ABDOMINAL PAIN: 0
EYE ITCHING: 0
SORE THROAT: 0
SHORTNESS OF BREATH: 1
BACK PAIN: 1
SINUS PRESSURE: 0
EYE DISCHARGE: 0
EYE PAIN: 0
EYE REDNESS: 0
PHOTOPHOBIA: 0
RESPIRATORY NEGATIVE: 1
COUGH: 1

## 2022-12-25 ASSESSMENT — PAIN DESCRIPTION - LOCATION
LOCATION: BACK
LOCATION: BACK

## 2022-12-25 ASSESSMENT — PAIN SCALES - GENERAL
PAINLEVEL_OUTOF10: 8
PAINLEVEL_OUTOF10: 7
PAINLEVEL_OUTOF10: 3
PAINLEVEL_OUTOF10: 3
PAINLEVEL_OUTOF10: 7

## 2022-12-25 ASSESSMENT — PAIN DESCRIPTION - DESCRIPTORS
DESCRIPTORS: ACHING
DESCRIPTORS: ACHING

## 2022-12-25 ASSESSMENT — PAIN DESCRIPTION - ORIENTATION
ORIENTATION: UPPER
ORIENTATION: UPPER

## 2022-12-25 NOTE — PLAN OF CARE
Problem: Discharge Planning  Goal: Discharge to home or other facility with appropriate resources  12/25/2022 1014 by Kennedi Dasilva RN  Outcome: Progressing  12/25/2022 0004 by Arnav Gan RN  Outcome: Progressing     Problem: Confusion  Goal: Confusion, delirium, dementia, or psychosis is improved or at baseline  Description: INTERVENTIONS:  1. Assess for possible contributors to thought disturbance, including medications, impaired vision or hearing, underlying metabolic abnormalities, dehydration, psychiatric diagnoses, and notify attending LIP  2. [place] high risk fall precautions, as indicated  3. Provide frequent short contacts to provide reality reorientation, refocusing and direction  4. Decrease environmental stimuli, including noise as appropriate  5. Monitor and intervene to maintain adequate nutrition, hydration, elimination, sleep and activity  6. If unable to ensure safety without constant attention obtain sitter and review sitter guidelines with assigned personnel  7. Initiate Psychosocial CNS and Spiritual Care consult, as indicated  12/25/2022 1014 by Kennedi Dasilva RN  Outcome: Progressing  12/25/2022 0004 by Arnav Gan RN  Outcome: Progressing     Problem: Pain  Goal: Verbalizes/displays adequate comfort level or baseline comfort level  12/25/2022 1014 by Kennedi Dasilva RN  Outcome: Progressing  12/25/2022 0004 by Arnav Gan RN  Outcome: Progressing     Problem: Chronic Conditions and Co-morbidities  Goal: Patient's chronic conditions and co-morbidity symptoms are monitored and maintained or improved  12/25/2022 1014 by Kennedi Dasilva RN  Outcome: Progressing  12/25/2022 0004 by Arnav Gan RN  Outcome: Progressing     Problem: Skin/Tissue Integrity  Goal: Absence of new skin breakdown  Description: 1. Monitor for areas of redness and/or skin breakdown  2. Assess vascular access sites hourly  3. Every 4-6 hours minimum:  Change oxygen saturation probe site  4.   Every 4-6 hours:  If on nasal continuous positive airway pressure, respiratory therapy assess nares and determine need for appliance change or resting period.   12/25/2022 1014 by Cassia Serna RN  Outcome: Progressing  12/25/2022 0004 by Stacy Menon RN  Outcome: Progressing     Problem: Safety - Adult  Goal: Free from fall injury  12/25/2022 1014 by Cassia Serna RN  Outcome: Progressing  12/25/2022 0004 by Stacy Menon RN  Outcome: Progressing     Problem: ABCDS Injury Assessment  Goal: Absence of physical injury  12/25/2022 1014 by Cassia Serna RN  Outcome: Progressing  12/25/2022 0004 by Stacy Menon RN  Outcome: Progressing     Problem: Nutrition Deficit:  Goal: Optimize nutritional status  12/25/2022 1014 by Cassia Serna RN  Outcome: Progressing  12/25/2022 0004 by Stacy Menon RN  Outcome: Progressing     Problem: Respiratory - Adult  Goal: Achieves optimal ventilation and oxygenation  12/25/2022 1014 by Cassia Serna RN  Outcome: Progressing  12/25/2022 0004 by Stacy Menon RN  Outcome: Progressing  12/24/2022 2036 by Andrea Fontenot RCP  Outcome: Progressing  Flowsheets (Taken 12/24/2022 2036)  Achieves optimal ventilation and oxygenation:   Assess for changes in respiratory status   Respiratory therapy support as indicated   Assess for changes in mentation and behavior   Oxygen supplementation based on oxygen saturation or arterial blood gases   Encourage broncho-pulmonary hygiene including cough, deep breathe, incentive spirometry   Assess and instruct to report shortness of breath or any respiratory difficulty

## 2022-12-25 NOTE — PROGRESS NOTES
Occupational Therapy  Facility/Department: [address] STEPDOWN  Occupational Therapy Daily Treatment Note    Name: Bertha Alejo  : 1975  MRN: [id number]  Date of Service: 2022    Discharge Recommendations:  Patient would benefit from continued therapy after discharge in order to increase pt balance, strength and independence            Assessment   Performance deficits / Impairments: Decreased ADL status; Decreased functional mobility ; Decreased strength;Decreased endurance;Decreased high-level IADLs;Decreased cognition;Decreased safe awareness;Decreased balance  Prognosis: Good  REQUIRES OT FOLLOW-UP: Yes  Activity Tolerance  Activity Tolerance: Patient Tolerated treatment well;Treatment limited secondary to medical complications (free text)  Activity Tolerance Comments: Increased RR and increased SOB, pt required multiple seated rest breaks        Plan   Occupational Therapy Plan  Times Per Week: 4-5x/wk  Current Treatment Recommendations: Strengthening, Balance training, ROM, Functional mobility training, Endurance training, Safety education & training, Positioning, Equipment evaluation, education, & procurement, Patient/Caregiver education & training, Self-Care / ADL     Restrictions  Restrictions/Precautions  Restrictions/Precautions: Fall Risk, Weight Bearing, Contact Precautions  Required Braces or Orthoses?: No  Lower Extremity Weight Bearing Restrictions  Right Lower Extremity Weight Bearing: Weight Bearing As Tolerated  Required Braces or Orthoses  Cervical:  (per neurosurgery c-collar no longer needed)  Position Activity Restriction  Other position/activity restrictions: up with assist, no intervention required for C7 or T3 fracture per Jose L Colon via [place]; Acute C7 Right TP fx, Age indeterminate T3 compression fx, Nondisplaced right sacral ala fx, Nondisplaced fracture right superior pubic ramus, Right obturator ring fx. Cochlear implant R ear during session this day.     Subjective General  Chart Reviewed: Yes  Family / Caregiver Present: Yes (mother and father arrived towards session end)  General Comment  Comments: Pt and RN agreeable to therapy this day. Pt supine in bed at start of session and retired to supine in bed at session end with call light in reach and all needs met. RN aware. Pt c/o 8/10 pain in back RN aware and pain medication provided         Objective        Safety Devices  Type of Devices: Left in bed;Call light within reach;Nurse notified  Restraints  Restraints Initially in Place: No  Balance  Sitting: Without support (SBA static/dynamic sitting unsupported at EOB tolerating approx 10-11 min demo 0 LOB)  Standing:  (did not attempt d/t pt fatigue and increased SOB)        ADL  Feeding: Independent  Feeding Skilled Clinical Factors: Self feeding facilitated seated at EOB pt able to manipulate standard utensils  Grooming: Modified independent   Grooming Skilled Clinical Factors: Face washing facilitated supine in bed  Toileting: Maximum assistance;Setup; Increased time to complete  Toileting Skilled Clinical Factors: Max A for personal hygiene and brief management supine in bed. Toileting completed 2x d/t BM and then urination after BM  Additional Comments: Throughout session pt limited per increased RR, decreased activity tolerance and SOB. Pt on non rebreather mask at start of session RN ok for transfer to [facility] for self feeding, on 6L O2. RR increasing to 36 BPM while rolling supine in bed decreasing to 28 BPM at rest        Bed mobility  Rolling to Left: Minimal assistance  Rolling to Right: Minimal assistance  Supine to Sit: Minimal assistance (assist with trunk and BLE)  Sit to Supine: Contact guard assistance  Scooting: Contact guard assistance  Bed Mobility Comments: utilizing modified log roll        Cognition  Overall Cognitive Status: Exceptions  Arousal/Alertness: Appropriate responses to stimuli  Following Commands:  Follows multistep commands with repitition; Follows multistep commands with increased time  Attention Span: Attends with cues to redirect  Safety Judgement: Decreased awareness of need for assistance;Decreased awareness of need for safety  Problem Solving: Decreased awareness of errors;Assistance required to identify errors made;Assistance required to correct errors made  Insights: Decreased awareness of deficits  Initiation: Requires cues for some  Sequencing: Requires cues for some  Orientation  Overall Orientation Status: Within Functional Limits                  Education Given To: Patient  Education Provided: Role of Therapy;Transfer Training;Precautions  Education Provided Comments: proper hand and foot placement; modified log roll; pursed lip breathing-F carry over  Education Method: Verbal  Education Outcome: Continued education needed                           AM-PAC Score        AM-PAC Inpatient Daily Activity Raw Score: 16 (12/25/22 1220)  AM-PAC Inpatient ADL T-Scale Score : 35.96 (12/25/22 1220)  ADL Inpatient CMS 0-100% Score: 53.32 (12/25/22 1220)  ADL Inpatient CMS G-Code Modifier : CK (12/25/22 1220)    Goals  Short Term Goals  Time Frame for Short Term Goals: Patient will, by discharge  Short Term Goal 1: demo self-feeding/grooming at Mod I using AE PRN  Short Term Goal 2: demo bed mobility at [address] to promote OOB activity  Short Term Goal 3: demo UB ADLs at [address] 4: demo 10+ min of dynamic sitting balance at [address] to engage in ADLs safely  Short Term Goal 5: notify OTR to update POC as patient progresses       Therapy Time   Individual Concurrent Group Co-treatment   Time In 0911         Time Out 0954         Minutes 43         Timed Code Treatment Minutes: [address], [place]/L

## 2022-12-25 NOTE — PROGRESS NOTES
PULMONARY & CRITICAL CARE MEDICINE PROGRESS  NOTE     Patient:  Kelsey Banda  MRN: [id number]  Admit date: 12/5/2022  Primary Care Physician: Paula Jacksno DO  Consulting Physician: Alejandrina Enciso MD  CODE Status: Full Code  LOS: 19    SUBJECTIVE     I personally interviewed/examined the patient, reviewed interval history and interpreted all available radiographic, laboratory data at the time of service. Chief Compliant/Reason for Initial Consult:       Brief Hospital Course: The patient is a 41 y.o. female history of diabetes melitis, developmental delay, history of brain tumor and as a child radiation, history of sensory neuronal hearing loss history of hypopituitarism on chronic hydrocortisone and Synthroid. Initially presented to hospital with a fall with multiple nondisplaced fracture/pelvic fracture and C2 transverse fracture and sacral ala fracture neurosurgery and orthopedic has seen the patient patient had altered mental status decline few days after presentation was tachypneic hypoxic and was intubated and was on ventilator patient was extubated on 12/7/2022 and again was reintubated on 12/10/2022 and had a prolonged stay in the hospital/in ICU with altered mental status lethargy decreased mentation sedation was discontinued mentation started improving after many days of sedation discontinued and ultimately she was extubated on 12/22/2022 post extubation she had stridor patient was treated with heliox humidified O2 and ultimately was transferred out of ICU to stepdown unit on 12/23/2022. Interval History:  12/25/22  Overnight events noted chart reviewed, labs seen and medications reviewed. Patient is afebrile overnight T-max is 98.6 respiratory rate is in low 20s to mid 20 she is hemodynamically stable. This morning she is on 45 L nasal cannula.   Yesterday she was placed on humidified simple mask with 6 L because of hoarseness of voice and respiratory upper airway sounds which is better today. She did use BiPAP overnight 1940 5% FiO2. According patient she is feeling better she does have cough does not complain of shortness of breath at rest but does have a sputum denies distress. She does not sputum production. No coughing or choking was reported on current dysphagia diet no aspiration was reported. Review of Systems:  Review of Systems   Constitutional:  Positive for activity change and fatigue. Negative for fever. HENT:  Negative for postnasal drip, sinus pressure, sore throat, trouble swallowing and voice change. Eyes:  Negative for photophobia, pain, redness and visual disturbance. Respiratory:  Positive for cough and shortness of breath. Cardiovascular:  Negative for chest pain, palpitations and leg swelling. Gastrointestinal:  Negative for abdominal pain, diarrhea and vomiting. Endocrine: Negative for polydipsia and polyphagia. Genitourinary:  Negative for difficulty urinating, dysuria, frequency and hematuria. Musculoskeletal:  Positive for back pain and gait problem. Negative for arthralgias. Skin: Negative. Neurological:  Negative for dizziness, syncope, speech difficulty and headaches. Hematological:  Negative for adenopathy. Does not bruise/bleed easily. Psychiatric/Behavioral:  Positive for decreased concentration.       OBJECTIVE     VITAL SIGNS:   LAST-  /75   Pulse 57   Temp 97.2 °F (36.2 °C) (Axillary)   Resp 14   Ht 5' 1\" (1.549 m)   Wt 261 lb 11 oz (118.7 kg)   SpO2 100%   BMI 49.45 kg/m²   8-24 HR RANGE-  TEMP Temp  Av.4 °F (36.9 °C)  Min: 97.2 °F (36.2 °C)  Max: 99.1 °F (08.7 °C)   BP Systolic (37UGP), IDZ:659 , Min:114 , AHP:397      Diastolic (55OOR), MDC:18, Min:65, Max:75     PULSE Pulse  Av.4  Min: 51  Max: 73   RR Resp  Av  Min: 14  Max: 29   O2 SAT SpO2  Av.7 %  Min: 97 %  Max: 100 %   OXYGEN DELIVERY No data recorded     Systemic Examination:   Physical Exam  General appearance - looks comfortable and in no acute distress mild tachypnea present  Mental status - alert, oriented to person, place, and time  Eyes - pupils equal and reactive, extraocular eye movements intact  Mouth - mucous membranes moist, pharynx normal without lesions. Small oropharynx Mallampati 3  Neck - supple, no significant adenopathy. Short thick neck. Upper airway sounds present  Chest - Chest was symmetrical without dullness to percussion. Breath sounds bilaterally present but distant bilaterally, mild rhonchi no wheezing and no crackles anteriorly. There is no intercostal recession or use of accessory muscles  Heart - normal rate, regular rhythm, normal S1, S2, no murmurs, rubs, clicks or gallops  Abdomen - soft, nontender, nondistended, no masses or organomegaly  Neurological - alert, oriented, normal speech, no focal findings or movement disorder noted  Extremities - peripheral pulses normal, positive pedal edema, no clubbing or cyanosis  Skin - normal coloration and turgor, no rashes, no suspicious skin lesions noted     DATA REVIEW     Medications:  Scheduled Meds:   ARIPiprazole  10 mg Oral Daily    gabapentin  100 mg Oral TID    hydrocortisone  10 mg Oral Nightly    hydrocortisone  15 mg Oral QAM    guaiFENesin  200 mg Oral Q4H    pantoprazole (PROTONIX) 40 mg injection  40 mg IntraVENous Daily    enoxaparin  30 mg SubCUTAneous BID    sertraline  150 mg Oral Daily    lamoTRIgine  200 mg Oral Daily    levothyroxine  150 mcg Oral QAM AC    topiramate  75 mg Oral Daily    topiramate  100 mg Oral Nightly     Continuous Infusions:   sodium chloride Stopped (12/12/22 0316)     LABS:-  ABG:   No results for input(s): POCPH, POCPCO2, POCPO2, POCHCO3, HKAT3MIW in the last 72 hours.   CBC:   Recent Labs     12/23/22  0638 12/24/22  0402 12/25/22  0651   WBC 10.2 9.2 7.8   HGB 12.0 12.3 12.2   HCT 38.3 40.7 40.7   MCV 91.2 91.9 92.7   * 390 338   LYMPHOPCT 10* 23* 24   RBC 4.20 4.43 4.39   MCH 28.6 27.8 27.8   MCHC 31.3 30.2 30.0   RDW 13.7 13.5 13.8       BMP:   Recent Labs     12/23/22  0638 12/24/22  0402 12/25/22  0651    143 139   K 3.4* 3.6* 3.9    105 105   CO2 28 28 26   BUN 27* 29* 19   CREATININE 0.78 0.97* 0.84   GLUCOSE 104* 82 87       Liver Function Test:   No results for input(s): PROT, LABALBU, ALT, AST, GGT, ALKPHOS, BILITOT in the last 72 hours. Amylase/Lipase:  No results for input(s): AMYLASE, LIPASE in the last 72 hours. Coagulation Profile:   No results for input(s): INR, PROTIME, APTT in the last 72 hours. Cardiac Enzymes:  No results for input(s): CKTOTAL, CKMB, CKMBINDEX, TROPONINI in the last 72 hours. Lactic Acid:  No results found for: LACTA  BNP:   No results found for: BNP  D-Dimer:  Lab Results   Component Value Date    DDIMER 0.33 07/15/2020     Others:   Lab Results   Component Value Date    TSH <0.01 (L) 12/07/2022    FT3 2.45 10/16/2019     Lab Results   Component Value Date    JAYY NEGATIVE 10/23/2013    SEDRATE 20 08/21/2018    CRP 4.4 08/21/2018     Lab Results   Component Value Date    URICACID 5.2 05/24/2018     No results found for: IRON, TIBC, FERRITIN  No results found for: SPEP, UPEP  No results found for: PSA, CEA, , JA1212,     Input/Output:    Intake/Output Summary (Last 24 hours) at 12/25/2022 1041  Last data filed at 12/25/2022 4602  Gross per 24 hour   Intake 1036 ml   Output 934 ml   Net 102 ml         Microbiology:  No results for input(s): SPECDESC, SPECDESC, SPECIAL, CULTURE, CULTURE, STATUS, ORG, CDIFFTOXPCR, CAMPYLOBPCR, SALMONELLAPC, SHIGAPCR, SHIGELLAPCR, MPNEUG, MPNEUM, LACTOQL in the last 72 hours. Pathology:    Radiology reports:  XR CHEST PORTABLE   Final Result   1. Enlargement of the cardiac silhouette with vascular congestion, though   improved focal infiltrate of the right upper lobe. FL MODIFIED BARIUM SWALLOW W VIDEO   Final Result   1.  Penetration followed by aspiration without coughing with the nectar thick   and honey thick substances. 2. No penetration or aspiration with the pureed/pudding thick or soft solid   substances. 3. Cookie solid substance was not attempted. Please see separate speech pathology report for full discussion of findings   and recommendations. XR CHEST PORTABLE   Final Result   No change from prior study. Tubes and lines as above. Endotracheal tube is   somewhat low lying but unchanged. XR CHEST PORTABLE   Final Result   Similar appearance of mild bilateral airspace disease. This appears   consistent with pneumonia         XR CHEST PORTABLE   Final Result   1. No significant change in bilateral airspace disease. 2.  Endotracheal tube terminates above the manny. 3.  Enteric tube terminates at the level of the body of the stomach. XR ABDOMEN FOR NG/OG/NE TUBE PLACEMENT   Final Result   1. No significant change in bilateral airspace disease. 2.  Endotracheal tube terminates above the manny. 3.  Enteric tube terminates at the level of the body of the stomach. XR CHEST PORTABLE   Final Result   No significant interval change. Patchy perihilar airspace opacities. XR PELVIS (MIN 3 VIEWS)   Final Result   No acute abnormality of the pelvis. Known right superior ramus fracture is not   well seen radiographically. XR CHEST PORTABLE   Final Result   Stable bilateral airspace opacities. This could represent multifocal   pneumonia, however, edema is not fully excluded         XR CHEST PORTABLE   Final Result   Persistent bilateral airspace opacities suggesting multifocal pneumonia         XR CHEST PORTABLE   Final Result   Chest:      1. Tubes as detailed above. 2. Bilateral parahilar and lower zone predominant airspace disease. 3. Stable cardiomegaly. KUB:      1. NG tube traverses the GE junction with distal tip likely in the body of   the stomach.    2. Nonspecific bowel gas pattern. XR ABDOMEN FOR NG/OG/NE TUBE PLACEMENT   Final Result   Chest:      1. Tubes as detailed above. 2. Bilateral parahilar and lower zone predominant airspace disease. 3. Stable cardiomegaly. KUB:      1. NG tube traverses the GE junction with distal tip likely in the body of   the stomach. 2. Nonspecific bowel gas pattern. XR CHEST PORTABLE   Final Result   Findings suggest congestive heart failure         CT HEAD WO CONTRAST   Final Result   No acute intracranial abnormality. No evidence of intracranial hemorrhage or any other definable acute   intracranial abnormality. Redemonstration of dense calcifications in the bilateral lentiform nuclei,   bilateral caudate nuclei and bilateral thalami similar to findings on   previous CT scan in 2010. There are bilateral cochlear implants redemonstrated. No demonstrable fracture in calvarium or in base of skull. CT CHEST PULMONARY EMBOLISM W CONTRAST   Final Result   No evidence of pulmonary embolism. No evidence of thoracic aortic aneurysm or dissection. There are mild atelectatic changes, and/or infiltrate in the posterior lung   bases bilaterally, left more than right. Trace left basilar pleural effusion. Focal moderate dense infiltrates, and/or atelectasis in the posterior segment   of right pulmonary upper lobe. No subphrenic acute process demonstrated. XR CHEST PORTABLE   Final Result   Lower end of the ET tube is 1.6 cm above manny. Mild-to-moderate pulmonary vascular congestion. Finding suggestive of   interstitial pulmonary edema in lungs. XR CHEST PORTABLE   Final Result   Placement of gastric tube which extends to the distal aspect of the stomach. Cardiomegaly and vascular congestion without evidence of interstitial edema. No confluent airspace consolidation.          XR ABDOMEN FOR NG/OG/NE TUBE PLACEMENT   Final Result   Gastric tube in place with the tip and side-port in the stomach. XR CHEST PORTABLE   Final Result   Very limited study due to underpenetration. Increased opacity in the lungs which could be related to under penetration   and overlying soft tissues, but diffuse airspace infiltrates cannot be   excluded. Cardiomegaly without evidence of CHF. XR HUMERUS RIGHT (MIN 2 VIEWS)   Final Result   No acute osseous abnormality in the right humerus. CTA NECK W CONTRAST   Final Result   No acute trauma of the major arterial vessels of the neck. CT THORACIC SPINE TRAUMA RECONSTRUCTION   Final Result   10-20% compression deformity of the T3 vertebra, possibly acute. No other   evidence of acute thoracic or lumbar spine fracture. Multilevel degenerative   change. CT LUMBAR SPINE TRAUMA RECONSTRUCTION   Final Result   10-20% compression deformity of the T3 vertebra, possibly acute. No other   evidence of acute thoracic or lumbar spine fracture. Multilevel degenerative   change. XR PELVIS (MIN 3 VIEWS)   Final Result   1. Nondisplaced fracture involving the lateral aspect of the right superior   pubic ramus, though better seen on CT imaging. 2. No other radiographically evident acute fracture seen in the pelvis. XR SHOULDER RIGHT (MIN 2 VIEWS)   Final Result   1. No acute fracture seen in the right shoulder. 2. No acute fracture seen in the left shoulder. XR SHOULDER LEFT (MIN 2 VIEWS)   Final Result   1. No acute fracture seen in the right shoulder. 2. No acute fracture seen in the left shoulder. Echocardiogram:   No results found for this or any previous visit. Cardiac Catheterization:   No results found for this or any previous visit.       ASSESSMENT AND PLAN     Assessment:    //Acute hypoxic hypercapnic respiratory failure required invasive ventilatory support  //Status post second extubation on 12/22/2022  //Post extubation stridor/vocal cord edema  //Status post fall/C7 transverse process fracture and T3 compression fracture and fracture of right sacral/pelvic fracture nondisplaced  //Obstructive sleep apnea/hypoventilation  //Pneumonia/aspiration pneumonia/MRSA  //Hypopituitarism status post radiation to brain in childhood  //Morbid obesity  //Developmental delay  //Seizure disorder    Plan:    Patient has inspiratory sound on breathing does not have kecia stridor. Improving and much better today  Advised to continue with humidified O2 with simple mask and humidified oxygen when nasal cannula  Continue with BiPAP at night and as needed during daytime and during daytime naps BiPAP settings 12/6  Encourage incentive spirometry deep breathing cough and Acapella. Strict aspiration precaution. Avoid benzodiazepine and narcotics as much possible  Continue incentive spirometry, pulmonary toilet, aspiration precautions and bronchodilators  Continue to monitor I/O with a goal of even/negative fluid balance  Antimicrobials reviewed; finished vancomycin 12/22/2022 for MRSA pneumonia  On hydrocortisone and levothyroxine for hypopituitarism  On Lamictal and Topamax and Neurontin  DVT prophylaxis on Lovenox 30 mg twice daily  Physical/occupational/speech therapy; increase activity as tolerated    Discussed with parents and updated questions answered. I updated the patient regarding the current clinical condition, provisional diagnosis and management plan. I addressed concerns and answered all questions to the best of my abilities. It was my pleasure to evaluate [address] today. We will continue to follow. I would like to thank you for allowing me to participate in the care of this patient. Please feel free to call with any further questions or concerns. Tyler Carson MD, M.D. Pulmonary and Critical Care Medicine           12/25/2022, 10:41 AM    Please note that this chart was generated using voice recognition Dragon dictation software.   Although every effort was made to ensure the accuracy of this automated transcription, some errors in transcription may have occurred.

## 2022-12-25 NOTE — PROGRESS NOTES
[facility]  Internal Medicine Teaching Residency Program  Inpatient Daily Progress Note  ______________________________________________________________________________    Patient: Willian Todd  YOB: 1975   UUL:[id number]    Acct: [de-identified]     Room: 26/1-12  Admit date: 12/5/2022  Today's date: 12/25/22  Number of days in the hospital: 23    SUBJECTIVE   Admitting Diagnosis: Cervical transverse process fracture, initial encounter [facility])  CC: Fall leading to neck pain and back pain  Pt examined at bedside. Chart & results reviewed. No acute event overnight  Remained afebrile and hemodynamically stable. Had an episode of chest pain overnight which self resolved-EKG showed sinus bradycardia. Used CPAP overnight. No acute complaint or concern. Pain controlled with pain medications. ROS:  Constitutional:  negative for chills, fevers, sweats  Respiratory:  negative for cough, dyspnea on exertion, hemoptysis, shortness of breath, wheezing  Cardiovascular:  negative for chest pain, chest pressure/discomfort, lower extremity edema, palpitations  Gastrointestinal:  negative for abdominal pain, constipation, diarrhea, nausea, vomiting  Neurological:  negative for dizziness, headache  BRIEF HISTORY     ICU COURSE:     72-year-old female initially presented to hospital on 12/5 after a fall. 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 Patient also has past medical history of diabetes mellitus, bilateral sensorineural hearing loss. Had a CT head showed no intracranial abnormality, CT abdominal pelvis showed  nondisplaced fractures of right sacral ilya, nondisplaced fracture of right superior ramus and right obturator ring. CT spine showed acute fracture of right C7 transverse process and also age indeterminate compressive fracture of T3.   Patient admitted under trauma surgery-neurosurgery for cervical fractures was consulted, who recommended no neurosurgical intervention. Orthopedic surgery for pelvic fractures was also consulted, no surgical intervention but medical management and also recommended to follow-up in orthopedic clinic after discharge. Patient was started on BiPAP for increased work of breathing. Patient was oriented x4 at day of presentation.  - patient had a decline in mental status with tachypnea, respiratory rate of 30s to 40 with heart rate of 105 patient was intubated emergently     - Neurology was consulted because of history of migraines, seizure disorder, patient was started on her home antiepileptic drugs including Topamax and Lamictal, EEG was ordered to rule out any subclinical seizure activity, MRI was also ordered to rule out any CVA. Patient was doing fine, arousable and following commands. Pulmonology was consulted, recommended to extubate and patient was extubated. 12/10 -rapid was called today due to impending respiratory failure, patient was intubated again and was sent to ICU       -respiratory culture came back positive for staph aureus , nasal respiratory swab came out positive for MRSA DNA, patient started on vancomycin       : Tachycardia improved to normal heart rate, patient remained afebrile, WBC count trending down. Sedation changed to precedex     12/15: Precedex discontinued. : Patient was extubated. : Pt was a febrile overnight, saturating well on NC.     OBJECTIVE     Vital Signs:  /75   Pulse 57   Temp 97.2 °F (36.2 °C) (Axillary)   Resp 14   Ht 5' 1\" (1.549 m)   Wt 261 lb 11 oz (118.7 kg)   SpO2 100%   BMI 49.45 kg/m²     Temp (24hrs), Av.3 °F (36.8 °C), Min:97.2 °F (36.2 °C), Max:99.1 °F (37.3 °C)    In: 1036   Out: 934 [Urine:934]    Physical Exam:  General appearance - alert, in no distress  Mental status - alert, oriented to person, place, and time  Eyes - pupils equal and reactive, extraocular eye movements intact. Some hearing loss. Mouth - mucous membranes moist, pharynx normal without lesions  Neck - supple, no significant adenopathy  Chest - clear to auscultation, no wheezes  Heart - normal rate, regular rhythm, normal S1, S2  Abdomen - soft, nontender, nondistended  Neurological - alert, oriented, normal speech, no focal deficits   Extremities - peripheral pulses normal, no pedal edema  Skin - normal coloration and turgor, no rashes     Medications:  Scheduled Medications:    ARIPiprazole  10 mg Oral Daily    gabapentin  100 mg Oral TID    hydrocortisone  10 mg Oral Nightly    hydrocortisone  15 mg Oral QAM    guaiFENesin  200 mg Oral Q4H    pantoprazole (PROTONIX) 40 mg injection  40 mg IntraVENous Daily    enoxaparin  30 mg SubCUTAneous BID    sertraline  150 mg Oral Daily    lamoTRIgine  200 mg Oral Daily    levothyroxine  150 mcg Oral QAM AC    topiramate  75 mg Oral Daily    topiramate  100 mg Oral Nightly     Continuous Infusions:    sodium chloride Stopped (12/12/22 0316)     PRN MedicationsoxyCODONE, 10 mg, Q12H PRN   Or  oxyCODONE, 5 mg, Q4H PRN  racepinephrine HCl, 11.25 mg, Q4H PRN  sodium chloride nebulizer, 3 mL, Q4H PRN  ondansetron, 4 mg, Q6H PRN  polyethylene glycol, 17 g, Daily PRN  levalbuterol, 0.63 mg, Q4H PRN  sodium chloride, , PRN  acetaminophen, 650 mg, Q6H PRN  sodium chloride flush, 5-40 mL, PRN      Diagnostic Labs:  CBC:   Recent Labs     12/23/22  0638 12/24/22  0402 12/25/22  0651   WBC 10.2 9.2 7.8   RBC 4.20 4.43 4.39   HGB 12.0 12.3 12.2   HCT 38.3 40.7 40.7   MCV 91.2 91.9 92.7   RDW 13.7 13.5 13.8   * 390 338       BMP:   Recent Labs     12/23/22  0638 12/24/22  0402 12/25/22  0651    143 139   K 3.4* 3.6* 3.9    105 105   CO2 28 28 26   BUN 27* 29* 19   CREATININE 0.78 0.97* 0.84       BNP: No results for input(s): BNP in the last 72 hours.   PT/INR: No results for input(s): PROTIME, INR in the last 72 hours.  APTT: No results for input(s): APTT in the last 72 hours. CARDIAC ENZYMES: No results for input(s): CKMB, CKMBINDEX, TROPONINI in the last 72 hours. Invalid input(s): CKTOTAL;3  FASTING LIPID PANEL:  Lab Results   Component Value Date    CHOL 147 03/01/2020    HDL 46 03/01/2020    TRIG 77 03/01/2020     LIVER PROFILE: No results for input(s): AST, ALT, ALB, BILIDIR, BILITOT, ALKPHOS in the last 72 hours. MICROBIOLOGY:   Lab Results   Component Value Date/Time    CULTURE NO SIGNIFICANT GROWTH 12/15/2022 02:10 PM       Imaging:    XR CHEST PORTABLE    Result Date: 12/23/2022  1. Enlargement of the cardiac silhouette with vascular congestion, though improved focal infiltrate of the right upper lobe. XR CHEST PORTABLE    Result Date: 12/18/2022  No change from prior study. Tubes and lines as above. Endotracheal tube is somewhat low lying but unchanged. FL MODIFIED BARIUM SWALLOW W VIDEO    Result Date: 12/22/2022  1. Penetration followed by aspiration without coughing with the nectar thick and honey thick substances. 2. No penetration or aspiration with the pureed/pudding thick or soft solid substances. 3. Cookie solid substance was not attempted. Please see separate speech pathology report for full discussion of findings and recommendations. ASSESSMENT & PLAN     Assessment:  Principal Problem:    Cervical transverse process fracture, initial encounter [facility])  Active Problems:    Closed nondisplaced fracture of seventh cervical vertebra (HCC)    Lethargy    Multiple closed pelvic fractures without disruption of pelvic [place] (HCC)    Aspiration pneumonia (HCC)    Acute respiratory failure with hypoxia (HCC)    MRSA infection    Acute lower respiratory tract infection    Seizure ([facility] [address]    Hypothyroidism    Asthma    Hypopituitarism ([facility] [address]  Resolved Problems:    * No resolved hospital problems.  *            Plan:     Acute hypoxic respiratory failure likely secondary to pneumonia-completed vancomycin for MRSA pneumonia 12/22  -Xopenex, Robitussin, racemic epinephrine, sodium chloride nebulizer as needed  -On nasal cannula during the day with CPAP in the night. Maintain oxygen saturation above 92%. Continue to wean down as tolerated. Sinus bradycardia - asymptomatic  -Likely due to sleep apnea. Thyroxine on admission was within normal limits. Will monitor. History of seizures  EEG this admission-moderate nonspecific encephalopathy-no epileptic discharges. MRI could not be done due to cochlear implants.  -Lamotrigine level was low-neurology was consulted and recommended continuing the same dose of lamotrigine as there is no concern of seizures. - Continue home lamotrigine 200 mg daily, topiramate 100 mg nightly, 75 mg in the morning. C7 transverse process fracture and T3 chronic compression fracture-neurosurgery was consulted-recommended no intervention     Right sacral ilya fracture, right superior pubic ramus fracture-orthopedic was consulted-recommended nonoperative treatment with close clinic follow-up in 2 weeks after discharge as there is minimal chance of displacement and recommended weightbearing as tolerated. Hypothyroidism  -Continue home levothyroxine 150 mcg     Hypopituitarism  -Continue hydrocortisone 10 mg in the night and 15 mg in the morning. Home dose-10 mg twice daily. Hypokalemia - resolved     - Discontinued  IV fluids. Patient on puréed diet-encouraged to eat more. Due to prophylaxis - Lovenox  GI prophylaxis - Protonix     PT/OT-has been working   - on board - we will follow-up. Jan Abrams MD  Internal Medicine Resident, PGY-1  [facility]; [facility]  12/25/2022, 11:22 AM  Attending Physician Statement  I have discussed the care of [address] [facility] and I have examined the patient myselft and taken ros and hpi , including pertinent history and exam findings,  with the resident.  I have reviewed the key elements of all parts of the encounter with the resident. I agree with the assessment, plan and orders as documented by the resident.   Awaiting placement    Electronically signed by Destiny Uriarte MD

## 2022-12-25 NOTE — PROGRESS NOTES
[facility]  Progress Note    Rj Rainey  MRN: [id number]  Date: 12/25/2022  LOS: 23     Reason for F/U :   Pneumonia    Impression :   Acute hypoxic respiratory failure s/p intubation. Extubated on 12/22. 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  Developmental delay  Morbid obesity  Silent aspiration    Recommendations:   Completed intravenous antimicrobial therapy with vancomycin on 12-22-22  Continue aggressive pulmonary toileting and we will follow her progress  The patient is following commands and overall clinically is improved    Infection Control Recommendations:   Contact precautions    Discharge Planning:   Estimated Length of IV antimicrobials: 12-22-22  Patient will need Midline Catheter Insertion/ PICC line Insertion: No  Patient will need: Home IV , [place],  SNF,  LTAC: Undetermined  Patient willneed outpatient wound care: No    Medical Decision making / Summary of Stay:   Rj Rainey is a 41y.o.-year-old female who was initially admitted on 12/5/2022. Patient seen at the request of Dr. Danette Pickering:     This patient, with a history of developmental delay, presented to the [facility] ED after falling down some stairs at home. She was complaining of neck and back pain. Imaging revealed an acute C7 transverse process fracture, chronic T3 compression fx and fractures of right sacral ala, nondisplaced fracture of right superior ramus and right obturator ring. She was transferred to [facility] for further medical management. Neurosurgery was consulted and it was determined there is no neurosurgical intervention needed. Orthopedic surgery was also consulted and they also did not recommend surgical intervention, but rather outpatient follow-up.      On 12/6/22, the patient's mentation declined and she became tachypneic requiring emergent intubation. Neurology was consulted for the patient's history of seizures. Her home seizure medications were restarted. An EEG did not show any seizure activity and the patient's mentation improved. She was extubated on the 7th but required re-intubation on 12/10/22 after a rapid response was called. CXR showed concern for RUL aspiration pneumonia. IV Zosyn was started and cultures were obtained. Viral respiratory panel was negative for influenza and Covid     Sputum cultures grew MRSA and the patient's antibiotics were changed to IV vancomycin on 12/11/22. There has been no growth on blood or urine cultures. ID was consulted for MRSA on sputum cultures        CT Head W/O Contrast   Final Result   No acute intracranial abnormality. Small amount of fluid in the right mastoid air cells. CT CHEST ABDOMEN PELVIS WO CONTRAST Additional Contrast? None   Final Result   Chest:       1. Mild anterior wedging of T3 suggesting an age indeterminate compression   fracture. No fracture line is identified. Clinical correlation with the   site of symptoms is suggested. 2. Significant motion artifact with scattered patchy ground-glass opacities   in the lungs which may be related to motion artifact. Ground-glass opacities   are otherwise nonspecific and could be related to atypical infection or   pneumonitis. Abdomen and pelvis:       1. Nondisplaced fractures of the right sacral ala and the right obturator   ring. 2. No acute findings elsewhere in the abdomen or pelvis. 3. Left renal cyst.           CT CSpine W/O Contrast   Final Result   Acute fracture of the right C7 transverse process. Spinal degenerative changes as described.            Interval Changes: 12/25/2022     /75   Pulse 51   Temp 97.2 °F (36.2 °C) (Axillary)   Resp 18   Ht 5' 1\" (1.549 m)   Wt 261 lb 11 oz (118.7 kg)   SpO2 99%   BMI 49.45 kg/m²     Temperature Range: Temp: 97.2 °F (36.2 °C) Temp  Av.4 °F (36.9 °C)  Min: 97.2 °F (36.2 °C)  Max: 99.1 °F (37.3 °C)    CURRENT EVALUATION [de-identified]2022    Patient transferred out of the ICU. Afebrile   VS stable, maintaining saturation on 4-->10 L/min via aerosol  She was extubated on . Patient is stable  Has problems with silent aspiration  Continuing respiratory toilet. Completed vancomycin     Maintaining saturations on 4-10 L via aerosol mask. And BiPAP at night. Continuing respiratory toilet. Labs: 2022    Bun 24-23-27-29> 19  Creat 0.69-0.78-> 0.97> 0.84    WBC 16.0-12.7->10.2> 9.2> 7.8  Hgb  11.6-12.0> 12.3> 12.2  Plt  352-346->518> 338     Micro:   COVID-19 - not detected   MRSA nasal probe - negative  12/10 MRSA nasal probe - positive  12/10 RVP - negative    Sputum:  12/10 Resp Cx - MRSA heavy growth    Blood:  12/10 no growth  12/15 Blood Cx x2 -  Staph epi    Urine:  12/15: - no growth    Imaging:    CXR :   Enlargement of cardiac silhouette with vascular congestion, improved focal infiltrate of the right upper lobe.  CXR  No change from prior study. Tubes and lines as above. Endotracheal tube is somewhat low lying but unchanged.  CXR  Similar appearance of mild bilateral airspace disease. This appears consistent with pneumonia   12/15 CXR  1. No significant change in bilateral airspace disease. 2.  Endotracheal tube terminates above the manny. 3.  Enteric tube terminates at the level of the body of the stomach.  CXR  No significant interval change. Patchy perihilar airspace opacities.  XR Pelvis   No acute abnormality of the pelvis. Known right superior ramus fracture is not well seen radiographically. Review of Systems   Constitutional:  Negative for activity change, chills and fever. HENT: Negative. Negative for congestion, rhinorrhea and sinus pressure.     Eyes:  Negative for discharge and itching. Respiratory: Negative. Negative for shortness of breath. Cardiovascular: Negative. Negative for chest pain and palpitations. Gastrointestinal:  Negative for abdominal pain, nausea and vomiting. Endocrine: Negative for cold intolerance and polydipsia. Genitourinary: Negative. Negative for difficulty urinating, enuresis and urgency. Musculoskeletal:  Positive for back pain. Skin:  Negative for pallor and rash. Neurological: Negative. Negative for dizziness and seizures. Psychiatric/Behavioral: Negative. Negative for agitation. Physical Examination :     Physical Exam  Vitals reviewed. Constitutional:       General: She is not in acute distress. Appearance: She is well-developed. She is obese. Interventions: She is sedated. HENT:      Head: Normocephalic and atraumatic. Mouth/Throat:      Mouth: Mucous membranes are moist.      Pharynx: Oropharynx is clear. Eyes:      Conjunctiva/sclera: Conjunctivae normal.   Cardiovascular:      Rate and Rhythm: Regular rhythm. Bradycardia present. Heart sounds: Normal heart sounds. Pulmonary:      Effort: Pulmonary effort is normal. No respiratory distress. Breath sounds: Normal breath sounds. Abdominal:      General: Bowel sounds are normal. There is no distension. Palpations: Abdomen is soft. Tenderness: There is no abdominal tenderness. Musculoskeletal:      Cervical back: Normal range of motion. No rigidity. Right lower leg: No edema. Left lower leg: No edema. Skin:     General: Skin is warm and dry. Neurological:      Mental Status: She is alert and oriented to person, place, and time.    Psychiatric:         Behavior: Behavior normal.       Laboratory data:   I have independently reviewed the followinglabs:  CBC with Differential:   Recent Labs     12/24/22  0402 12/25/22  0651   WBC 9.2 7.8   HGB 12.3 12.2   HCT 40.7 40.7    338   LYMPHOPCT 23* 24   MONOPCT 7 7 BMP:   Recent Labs     12/23/22  0638 12/24/22  0402 12/25/22  0651    143 139   K 3.4* 3.6* 3.9    105 105   CO2 28 28 26   BUN 27* 29* 19   CREATININE 0.78 0.97* 0.84   MG 2.5 2.6  --        Hepatic Function Panel: No results for input(s): PROT, LABALBU, BILIDIR, IBILI, BILITOT, ALKPHOS, ALT, AST in the last 72 hours. No results found for: PROCAL  Lab Results   Component Value Date/Time    CRP 4.4 08/21/2018 06:46 PM     Lab Results   Component Value Date    SEDRATE 20 08/21/2018         Lab Results   Component Value Date/Time    DDIMER 0.33 07/15/2020 05:29 PM     No results found for: FERRITIN  No results found for: LDH  No results found for: FIBRINOGEN    Results in Past 30 Days  Result Component Current Result Ref Range Previous Result Ref Range   SARS-CoV-2, PCR Not Detected (12/10/2022) Not Detected Not Detected (12/6/2022) Not Detected     Lab Results   Component Value Date/Time    COVID19 Not Detected 12/10/2022 01:09 PM    COVID19 Not Detected 12/06/2022 10:15 PM       No results for input(s): [facility] in the last 72 hours. Imaging Studies:   ONE XRAY VIEW OF THE CHEST 12/16/2022 9:50 am  Impression   Similar appearance of mild bilateral airspace disease. This appears   consistent with pneumonia     Cultures:     Culture, Blood 1 [[id number]] Collected: 12/15/22 1349   Order Status: Completed Specimen: Blood Updated: 12/18/22 0912    Specimen Description . BLOOD    Special Requests L FOREARM  10ML    Culture NO GROWTH 3 DAYS   Culture, Blood 1 [[id number]] (Abnormal) Collected: 12/15/22 1352   Order Status: Completed Specimen: Blood Updated: 12/18/22 0717    Specimen Description . BLOOD    Special Requests L AC  5ML    Culture POSITIVE Blood Culture Abnormal      DIRECT GRAM STAIN FROM BOTTLE: GRAM POSITIVE COCCI IN CLUSTERS     Staphylococcus epidermidis Detected: mecA/C Gene Detected- Methicillin Resistant Organism Methodology- Polymerase Chain Reaction (PCR) STAPHYLOCOCCUS EPIDERMIDIS A single positive blood culture of coagulase negative Staphylocci, diphtheroids,micrococci, Cutibacterium, viridans Streptocci, Bacillus, or Lactobacillus species should be interpreted with caution and viewed as a likely skin contaminant. Abnormal      (NOTE) Direct Gram Stain from bottle and Polymerase Chain Reaction (PCR) results called to and read back by:JEANETTE RENE AT 1420 ON 12/16/22     Culture, Urine [[id number]] Collected: 12/15/22 1410   Order Status: Completed Specimen: Urine, indwelling catheter Updated: 12/16/22 1257    Specimen Description . INDWELLING CATH URINE    Culture NO SIGNIFICANT GROWTH   Culture, Blood 1 [[id number]] Collected: 12/10/22 1223   Order Status: Completed Specimen: Blood Updated: 12/15/22 1319    Specimen Description . BLOOD    Special Requests LEFT HAND 2.5 ML    Culture NO GROWTH 5 DAYS   Culture, Blood 1 [[id number]] Collected: 12/10/22 1230   Order Status: Completed Specimen: Blood Updated: 12/15/22 1319    Specimen Description . BLOOD    Special Requests RT HAND 2.5 ML    Culture NO GROWTH 5 DAYS   Culture, Respiratory [[id number]] (Abnormal)  Collected: 12/10/22 1130   Order Status: Completed Specimen: Sputum Induced Updated: 12/12/22 0953    Specimen Description . INDUCED SPUTUM    Direct Exam < 10 EPITHELIAL CELLS/LPF     <10 NEUTROPHILS/LPF     FEW GRAM POSITIVE COCCI IN CLUSTERS Abnormal     Culture METHICILLIN RESISTANT STAPHYLOCOCCUS AUREUS HEAVY GROWTH Abnormal      NO NORMAL JEFFREY   MRSA DNA Probe, Nasal [[id number]] (Abnormal) Collected: 12/10/22 1215   Order Status: Completed Specimen: Nasal Updated: 12/11/22 1027    Specimen Description . NASAL SWAB    MRSA, DNA, Nasal POSITIVE:  MRSA DNA detected by nucleic acid amplification. Abnormal     Comment:                                                    Results should be used as an adjunct to nosocomial control efforts to identify patients   needing enhanced precautions.      The test is not intended to identify patients with staphylococcal infections. Results   should not be used to guide or monitor treatment for MRSA infections. Respiratory Panel, Molecular, with COVID-19 (Restricted: peds pts or suitable admitted adults) [[id number]] Collected: 12/10/22 1309   Order Status: Completed Specimen: Nasopharyngeal Swab Updated: 12/10/22 1451    Specimen Description . NASOPHARYNGEAL SWAB    Adenovirus PCR Not Detected    Coronavirus 229E PCR Not Detected    Coronavirus HKU1 PCR Not Detected    Coronavirus NL63 PCR Not Detected    Coronavirus OC43 PCR Not Detected    SARS-CoV-2, PCR Not Detected    Human Metapneumovirus PCR Not Detected    Rhino/Enterovirus PCR Not Detected    Influenza A by PCR Not Detected    Influenza B by PCR Not Detected    Parainfluenza 1 PCR Not Detected    Parainfluenza 2 PCR Not Detected    Parainfluenza 3 PCR Not Detected    Parainfluenza 4 PCR Not Detected    Resp Syncytial Virus PCR Not Detected    Bordetella Parapertussis Not Detected    B Pertussis by PCR Not Detected    Chlamydia pneumoniae By PCR Not Detected    Mycoplasma pneumo by PCR Not Detected    Comment: Performed by multiplexed nucleic acid assay. Respiratory Panel, Molecular, with COVID-19 (Restricted: peds pts or suitable admitted adults) [[id number]] Collected: 12/10/22 1130   Order Status: Canceled Specimen: Nasopharyngeal Swab    MRSA DNA Probe, Nasal [[id number]] Collected: 12/07/22 0200   Order Status: Completed Specimen: Nasal Updated: 12/07/22 1440    Specimen Description . NASAL SWAB    MRSA, DNA, Nasal NEGATIVE    Comment: NEGATIVE:  MRSA DNA not detected by nucleic acid amplification. Results should be used as an adjunct to nosocomial control efforts to identify patients   needing enhanced precautions. The test is not intended to identify patients with staphylococcal infections.   Results   should not be used to guide or monitor treatment for MRSA infections. COVID-19, Rapid [[id number]] Collected: 12/06/22 2215   Order Status: Completed Specimen: Nasopharyngeal Swab Updated: 12/07/22 0207    Specimen Description . NASOPHARYNGEAL SWAB    SARS-CoV-2, Rapid Not Detected    Comment:        Rapid NAAT:  The specimen is NEGATIVE for SARS-CoV-2, the novel coronavirus associated with   COVID-19. The ID NOW COVID-19 assay is designed to detect the virus that causes COVID-19 in patients   with signs and symptoms of infection who are suspected of COVID-19. An individual without symptoms of COVID-19 and who is not shedding SARS-CoV-2 virus would   expect to have a negative (not detected) result in this assay. Negative results should be treated as presumptive and, if inconsistent with clinical signs   and symptoms or necessary for patient management,   should be tested with an alternative molecular assay. Negative results do not preclude   SARS-CoV-2 infection and   should not be used as the sole basis for patient management decisions.          Fact sheet for Healthcare Providers: http://www.mandeep.hollis/   Fact sheet for Patients: http://www.mandeep.hollis/         Methodology: Isothermal Nucleic Acid Amplification         Medications:      ARIPiprazole  10 mg Oral Daily    gabapentin  100 mg Oral TID    hydrocortisone  10 mg Oral Nightly    hydrocortisone  15 mg Oral QAM    guaiFENesin  200 mg Oral Q4H    pantoprazole (PROTONIX) 40 mg injection  40 mg IntraVENous Daily    enoxaparin  30 mg SubCUTAneous BID    sertraline  150 mg Oral Daily    lamoTRIgine  200 mg Oral Daily    levothyroxine  150 mcg Oral QAM AC    topiramate  75 mg Oral Daily    topiramate  100 mg Oral Nightly           [facility]  Amandeep Khan MD  [employer] messaging  OFFICE: [phone]      Electronically signed by Amandeep Khan MD on 12/25/2022 at 8:57 AM  Thank you for allowing us to participate in the care of this patient. Please call with questions. ATTESTATION:    I have discussed the case, including pertinent history and exam findings with the residents and students. I have seen and examined the patient and the key elements of the encounter have been performed by me. I was present when the student obtained his information or examined the patient. I have reviewed the laboratory data, other diagnostic studies and discussed them with the residents. I have updated the medical record where necessary. I agree with the assessment, plan and orders as documented by the resident/ student.     Zack Sequeira MD.

## 2022-12-25 NOTE — PLAN OF CARE
Problem: Respiratory - Adult  Goal: Achieves optimal ventilation and oxygenation  Outcome: Progressing  Flowsheets (Taken 12/24/2022 2036)  Achieves optimal ventilation and oxygenation:   Assess for changes in respiratory status   Respiratory therapy support as indicated   Assess for changes in mentation and behavior   Oxygen supplementation based on oxygen saturation or arterial blood gases   Encourage broncho-pulmonary hygiene including cough, deep breathe, incentive spirometry   Assess and instruct to report shortness of breath or any respiratory difficulty

## 2022-12-25 NOTE — PLAN OF CARE
Problem: Discharge Planning  Goal: Discharge to home or other facility with appropriate resources  Outcome: Progressing     Problem: Confusion  Goal: Confusion, delirium, dementia, or psychosis is improved or at baseline  Description: INTERVENTIONS:  1. Assess for possible contributors to thought disturbance, including medications, impaired vision or hearing, underlying metabolic abnormalities, dehydration, psychiatric diagnoses, and notify attending LIP  2. Avery high risk fall precautions, as indicated  3. Provide frequent short contacts to provide reality reorientation, refocusing and direction  4. Decrease environmental stimuli, including noise as appropriate  5. Monitor and intervene to maintain adequate nutrition, hydration, elimination, sleep and activity  6. If unable to ensure safety without constant attention obtain sitter and review sitter guidelines with assigned personnel  7.  Initiate Psychosocial CNS and Spiritual Care consult, as indicated  Outcome: Progressing     Problem: Pain  Goal: Verbalizes/displays adequate comfort level or baseline comfort level  Outcome: Progressing     Problem: Chronic Conditions and Co-morbidities  Goal: Patient's chronic conditions and co-morbidity symptoms are monitored and maintained or improved  Outcome: Progressing       Problem: Safety - Adult  Goal: Free from fall injury  Outcome: Progressing     Problem: Nutrition Deficit:  Goal: Optimize nutritional status  Outcome: Progressing     Problem: Respiratory - Adult  Goal: Achieves optimal ventilation and oxygenation  12/25/2022 0004 by Rosario Robles RN  Outcome: Progressing

## 2022-12-26 ENCOUNTER — APPOINTMENT (OUTPATIENT)
Dept: GENERAL RADIOLOGY | Age: 47
DRG: 551 | End: 2022-12-26
Payer: MEDICARE

## 2022-12-26 PROBLEM — J15.212 PNEUMONIA OF LEFT LOWER LOBE DUE TO METHICILLIN-RESISTANT STAPHYLOCOCCUS AUREUS (MRSA) (HCC): Status: ACTIVE | Noted: 2022-12-14

## 2022-12-26 LAB
EKG ATRIAL RATE: 54 BPM
EKG ATRIAL RATE: 54 BPM
EKG P AXIS: 35 DEGREES
EKG P AXIS: 41 DEGREES
EKG P-R INTERVAL: 156 MS
EKG P-R INTERVAL: 160 MS
EKG Q-T INTERVAL: 442 MS
EKG Q-T INTERVAL: 462 MS
EKG QRS DURATION: 92 MS
EKG QRS DURATION: 94 MS
EKG QTC CALCULATION (BAZETT): 419 MS
EKG QTC CALCULATION (BAZETT): 438 MS
EKG R AXIS: 38 DEGREES
EKG R AXIS: 42 DEGREES
EKG T AXIS: 33 DEGREES
EKG T AXIS: 49 DEGREES
EKG VENTRICULAR RATE: 54 BPM
EKG VENTRICULAR RATE: 54 BPM
PRO-BNP: 431 PG/ML

## 2022-12-26 PROCEDURE — 83880 ASSAY OF NATRIURETIC PEPTIDE: CPT

## 2022-12-26 PROCEDURE — 2060000000 HC ICU INTERMEDIATE R&B

## 2022-12-26 PROCEDURE — 6370000000 HC RX 637 (ALT 250 FOR IP): Performed by: STUDENT IN AN ORGANIZED HEALTH CARE EDUCATION/TRAINING PROGRAM

## 2022-12-26 PROCEDURE — 99233 SBSQ HOSP IP/OBS HIGH 50: CPT | Performed by: INTERNAL MEDICINE

## 2022-12-26 PROCEDURE — 93005 ELECTROCARDIOGRAM TRACING: CPT | Performed by: INTERNAL MEDICINE

## 2022-12-26 PROCEDURE — 31575 DIAGNOSTIC LARYNGOSCOPY: CPT | Performed by: OTOLARYNGOLOGY

## 2022-12-26 PROCEDURE — 2500000003 HC RX 250 WO HCPCS

## 2022-12-26 PROCEDURE — 6370000000 HC RX 637 (ALT 250 FOR IP): Performed by: NURSE PRACTITIONER

## 2022-12-26 PROCEDURE — 6360000002 HC RX W HCPCS: Performed by: NURSE PRACTITIONER

## 2022-12-26 PROCEDURE — 2500000003 HC RX 250 WO HCPCS: Performed by: HEALTH CARE PROVIDER

## 2022-12-26 PROCEDURE — 97530 THERAPEUTIC ACTIVITIES: CPT

## 2022-12-26 PROCEDURE — 36415 COLL VENOUS BLD VENIPUNCTURE: CPT

## 2022-12-26 PROCEDURE — 71045 X-RAY EXAM CHEST 1 VIEW: CPT

## 2022-12-26 PROCEDURE — 2580000003 HC RX 258

## 2022-12-26 PROCEDURE — C9113 INJ PANTOPRAZOLE SODIUM, VIA: HCPCS

## 2022-12-26 PROCEDURE — 2709999900 HC NON-CHARGEABLE SUPPLY

## 2022-12-26 PROCEDURE — 99232 SBSQ HOSP IP/OBS MODERATE 35: CPT | Performed by: INTERNAL MEDICINE

## 2022-12-26 PROCEDURE — 99221 1ST HOSP IP/OBS SF/LOW 40: CPT | Performed by: OTOLARYNGOLOGY

## 2022-12-26 PROCEDURE — 51798 US URINE CAPACITY MEASURE: CPT

## 2022-12-26 PROCEDURE — 6360000002 HC RX W HCPCS

## 2022-12-26 PROCEDURE — 2580000003 HC RX 258: Performed by: STUDENT IN AN ORGANIZED HEALTH CARE EDUCATION/TRAINING PROGRAM

## 2022-12-26 RX ORDER — TOPIRAMATE 50 MG/1
75 TABLET, FILM COATED ORAL DAILY
Qty: 60 TABLET | Refills: 3 | OUTPATIENT
Start: 2022-12-27

## 2022-12-26 RX ORDER — LAMOTRIGINE 200 MG/1
200 TABLET ORAL DAILY
Qty: 30 TABLET | Refills: 3 | OUTPATIENT
Start: 2022-12-27

## 2022-12-26 RX ORDER — GABAPENTIN 100 MG/1
100 CAPSULE ORAL 3 TIMES DAILY
Qty: 90 CAPSULE | Refills: 0 | OUTPATIENT
Start: 2022-12-26 | End: 2023-01-25

## 2022-12-26 RX ORDER — TOPIRAMATE 100 MG/1
100 TABLET, FILM COATED ORAL NIGHTLY
Qty: 60 TABLET | Refills: 3 | OUTPATIENT
Start: 2022-12-26

## 2022-12-26 RX ORDER — OXYCODONE HYDROCHLORIDE AND ACETAMINOPHEN 5; 325 MG/1; MG/1
1 TABLET ORAL EVERY 6 HOURS PRN
Qty: 12 TABLET | Refills: 0 | OUTPATIENT
Start: 2022-12-26 | End: 2022-12-29

## 2022-12-26 RX ORDER — PANTOPRAZOLE SODIUM 40 MG/1
40 TABLET, DELAYED RELEASE ORAL
Status: DISCONTINUED | OUTPATIENT
Start: 2022-12-27 | End: 2022-12-28

## 2022-12-26 RX ADMIN — SODIUM CHLORIDE, PRESERVATIVE FREE 10 ML: 5 INJECTION INTRAVENOUS at 19:58

## 2022-12-26 RX ADMIN — GUAIFENESIN 200 MG: 200 SOLUTION ORAL at 14:09

## 2022-12-26 RX ADMIN — LEVOTHYROXINE SODIUM 150 MCG: 75 TABLET ORAL at 08:53

## 2022-12-26 RX ADMIN — TOPIRAMATE 100 MG: 100 TABLET, FILM COATED ORAL at 19:57

## 2022-12-26 RX ADMIN — GUAIFENESIN 200 MG: 200 SOLUTION ORAL at 00:22

## 2022-12-26 RX ADMIN — GABAPENTIN 100 MG: 100 CAPSULE ORAL at 14:09

## 2022-12-26 RX ADMIN — ENOXAPARIN SODIUM 30 MG: 100 INJECTION SUBCUTANEOUS at 08:54

## 2022-12-26 RX ADMIN — ANTI-FUNGAL POWDER MICONAZOLE NITRATE TALC FREE: 1.42 POWDER TOPICAL at 19:58

## 2022-12-26 RX ADMIN — ENOXAPARIN SODIUM 30 MG: 100 INJECTION SUBCUTANEOUS at 19:57

## 2022-12-26 RX ADMIN — GUAIFENESIN 200 MG: 200 SOLUTION ORAL at 05:49

## 2022-12-26 RX ADMIN — HYDROCORTISONE 15 MG: 10 TABLET ORAL at 08:53

## 2022-12-26 RX ADMIN — SODIUM CHLORIDE, PRESERVATIVE FREE 40 MG: 5 INJECTION INTRAVENOUS at 08:54

## 2022-12-26 RX ADMIN — ARIPIPRAZOLE 10 MG: 10 TABLET ORAL at 08:53

## 2022-12-26 RX ADMIN — OXYCODONE 5 MG: 5 TABLET ORAL at 14:10

## 2022-12-26 RX ADMIN — GUAIFENESIN 200 MG: 200 SOLUTION ORAL at 08:53

## 2022-12-26 RX ADMIN — OXYCODONE 5 MG: 5 TABLET ORAL at 10:00

## 2022-12-26 RX ADMIN — GABAPENTIN 100 MG: 100 CAPSULE ORAL at 08:53

## 2022-12-26 RX ADMIN — OXYCODONE 5 MG: 5 TABLET ORAL at 05:46

## 2022-12-26 RX ADMIN — OXYCODONE 5 MG: 5 TABLET ORAL at 00:21

## 2022-12-26 RX ADMIN — OXYCODONE 5 MG: 5 TABLET ORAL at 21:31

## 2022-12-26 RX ADMIN — OXYCODONE 5 MG: 5 TABLET ORAL at 16:00

## 2022-12-26 RX ADMIN — ANTI-FUNGAL POWDER MICONAZOLE NITRATE TALC FREE: 1.42 POWDER TOPICAL at 15:26

## 2022-12-26 RX ADMIN — GUAIFENESIN 200 MG: 200 SOLUTION ORAL at 19:57

## 2022-12-26 RX ADMIN — GABAPENTIN 100 MG: 100 CAPSULE ORAL at 19:57

## 2022-12-26 RX ADMIN — LAMOTRIGINE 200 MG: 100 TABLET ORAL at 08:53

## 2022-12-26 RX ADMIN — ACETAMINOPHEN 650 MG: 325 TABLET ORAL at 19:57

## 2022-12-26 RX ADMIN — GUAIFENESIN 200 MG: 200 SOLUTION ORAL at 17:33

## 2022-12-26 RX ADMIN — HYDROCORTISONE 10 MG: 10 TABLET ORAL at 19:57

## 2022-12-26 RX ADMIN — SERTRALINE 150 MG: 50 TABLET, FILM COATED ORAL at 08:53

## 2022-12-26 RX ADMIN — TOPIRAMATE 75 MG: 100 TABLET, FILM COATED ORAL at 08:53

## 2022-12-26 ASSESSMENT — PAIN DESCRIPTION - ORIENTATION
ORIENTATION: UPPER

## 2022-12-26 ASSESSMENT — PAIN SCALES - GENERAL
PAINLEVEL_OUTOF10: 9
PAINLEVEL_OUTOF10: 9
PAINLEVEL_OUTOF10: 8
PAINLEVEL_OUTOF10: 4
PAINLEVEL_OUTOF10: 8
PAINLEVEL_OUTOF10: 7
PAINLEVEL_OUTOF10: 8
PAINLEVEL_OUTOF10: 8
PAINLEVEL_OUTOF10: 9

## 2022-12-26 ASSESSMENT — PAIN DESCRIPTION - DESCRIPTORS
DESCRIPTORS: ACHING
DESCRIPTORS: SHARP

## 2022-12-26 ASSESSMENT — ENCOUNTER SYMPTOMS
VOICE CHANGE: 0
VOMITING: 0
TROUBLE SWALLOWING: 0
DIARRHEA: 0
BACK PAIN: 1
SINUS PRESSURE: 0
EYE ITCHING: 0
EYE REDNESS: 0
PHOTOPHOBIA: 0
EYE PAIN: 0
NAUSEA: 0
SHORTNESS OF BREATH: 1
EYE DISCHARGE: 0
SHORTNESS OF BREATH: 0
RHINORRHEA: 0
ABDOMINAL PAIN: 0
RESPIRATORY NEGATIVE: 1
COUGH: 1
SORE THROAT: 0

## 2022-12-26 ASSESSMENT — PAIN DESCRIPTION - LOCATION
LOCATION: BACK

## 2022-12-26 ASSESSMENT — PAIN - FUNCTIONAL ASSESSMENT: PAIN_FUNCTIONAL_ASSESSMENT: PREVENTS OR INTERFERES SOME ACTIVE ACTIVITIES AND ADLS

## 2022-12-26 NOTE — PROGRESS NOTES
Occupational Therapy  Facility/Department: [address] STEPDOWN  Occupational Therapy Daily Treatment Note    Name: Nakul Petersen  : 1975  MRN: [id number]  Date of Service: 2022    Discharge Recommendations:  Patient would benefit from continued therapy after discharge in order to increase pt strength, endurance and independence            Assessment   Performance deficits / Impairments: Decreased ADL status; Decreased functional mobility ; Decreased strength;Decreased endurance;Decreased high-level IADLs;Decreased cognition;Decreased safe awareness;Decreased balance  Prognosis: Good  REQUIRES OT FOLLOW-UP: Yes  Activity Tolerance  Activity Tolerance: Patient Tolerated treatment well;Treatment limited secondary to medical complications (free text); Patient limited by pain  Activity Tolerance Comments: SOB        Plan   Occupational Therapy Plan  Times Per Week: 4-5x/wk  Current Treatment Recommendations: Strengthening, Balance training, ROM, Functional mobility training, Endurance training, Safety education & training, Positioning, Equipment evaluation, education, & procurement, Patient/Caregiver education & training, Self-Care / ADL     Restrictions  Restrictions/Precautions  Restrictions/Precautions: Fall Risk, Weight Bearing, Contact Precautions  Required Braces or Orthoses?: No  Lower Extremity Weight Bearing Restrictions  Right Lower Extremity Weight Bearing: Weight Bearing As Tolerated  Required Braces or Orthoses  Cervical:  (per neurosurg c-collar no longer needed)  Position Activity Restriction  Other position/activity restrictions: up with assist, no intervention required for C7 or T3 fracture per [place] via [employer]; Acute C7 Right TP fx, Age indeterminate T3 compression fx, Nondisplaced right sacral ala fx, Nondisplaced fracture right superior pubic ramus, Right obturator ring fx. Cochlear implant R ear during session this day.     Subjective   General  Chart Reviewed: Yes  Family / Caregiver Present: Yes (father)  General Comment  Comments: Pt and RN agreeable to therapy this day. Pt supine in bed at start of session and retired to supine in bed at session end with call light in reach and all needs met. RN aware. Pt c/o 8/10 pain in back RN aware         Objective        Safety Devices  Type of Devices: Left in bed;Call light within reach; Patient at risk for falls; Bed alarm in place;Nurse notified  Restraints  Restraints Initially in Place: No  Balance  Sitting: With support (Min A static/dynamic sitting at EOB utilizing 0-2 hand support demo post and L lateral lean tolerating approx 10-12 min)  Standing:  (GILDARDO d/t decreased sitting balance, SOB and pt c/o back pain)        ADL  Additional Comments: Max A to don  R cochlear implant        Bed mobility  Rolling to Left: Minimal assistance  Supine to Sit: Moderate assistance;2 Person assistance  Sit to Supine: Moderate assistance;2 Person assistance  Scooting: Moderate assistance  Bed Mobility Comments: utilizing modified log roll        Cognition  Overall Cognitive Status: Exceptions  Following Commands: Follows multistep commands with repitition; Follows multistep commands with increased time  Safety Judgement: Decreased awareness of need for assistance;Decreased awareness of need for safety  Problem Solving: Decreased awareness of errors;Assistance required to identify errors made;Assistance required to correct errors made  Insights: Decreased awareness of deficits  Initiation: Requires cues for some  Sequencing: Requires cues for some  Orientation  Overall Orientation Status: Within Functional Limits                  Education Given To: Patient  Education Provided: Role of Therapy;Transfer Training;Precautions  Education Provided Comments: proper hand and foot placement; modified log roll; pursed lip breathing-F carry over  Education Method: Verbal  Education Outcome: Continued education needed         AM-PAC Score        AM-PAC Inpatient Daily Activity Raw Score: 16 (12/26/22 1649)  AM-PAC Inpatient ADL T-Scale Score : 35.96 (12/26/22 1649)  ADL Inpatient CMS 0-100% Score: 53.32 (12/26/22 1649)  ADL Inpatient CMS G-Code Modifier : CK (12/26/22 1649)        Goals  Short Term Goals  Time Frame for Short Term Goals: Patient will, by discharge  Short Term Goal 1: demo self-feeding/grooming at Mod I using AE PRN  Short Term Goal 2: demo bed mobility at [address] [facility] [address] to promote OOB activity  Short Term Goal 3: demo UB ADLs at [address] [place] [address] Road Term Goal 4: demo 10+ min of dynamic sitting balance at [facility] to engage in ADLs safely  Short Term Goal 5: notify OTR to update POC as patient progresses       Therapy Time   Individual Concurrent Group Co-treatment   Time In 1142         Time Out 1205         Minutes 23         Timed Code Treatment Minutes: 12 Minutes (co tx with PTA)       RICHELLE Vazquez/GIRMA

## 2022-12-26 NOTE — PLAN OF CARE
Problem: Discharge Planning  Goal: Discharge to home or other facility with appropriate resources  Outcome: Progressing     Problem: Confusion  Goal: Confusion, delirium, dementia, or psychosis is improved or at baseline  Description: INTERVENTIONS:  1. Assess for possible contributors to thought disturbance, including medications, impaired vision or hearing, underlying metabolic abnormalities, dehydration, psychiatric diagnoses, and notify attending LIP  2. [place] high risk fall precautions, as indicated  3. Provide frequent short contacts to provide reality reorientation, refocusing and direction  4. Decrease environmental stimuli, including noise as appropriate  5. Monitor and intervene to maintain adequate nutrition, hydration, elimination, sleep and activity  6. If unable to ensure safety without constant attention obtain sitter and review sitter guidelines with assigned personnel  7. Initiate Psychosocial CNS and Spiritual Care consult, as indicated  Outcome: Progressing     Problem: Pain  Goal: Verbalizes/displays adequate comfort level or baseline comfort level  Outcome: Progressing     Problem: Chronic Conditions and Co-morbidities  Goal: Patient's chronic conditions and co-morbidity symptoms are monitored and maintained or improved  Outcome: Progressing     Problem: Skin/Tissue Integrity  Goal: Absence of new skin breakdown  Description: 1. Monitor for areas of redness and/or skin breakdown  2. Assess vascular access sites hourly  3. Every 4-6 hours minimum:  Change oxygen saturation probe site  4. Every 4-6 hours:  If on nasal continuous positive airway pressure, respiratory therapy assess nares and determine need for appliance change or resting period.   Outcome: Progressing     Problem: Safety - Adult  Goal: Free from fall injury  Outcome: Progressing     Problem: ABCDS Injury Assessment  Goal: Absence of physical injury  Outcome: Progressing     Problem: Nutrition Deficit:  Goal: Optimize nutritional status  Outcome: Progressing     Problem: Respiratory - Adult  Goal: Achieves optimal ventilation and oxygenation  Outcome: Progressing

## 2022-12-26 NOTE — PROGRESS NOTES
[facility]  Internal Medicine Teaching Residency Program  Inpatient Daily Progress Note  ______________________________________________________________________________    Patient: [address]  YOB: 1975   GERRI:[id number]    Acct: [de-identified]     Room: 26/1-12  Admit date: 12/5/2022  Today's date: 12/26/22  Number of days in the hospital: 20    SUBJECTIVE   Admitting Diagnosis: Acute respiratory failure with hypoxia [facility])  CC: Fall leading to neck pain and back pain  Pt examined at bedside. Chart & results reviewed. No acute event overnight  Remained afebrile and hemodynamically stable. Had an episode of chest pain overnight again which self resolved-EKG showed sinus bradycardia. Used CPAP overnight. Has been having more cough since yesterday. Oxygen requirement has gone up from 4 L to 6 L. No other acute complaints. Has a back pain. ROS:  Constitutional:  negative for chills, fevers, sweats  Respiratory:  negative for cough, dyspnea on exertion, hemoptysis, shortness of breath, wheezing  Cardiovascular:  negative for chest pain, chest pressure/discomfort, lower extremity edema, palpitations  Gastrointestinal:  negative for abdominal pain, constipation, diarrhea, nausea, vomiting  Neurological:  negative for dizziness, headache  BRIEF HISTORY     ICU COURSE:     42-year-old female initially presented to hospital on 12/5 after a fall. 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 Patient also has past medical history of diabetes mellitus, bilateral sensorineural hearing loss. Had a CT head showed no intracranial abnormality, CT abdominal pelvis showed  nondisplaced fractures of right sacral ilya, nondisplaced fracture of right superior ramus and right obturator ring.   CT spine showed acute fracture of right C7 transverse process and also age indeterminate compressive fracture of T3. Patient admitted under trauma surgery-neurosurgery for cervical fractures was consulted, who recommended no neurosurgical intervention. Orthopedic surgery for pelvic fractures was also consulted, no surgical intervention but medical management and also recommended to follow-up in orthopedic clinic after discharge. Patient was started on BiPAP for increased work of breathing. Patient was oriented x4 at day of presentation.  - patient had a decline in mental status with tachypnea, respiratory rate of 30s to 40 with heart rate of 105 patient was intubated emergently     - Neurology was consulted because of history of migraines, seizure disorder, patient was started on her home antiepileptic drugs including Topamax and Lamictal, EEG was ordered to rule out any subclinical seizure activity, MRI was also ordered to rule out any CVA. Patient was doing fine, arousable and following commands. Pulmonology was consulted, recommended to extubate and patient was extubated. 12/10 -rapid was called today due to impending respiratory failure, patient was intubated again and was sent to ICU       -respiratory culture came back positive for staph aureus , nasal respiratory swab came out positive for MRSA DNA, patient started on vancomycin       : Tachycardia improved to normal heart rate, patient remained afebrile, WBC count trending down. Sedation changed to precedex     12/15: Precedex discontinued. : Patient was extubated. : Pt was a febrile overnight, saturating well on NC.     OBJECTIVE     Vital Signs:  BP 97/73   Pulse 57   Temp 98.2 °F (36.8 °C) (Oral)   Resp 18   Ht 5' 1\" (1.549 m)   Wt 264 lb 12.4 oz (120.1 kg)   SpO2 100%   BMI 50.03 kg/m²     Temp (24hrs), Av.8 °F (36.6 °C), Min:97.2 °F (36.2 °C), Max:98.4 °F (36.9 °C)    In: -   Out: 550 [Urine:550]    Physical Exam:  General appearance - alert, in no distress  Mental status - alert, oriented to person, place, and time  Eyes - pupils equal and reactive, extraocular eye movements intact. Some hearing loss. Mouth - mucous membranes moist, pharynx normal without lesions  Neck - supple, no significant adenopathy  Chest - clear to auscultation, no wheezes  Heart - normal rate, regular rhythm, normal S1, S2  Abdomen - soft, nontender, nondistended  Neurological - alert, oriented, normal speech, no focal deficits   Extremities - peripheral pulses normal, no pedal edema  Skin - normal coloration and turgor, no rashes     Medications:  Scheduled Medications:    ARIPiprazole  10 mg Oral Daily    gabapentin  100 mg Oral TID    hydrocortisone  10 mg Oral Nightly    hydrocortisone  15 mg Oral QAM    guaiFENesin  200 mg Oral Q4H    pantoprazole (PROTONIX) 40 mg injection  40 mg IntraVENous Daily    enoxaparin  30 mg SubCUTAneous BID    sertraline  150 mg Oral Daily    lamoTRIgine  200 mg Oral Daily    levothyroxine  150 mcg Oral QAM AC    topiramate  75 mg Oral Daily    topiramate  100 mg Oral Nightly     Continuous Infusions:    sodium chloride Stopped (12/12/22 0316)     PRN MedicationsoxyCODONE, 10 mg, Q12H PRN   Or  oxyCODONE, 5 mg, Q4H PRN  racepinephrine HCl, 11.25 mg, Q4H PRN  sodium chloride nebulizer, 3 mL, Q4H PRN  ondansetron, 4 mg, Q6H PRN  polyethylene glycol, 17 g, Daily PRN  levalbuterol, 0.63 mg, Q4H PRN  sodium chloride, , PRN  acetaminophen, 650 mg, Q6H PRN  sodium chloride flush, 5-40 mL, PRN      Diagnostic Labs:  CBC:   Recent Labs     12/24/22  0402 12/25/22  0651   WBC 9.2 7.8   RBC 4.43 4.39   HGB 12.3 12.2   HCT 40.7 40.7   MCV 91.9 92.7   RDW 13.5 13.8    338       BMP:   Recent Labs     12/24/22  0402 12/25/22  0651    139   K 3.6* 3.9    105   CO2 28 26   BUN 29* 19   CREATININE 0.97* 0.84       BNP: No results for input(s): BNP in the last 72 hours. PT/INR: No results for input(s): PROTIME, INR in the last 72 hours.   APTT: No results for input(s): APTT in the last 72 hours. CARDIAC ENZYMES: No results for input(s): CKMB, CKMBINDEX, TROPONINI in the last 72 hours. Invalid input(s): CKTOTAL;3  FASTING LIPID PANEL:  Lab Results   Component Value Date    CHOL 147 03/01/2020    HDL 46 03/01/2020    TRIG 77 03/01/2020     LIVER PROFILE: No results for input(s): AST, ALT, ALB, BILIDIR, BILITOT, ALKPHOS in the last 72 hours. MICROBIOLOGY:   Lab Results   Component Value Date/Time    CULTURE NO SIGNIFICANT GROWTH 12/15/2022 02:10 PM       Imaging:    XR CHEST PORTABLE    Result Date: 12/23/2022  1. Enlargement of the cardiac silhouette with vascular congestion, though improved focal infiltrate of the right upper lobe. XR CHEST PORTABLE    Result Date: 12/18/2022  No change from prior study. Tubes and lines as above. Endotracheal tube is somewhat low lying but unchanged. FL MODIFIED BARIUM SWALLOW W VIDEO    Result Date: 12/22/2022  1. Penetration followed by aspiration without coughing with the nectar thick and honey thick substances. 2. No penetration or aspiration with the pureed/pudding thick or soft solid substances. 3. Cookie solid substance was not attempted. Please see separate speech pathology report for full discussion of findings and recommendations. ASSESSMENT & PLAN     Assessment:  Principal Problem:    Cervical transverse process fracture, initial encounter [facility])  Active Problems:    Closed nondisplaced fracture of seventh cervical vertebra (HCC)    Lethargy    Multiple closed pelvic fractures without disruption of pelvic [place] (HCC)    Aspiration pneumonia (HCC)    Acute respiratory failure with hypoxia (HCC)    MRSA infection    Acute lower respiratory tract infection    Seizure ([facility] [address]    Hypothyroidism    Asthma    Hypopituitarism ([facility] [address]  Resolved Problems:    * No resolved hospital problems.  *            Plan:     Acute hypoxic respiratory failure likely secondary to pneumonia-completed vancomycin for MRSA pneumonia 12/22  -Xopenex, Robitussin, racemic epinephrine, sodium chloride nebulizer as needed  -On nasal cannula during the day with CPAP in the night. Maintain oxygen saturation above 92%. Continue to wean down as tolerated. Chest x-ray showing possible cardiomegaly with congestion. We will get proBNP if elevated-we will give furosemide. Sinus bradycardia - asymptomatic  -Likely due to sleep apnea. Thyroxine on admission was within normal limits. Will monitor. Chest pain-self resolved-EKG negative. Chest x-ray showing cardiomegaly with possible congestion.  -We will check troponins if recurrence of chest pain. History of seizures  EEG this admission-moderate nonspecific encephalopathy-no epileptic discharges. MRI could not be done due to cochlear implants.  -Lamotrigine level was low-neurology was consulted and recommended continuing the same dose of lamotrigine as there is no concern of seizures. - Continue home lamotrigine 200 mg daily, topiramate 100 mg nightly, 75 mg in the morning. C7 transverse process fracture and T3 chronic compression fracture-neurosurgery was consulted-recommended no intervention     Right sacral ilya fracture, right superior pubic ramus fracture-orthopedic was consulted-recommended nonoperative treatment with close clinic follow-up in 2 weeks after discharge as there is minimal chance of displacement and recommended weightbearing as tolerated. Hypothyroidism  -Continue home levothyroxine 150 mcg     Hypopituitarism  -Continue hydrocortisone 10 mg in the night and 15 mg in the morning. Home dose-10 mg twice daily. Hypokalemia - resolved     - Discontinued  IV fluids. Patient on puréed diet-encouraged to eat more. Due to prophylaxis - Lovenox  GI prophylaxis - Protonix     PT/OT-has been working   - on board - we will follow-up. Jarrod Judge MD  Internal Medicine Resident, PGY-1  [address], [place]  12/26/2022, 1:21 PM  Attending Physician Statement  I have discussed the care of [address] and I have examined the patient myselft and taken ros and hpi , including pertinent history and exam findings,  with the resident. I have reviewed the key elements of all parts of the encounter with the resident. I agree with the assessment, plan and orders as documented by the resident.   Patient has increased oxygen requirement  Ordering x-rays, BMP  Will consider diuretics  Awaiting placement  Need to work with PT    Electronically signed by Wilson Schaffer MD

## 2022-12-26 NOTE — PLAN OF CARE
Problem: Discharge Planning  Goal: Discharge to home or other facility with appropriate resources  Outcome: Progressing     Problem: Confusion  Goal: Confusion, delirium, dementia, or psychosis is improved or at baseline  Description: INTERVENTIONS:  1. Assess for possible contributors to thought disturbance, including medications, impaired vision or hearing, underlying metabolic abnormalities, dehydration, psychiatric diagnoses, and notify attending LIP  2. [place] high risk fall precautions, as indicated  3. Provide frequent short contacts to provide reality reorientation, refocusing and direction  4. Decrease environmental stimuli, including noise as appropriate  5. Monitor and intervene to maintain adequate nutrition, hydration, elimination, sleep and activity  6. If unable to ensure safety without constant attention obtain sitter and review sitter guidelines with assigned personnel  7.  Initiate Psychosocial CNS and Spiritual Care consult, as indicated  Outcome: Progressing     Problem: Pain  Goal: Verbalizes/displays adequate comfort level or baseline comfort level  Outcome: Progressing

## 2022-12-26 NOTE — CONSULTS
CONSULTING SERVICE: Otolaryngology-Head and Neck Surgery    REASON FOR CONSULT:  Luis Britt is a 41 y.o. female seen today for hoarseness  Chief Complaint   Patient presents with    Fall     HISTORY OF PRESENT ILLNESS:     Patient is a 42 y/o female with h/o morbid obesity, recent fall, developmental delay, and stridor. H/o brain tumor in the past and s/p XRT. Recently extubated about 2-3 days ago and with stridor since extubation. H/o silent aspiration in the past.   Asked by primary team for NP scope. H/o ROHIT on CPAP at night. H/o seizures and DM and morbid obesity and fibromyalgia. Has low mobility issues at home. Had recent fall with injury to cervical spine (C7) and lumbar compression fracture on L3 as well as evidence of DDD. Imaging revealed an acute C7 transverse process fracture, chronic T3 compression fx and fractures of right sacral ala, nondisplaced fracture of right superior ramus and right obturator ring. She was extubated on the 7th but required re-intubation on 12/10/22 after a rapid response was called. Was extubated on 12/21/2022. Was intubated with a 7.5 cuffed ETT.          REVIEW OF SYSTEMS:   General ROS: negative  Psychological ROS: negative  Ophthalmic ROS: negative  ENT ROS: as above  Allergy and Immunology ROS: negative  Hematological and Lymphatic ROS: negative  Endocrine ROS: DM2  Respiratory ROS: stridor  Cardiovascular ROS: no chest pain or dyspnea on exertion  Gastrointestinal ROS: \"no abdominal pain, diarrhea, tarry stools, change in bowel habit  Musculoskeletal ROS: fibromyalgia    PAST HISTORY:   Past Medical History:   Diagnosis Date    Acquired acanthosis nigricans     Anemia     Arthritis     Asthma     Brain cancer ([address]     Brain tumor [facility])     age 5    Contact dermatitis and other eczema, due to unspecified cause     COVID-19 01/26/2021    Diabetes mellitus [facility])     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)     Fibromyalgia Herpes zoster without mention of complication     Hypothyroidism     Migraine     Seizures (HCC)     last seizure in 2000    Sleep apnea     CPAP    TIA (transient ischemic attack) 2000     Past Surgical History:   Procedure Laterality Date    BRAIN BIOPSY      tumor biopsy    COCHLEAR IMPLANT Left     COCHLEAR IMPLANT Right 05/10/2022    COLONOSCOPY      COLONOSCOPY N/A 8/12/2022    COLONOSCOPY WITH BIOPSY performed by Fredi Richter MD at [facility] OR    DENTAL SURGERY      implants 1/22    ENDOSCOPY, COLON, DIAGNOSTIC      KNEE SURGERY      scope    TONSILLECTOMY      UPPER GASTROINTESTINAL ENDOSCOPY N/A 8/12/2022    EGD BIOPSY performed by Fredi Richter MD at [facility] OR     Family History   Problem Relation Age of Onset    Cancer Mother 48        breast    Migraines Mother     Diabetes Father     High Blood Pressure Father     High Cholesterol Father     Migraines Sister     High Blood Pressure Maternal Grandmother     Cancer Paternal Grandmother 71        colon cancer    Cancer Paternal Uncle         esophageal      Social Connections: Not on file        MEDICATIONS:   Current Facility-Administered Medications   Medication Dose Route Frequency Provider Last Rate Last Admin    miconazole (MICOTIN) 2 % powder   Topical BID Brice Villalba MD   Given at 12/26/22 1526    [START ON 12/27/2022] pantoprazole (PROTONIX) tablet 40 mg  40 mg Oral QAM AC Brice Villalba MD        oxyCODONE (ROXICODONE) immediate release tablet 10 mg  10 mg Oral Q12H PRN Bereket Feliciano DO   10 mg at 12/25/22 0929    Or    oxyCODONE (ROXICODONE) immediate release tablet 5 mg  5 mg Oral Q4H PRN Bereket Feliciano DO   5 mg at 12/26/22 1410    ARIPiprazole (ABILIFY) tablet 10 mg  10 mg Oral Daily Kian Hardin MD   10 mg at 12/26/22 0853    gabapentin (NEURONTIN) capsule 100 mg  100 mg Oral TID Kian Hardin MD   100 mg at 12/26/22 1409    hydrocortisone (CORTEF) tablet 10 mg  10 mg Oral Nightly Ariadna Glasgow MD   10 mg at 12/25/22  2034    hydrocortisone (CORTEF) tablet 15 mg  15 mg Oral QAM Vernell Barajas MD   15 mg at 12/26/22 0853    racepinephrine HCl (VAPONEFPRIN) 2.25 % nebulizer solution NEBU 11.25 mg  11.25 mg Nebulization Q4H PRN Cheri Tatum MD   11.25 mg at 12/25/22 1153    sodium chloride nebulizer 0.9 % solution 3 mL  3 mL Nebulization Q4H PRN Cheri Tatum MD        polyethylene glycol (GLYCOLAX) packet 17 g  17 g Oral Daily PRN Vernell Barajas MD        guaiFENesin (ROBITUSSIN) 100 MG/5ML liquid 200 mg  200 mg Oral Q4H Nelson Orange, DO   200 mg at 12/26/22 1409    levalbuterol (XOPENEX) nebulization 0.63 mg  0.63 mg Nebulization Q4H PRN Eleanore Cowden, MD   0.63 mg at 12/25/22 1200    0.9 % sodium chloride infusion   IntraVENous PRN Karla Gordon MD   Stopped at 12/12/22 0316    acetaminophen (TYLENOL) tablet 650 mg  650 mg Oral Q6H PRN Marques Levels Sampair, DO   650 mg at 12/25/22 1818    enoxaparin Sodium (LOVENOX) injection 30 mg  30 mg SubCUTAneous BID Sergio Clancy, APRN - CNP   30 mg at 12/26/22 0854    sertraline (ZOLOFT) tablet 150 mg  150 mg Oral Daily Sergio Clancy, APRN - CNP   150 mg at 12/26/22 0853    lamoTRIgine (LAMICTAL) tablet 200 mg  200 mg Oral Daily Johnnye Angry, DO   200 mg at 12/26/22 5389    levothyroxine (SYNTHROID) tablet 150 mcg  150 mcg Oral QAM AC Johnnye Angry, DO   150 mcg at 12/26/22 0853    topiramate (TOPAMAX) tablet 75 mg  75 mg Oral Daily Johnnye Angry, DO   75 mg at 12/26/22 0853    topiramate (TOPAMAX) tablet 100 mg  100 mg Oral Nightly Johnnye Angry, DO   100 mg at 12/25/22 2035    sodium chloride flush 0.9 % injection 5-40 mL  5-40 mL IntraVENous PRN Johnnye Angry, DO   5 mL at 12/18/22 8302        ALLERGIES:   Allergies   Allergen Reactions    Bactrim [Sulfamethoxazole-Trimethoprim]     Bee Venom     Ciprofloxacin     Milk-Related Compounds Diarrhea        PHYSICAL EXAM:  Vitals:    12/26/22 1200 12/26/22 1323 12/26/22 1410 12/26/22 1440   BP:  130/81 Pulse: 59 55     Resp: 29 26 18 19   Temp:       TempSrc:       SpO2:  99%     Weight:       Height:          GENERAL: well developed and well nourished and in no acute distress  HEAD: normocephalic and atraumatic  EYES: no eyelid swelling, no conjunctival injection or exudate, pupils equal round and reactive to light    EXTERNAL EARS: normal    EAR EXAM: normal TMs bilaterally and normal canals bilaterally    NOSE: nares patent, normal mucosa    MOUTH/THROAT: mucus membranes dry    NECK: non-tender, full range of motion, no mass, no focal lymphadenopathy    RESPIRATORY: with inspiratory stridor, and expiratory rales. NEUROLOGICAL:  cranial nerves II-XII are grossly intact    VOICE:  Not overly hoarse. RELEVANT LABS/STUDIES:  None        PROCEDURE:  Flexible fiberoptic nasopharyngolaryngoscopy    DATE AND TIME OF PROCEDURE: 12/26/2022 4:24 PM    SURGEON:  Jeffrey Molina MD     ASSISTANT SURGEON:  None    SCOPE USED:   pediatric    INDICATION(S): vocal cord mobility    TEACHING: Procedure, benefits, and risks were explained topatientConsent obtained. TIME OUT: A time out was conducted immediately before starting the procedure that confirmed a final verification of the correct patient, correct procedure, correct patient position, correct site and availability of special equipment. SITE: Nose, Nasopharynx, Oropharynx, Hypopharynx, Larynx    PROCEDURAL ANALGESIA/ANESTHESIA: no local anesthesia    PROCEDURE: Scope advanced through the left nostril to sequentially examine the nasopharynx, palate, oropharynx, base of tongue, epiglottis, larynx, hypopharynx, and piriform sinuses.      TOLERANCE: Good    COMPLICATIONS:* No surgery found *     ESTIMATED BLOOD LOSS: * No surgery found *     PATHOLOGIC SPECIMEN: * Cannot find log *     FINDINGS:   Nasal cavity:     Right:     Patent: no 2/2 mucus crusting anteriorly   Masses:  CNT   Posterior turbinate hypertrophy:  CNT   Septal deviation:  yes, slightly to right side. Left:    Patent:  yes   Masses:  no   Posterior turbinate hypertrophy: no   Septal deviation:  no    Nasopharynx:      Mucosa: very dry     Eustachian Tube:  clear     Inflammation: no     Adenoids: not present     Masses:  no       Palate and oropharynx:     Palatal movement:normal     Tonsils:could not see any tonsil tissue     Lingual tonsil tissue: is not hypertrophic     Base of tongue:  [place], lesions, or mucosal abnormality     Vallecula:  nom[place], lesions, or mucosal abnormality. VERY DRY MUCUS CRUSTING IN THE PHARYNX    Larynx and Hypopharynx:     Hypopharynx: noedema, noerythema, nocobblestoning     Epiglottis: normal     Arytenoids: normal     Aryepiglottic folds: normal     Vocal Cords: Right TVF paretic and with mild Abduction and Left TVF paretic and with barely any movement noted. Pyriform Sinuses: normal    IMPRESSION:  Inspiratory stridor  Bilateral TVF Dysfunction (paresis, L>R)  Currently very deconditioned  ? CHF vs. Acute congestive pulmonary features  DD  ROHIT, on CPAP/BiPAP      RECOMMENDATIONS/PLAN:  Will discuss with primary team.   Likely Bilateral TVF paresis is a function of patient systemic deconditioning/weakness, but could be related to underlying prolonged intubation.           Needs humidified air to her nasal cannula given her pharynx, nasal cavity mucosa is extremely dry and has mild/moderate mucus crusting in the pharynx.         --------------------------------------------------  Rodo Cleary MD  Pediatric Otolaryngology-Head and Neck Surgery  [address] [place] [address] Otolaryngology group    Office  # [phone]    Also available in [address] [place] [address]

## 2022-12-26 NOTE — PROGRESS NOTES
[facility]  Progress Note    Bren Parrish  MRN: [id number]  Date: 12/26/2022  LOS: 21     Reason for F/U :   Pneumonia    Impression :   Acute hypoxic respiratory failure s/p intubation. Extubated on 12/22. 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  Developmental delay  Morbid obesity  Silent aspiration    Recommendations:   Completed intravenous antimicrobial therapy with vancomycin on 12-22-22  Continue aggressive pulmonary toileting and we will follow her progress  The patient is following commands and overall clinically is improved    Infection Control Recommendations:   Contact precautions    Discharge Planning:   Estimated Length of IV antimicrobials: 12-22-22  Patient will need Midline Catheter Insertion/ PICC line Insertion: No  Patient will need: Home IV , [place],  SNF,  LTAC: Undetermined  Patient willneed outpatient wound care: No    Medical Decision making / Summary of Stay:   Bren Parrish is a 41y.o.-year-old female who was initially admitted on 12/5/2022. Patient seen at the request of Dr. Abran Vigil:     This patient, with a history of developmental delay, presented to the [facility] ED after falling down some stairs at home. She was complaining of neck and back pain. Imaging revealed an acute C7 transverse process fracture, chronic T3 compression fx and fractures of right sacral ala, nondisplaced fracture of right superior ramus and right obturator ring. She was transferred to [address] [place] [address] for further medical management. Neurosurgery was consulted and it was determined there is no neurosurgical intervention needed. Orthopedic surgery was also consulted and they also did not recommend surgical intervention, but rather outpatient follow-up.      On 12/6/22, the patient's mentation declined and she became tachypneic requiring emergent intubation. Neurology was consulted for the patient's history of seizures. Her home seizure medications were restarted. An EEG did not show any seizure activity and the patient's mentation improved. She was extubated on the 7th but required re-intubation on 12/10/22 after a rapid response was called. CXR showed concern for RUL aspiration pneumonia. IV Zosyn was started and cultures were obtained. Viral respiratory panel was negative for influenza and Covid     Sputum cultures grew MRSA and the patient's antibiotics were changed to IV vancomycin on 12/11/22. There has been no growth on blood or urine cultures. ID was consulted for MRSA on sputum cultures        CT Head W/O Contrast   Final Result   No acute intracranial abnormality. Small amount of fluid in the right mastoid air cells. CT CHEST ABDOMEN PELVIS WO CONTRAST Additional Contrast? None   Final Result   Chest:       1. Mild anterior wedging of T3 suggesting an age indeterminate compression   fracture. No fracture line is identified. Clinical correlation with the   site of symptoms is suggested. 2. Significant motion artifact with scattered patchy ground-glass opacities   in the lungs which may be related to motion artifact. Ground-glass opacities   are otherwise nonspecific and could be related to atypical infection or   pneumonitis. Abdomen and pelvis:       1. Nondisplaced fractures of the right sacral ala and the right obturator   ring. 2. No acute findings elsewhere in the abdomen or pelvis. 3. Left renal cyst.           CT CSpine W/O Contrast   Final Result   Acute fracture of the right C7 transverse process. Spinal degenerative changes as described.            Interval Changes: 12/26/2022     /81   Pulse 55   Temp 98.2 °F (36.8 °C) (Oral)   Resp 26   Ht 5' 1\" (1.549 m)   Wt 264 lb 12.4 oz (120.1 kg)   SpO2 99%   BMI 50.03 kg/m² Temperature Range: Temp: 98.2 °F (36.8 °C) Temp  Av.8 °F (36.6 °C)  Min: 97.2 °F (36.2 °C)  Max: 98.4 °F (36.9 °C)    CURRENT EVALUATION [de-identified]2022    Patient transferred out of the ICU. Afebrile   VS stable, maintaining saturation on 4-->10 L/min via aerosol  She was extubated on . Patient is stable  Has problems with silent aspiration  Continuing respiratory toilet. Completed vancomycin     Maintaining saturations on 4-10 L via aerosol mask. And BiPAP at night. Continuing respiratory toilet.   Afebrile  Vital signs stable, maintaining saturation on 6 L via nasal cannula. Uses BiPAP at night. No fever, chills, nausea, vomiting, chest pain, cough, shortness of breath    Completed vancomycin . Labs: 2022    Bun 24-23-27-29> 19>   Creat 0.69-0.78-> 0.97> 0.84    WBC 16.0-12.7->10.2> 9.2> 7.8  Hgb  11.6-12.0> 12.3> 12.2  Plt  352-346->518> 338     Micro:   COVID-19 - not detected   MRSA nasal probe - negative  12/10 MRSA nasal probe - positive  12/10 RVP - negative    Sputum:  12/10 Resp Cx - MRSA heavy growth    Blood:  12/10 no growth  12/15 Blood Cx x2 -  Staph epi    Urine:  12/15: - no growth    Imaging:    CXR :  Cardiomegaly with perihilar congestive changes and increased perihilar   opacities, particularly on the right, which may be due to edema although   superimposed infection should be excluded clinically. Possible small right   pleural effusion. CXR :   Enlargement of cardiac silhouette with vascular congestion, improved focal infiltrate of the right upper lobe.  CXR  No change from prior study. Tubes and lines as above. Endotracheal tube is somewhat low lying but unchanged.  CXR  Similar appearance of mild bilateral airspace disease. This appears consistent with pneumonia   12/15 CXR  1. No significant change in bilateral airspace disease. 2.  Endotracheal tube terminates above the manny. 3.  Enteric tube terminates at the level of the body of the stomach. 12/14 CXR  No significant interval change. Patchy perihilar airspace opacities. 12/13 XR Pelvis   No acute abnormality of the pelvis. Known right superior ramus fracture is not well seen radiographically. Review of Systems   Constitutional:  Negative for activity change, chills and fever. HENT: Negative. Negative for congestion, rhinorrhea and sinus pressure. Eyes:  Negative for discharge and itching. Respiratory: Negative. Negative for shortness of breath. Cardiovascular: Negative. Negative for chest pain and palpitations. Gastrointestinal:  Negative for abdominal pain, nausea and vomiting. Endocrine: Negative for cold intolerance and polydipsia. Genitourinary: Negative. Negative for difficulty urinating, enuresis and urgency. Musculoskeletal:  Positive for back pain. Skin:  Negative for pallor and rash. Neurological: Negative. Negative for dizziness and seizures. Psychiatric/Behavioral: Negative. Negative for agitation. Physical Examination :     Physical Exam  Vitals reviewed. Constitutional:       General: She is not in acute distress. Appearance: She is well-developed. She is obese. Interventions: She is sedated. HENT:      Head: Normocephalic and atraumatic. Mouth/Throat:      Mouth: Mucous membranes are moist.      Pharynx: Oropharynx is clear. Eyes:      Conjunctiva/sclera: Conjunctivae normal.   Cardiovascular:      Rate and Rhythm: Regular rhythm. Bradycardia present. Heart sounds: Normal heart sounds. Pulmonary:      Effort: Pulmonary effort is normal. No respiratory distress. Breath sounds: Normal breath sounds. Abdominal:      General: Bowel sounds are normal. There is no distension. Palpations: Abdomen is soft. Tenderness: There is no abdominal tenderness. Musculoskeletal:      Cervical back: Normal range of motion. No rigidity.       Right lower leg: No edema. Left lower leg: No edema. Skin:     General: Skin is warm and dry. Neurological:      Mental Status: She is alert and oriented to person, place, and time. Psychiatric:         Behavior: Behavior normal.       Laboratory data:   I have independently reviewed the followinglabs:  CBC with Differential:   Recent Labs     12/24/22  0402 12/25/22  0651   WBC 9.2 7.8   HGB 12.3 12.2   HCT 40.7 40.7    338   LYMPHOPCT 23* 24   MONOPCT 7 7       BMP:   Recent Labs     12/24/22  0402 12/25/22  0651    139   K 3.6* 3.9    105   CO2 28 26   BUN 29* 19   CREATININE 0.97* 0.84   MG 2.6  --        Hepatic Function Panel: No results for input(s): PROT, LABALBU, BILIDIR, IBILI, BILITOT, ALKPHOS, ALT, AST in the last 72 hours. No results found for: PROCAL  Lab Results   Component Value Date/Time    CRP 4.4 08/21/2018 06:46 PM     Lab Results   Component Value Date    SEDRATE 20 08/21/2018         Lab Results   Component Value Date/Time    DDIMER 0.33 07/15/2020 05:29 PM     No results found for: FERRITIN  No results found for: LDH  No results found for: FIBRINOGEN    Results in Past 30 Days  Result Component Current Result Ref Range Previous Result Ref Range   SARS-CoV-2, PCR Not Detected (12/10/2022) Not Detected Not Detected (12/6/2022) Not Detected     Lab Results   Component Value Date/Time    COVID19 Not Detected 12/10/2022 01:09 PM    COVID19 Not Detected 12/06/2022 10:15 PM       No results for input(s): [facility] in the last 72 hours. Imaging Studies:   ONE XRAY VIEW OF THE CHEST 12/16/2022 9:50 am  Impression   Similar appearance of mild bilateral airspace disease. This appears   consistent with pneumonia     Cultures:     Culture, Blood 1 [[id number]] Collected: 12/15/22 5247   Order Status: Completed Specimen: Blood Updated: 12/18/22 0912    Specimen Description . BLOOD    Special Requests L FOREARM  10ML    Culture NO GROWTH 3 DAYS   Culture, Blood 1 [[id number]] (Abnormal) Collected: 12/15/22 1352   Order Status: Completed Specimen: Blood Updated: 12/18/22 0717    Specimen Description . BLOOD    Special Requests L AC  5ML    Culture POSITIVE Blood Culture Abnormal      DIRECT GRAM STAIN FROM BOTTLE: GRAM POSITIVE COCCI IN CLUSTERS     Staphylococcus epidermidis Detected: mecA/C Gene Detected- Methicillin Resistant Organism Methodology- Polymerase Chain Reaction (PCR)     STAPHYLOCOCCUS EPIDERMIDIS A single positive blood culture of coagulase negative Staphylocci, diphtheroids,micrococci, Cutibacterium, viridans Streptocci, Bacillus, or Lactobacillus species should be interpreted with caution and viewed as a likely skin contaminant. Abnormal      (NOTE) Direct Gram Stain from bottle and Polymerase Chain Reaction (PCR) results called to and read back by:JEANETTE RENE AT 1420 ON 12/16/22     Culture, Urine [[id number]] Collected: 12/15/22 1410   Order Status: Completed Specimen: Urine, indwelling catheter Updated: 12/16/22 1257    Specimen Description . INDWELLING CATH URINE    Culture NO SIGNIFICANT GROWTH   Culture, Blood 1 [[id number]] Collected: 12/10/22 1223   Order Status: Completed Specimen: Blood Updated: 12/15/22 1319    Specimen Description . BLOOD    Special Requests LEFT HAND 2.5 ML    Culture NO GROWTH 5 DAYS   Culture, Blood 1 [[id number]] Collected: 12/10/22 1230   Order Status: Completed Specimen: Blood Updated: 12/15/22 1319    Specimen Description . BLOOD    Special Requests RT HAND 2.5 ML    Culture NO GROWTH 5 DAYS   Culture, Respiratory [[id number]] (Abnormal)  Collected: 12/10/22 1130   Order Status: Completed Specimen: Sputum Induced Updated: 12/12/22 0953    Specimen Description . INDUCED SPUTUM    Direct Exam < 10 EPITHELIAL CELLS/LPF     <10 NEUTROPHILS/LPF     FEW GRAM POSITIVE COCCI IN CLUSTERS Abnormal     Culture METHICILLIN RESISTANT STAPHYLOCOCCUS AUREUS HEAVY GROWTH Abnormal      NO NORMAL JEFFREY   MRSA DNA Probe, Nasal [[id number]] (Abnormal) Collected: 12/10/22 1215   Order Status: Completed Specimen: Nasal Updated: 12/11/22 1027    Specimen Description . NASAL SWAB    MRSA, DNA, Nasal POSITIVE:  MRSA DNA detected by nucleic acid amplification. Abnormal     Comment:                                                    Results should be used as an adjunct to nosocomial control efforts to identify patients   needing enhanced precautions. The test is not intended to identify patients with staphylococcal infections. Results   should not be used to guide or monitor treatment for MRSA infections. Respiratory Panel, Molecular, with COVID-19 (Restricted: peds pts or suitable admitted adults) [[id number]] Collected: 12/10/22 1309   Order Status: Completed Specimen: Nasopharyngeal Swab Updated: 12/10/22 1451    Specimen Description . NASOPHARYNGEAL SWAB    Adenovirus PCR Not Detected    Coronavirus 229E PCR Not Detected    Coronavirus HKU1 PCR Not Detected    Coronavirus NL63 PCR Not Detected    Coronavirus OC43 PCR Not Detected    SARS-CoV-2, PCR Not Detected    Human Metapneumovirus PCR Not Detected    Rhino/Enterovirus PCR Not Detected    Influenza A by PCR Not Detected    Influenza B by PCR Not Detected    Parainfluenza 1 PCR Not Detected    Parainfluenza 2 PCR Not Detected    Parainfluenza 3 PCR Not Detected    Parainfluenza 4 PCR Not Detected    Resp Syncytial Virus PCR Not Detected    Bordetella Parapertussis Not Detected    B Pertussis by PCR Not Detected    Chlamydia pneumoniae By PCR Not Detected    Mycoplasma pneumo by PCR Not Detected    Comment: Performed by multiplexed nucleic acid assay.       Respiratory Panel, Molecular, with COVID-19 (Restricted: peds pts or suitable admitted adults) [[id number]] Collected: 12/10/22 1130   Order Status: Canceled Specimen: Nasopharyngeal Swab    MRSA DNA Probe, Nasal [[id number]] Collected: 12/07/22 0200   Order Status: Completed Specimen: Nasal Updated: 12/07/22 1440    Specimen Description .NASAL SWAB    MRSA, DNA, Nasal NEGATIVE    Comment: NEGATIVE:  MRSA DNA not detected by nucleic acid amplification.                                                     Results should be used as an adjunct to nosocomial control efforts to identify patients   needing enhanced precautions.     The test is not intended to identify patients with staphylococcal infections.  Results   should not be used to guide or monitor treatment for MRSA infections.       COVID-19, Rapid [[id number]] Collected: 12/06/22 2215   Order Status: Completed Specimen: Nasopharyngeal Swab Updated: 12/07/22 0207    Specimen Description .NASOPHARYNGEAL SWAB    SARS-CoV-2, Rapid Not Detected    Comment:        Rapid NAAT:  The specimen is NEGATIVE for SARS-CoV-2, the novel coronavirus associated with   COVID-19.         The ID NOW COVID-19 assay is designed to detect the virus that causes COVID-19 in patients   with signs and symptoms of infection who are suspected of COVID-19.   An individual without symptoms of COVID-19 and who is not shedding SARS-CoV-2 virus would   expect to have a negative (not detected) result in this assay.   Negative results should be treated as presumptive and, if inconsistent with clinical signs   and symptoms or necessary for patient management,   should be tested with an alternative molecular assay. Negative results do not preclude   SARS-CoV-2 infection and   should not be used as the sole basis for patient management decisions.         Fact sheet for Healthcare Providers: https://www.fda.gov/media/271193/download   Fact sheet for Patients: https://www.fda.gov/media/949142/download         Methodology: Isothermal Nucleic Acid Amplification         Medications:      miconazole   Topical BID    ARIPiprazole  10 mg Oral Daily    gabapentin  100 mg Oral TID    hydrocortisone  10 mg Oral Nightly    hydrocortisone  15 mg Oral QAM    guaiFENesin  200 mg Oral Q4H    pantoprazole (PROTONIX) 40 mg injection  40 mg IntraVENous  Daily    enoxaparin  30 mg SubCUTAneous BID    sertraline  150 mg Oral Daily    lamoTRIgine  200 mg Oral Daily    levothyroxine  150 mcg Oral QAM AC    topiramate  75 mg Oral Daily    topiramate  100 mg Oral Nightly           [facility]  Demarcus Nieves MD  [employer] messaging  OFFICE: [phone]      Electronically signed by Demarcus Nieves MD on 12/26/2022 at 2:00 PM  Thank you for allowing us to participate in the care of this patient. Please call with questions. I have discussed the care of the patient, including pertinent history and exam findings,  with the resident. I have seen and examined the patient and the key elements of all parts of the encounter have been performed by me. I agree with the assessment, plan and orders as documented by the resident.     Charity Regan, Infectious Diseases

## 2022-12-26 NOTE — PROGRESS NOTES
Physical Therapy  Facility/Department: [address] STEPDOWN  Physical Therapy Daily treatment note    Name: Addison Hernandez  : 1975  MRN: [id number]  Date of Service: 2022    Discharge Recommendations:  Patient would benefit from continued therapy after discharge   PT Equipment Recommendations  Equipment Needed: No (CTA)      Patient Diagnosis(es): The primary encounter diagnosis was Other closed nondisplaced fracture of seventh cervical vertebra, initial encounter ([facility] [address] Diagnoses of Closed wedge compression fracture of T3 vertebra, initial encounter ([facility] [address] and Multiple closed fractures of pelvis without disruption of pelvic ring, initial encounter [facility]) were also pertinent to this visit. 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 Past Surgical History:  has a past surgical history that includes Brain Biopsy; Tonsillectomy; knee surgery; Cochlear implant (Left); Dental surgery; Cochlear implant (Right, 05/10/2022); Colonoscopy; Endoscopy, colon, diagnostic; Colonoscopy (N/A, 2022); and Upper gastrointestinal endoscopy (N/A, 2022). Assessment   Assessment: Pt required MOD A x2 for bed mobility. Tolerated sitting EOB x 10-12 min, up to MIN A to maintain. Recommend intense PT after d/c to address deficits.   Therapy Prognosis: Good  Decision Making: High Complexity  Requires PT Follow-Up: Yes  Activity Tolerance  Activity Tolerance: Patient limited by pain  Activity Tolerance Comments: Pt c/o LBP 8/10 limiting mobility this date     Plan   Physcial Therapy Plan  General Plan:  (5-6 x week)  Current Treatment Recommendations: Strengthening, ROM, Balance training, Functional mobility training, Transfer training, Endurance training, Gait training, Stair training, Therapeutic activities, Patient/Caregiver education & training, Equipment evaluation, education, & procurement, Safety education & training, Wheelchair mobility training  Safety Devices  Type of Devices: Left in bed, Call light within reach, Patient at risk for falls, Bed alarm in place, Nurse notified  Restraints  Restraints Initially in Place: No     Restrictions  Restrictions/Precautions  Restrictions/Precautions: Fall Risk, Weight Bearing, Contact Precautions  Required Braces or Orthoses?: No  Lower Extremity Weight Bearing Restrictions  Right Lower Extremity Weight Bearing: Weight Bearing As Tolerated  Required Braces or Orthoses  Cervical:  (per neurosurg c-collar no longer needed)  Position Activity Restriction  Other position/activity restrictions: up with assist, no intervention required for C7 or T3 fracture per Le[place] Law via [employer]; Acute C7 Right TP fx, Age indeterminate T3 compression fx, Nondisplaced right sacral ala fx, Nondisplaced fracture right superior pubic ramus, Right obturator ring fx. Cochlear implant R ear during session this day. Subjective   General  Chart Reviewed: Yes  Patient assessed for rehabilitation services?: Yes  Response To Previous Treatment: Patient with no complaints from previous session. Family / Caregiver Present: Yes (Dad)  Follows Commands: Within Functional Limits  General Comment  Comments: Pt retired to bed , bed alarm set  Subjective  Subjective: Pt resting on Bipap, RN removed and added NC, pt pleasant and cooperative, [place] has cochlear implants, father very supportive, pt c/o of LBP 8/10, RN aware.         Cognition   Orientation  Overall Orientation Status: Within Functional Limits  Orientation Level: Oriented to person;Oriented to place  Cognition  Overall Cognitive Status: Exceptions  Following Commands: Follows multistep commands with repitition; Follows multistep commands with increased time  Attention Span: Attends with cues to redirect  Safety Judgement: Decreased awareness of need for assistance;Decreased awareness of need for safety  Problem Solving: Decreased awareness of errors;Assistance required to identify errors made;Assistance required to correct errors made  Insights: Decreased awareness of deficits  Initiation: Requires cues for some  Sequencing: Requires cues for some     Resp: 23  SpO2: 95 %  O2 Device: Nasal cannula     Balance  Sitting: With support (Min A static/dynamic sitting at EOB utilizing 0-2 hand support demo post and L lateral lean tolerating approx 10-12 min)  Standing:  (GILDARDO d/t decreased sitting balance, SOB and pt c/o back pain)  Bed mobility  Rolling to Left: Minimal assistance  Supine to Sit: Moderate assistance;2 Person assistance  Sit to Supine: Moderate assistance;2 Person assistance  Scooting:  Moderate assistance  Bed Mobility Comments: utilizing modified log roll  Transfers  Sit to Stand: Unable to assess  Comment: unsafe to attempt due to poor endurance and back pain        Balance  Posture: Fair  Sitting - Static: Fair  Sitting - Dynamic: Fair;-  Comments: Min A static/dynamic sitting at EOB utilizing 0-2 hand support demo post and L lateral lean tolerating approx 10-12 min  Exercise Treatment: Pt performed supine ankle pumps x15, seated LAQ x10, and marching x10 at EOB with bilateral hand suppport      AM-PAC Score  AM-PAC Inpatient Mobility Raw Score : 8 (12/26/22 1747)  AM-PAC Inpatient T-Scale Score : 28.52 (12/26/22 1747)  Mobility Inpatient CMS 0-100% Score: 86.62 (12/26/22 1747)  Mobility Inpatient CMS G-Code Modifier : CM (12/26/22 1747)      Goals  Short Term Goals  Time Frame for Short Term Goals: 14 visits  Short Term Goal 1: Perform bed mobility Mod I  Short Term Goal 2: Perform sit to stand with Min A  Short Term Goal 3: Propel wheelchair 300ft independently  Short Term Goal 4: Ambulate 100ft with RW and CGA       Education  Patient Education  Education Given To: Patient; Family  Education Provided: Role of Therapy;Transfer Training;Plan of Care;Precautions  Education Method: Demonstration;Verbal  Barriers to Learning: Hearing  Education Outcome: Continued education needed      Therapy Time   Individual Concurrent Group Co-treatment   Time In 1142         Time Out 1205         Minutes 23         Timed Code Treatment Minutes: 11 Minutes ([place] with [place]OPLES for safety and to progress goals)       [place], PTA

## 2022-12-27 PROBLEM — R06.1 STRIDOR: Status: ACTIVE | Noted: 2022-12-27

## 2022-12-27 PROBLEM — S12.9XXA CERVICAL TRANSVERSE PROCESS FRACTURE, INITIAL ENCOUNTER (HCC): Chronic | Status: ACTIVE | Noted: 2022-12-06

## 2022-12-27 PROBLEM — S12.601A CLOSED NONDISPLACED FRACTURE OF SEVENTH CERVICAL VERTEBRA (HCC): Chronic | Status: ACTIVE | Noted: 2022-12-06

## 2022-12-27 PROBLEM — I50.30 (HFPEF) HEART FAILURE WITH PRESERVED EJECTION FRACTION (HCC): Chronic | Status: ACTIVE | Noted: 2022-12-27

## 2022-12-27 PROBLEM — S32.82XA MULTIPLE CLOSED PELVIC FRACTURES WITHOUT DISRUPTION OF PELVIC CIRCLE (HCC): Chronic | Status: ACTIVE | Noted: 2022-12-09

## 2022-12-27 PROBLEM — I50.30 (HFPEF) HEART FAILURE WITH PRESERVED EJECTION FRACTION (HCC): Status: ACTIVE | Noted: 2022-12-27

## 2022-12-27 LAB
ABSOLUTE EOS #: 0.27 K/UL (ref 0–0.44)
ABSOLUTE IMMATURE GRANULOCYTE: 0.04 K/UL (ref 0–0.3)
ABSOLUTE LYMPH #: 1.54 K/UL (ref 1.1–3.7)
ABSOLUTE MONO #: 0.53 K/UL (ref 0.1–1.2)
ANION GAP SERPL CALCULATED.3IONS-SCNC: 9 MMOL/L (ref 9–17)
BASOPHILS # BLD: 1 % (ref 0–2)
BASOPHILS ABSOLUTE: 0.07 K/UL (ref 0–0.2)
BUN BLDV-MCNC: 12 MG/DL (ref 6–20)
CALCIUM SERPL-MCNC: 8.3 MG/DL (ref 8.6–10.4)
CHLORIDE BLD-SCNC: 104 MMOL/L (ref 98–107)
CO2: 24 MMOL/L (ref 20–31)
CREAT SERPL-MCNC: 0.72 MG/DL (ref 0.5–0.9)
EOSINOPHILS RELATIVE PERCENT: 4 % (ref 1–4)
GFR SERPL CREATININE-BSD FRML MDRD: >60 ML/MIN/1.73M2
GLUCOSE BLD-MCNC: 95 MG/DL (ref 70–99)
HCT VFR BLD CALC: 42.5 % (ref 36.3–47.1)
HEMOGLOBIN: 12.4 G/DL (ref 11.9–15.1)
IMMATURE GRANULOCYTES: 1 %
LYMPHOCYTES # BLD: 21 % (ref 24–43)
MCH RBC QN AUTO: 27.7 PG (ref 25.2–33.5)
MCHC RBC AUTO-ENTMCNC: 29.2 G/DL (ref 28.4–34.8)
MCV RBC AUTO: 95.1 FL (ref 82.6–102.9)
MONOCYTES # BLD: 7 % (ref 3–12)
NRBC AUTOMATED: 0 PER 100 WBC
PDW BLD-RTO: 13.9 % (ref 11.8–14.4)
PLATELET # BLD: 278 K/UL (ref 138–453)
PMV BLD AUTO: 10.3 FL (ref 8.1–13.5)
POTASSIUM SERPL-SCNC: 4 MMOL/L (ref 3.7–5.3)
RBC # BLD: 4.47 M/UL (ref 3.95–5.11)
SEG NEUTROPHILS: 66 % (ref 36–65)
SEGMENTED NEUTROPHILS ABSOLUTE COUNT: 4.89 K/UL (ref 1.5–8.1)
SODIUM BLD-SCNC: 137 MMOL/L (ref 135–144)
WBC # BLD: 7.3 K/UL (ref 3.5–11.3)

## 2022-12-27 PROCEDURE — 6370000000 HC RX 637 (ALT 250 FOR IP): Performed by: STUDENT IN AN ORGANIZED HEALTH CARE EDUCATION/TRAINING PROGRAM

## 2022-12-27 PROCEDURE — 99233 SBSQ HOSP IP/OBS HIGH 50: CPT | Performed by: INTERNAL MEDICINE

## 2022-12-27 PROCEDURE — 6360000002 HC RX W HCPCS: Performed by: NURSE PRACTITIONER

## 2022-12-27 PROCEDURE — 99232 SBSQ HOSP IP/OBS MODERATE 35: CPT | Performed by: INTERNAL MEDICINE

## 2022-12-27 PROCEDURE — 36415 COLL VENOUS BLD VENIPUNCTURE: CPT

## 2022-12-27 PROCEDURE — 94660 CPAP INITIATION&MGMT: CPT

## 2022-12-27 PROCEDURE — 6370000000 HC RX 637 (ALT 250 FOR IP): Performed by: NURSE PRACTITIONER

## 2022-12-27 PROCEDURE — 2500000003 HC RX 250 WO HCPCS: Performed by: HEALTH CARE PROVIDER

## 2022-12-27 PROCEDURE — 2060000000 HC ICU INTERMEDIATE R&B

## 2022-12-27 PROCEDURE — 85025 COMPLETE CBC W/AUTO DIFF WBC: CPT

## 2022-12-27 PROCEDURE — 6370000000 HC RX 637 (ALT 250 FOR IP)

## 2022-12-27 PROCEDURE — 80048 BASIC METABOLIC PNL TOTAL CA: CPT

## 2022-12-27 PROCEDURE — 2580000003 HC RX 258: Performed by: STUDENT IN AN ORGANIZED HEALTH CARE EDUCATION/TRAINING PROGRAM

## 2022-12-27 PROCEDURE — 2500000003 HC RX 250 WO HCPCS: Performed by: INTERNAL MEDICINE

## 2022-12-27 PROCEDURE — 6360000002 HC RX W HCPCS

## 2022-12-27 RX ORDER — LAMOTRIGINE 200 MG/1
200 TABLET ORAL DAILY
Qty: 30 TABLET | Refills: 3 | Status: CANCELLED | OUTPATIENT
Start: 2022-12-27

## 2022-12-27 RX ORDER — TOPIRAMATE 100 MG/1
100 TABLET, FILM COATED ORAL NIGHTLY
Qty: 60 TABLET | Refills: 3 | Status: CANCELLED | OUTPATIENT
Start: 2022-12-27

## 2022-12-27 RX ORDER — TOPIRAMATE 50 MG/1
75 TABLET, FILM COATED ORAL DAILY
Qty: 60 TABLET | Refills: 3 | Status: CANCELLED | OUTPATIENT
Start: 2022-12-27

## 2022-12-27 RX ORDER — PANTOPRAZOLE SODIUM 40 MG/1
40 TABLET, DELAYED RELEASE ORAL
Qty: 30 TABLET | Refills: 0 | Status: CANCELLED | OUTPATIENT
Start: 2022-12-28

## 2022-12-27 RX ORDER — GUAIFENESIN 100 MG/5ML
200 SYRUP ORAL 2 TIMES DAILY
Status: DISCONTINUED | OUTPATIENT
Start: 2022-12-27 | End: 2022-12-31 | Stop reason: HOSPADM

## 2022-12-27 RX ORDER — FUROSEMIDE 10 MG/ML
20 INJECTION INTRAMUSCULAR; INTRAVENOUS ONCE
Status: COMPLETED | OUTPATIENT
Start: 2022-12-27 | End: 2022-12-27

## 2022-12-27 RX ADMIN — TOPIRAMATE 100 MG: 100 TABLET, FILM COATED ORAL at 19:58

## 2022-12-27 RX ADMIN — OXYCODONE 5 MG: 5 TABLET ORAL at 01:34

## 2022-12-27 RX ADMIN — GUAIFENESIN 200 MG: 200 SOLUTION ORAL at 09:00

## 2022-12-27 RX ADMIN — SERTRALINE 150 MG: 50 TABLET, FILM COATED ORAL at 08:59

## 2022-12-27 RX ADMIN — GUAIFENESIN 200 MG: 200 SOLUTION ORAL at 19:59

## 2022-12-27 RX ADMIN — OXYCODONE 5 MG: 5 TABLET ORAL at 09:30

## 2022-12-27 RX ADMIN — OXYCODONE 5 MG: 5 TABLET ORAL at 21:42

## 2022-12-27 RX ADMIN — LEVOTHYROXINE SODIUM 150 MCG: 75 TABLET ORAL at 05:29

## 2022-12-27 RX ADMIN — LAMOTRIGINE 200 MG: 100 TABLET ORAL at 08:59

## 2022-12-27 RX ADMIN — ENOXAPARIN SODIUM 30 MG: 100 INJECTION SUBCUTANEOUS at 19:59

## 2022-12-27 RX ADMIN — ENOXAPARIN SODIUM 30 MG: 100 INJECTION SUBCUTANEOUS at 08:59

## 2022-12-27 RX ADMIN — OXYCODONE 5 MG: 5 TABLET ORAL at 13:31

## 2022-12-27 RX ADMIN — GABAPENTIN 100 MG: 100 CAPSULE ORAL at 19:58

## 2022-12-27 RX ADMIN — OXYCODONE 5 MG: 5 TABLET ORAL at 17:30

## 2022-12-27 RX ADMIN — SODIUM CHLORIDE, PRESERVATIVE FREE 10 ML: 5 INJECTION INTRAVENOUS at 19:59

## 2022-12-27 RX ADMIN — TOPIRAMATE 75 MG: 100 TABLET, FILM COATED ORAL at 08:59

## 2022-12-27 RX ADMIN — ARIPIPRAZOLE 10 MG: 10 TABLET ORAL at 08:59

## 2022-12-27 RX ADMIN — GABAPENTIN 100 MG: 100 CAPSULE ORAL at 13:31

## 2022-12-27 RX ADMIN — HYDROCORTISONE 15 MG: 10 TABLET ORAL at 08:59

## 2022-12-27 RX ADMIN — OXYCODONE 5 MG: 5 TABLET ORAL at 05:29

## 2022-12-27 RX ADMIN — ANTI-FUNGAL POWDER MICONAZOLE NITRATE TALC FREE: 1.42 POWDER TOPICAL at 19:59

## 2022-12-27 RX ADMIN — GUAIFENESIN 200 MG: 200 SOLUTION ORAL at 01:34

## 2022-12-27 RX ADMIN — ANTI-FUNGAL POWDER MICONAZOLE NITRATE TALC FREE: 1.42 POWDER TOPICAL at 09:00

## 2022-12-27 RX ADMIN — ACETAMINOPHEN 650 MG: 325 TABLET ORAL at 20:04

## 2022-12-27 RX ADMIN — GABAPENTIN 100 MG: 100 CAPSULE ORAL at 08:59

## 2022-12-27 RX ADMIN — PANTOPRAZOLE SODIUM 40 MG: 40 TABLET, DELAYED RELEASE ORAL at 05:29

## 2022-12-27 RX ADMIN — FUROSEMIDE 20 MG: 10 INJECTION, SOLUTION INTRAMUSCULAR; INTRAVENOUS at 09:20

## 2022-12-27 RX ADMIN — GUAIFENESIN 200 MG: 200 SOLUTION ORAL at 05:29

## 2022-12-27 RX ADMIN — HYDROCORTISONE 10 MG: 10 TABLET ORAL at 19:58

## 2022-12-27 ASSESSMENT — ENCOUNTER SYMPTOMS
BACK PAIN: 0
DIARRHEA: 0
RESPIRATORY NEGATIVE: 1
COUGH: 0
EYE DISCHARGE: 0
VOMITING: 0
EYE PAIN: 0
BACK PAIN: 1
SHORTNESS OF BREATH: 1
EYE ITCHING: 0
WHEEZING: 0
PHOTOPHOBIA: 0
SORE THROAT: 0
SINUS PRESSURE: 0
SHORTNESS OF BREATH: 0
EYE REDNESS: 0
ABDOMINAL PAIN: 0
VOICE CHANGE: 0
RHINORRHEA: 0
TROUBLE SWALLOWING: 0
COUGH: 1
NAUSEA: 0

## 2022-12-27 ASSESSMENT — PAIN SCALES - GENERAL
PAINLEVEL_OUTOF10: 8
PAINLEVEL_OUTOF10: 7
PAINLEVEL_OUTOF10: 8
PAINLEVEL_OUTOF10: 8
PAINLEVEL_OUTOF10: 9

## 2022-12-27 ASSESSMENT — PAIN DESCRIPTION - LOCATION
LOCATION: BACK

## 2022-12-27 ASSESSMENT — PAIN - FUNCTIONAL ASSESSMENT: PAIN_FUNCTIONAL_ASSESSMENT: PREVENTS OR INTERFERES SOME ACTIVE ACTIVITIES AND ADLS

## 2022-12-27 ASSESSMENT — PAIN DESCRIPTION - DESCRIPTORS
DESCRIPTORS: ACHING;SHARP

## 2022-12-27 ASSESSMENT — PAIN DESCRIPTION - ORIENTATION
ORIENTATION: UPPER

## 2022-12-27 ASSESSMENT — PAIN SCALES - WONG BAKER: WONGBAKER_NUMERICALRESPONSE: 0

## 2022-12-27 NOTE — PROGRESS NOTES
[facility]  Progress Note    Bertha Alejo  MRN: [id number]  Date: 12/27/2022  LOS: 24     Reason for F/U :   Pneumonia    Impression :   Acute hypoxic respiratory failure s/p intubation. Extubated on 12/22. 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  Developmental delay  Morbid obesity  Silent aspiration    Recommendations:   Completed intravenous antimicrobial therapy with vancomycin on 12-22-22  Continue aggressive pulmonary toileting and we will follow her progress  Spirometer use  ID signing off    Infection Control Recommendations:   Contact precautions    Discharge Planning:   Estimated Length of IV antimicrobials: 12-22-22  Patient will need Midline Catheter Insertion/ PICC line Insertion: No  Patient will need: Home IV , [place],  SNF,  LTAC: Undetermined  Patient willneed outpatient wound care: No    Medical Decision making / Summary of Stay:   Bertha Alejo is a 41y.o.-year-old female who was initially admitted on 12/5/2022. Patient seen at the request of Dr. Evgeny Tobar:     This patient, with a history of developmental delay, presented to the [facility] ED after falling down some stairs at home. She was complaining of neck and back pain. Imaging revealed an acute C7 transverse process fracture, chronic T3 compression fx and fractures of right sacral ala, nondisplaced fracture of right superior ramus and right obturator ring. [facility] Neurosurgery was consulted and it was determined there is no neurosurgical intervention needed. Orthopedic surgery was also consulted and they also did not recommend surgical intervention, but rather outpatient follow-up.      On 12/6/22, the patient's mentation declined and she became tachypneic requiring emergent intubation. Neurology was consulted for the patient's history of seizures. Her home seizure medications were restarted. An EEG did not show any seizure activity and the patient's mentation improved. She was extubated on the 7th but required re-intubation on 12/10/22 after a rapid response was called. CXR showed concern for RUL aspiration pneumonia. IV Zosyn was started and cultures were obtained. Viral respiratory panel was negative for influenza and Covid     Sputum cultures grew MRSA and the patient's antibiotics were changed to IV vancomycin on 12/11/22. There has been no growth on blood or urine cultures. ID was consulted for MRSA on sputum cultures        CT Head W/O Contrast   Final Result   No acute intracranial abnormality. Small amount of fluid in the right mastoid air cells. CT CHEST ABDOMEN PELVIS WO CONTRAST Additional Contrast? None   Final Result   Chest:       1. Mild anterior wedging of T3 suggesting an age indeterminate compression   fracture. No fracture line is identified. Clinical correlation with the   site of symptoms is suggested. 2. Significant motion artifact with scattered patchy ground-glass opacities   in the lungs which may be related to motion artifact. Ground-glass opacities   are otherwise nonspecific and could be related to atypical infection or   pneumonitis. Abdomen and pelvis:       1. Nondisplaced fractures of the right sacral ala and the right obturator   ring. 2. No acute findings elsewhere in the abdomen or pelvis. 3. Left renal cyst.           CT CSpine W/O Contrast   Final Result   Acute fracture of the right C7 transverse process. Spinal degenerative changes as described.            Interval Changes: 12/27/2022     /75   Pulse 70   Temp 98.5 °F (36.9 °C) (Oral)   Resp 23   Ht 5' 1\" (1.549 m)   Wt 263 lb 7.2 oz (119.5 kg)   SpO2 96%   BMI 49.78 kg/m²     Temperature Range: Temp: 98.5 °F (36.9 °C) Temp  Av.4 °F (36.9 °C)  Min: 97.4 °F (36.3 °C)  Max: 98.9 °F (37.2 °C)    CURRENT EVALUATION [de-identified]2022    Patient transferred out of the ICU. Afebrile   VS stable, maintaining saturation on 4-->10 L/min via aerosol  She was extubated on . Patient is stable  Has problems with silent aspiration  Continuing respiratory toilet. Completed vancomycin     Maintaining saturations on 4-10 L via aerosol mask. And BiPAP at night. Continuing respiratory toilet.   Afebrile  Vital signs stable, maintaining saturation on 6 L via nasal cannula. Uses BiPAP at night. No fever, chills, nausea, vomiting, chest pain, cough, shortness of breath    Completed vancomycin .     :  Afebrile  VS stable, maintaining saturation on 4L. Uses BiPAP at night  No fever, chills, nausea vomiting, chest pain, cough, shortness of breath. Completed vancomycin, continue to monitor. Labs: 2022    Bun 24-23-27-29> 19> 12  Creat 0.69-0.78-> 0.97> 0.84> 0.72    WBC 16.0-12.7->10.2> 9.2> 7.8> 7.3  Hgb  11.6-12.0> 12.3> 12.2> 12.4  Plt  352-346->518> 338 > 278    Micro:   COVID-19 - not detected   MRSA nasal probe - negative  12/10 MRSA nasal probe - positive  12/10 RVP - negative    Sputum:  12/10 Resp Cx - MRSA heavy growth    Blood:  12/10 no growth  12/15 Blood Cx x2 - / Staph epi    Urine:  12/15: - no growth    Imaging:    CXR :  Cardiomegaly with perihilar congestive changes and increased perihilar   opacities, particularly on the right, which may be due to edema although   superimposed infection should be excluded clinically. Possible small right   pleural effusion. CXR :   Enlargement of cardiac silhouette with vascular congestion, improved focal infiltrate of the right upper lobe.  CXR  No change from prior study. Tubes and lines as above. Endotracheal tube is somewhat low lying but unchanged.           CXR  Similar appearance of mild bilateral airspace disease. This appears consistent with pneumonia   12/15 CXR  1. No significant change in bilateral airspace disease. 2.  Endotracheal tube terminates above the manny. 3.  Enteric tube terminates at the level of the body of the stomach. 12/14 CXR  No significant interval change. Patchy perihilar airspace opacities. 12/13 XR Pelvis   No acute abnormality of the pelvis. Known right superior ramus fracture is not well seen radiographically. Review of Systems   Constitutional:  Negative for activity change, chills and fever. HENT: Negative. Negative for congestion, rhinorrhea and sinus pressure. Eyes:  Negative for discharge and itching. Respiratory: Negative. Negative for cough, shortness of breath and wheezing. Cardiovascular: Negative. Negative for chest pain and palpitations. Gastrointestinal:  Negative for abdominal pain, nausea and vomiting. Endocrine: Negative for cold intolerance and polydipsia. Genitourinary: Negative. Negative for difficulty urinating, enuresis and urgency. Musculoskeletal:  Negative for back pain. Skin:  Negative for pallor and rash. Neurological: Negative. Negative for dizziness, seizures and weakness. Psychiatric/Behavioral: Negative. Negative for agitation. Physical Examination :     Physical Exam  Vitals reviewed. Constitutional:       General: She is not in acute distress. Appearance: She is well-developed. She is obese. Interventions: She is sedated. HENT:      Head: Normocephalic and atraumatic. Nose: Nose normal.      Mouth/Throat:      Mouth: Mucous membranes are moist.      Pharynx: Oropharynx is clear. Eyes:      Conjunctiva/sclera: Conjunctivae normal.   Cardiovascular:      Rate and Rhythm: Regular rhythm. Bradycardia present. Heart sounds: Normal heart sounds. Pulmonary:      Effort: Pulmonary effort is normal. No respiratory distress. Breath sounds: Normal breath sounds. Abdominal:      General: Bowel sounds are normal. There is no distension. Palpations: Abdomen is soft. Tenderness: There is no abdominal tenderness. Genitourinary:     Comments: No [place]  Musculoskeletal:      Cervical back: Normal range of motion. No rigidity. Right lower leg: No edema. Left lower leg: No edema. Skin:     General: Skin is warm and dry. Findings: No erythema, lesion or rash. Neurological:      Mental Status: She is alert and oriented to person, place, and time. Psychiatric:         Mood and Affect: Mood normal.         Behavior: Behavior normal.       Laboratory data:   I have independently reviewed the followinglabs:  CBC with Differential:   Recent Labs     12/25/22  0651 12/27/22  0448   WBC 7.8 7.3   HGB 12.2 12.4   HCT 40.7 42.5    278   LYMPHOPCT 24 21*   MONOPCT 7 7       BMP:   Recent Labs     12/25/22  0651 12/27/22  0448    137   K 3.9 4.0    104   CO2 26 24   BUN 19 12   CREATININE 0.84 0.72       Hepatic Function Panel: No results for input(s): PROT, LABALBU, BILIDIR, IBILI, BILITOT, ALKPHOS, ALT, AST in the last 72 hours. No results found for: PROCAL  Lab Results   Component Value Date/Time    CRP 4.4 08/21/2018 06:46 PM     Lab Results   Component Value Date    SEDRATE 20 08/21/2018         Lab Results   Component Value Date/Time    DDIMER 0.33 07/15/2020 05:29 PM     No results found for: FERRITIN  No results found for: LDH  No results found for: FIBRINOGEN    Results in Past 30 Days  Result Component Current Result Ref Range Previous Result Ref Range   SARS-CoV-2, PCR Not Detected (12/10/2022) Not Detected Not Detected (12/6/2022) Not Detected     Lab Results   Component Value Date/Time    COVID19 Not Detected 12/10/2022 01:09 PM    COVID19 Not Detected 12/06/2022 10:15 PM       No results for input(s): VANCOTROUGH in the last 72 hours.     Imaging Studies:   ONE XRAY VIEW OF THE CHEST 12/16/2022 9:50 am  Impression   Similar appearance of mild bilateral airspace disease. This appears   consistent with pneumonia     Cultures:     Culture, Blood 1 [[id number]] Collected: 12/15/22 1349   Order Status: Completed Specimen: Blood Updated: 12/18/22 0912    Specimen Description . BLOOD    Special Requests L FOREARM  10ML    Culture NO GROWTH 3 DAYS   Culture, Blood 1 [[id number]] (Abnormal) Collected: 12/15/22 1352   Order Status: Completed Specimen: Blood Updated: 12/18/22 0717    Specimen Description . BLOOD    Special Requests L AC  5ML    Culture POSITIVE Blood Culture Abnormal      DIRECT GRAM STAIN FROM BOTTLE: GRAM POSITIVE COCCI IN CLUSTERS     Staphylococcus epidermidis Detected: mecA/C Gene Detected- Methicillin Resistant Organism Methodology- Polymerase Chain Reaction (PCR)     STAPHYLOCOCCUS EPIDERMIDIS A single positive blood culture of coagulase negative Staphylocci, diphtheroids,micrococci, Cutibacterium, viridans Streptocci, Bacillus, or Lactobacillus species should be interpreted with caution and viewed as a likely skin contaminant. Abnormal      (NOTE) Direct Gram Stain from bottle and Polymerase Chain Reaction (PCR) results called to and read back by:JEANETTE RENE AT 1420 ON 12/16/22     Culture, Urine [[id number]] Collected: 12/15/22 1410   Order Status: Completed Specimen: Urine, indwelling catheter Updated: 12/16/22 1257    Specimen Description . INDWELLING CATH URINE    Culture NO SIGNIFICANT GROWTH   Culture, Blood 1 [[id number]] Collected: 12/10/22 1223   Order Status: Completed Specimen: Blood Updated: 12/15/22 1319    Specimen Description . BLOOD    Special Requests LEFT HAND 2.5 ML    Culture NO GROWTH 5 DAYS   Culture, Blood 1 [[id number]] Collected: 12/10/22 1230   Order Status: Completed Specimen: Blood Updated: 12/15/22 1319    Specimen Description . BLOOD    Special Requests RT HAND 2.5 ML    Culture NO GROWTH 5 DAYS   Culture, Respiratory [[id number]] (Abnormal)  Collected: 12/10/22 1130   Order Status: Completed Specimen: Sputum Induced Updated: 12/12/22 0975    Specimen Description . INDUCED SPUTUM    Direct Exam < 10 EPITHELIAL CELLS/LPF     <10 NEUTROPHILS/LPF     FEW GRAM POSITIVE COCCI IN CLUSTERS Abnormal     Culture METHICILLIN RESISTANT STAPHYLOCOCCUS AUREUS HEAVY GROWTH Abnormal      NO NORMAL JEFFREY   MRSA DNA Probe, Nasal [[id number]] (Abnormal) Collected: 12/10/22 1215   Order Status: Completed Specimen: Nasal Updated: 12/11/22 1027    Specimen Description . NASAL SWAB    MRSA, DNA, Nasal POSITIVE:  MRSA DNA detected by nucleic acid amplification. Abnormal     Comment:                                                    Results should be used as an adjunct to nosocomial control efforts to identify patients   needing enhanced precautions. The test is not intended to identify patients with staphylococcal infections. Results   should not be used to guide or monitor treatment for MRSA infections. Respiratory Panel, Molecular, with COVID-19 (Restricted: peds pts or suitable admitted adults) [[id number]] Collected: 12/10/22 1309   Order Status: Completed Specimen: Nasopharyngeal Swab Updated: 12/10/22 1451    Specimen Description . NASOPHARYNGEAL SWAB    Adenovirus PCR Not Detected    Coronavirus 229E PCR Not Detected    Coronavirus HKU1 PCR Not Detected    Coronavirus NL63 PCR Not Detected    Coronavirus OC43 PCR Not Detected    SARS-CoV-2, PCR Not Detected    Human Metapneumovirus PCR Not Detected    Rhino/Enterovirus PCR Not Detected    Influenza A by PCR Not Detected    Influenza B by PCR Not Detected    Parainfluenza 1 PCR Not Detected    Parainfluenza 2 PCR Not Detected    Parainfluenza 3 PCR Not Detected    Parainfluenza 4 PCR Not Detected    Resp Syncytial Virus PCR Not Detected    Bordetella Parapertussis Not Detected    B Pertussis by PCR Not Detected    Chlamydia pneumoniae By PCR Not Detected    Mycoplasma pneumo by PCR Not Detected    Comment: Performed by multiplexed nucleic acid assay. Respiratory Panel, Molecular, with COVID-19 (Restricted: peds pts or suitable admitted adults) [[id number]] Collected: 12/10/22 1130   Order Status: Canceled Specimen: Nasopharyngeal Swab    MRSA DNA Probe, Nasal [[id number]] Collected: 12/07/22 0200   Order Status: Completed Specimen: Nasal Updated: 12/07/22 1440    Specimen Description . NASAL SWAB    MRSA, DNA, Nasal NEGATIVE    Comment: NEGATIVE:  MRSA DNA not detected by nucleic acid amplification. Results should be used as an adjunct to nosocomial control efforts to identify patients   needing enhanced precautions. The test is not intended to identify patients with staphylococcal infections. Results   should not be used to guide or monitor treatment for MRSA infections. COVID-19, Rapid [[id number]] Collected: 12/06/22 2215   Order Status: Completed Specimen: Nasopharyngeal Swab Updated: 12/07/22 0207    Specimen Description . NASOPHARYNGEAL SWAB    SARS-CoV-2, Rapid Not Detected    Comment:        Rapid NAAT:  The specimen is NEGATIVE for SARS-CoV-2, the novel coronavirus associated with   COVID-19. The ID NOW COVID-19 assay is designed to detect the virus that causes COVID-19 in patients   with signs and symptoms of infection who are suspected of COVID-19. An individual without symptoms of COVID-19 and who is not shedding SARS-CoV-2 virus would   expect to have a negative (not detected) result in this assay. Negative results should be treated as presumptive and, if inconsistent with clinical signs   and symptoms or necessary for patient management,   should be tested with an alternative molecular assay. Negative results do not preclude   SARS-CoV-2 infection and   should not be used as the sole basis for patient management decisions.          Fact sheet for Healthcare Providers: http://www.mandeep.biz/   Fact sheet for Patients: Gly[employer].         Methodology: Isothermal Nucleic Acid Amplification         Medications:      furosemide  20 mg IntraVENous Once    miconazole   Topical BID    pantoprazole  40 mg Oral QAM AC    ARIPiprazole  10 mg Oral Daily    gabapentin  100 mg Oral TID    hydrocortisone  10 mg Oral Nightly    hydrocortisone  15 mg Oral QAM    guaiFENesin  200 mg Oral Q4H    enoxaparin  30 mg SubCUTAneous BID    sertraline  150 mg Oral Daily    lamoTRIgine  200 mg Oral Daily    levothyroxine  150 mcg Oral QAM AC    topiramate  75 mg Oral Daily    topiramate  100 mg Oral Nightly           [facility]  Bayron Hilton MD  [employer] messaging  OFFICE: [phone]      Electronically signed by Bayron Hilton MD on 12/27/2022 at 8:22 AM  Thank you for allowing us to participate in the care of this patient. Please call with questions. I have discussed the care of the patient, including pertinent history and exam findings,  with the resident. I have seen and examined the patient and the key elements of all parts of the encounter have been performed by me. I agree with the assessment, plan and orders as documented by the resident.     Charity Regan, Infectious Diseases

## 2022-12-27 NOTE — PROGRESS NOTES
Comprehensive Nutrition Assessment    Type and Reason for Visit:  Reassess    Nutrition Recommendations/Plan:   Increase oral fortified pudding supplement from BID to TID related to less than adequate intakes, poor appetite, patient reporting that she consumes 100% of supplement. Continue to monitor weights, intake, labs, and follow up. Malnutrition Assessment:  Malnutrition Status: At risk for malnutrition (Comment) (12/07/22 1047)    Context:  Acute Illness     Findings of the 6 clinical characteristics of malnutrition:  Energy Intake:  Mild decrease in energy intake (Comment)  Weight Loss:  No significant weight loss     Body Fat Loss:  No significant body fat loss     Muscle Mass Loss:  No significant muscle mass loss    Fluid Accumulation:  No significant fluid accumulation     Strength:  Not Performed    Nutrition Assessment:    Chart reviewed for follow up. Observed patient in bed, father in room during visit. Patient reported she still has very little of an appetite. Father reported that patient is consuming applesauce and the fortified pudding only. Current weight indicates a weight loss since admission. Documented intakes 50-75%. LBM 12/22. Will recommend to increase fortified pudding from BID to TID with meals. Will continue to monitor and follow up. Nutrition Related Findings:    Meds/Labs reviewed Wound Type: Pressure Injury, Stage I (to right buttocks)       Current Nutrition Intake & Therapies:    Average Meal Intake: 26-50%  Average Supplements Intake: %  ADULT DIET; Dysphagia - Pureed; No Fluids on Tray  ADULT ORAL NUTRITION SUPPLEMENT; Breakfast, Dinner; Fortified Pudding Oral Supplement    Anthropometric Measures:  Height: 5' 1\" (154.9 cm)  Ideal Body Weight (IBW): 105 lbs (48 kg)    Admission Body Weight: 282 lb (127.9 kg)  Current Body Weight: 263 lb 7.2 oz (119.5 kg), 252.4 % IBW.  Weight Source: Bed Scale  Current BMI (kg/m2): 49.8  Usual Body Weight: 273 lb 12.8 oz (124.2 kg) (8/12/22 bed scale per chart review)  % Weight Change (Calculated): 3.8  Weight Adjustment For: No Adjustment  BMI Categories: Obese Class 3 (BMI 40.0 or greater)    Estimated Daily Nutrient Needs:  Energy Requirements Based On: Formula  Weight Used for Energy Requirements: Admission  Energy (kcal/day): 7056-7307 kcals/day  Weight Used for Protein Requirements: Ideal  Protein (g/day):  g pro/day  Method Used for Fluid Requirements: Other (Comment)  Fluid (ml/day): fluids per MD    Nutrition Diagnosis:   Inadequate oral intake related to swallowing difficulty (lack of appetite) as evidenced by intake 26-50%    Nutrition Interventions:   Food and/or Nutrient Delivery: Continue Current Diet, Modify Oral Nutrition Supplement  Nutrition Education/Counseling: No recommendation at this time  Coordination of Nutrition Care: Continue to monitor while inpatient  Plan of Care discussed with: Patient/Father    Goals:  Previous Goal Met: Progressing toward Goal(s)  Goals: Meet at least 75% of estimated needs, within 7 days, PO intake 50% or greater       Nutrition Monitoring and Evaluation:   Behavioral-Environmental Outcomes: None Identified  Food/Nutrient Intake Outcomes: Food and Nutrient Intake, Supplement Intake  Physical Signs/Symptoms Outcomes: Biochemical Data, Skin, Weight, Fluid Status or Edema, GI Status    Discharge Planning:     Too soon to determine     Thuy Balderas RD  Contact: [phone]

## 2022-12-27 NOTE — PROGRESS NOTES
PULMONARY & CRITICAL CARE MEDICINE PROGRESS  NOTE     Patient:  Bailey Seip  MRN: [id number]  Admit date: 12/5/2022  Primary Care Physician: Yogesh Fraire DO  Consulting Physician: Lady Guillermo MD  CODE Status: Full Code  LOS: 21    SUBJECTIVE     I personally interviewed/examined the patient, reviewed interval history and interpreted all available radiographic, laboratory data at the time of service. Chief Compliant/Reason for Initial Consult:       Brief Hospital Course: The patient is a 41 y.o. female history of diabetes melitis, developmental delay, history of brain tumor and as a child radiation, history of sensory neuronal hearing loss history of hypopituitarism on chronic hydrocortisone and Synthroid. Initially presented to hospital with a fall with multiple nondisplaced fracture/pelvic fracture and C2 transverse fracture and sacral ala fracture neurosurgery and orthopedic has seen the patient patient had altered mental status decline few days after presentation was tachypneic hypoxic and was intubated and was on ventilator patient was extubated on 12/7/2022 and again was reintubated on 12/10/2022 and had a prolonged stay in the hospital/in ICU with altered mental status lethargy decreased mentation sedation was discontinued mentation started improving after many days of sedation discontinued and ultimately she was extubated on 12/22/2022 post extubation she had stridor patient was treated with heliox humidified O2 and ultimately was transferred out of ICU to stepdown unit on 12/23/2022. Interval History:  12/27/22  Overnight events noted chart reviewed, labs seen and medications reviewed. On humidified oxygen  ENT note reviewed      Review of Systems:  Review of Systems   Constitutional:  Positive for activity change and fatigue. Negative for fever.    HENT:  Negative for postnasal drip, sinus pressure, sore throat, trouble swallowing and voice change. Eyes:  Negative for photophobia, pain, redness and visual disturbance. Respiratory:  Positive for cough and shortness of breath. Cardiovascular:  Negative for chest pain, palpitations and leg swelling. Gastrointestinal:  Negative for abdominal pain, diarrhea and vomiting. Endocrine: Negative for polydipsia and polyphagia. Genitourinary:  Negative for difficulty urinating, dysuria, frequency and hematuria. Musculoskeletal:  Positive for back pain and gait problem. Negative for arthralgias. Skin: Negative. Neurological:  Negative for dizziness, syncope, speech difficulty and headaches. Hematological:  Negative for adenopathy. Does not bruise/bleed easily. Psychiatric/Behavioral:  Positive for decreased concentration. OBJECTIVE     VITAL SIGNS:   LAST-  /81   Pulse 60   Temp 98.6 °F (37 °C) (Oral)   Resp 23   Ht 5' 1\" (1.549 m)   Wt 263 lb 7.2 oz (119.5 kg)   SpO2 94%   BMI 49.78 kg/m²   8-24 HR RANGE-  TEMP Temp  Av.5 °F (36.9 °C)  Min: 97.4 °F (36.3 °C)  Max: 98.9 °F (86.1 °C)   BP Systolic (26AQE), BSM:641 , Min:114 , SGJ:958      Diastolic (98NTW), VJX:28, Min:67, Max:83     PULSE Pulse  Av.3  Min: 55  Max: 73   RR Resp  Av.6  Min: 19  Max: 23   O2 SAT SpO2  Av.5 %  Min: 93 %  Max: 94 %   OXYGEN DELIVERY O2 Flow Rate (L/min)  Av L/min  Min: 4 L/min  Max: 4 L/min     Systemic Examination:   Physical Exam  General appearance - looks comfortable and in no acute distress mild tachypnea present  Mental status - alert, oriented to person, place, and time  Eyes - pupils equal and reactive, extraocular eye movements intact  Mouth - mucous membranes moist, pharynx normal without lesions. Small oropharynx Mallampati 3  Neck - supple, no significant adenopathy. Short thick neck. , stridor   Chest - Chest was symmetrical without dullness to percussion.   Breath sounds bilaterally present but distant bilaterally, mild rhonchi no wheezing and no crackles anteriorly. There is no intercostal recession or use of accessory muscles  Heart - normal rate, regular rhythm, normal S1, S2, no murmurs, rubs, clicks or gallops  Abdomen - soft, nontender, nondistended, no masses or organomegaly  Neurological - alert, oriented, normal speech, no focal findings or movement disorder noted  Extremities - peripheral pulses normal, positive pedal edema, no clubbing or cyanosis  Skin - normal coloration and turgor, no rashes, no suspicious skin lesions noted     DATA REVIEW     Medications:  Scheduled Meds:   guaiFENesin  200 mg Oral BID    miconazole   Topical BID    pantoprazole  40 mg Oral QAM AC    ARIPiprazole  10 mg Oral Daily    gabapentin  100 mg Oral TID    hydrocortisone  10 mg Oral Nightly    hydrocortisone  15 mg Oral QAM    enoxaparin  30 mg SubCUTAneous BID    sertraline  150 mg Oral Daily    lamoTRIgine  200 mg Oral Daily    levothyroxine  150 mcg Oral QAM AC    topiramate  75 mg Oral Daily    topiramate  100 mg Oral Nightly     Continuous Infusions:   sodium chloride Stopped (12/12/22 0316)     LABS:-  ABG:   No results for input(s): POCPH, POCPCO2, POCPO2, POCHCO3, MDJR7BJH in the last 72 hours. CBC:   Recent Labs     12/25/22  0651 12/27/22  0448   WBC 7.8 7.3   HGB 12.2 12.4   HCT 40.7 42.5   MCV 92.7 95.1    278   LYMPHOPCT 24 21*   RBC 4.39 4.47   MCH 27.8 27.7   MCHC 30.0 29.2   RDW 13.8 13.9       BMP:   Recent Labs     12/25/22  0651 12/27/22  0448    137   K 3.9 4.0    104   CO2 26 24   BUN 19 12   CREATININE 0.84 0.72   GLUCOSE 87 95       Liver Function Test:   No results for input(s): PROT, LABALBU, ALT, AST, GGT, ALKPHOS, BILITOT in the last 72 hours. Amylase/Lipase:  No results for input(s): AMYLASE, LIPASE in the last 72 hours. Coagulation Profile:   No results for input(s): INR, PROTIME, APTT in the last 72 hours.   Cardiac Enzymes:  No results for input(s): CKTOTAL, CKMB, CKMBINDEX, TROPONINI in the last 72 hours. Lactic Acid:  No results found for: LACTA  BNP:   No results found for: BNP  D-Dimer:  Lab Results   Component Value Date    DDIMER 0.33 07/15/2020     Others:   Lab Results   Component Value Date    TSH <0.01 (L) 12/07/2022    FT3 2.45 10/16/2019     Lab Results   Component Value Date    JAYY NEGATIVE 10/23/2013    SEDRATE 20 08/21/2018    CRP 4.4 08/21/2018     Lab Results   Component Value Date    URICACID 5.2 05/24/2018     No results found for: IRON, TIBC, FERRITIN  No results found for: SPEP, UPEP  No results found for: PSA, CEA, , AM4937,     Input/Output:    Intake/Output Summary (Last 24 hours) at 12/27/2022 1720  Last data filed at 12/27/2022 1100  Gross per 24 hour   Intake --   Output 1850 ml   Net -1850 ml         Microbiology:  No results for input(s): SPECDESC, SPECDESC, SPECIAL, CULTURE, CULTURE, STATUS, ORG, CDIFFTOXPCR, CAMPYLOBPCR, SALMONELLAPC, SHIGAPCR, SHIGELLAPCR, MPNEUG, MPNEUM, LACTOQL in the last 72 hours. Pathology:    Radiology reports:  XR CHEST PORTABLE   Final Result   Cardiomegaly with perihilar congestive changes and increased perihilar   opacities, particularly on the right, which may be due to edema although   superimposed infection should be excluded clinically. Possible small right   pleural effusion. XR CHEST PORTABLE   Final Result   1. Enlargement of the cardiac silhouette with vascular congestion, though   improved focal infiltrate of the right upper lobe. FL MODIFIED BARIUM SWALLOW W VIDEO   Final Result   1. Penetration followed by aspiration without coughing with the nectar thick   and honey thick substances. 2. No penetration or aspiration with the pureed/pudding thick or soft solid   substances. 3. Cookie solid substance was not attempted. Please see separate speech pathology report for full discussion of findings   and recommendations. XR CHEST PORTABLE   Final Result   No change from prior study.   Tubes and lines as above.  Endotracheal tube is   somewhat low lying but unchanged. XR CHEST PORTABLE   Final Result   Similar appearance of mild bilateral airspace disease. This appears   consistent with pneumonia         XR CHEST PORTABLE   Final Result   1. No significant change in bilateral airspace disease. 2.  Endotracheal tube terminates above the manny. 3.  Enteric tube terminates at the level of the body of the stomach. XR ABDOMEN FOR NG/OG/NE TUBE PLACEMENT   Final Result   1. No significant change in bilateral airspace disease. 2.  Endotracheal tube terminates above the manny. 3.  Enteric tube terminates at the level of the body of the stomach. XR CHEST PORTABLE   Final Result   No significant interval change. Patchy perihilar airspace opacities. XR PELVIS (MIN 3 VIEWS)   Final Result   No acute abnormality of the pelvis. Known right superior ramus fracture is not   well seen radiographically. XR CHEST PORTABLE   Final Result   Stable bilateral airspace opacities. This could represent multifocal   pneumonia, however, edema is not fully excluded         XR CHEST PORTABLE   Final Result   Persistent bilateral airspace opacities suggesting multifocal pneumonia         XR CHEST PORTABLE   Final Result   Chest:      1. Tubes as detailed above. 2. Bilateral parahilar and lower zone predominant airspace disease. 3. Stable cardiomegaly. KUB:      1. NG tube traverses the GE junction with distal tip likely in the body of   the stomach. 2. Nonspecific bowel gas pattern. XR ABDOMEN FOR NG/OG/NE TUBE PLACEMENT   Final Result   Chest:      1. Tubes as detailed above. 2. Bilateral parahilar and lower zone predominant airspace disease. 3. Stable cardiomegaly. KUB:      1. NG tube traverses the GE junction with distal tip likely in the body of   the stomach. 2. Nonspecific bowel gas pattern.          XR CHEST PORTABLE   Final Result   Findings suggest congestive heart failure         CT HEAD WO CONTRAST   Final Result   No acute intracranial abnormality. No evidence of intracranial hemorrhage or any other definable acute   intracranial abnormality. Redemonstration of dense calcifications in the bilateral lentiform nuclei,   bilateral caudate nuclei and bilateral thalami similar to findings on   previous CT scan in 2010. There are bilateral cochlear implants redemonstrated. No demonstrable fracture in calvarium or in base of skull. CT CHEST PULMONARY EMBOLISM W CONTRAST   Final Result   No evidence of pulmonary embolism. No evidence of thoracic aortic aneurysm or dissection. There are mild atelectatic changes, and/or infiltrate in the posterior lung   bases bilaterally, left more than right. Trace left basilar pleural effusion. Focal moderate dense infiltrates, and/or atelectasis in the posterior segment   of right pulmonary upper lobe. No subphrenic acute process demonstrated. XR CHEST PORTABLE   Final Result   Lower end of the ET tube is 1.6 cm above manny. Mild-to-moderate pulmonary vascular congestion. Finding suggestive of   interstitial pulmonary edema in lungs. XR CHEST PORTABLE   Final Result   Placement of gastric tube which extends to the distal aspect of the stomach. Cardiomegaly and vascular congestion without evidence of interstitial edema. No confluent airspace consolidation. XR ABDOMEN FOR NG/OG/NE TUBE PLACEMENT   Final Result   Gastric tube in place with the tip and side-port in the stomach. XR CHEST PORTABLE   Final Result   Very limited study due to underpenetration. Increased opacity in the lungs which could be related to under penetration   and overlying soft tissues, but diffuse airspace infiltrates cannot be   excluded. Cardiomegaly without evidence of CHF.          XR HUMERUS RIGHT (MIN 2 VIEWS)   Final Result   No acute osseous abnormality in the right humerus. CTA NECK W CONTRAST   Final Result   No acute trauma of the major arterial vessels of the neck. CT THORACIC SPINE TRAUMA RECONSTRUCTION   Final Result   10-20% compression deformity of the T3 vertebra, possibly acute. No other   evidence of acute thoracic or lumbar spine fracture. Multilevel degenerative   change. CT LUMBAR SPINE TRAUMA RECONSTRUCTION   Final Result   10-20% compression deformity of the T3 vertebra, possibly acute. No other   evidence of acute thoracic or lumbar spine fracture. Multilevel degenerative   change. XR PELVIS (MIN 3 VIEWS)   Final Result   1. Nondisplaced fracture involving the lateral aspect of the right superior   pubic ramus, though better seen on CT imaging. 2. No other radiographically evident acute fracture seen in the pelvis. XR SHOULDER RIGHT (MIN 2 VIEWS)   Final Result   1. No acute fracture seen in the right shoulder. 2. No acute fracture seen in the left shoulder. XR SHOULDER LEFT (MIN 2 VIEWS)   Final Result   1. No acute fracture seen in the right shoulder. 2. No acute fracture seen in the left shoulder. Echocardiogram:   No results found for this or any previous visit. Cardiac Catheterization:   No results found for this or any previous visit.       ASSESSMENT AND PLAN     Assessment:    //Acute hypoxic hypercapnic respiratory failure required invasive ventilatory support  //Status post second extubation on 12/22/2022  //Post extubation stridor due to true vocal cord dysfunction left greater than right paresis  //Status post fall/C7 transverse process fracture and T3 compression fracture and fracture of right sacral/pelvic fracture nondisplaced  //Obstructive sleep apnea/hypoventilation  //Pneumonia/aspiration pneumonia/MRSA  //Hypopituitarism status post radiation to brain in childhood  //Morbid obesity  //Developmental delay  //Seizure disorder    Plan:    Continue humidified oxygen  Positive airway pressure therapy at night and as needed during the day  PT OT  Follow ENT recommendation    Adrianne aHrt MD, MMICHAEL. Pulmonary and Critical Care Medicine           12/27/2022, 5:20 PM    Please note that this chart was generated using voice recognition Dragon dictation software. Although every effort was made to ensure the accuracy of this automated transcription, some errors in transcription may have occurred.

## 2022-12-27 NOTE — PLAN OF CARE
Problem: Discharge Planning  Goal: Discharge to home or other facility with appropriate resources  12/27/2022 0441 by Cheyanne Prather RN  Outcome: Progressing     Problem: Confusion  Goal: Confusion, delirium, dementia, or psychosis is improved or at baseline  Description: INTERVENTIONS:  1. Assess for possible contributors to thought disturbance, including medications, impaired vision or hearing, underlying metabolic abnormalities, dehydration, psychiatric diagnoses, and notify attending LIP  2. [place] high risk fall precautions, as indicated  3. Provide frequent short contacts to provide reality reorientation, refocusing and direction  4. Decrease environmental stimuli, including noise as appropriate  5. Monitor and intervene to maintain adequate nutrition, hydration, elimination, sleep and activity  6. If unable to ensure safety without constant attention obtain sitter and review sitter guidelines with assigned personnel  7. Initiate Psychosocial CNS and Spiritual Care consult, as indicated  12/27/2022 0441 by Cheyanne Prather RN  Outcome: Progressing    Problem: Pain  Goal: Verbalizes/displays adequate comfort level or baseline comfort level  12/27/2022 0441 by Cheyanne Prather RN  Outcome: Progressing     Problem: Chronic Conditions and Co-morbidities  Goal: Patient's chronic conditions and co-morbidity symptoms are monitored and maintained or improved  12/27/2022 0441 by Cheyanne Prather RN  Outcome: Progressing     Problem: Skin/Tissue Integrity  Goal: Absence of new skin breakdown  Description: 1. Monitor for areas of redness and/or skin breakdown  2. Assess vascular access sites hourly  3. Every 4-6 hours minimum:  Change oxygen saturation probe site  4. Every 4-6 hours:  If on nasal continuous positive airway pressure, respiratory therapy assess nares and determine need for appliance change or resting period.   12/27/2022 0441 by Cheyanne Prather RN  Outcome: Progressing     Problem: Safety - Adult  Goal: Free from fall injury  12/27/2022 0441 by Maria Esther Calderón RN  Outcome: Progressing    Problem: ABCDS Injury Assessment  Goal: Absence of physical injury  12/27/2022 0441 by Maria Esther Calderón RN  Outcome: Progressing    Problem: Nutrition Deficit:  Goal: Optimize nutritional status  12/27/2022 0441 by Maria Esther Calderón RN  Outcome: Progressing    Problem: Respiratory - Adult  Goal: Achieves optimal ventilation and oxygenation  12/27/2022 0441 by Maria Esther Calderón RN  Outcome: Progressing

## 2022-12-27 NOTE — PROGRESS NOTES
[facility]  Internal Medicine Teaching Residency Program  Inpatient Daily Progress Note  ______________________________________________________________________________    Patient: Bertha Nearsherice  YOB: 1975   HKW:[id number]    Acct: [de-identified]     Room: 26/1-12  Admit date: 12/5/2022  Today's date: 12/27/22  Number of days in the hospital: 21    SUBJECTIVE   Admitting Diagnosis: Acute respiratory failure with hypoxia [facility])  CC: Fall leading to neck pain and back pain  Pt examined at bedside. Chart & results reviewed. No acute event overnight  Remained afebrile and hemodynamically stable. Used CPAP overnight. No worsening cough or shortness of breath. oxygen requirement has gone down to 4 L. No other acute complaints. Has a back pain. ROS:  Constitutional:  negative for chills, fevers, sweats  Respiratory:  negative for cough, dyspnea on exertion, hemoptysis, shortness of breath, wheezing  Cardiovascular:  negative for chest pain, chest pressure/discomfort, lower extremity edema, palpitations  Gastrointestinal:  negative for abdominal pain, constipation, diarrhea, nausea, vomiting  Neurological:  negative for dizziness, headache  BRIEF HISTORY     ICU COURSE:     51-year-old female initially presented to hospital on 12/5 after a fall. 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 Patient also has past medical history of diabetes mellitus, bilateral sensorineural hearing loss. Had a CT head showed no intracranial abnormality, CT abdominal pelvis showed  nondisplaced fractures of right sacral ilya, nondisplaced fracture of right superior ramus and right obturator ring. CT spine showed acute fracture of right C7 transverse process and also age indeterminate compressive fracture of T3.   Patient admitted under trauma surgery-neurosurgery for cervical fractures was consulted, who recommended no neurosurgical intervention. Orthopedic surgery for pelvic fractures was also consulted, no surgical intervention but medical management and also recommended to follow-up in orthopedic clinic after discharge. Patient was started on BiPAP for increased work of breathing. Patient was oriented x4 at day of presentation.  - patient had a decline in mental status with tachypnea, respiratory rate of 30s to 40 with heart rate of 105 patient was intubated emergently     - Neurology was consulted because of history of migraines, seizure disorder, patient was started on her home antiepileptic drugs including Topamax and Lamictal, EEG was ordered to rule out any subclinical seizure activity, MRI was also ordered to rule out any CVA. Patient was doing fine, arousable and following commands. Pulmonology was consulted, recommended to extubate and patient was extubated. 12/10 -rapid was called today due to impending respiratory failure, patient was intubated again and was sent to ICU       -respiratory culture came back positive for staph aureus , nasal respiratory swab came out positive for MRSA DNA, patient started on vancomycin       : Tachycardia improved to normal heart rate, patient remained afebrile, WBC count trending down. Sedation changed to precedex     12/15: Precedex discontinued. : Patient was extubated. : Pt was a febrile overnight, saturating well on NC.     OBJECTIVE     Vital Signs:  /79   Pulse 62   Temp 98.8 °F (37.1 °C) (Oral)   Resp 19   Ht 5' 1\" (1.549 m)   Wt 263 lb 7.2 oz (119.5 kg)   SpO2 93%   BMI 49.78 kg/m²     Temp (24hrs), Av.5 °F (36.9 °C), Min:97.4 °F (36.3 °C), Max:98.9 °F (37.2 °C)    In: -   Out: 0 [Urine:0]    Physical Exam:  General appearance - alert, in no distress  Mental status - alert, oriented to person, place, and time  Eyes - pupils equal and reactive, extraocular eye movements intact. Some hearing loss. Mouth - mucous membranes moist, pharynx normal without lesions  Neck - supple, no significant adenopathy  Chest - clear to auscultation, no wheezes  Heart - normal rate, regular rhythm, normal S1, S2  Abdomen - soft, nontender, nondistended  Neurological - alert, oriented, normal speech, no focal deficits   Extremities - peripheral pulses normal, no pedal edema  Skin - normal coloration and turgor, no rashes     Medications:  Scheduled Medications:    guaiFENesin  200 mg Oral BID    miconazole   Topical BID    pantoprazole  40 mg Oral QAM AC    ARIPiprazole  10 mg Oral Daily    gabapentin  100 mg Oral TID    hydrocortisone  10 mg Oral Nightly    hydrocortisone  15 mg Oral QAM    enoxaparin  30 mg SubCUTAneous BID    sertraline  150 mg Oral Daily    lamoTRIgine  200 mg Oral Daily    levothyroxine  150 mcg Oral QAM AC    topiramate  75 mg Oral Daily    topiramate  100 mg Oral Nightly     Continuous Infusions:    sodium chloride Stopped (12/12/22 0316)     PRN MedicationsoxyCODONE, 10 mg, Q12H PRN   Or  oxyCODONE, 5 mg, Q4H PRN  racepinephrine HCl, 11.25 mg, Q4H PRN  sodium chloride nebulizer, 3 mL, Q4H PRN  polyethylene glycol, 17 g, Daily PRN  levalbuterol, 0.63 mg, Q4H PRN  sodium chloride, , PRN  acetaminophen, 650 mg, Q6H PRN  sodium chloride flush, 5-40 mL, PRN      Diagnostic Labs:  CBC:   Recent Labs     12/25/22  0651 12/27/22  0448   WBC 7.8 7.3   RBC 4.39 4.47   HGB 12.2 12.4   HCT 40.7 42.5   MCV 92.7 95.1   RDW 13.8 13.9    278       BMP:   Recent Labs     12/25/22  0651 12/27/22  0448    137   K 3.9 4.0    104   CO2 26 24   BUN 19 12   CREATININE 0.84 0.72       BNP: No results for input(s): BNP in the last 72 hours. PT/INR: No results for input(s): PROTIME, INR in the last 72 hours. APTT: No results for input(s): APTT in the last 72 hours. CARDIAC ENZYMES: No results for input(s): CKMB, CKMBINDEX, TROPONINI in the last 72 hours.     Invalid input(s): CKTOTAL;3  FASTING LIPID PANEL:  Lab Results   Component Value Date    CHOL 147 03/01/2020    HDL 46 03/01/2020    TRIG 77 03/01/2020     LIVER PROFILE: No results for input(s): AST, ALT, ALB, BILIDIR, BILITOT, ALKPHOS in the last 72 hours. MICROBIOLOGY:   Lab Results   Component Value Date/Time    CULTURE NO SIGNIFICANT GROWTH 12/15/2022 02:10 PM       Imaging:    XR CHEST PORTABLE    Result Date: 12/23/2022  1. Enlargement of the cardiac silhouette with vascular congestion, though improved focal infiltrate of the right upper lobe. XR CHEST PORTABLE    Result Date: 12/18/2022  No change from prior study. Tubes and lines as above. Endotracheal tube is somewhat low lying but unchanged. FL MODIFIED BARIUM SWALLOW W VIDEO    Result Date: 12/22/2022  1. Penetration followed by aspiration without coughing with the nectar thick and honey thick substances. 2. No penetration or aspiration with the pureed/pudding thick or soft solid substances. 3. Cookie solid substance was not attempted. Please see separate speech pathology report for full discussion of findings and recommendations. ASSESSMENT & PLAN     Assessment:  Principal Problem:    Cervical transverse process fracture, initial encounter [facility])  Active Problems:    Closed nondisplaced fracture of seventh cervical vertebra (HCC)    Lethargy    Multiple closed pelvic fractures without disruption of pelvic [place] (HCC)    Aspiration pneumonia (HCC)    Acute respiratory failure with hypoxia (HCC)    MRSA infection    Acute lower respiratory tract infection    Seizure ([facility] [address]    Hypothyroidism    Asthma    Hypopituitarism ([facility] [address]  Resolved Problems:    * No resolved hospital problems.  *            Plan:     Acute hypoxic respiratory failure likely secondary to pneumonia-completed vancomycin for MRSA pneumonia 12/22  -Xopenex, Robitussin, racemic epinephrine, sodium chloride nebulizer as needed  -On nasal cannula during the day with CPAP in the night.  Maintain oxygen saturation above 92%. Continue to wean down as tolerated. Chest x-ray showing possible cardiomegaly with congestion. Elevated proBNP-1 dose of Lasix was given in the morning. Sinus bradycardia - asymptomatic  -Likely due to sleep apnea. Thyroxine on admission was within normal limits. Will monitor. Chest pain-self resolved-EKG negative. Chest x-ray showing cardiomegaly with possible congestion.  -We will check troponins if recurrence of chest pain. History of seizures  EEG this admission-moderate nonspecific encephalopathy-no epileptic discharges. MRI could not be done due to cochlear implants.  -Lamotrigine level was low-neurology was consulted and recommended continuing the same dose of lamotrigine as there is no concern of seizures. - Continue home lamotrigine 200 mg daily, topiramate 100 mg nightly, 75 mg in the morning. C7 transverse process fracture and T3 chronic compression fracture-neurosurgery was consulted-recommended no intervention     Right sacral ilya fracture, right superior pubic ramus fracture-orthopedic was consulted-recommended nonoperative treatment with close clinic follow-up in 2 weeks after discharge as there is minimal chance of displacement and recommended weightbearing as tolerated. Stridor  -ENT was consulted - likely due to systemic deconditioning or weakness or underlying prolonged intubation. Recommended humidified air. Hypothyroidism  -Continue home levothyroxine 150 mcg     Hypopituitarism  -Continue hydrocortisone 10 mg in the night and 15 mg in the morning. Home dose-10 mg twice daily. Hypokalemia - resolved    Due to prophylaxis - Lovenox  GI prophylaxis - Protonix     PT/OT-has been working   - on board - we will follow-up. Tadeo Fields MD  Internal Medicine Resident, PGY-1  [facility];  [place], [place]  12/27/2022, 1:51 PM    Attending Physician Statement  I have discussed the care Jojo Todd and I have examined the patient myselft and taken ros and hpi , including pertinent history and exam findings,  with the resident. I have reviewed the key elements of all parts of the encounter with the resident. I agree with the assessment, plan and orders as documented by the resident.   Encephalopathy resolved  Lamictal level was subtherapeutic  Will repeat  ENT evaluation       Electronically signed by Silvia Montoya MD

## 2022-12-27 NOTE — PLAN OF CARE
Problem: Discharge Planning  Goal: Discharge to home or other facility with appropriate resources  12/27/2022 0441 by Darcey Kanner, RN  Outcome: Progressing     Problem: Pain  Goal: Verbalizes/displays adequate comfort level or baseline comfort level  12/27/2022 0441 by Darcey Kanner, RN  Outcome: Progressing     Problem: Skin/Tissue Integrity  Goal: Absence of new skin breakdown  Description: 1. Monitor for areas of redness and/or skin breakdown  2. Assess vascular access sites hourly  3. Every 4-6 hours minimum:  Change oxygen saturation probe site  4. Every 4-6 hours:  If on nasal continuous positive airway pressure, respiratory therapy assess nares and determine need for appliance change or resting period.   12/27/2022 0441 by Darcey Kanner, RN  Outcome: Progressing

## 2022-12-27 NOTE — PROGRESS NOTES
PULMONARY & CRITICAL CARE MEDICINE PROGRESS  NOTE     Patient:  Les Carr  MRN: [id number]  Admit date: 12/5/2022  Primary Care Physician: Katerina Callejas DO  Consulting Physician: Felix Mohamud MD  CODE Status: Full Code  LOS: 20    SUBJECTIVE     I personally interviewed/examined the patient, reviewed interval history and interpreted all available radiographic, laboratory data at the time of service. Chief Compliant/Reason for Initial Consult:       Brief Hospital Course: The patient is a 41 y.o. female history of diabetes melitis, developmental delay, history of brain tumor and as a child radiation, history of sensory neuronal hearing loss history of hypopituitarism on chronic hydrocortisone and Synthroid. Initially presented to hospital with a fall with multiple nondisplaced fracture/pelvic fracture and C2 transverse fracture and sacral ala fracture neurosurgery and orthopedic has seen the patient patient had altered mental status decline few days after presentation was tachypneic hypoxic and was intubated and was on ventilator patient was extubated on 12/7/2022 and again was reintubated on 12/10/2022 and had a prolonged stay in the hospital/in ICU with altered mental status lethargy decreased mentation sedation was discontinued mentation started improving after many days of sedation discontinued and ultimately she was extubated on 12/22/2022 post extubation she had stridor patient was treated with heliox humidified O2 and ultimately was transferred out of ICU to stepdown unit on 12/23/2022. Interval History:  12/26/22  Overnight events noted chart reviewed, labs seen and medications reviewed. Stridor       Review of Systems:  Review of Systems   Constitutional:  Positive for activity change and fatigue. Negative for fever. HENT:  Negative for postnasal drip, sinus pressure, sore throat, trouble swallowing and voice change. Eyes:  Negative for photophobia, pain, redness and visual disturbance. Respiratory:  Positive for cough and shortness of breath. Cardiovascular:  Negative for chest pain, palpitations and leg swelling. Gastrointestinal:  Negative for abdominal pain, diarrhea and vomiting. Endocrine: Negative for polydipsia and polyphagia. Genitourinary:  Negative for difficulty urinating, dysuria, frequency and hematuria. Musculoskeletal:  Positive for back pain and gait problem. Negative for arthralgias. Skin: Negative. Neurological:  Negative for dizziness, syncope, speech difficulty and headaches. Hematological:  Negative for adenopathy. Does not bruise/bleed easily. Psychiatric/Behavioral:  Positive for decreased concentration. OBJECTIVE     VITAL SIGNS:   LAST-  BP (!) 140/81   Pulse 67   Temp 98.7 °F (37.1 °C) (Oral)   Resp 19   Ht 5' 1\" (1.549 m)   Wt 264 lb 12.4 oz (120.1 kg)   SpO2 97%   BMI 50.03 kg/m²   8-24 HR RANGE-  TEMP Temp  Av.9 °F (36.6 °C)  Min: 97.2 °F (36.2 °C)  Max: 98.7 °F (27.2 °C)   BP Systolic (08AHW), YDF:479 , Min:97 , PCX:343      Diastolic (89NLE), JPK:86, Min:73, Max:94     PULSE Pulse  Av.1  Min: 43  Max: 67   RR Resp  Av.4  Min: 18  Max: 29   O2 SAT SpO2  Av %  Min: 95 %  Max: 99 %   OXYGEN DELIVERY O2 Flow Rate (L/min)  Av L/min  Min: 4 L/min  Max: 4 L/min     Systemic Examination:   Physical Exam  General appearance - looks comfortable and in no acute distress mild tachypnea present  Mental status - alert, oriented to person, place, and time  Eyes - pupils equal and reactive, extraocular eye movements intact  Mouth - mucous membranes moist, pharynx normal without lesions. Small oropharynx Mallampati 3  Neck - supple, no significant adenopathy. Short thick neck. , stridor   Chest - Chest was symmetrical without dullness to percussion.   Breath sounds bilaterally present but distant bilaterally, mild rhonchi no wheezing and no crackles anteriorly. There is no intercostal recession or use of accessory muscles  Heart - normal rate, regular rhythm, normal S1, S2, no murmurs, rubs, clicks or gallops  Abdomen - soft, nontender, nondistended, no masses or organomegaly  Neurological - alert, oriented, normal speech, no focal findings or movement disorder noted  Extremities - peripheral pulses normal, positive pedal edema, no clubbing or cyanosis  Skin - normal coloration and turgor, no rashes, no suspicious skin lesions noted     DATA REVIEW     Medications:  Scheduled Meds:   miconazole   Topical BID    [START ON 12/27/2022] pantoprazole  40 mg Oral QAM AC    ARIPiprazole  10 mg Oral Daily    gabapentin  100 mg Oral TID    hydrocortisone  10 mg Oral Nightly    hydrocortisone  15 mg Oral QAM    guaiFENesin  200 mg Oral Q4H    enoxaparin  30 mg SubCUTAneous BID    sertraline  150 mg Oral Daily    lamoTRIgine  200 mg Oral Daily    levothyroxine  150 mcg Oral QAM AC    topiramate  75 mg Oral Daily    topiramate  100 mg Oral Nightly     Continuous Infusions:   sodium chloride Stopped (12/12/22 0316)     LABS:-  ABG:   No results for input(s): POCPH, POCPCO2, POCPO2, POCHCO3, SDTM2RQN in the last 72 hours. CBC:   Recent Labs     12/24/22  0402 12/25/22  0651   WBC 9.2 7.8   HGB 12.3 12.2   HCT 40.7 40.7   MCV 91.9 92.7    338   LYMPHOPCT 23* 24   RBC 4.43 4.39   MCH 27.8 27.8   MCHC 30.2 30.0   RDW 13.5 13.8       BMP:   Recent Labs     12/24/22  0402 12/25/22  0651    139   K 3.6* 3.9    105   CO2 28 26   BUN 29* 19   CREATININE 0.97* 0.84   GLUCOSE 82 87       Liver Function Test:   No results for input(s): PROT, LABALBU, ALT, AST, GGT, ALKPHOS, BILITOT in the last 72 hours. Amylase/Lipase:  No results for input(s): AMYLASE, LIPASE in the last 72 hours. Coagulation Profile:   No results for input(s): INR, PROTIME, APTT in the last 72 hours.   Cardiac Enzymes:  No results for input(s): CKTOTAL, CKMB, CKMBINDEX, TROPONINI in the last 72 hours. Lactic Acid:  No results found for: LACTA  BNP:   No results found for: BNP  D-Dimer:  Lab Results   Component Value Date    DDIMER 0.33 07/15/2020     Others:   Lab Results   Component Value Date    TSH <0.01 (L) 12/07/2022    FT3 2.45 10/16/2019     Lab Results   Component Value Date    JAYY NEGATIVE 10/23/2013    SEDRATE 20 08/21/2018    CRP 4.4 08/21/2018     Lab Results   Component Value Date    URICACID 5.2 05/24/2018     No results found for: IRON, TIBC, FERRITIN  No results found for: SPEP, UPEP  No results found for: PSA, CEA, , TC1051,     Input/Output:    Intake/Output Summary (Last 24 hours) at 12/26/2022 1927  Last data filed at 12/26/2022 1800  Gross per 24 hour   Intake --   Output 1000 ml   Net -1000 ml         Microbiology:  No results for input(s): SPECDESC, SPECDESC, SPECIAL, CULTURE, CULTURE, STATUS, ORG, CDIFFTOXPCR, CAMPYLOBPCR, SALMONELLAPC, SHIGAPCR, SHIGELLAPCR, MPNEUG, MPNEUM, LACTOQL in the last 72 hours. Pathology:    Radiology reports:  XR CHEST PORTABLE   Final Result   Cardiomegaly with perihilar congestive changes and increased perihilar   opacities, particularly on the right, which may be due to edema although   superimposed infection should be excluded clinically. Possible small right   pleural effusion. XR CHEST PORTABLE   Final Result   1. Enlargement of the cardiac silhouette with vascular congestion, though   improved focal infiltrate of the right upper lobe. FL MODIFIED BARIUM SWALLOW W VIDEO   Final Result   1. Penetration followed by aspiration without coughing with the nectar thick   and honey thick substances. 2. No penetration or aspiration with the pureed/pudding thick or soft solid   substances. 3. Cookie solid substance was not attempted. Please see separate speech pathology report for full discussion of findings   and recommendations. XR CHEST PORTABLE   Final Result   No change from prior study.   Tubes and lines as above.  Endotracheal tube is   somewhat low lying but unchanged. XR CHEST PORTABLE   Final Result   Similar appearance of mild bilateral airspace disease. This appears   consistent with pneumonia         XR CHEST PORTABLE   Final Result   1. No significant change in bilateral airspace disease. 2.  Endotracheal tube terminates above the manny. 3.  Enteric tube terminates at the level of the body of the stomach. XR ABDOMEN FOR NG/OG/NE TUBE PLACEMENT   Final Result   1. No significant change in bilateral airspace disease. 2.  Endotracheal tube terminates above the manny. 3.  Enteric tube terminates at the level of the body of the stomach. XR CHEST PORTABLE   Final Result   No significant interval change. Patchy perihilar airspace opacities. XR PELVIS (MIN 3 VIEWS)   Final Result   No acute abnormality of the pelvis. Known right superior ramus fracture is not   well seen radiographically. XR CHEST PORTABLE   Final Result   Stable bilateral airspace opacities. This could represent multifocal   pneumonia, however, edema is not fully excluded         XR CHEST PORTABLE   Final Result   Persistent bilateral airspace opacities suggesting multifocal pneumonia         XR CHEST PORTABLE   Final Result   Chest:      1. Tubes as detailed above. 2. Bilateral parahilar and lower zone predominant airspace disease. 3. Stable cardiomegaly. KUB:      1. NG tube traverses the GE junction with distal tip likely in the body of   the stomach. 2. Nonspecific bowel gas pattern. XR ABDOMEN FOR NG/OG/NE TUBE PLACEMENT   Final Result   Chest:      1. Tubes as detailed above. 2. Bilateral parahilar and lower zone predominant airspace disease. 3. Stable cardiomegaly. KUB:      1. NG tube traverses the GE junction with distal tip likely in the body of   the stomach. 2. Nonspecific bowel gas pattern.          XR CHEST PORTABLE   Final Result   Findings suggest congestive heart failure         CT HEAD WO CONTRAST   Final Result   No acute intracranial abnormality. No evidence of intracranial hemorrhage or any other definable acute   intracranial abnormality. Redemonstration of dense calcifications in the bilateral lentiform nuclei,   bilateral caudate nuclei and bilateral thalami similar to findings on   previous CT scan in 2010. There are bilateral cochlear implants redemonstrated. No demonstrable fracture in calvarium or in base of skull. CT CHEST PULMONARY EMBOLISM W CONTRAST   Final Result   No evidence of pulmonary embolism. No evidence of thoracic aortic aneurysm or dissection. There are mild atelectatic changes, and/or infiltrate in the posterior lung   bases bilaterally, left more than right. Trace left basilar pleural effusion. Focal moderate dense infiltrates, and/or atelectasis in the posterior segment   of right pulmonary upper lobe. No subphrenic acute process demonstrated. XR CHEST PORTABLE   Final Result   Lower end of the ET tube is 1.6 cm above manny. Mild-to-moderate pulmonary vascular congestion. Finding suggestive of   interstitial pulmonary edema in lungs. XR CHEST PORTABLE   Final Result   Placement of gastric tube which extends to the distal aspect of the stomach. Cardiomegaly and vascular congestion without evidence of interstitial edema. No confluent airspace consolidation. XR ABDOMEN FOR NG/OG/NE TUBE PLACEMENT   Final Result   Gastric tube in place with the tip and side-port in the stomach. XR CHEST PORTABLE   Final Result   Very limited study due to underpenetration. Increased opacity in the lungs which could be related to under penetration   and overlying soft tissues, but diffuse airspace infiltrates cannot be   excluded. Cardiomegaly without evidence of CHF.          XR HUMERUS RIGHT (MIN 2 VIEWS)   Final Result   No acute osseous abnormality in the right humerus. CTA NECK W CONTRAST   Final Result   No acute trauma of the major arterial vessels of the neck. CT THORACIC SPINE TRAUMA RECONSTRUCTION   Final Result   10-20% compression deformity of the T3 vertebra, possibly acute. No other   evidence of acute thoracic or lumbar spine fracture. Multilevel degenerative   change. CT LUMBAR SPINE TRAUMA RECONSTRUCTION   Final Result   10-20% compression deformity of the T3 vertebra, possibly acute. No other   evidence of acute thoracic or lumbar spine fracture. Multilevel degenerative   change. XR PELVIS (MIN 3 VIEWS)   Final Result   1. Nondisplaced fracture involving the lateral aspect of the right superior   pubic ramus, though better seen on CT imaging. 2. No other radiographically evident acute fracture seen in the pelvis. XR SHOULDER RIGHT (MIN 2 VIEWS)   Final Result   1. No acute fracture seen in the right shoulder. 2. No acute fracture seen in the left shoulder. XR SHOULDER LEFT (MIN 2 VIEWS)   Final Result   1. No acute fracture seen in the right shoulder. 2. No acute fracture seen in the left shoulder. Echocardiogram:   No results found for this or any previous visit. Cardiac Catheterization:   No results found for this or any previous visit. ASSESSMENT AND PLAN     Assessment:    //Acute hypoxic hypercapnic respiratory failure required invasive ventilatory support  //Status post second extubation on 12/22/2022  //Post extubation stridor  //Status post fall/C7 transverse process fracture and T3 compression fracture and fracture of right sacral/pelvic fracture nondisplaced  //Obstructive sleep apnea/hypoventilation  //Pneumonia/aspiration pneumonia/MRSA  //Hypopituitarism status post radiation to brain in childhood  //Morbid obesity  //Developmental delay  //Seizure disorder    Plan:  Has stridor - , intubated 2 times and second prolonged intubation .  Ent consult for evaluation of vc dysfunction . Humidified oxygen . Bipap at night and prn during day   Strict aspiration precaution. Avoid benzodiazepine and narcotics as much possible  Continue incentive spirometry, pulmonary toilet, aspiration precautions and bronchodilators  Continue to monitor I/O with a goal of even/negative fluid balance  Antimicrobials reviewed; finished vancomycin 12/22/2022 for MRSA pneumonia  On hydrocortisone and levothyroxine for hypopituitarism  On Lamictal and Topamax and Neurontin  DVT prophylaxis on Lovenox 30 mg twice daily  Physical/occupational/speech therapy; increase activity as tolerated    Patrizia Meza MD, M.D. Pulmonary and Critical Care Medicine           12/26/2022, 7:27 PM    Please note that this chart was generated using voice recognition Dragon dictation software. Although every effort was made to ensure the accuracy of this automated transcription, some errors in transcription may have occurred.

## 2022-12-27 NOTE — CARE COORDINATION
Transitional planning note: plan is [facility]. Patient currently on 4L O2 per NC with BIPAP at night.

## 2022-12-28 LAB — LAMOTRIGINE LEVEL: 2.1 UG/ML (ref 3–15)

## 2022-12-28 PROCEDURE — 2500000003 HC RX 250 WO HCPCS: Performed by: INTERNAL MEDICINE

## 2022-12-28 PROCEDURE — 2700000000 HC OXYGEN THERAPY PER DAY

## 2022-12-28 PROCEDURE — 6370000000 HC RX 637 (ALT 250 FOR IP): Performed by: NURSE PRACTITIONER

## 2022-12-28 PROCEDURE — 6370000000 HC RX 637 (ALT 250 FOR IP): Performed by: STUDENT IN AN ORGANIZED HEALTH CARE EDUCATION/TRAINING PROGRAM

## 2022-12-28 PROCEDURE — 6360000002 HC RX W HCPCS

## 2022-12-28 PROCEDURE — 94761 N-INVAS EAR/PLS OXIMETRY MLT: CPT

## 2022-12-28 PROCEDURE — 6370000000 HC RX 637 (ALT 250 FOR IP)

## 2022-12-28 PROCEDURE — 36415 COLL VENOUS BLD VENIPUNCTURE: CPT

## 2022-12-28 PROCEDURE — 6360000002 HC RX W HCPCS: Performed by: NURSE PRACTITIONER

## 2022-12-28 PROCEDURE — 2060000000 HC ICU INTERMEDIATE R&B

## 2022-12-28 PROCEDURE — 94640 AIRWAY INHALATION TREATMENT: CPT

## 2022-12-28 PROCEDURE — 99232 SBSQ HOSP IP/OBS MODERATE 35: CPT | Performed by: INTERNAL MEDICINE

## 2022-12-28 PROCEDURE — 99233 SBSQ HOSP IP/OBS HIGH 50: CPT | Performed by: INTERNAL MEDICINE

## 2022-12-28 PROCEDURE — 2580000003 HC RX 258: Performed by: STUDENT IN AN ORGANIZED HEALTH CARE EDUCATION/TRAINING PROGRAM

## 2022-12-28 PROCEDURE — 99231 SBSQ HOSP IP/OBS SF/LOW 25: CPT | Performed by: OTOLARYNGOLOGY

## 2022-12-28 PROCEDURE — 80175 DRUG SCREEN QUAN LAMOTRIGINE: CPT

## 2022-12-28 PROCEDURE — 94660 CPAP INITIATION&MGMT: CPT

## 2022-12-28 RX ORDER — FAMOTIDINE 40 MG/5ML
20 POWDER, FOR SUSPENSION ORAL 2 TIMES DAILY
Status: DISCONTINUED | OUTPATIENT
Start: 2022-12-28 | End: 2022-12-31 | Stop reason: HOSPADM

## 2022-12-28 RX ORDER — FUROSEMIDE 20 MG/1
20 TABLET ORAL DAILY
Status: DISCONTINUED | OUTPATIENT
Start: 2022-12-28 | End: 2022-12-31 | Stop reason: HOSPADM

## 2022-12-28 RX ADMIN — ANTI-FUNGAL POWDER MICONAZOLE NITRATE TALC FREE: 1.42 POWDER TOPICAL at 22:11

## 2022-12-28 RX ADMIN — HYDROCORTISONE 10 MG: 10 TABLET ORAL at 21:56

## 2022-12-28 RX ADMIN — ARIPIPRAZOLE 10 MG: 10 TABLET ORAL at 08:58

## 2022-12-28 RX ADMIN — OXYCODONE 5 MG: 5 TABLET ORAL at 12:25

## 2022-12-28 RX ADMIN — GABAPENTIN 100 MG: 100 CAPSULE ORAL at 08:58

## 2022-12-28 RX ADMIN — ENOXAPARIN SODIUM 30 MG: 100 INJECTION SUBCUTANEOUS at 21:57

## 2022-12-28 RX ADMIN — GUAIFENESIN 200 MG: 200 SOLUTION ORAL at 21:56

## 2022-12-28 RX ADMIN — SERTRALINE 150 MG: 50 TABLET, FILM COATED ORAL at 08:58

## 2022-12-28 RX ADMIN — ACETAMINOPHEN 650 MG: 325 TABLET ORAL at 21:57

## 2022-12-28 RX ADMIN — HYDROCORTISONE 15 MG: 10 TABLET ORAL at 08:58

## 2022-12-28 RX ADMIN — GABAPENTIN 100 MG: 100 CAPSULE ORAL at 14:17

## 2022-12-28 RX ADMIN — OXYCODONE 10 MG: 5 TABLET ORAL at 16:47

## 2022-12-28 RX ADMIN — TOPIRAMATE 100 MG: 100 TABLET, FILM COATED ORAL at 21:56

## 2022-12-28 RX ADMIN — LEVOTHYROXINE SODIUM 150 MCG: 75 TABLET ORAL at 05:39

## 2022-12-28 RX ADMIN — SODIUM CHLORIDE, PRESERVATIVE FREE 10 ML: 5 INJECTION INTRAVENOUS at 08:59

## 2022-12-28 RX ADMIN — FAMOTIDINE 20 MG: 40 POWDER, FOR SUSPENSION ORAL at 08:58

## 2022-12-28 RX ADMIN — OXYCODONE 5 MG: 5 TABLET ORAL at 09:03

## 2022-12-28 RX ADMIN — GUAIFENESIN 200 MG: 200 SOLUTION ORAL at 08:57

## 2022-12-28 RX ADMIN — ENOXAPARIN SODIUM 30 MG: 100 INJECTION SUBCUTANEOUS at 08:57

## 2022-12-28 RX ADMIN — TOPIRAMATE 75 MG: 100 TABLET, FILM COATED ORAL at 08:58

## 2022-12-28 RX ADMIN — OXYCODONE 5 MG: 5 TABLET ORAL at 05:39

## 2022-12-28 RX ADMIN — FAMOTIDINE 20 MG: 40 POWDER, FOR SUSPENSION ORAL at 21:57

## 2022-12-28 RX ADMIN — ANTI-FUNGAL POWDER MICONAZOLE NITRATE TALC FREE: 1.42 POWDER TOPICAL at 08:58

## 2022-12-28 RX ADMIN — GABAPENTIN 100 MG: 100 CAPSULE ORAL at 22:27

## 2022-12-28 RX ADMIN — FUROSEMIDE 20 MG: 20 TABLET ORAL at 12:25

## 2022-12-28 RX ADMIN — LAMOTRIGINE 200 MG: 100 TABLET ORAL at 08:58

## 2022-12-28 RX ADMIN — OXYCODONE 5 MG: 5 TABLET ORAL at 22:26

## 2022-12-28 RX ADMIN — LEVALBUTEROL HYDROCHLORIDE 0.63 MG: 0.63 SOLUTION RESPIRATORY (INHALATION) at 12:21

## 2022-12-28 ASSESSMENT — PAIN SCALES - GENERAL
PAINLEVEL_OUTOF10: 5
PAINLEVEL_OUTOF10: 3
PAINLEVEL_OUTOF10: 8
PAINLEVEL_OUTOF10: 0
PAINLEVEL_OUTOF10: 5
PAINLEVEL_OUTOF10: 8

## 2022-12-28 ASSESSMENT — ENCOUNTER SYMPTOMS
EYE REDNESS: 0
VOICE CHANGE: 0
SORE THROAT: 0
DIARRHEA: 0
ABDOMINAL PAIN: 0
VOMITING: 0
COUGH: 1
EYE PAIN: 0
PHOTOPHOBIA: 0
BACK PAIN: 1
TROUBLE SWALLOWING: 0
SHORTNESS OF BREATH: 1
SINUS PRESSURE: 0

## 2022-12-28 ASSESSMENT — PAIN DESCRIPTION - ORIENTATION
ORIENTATION: RIGHT
ORIENTATION: MID

## 2022-12-28 ASSESSMENT — PAIN - FUNCTIONAL ASSESSMENT
PAIN_FUNCTIONAL_ASSESSMENT: ACTIVITIES ARE NOT PREVENTED
PAIN_FUNCTIONAL_ASSESSMENT: PREVENTS OR INTERFERES SOME ACTIVE ACTIVITIES AND ADLS

## 2022-12-28 ASSESSMENT — PAIN DESCRIPTION - DESCRIPTORS
DESCRIPTORS: SHARP;SHOOTING
DESCRIPTORS: SHARP;STABBING
DESCRIPTORS: STABBING;SHARP

## 2022-12-28 ASSESSMENT — PAIN DESCRIPTION - LOCATION
LOCATION: BACK

## 2022-12-28 NOTE — CARE COORDINATION
Transitional planning: Received VM from [facility] ([phone]) that they are accepting and to call when discharging.

## 2022-12-28 NOTE — PROGRESS NOTES
ENT/OTOLARYNGOLOGY SUBSEQUENT CARE PROGRESS NOTE     REASON FOR CARE: inspiratory stridor     HISTORY OF PRESENT ILLNESS:   Shannon Lorenz is a 41 y.o. who is being seen for follow-up of her inspiratory stridor. Minimal to slight improvement in her stridor since seen in the past 1-2 days per the patient's father. Improved breathing and lungs per dad. No h/o stridor prior to intubation and this admission. H/o developmental delay. On BID lovenox currently. Pertinent Examination:   GENERAL: well developed and well nourished and in no acute distress  HEAD: normocephalic and atraumatic  EYES: no eyelid swelling, no conjunctival injection or exudate, pupils equal round and reactive to light  EXTERNAL EARS: normal  EAR EXAM: deferred  NOSE: nares patent, normal mucosa  MOUTH/THROAT: mucous membranes moist, no focal lesions, no tonsillar enlargement or exudate  NECK: non-tender, full range of motion, no mass, no focal lymphadenopathy    RESPIRATORY: auscultated inspiratory stridor during normal non-labored respiration. Improved expiratory breath sounds and rales/crackles. No SOB or increased WOB. NEUROLOGICAL:  cranial nerves II-XII are grossly intact     RELEVANT LABS/STUDIES:   Additional data reviewed:    None    Procedures:    None    Surgical risk factors:  As above     IMPRESSION AND RECOMMENDATIONS:   Shannon Lorenz is a 41 y.o. female with inspiratory stridor and b/l TVF paresis vs. Mixed unilateral paralysis/paresis. Stridor could be related to her glottis and TVF dysfunction vs. Possible subglottic/tracheal injury/stenosis. Plan: Will reassess tomorrow. If has persistent inspiratory stridor not improving with time, it may be worthwhile to take to the OR for a DLB to check her subglottis and trachea to evaluate for any intubation injury/tracheal stenosis as the cause of her persistent inspiratory stridor.    Currently I cannot evaluate this area with only a flexible larygnoscopy at the bedside. Would need to hold lovenox prior to any surgery.        --------------------------------------------------  Elijah Kendall MD  Pediatric Otolaryngology-Head and Neck Surgery  [address] [place] [address] [facility]    Office  ph# [phone]    Also available in [facility]

## 2022-12-28 NOTE — PLAN OF CARE
Problem: Discharge Planning  Goal: Discharge to home or other facility with appropriate resources  Outcome: Progressing     Problem: Confusion  Goal: Confusion, delirium, dementia, or psychosis is improved or at baseline  Description: INTERVENTIONS:  1. Assess for possible contributors to thought disturbance, including medications, impaired vision or hearing, underlying metabolic abnormalities, dehydration, psychiatric diagnoses, and notify attending LIP  2. [place] high risk fall precautions, as indicated  3. Provide frequent short contacts to provide reality reorientation, refocusing and direction  4. Decrease environmental stimuli, including noise as appropriate  5. Monitor and intervene to maintain adequate nutrition, hydration, elimination, sleep and activity  6. If unable to ensure safety without constant attention obtain sitter and review sitter guidelines with assigned personnel  7. Initiate Psychosocial CNS and Spiritual Care consult, as indicated  Outcome: Progressing     Problem: Pain  Goal: Verbalizes/displays adequate comfort level or baseline comfort level  Outcome: Progressing     Problem: Chronic Conditions and Co-morbidities  Goal: Patient's chronic conditions and co-morbidity symptoms are monitored and maintained or improved  Outcome: Progressing     Problem: Skin/Tissue Integrity  Goal: Absence of new skin breakdown  Description: 1. Monitor for areas of redness and/or skin breakdown  2. Assess vascular access sites hourly  3. Every 4-6 hours minimum:  Change oxygen saturation probe site  4. Every 4-6 hours:  If on nasal continuous positive airway pressure, respiratory therapy assess nares and determine need for appliance change or resting period.   Outcome: Progressing     Problem: Safety - Adult  Goal: Free from fall injury  Outcome: Progressing     Problem: ABCDS Injury Assessment  Goal: Absence of physical injury  Outcome: Progressing     Problem: Nutrition Deficit:  Goal: Optimize nutritional status  Outcome: Progressing     Problem: Respiratory - Adult  Goal: Achieves optimal ventilation and oxygenation  Outcome: Progressing

## 2022-12-28 NOTE — PROGRESS NOTES
[facility]  Internal Medicine Teaching Residency Program  Inpatient Daily Progress Note  ______________________________________________________________________________    Patient: Mukul Simpson  YOB: 1975   YSI:[id number]    Acct: [de-identified]     Room: 26/1-12  Admit date: 12/5/2022  Today's date: 12/28/22  Number of days in the hospital: 22    SUBJECTIVE   Admitting Diagnosis: Acute respiratory failure with hypoxia [facility])  CC: Fall leading to neck pain and back pain  Pt examined at bedside. Chart & results reviewed. No acute event overnight  Remained afebrile and hemodynamically stable. Used CPAP overnight. No worsening cough or shortness of breath.-On 4 L oxygen. States the stridor is the same. No difficulty with speaking. Not in distress. Denies chest pain, shortness of breath. no other acute complaints. Has a back pain. ROS:  Constitutional:  negative for chills, fevers, sweats  Respiratory:  negative for cough, dyspnea on exertion, hemoptysis, shortness of breath, wheezing  Cardiovascular:  negative for chest pain, chest pressure/discomfort, lower extremity edema, palpitations  Gastrointestinal:  negative for abdominal pain, constipation, diarrhea, nausea, vomiting  Neurological:  negative for dizziness, headache  BRIEF HISTORY     ICU COURSE:     82-year-old female initially presented to hospital on 12/5 after a fall. 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 Patient also has past medical history of diabetes mellitus, bilateral sensorineural hearing loss. Had a CT head showed no intracranial abnormality, CT abdominal pelvis showed  nondisplaced fractures of right sacral ilya, nondisplaced fracture of right superior ramus and right obturator ring.   CT spine showed acute fracture of right C7 transverse process and also age indeterminate compressive fracture of T3. Patient admitted under trauma surgery-neurosurgery for cervical fractures was consulted, who recommended no neurosurgical intervention. Orthopedic surgery for pelvic fractures was also consulted, no surgical intervention but medical management and also recommended to follow-up in orthopedic clinic after discharge. Patient was started on BiPAP for increased work of breathing. Patient was oriented x4 at day of presentation.  - patient had a decline in mental status with tachypnea, respiratory rate of 30s to 40 with heart rate of 105 patient was intubated emergently     - Neurology was consulted because of history of migraines, seizure disorder, patient was started on her home antiepileptic drugs including Topamax and Lamictal, EEG was ordered to rule out any subclinical seizure activity, MRI was also ordered to rule out any CVA. Patient was doing fine, arousable and following commands. Pulmonology was consulted, recommended to extubate and patient was extubated. 12/10 -rapid was called today due to impending respiratory failure, patient was intubated again and was sent to ICU       -respiratory culture came back positive for staph aureus , nasal respiratory swab came out positive for MRSA DNA, patient started on vancomycin       : Tachycardia improved to normal heart rate, patient remained afebrile, WBC count trending down. Sedation changed to precedex     12/15: Precedex discontinued. : Patient was extubated. : Pt was a febrile overnight, saturating well on NC.     OBJECTIVE     Vital Signs:  /74   Pulse 53   Temp 98.3 °F (36.8 °C) (Oral)   Resp 18   Ht 5' 1\" (1.549 m)   Wt 261 lb 7.5 oz (118.6 kg)   SpO2 100%   BMI 49.40 kg/m²     Temp (24hrs), Av.4 °F (36.9 °C), Min:98 °F (36.7 °C), Max:98.8 °F (37.1 °C)    In: -   Out: 1750 [Urine:1750]    Physical Exam:  General appearance - alert, in no distress  Mental status - alert, oriented to person, place, and time  Eyes - pupils equal and reactive, extraocular eye movements intact. Some hearing loss. Mouth - mucous membranes moist, pharynx normal without lesions  Neck - supple, no significant adenopathy  Chest - clear to auscultation, no wheezes  Heart - normal rate, regular rhythm, normal S1, S2  Abdomen - soft, nontender, nondistended  Neurological - alert, oriented, normal speech, no focal deficits   Extremities - peripheral pulses normal, no pedal edema  Skin - normal coloration and turgor, no rashes     Medications:  Scheduled Medications:    famotidine  20 mg Oral BID    guaiFENesin  200 mg Oral BID    miconazole   Topical BID    ARIPiprazole  10 mg Oral Daily    gabapentin  100 mg Oral TID    hydrocortisone  10 mg Oral Nightly    hydrocortisone  15 mg Oral QAM    enoxaparin  30 mg SubCUTAneous BID    sertraline  150 mg Oral Daily    lamoTRIgine  200 mg Oral Daily    levothyroxine  150 mcg Oral QAM AC    topiramate  75 mg Oral Daily    topiramate  100 mg Oral Nightly     Continuous Infusions:    sodium chloride Stopped (12/12/22 0316)     PRN MedicationsoxyCODONE, 10 mg, Q12H PRN   Or  oxyCODONE, 5 mg, Q4H PRN  racepinephrine HCl, 11.25 mg, Q4H PRN  sodium chloride nebulizer, 3 mL, Q4H PRN  polyethylene glycol, 17 g, Daily PRN  levalbuterol, 0.63 mg, Q4H PRN  sodium chloride, , PRN  acetaminophen, 650 mg, Q6H PRN  sodium chloride flush, 5-40 mL, PRN      Diagnostic Labs:  CBC:   Recent Labs     12/27/22  0448   WBC 7.3   RBC 4.47   HGB 12.4   HCT 42.5   MCV 95.1   RDW 13.9          BMP:   Recent Labs     12/27/22  0448      K 4.0      CO2 24   BUN 12   CREATININE 0.72       BNP: No results for input(s): BNP in the last 72 hours. PT/INR: No results for input(s): PROTIME, INR in the last 72 hours. APTT: No results for input(s): APTT in the last 72 hours. CARDIAC ENZYMES: No results for input(s): CKMB, CKMBINDEX, TROPONINI in the last 72 hours.     Invalid input(s): CKTOTAL;3  FASTING LIPID PANEL:  Lab Results   Component Value Date    CHOL 147 03/01/2020    HDL 46 03/01/2020    TRIG 77 03/01/2020     LIVER PROFILE: No results for input(s): AST, ALT, ALB, BILIDIR, BILITOT, ALKPHOS in the last 72 hours. MICROBIOLOGY:   Lab Results   Component Value Date/Time    CULTURE NO SIGNIFICANT GROWTH 12/15/2022 02:10 PM       Imaging:    XR CHEST PORTABLE    Result Date: 12/23/2022  1. Enlargement of the cardiac silhouette with vascular congestion, though improved focal infiltrate of the right upper lobe. XR CHEST PORTABLE    Result Date: 12/18/2022  No change from prior study. Tubes and lines as above. Endotracheal tube is somewhat low lying but unchanged. FL MODIFIED BARIUM SWALLOW W VIDEO    Result Date: 12/22/2022  1. Penetration followed by aspiration without coughing with the nectar thick and honey thick substances. 2. No penetration or aspiration with the pureed/pudding thick or soft solid substances. 3. Cookie solid substance was not attempted. Please see separate speech pathology report for full discussion of findings and recommendations. ASSESSMENT & PLAN     Assessment:  Principal Problem:    Cervical transverse process fracture, initial encounter [facility])  Active Problems:    Closed nondisplaced fracture of seventh cervical vertebra (HCC)    Lethargy    Multiple closed pelvic fractures without disruption of pelvic [place] (HCC)    Aspiration pneumonia (HCC)    Acute respiratory failure with hypoxia (HCC)    MRSA infection    Acute lower respiratory tract infection    Seizure ([facility] [address]    Hypothyroidism    Asthma    Hypopituitarism ([facility] [address]  Resolved Problems:    * No resolved hospital problems.  *            Plan:     Acute hypoxic respiratory failure likely secondary to pneumonia-completed vancomycin for MRSA pneumonia 12/22  -Xopenex, Robitussin, racemic epinephrine, sodium chloride nebulizer as needed  -On nasal cannula during the day with CPAP in the night.  Maintain oxygen saturation above 92%. Continue to wean down as tolerated. Sinus bradycardia - asymptomatic  -Likely due to sleep apnea. Thyroxine on admission was within normal limits. Will monitor. Chest pain - resolved -EKG negative. Chest x-ray showing cardiomegaly with possible congestion.  -We will check troponins if recurrence of chest pain. History of seizures  EEG this admission-moderate nonspecific encephalopathy-no epileptic discharges. MRI could not be done due to cochlear implants.  -Lamotrigine level was low-neurology recommended continuing the same dose of lamotrigine as there is no concern of seizures. We will check lamotrigine again.  - Continue home lamotrigine 200 mg daily, topiramate 100 mg nightly, 75 mg in the morning. C7 transverse process fracture and T3 chronic compression fracture-neurosurgery was consulted-recommended no intervention     Right sacral ilya fracture, right superior pubic ramus fracture-orthopedic was consulted-recommended nonoperative treatment with close clinic follow-up in 2 weeks after discharge as there is minimal chance of displacement and recommended weightbearing as tolerated. Stridor  -ENT was consulted - likely due to systemic deconditioning or weakness or underlying prolonged intubation. Recommended humidified air. Hypothyroidism  -Continue home levothyroxine 150 mcg     Hypopituitarism  -Continue hydrocortisone 10 mg in the night and 15 mg in the morning. Home dose-10 mg twice daily. Hypokalemia - resolved    Due to prophylaxis - Lovenox  GI prophylaxis - Protonix     PT/OT-has been working   - on board - we will follow-up. Levi Salgado MD  Internal Medicine Resident, PGY-1  [address], [place]  12/28/2022, 11:17 AM    Attending Physician Statement  I have discussed the care of Afua Muro and I have examined the patient myselft and taken ros and hpi , including pertinent history and exam findings,  with the resident. I have reviewed the key elements of all parts of the encounter with the resident. I agree with the assessment, plan and orders as documented by the resident.     ENT evaluation pending  Continues to be on 4 L of oxygen  Will add low-dose Lasix    Electronically signed by Nguyễn Gary MD

## 2022-12-29 ENCOUNTER — APPOINTMENT (OUTPATIENT)
Dept: CT IMAGING | Age: 47
DRG: 551 | End: 2022-12-29
Payer: MEDICARE

## 2022-12-29 ENCOUNTER — ANESTHESIA EVENT (OUTPATIENT)
Dept: OPERATING ROOM | Age: 47
End: 2022-12-29
Payer: MEDICARE

## 2022-12-29 ENCOUNTER — APPOINTMENT (OUTPATIENT)
Dept: GENERAL RADIOLOGY | Age: 47
DRG: 551 | End: 2022-12-29
Payer: MEDICARE

## 2022-12-29 LAB
ABSOLUTE EOS #: 0.25 K/UL (ref 0–0.44)
ABSOLUTE IMMATURE GRANULOCYTE: <0.03 K/UL (ref 0–0.3)
ABSOLUTE LYMPH #: 1.52 K/UL (ref 1.1–3.7)
ABSOLUTE MONO #: 0.43 K/UL (ref 0.1–1.2)
ANION GAP SERPL CALCULATED.3IONS-SCNC: 10 MMOL/L (ref 9–17)
BASOPHILS # BLD: 1 % (ref 0–2)
BASOPHILS ABSOLUTE: 0.04 K/UL (ref 0–0.2)
BUN BLDV-MCNC: 11 MG/DL (ref 6–20)
CALCIUM SERPL-MCNC: 8.7 MG/DL (ref 8.6–10.4)
CHLORIDE BLD-SCNC: 100 MMOL/L (ref 98–107)
CO2: 29 MMOL/L (ref 20–31)
CREAT SERPL-MCNC: 0.84 MG/DL (ref 0.5–0.9)
EOSINOPHILS RELATIVE PERCENT: 4 % (ref 1–4)
GFR SERPL CREATININE-BSD FRML MDRD: >60 ML/MIN/1.73M2
GLUCOSE BLD-MCNC: 104 MG/DL (ref 65–105)
GLUCOSE BLD-MCNC: 79 MG/DL (ref 65–105)
GLUCOSE BLD-MCNC: 85 MG/DL (ref 70–99)
HCT VFR BLD CALC: 42.8 % (ref 36.3–47.1)
HEMOGLOBIN: 13.5 G/DL (ref 11.9–15.1)
IMMATURE GRANULOCYTES: 0 %
LYMPHOCYTES # BLD: 27 % (ref 24–43)
MCH RBC QN AUTO: 28.1 PG (ref 25.2–33.5)
MCHC RBC AUTO-ENTMCNC: 31.5 G/DL (ref 28.4–34.8)
MCV RBC AUTO: 89 FL (ref 82.6–102.9)
MONOCYTES # BLD: 8 % (ref 3–12)
NRBC AUTOMATED: 0 PER 100 WBC
PDW BLD-RTO: 13.9 % (ref 11.8–14.4)
PLATELET # BLD: 208 K/UL (ref 138–453)
PMV BLD AUTO: 11 FL (ref 8.1–13.5)
POTASSIUM SERPL-SCNC: 3.7 MMOL/L (ref 3.7–5.3)
RBC # BLD: 4.81 M/UL (ref 3.95–5.11)
SEG NEUTROPHILS: 60 % (ref 36–65)
SEGMENTED NEUTROPHILS ABSOLUTE COUNT: 3.36 K/UL (ref 1.5–8.1)
SODIUM BLD-SCNC: 139 MMOL/L (ref 135–144)
WBC # BLD: 5.6 K/UL (ref 3.5–11.3)

## 2022-12-29 PROCEDURE — 6370000000 HC RX 637 (ALT 250 FOR IP): Performed by: STUDENT IN AN ORGANIZED HEALTH CARE EDUCATION/TRAINING PROGRAM

## 2022-12-29 PROCEDURE — 6370000000 HC RX 637 (ALT 250 FOR IP)

## 2022-12-29 PROCEDURE — 6370000000 HC RX 637 (ALT 250 FOR IP): Performed by: NURSE PRACTITIONER

## 2022-12-29 PROCEDURE — 71250 CT THORAX DX C-: CPT

## 2022-12-29 PROCEDURE — 2500000003 HC RX 250 WO HCPCS: Performed by: INTERNAL MEDICINE

## 2022-12-29 PROCEDURE — 6360000002 HC RX W HCPCS: Performed by: STUDENT IN AN ORGANIZED HEALTH CARE EDUCATION/TRAINING PROGRAM

## 2022-12-29 PROCEDURE — 80048 BASIC METABOLIC PNL TOTAL CA: CPT

## 2022-12-29 PROCEDURE — 2060000000 HC ICU INTERMEDIATE R&B

## 2022-12-29 PROCEDURE — 2580000003 HC RX 258: Performed by: STUDENT IN AN ORGANIZED HEALTH CARE EDUCATION/TRAINING PROGRAM

## 2022-12-29 PROCEDURE — 94760 N-INVAS EAR/PLS OXIMETRY 1: CPT

## 2022-12-29 PROCEDURE — 6360000002 HC RX W HCPCS: Performed by: NURSE PRACTITIONER

## 2022-12-29 PROCEDURE — 99232 SBSQ HOSP IP/OBS MODERATE 35: CPT | Performed by: INTERNAL MEDICINE

## 2022-12-29 PROCEDURE — 85025 COMPLETE CBC W/AUTO DIFF WBC: CPT

## 2022-12-29 PROCEDURE — 51798 US URINE CAPACITY MEASURE: CPT

## 2022-12-29 PROCEDURE — 82947 ASSAY GLUCOSE BLOOD QUANT: CPT

## 2022-12-29 PROCEDURE — 92611 MOTION FLUOROSCOPY/SWALLOW: CPT

## 2022-12-29 PROCEDURE — 2700000000 HC OXYGEN THERAPY PER DAY

## 2022-12-29 PROCEDURE — 36415 COLL VENOUS BLD VENIPUNCTURE: CPT

## 2022-12-29 PROCEDURE — 74230 X-RAY XM SWLNG FUNCJ C+: CPT

## 2022-12-29 PROCEDURE — 99231 SBSQ HOSP IP/OBS SF/LOW 25: CPT | Performed by: OTOLARYNGOLOGY

## 2022-12-29 PROCEDURE — 94660 CPAP INITIATION&MGMT: CPT

## 2022-12-29 RX ORDER — SODIUM CHLORIDE 9 MG/ML
INJECTION, SOLUTION INTRAVENOUS CONTINUOUS
Status: DISCONTINUED | OUTPATIENT
Start: 2022-12-29 | End: 2022-12-31 | Stop reason: HOSPADM

## 2022-12-29 RX ADMIN — ENOXAPARIN SODIUM 30 MG: 100 INJECTION SUBCUTANEOUS at 20:17

## 2022-12-29 RX ADMIN — LEVOTHYROXINE SODIUM 150 MCG: 75 TABLET ORAL at 06:27

## 2022-12-29 RX ADMIN — TOPIRAMATE 100 MG: 100 TABLET, FILM COATED ORAL at 20:17

## 2022-12-29 RX ADMIN — ARIPIPRAZOLE 10 MG: 10 TABLET ORAL at 09:09

## 2022-12-29 RX ADMIN — OXYCODONE 5 MG: 5 TABLET ORAL at 20:17

## 2022-12-29 RX ADMIN — SODIUM CHLORIDE, PRESERVATIVE FREE 10 ML: 5 INJECTION INTRAVENOUS at 09:07

## 2022-12-29 RX ADMIN — FUROSEMIDE 20 MG: 20 TABLET ORAL at 09:09

## 2022-12-29 RX ADMIN — ACETAMINOPHEN 650 MG: 325 TABLET ORAL at 06:27

## 2022-12-29 RX ADMIN — LAMOTRIGINE 200 MG: 100 TABLET ORAL at 09:07

## 2022-12-29 RX ADMIN — TOPIRAMATE 75 MG: 100 TABLET, FILM COATED ORAL at 09:07

## 2022-12-29 RX ADMIN — ACETAMINOPHEN 650 MG: 325 TABLET ORAL at 20:17

## 2022-12-29 RX ADMIN — HYDROCORTISONE 15 MG: 10 TABLET ORAL at 09:08

## 2022-12-29 RX ADMIN — GUAIFENESIN 200 MG: 200 SOLUTION ORAL at 20:17

## 2022-12-29 RX ADMIN — FAMOTIDINE 20 MG: 40 POWDER, FOR SUSPENSION ORAL at 20:17

## 2022-12-29 RX ADMIN — ANTI-FUNGAL POWDER MICONAZOLE NITRATE TALC FREE: 1.42 POWDER TOPICAL at 22:35

## 2022-12-29 RX ADMIN — ENOXAPARIN SODIUM 30 MG: 100 INJECTION SUBCUTANEOUS at 09:07

## 2022-12-29 RX ADMIN — ANTI-FUNGAL POWDER MICONAZOLE NITRATE TALC FREE: 1.42 POWDER TOPICAL at 09:16

## 2022-12-29 RX ADMIN — OXYCODONE 10 MG: 5 TABLET ORAL at 13:15

## 2022-12-29 RX ADMIN — GABAPENTIN 100 MG: 100 CAPSULE ORAL at 09:09

## 2022-12-29 RX ADMIN — HYDROCORTISONE 10 MG: 10 TABLET ORAL at 20:17

## 2022-12-29 RX ADMIN — FAMOTIDINE 20 MG: 40 POWDER, FOR SUSPENSION ORAL at 09:07

## 2022-12-29 RX ADMIN — GABAPENTIN 100 MG: 100 CAPSULE ORAL at 20:18

## 2022-12-29 RX ADMIN — SERTRALINE 150 MG: 50 TABLET, FILM COATED ORAL at 09:08

## 2022-12-29 RX ADMIN — GUAIFENESIN 200 MG: 200 SOLUTION ORAL at 09:07

## 2022-12-29 RX ADMIN — OXYCODONE 5 MG: 5 TABLET ORAL at 06:27

## 2022-12-29 RX ADMIN — GABAPENTIN 100 MG: 100 CAPSULE ORAL at 13:15

## 2022-12-29 ASSESSMENT — ENCOUNTER SYMPTOMS
SHORTNESS OF BREATH: 1
COUGH: 1
GASTROINTESTINAL NEGATIVE: 1
STRIDOR: 1

## 2022-12-29 ASSESSMENT — PAIN SCALES - GENERAL
PAINLEVEL_OUTOF10: 8
PAINLEVEL_OUTOF10: 8

## 2022-12-29 ASSESSMENT — PAIN DESCRIPTION - LOCATION: LOCATION: BACK

## 2022-12-29 ASSESSMENT — PAIN DESCRIPTION - DESCRIPTORS: DESCRIPTORS: SHARP;STABBING

## 2022-12-29 NOTE — ANESTHESIA PRE PROCEDURE
Department of Anesthesiology  Preprocedure Note       Name:  Alyssa Montoya   Age:  41 y.o.  :  1975                                          MRN:  [id number]         Date:  2022      Surgeon: Sally Mnoroe):  Abraham Gonsales MD    Procedure: Procedure(s):  DIRECT LARYNGOSCOPY  DIRECT RIGID BRONCHOSCOPY, POSSIBLE INTERVENTION    Medications prior to admission:   Prior to Admission medications    Medication Sig Start Date End Date Taking?  Authorizing Provider   SUMAtriptan (IMITREX) 100 MG tablet Take 100 mg by mouth once as needed for Migraine 1.5 tabs qam, 2 tabs qpm    Historical Provider, MD   hydrocortisone (CORTEF) 10 MG tablet Take 10 mg by mouth 2 times daily    Historical Provider, MD   topiramate (TOPAMAX) 50 MG tablet TAKE 1 AND 1/2 TABLETS IN THE MORNING AND 2 TABLETS AT NIGHT. 22   Altagracia Horne MD   KLOR-CON M10 10 MEQ extended release tablet TAKE 1 TABLET BY MOUTH TWICE A DAY 22   Sue Choi,    metFORMIN (GLUCOPHAGE) 500 MG tablet TAKE 1 TABLET BY MOUTH TWICE A DAY WITH MEALS 6/10/21   Gumaro Macias DO   Handicap Placard MISC by Does not apply route EXPIRES IN 5 YEARS FROM ORIGINAL SCRIPT DATE 21   Gumaro Macias DO   buPROPion (WELLBUTRIN XL) 150 MG extended release tablet Take 300 mg by mouth daily  20   Historical Provider, MD   ARIPiprazole (ABILIFY) 20 MG tablet Take 1 tablet by mouth daily 20   Historical Provider, MD   levothyroxine (SYNTHROID) 150 MCG tablet Take 1 tablet by mouth every morning (before breakfast) 20   Historical Provider, MD   albuterol sulfate  (90 Base) MCG/ACT inhaler INHALE 2 PUFFS BY MOUTH EVERY 6 HOURS AS NEEDED FOR WHEEZING 20   Ghada Choi,    EPINEPHrine (EPIPEN 2-MINNIE) 0.3 MG/0.3ML SOAJ injection Inject 0.3 mLs into the muscle once for 1 dose Use as directed for allergic reaction 20  Gumaro Macias DO   lamoTRIgine (LAMICTAL) 150 MG tablet Take 150 mg by mouth daily    Historical Provider, MD   Calcium-Magnesium-Vitamin D (CALCIUM 1200+D3 PO) Take 2 tablets by mouth every morning    Historical Provider, MD   LORazepam (ATIVAN) 1 MG tablet 1 mg daily as needed. 1/29/16   Historical Provider, MD   sertraline (ZOLOFT) 100 MG tablet Take 150 mg by mouth daily     Historical Provider, MD   Ergocalciferol (VITAMIN D2 PO)   Take 1.25 mg by mouth daily     Historical Provider, MD       Current medications:    No current facility-administered medications for this visit. No current outpatient medications on file.      Facility-Administered Medications Ordered in Other Visits   Medication Dose Route Frequency Provider Last Rate Last Admin    famotidine (PEPCID) 40 MG/5ML suspension 20 mg  20 mg Oral BID Brice Villalba MD   20 mg at 12/29/22 0907    furosemide (LASIX) tablet 20 mg  20 mg Oral Daily Kian Hardin MD   20 mg at 12/29/22 0909    guaiFENesin (ROBITUSSIN) 100 MG/5ML liquid 200 mg  200 mg Oral BID aD Rangel MD   200 mg at 12/29/22 0907    miconazole (MICOTIN) 2 % powder   Topical BID Cecelia Alvarez MD   Given at 12/29/22 0916    oxyCODONE (ROXICODONE) immediate release tablet 10 mg  10 mg Oral Q12H PRN Caraballo Patella, DO   10 mg at 12/29/22 1315    Or    oxyCODONE (ROXICODONE) immediate release tablet 5 mg  5 mg Oral Q4H PRN Caraballo Patella, DO   5 mg at 12/29/22 9067    ARIPiprazole (ABILIFY) tablet 10 mg  10 mg Oral Daily Janet Sanabria MD   10 mg at 12/29/22 0846    gabapentin (NEURONTIN) capsule 100 mg  100 mg Oral TID Janet Sanarbia MD   100 mg at 12/29/22 1315    hydrocortisone (CORTEF) tablet 10 mg  10 mg Oral Nightly Tanya Soares MD   10 mg at 12/28/22 2156    hydrocortisone (CORTEF) tablet 15 mg  15 mg Oral QAM Tanya Soares MD   15 mg at 12/29/22 0908    racepinephrine HCl (VAPONEFPRIN) 2.25 % nebulizer solution NEBU 11.25 mg  11.25 mg Nebulization Q4H PRN Cheri Solis MD   11.25 mg at 12/25/22 1153    sodium chloride nebulizer 0.9 % solution 3 mL  3 mL Nebulization Q4H PRN Cheri Barrett MD        polyethylene glycol (GLYCOLAX) packet 17 g  17 g Oral Daily PRN Romana Needy, MD        levalbuterol [facility]) nebulization 0.63 mg  0.63 mg Nebulization Q4H PRN Desiree Sanchez MD   0.63 mg at 12/28/22 1221    0.9 % sodium chloride infusion   IntraVENous PRN Alaina Ta MD   Stopped at 12/12/22 0316    acetaminophen (TYLENOL) tablet 650 mg  650 mg Oral Q6H PRN Blayne Bolanos, DO   650 mg at 12/29/22 4226    enoxaparin Sodium (LOVENOX) injection 30 mg  30 mg SubCUTAneous BID Cristina Wetzel, DO   30 mg at 12/29/22 0915    sertraline (ZOLOFT) tablet 150 mg  150 mg Oral Daily ADELA Santiago - CNP   150 mg at 12/29/22 0908    lamoTRIgine (LAMICTAL) tablet 200 mg  200 mg Oral Daily Alex Montague, DO   200 mg at 12/29/22 5628    levothyroxine (SYNTHROID) tablet 150 mcg  150 mcg Oral QAM AC Alex Connorse, DO   150 mcg at 12/29/22 0874    topiramate (TOPAMAX) tablet 75 mg  75 mg Oral Daily Alex Connorse, DO   75 mg at 12/29/22 4069    topiramate (TOPAMAX) tablet 100 mg  100 mg Oral Nightly Alex Connorse, DO   100 mg at 12/28/22 2156    sodium chloride flush 0.9 % injection 5-40 mL  5-40 mL IntraVENous PRN Alex Montague, DO   10 mL at 12/29/22 5834       Allergies: Allergies   Allergen Reactions    Bactrim [Sulfamethoxazole-Trimethoprim]     Bee Venom     Ciprofloxacin     Milk-Related Compounds Diarrhea       Problem List:    Patient Active Problem List   Diagnosis Code    [facility] R56.9    Hypothyroidism E03.9    Asthma J45.909    Morbid obesity ([facility] [address] E66.01    ROHIT on CPAP G47.33, Z99.89    Hypopituitarism ([facility] E23.0    Moderate episode of recurrent major depressive disorder ([facility] F33.1    Irritable bowel syndrome with constipation K58.1    Gastroesophageal reflux disease K21.9    Cervical transverse process fracture, initial encounter ([facility] S12. 9XXA    Closed nondisplaced fracture of BP Readings from Last 3 Encounters:   12/29/22 122/86   12/05/22 119/70   11/07/22 116/70       NPO Status:                                                                                 BMI:   Wt Readings from Last 3 Encounters:   12/28/22 261 lb 7.5 oz (118.6 kg)   12/05/22 277 lb (125.6 kg)   11/07/22 279 lb (126.6 kg)     There is no height or weight on file to calculate BMI.    CBC:   Lab Results   Component Value Date/Time    WBC 5.6 12/29/2022 06:39 AM    RBC 4.81 12/29/2022 06:39 AM    HGB 13.5 12/29/2022 06:39 AM    HCT 42.8 12/29/2022 06:39 AM    MCV 89.0 12/29/2022 06:39 AM    RDW 13.9 12/29/2022 06:39 AM     12/29/2022 06:39 AM       CMP:   Lab Results   Component Value Date/Time     12/29/2022 06:39 AM    K 3.7 12/29/2022 06:39 AM     12/29/2022 06:39 AM    CO2 29 12/29/2022 06:39 AM    BUN 11 12/29/2022 06:39 AM    CREATININE 0.84 12/29/2022 06:39 AM    GFRAA >60 09/14/2022 02:16 PM    LABGLOM >60 12/29/2022 06:39 AM    GLUCOSE 85 12/29/2022 06:39 AM    PROT 8.0 12/05/2022 05:35 PM    CALCIUM 8.7 12/29/2022 06:39 AM    BILITOT 0.4 12/05/2022 05:35 PM    ALKPHOS 99 12/05/2022 05:35 PM    AST 33 12/05/2022 05:35 PM    ALT 20 12/05/2022 05:35 PM       POC Tests:   Recent Labs     12/29/22  0810   POCGLU 79       Coags:   Lab Results   Component Value Date/Time    PROTIME 10.5 12/05/2022 05:35 PM    INR 1.0 12/05/2022 05:35 PM    APTT 24.9 03/10/2015 11:25 AM       HCG (If Applicable):   Lab Results   Component Value Date    HCG NEGATIVE 03/27/2013        ABGs: No results found for: PHART, PO2ART, EDV6DWD, OOY0GTI, BEART, B4UHOSKM     Type & Screen (If Applicable):  No results found for: LABABO, LABRH    Drug/Infectious Status (If Applicable):  Lab Results   Component Value Date/Time    HEPCAB NONREACTIVE 03/22/2017 08:23 AM       COVID-19 Screening (If Applicable):   Lab Results   Component Value Date/Time    COVID19 Not Detected 12/10/2022 01:09 PM           Anesthesia Evaluation  Patient summary reviewed and Nursing notes reviewed no history of anesthetic complications:   Airway: Mallampati: III  TM distance: >3 FB   Neck ROM: full  Mouth opening: > = 3 FB   Dental:    (+) edentulous      Pulmonary:   (+) sleep apnea: on CPAP,  stridor,  asthma:                           ROS comment: inspiratory stridor. H/o prolonged intubation and was extubated on 12/21/2022   Cardiovascular:  Exercise tolerance: no interval change,       (-) valvular problems/murmurs, CABG/stent, dysrhythmias and  angina    ECG reviewed               Beta Blocker:  Not on Beta Blocker         Neuro/Psych:   (+) seizures (2000): well controlled, TIA, headaches: migraine headaches, psychiatric history:depression/anxiety             GI/Hepatic/Renal:   (+) GERD:, renal disease:, morbid obesity     (-) liver disease       Endo/Other:    (+) DiabetesType II DM, , hypothyroidism, blood dyscrasia: anemia:., .                 Abdominal:   (+) obese,           Vascular:     - DVT and PE. Other Findings:             Anesthesia Plan      MAC     ASA 3     (Pending chest CT)  Induction: intravenous.                             Dianna Cabello MD   12/29/2022

## 2022-12-29 NOTE — PROGRESS NOTES
[facility]  Internal Medicine Teaching Residency Program  Inpatient Daily Progress Note  ______________________________________________________________________________    Patient: Liane Avelar  YOB: 1975   MRN:[id number]    Acct: [id number]     Room: 0426/0426-02  Admit date: 12/5/2022  Today's date: 12/29/22  Number of days in the hospital: 23    SUBJECTIVE   Admitting Diagnosis: Acute respiratory failure with hypoxia (HCC)  CC: Fall leading to neck pain and back pain  Pt examined at bedside. Chart & results reviewed.   No acute events overnight.  Patient's vitals are stable and she remains on 4 L nasal cannula which is her baseline.  She had 1.7 L urine output last 24 hours.  Patient stating she would like to try different foods today however she still is having some issues with swallowing per nursing staff and patient.  Waiting for further recommendations from ENT  BRIEF HISTORY     ICU COURSE:     47-year-old female initially presented to hospital on 12/5 after a fall.  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  Patient also has past medical history of diabetes mellitus, bilateral sensorineural hearing loss. Had a CT head showed no intracranial abnormality, CT abdominal pelvis showed  nondisplaced fractures of right sacral ilya, nondisplaced fracture of right superior ramus and right obturator ring.  CT spine showed acute fracture of right C7 transverse process and also age indeterminate compressive fracture of T3.  Patient admitted under trauma surgery-neurosurgery for cervical fractures was consulted, who recommended no neurosurgical intervention.  Orthopedic surgery for pelvic fractures was also consulted, no surgical intervention but medical management and also recommended to follow-up in orthopedic clinic after discharge.  Patient was started on BiPAP for  increased work of breathing. Patient was oriented x4 at day of presentation.  - patient had a decline in mental status with tachypnea, respiratory rate of 30s to 40 with heart rate of 105 patient was intubated emergently     - Neurology was consulted because of history of migraines, seizure disorder, patient was started on her home antiepileptic drugs including Topamax and Lamictal, EEG was ordered to rule out any subclinical seizure activity, MRI was also ordered to rule out any CVA. Patient was doing fine, arousable and following commands. Pulmonology was consulted, recommended to extubate and patient was extubated. 12/10 -rapid was called today due to impending respiratory failure, patient was intubated again and was sent to ICU       -respiratory culture came back positive for staph aureus , nasal respiratory swab came out positive for MRSA DNA, patient started on vancomycin       : Tachycardia improved to normal heart rate, patient remained afebrile, WBC count trending down. Sedation changed to precedex     12/15: Precedex discontinued. : Patient was extubated. : Pt was a febrile overnight, saturating well on NC.  2022-further ENT work-up with CT chest to assess for tracheal obstruction status post multiple intubations. Patient improving. Repeat speech evaluation to try to advance that    OBJECTIVE     Vital Signs:  /86   Pulse 70   Temp 98.2 °F (36.8 °C) (Oral)   Resp 14   Ht 5' 1\" (1.549 m)   Wt 261 lb 7.5 oz (118.6 kg)   SpO2 100%   BMI 49.40 kg/m²     Temp (24hrs), Av.3 °F (36.8 °C), Min:97.9 °F (36.6 °C), Max:98.7 °F (37.1 °C)    In: -   Out: 500 [Urine:500]    Physical Exam:  General appearance - alert, in no distress  Mental status - alert, oriented to person, place, and time  Eyes - pupils equal and reactive, extraocular eye movements intact. Some hearing loss.   Mouth - mucous membranes moist, pharynx normal without lesions  Neck - supple, no significant adenopathy  Chest - clear to auscultation, no wheezes  Heart - normal rate, regular rhythm, normal S1, S2  Abdomen - soft, nontender, nondistended  Neurological - alert, oriented, normal speech, no focal deficits   Extremities - peripheral pulses normal, no pedal edema  Skin - normal coloration and turgor, no rashes     Medications:  Scheduled Medications:    famotidine  20 mg Oral BID    furosemide  20 mg Oral Daily    guaiFENesin  200 mg Oral BID    miconazole   Topical BID    ARIPiprazole  10 mg Oral Daily    gabapentin  100 mg Oral TID    hydrocortisone  10 mg Oral Nightly    hydrocortisone  15 mg Oral QAM    enoxaparin  30 mg SubCUTAneous BID    sertraline  150 mg Oral Daily    lamoTRIgine  200 mg Oral Daily    levothyroxine  150 mcg Oral QAM AC    topiramate  75 mg Oral Daily    topiramate  100 mg Oral Nightly     Continuous Infusions:    sodium chloride Stopped (12/12/22 0316)     PRN MedicationsoxyCODONE, 10 mg, Q12H PRN   Or  oxyCODONE, 5 mg, Q4H PRN  racepinephrine HCl, 11.25 mg, Q4H PRN  sodium chloride nebulizer, 3 mL, Q4H PRN  polyethylene glycol, 17 g, Daily PRN  levalbuterol, 0.63 mg, Q4H PRN  sodium chloride, , PRN  acetaminophen, 650 mg, Q6H PRN  sodium chloride flush, 5-40 mL, PRN      Diagnostic Labs:  CBC:   Recent Labs     12/27/22  0448 12/29/22  0639   WBC 7.3 5.6   RBC 4.47 4.81   HGB 12.4 13.5   HCT 42.5 42.8   MCV 95.1 89.0   RDW 13.9 13.9    208       BMP:   Recent Labs     12/27/22  0448 12/29/22  0639    139   K 4.0 3.7    100   CO2 24 29   BUN 12 11   CREATININE 0.72 0.84       BNP: No results for input(s): BNP in the last 72 hours. PT/INR: No results for input(s): PROTIME, INR in the last 72 hours. APTT: No results for input(s): APTT in the last 72 hours. CARDIAC ENZYMES: No results for input(s): CKMB, CKMBINDEX, TROPONINI in the last 72 hours.     Invalid input(s): CKTOTAL;3  FASTING LIPID PANEL:  Lab Results   Component Value Date    CHOL 147 03/01/2020    HDL 46 03/01/2020    TRIG 77 03/01/2020     LIVER PROFILE: No results for input(s): AST, ALT, ALB, BILIDIR, BILITOT, ALKPHOS in the last 72 hours. MICROBIOLOGY:   Lab Results   Component Value Date/Time    CULTURE NO SIGNIFICANT GROWTH 12/15/2022 02:10 PM       Imaging:    XR CHEST PORTABLE    Result Date: 12/23/2022  1. Enlargement of the cardiac silhouette with vascular congestion, though improved focal infiltrate of the right upper lobe. XR CHEST PORTABLE    Result Date: 12/18/2022  No change from prior study. Tubes and lines as above. Endotracheal tube is somewhat low lying but unchanged. FL MODIFIED BARIUM SWALLOW W VIDEO    Result Date: 12/22/2022  1. Penetration followed by aspiration without coughing with the nectar thick and honey thick substances. 2. No penetration or aspiration with the pureed/pudding thick or soft solid substances. 3. Cookie solid substance was not attempted. Please see separate speech pathology report for full discussion of findings and recommendations. ASSESSMENT & PLAN     Assessment:  Principal Problem:    Cervical transverse process fracture, initial encounter [facility])  Active Problems:    Closed nondisplaced fracture of seventh cervical vertebra (HCC)    Lethargy    Multiple closed pelvic fractures without disruption of pelvic [place] (HCC)    Aspiration pneumonia (HCC)    Acute respiratory failure with hypoxia (HCC)    MRSA infection    Acute lower respiratory tract infection    Seizure ([facility] [address]    Hypothyroidism    Asthma    Hypopituitarism ([facility] [address]  Resolved Problems:    * No resolved hospital problems. *            Plan:     #Acute hypoxic respiratory failure, improved  -Likely secondary to pneumonia-completed vancomycin for MRSA pneumonia 12/22  -Xopenex, Robitussin, racemic epinephrine, sodium chloride nebulizer as needed  -On nasal cannula during the day with CPAP in the night. Baseline 4 L nasal cannula  -Maintain oxygen saturation above 92%. Continue to wean down as tolerated. #Sinus bradycardia - asymptomatic  -Likely due to sleep apnea. -Thyroxine on admission was within normal limits. #Chest pain - resolved  -EKG negative. Chest x-ray showing cardiomegaly with possible congestion.  -We will check troponins if recurrence of chest pain. #History of seizures  EEG this admission-moderate nonspecific encephalopathy-no epileptic discharges. MRI could not be done due to cochlear implants.  -Lamotrigine level was low-neurology recommended continuing the same dose of lamotrigine as there is no concern of seizures. We will check lamotrigine again.  - Continue home lamotrigine 200 mg daily, topiramate 100 mg nightly, 75 mg in the morning. C7 transverse process fracture and T3 chronic compression fracture-neurosurgery was consulted-recommended no intervention     Right sacral ilya fracture, right superior pubic ramus fracture-orthopedic was consulted-recommended nonoperative treatment with close clinic follow-up in 2 weeks after discharge as there is minimal chance of displacement and recommended weightbearing as tolerated. Stridor  -ENT was consulted - likely due to systemic deconditioning or weakness or underlying prolonged intubation. Recommended humidified air.  -Will reassess airway with CT chest.  Possible plan for intervention in OR tomorrow. Will morning dose Lovenox    #Hypothyroidism  -Continue home levothyroxine 150 mcg     Hypopituitarism  -Continue hydrocortisone 10 mg in the night and 15 mg in the morning. Home dose-10 mg twice daily. Hypokalemia - resolved    Due to prophylaxis - Lovenox  GI prophylaxis - Protonix     PT/OT-has been working   - on board - we will follow-up. Charl Apley, DO  Internal Medicine Resident, PGY-2  [facility];  [place], [place]  12/29/2022, 1:38 PM  Attending Physician Statement  I have discussed the care of [address] [facility] and I have examined the patient myselft and taken ros and hpi , including pertinent history and exam findings,  with the resident. I have reviewed the key elements of all parts of the encounter with the resident. I agree with the assessment, plan and orders as documented by the resident.       Electronically signed by Divya Collier MD

## 2022-12-29 NOTE — PROGRESS NOTES
PULMONARY & CRITICAL CARE MEDICINE PROGRESS  NOTE     Patient:  Lily Harris  MRN: [id number]  Admit date: 12/5/2022  Primary Care Physician: Kaur Gil DO  Consulting Physician: Karl Nur MD  CODE Status: Full Code  LOS: 22    SUBJECTIVE     I personally interviewed/examined the patient, reviewed interval history and interpreted all available radiographic, laboratory data at the time of service. Chief Compliant/Reason for Initial Consult:       Brief Hospital Course: The patient is a 41 y.o. female history of diabetes melitis, developmental delay, history of brain tumor and as a child radiation, history of sensory neuronal hearing loss history of hypopituitarism on chronic hydrocortisone and Synthroid. Initially presented to hospital with a fall with multiple nondisplaced fracture/pelvic fracture and C2 transverse fracture and sacral ala fracture neurosurgery and orthopedic has seen the patient patient had altered mental status decline few days after presentation was tachypneic hypoxic and was intubated and was on ventilator patient was extubated on 12/7/2022 and again was reintubated on 12/10/2022 and had a prolonged stay in the hospital/in ICU with altered mental status lethargy decreased mentation sedation was discontinued mentation started improving after many days of sedation discontinued and ultimately she was extubated on 12/22/2022 post extubation she had stridor patient was treated with heliox humidified O2 and ultimately was transferred out of ICU to stepdown unit on 12/23/2022. Interval History:  12/28/22  No acute events   Persistent stridor       Review of Systems:  Review of Systems   Constitutional:  Positive for activity change and fatigue. Negative for fever. HENT:  Negative for postnasal drip, sinus pressure, sore throat, trouble swallowing and voice change.     Eyes:  Negative for photophobia, pain, redness and visual disturbance. Respiratory:  Positive for cough and shortness of breath. Cardiovascular:  Negative for chest pain, palpitations and leg swelling. Gastrointestinal:  Negative for abdominal pain, diarrhea and vomiting. Endocrine: Negative for polydipsia and polyphagia. Genitourinary:  Negative for difficulty urinating, dysuria, frequency and hematuria. Musculoskeletal:  Positive for back pain and gait problem. Negative for arthralgias. Skin: Negative. Neurological:  Negative for dizziness, syncope, speech difficulty and headaches. Hematological:  Negative for adenopathy. Does not bruise/bleed easily. Psychiatric/Behavioral:  Positive for decreased concentration. OBJECTIVE     VITAL SIGNS:   LAST-  BP 99/64   Pulse 62   Temp 98.7 °F (37.1 °C) (Oral)   Resp 16   Ht 5' 1\" (1.549 m)   Wt 261 lb 7.5 oz (118.6 kg)   SpO2 95%   BMI 49.40 kg/m²   8-24 HR RANGE-  TEMP Temp  Av.3 °F (36.8 °C)  Min: 97.9 °F (36.6 °C)  Max: 98.7 °F (12.1 °C)   BP Systolic (36LSP), ERF:537 , Min:99 , PKJ:551      Diastolic (98BRJ), URN:63, Min:64, Max:87     PULSE Pulse  Av.9  Min: 53  Max: 68   RR Resp  Av.3  Min: 16  Max: 20   O2 SAT SpO2  Av %  Min: 95 %  Max: 95 %   OXYGEN DELIVERY No data recorded     Systemic Examination:   Physical Exam  General appearance - looks comfortable and in no acute distress mild tachypnea present  Mental status - alert, oriented to person, place, and time  Eyes - pupils equal and reactive, extraocular eye movements intact  Mouth - mucous membranes moist, pharynx normal without lesions. Small oropharynx Mallampati 3  Neck - supple, no significant adenopathy. Short thick neck. , stridor   Chest - Chest was symmetrical without dullness to percussion. Breath sounds bilaterally present but distant bilaterally, mild rhonchi no wheezing and no crackles anteriorly.  There is no intercostal recession or use of accessory muscles  Heart - normal rate, regular rhythm, normal S1, S2, no murmurs, rubs, clicks or gallops  Abdomen - soft, nontender, nondistended, no masses or organomegaly  Neurological - alert, oriented, normal speech, no focal findings or movement disorder noted  Extremities - peripheral pulses normal, positive pedal edema, no clubbing or cyanosis  Skin - normal coloration and turgor, no rashes, no suspicious skin lesions noted     DATA REVIEW     Medications:  Scheduled Meds:   famotidine  20 mg Oral BID    furosemide  20 mg Oral Daily    guaiFENesin  200 mg Oral BID    miconazole   Topical BID    ARIPiprazole  10 mg Oral Daily    gabapentin  100 mg Oral TID    hydrocortisone  10 mg Oral Nightly    hydrocortisone  15 mg Oral QAM    enoxaparin  30 mg SubCUTAneous BID    sertraline  150 mg Oral Daily    lamoTRIgine  200 mg Oral Daily    levothyroxine  150 mcg Oral QAM AC    topiramate  75 mg Oral Daily    topiramate  100 mg Oral Nightly     Continuous Infusions:   sodium chloride Stopped (12/12/22 0316)     LABS:-  ABG:   No results for input(s): POCPH, POCPCO2, POCPO2, POCHCO3, LWGH3IPI in the last 72 hours. CBC:   Recent Labs     12/27/22  0448   WBC 7.3   HGB 12.4   HCT 42.5   MCV 95.1      LYMPHOPCT 21*   RBC 4.47   MCH 27.7   MCHC 29.2   RDW 13.9       BMP:   Recent Labs     12/27/22  0448      K 4.0      CO2 24   BUN 12   CREATININE 0.72   GLUCOSE 95       Liver Function Test:   No results for input(s): PROT, LABALBU, ALT, AST, GGT, ALKPHOS, BILITOT in the last 72 hours. Amylase/Lipase:  No results for input(s): AMYLASE, LIPASE in the last 72 hours. Coagulation Profile:   No results for input(s): INR, PROTIME, APTT in the last 72 hours. Cardiac Enzymes:  No results for input(s): CKTOTAL, CKMB, CKMBINDEX, TROPONINI in the last 72 hours.   Lactic Acid:  No results found for: LACTA  BNP:   No results found for: BNP  D-Dimer:  Lab Results   Component Value Date    DDIMER 0.33 07/15/2020     Others:   Lab Results   Component Value Date TSH <0.01 (L) 12/07/2022    FT3 2.45 10/16/2019     Lab Results   Component Value Date    JAYY NEGATIVE 10/23/2013    SEDRATE 20 08/21/2018    CRP 4.4 08/21/2018     Lab Results   Component Value Date    URICACID 5.2 05/24/2018     No results found for: IRON, TIBC, FERRITIN  No results found for: SPEP, UPEP  No results found for: PSA, CEA, , AD6668,     Input/Output:    Intake/Output Summary (Last 24 hours) at 12/28/2022 2037  Last data filed at 12/28/2022 1850  Gross per 24 hour   Intake --   Output 850 ml   Net -850 ml         Microbiology:  No results for input(s): SPECDESC, SPECDESC, SPECIAL, CULTURE, CULTURE, STATUS, ORG, CDIFFTOXPCR, CAMPYLOBPCR, SALMONELLAPC, SHIGAPCR, SHIGELLAPCR, MPNEUG, MPNEUM, LACTOQL in the last 72 hours. Pathology:    Radiology reports:  XR CHEST PORTABLE   Final Result   Cardiomegaly with perihilar congestive changes and increased perihilar   opacities, particularly on the right, which may be due to edema although   superimposed infection should be excluded clinically. Possible small right   pleural effusion. XR CHEST PORTABLE   Final Result   1. Enlargement of the cardiac silhouette with vascular congestion, though   improved focal infiltrate of the right upper lobe. FL MODIFIED BARIUM SWALLOW W VIDEO   Final Result   1. Penetration followed by aspiration without coughing with the nectar thick   and honey thick substances. 2. No penetration or aspiration with the pureed/pudding thick or soft solid   substances. 3. Cookie solid substance was not attempted. Please see separate speech pathology report for full discussion of findings   and recommendations. XR CHEST PORTABLE   Final Result   No change from prior study. Tubes and lines as above. Endotracheal tube is   somewhat low lying but unchanged. XR CHEST PORTABLE   Final Result   Similar appearance of mild bilateral airspace disease.   This appears   consistent with pneumonia XR CHEST PORTABLE   Final Result   1. No significant change in bilateral airspace disease. 2.  Endotracheal tube terminates above the manny. 3.  Enteric tube terminates at the level of the body of the stomach. XR ABDOMEN FOR NG/OG/NE TUBE PLACEMENT   Final Result   1. No significant change in bilateral airspace disease. 2.  Endotracheal tube terminates above the manny. 3.  Enteric tube terminates at the level of the body of the stomach. XR CHEST PORTABLE   Final Result   No significant interval change. Patchy perihilar airspace opacities. XR PELVIS (MIN 3 VIEWS)   Final Result   No acute abnormality of the pelvis. Known right superior ramus fracture is not   well seen radiographically. XR CHEST PORTABLE   Final Result   Stable bilateral airspace opacities. This could represent multifocal   pneumonia, however, edema is not fully excluded         XR CHEST PORTABLE   Final Result   Persistent bilateral airspace opacities suggesting multifocal pneumonia         XR CHEST PORTABLE   Final Result   Chest:      1. Tubes as detailed above. 2. Bilateral parahilar and lower zone predominant airspace disease. 3. Stable cardiomegaly. KUB:      1. NG tube traverses the GE junction with distal tip likely in the body of   the stomach. 2. Nonspecific bowel gas pattern. XR ABDOMEN FOR NG/OG/NE TUBE PLACEMENT   Final Result   Chest:      1. Tubes as detailed above. 2. Bilateral parahilar and lower zone predominant airspace disease. 3. Stable cardiomegaly. KUB:      1. NG tube traverses the GE junction with distal tip likely in the body of   the stomach. 2. Nonspecific bowel gas pattern. XR CHEST PORTABLE   Final Result   Findings suggest congestive heart failure         CT HEAD WO CONTRAST   Final Result   No acute intracranial abnormality. No evidence of intracranial hemorrhage or any other definable acute   intracranial abnormality. Redemonstration of dense calcifications in the bilateral lentiform nuclei,   bilateral caudate nuclei and bilateral thalami similar to findings on   previous CT scan in 2010. There are bilateral cochlear implants redemonstrated. No demonstrable fracture in calvarium or in base of skull. CT CHEST PULMONARY EMBOLISM W CONTRAST   Final Result   No evidence of pulmonary embolism. No evidence of thoracic aortic aneurysm or dissection. There are mild atelectatic changes, and/or infiltrate in the posterior lung   bases bilaterally, left more than right. Trace left basilar pleural effusion. Focal moderate dense infiltrates, and/or atelectasis in the posterior segment   of right pulmonary upper lobe. No subphrenic acute process demonstrated. XR CHEST PORTABLE   Final Result   Lower end of the ET tube is 1.6 cm above manny. Mild-to-moderate pulmonary vascular congestion. Finding suggestive of   interstitial pulmonary edema in lungs. XR CHEST PORTABLE   Final Result   Placement of gastric tube which extends to the distal aspect of the stomach. Cardiomegaly and vascular congestion without evidence of interstitial edema. No confluent airspace consolidation. XR ABDOMEN FOR NG/OG/NE TUBE PLACEMENT   Final Result   Gastric tube in place with the tip and side-port in the stomach. XR CHEST PORTABLE   Final Result   Very limited study due to underpenetration. Increased opacity in the lungs which could be related to under penetration   and overlying soft tissues, but diffuse airspace infiltrates cannot be   excluded. Cardiomegaly without evidence of CHF. XR HUMERUS RIGHT (MIN 2 VIEWS)   Final Result   No acute osseous abnormality in the right humerus. CTA NECK W CONTRAST   Final Result   No acute trauma of the major arterial vessels of the neck.          CT THORACIC SPINE TRAUMA RECONSTRUCTION   Final Result   10-20% compression deformity of the T3 vertebra, possibly acute. No other   evidence of acute thoracic or lumbar spine fracture. Multilevel degenerative   change. CT LUMBAR SPINE TRAUMA RECONSTRUCTION   Final Result   10-20% compression deformity of the T3 vertebra, possibly acute. No other   evidence of acute thoracic or lumbar spine fracture. Multilevel degenerative   change. XR PELVIS (MIN 3 VIEWS)   Final Result   1. Nondisplaced fracture involving the lateral aspect of the right superior   pubic ramus, though better seen on CT imaging. 2. No other radiographically evident acute fracture seen in the pelvis. XR SHOULDER RIGHT (MIN 2 VIEWS)   Final Result   1. No acute fracture seen in the right shoulder. 2. No acute fracture seen in the left shoulder. XR SHOULDER LEFT (MIN 2 VIEWS)   Final Result   1. No acute fracture seen in the right shoulder. 2. No acute fracture seen in the left shoulder. Echocardiogram:   No results found for this or any previous visit. Cardiac Catheterization:   No results found for this or any previous visit. ASSESSMENT AND PLAN     Assessment:    //Acute hypoxic hypercapnic respiratory failure required invasive ventilatory support  //Status post second extubation on 12/22/2022  //Post extubation stridor due to true vocal cord dysfunction left greater than right paresis  //Status post fall/C7 transverse process fracture and T3 compression fracture and fracture of right sacral/pelvic fracture nondisplaced  //Obstructive sleep apnea/hypoventilation  //Pneumonia/aspiration pneumonia/MRSA  //Hypopituitarism status post radiation to brain in childhood  //Morbid obesity  //Developmental delay  //Seizure disorder    Plan:    Continue humidified oxygen  Positive airway pressure therapy at night and as needed during the day  Persistent stridor   Follow ENT rec     Apurva Trinh MD, MMICHAEL.    Pulmonary and Critical Care Medicine 12/28/2022, 8:37 PM    Please note that this chart was generated using voice recognition Dragon dictation software. Although every effort was made to ensure the accuracy of this automated transcription, some errors in transcription may have occurred.

## 2022-12-29 NOTE — PROGRESS NOTES
PULMONARY & CRITICAL CARE MEDICINE PROGRESS  NOTE     Patient:  Bren Parrish  MRN: [id number]  Admit date: 12/5/2022  Primary Care Physician: Georgie Goel DO  Consulting Physician: Park Ward MD  CODE Status: Full Code  LOS: 23    SUBJECTIVE     I personally interviewed/examined the patient, reviewed interval history and interpreted all available radiographic, laboratory data at the time of service. Chief Compliant/Reason for Initial Consult:       Brief Hospital Course: The patient is a 41 y.o. female history of diabetes melitis, developmental delay, history of brain tumor and as a child radiation, history of sensory neuronal hearing loss history of hypopituitarism on chronic hydrocortisone and Synthroid. Initially presented to hospital with a fall with multiple nondisplaced fracture/pelvic fracture and C2 transverse fracture and sacral ala fracture neurosurgery and orthopedic has seen the patient patient had altered mental status decline few days after presentation was tachypneic hypoxic and was intubated and was on ventilator patient was extubated on 12/7/2022 and again was reintubated on 12/10/2022 and had a prolonged stay in the hospital/in ICU with altered mental status lethargy decreased mentation sedation was discontinued mentation started improving after many days of sedation discontinued and ultimately she was extubated on 12/22/2022 post extubation she had stridor patient was treated with heliox humidified O2 and ultimately was transferred out of ICU to stepdown unit on 12/23/2022. Interval History:  12/29/22  Persistent stridor  CT of the chest shows narrowing of the mid trachea when compared to initial CT chest on admission. There is bibasilar atelectasis and effusion    Review of Systems:  Review of Systems   Constitutional:  Positive for fatigue. Respiratory:  Positive for cough, shortness of breath and stridor. Cardiovascular: Negative. Gastrointestinal: Negative. Neurological: Negative. OBJECTIVE     VITAL SIGNS:   LAST-  /74   Pulse 72   Temp 98 °F (36.7 °C) (Oral)   Resp 25   Ht 5' 1\" (1.549 m)   Wt 261 lb 7.5 oz (118.6 kg)   SpO2 99%   BMI 49.40 kg/m²   8-24 HR RANGE-  TEMP Temp  Av.2 °F (36.8 °C)  Min: 97.9 °F (36.6 °C)  Max: 98.6 °F (37 °C)   BP Systolic (49ONC), IRZ:028 , Min:108 , MDR:345      Diastolic (66ZWK), PFI:20, Min:64, Max:97     PULSE Pulse  Av.1  Min: 55  Max: 72   RR Resp  Av.5  Min: 14  Max: 25   O2 SAT SpO2  Av.7 %  Min: 99 %  Max: 100 %   OXYGEN DELIVERY O2 Flow Rate (L/min)  Av L/min  Min: 4 L/min  Max: 4 L/min     Systemic Examination:   Physical Exam  General appearance - looks comfortable and in no acute distress mild tachypnea present  Mental status - alert, oriented to person, place, and time  Eyes - pupils equal and reactive, extraocular eye movements intact  Mouth - mucous membranes moist, pharynx normal without lesions. Small oropharynx Mallampati 3  Neck - supple, no significant adenopathy. Short thick neck.   , stridor   Chest -decreased breath sounds in bilateral bases anterior rhonchi are heard  Heart - normal rate, regular rhythm, normal S1, S2, no murmurs, rubs, clicks or gallops  Abdomen - soft, nontender, nondistended, no masses or organomegaly  Neurological - alert, oriented, normal speech, no focal findings or movement disorder noted  Extremities - peripheral pulses normal, positive pedal edema, no clubbing or cyanosis  Skin - normal coloration and turgor, no rashes, no suspicious skin lesions noted     DATA REVIEW     Medications:  Scheduled Meds:   famotidine  20 mg Oral BID    furosemide  20 mg Oral Daily    guaiFENesin  200 mg Oral BID    miconazole   Topical BID    ARIPiprazole  10 mg Oral Daily    gabapentin  100 mg Oral TID    hydrocortisone  10 mg Oral Nightly    hydrocortisone  15 mg Oral QAM    enoxaparin  30 mg SubCUTAneous BID    sertraline  150 mg Oral Daily    lamoTRIgine  200 mg Oral Daily    levothyroxine  150 mcg Oral QAM AC    topiramate  75 mg Oral Daily    topiramate  100 mg Oral Nightly     Continuous Infusions:   sodium chloride Stopped (12/12/22 0316)     LABS:-  ABG:   No results for input(s): POCPH, POCPCO2, POCPO2, POCHCO3, OFRE7GHK in the last 72 hours. CBC:   Recent Labs     12/27/22  0448 12/29/22  0639   WBC 7.3 5.6   HGB 12.4 13.5   HCT 42.5 42.8   MCV 95.1 89.0    208   LYMPHOPCT 21* 27   RBC 4.47 4.81   MCH 27.7 28.1   MCHC 29.2 31.5   RDW 13.9 13.9       BMP:   Recent Labs     12/27/22  0448 12/29/22  0639    139   K 4.0 3.7    100   CO2 24 29   BUN 12 11   CREATININE 0.72 0.84   GLUCOSE 95 85       Liver Function Test:   No results for input(s): PROT, LABALBU, ALT, AST, GGT, ALKPHOS, BILITOT in the last 72 hours. Amylase/Lipase:  No results for input(s): AMYLASE, LIPASE in the last 72 hours. Coagulation Profile:   No results for input(s): INR, PROTIME, APTT in the last 72 hours. Cardiac Enzymes:  No results for input(s): CKTOTAL, CKMB, CKMBINDEX, TROPONINI in the last 72 hours.   Lactic Acid:  No results found for: LACTA  BNP:   No results found for: BNP  D-Dimer:  Lab Results   Component Value Date    DDIMER 0.33 07/15/2020     Others:   Lab Results   Component Value Date    TSH <0.01 (L) 12/07/2022    FT3 2.45 10/16/2019     Lab Results   Component Value Date    JAYY NEGATIVE 10/23/2013    SEDRATE 20 08/21/2018    CRP 4.4 08/21/2018     Lab Results   Component Value Date    URICACID 5.2 05/24/2018     No results found for: IRON, TIBC, FERRITIN  No results found for: SPEP, UPEP  No results found for: PSA, CEA, , BW5448,     Input/Output:    Intake/Output Summary (Last 24 hours) at 12/29/2022 4598  Last data filed at 12/28/2022 1850  Gross per 24 hour   Intake --   Output 500 ml   Net -500 ml         Microbiology:  No results for input(s): SPECDESC, SPECDESC, SPECIAL, CULTURE, CULTURE, STATUS, ORG, CDIFFTOXPCR, CAMPYLOBPCR, SALMONELLAPC, SHIGAPCR, SHIGELLAPCR, MPNEUG, MPNEUM, LACTOQL in the last 72 hours. Pathology:    Radiology reports:  FL MODIFIED BARIUM SWALLOW W VIDEO   Final Result   Aspiration of thin liquid. Laryngeal penetration of thick liquid by teaspoon. Please see separate speech pathology report for full discussion of findings   and recommendations. CT CHEST WO CONTRAST   Final Result   Severe focus of narrowing within the mid trachea. Bilateral effusion with adjacent consolidation representing atelectasis   versus pneumonia. XR CHEST PORTABLE   Final Result   Cardiomegaly with perihilar congestive changes and increased perihilar   opacities, particularly on the right, which may be due to edema although   superimposed infection should be excluded clinically. Possible small right   pleural effusion. XR CHEST PORTABLE   Final Result   1. Enlargement of the cardiac silhouette with vascular congestion, though   improved focal infiltrate of the right upper lobe. FL MODIFIED BARIUM SWALLOW W VIDEO   Final Result   1. Penetration followed by aspiration without coughing with the nectar thick   and honey thick substances. 2. No penetration or aspiration with the pureed/pudding thick or soft solid   substances. 3. Cookie solid substance was not attempted. Please see separate speech pathology report for full discussion of findings   and recommendations. XR CHEST PORTABLE   Final Result   No change from prior study. Tubes and lines as above. Endotracheal tube is   somewhat low lying but unchanged. XR CHEST PORTABLE   Final Result   Similar appearance of mild bilateral airspace disease. This appears   consistent with pneumonia         XR CHEST PORTABLE   Final Result   1. No significant change in bilateral airspace disease. 2.  Endotracheal tube terminates above the manny.       3.  Enteric tube terminates at the level of the body of the stomach. XR ABDOMEN FOR NG/OG/NE TUBE PLACEMENT   Final Result   1. No significant change in bilateral airspace disease. 2.  Endotracheal tube terminates above the manny. 3.  Enteric tube terminates at the level of the body of the stomach. XR CHEST PORTABLE   Final Result   No significant interval change. Patchy perihilar airspace opacities. XR PELVIS (MIN 3 VIEWS)   Final Result   No acute abnormality of the pelvis. Known right superior ramus fracture is not   well seen radiographically. XR CHEST PORTABLE   Final Result   Stable bilateral airspace opacities. This could represent multifocal   pneumonia, however, edema is not fully excluded         XR CHEST PORTABLE   Final Result   Persistent bilateral airspace opacities suggesting multifocal pneumonia         XR CHEST PORTABLE   Final Result   Chest:      1. Tubes as detailed above. 2. Bilateral parahilar and lower zone predominant airspace disease. 3. Stable cardiomegaly. KUB:      1. NG tube traverses the GE junction with distal tip likely in the body of   the stomach. 2. Nonspecific bowel gas pattern. XR ABDOMEN FOR NG/OG/NE TUBE PLACEMENT   Final Result   Chest:      1. Tubes as detailed above. 2. Bilateral parahilar and lower zone predominant airspace disease. 3. Stable cardiomegaly. KUB:      1. NG tube traverses the GE junction with distal tip likely in the body of   the stomach. 2. Nonspecific bowel gas pattern. XR CHEST PORTABLE   Final Result   Findings suggest congestive heart failure         CT HEAD WO CONTRAST   Final Result   No acute intracranial abnormality. No evidence of intracranial hemorrhage or any other definable acute   intracranial abnormality.       Redemonstration of dense calcifications in the bilateral lentiform nuclei,   bilateral caudate nuclei and bilateral thalami similar to findings on   previous CT scan in 2010.      There are bilateral cochlear implants redemonstrated. No demonstrable fracture in calvarium or in base of skull. CT CHEST PULMONARY EMBOLISM W CONTRAST   Final Result   No evidence of pulmonary embolism. No evidence of thoracic aortic aneurysm or dissection. There are mild atelectatic changes, and/or infiltrate in the posterior lung   bases bilaterally, left more than right. Trace left basilar pleural effusion. Focal moderate dense infiltrates, and/or atelectasis in the posterior segment   of right pulmonary upper lobe. No subphrenic acute process demonstrated. XR CHEST PORTABLE   Final Result   Lower end of the ET tube is 1.6 cm above manny. Mild-to-moderate pulmonary vascular congestion. Finding suggestive of   interstitial pulmonary edema in lungs. XR CHEST PORTABLE   Final Result   Placement of gastric tube which extends to the distal aspect of the stomach. Cardiomegaly and vascular congestion without evidence of interstitial edema. No confluent airspace consolidation. XR ABDOMEN FOR NG/OG/NE TUBE PLACEMENT   Final Result   Gastric tube in place with the tip and side-port in the stomach. XR CHEST PORTABLE   Final Result   Very limited study due to underpenetration. Increased opacity in the lungs which could be related to under penetration   and overlying soft tissues, but diffuse airspace infiltrates cannot be   excluded. Cardiomegaly without evidence of CHF. XR HUMERUS RIGHT (MIN 2 VIEWS)   Final Result   No acute osseous abnormality in the right humerus. CTA NECK W CONTRAST   Final Result   No acute trauma of the major arterial vessels of the neck. CT THORACIC SPINE TRAUMA RECONSTRUCTION   Final Result   10-20% compression deformity of the T3 vertebra, possibly acute. No other   evidence of acute thoracic or lumbar spine fracture. Multilevel degenerative   change.          CT LUMBAR SPINE TRAUMA RECONSTRUCTION   Final Result   10-20% compression deformity of the T3 vertebra, possibly acute. No other   evidence of acute thoracic or lumbar spine fracture. Multilevel degenerative   change. XR PELVIS (MIN 3 VIEWS)   Final Result   1. Nondisplaced fracture involving the lateral aspect of the right superior   pubic ramus, though better seen on CT imaging. 2. No other radiographically evident acute fracture seen in the pelvis. XR SHOULDER RIGHT (MIN 2 VIEWS)   Final Result   1. No acute fracture seen in the right shoulder. 2. No acute fracture seen in the left shoulder. XR SHOULDER LEFT (MIN 2 VIEWS)   Final Result   1. No acute fracture seen in the right shoulder. 2. No acute fracture seen in the left shoulder. Echocardiogram:   No results found for this or any previous visit. Cardiac Catheterization:   No results found for this or any previous visit. ASSESSMENT AND PLAN     Assessment:    //Acute hypoxic hypercapnic respiratory failure required invasive ventilatory support  //Status post second extubation on 12/22/2022  //Post extubation stridor due to true vocal cord dysfunction left greater than right paresis  //Mid tracheal narrowing   //Status post fall/C7 transverse process fracture and T3 compression fracture and fracture of right sacral/pelvic fracture nondisplaced  //Obstructive sleep apnea/hypoventilation  //Pneumonia/aspiration pneumonia/MRSA  //Hypopituitarism status post radiation to brain in childhood  //Morbid obesity  //Developmental delay  //Seizure disorder  //Dysphagia    Plan:    Continue humidified oxygen  Follow ENT recommendation-plan for direct laryngoscopy and rigid bronchoscopy by ENT in OR tomorrow  Npo order placed   Fluids at 50 ml/hr ns while Shelby Kaur MD, M.D.    Pulmonary and Critical Care Medicine           12/29/2022, 4:58 PM    Please note that this chart was generated using voice recognition Dragon dictation software. Although every effort was made to ensure the accuracy of this automated transcription, some errors in transcription may have occurred.

## 2022-12-29 NOTE — PROCEDURES
INSTRUMENTAL SWALLOW REPORT  MODIFIED BARIUM SWALLOW    NAME: Bertha Alejo   : 1975  MRN: [id number]       Date of Eval: 2022        Radiologist: Dr. 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 Past Surgical History:  has a past surgical history that includes Brain Biopsy; Tonsillectomy; knee surgery; Cochlear implant (Left); Dental surgery; Cochlear implant (Right, 05/10/2022); Colonoscopy; Endoscopy, colon, diagnostic; Colonoscopy (N/A, 2022); and Upper gastrointestinal endoscopy (N/A, 2022). Type of Study: Repeat MBS    Patient Complaints/Reason for Referral:  Bertha Alejo was referred for a MBS to assess the efficiency of her swallow function, assess for aspiration, and to make recommendations regarding safe dietary consistencies, effective compensatory strategies, and safe eating environment. Behavior/Cognition/Vision/Hearing:  Behavior/Cognition: Alert; Cooperative  Vision: Within Functional Limits  Hearing: Within functional limits    Impressions:  Pt. With + penetration, + aspiration with no cough with thin liquid.  + residual noted in laryngeal vestibule. + penetration, no aspiration with nectar thick liquid via tsp. No penetration, no aspiration with nectar thick liquid via straw. No penetration, no aspiration with puree and soft solid. Min residual noted with puree. Min-no mastication noted with soft solids. Premature spill noted with liquids. ST to recommend Dysphagia Pureed (Dysphagia I) with Mildly Thick (Nectar) liquid.   If s/s of aspiration occur, d/c all PO and recommend NPO. Results and recommendations reported to RN. Consistencies Administered: Soft & Bite Sized;Pureed; Thin straw;Mildly Thick teaspoon;Mildly Thick straw    Recommended Diet:  Solid consistency: Pureed  Liquid consistency: Mildly Thick  Medication administration: Meds in puree    Safe Swallow Protocol:     Compensatory Swallowing Strategies : Upright as possible for all oral intake;Eat/Feed slowly; Small bites/sips    Recommendations/Treatment  Requires SLP Intervention: Yes  D/C Recommendations: Ongoing speech therapy is recommended during this hospitalization  Frequency: 3-5 x week  Further therapy recommended at discharge. Recommended Exercises:    Therapeutic Interventions: Diet tolerance monitoring;Patient/Family education;Oral motor exercises    Education: Images and recommendations were reviewed with pt. And RN following this exam.     Patient Education: yes  Patient Education Response: Needs reinforcement      Goals:    Long Term: To Maximize safety with intake, optimize nutrition/hydration and minimize risk for aspiration. Short Term:     Goal 1: Pt. will complete OMEX for dysphagia 10-20 x per session. Dysphagia Goals: The patient will tolerate recommended diet without observed clinical signs of aspiration      Oral Preparation / Oral Phase  Min-no mastication noted with soft solids. Premature spill noted with liquids. Pharyngeal Phase   Pt. With + penetration, + aspiration with no cough with thin liquid.  + residual noted in laryngeal vestibule. + penetration, no aspiration with nectar thick liquid via tsp. No penetration, no aspiration with nectar thick liquid via straw. No penetration, no aspiration with puree and soft solid. Min residual noted with puree.      Esophageal Phase  Esophageal Screen: WFL      Pain      Pain Level: 8  Pain Location: Back      Therapy Time:   Individual Concurrent Group Co-treatment   Time In  1291 Time Out  1500         Minutes  [facility] NIURKA ALVAREZ  CCC-SLP, 12/29/2022, 3:19 PM

## 2022-12-29 NOTE — PROGRESS NOTES
Occupational [address]  Occupational Therapy Not Seen Note    DATE: 2022    NAME: Bailey Seip  MRN: [id number]   : 1975      Patient not seen this date for Occupational Therapy due to:    Surgery/Procedure: Radiology    Next Scheduled Treatment: 2022    Electronically signed by LOLIS Torre on 2022 at 2:33 PM

## 2022-12-29 NOTE — PROGRESS NOTES
ENT/OTOLARYNGOLOGY SUBSEQUENT CARE PROGRESS NOTE     REASON FOR CARE: inspiratory stridor     HISTORY OF PRESENT ILLNESS:   Dipika Bales is a 41 y.o. who is being seen for follow-up of her inspiratory stridor. H/o prolonged intubation and was extubated on 12/21/2022. Ho SMA and multiple medical issues. Mild improvement in reported breathing since yesterday    Dad at bedside and reports a h/o esophageal dilation for Fernando Bj about 8 years ago for dysphagia. Pertinent Examination:   GENERAL: well developed and well nourished and in no acute distress  HEAD: normocephalic and atraumatic  EYES: no eyelid swelling, no conjunctival injection or exudate, pupils equal round and reactive to light  EXTERNAL EARS: normal  EAR EXAM: deferred  NOSE: nares patent, normal mucosa  MOUTH/THROAT: mucous membranes moist, no focal lesions, no tonsillar enlargement or exudate  NECK: non-tender, full range of motion, no mass, no focal lymphadenopathy  RESPIRATORY:  +inspiratory stridor since audible/present this morning. NEUROLOGICAL:  cranial nerves II-XII are grossly intact     RELEVANT LABS/STUDIES:   Additional data reviewed:    None    Procedures:    None    Surgical risk factors:  Multiple comorbidities     IMPRESSION AND RECOMMENDATIONS:   Dipika Bales is a 41 y.o. female with persistent inspiratory stridor and h/o prolonged intubation    Plan: Will speak with primary team about obtaining a CT chest with contrast to evaluate the subglottis and trachea for any evidence of stenosis from her prolonged intubation.   If present on CT chest, would consider a possible trip to the OR tomorrow for a DLB and possible intervention.       --------------------------------------------------  Elijah Kendall MD  Pediatric Otolaryngology-Head and Neck Surgery  [address] [place] [address] Otolaryngology group    Office  ph# [phone]    Also available in [facility]

## 2022-12-29 NOTE — PLAN OF CARE
Problem: Discharge Planning  Goal: Discharge to home or other facility with appropriate resources  Outcome: Progressing     Problem: Confusion  Goal: Confusion, delirium, dementia, or psychosis is improved or at baseline  Description: INTERVENTIONS:  1. Assess for possible contributors to thought disturbance, including medications, impaired vision or hearing, underlying metabolic abnormalities, dehydration, psychiatric diagnoses, and notify attending LIP  2. [place] high risk fall precautions, as indicated  3. Provide frequent short contacts to provide reality reorientation, refocusing and direction  4. Decrease environmental stimuli, including noise as appropriate  5. Monitor and intervene to maintain adequate nutrition, hydration, elimination, sleep and activity  6. If unable to ensure safety without constant attention obtain sitter and review sitter guidelines with assigned personnel  7. Initiate Psychosocial CNS and Spiritual Care consult, as indicated  Outcome: Progressing     Problem: Pain  Goal: Verbalizes/displays adequate comfort level or baseline comfort level  Outcome: Progressing     Problem: Chronic Conditions and Co-morbidities  Goal: Patient's chronic conditions and co-morbidity symptoms are monitored and maintained or improved  Outcome: Progressing     Problem: Skin/Tissue Integrity  Goal: Absence of new skin breakdown  Description: 1. Monitor for areas of redness and/or skin breakdown  2. Assess vascular access sites hourly  3. Every 4-6 hours minimum:  Change oxygen saturation probe site  4. Every 4-6 hours:  If on nasal continuous positive airway pressure, respiratory therapy assess nares and determine need for appliance change or resting period.   Outcome: Progressing     Problem: Safety - Adult  Goal: Free from fall injury  Outcome: Progressing     Problem: ABCDS Injury Assessment  Goal: Absence of physical injury  Outcome: Progressing     Problem: Nutrition Deficit:  Goal: Optimize nutritional status  Outcome: Progressing     Problem: Respiratory - Adult  Goal: Achieves optimal ventilation and oxygenation  Outcome: Progressing

## 2022-12-29 NOTE — PLAN OF CARE
Problem: Discharge Planning  Goal: Discharge to home or other facility with appropriate resources  Outcome: Progressing  Flowsheets (Taken 12/29/2022 0800)  Discharge to home or other facility with appropriate resources: Identify barriers to discharge with patient and caregiver     Problem: Confusion  Goal: Confusion, delirium, dementia, or psychosis is improved or at baseline  Description: INTERVENTIONS:  1. Assess for possible contributors to thought disturbance, including medications, impaired vision or hearing, underlying metabolic abnormalities, dehydration, psychiatric diagnoses, and notify attending LIP  2. [place] high risk fall precautions, as indicated  3. Provide frequent short contacts to provide reality reorientation, refocusing and direction  4. Decrease environmental stimuli, including noise as appropriate  5. Monitor and intervene to maintain adequate nutrition, hydration, elimination, sleep and activity  6. If unable to ensure safety without constant attention obtain sitter and review sitter guidelines with assigned personnel  7.  Initiate Psychosocial CNS and Spiritual Care consult, as indicated  Outcome: Progressing  Flowsheets (Taken 12/29/2022 0800)  Effect of thought disturbance (confusion, delirium, dementia, or psychosis) are managed with adequate functional status: Assess for contributors to thought disturbance, including medications, impaired vision or hearing, underlying metabolic abnormalities, dehydration, psychiatric diagnoses, notify LIP     Problem: Pain  Goal: Verbalizes/displays adequate comfort level or baseline comfort level  Outcome: Progressing     Problem: Chronic Conditions and Co-morbidities  Goal: Patient's chronic conditions and co-morbidity symptoms are monitored and maintained or improved  Outcome: Progressing  Flowsheets (Taken 12/29/2022 0800)  Care Plan - Patient's Chronic Conditions and Co-Morbidity Symptoms are Monitored and Maintained or Improved: Monitor and assess patient's chronic conditions and comorbid symptoms for stability, deterioration, or improvement     Problem: Skin/Tissue Integrity  Goal: Absence of new skin breakdown  Description: 1. Monitor for areas of redness and/or skin breakdown  2. Assess vascular access sites hourly  3. Every 4-6 hours minimum:  Change oxygen saturation probe site  4. Every 4-6 hours:  If on nasal continuous positive airway pressure, respiratory therapy assess nares and determine need for appliance change or resting period.   Outcome: Progressing     Problem: Safety - Adult  Goal: Free from fall injury  Outcome: Progressing     Problem: ABCDS Injury Assessment  Goal: Absence of physical injury  Outcome: Progressing     Problem: Nutrition Deficit:  Goal: Optimize nutritional status  Outcome: Progressing     Problem: Respiratory - Adult  Goal: Achieves optimal ventilation and oxygenation  Outcome: Progressing

## 2022-12-30 ENCOUNTER — ANESTHESIA (OUTPATIENT)
Dept: OPERATING ROOM | Age: 47
End: 2022-12-30
Payer: MEDICARE

## 2022-12-30 PROBLEM — J38.6: Status: ACTIVE | Noted: 2022-12-30

## 2022-12-30 PROBLEM — I77.1: Status: ACTIVE | Noted: 2022-12-30

## 2022-12-30 PROBLEM — J38.01 PARESIS OF LEFT VOCAL CORD: Status: ACTIVE | Noted: 2022-12-30

## 2022-12-30 PROBLEM — J39.8 TRACHEAL STENOSIS: Status: ACTIVE | Noted: 2022-12-30

## 2022-12-30 LAB
GLUCOSE BLD-MCNC: 150 MG/DL (ref 65–105)
GLUCOSE BLD-MCNC: 77 MG/DL (ref 65–105)
GLUCOSE BLD-MCNC: 79 MG/DL (ref 65–105)
GLUCOSE BLD-MCNC: 90 MG/DL (ref 65–105)

## 2022-12-30 PROCEDURE — 3600000014 HC SURGERY LEVEL 4 ADDTL 15MIN: Performed by: OTOLARYNGOLOGY

## 2022-12-30 PROCEDURE — 6370000000 HC RX 637 (ALT 250 FOR IP)

## 2022-12-30 PROCEDURE — 2720000010 HC SURG SUPPLY STERILE: Performed by: OTOLARYNGOLOGY

## 2022-12-30 PROCEDURE — 2580000003 HC RX 258

## 2022-12-30 PROCEDURE — 3600000004 HC SURGERY LEVEL 4 BASE: Performed by: OTOLARYNGOLOGY

## 2022-12-30 PROCEDURE — 31622 DX BRONCHOSCOPE/WASH: CPT | Performed by: OTOLARYNGOLOGY

## 2022-12-30 PROCEDURE — 94761 N-INVAS EAR/PLS OXIMETRY MLT: CPT

## 2022-12-30 PROCEDURE — 6360000002 HC RX W HCPCS: Performed by: NURSE ANESTHETIST, CERTIFIED REGISTERED

## 2022-12-30 PROCEDURE — 0CJS8ZZ INSPECTION OF LARYNX, VIA NATURAL OR ARTIFICIAL OPENING ENDOSCOPIC: ICD-10-PCS | Performed by: OTOLARYNGOLOGY

## 2022-12-30 PROCEDURE — 2580000003 HC RX 258: Performed by: INTERNAL MEDICINE

## 2022-12-30 PROCEDURE — 2500000003 HC RX 250 WO HCPCS: Performed by: NURSE ANESTHETIST, CERTIFIED REGISTERED

## 2022-12-30 PROCEDURE — 6360000002 HC RX W HCPCS

## 2022-12-30 PROCEDURE — 99231 SBSQ HOSP IP/OBS SF/LOW 25: CPT | Performed by: OTOLARYNGOLOGY

## 2022-12-30 PROCEDURE — 94660 CPAP INITIATION&MGMT: CPT

## 2022-12-30 PROCEDURE — 3700000000 HC ANESTHESIA ATTENDED CARE: Performed by: OTOLARYNGOLOGY

## 2022-12-30 PROCEDURE — 2060000000 HC ICU INTERMEDIATE R&B

## 2022-12-30 PROCEDURE — 2709999900 HC NON-CHARGEABLE SUPPLY: Performed by: OTOLARYNGOLOGY

## 2022-12-30 PROCEDURE — 31526 DX LARYNGOSCOPY W/OPER SCOPE: CPT | Performed by: OTOLARYNGOLOGY

## 2022-12-30 PROCEDURE — 2580000003 HC RX 258: Performed by: OTOLARYNGOLOGY

## 2022-12-30 PROCEDURE — 7100000000 HC PACU RECOVERY - FIRST 15 MIN: Performed by: OTOLARYNGOLOGY

## 2022-12-30 PROCEDURE — 7100000001 HC PACU RECOVERY - ADDTL 15 MIN: Performed by: OTOLARYNGOLOGY

## 2022-12-30 PROCEDURE — 2580000003 HC RX 258: Performed by: NURSE ANESTHETIST, CERTIFIED REGISTERED

## 2022-12-30 PROCEDURE — C1713 ANCHOR/SCREW BN/BN,TIS/BN: HCPCS | Performed by: OTOLARYNGOLOGY

## 2022-12-30 PROCEDURE — 82947 ASSAY GLUCOSE BLOOD QUANT: CPT

## 2022-12-30 PROCEDURE — 3700000001 HC ADD 15 MINUTES (ANESTHESIA): Performed by: OTOLARYNGOLOGY

## 2022-12-30 PROCEDURE — 6360000002 HC RX W HCPCS: Performed by: ANESTHESIOLOGY

## 2022-12-30 PROCEDURE — 2500000003 HC RX 250 WO HCPCS

## 2022-12-30 PROCEDURE — 0BJ08ZZ INSPECTION OF TRACHEOBRONCHIAL TREE, VIA NATURAL OR ARTIFICIAL OPENING ENDOSCOPIC: ICD-10-PCS | Performed by: OTOLARYNGOLOGY

## 2022-12-30 RX ORDER — DEXAMETHASONE SODIUM PHOSPHATE 10 MG/ML
10 INJECTION INTRAMUSCULAR; INTRAVENOUS EVERY 8 HOURS
Status: COMPLETED | OUTPATIENT
Start: 2022-12-30 | End: 2022-12-31

## 2022-12-30 RX ORDER — PROPOFOL 10 MG/ML
INJECTION, EMULSION INTRAVENOUS CONTINUOUS PRN
Status: DISCONTINUED | OUTPATIENT
Start: 2022-12-30 | End: 2022-12-30 | Stop reason: SDUPTHER

## 2022-12-30 RX ORDER — BENZONATATE 100 MG/1
100 CAPSULE ORAL ONCE AS NEEDED
Status: COMPLETED | OUTPATIENT
Start: 2022-12-30 | End: 2022-12-30

## 2022-12-30 RX ORDER — LIDOCAINE HYDROCHLORIDE 10 MG/ML
INJECTION, SOLUTION EPIDURAL; INFILTRATION; INTRACAUDAL; PERINEURAL PRN
Status: DISCONTINUED | OUTPATIENT
Start: 2022-12-30 | End: 2022-12-30 | Stop reason: SDUPTHER

## 2022-12-30 RX ORDER — UREA 10 %
3 LOTION (ML) TOPICAL ONCE
Status: COMPLETED | OUTPATIENT
Start: 2022-12-30 | End: 2022-12-30

## 2022-12-30 RX ORDER — SODIUM CHLORIDE 9 MG/ML
INJECTION, SOLUTION INTRAVENOUS PRN
Status: DISCONTINUED | OUTPATIENT
Start: 2022-12-30 | End: 2022-12-30 | Stop reason: HOSPADM

## 2022-12-30 RX ORDER — FENTANYL CITRATE 50 UG/ML
25 INJECTION, SOLUTION INTRAMUSCULAR; INTRAVENOUS EVERY 5 MIN PRN
Status: COMPLETED | OUTPATIENT
Start: 2022-12-30 | End: 2022-12-30

## 2022-12-30 RX ORDER — SODIUM CHLORIDE, SODIUM LACTATE, POTASSIUM CHLORIDE, CALCIUM CHLORIDE 600; 310; 30; 20 MG/100ML; MG/100ML; MG/100ML; MG/100ML
INJECTION, SOLUTION INTRAVENOUS CONTINUOUS PRN
Status: DISCONTINUED | OUTPATIENT
Start: 2022-12-30 | End: 2022-12-30 | Stop reason: SDUPTHER

## 2022-12-30 RX ORDER — MAGNESIUM HYDROXIDE 1200 MG/15ML
LIQUID ORAL CONTINUOUS PRN
Status: DISCONTINUED | OUTPATIENT
Start: 2022-12-30 | End: 2022-12-30 | Stop reason: HOSPADM

## 2022-12-30 RX ORDER — SODIUM CHLORIDE 0.9 % (FLUSH) 0.9 %
5-40 SYRINGE (ML) INJECTION PRN
Status: DISCONTINUED | OUTPATIENT
Start: 2022-12-30 | End: 2022-12-30 | Stop reason: HOSPADM

## 2022-12-30 RX ORDER — MIDAZOLAM HYDROCHLORIDE 1 MG/ML
INJECTION INTRAMUSCULAR; INTRAVENOUS PRN
Status: DISCONTINUED | OUTPATIENT
Start: 2022-12-30 | End: 2022-12-30 | Stop reason: SDUPTHER

## 2022-12-30 RX ORDER — FLUTICASONE PROPIONATE 110 UG/1
1 AEROSOL, METERED RESPIRATORY (INHALATION) 2 TIMES DAILY
Status: DISCONTINUED | OUTPATIENT
Start: 2022-12-30 | End: 2022-12-31 | Stop reason: HOSPADM

## 2022-12-30 RX ORDER — DEXAMETHASONE SODIUM PHOSPHATE 10 MG/ML
10 INJECTION INTRAMUSCULAR; INTRAVENOUS EVERY 8 HOURS
Status: DISCONTINUED | OUTPATIENT
Start: 2022-12-30 | End: 2022-12-30

## 2022-12-30 RX ORDER — GLYCOPYRROLATE 0.2 MG/ML
INJECTION INTRAMUSCULAR; INTRAVENOUS PRN
Status: DISCONTINUED | OUTPATIENT
Start: 2022-12-30 | End: 2022-12-30 | Stop reason: SDUPTHER

## 2022-12-30 RX ORDER — DEXAMETHASONE SODIUM PHOSPHATE 10 MG/ML
INJECTION INTRAMUSCULAR; INTRAVENOUS PRN
Status: DISCONTINUED | OUTPATIENT
Start: 2022-12-30 | End: 2022-12-30 | Stop reason: SDUPTHER

## 2022-12-30 RX ORDER — KETAMINE HCL IN NACL, ISO-OSM 100MG/10ML
SYRINGE (ML) INJECTION PRN
Status: DISCONTINUED | OUTPATIENT
Start: 2022-12-30 | End: 2022-12-30 | Stop reason: SDUPTHER

## 2022-12-30 RX ORDER — SODIUM CHLORIDE 0.9 % (FLUSH) 0.9 %
5-40 SYRINGE (ML) INJECTION EVERY 12 HOURS SCHEDULED
Status: DISCONTINUED | OUTPATIENT
Start: 2022-12-30 | End: 2022-12-30 | Stop reason: HOSPADM

## 2022-12-30 RX ADMIN — MIDAZOLAM 2 MG: 1 INJECTION INTRAMUSCULAR; INTRAVENOUS at 10:11

## 2022-12-30 RX ADMIN — OXYCODONE 5 MG: 5 TABLET ORAL at 18:48

## 2022-12-30 RX ADMIN — Medication 50 MG: at 10:11

## 2022-12-30 RX ADMIN — RACEPINEPHRINE HYDROCHLORIDE 5.62 MG: 11.25 SOLUTION RESPIRATORY (INHALATION) at 10:59

## 2022-12-30 RX ADMIN — OXYCODONE 5 MG: 5 TABLET ORAL at 14:08

## 2022-12-30 RX ADMIN — ACETAMINOPHEN 650 MG: 325 TABLET ORAL at 16:44

## 2022-12-30 RX ADMIN — PROPOFOL 200 MCG/KG/MIN: 10 INJECTION, EMULSION INTRAVENOUS at 10:16

## 2022-12-30 RX ADMIN — FAMOTIDINE 20 MG: 40 POWDER, FOR SUSPENSION ORAL at 20:00

## 2022-12-30 RX ADMIN — Medication 3 MG: at 20:13

## 2022-12-30 RX ADMIN — FENTANYL CITRATE 25 MCG: 50 INJECTION, SOLUTION INTRAMUSCULAR; INTRAVENOUS at 11:57

## 2022-12-30 RX ADMIN — GUAIFENESIN 200 MG: 200 SOLUTION ORAL at 20:00

## 2022-12-30 RX ADMIN — GABAPENTIN 100 MG: 100 CAPSULE ORAL at 20:14

## 2022-12-30 RX ADMIN — HYDROCORTISONE 10 MG: 10 TABLET ORAL at 20:00

## 2022-12-30 RX ADMIN — FENTANYL CITRATE 25 MCG: 50 INJECTION, SOLUTION INTRAMUSCULAR; INTRAVENOUS at 11:52

## 2022-12-30 RX ADMIN — DEXAMETHASONE SODIUM PHOSPHATE 10 MG: 10 INJECTION INTRAMUSCULAR; INTRAVENOUS at 10:16

## 2022-12-30 RX ADMIN — ENOXAPARIN SODIUM 30 MG: 100 INJECTION SUBCUTANEOUS at 20:00

## 2022-12-30 RX ADMIN — GLYCOPYRROLATE 0.2 MG: 0.2 INJECTION INTRAMUSCULAR; INTRAVENOUS at 10:12

## 2022-12-30 RX ADMIN — LIDOCAINE HYDROCHLORIDE 50 MG: 10 INJECTION, SOLUTION EPIDURAL; INFILTRATION; INTRACAUDAL; PERINEURAL at 10:11

## 2022-12-30 RX ADMIN — SODIUM CHLORIDE, POTASSIUM CHLORIDE, SODIUM LACTATE AND CALCIUM CHLORIDE: 600; 310; 30; 20 INJECTION, SOLUTION INTRAVENOUS at 10:01

## 2022-12-30 RX ADMIN — TOPIRAMATE 100 MG: 100 TABLET, FILM COATED ORAL at 20:00

## 2022-12-30 RX ADMIN — SODIUM CHLORIDE: 9 INJECTION, SOLUTION INTRAVENOUS at 12:51

## 2022-12-30 RX ADMIN — SODIUM CHLORIDE: 9 INJECTION, SOLUTION INTRAVENOUS at 02:58

## 2022-12-30 RX ADMIN — ANTI-FUNGAL POWDER MICONAZOLE NITRATE TALC FREE: 1.42 POWDER TOPICAL at 20:16

## 2022-12-30 RX ADMIN — SODIUM CHLORIDE, PRESERVATIVE FREE 10 ML: 5 INJECTION INTRAVENOUS at 20:01

## 2022-12-30 RX ADMIN — DEXAMETHASONE SODIUM PHOSPHATE 10 MG: 10 INJECTION INTRAMUSCULAR; INTRAVENOUS at 16:44

## 2022-12-30 RX ADMIN — BENZONATATE 100 MG: 100 CAPSULE ORAL at 16:44

## 2022-12-30 ASSESSMENT — ENCOUNTER SYMPTOMS: STRIDOR: 1

## 2022-12-30 ASSESSMENT — PAIN DESCRIPTION - PAIN TYPE: TYPE: ACUTE PAIN

## 2022-12-30 ASSESSMENT — PAIN SCALES - GENERAL
PAINLEVEL_OUTOF10: 7
PAINLEVEL_OUTOF10: 4
PAINLEVEL_OUTOF10: 6
PAINLEVEL_OUTOF10: 5
PAINLEVEL_OUTOF10: 6
PAINLEVEL_OUTOF10: 8

## 2022-12-30 ASSESSMENT — PAIN DESCRIPTION - LOCATION
LOCATION: THROAT
LOCATION: BACK
LOCATION: THROAT

## 2022-12-30 ASSESSMENT — PAIN DESCRIPTION - DESCRIPTORS: DESCRIPTORS: SHARP

## 2022-12-30 ASSESSMENT — PAIN DESCRIPTION - ORIENTATION: ORIENTATION: RIGHT

## 2022-12-30 ASSESSMENT — PAIN - FUNCTIONAL ASSESSMENT: PAIN_FUNCTIONAL_ASSESSMENT: 0-10

## 2022-12-30 NOTE — OP NOTE
Last HbA1c over 1 yr ago was 5 7  Will repeat Hba1c   Advised low carb diet and exercise OPERATIVE REPORT    PATIENT NAME: Benito Bentley    MRN#: [id number]    : 1975    DATE OF SURGERY: 2022    Service: Otolaryngology    Surgeon(s):  MD Joe Thomson MD    Anesthesiologist: Ray Wall MD  CRNA: ADELA Neal - KAPIL     Pre-op Diagnosis:  Stridor  History of prolonged intubation  Left vocal paresis    Post-op Diagnosis:  Left>R posterior subglottic abrasion with exposed cartilage. 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 Fixation of left vocal fold. No evidence of cicatricial stenosis  Subglottis:  normal  Trachea:  abnormal - LEFT sided compression at thoracic inlet, pulsatile, leading to narrowing of tracheal lumen by ~50%. Unable to pass 6-0 bronchoscope due to firm resistance. Beyond this area, the airway returned to normal caliber   Bronchi:  limited assessment. No gross abnormalities    The airway was sized with a 5 ETT and there was no leak at 30 cm H20. This was noted to be related to tube contact at the tracheal narrowing. Intervention was not performed. INDICATIONS AND CONSENT  The patient was seen and evaluated by the Otolaryngology practice. After history and physical examination, recommendations were made to proceed to the operating room for the above listed procedures. Indications, risks and benefits were discussed with the patient's guardian, who agreed to proceed and signed proper informed consent.     DESCRIPTION OF PROCEDURE:  The patient was taken to the operating room and laid supine on the operating room table. General mask inhalational anesthesia was induced by the anesthesiology team.   Proper surgeon-initiated time-out was performed. Once an adequate level of anesthesia was achieved, the patient's head of bed was turned 90 degrees. The patient was properly positioned for the procedure on a shoulder roll. The maxillary tooth guard was placed. Topical lidocaine laryngotracheal anesthesia was administered to the larynx in a dose as determined by the anesthesia team.  Oxygen was delivered via the laryngoscope side-port. The operative laryngoscope was used to perform direct laryngoscopy and visualize the larynx. Rigid 0-degree Chadwick kayla telescopeon a 6-0 Bronchoscope was used to visualize the larynx and the immediate subglottis (with high definition magnification). We then performed rigid bronchoscopy by passing the bronchoscope into the subglottis and distal trachea. Distal tracheal visualization was limited by airway stenosis described above with visualization of the distal airways attained by passing the optic through the endotracheal tube to visualize the distal airway. We visualized the membranous trachea. Photodocumentation was achieved using the digital image capture system. The findings were as described above. The larynx was then intubated as described above. The tube was removed and the patient's care was turned back over to anesthesia, she was awakened and was transported to PACU in stable condition. I was present for and directly performed or supervised the entire procedure.       Ho Giraldo MD   Pediatric Otolaryngology-Head and [address] [place] [address] [facility]     PLAN:    Observation in monitored bed with continuous pulse-oximetry, ICU monitoring if possible recommended given potential for rebound airway edema and need for positive pressure. Continue airway support with supplemental O2 as needed, escalating to positive pressure if needed. IF intubation needed, contact Anesthesia for intubation with 5.0 ETT (taped to bedside). Steroids IV q 8 for 3 doses  Inhaled steroids to help minimize inflammation of subglottic area as it continues to heal  Suspect current stridor related to multilevel airway stenosis with left vocal fold paresis related to subglottic injury and potential for evolving scar in addition to tracheal compression by innominate at thoracic inlet with potential exacerbation by recent intubation. I recommend transfer to tertiary center with laryngology and thoracic surgery capabilities for further assessment and management of the patient's airway stenosis. Tracheostomy would be challenging in this patient related to high-riding innominate and obese habitus. Will continue to monitor. Plan of care discussed with Primary team and patient's father.

## 2022-12-30 NOTE — PROGRESS NOTES
[facility]  Internal Medicine Teaching Residency Program  Inpatient Daily Progress Note  ______________________________________________________________________________    Patient: William Tate  YOB: 1975   ZTU:[id number]    Acct: [de-identified]     Room: [facility]/NONE  Admit date: 12/5/2022  Today's date: 12/30/22  Number of days in the hospital: 24    SUBJECTIVE   Admitting Diagnosis: Acute respiratory failure with hypoxia [facility])  CC: Fall leading to neck pain and back pain  Pt examined at bedside. Chart & results reviewed. No acute events overnight. Patient's vitals are stable and she remains on 4 L nasal cannula which is her baseline. Feels well overall-no acute complaints. Denies chest pain, shortness of breath, cough, leg pain or swelling. CT scan yesterday showed mid tracheal severe stenosis-will be going to the OR for possible intervention by ENT. BRIEF HISTORY     ICU COURSE:     82-year-old female initially presented to hospital on 12/5 after a fall. 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 Patient also has past medical history of diabetes mellitus, bilateral sensorineural hearing loss. Had a CT head showed no intracranial abnormality, CT abdominal pelvis showed  nondisplaced fractures of right sacral ilya, nondisplaced fracture of right superior ramus and right obturator ring. CT spine showed acute fracture of right C7 transverse process and also age indeterminate compressive fracture of T3. Patient admitted under trauma surgery-neurosurgery for cervical fractures was consulted, who recommended no neurosurgical intervention. Orthopedic surgery for pelvic fractures was also consulted, no surgical intervention but medical management and also recommended to follow-up in orthopedic clinic after discharge.   Patient was started on BiPAP for increased work of breathing. Patient was oriented x4 at day of presentation.  - patient had a decline in mental status with tachypnea, respiratory rate of 30s to 40 with heart rate of 105 patient was intubated emergently     - Neurology was consulted because of history of migraines, seizure disorder, patient was started on her home antiepileptic drugs including Topamax and Lamictal, EEG was ordered to rule out any subclinical seizure activity, MRI was also ordered to rule out any CVA. Patient was doing fine, arousable and following commands. Pulmonology was consulted, recommended to extubate and patient was extubated. 12/10 -rapid was called today due to impending respiratory failure, patient was intubated again and was sent to ICU       -respiratory culture came back positive for staph aureus , nasal respiratory swab came out positive for MRSA DNA, patient started on vancomycin       : Tachycardia improved to normal heart rate, patient remained afebrile, WBC count trending down. Sedation changed to precedex     12/15: Precedex discontinued. : Patient was extubated. : Pt was a febrile overnight, saturating well on NC.  2022-further ENT work-up with CT chest to assess for tracheal obstruction status post multiple intubations. Patient improving. Repeat speech evaluation to try to advance that    OBJECTIVE     Vital Signs:  /73   Pulse 100   Temp 99 °F (37.2 °C) (Temporal)   Resp 17   Ht 5' 1\" (1.549 m)   Wt 261 lb (118.4 kg)   SpO2 98%   BMI 49.32 kg/m²     Temp (24hrs), Av.3 °F (36.8 °C), Min:96.8 °F (36 °C), Max:99.1 °F (37.3 °C)    In: 300   Out: 1200 [Urine:1200]    Physical Exam:  General appearance - alert, in no distress  Mental status - alert, oriented to person, place, and time  Eyes - pupils equal and reactive, extraocular eye movements intact. Some hearing loss.   Mouth - mucous membranes moist, pharynx normal without lesions  Neck - supple, no significant adenopathy  Chest - clear to auscultation, no wheezes  Heart - normal rate, regular rhythm, normal S1, S2  Abdomen - soft, nontender, nondistended  Neurological - alert, oriented, normal speech, no focal deficits   Extremities - peripheral pulses normal, no pedal edema  Skin - normal coloration and turgor, no rashes     Medications:  Scheduled Medications:    sodium chloride flush  5-40 mL IntraVENous 2 times per day    dexamethasone  10 mg IntraVENous Q8H    fluticasone  1 puff Inhalation BID    [MAR Hold] famotidine  20 mg Oral BID    [MAR Hold] furosemide  20 mg Oral Daily    [MAR Hold] guaiFENesin  200 mg Oral BID    [MAR Hold] miconazole   Topical BID    [MAR Hold] ARIPiprazole  10 mg Oral Daily    [MAR Hold] gabapentin  100 mg Oral TID    [MAR Hold] hydrocortisone  10 mg Oral Nightly    [MAR Hold] hydrocortisone  15 mg Oral QAM    [MAR Hold] enoxaparin  30 mg SubCUTAneous BID    [MAR Hold] sertraline  150 mg Oral Daily    [MAR Hold] lamoTRIgine  200 mg Oral Daily    [MAR Hold] levothyroxine  150 mcg Oral QAM AC    [MAR Hold] topiramate  75 mg Oral Daily    [MAR Hold] topiramate  100 mg Oral Nightly     Continuous Infusions:    sodium chloride      [MAR Hold] sodium chloride 50 mL/hr at 12/30/22 0258    [MAR Hold] sodium chloride Stopped (12/12/22 0316)     PRN Medicationssodium chloride flush, 5-40 mL, PRN  sodium chloride, , PRN  [MAR Hold] oxyCODONE, 10 mg, Q12H PRN   Or  [MAR Hold] oxyCODONE, 5 mg, Q4H PRN  [MAR Hold] racepinephrine HCl, 11.25 mg, Q4H PRN  [MAR Hold] sodium chloride nebulizer, 3 mL, Q4H PRN  [MAR Hold] polyethylene glycol, 17 g, Daily PRN  [MAR Hold] levalbuterol, 0.63 mg, Q4H PRN  [MAR Hold] sodium chloride, , PRN  [MAR Hold] acetaminophen, 650 mg, Q6H PRN  [MAR Hold] sodium chloride flush, 5-40 mL, PRN      Diagnostic Labs:  CBC:   Recent Labs     12/29/22  0639   WBC 5.6   RBC 4.81   HGB 13.5   HCT 42.8   MCV 89.0   RDW 13.9          BMP:   Recent Labs 12/29/22  0639      K 3.7      CO2 29   BUN 11   CREATININE 0.84       BNP: No results for input(s): BNP in the last 72 hours. PT/INR: No results for input(s): PROTIME, INR in the last 72 hours. APTT: No results for input(s): APTT in the last 72 hours. CARDIAC ENZYMES: No results for input(s): CKMB, CKMBINDEX, TROPONINI in the last 72 hours. Invalid input(s): CKTOTAL;3  FASTING LIPID PANEL:  Lab Results   Component Value Date    CHOL 147 03/01/2020    HDL 46 03/01/2020    TRIG 77 03/01/2020     LIVER PROFILE: No results for input(s): AST, ALT, ALB, BILIDIR, BILITOT, ALKPHOS in the last 72 hours. MICROBIOLOGY:   Lab Results   Component Value Date/Time    CULTURE NO SIGNIFICANT GROWTH 12/15/2022 02:10 PM       Imaging:    XR CHEST PORTABLE    Result Date: 12/23/2022  1. Enlargement of the cardiac silhouette with vascular congestion, though improved focal infiltrate of the right upper lobe. XR CHEST PORTABLE    Result Date: 12/18/2022  No change from prior study. Tubes and lines as above. Endotracheal tube is somewhat low lying but unchanged. FL MODIFIED BARIUM SWALLOW W VIDEO    Result Date: 12/22/2022  1. Penetration followed by aspiration without coughing with the nectar thick and honey thick substances. 2. No penetration or aspiration with the pureed/pudding thick or soft solid substances. 3. Cookie solid substance was not attempted. Please see separate speech pathology report for full discussion of findings and recommendations.        ASSESSMENT & PLAN     Assessment:  Principal Problem:    Cervical transverse process fracture, initial encounter [facility])  Active Problems:    Closed nondisplaced fracture of seventh cervical vertebra (HCC)    Lethargy    Multiple closed pelvic fractures without disruption of pelvic [place] (HCC)    Aspiration pneumonia (HCC)    Acute respiratory failure with hypoxia (HCC)    MRSA infection    Acute lower respiratory tract infection    Seizure ([facility] [address] Hypothyroidism    Asthma    Hypopituitarism ([facility] [place] [address]  Resolved Problems:    * No resolved hospital problems. *            Plan:     #Acute hypoxic respiratory failure, improved  -Likely secondary to pneumonia-completed vancomycin for MRSA pneumonia 12/22  -Xopenex, Robitussin, racemic epinephrine, sodium chloride nebulizer as needed  -On nasal cannula during the day with CPAP in the night. Baseline 4 L nasal cannula  -Maintain oxygen saturation above 92%. Continue to wean down as tolerated. #Sinus bradycardia - asymptomatic  -Likely due to sleep apnea. -Thyroxine on admission was within normal limits. #Chest pain - resolved    #History of seizures  EEG this admission-moderate nonspecific encephalopathy-no epileptic discharges. MRI could not be done due to cochlear implants.  -Lamotrigine level was low-neurology recommended continuing the same dose of lamotrigine as there is no concern of seizures. We will check lamotrigine again.  - Continue home lamotrigine 200 mg daily, topiramate 100 mg nightly, 75 mg in the morning. C7 transverse process fracture and T3 chronic compression fracture-neurosurgery was consulted-recommended no intervention     Right sacral ilya fracture, right superior pubic ramus fracture-orthopedic was consulted-recommended nonoperative treatment with close clinic follow-up in 2 weeks after discharge as there is minimal chance of displacement and recommended weightbearing as tolerated. Stridor  - CT scan showed severe stenosis in the mid trachea. -Will be going to the OR for possible intervention by ENT. #Hypothyroidism  -Continue home levothyroxine 150 mcg     Hypopituitarism  -Continue hydrocortisone 10 mg in the night and 15 mg in the morning. Home dose-10 mg twice daily. Hypokalemia - resolved    Due to prophylaxis - Lovenox  GI prophylaxis - Protonix     PT/OT-has been working   - on board - we will follow-up.      Sergey Yu MD  Internal Medicine Resident, PGY-1  [facility];  [place], [place]  12/30/2022, 12:07 PM

## 2022-12-30 NOTE — PLAN OF CARE
Problem: Discharge Planning  Goal: Discharge to home or other facility with appropriate resources  12/29/2022 1935 by Julia Trujillo RN  Outcome: Progressing  12/29/2022 1609 by Sean Pro RN  Outcome: Progressing  Flowsheets (Taken 12/29/2022 0800)  Discharge to home or other facility with appropriate resources: Identify barriers to discharge with patient and caregiver     Problem: Confusion  Goal: Confusion, delirium, dementia, or psychosis is improved or at baseline  Description: INTERVENTIONS:  1. Assess for possible contributors to thought disturbance, including medications, impaired vision or hearing, underlying metabolic abnormalities, dehydration, psychiatric diagnoses, and notify attending LIP  2. [place] high risk fall precautions, as indicated  3. Provide frequent short contacts to provide reality reorientation, refocusing and direction  4. Decrease environmental stimuli, including noise as appropriate  5. Monitor and intervene to maintain adequate nutrition, hydration, elimination, sleep and activity  6. If unable to ensure safety without constant attention obtain sitter and review sitter guidelines with assigned personnel  7.  Initiate Psychosocial CNS and Spiritual Care consult, as indicated  12/29/2022 1935 by Julia Trujillo RN  Outcome: Progressing  12/29/2022 1609 by Sean Pro RN  Outcome: Progressing  Flowsheets (Taken 12/29/2022 0800)  Effect of thought disturbance (confusion, delirium, dementia, or psychosis) are managed with adequate functional status: Assess for contributors to thought disturbance, including medications, impaired vision or hearing, underlying metabolic abnormalities, dehydration, psychiatric diagnoses, notify LIP     Problem: Pain  Goal: Verbalizes/displays adequate comfort level or baseline comfort level  12/29/2022 1935 by Julia Trujillo RN  Outcome: Progressing  12/29/2022 1609 by Sean Pro RN  Outcome: Progressing     Problem: Chronic Conditions and Co-morbidities  Goal: Patient's chronic conditions and co-morbidity symptoms are monitored and maintained or improved  12/29/2022 1935 by Jolanta aTtum RN  Outcome: Progressing  12/29/2022 1609 by Griffin Hankins RN  Outcome: Progressing  Flowsheets (Taken 12/29/2022 0800)  Care Plan - Patient's Chronic Conditions and Co-Morbidity Symptoms are Monitored and Maintained or Improved: Monitor and assess patient's chronic conditions and comorbid symptoms for stability, deterioration, or improvement     Problem: Skin/Tissue Integrity  Goal: Absence of new skin breakdown  Description: 1. Monitor for areas of redness and/or skin breakdown  2. Assess vascular access sites hourly  3. Every 4-6 hours minimum:  Change oxygen saturation probe site  4. Every 4-6 hours:  If on nasal continuous positive airway pressure, respiratory therapy assess nares and determine need for appliance change or resting period.   12/29/2022 1935 by Jolanta Tatum RN  Outcome: Progressing  12/29/2022 1609 by Griffin Hankins RN  Outcome: Progressing     Problem: Safety - Adult  Goal: Free from fall injury  12/29/2022 1935 by Jolanta Tatum RN  Outcome: Progressing  12/29/2022 1609 by Griffin Hankins RN  Outcome: Progressing     Problem: ABCDS Injury Assessment  Goal: Absence of physical injury  12/29/2022 1935 by Jolanta Tatum RN  Outcome: Progressing  12/29/2022 1609 by Griffin Hankins RN  Outcome: Progressing     Problem: Nutrition Deficit:  Goal: Optimize nutritional status  12/29/2022 1935 by Jolanta Tatum RN  Outcome: Progressing  12/29/2022 1609 by Griffin Hankins RN  Outcome: Progressing     Problem: Respiratory - Adult  Goal: Achieves optimal ventilation and oxygenation  12/29/2022 1935 by Jolanta Tatum RN  Outcome: Progressing  12/29/2022 1609 by Griffin Hankins RN  Outcome: Progressing

## 2022-12-30 NOTE — PROGRESS NOTES
Physical Therapy        Physical Therapy Cancel Note      DATE: 2022    NAME: Hardik Benavides  MRN: [id number]   : 1975      Patient not seen this date for Physical Therapy due to:    Surgery/Procedure: Per RN pt is off the floor for a sx/procedure.        Electronically signed by Rody Joshi PTA on 2022 at 1:09 PM

## 2022-12-30 NOTE — CARE COORDINATION
Transitional planning note: plan is now to transfer to tertiary care center either [facility] or [facility] for inpatient ENT evaluation d/t multilevel tracheal stenosis. The physician has already called [facility] line to initiate transfer and find an accepting physician/ facility. 1714: patient has been accepted to [facility]. Awaiting bed assignment. Call placed to file room and had radiology records electronically sent to [facility].  Transport request faxed to [address] for \"will call\" stretcher to [facility] and chart copied

## 2022-12-30 NOTE — PROGRESS NOTES
Medicine residents at bedside, no po fluids to be given at this time, Will go back to [facility]B and be assessed by [facility].

## 2022-12-30 NOTE — PROGRESS NOTES
Occupational [address]  Occupational Therapy Not Seen Note    DATE: 2022    NAME: Kelsey Diego  MRN: [id number]   : 1975      Patient not seen this date for Occupational Therapy due to:    Surgery/Procedure:  In OR with ENT procedure     Next Scheduled Treatment: 22    Electronically signed by LOLIS Go on 2022 at 4:57 PM

## 2022-12-30 NOTE — PLAN OF CARE
Patient seen and examined in postop area, RN and ENT at bedside. Patient is currently saturating around 97% on 4 to 5 L of nasal cannula with mild inspiratory stridor persistent, no signs of any significant respiratory distress    Patient underwent DLB which showed multilevel narrowing of the trachea along with mild inflammation, mild bleeding from the procedure. Assessment and plan    Multilevel tracheal stenosis please secondary to prolonged intubation, versus underlying chronic tracheal stenosis    Plan:  -ENT requesting ICU evaluation  -Continue nasal cannula oxygen at 4 to 5 L currently, monitor saturation  -BiPAP for acute worsening respiratory status  -IV Decadron 10 mg every 8 hours, s/p racemic epinephrine one-time dose  -Bronchodilator  - updated, appreciate their input for MICU evaluation  -Family has been updated, had long conversation with father about the procedure result, treatment  -We will initiate the transfer request to [address] [place] [address] for inpatient evaluation of laryngology as per request by ENT  -Updated the RN,  regarding care  -Call the [facility] access line at [phone] initially transferred to the above facilities, patient father updated in the postop area  -We will continue to monitor the patient    Pricilla Gibson MD  Internal Medicine Resident, PGY- [facility];  [place], [place]  12/30/2022, 11:55 AM

## 2022-12-30 NOTE — PROGRESS NOTES
Postop check  Seen at bedside on evening rounds. Stable inspiratory stridor. No retractions. No distress. Nasal cannula in place. Speaking in sentences. Discussed patient with [facility] ENT Debbrah Mortimer) who is agreeable to accept to medicine service at [facility] with ENT seeing patient in consultation.       In the meantime, will continue with current plan while awaiting bed at [address] MD

## 2022-12-30 NOTE — PROGRESS NOTES
Crit Care residents at bedside, patient oxygen sat 97 on 4 liters per nasal cannula. Patient ok for floor.

## 2022-12-30 NOTE — ANESTHESIA POSTPROCEDURE EVALUATION
Department of Anesthesiology  Postprocedure Note    Patient: Destiny Henriquez  MRN: [id number]  YOB: 1975  Date of evaluation: 12/30/2022      Procedure Summary     Date: 12/30/22 Room / Location: [address]    Anesthesia Start: 1002 Anesthesia Stop: 1121    Procedures:       DIRECT LARYNGOSCOPY      DIRECT RIGID BRONCHOSCOPY, POSSIBLE INTERVENTION Diagnosis:       Esophageal stricture      (STRICTURE)    Surgeons: Leandro Garcia MD Responsible Provider: Anurag Smith MD    Anesthesia Type: MAC ASA Status: 3          Anesthesia Type: No value filed.     Faisal Phase I: Fasial Score: 9    Faisla Phase II:        Anesthesia Post Evaluation    Patient location during evaluation: PACU  Patient participation: complete - patient participated  Level of consciousness: awake and alert  Pain score: 3  Airway patency: patent  Nausea & Vomiting: no nausea and no vomiting  Complications: no  Cardiovascular status: hemodynamically stable  Respiratory status: acceptable  Hydration status: euvolemic

## 2022-12-30 NOTE — PROGRESS NOTES
ENT/OTOLARYNGOLOGY SUBSEQUENT CARE PROGRESS NOTE     REASON FOR CARE: f/u     HISTORY OF PRESENT ILLNESS:   Gina Cespedes is a 41 y.o. who is being seen for follow-up of her airway. On 4 L/min O2 overnight. Still with stridor. Had a CT chest yesterday showing concern for mid-tracheal stenosis/narrowing    Pertinent Examination:   GENERAL: well developed and well nourished and in no acute distress  HEAD: normocephalic and atraumatic  EYES: no eyelid swelling, no conjunctival injection or exudate, pupils equal round and reactive to light  EXTERNAL EARS: normal  EAR EXAM: deferred  NOSE: nares patent, normal mucosa  MOUTH/THROAT: mucous membranes moist, no focal lesions, no tonsillar enlargement or exudate  NECK: non-tender, full range of motion, no mass, no focal lymphadenopathy  RESPIRATORY: still with inspiratory stridor  NEUROLOGICAL:  cranial nerves II-XII are grossly intact     RELEVANT LABS/STUDIES:   Additional data reviewed:    None    Procedures:    None    Surgical risk factors:  None     IMPRESSION AND RECOMMENDATIONS:   Gina Cespedes is a 41 y.o. female with inspiratory stridor, prolonged intubation, concern for subglottic vs. Mid tracheal stenosis, . Plan:  DLB with possible intervention today to check her trachea for underlying etiology for her persistent inspiratory stridor. If stenosis present, will determine if can be dilated, addressed surgically endoscopically or needs additional surgical management    Consent obtained.      --------------------------------------------------  Tim Hodgkins, MD  Pediatric Otolaryngology-Head and Neck Surgery  [address] [place] [address] Otolaryngology group    Office  ph# [phone]    Also available in [address] [place] [address]

## 2022-12-30 NOTE — PLAN OF CARE
Went to see the patient per request of primary team in post op area, saturating well on 3 L NC, primary team is initiating transfer to tertiary with laryngology and thoracic surgery capabilities for further assessment and management of the patient's airway stenosis. Will hold ICU transfer for now, primary team will reach out if her status changes, discussed with critical care attending Dr. Baugh.

## 2022-12-31 VITALS
DIASTOLIC BLOOD PRESSURE: 79 MMHG | SYSTOLIC BLOOD PRESSURE: 118 MMHG | HEART RATE: 59 BPM | BODY MASS INDEX: 49.41 KG/M2 | TEMPERATURE: 97.9 F | HEIGHT: 61 IN | RESPIRATION RATE: 20 BRPM | OXYGEN SATURATION: 97 % | WEIGHT: 261.69 LBS

## 2022-12-31 LAB
ABSOLUTE EOS #: 0 K/UL (ref 0–0.44)
ABSOLUTE IMMATURE GRANULOCYTE: 0 K/UL (ref 0–0.3)
ABSOLUTE LYMPH #: 0.56 K/UL (ref 1.1–3.7)
ABSOLUTE MONO #: 0.09 K/UL (ref 0.1–1.2)
ANION GAP SERPL CALCULATED.3IONS-SCNC: 11 MMOL/L (ref 9–17)
BASOPHILS # BLD: 0 % (ref 0–2)
BASOPHILS ABSOLUTE: 0 K/UL (ref 0–0.2)
BUN BLDV-MCNC: 12 MG/DL (ref 6–20)
CALCIUM SERPL-MCNC: 8.7 MG/DL (ref 8.6–10.4)
CHLORIDE BLD-SCNC: 102 MMOL/L (ref 98–107)
CO2: 22 MMOL/L (ref 20–31)
CREAT SERPL-MCNC: 0.68 MG/DL (ref 0.5–0.9)
EOSINOPHILS RELATIVE PERCENT: 0 % (ref 1–4)
GFR SERPL CREATININE-BSD FRML MDRD: >60 ML/MIN/1.73M2
GLUCOSE BLD-MCNC: 133 MG/DL (ref 70–99)
HCT VFR BLD CALC: 38.8 % (ref 36.3–47.1)
HEMOGLOBIN: 12.6 G/DL (ref 11.9–15.1)
IMMATURE GRANULOCYTES: 0 %
LYMPHOCYTES # BLD: 6 % (ref 24–43)
MCH RBC QN AUTO: 27.9 PG (ref 25.2–33.5)
MCHC RBC AUTO-ENTMCNC: 32.5 G/DL (ref 28.4–34.8)
MCV RBC AUTO: 86 FL (ref 82.6–102.9)
MONOCYTES # BLD: 1 % (ref 3–12)
MORPHOLOGY: NORMAL
NRBC AUTOMATED: 0 PER 100 WBC
PDW BLD-RTO: 13.6 % (ref 11.8–14.4)
PLATELET # BLD: 209 K/UL (ref 138–453)
PMV BLD AUTO: 11.3 FL (ref 8.1–13.5)
POTASSIUM SERPL-SCNC: 3.8 MMOL/L (ref 3.7–5.3)
RBC # BLD: 4.51 M/UL (ref 3.95–5.11)
SEG NEUTROPHILS: 93 % (ref 36–65)
SEGMENTED NEUTROPHILS ABSOLUTE COUNT: 8.75 K/UL (ref 1.5–8.1)
SODIUM BLD-SCNC: 135 MMOL/L (ref 135–144)
WBC # BLD: 9.4 K/UL (ref 3.5–11.3)

## 2022-12-31 PROCEDURE — 6360000002 HC RX W HCPCS

## 2022-12-31 PROCEDURE — 2500000003 HC RX 250 WO HCPCS

## 2022-12-31 PROCEDURE — 6370000000 HC RX 637 (ALT 250 FOR IP)

## 2022-12-31 PROCEDURE — 94640 AIRWAY INHALATION TREATMENT: CPT

## 2022-12-31 PROCEDURE — 99233 SBSQ HOSP IP/OBS HIGH 50: CPT | Performed by: OTOLARYNGOLOGY

## 2022-12-31 PROCEDURE — 80048 BASIC METABOLIC PNL TOTAL CA: CPT

## 2022-12-31 PROCEDURE — 36415 COLL VENOUS BLD VENIPUNCTURE: CPT

## 2022-12-31 PROCEDURE — 2700000000 HC OXYGEN THERAPY PER DAY

## 2022-12-31 PROCEDURE — 99232 SBSQ HOSP IP/OBS MODERATE 35: CPT | Performed by: INTERNAL MEDICINE

## 2022-12-31 PROCEDURE — 85025 COMPLETE CBC W/AUTO DIFF WBC: CPT

## 2022-12-31 RX ORDER — TOPIRAMATE 50 MG/1
75 TABLET, FILM COATED ORAL DAILY
Qty: 60 TABLET | Refills: 3 | Status: SHIPPED | OUTPATIENT
Start: 2023-01-01

## 2022-12-31 RX ORDER — GABAPENTIN 100 MG/1
100 CAPSULE ORAL 3 TIMES DAILY
Qty: 90 CAPSULE | Refills: 1 | Status: SHIPPED | OUTPATIENT
Start: 2022-12-31 | End: 2023-01-30

## 2022-12-31 RX ORDER — FLUTICASONE PROPIONATE 110 UG/1
1 AEROSOL, METERED RESPIRATORY (INHALATION) 2 TIMES DAILY
Qty: 12 G | Refills: 3 | Status: SHIPPED | OUTPATIENT
Start: 2022-12-31

## 2022-12-31 RX ORDER — LAMOTRIGINE 200 MG/1
200 TABLET ORAL DAILY
Qty: 30 TABLET | Refills: 3 | Status: SHIPPED | OUTPATIENT
Start: 2023-01-01

## 2022-12-31 RX ORDER — FUROSEMIDE 20 MG/1
20 TABLET ORAL DAILY
Qty: 30 TABLET | Refills: 0 | Status: SHIPPED | OUTPATIENT
Start: 2022-12-31 | End: 2023-01-30

## 2022-12-31 RX ORDER — TOPIRAMATE 100 MG/1
100 TABLET, FILM COATED ORAL NIGHTLY
Qty: 60 TABLET | Refills: 3 | Status: SHIPPED | OUTPATIENT
Start: 2022-12-31

## 2022-12-31 RX ADMIN — OXYCODONE 5 MG: 5 TABLET ORAL at 02:52

## 2022-12-31 RX ADMIN — LAMOTRIGINE 200 MG: 100 TABLET ORAL at 08:09

## 2022-12-31 RX ADMIN — ACETAMINOPHEN 650 MG: 325 TABLET ORAL at 05:09

## 2022-12-31 RX ADMIN — ENOXAPARIN SODIUM 30 MG: 100 INJECTION SUBCUTANEOUS at 08:10

## 2022-12-31 RX ADMIN — TOPIRAMATE 75 MG: 100 TABLET, FILM COATED ORAL at 08:09

## 2022-12-31 RX ADMIN — FLUTICASONE PROPIONATE 1 PUFF: 110 AEROSOL, METERED RESPIRATORY (INHALATION) at 09:20

## 2022-12-31 RX ADMIN — DEXAMETHASONE SODIUM PHOSPHATE 10 MG: 10 INJECTION INTRAMUSCULAR; INTRAVENOUS at 02:36

## 2022-12-31 RX ADMIN — GABAPENTIN 100 MG: 100 CAPSULE ORAL at 14:11

## 2022-12-31 RX ADMIN — SERTRALINE 150 MG: 50 TABLET, FILM COATED ORAL at 08:09

## 2022-12-31 RX ADMIN — ARIPIPRAZOLE 10 MG: 10 TABLET ORAL at 08:09

## 2022-12-31 RX ADMIN — DEXAMETHASONE SODIUM PHOSPHATE 10 MG: 10 INJECTION INTRAMUSCULAR; INTRAVENOUS at 11:05

## 2022-12-31 RX ADMIN — LEVOTHYROXINE SODIUM 150 MCG: 75 TABLET ORAL at 06:03

## 2022-12-31 RX ADMIN — ANTI-FUNGAL POWDER MICONAZOLE NITRATE TALC FREE: 1.42 POWDER TOPICAL at 08:10

## 2022-12-31 RX ADMIN — OXYCODONE 10 MG: 5 TABLET ORAL at 18:12

## 2022-12-31 RX ADMIN — OXYCODONE 5 MG: 5 TABLET ORAL at 08:08

## 2022-12-31 RX ADMIN — HYDROCORTISONE 15 MG: 10 TABLET ORAL at 08:09

## 2022-12-31 RX ADMIN — LEVALBUTEROL HYDROCHLORIDE 0.63 MG: 0.63 SOLUTION RESPIRATORY (INHALATION) at 02:02

## 2022-12-31 RX ADMIN — FUROSEMIDE 20 MG: 20 TABLET ORAL at 08:09

## 2022-12-31 RX ADMIN — GABAPENTIN 100 MG: 100 CAPSULE ORAL at 08:09

## 2022-12-31 RX ADMIN — FAMOTIDINE 20 MG: 40 POWDER, FOR SUSPENSION ORAL at 08:10

## 2022-12-31 RX ADMIN — GUAIFENESIN 200 MG: 200 SOLUTION ORAL at 08:10

## 2022-12-31 RX ADMIN — OXYCODONE 5 MG: 5 TABLET ORAL at 12:59

## 2022-12-31 ASSESSMENT — PAIN SCALES - GENERAL
PAINLEVEL_OUTOF10: 9
PAINLEVEL_OUTOF10: 10
PAINLEVEL_OUTOF10: 3
PAINLEVEL_OUTOF10: 3
PAINLEVEL_OUTOF10: 2
PAINLEVEL_OUTOF10: 8

## 2022-12-31 ASSESSMENT — PAIN DESCRIPTION - DESCRIPTORS: DESCRIPTORS: BURNING

## 2022-12-31 ASSESSMENT — PAIN DESCRIPTION - LOCATION
LOCATION: THROAT
LOCATION: THROAT

## 2022-12-31 NOTE — PLAN OF CARE
Problem: Discharge Planning  Goal: Discharge to home or other facility with appropriate resources  12/31/2022 0052 by Amadeo Lange RN  Outcome: Progressing  12/30/2022 1706 by Caesar Pablo RN  Outcome: Progressing     Problem: Confusion  Goal: Confusion, delirium, dementia, or psychosis is improved or at baseline  Description: INTERVENTIONS:  1. Assess for possible contributors to thought disturbance, including medications, impaired vision or hearing, underlying metabolic abnormalities, dehydration, psychiatric diagnoses, and notify attending LIP  2. [place] high risk fall precautions, as indicated  3. Provide frequent short contacts to provide reality reorientation, refocusing and direction  4. Decrease environmental stimuli, including noise as appropriate  5. Monitor and intervene to maintain adequate nutrition, hydration, elimination, sleep and activity  6. If unable to ensure safety without constant attention obtain sitter and review sitter guidelines with assigned personnel  7. Initiate Psychosocial CNS and Spiritual Care consult, as indicated  12/31/2022 0052 by Amadeo Lange RN  Outcome: Progressing  12/30/2022 1706 by Caesar Pablo RN  Outcome: Progressing     Problem: Pain  Goal: Verbalizes/displays adequate comfort level or baseline comfort level  12/31/2022 0052 by Amadeo Lange RN  Outcome: Progressing  12/30/2022 1706 by Caesar Pablo RN  Outcome: Progressing     Problem: Chronic Conditions and Co-morbidities  Goal: Patient's chronic conditions and co-morbidity symptoms are monitored and maintained or improved  12/31/2022 0052 by Amadeo Lange RN  Outcome: Progressing  12/30/2022 1706 by Caesar Pablo RN  Outcome: Progressing     Problem: Skin/Tissue Integrity  Goal: Absence of new skin breakdown  Description: 1. Monitor for areas of redness and/or skin breakdown  2. Assess vascular access sites hourly  3. Every 4-6 hours minimum:  Change oxygen saturation probe site  4. Every 4-6 hours:  If on nasal continuous positive airway pressure, respiratory therapy assess nares and determine need for appliance change or resting period.   12/31/2022 0052 by Amadeo Lange RN  Outcome: Progressing  12/30/2022 1706 by Caesar Pablo RN  Outcome: Progressing     Problem: Safety - Adult  Goal: Free from fall injury  12/31/2022 0052 by Amadeo Lange RN  Outcome: Progressing  12/30/2022 1706 by Caesar Pablo RN  Outcome: Progressing     Problem: ABCDS Injury Assessment  Goal: Absence of physical injury  12/31/2022 0052 by Amadeo Lange RN  Outcome: Progressing  12/30/2022 1706 by Caesar Pablo RN  Outcome: Progressing     Problem: Nutrition Deficit:  Goal: Optimize nutritional status  12/31/2022 0052 by Amadeo Lange RN  Outcome: Progressing  12/30/2022 1706 by Caesar Pablo RN  Outcome: Progressing     Problem: Respiratory - Adult  Goal: Achieves optimal ventilation and oxygenation  12/31/2022 0052 by Amadeo Lange RN  Outcome: Progressing  12/30/2022 1706 by Caesar Pablo RN  Outcome: Progressing

## 2022-12-31 NOTE — CARE COORDINATION
Transitional planning    [facility] and spoke to [address] [place] [address]. They have a bed at [facility]. [place] [address] Address [address] [facility]. Report # [id number] to [place] with [facility], they have patient scheduled for  at 7pm to go to [facility]. CM gave following info from Judi Rodriguez "Miri" [address], [address] [place] [address]. Report # [id number] to patient and told her that she is set to go to [facility] this evening with  time 7pm. She verbalized understanding. She would like her step Mom notified. Marilysherly Mcclellan [facility] [phone] and left VM about discharge to [facility] as no number for her step Mom listed. [id number] Updated patient that a message was left with her Dad about transportation to [facility].

## 2022-12-31 NOTE — PROGRESS NOTES
[facility]  Internal Medicine Teaching Residency Program  Inpatient Daily Progress Note  ______________________________________________________________________________    Patient: William Tate  YOB: 1975   JRB:[id number]    Acct: [de-identified]     Room: 26/1-12  Admit date: 12/5/2022  Today's date: 12/31/22  Number of days in the hospital: 25    SUBJECTIVE   Admitting Diagnosis: Acute respiratory failure with hypoxia [facility])  CC: Fall leading to neck pain and back pain  Pt examined at bedside. Chart & results reviewed. 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 -ENT recommended transfer to [address] [place] [address] for management by laryngology and thoracic surgery  No acute events overnight. Patient's vitals are stable and she remains on 4 L nasal cannula which is her baseline. Feels well overall-no acute complaints. Denies chest pain, shortness of breath, leg pain or swelling. Stridor is the same-speaking in full sentences. BRIEF HISTORY     ICU COURSE:     82-year-old female initially presented to hospital on 12/5 after a fall. 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 Patient also has past medical history of diabetes mellitus, bilateral sensorineural hearing loss. Had a CT head showed no intracranial abnormality, CT abdominal pelvis showed  nondisplaced fractures of right sacral ilya, nondisplaced fracture of right superior ramus and right obturator ring. CT spine showed acute fracture of right C7 transverse process and also age indeterminate compressive fracture of T3.   Patient admitted under trauma surgery-neurosurgery for cervical fractures was consulted, who recommended no neurosurgical intervention. Orthopedic surgery for pelvic fractures was also consulted, no surgical intervention but medical management and also recommended to follow-up in orthopedic clinic after discharge. Patient was started on BiPAP for increased work of breathing. Patient was oriented x4 at day of presentation. 12/06 - patient had a decline in mental status with tachypnea, respiratory rate of 30s to 40 with heart rate of 105 patient was intubated emergently     12/7- Neurology was consulted because of history of migraines, seizure disorder, patient was started on her home antiepileptic drugs including Topamax and Lamictal, EEG was ordered to rule out any subclinical seizure activity, MRI was also ordered to rule out any CVA. Patient was doing fine, arousable and following commands. Pulmonology was consulted, recommended to extubate and patient was extubated. 12/10 -rapid was called today due to impending respiratory failure, patient was intubated again and was sent to ICU      12/12 -respiratory culture came back positive for staph aureus , nasal respiratory swab came out positive for MRSA DNA, patient started on vancomycin      12/13 : Tachycardia improved to normal heart rate, patient remained afebrile, WBC count trending down. Sedation changed to precedex     12/15: Precedex discontinued. 12/21: Patient was extubated. 12/23: Pt was a febrile overnight, saturating well on NC.  12/29/2022-further ENT work-up with CT chest to assess for tracheal obstruction status post multiple intubations. Patient improving. 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  -ENT recommended transfer to [facility] for management by laryngology and thoracic surgery    OBJECTIVE     Vital Signs:  BP 98/62   Pulse 57   Temp 96.9 °F (36.1 °C) (Temporal)   Resp 21   Ht 5' 1\" (1.549 m)   Wt 261 lb 11 oz (118.7 kg)   SpO2 93%   BMI 49.45 kg/m²     Temp (24hrs), Av.2 °F (36.8 °C), Min:96.9 °F (36.1 °C), Max:99.1 °F (37.3 °C)    In: 1008.9   Out: 1350 [Urine:1350]    Physical Exam:  General appearance - alert, in no distress  Mental status - alert, oriented to person, place, and time  Eyes - pupils equal and reactive, extraocular eye movements intact. Some hearing loss.   Mouth - mucous membranes moist, pharynx normal without lesions  Neck - supple, no significant adenopathy  Chest - clear to auscultation, no wheezes  Heart - normal rate, regular rhythm, normal S1, S2  Abdomen - soft, nontender, nondistended  Neurological - alert, oriented, normal speech, no focal deficits   Extremities - peripheral pulses normal, no pedal edema  Skin - normal coloration and turgor, no rashes     Medications:  Scheduled Medications:    dexamethasone  10 mg IntraVENous Q8H    fluticasone  1 puff Inhalation BID    famotidine  20 mg Oral BID    furosemide  20 mg Oral Daily    guaiFENesin  200 mg Oral BID    miconazole   Topical BID    ARIPiprazole  10 mg Oral Daily    gabapentin  100 mg Oral TID    hydrocortisone  10 mg Oral Nightly    hydrocortisone  15 mg Oral QAM    enoxaparin  30 mg SubCUTAneous BID    sertraline  150 mg Oral Daily    lamoTRIgine  200 mg Oral Daily    levothyroxine  150 mcg Oral QAM AC    topiramate  75 mg Oral Daily    topiramate  100 mg Oral Nightly     Continuous Infusions:    sodium chloride 50 mL/hr at 22    sodium chloride Stopped (226)     PRN MedicationsoxyCODONE, 10 mg, Q12H PRN   Or  oxyCODONE, 5 mg, Q4H PRN  racepinephrine HCl, 11.25 mg, Q4H PRN  sodium chloride nebulizer, 3 mL, Q4H PRN  polyethylene glycol, 17 g, Daily PRN  levalbuterol, 0.63 mg, Q4H PRN  sodium chloride, , PRN  acetaminophen, 650 mg, Q6H PRN  sodium chloride flush, 5-40 mL, PRN      Diagnostic Labs:  CBC:   Recent Labs     12/29/22  0639 12/31/22  0602   WBC 5.6 9.4   RBC 4.81 4.51   HGB 13.5 12.6   HCT 42.8 38.8   MCV 89.0 86.0   RDW 13.9 13.6    209       BMP:   Recent Labs     12/29/22  0639 12/31/22  0602    135   K 3.7 3.8    102   CO2 29 22   BUN 11 12   CREATININE 0.84 0.68       BNP: No results for input(s): BNP in the last 72 hours. PT/INR: No results for input(s): PROTIME, INR in the last 72 hours. APTT: No results for input(s): APTT in the last 72 hours. CARDIAC ENZYMES: No results for input(s): CKMB, CKMBINDEX, TROPONINI in the last 72 hours. Invalid input(s): CKTOTAL;3  FASTING LIPID PANEL:  Lab Results   Component Value Date    CHOL 147 03/01/2020    HDL 46 03/01/2020    TRIG 77 03/01/2020     LIVER PROFILE: No results for input(s): AST, ALT, ALB, BILIDIR, BILITOT, ALKPHOS in the last 72 hours. MICROBIOLOGY:   Lab Results   Component Value Date/Time    CULTURE NO SIGNIFICANT GROWTH 12/15/2022 02:10 PM       Imaging:    XR CHEST PORTABLE    Result Date: 12/23/2022  1. Enlargement of the cardiac silhouette with vascular congestion, though improved focal infiltrate of the right upper lobe. XR CHEST PORTABLE    Result Date: 12/18/2022  No change from prior study. Tubes and lines as above. Endotracheal tube is somewhat low lying but unchanged. FL MODIFIED BARIUM SWALLOW W VIDEO    Result Date: 12/22/2022  1. Penetration followed by aspiration without coughing with the nectar thick and honey thick substances. 2. No penetration or aspiration with the pureed/pudding thick or soft solid substances. 3. Cookie solid substance was not attempted. Please see separate speech pathology report for full discussion of findings and recommendations.        ASSESSMENT & PLAN     Assessment:  Principal Problem:    Cervical transverse process fracture, initial encounter [facility])  Active Problems:    Closed nondisplaced fracture of seventh cervical vertebra ([facility]    Lethargy    Multiple closed pelvic fractures without disruption of pelvic [place] (HCC)    Aspiration pneumonia (HCC)    Acute respiratory failure with hypoxia (HCC)    MRSA infection    Acute lower respiratory tract infection    Seizure ([facility]    Hypothyroidism    Asthma    Hypopituitarism ([facility]  Resolved Problems:    * No resolved hospital problems. *     Plan:    Stridor  - CT scan showed severe stenosis in the mid trachea. 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 -ENT recommended transfer to [facility] for management by laryngology and thoracic surgery. Has been accepted and bed is available. pending transport. Meanwhile if respiratory status worsens patient will need to be intubated with ETT 5.0 by ENT/anesthesia. - 3 doses of Decadron 10 mg post laryngoscopy. -Flovent     #Acute hypoxic respiratory failure, improved  -Likely secondary to pneumonia-completed vancomycin for MRSA pneumonia 12/22  -Xopenex, Robitussin, racemic epinephrine, sodium chloride nebulizer as needed  -On nasal cannula during the day with CPAP in the night. Baseline 4 L nasal cannula  -Maintain oxygen saturation above 92%. Continue to wean down as tolerated. #Sinus bradycardia - asymptomatic  -Likely due to sleep apnea. -Thyroxine on admission was within normal limits. #History of seizures  EEG this admission-moderate nonspecific encephalopathy-no epileptic discharges. MRI could not be done due to cochlear implants.  -Lamotrigine level was low-neurology recommended continuing the same dose of lamotrigine as there is no concern of seizures. We will check lamotrigine again.  - Continue home lamotrigine 200 mg daily, topiramate 100 mg nightly, 75 mg in the morning.      C7 transverse process fracture and T3 chronic compression fracture-neurosurgery was consulted-recommended no intervention     Right sacral ilya fracture, right superior pubic ramus fracture-orthopedic was consulted-recommended nonoperative treatment with close clinic follow-up in 2 weeks after discharge as there is minimal chance of displacement and recommended weightbearing as tolerated. #Hypothyroidism  -Continue home levothyroxine 150 mcg     Hypopituitarism  -Continue hydrocortisone 10 mg in the night and 15 mg in the morning. Home dose-10 mg twice daily. Hypokalemia - resolved    Diet-dysphagia puréed with mildly thick fluids    Due to prophylaxis - Lovenox  GI prophylaxis - Protonix     PT/OT-has been working   - on board - we will follow-up. Morris Schaffer MD  Internal Medicine Resident, PGY-1  [facility]; [facility], [facility]  12/31/2022, 8:42 AM    Attestation and add on       I have discussed the care of Les Carr , including pertinent history and exam findings,      12/31/22    with the resident. I have seen and examined the patient and the key elements of all parts of the encounter have been performed by me . I agree with the assessment, plan and orders as documented by the resident. MD HAYDER [facility]  [address] [place] [address].    Phone [phone]   Fax: [phone]  Answering Service: [phone]

## 2022-12-31 NOTE — PROGRESS NOTES
PULMONARY & CRITICAL CARE MEDICINE PROGRESS  NOTE     Patient:  Rubia Oates  MRN: [id number]  Admit date: 12/5/2022  Primary Care Physician: Esha Gómez DO  Consulting Physician: Lance Blair MD  CODE Status: Full Code  LOS: 25    SUBJECTIVE     I personally interviewed/examined the patient, reviewed interval history and interpreted all available radiographic, laboratory data at the time of service. Chief Compliant/Reason for Initial Consult:       Brief Hospital Course: The patient is a 41 y.o. female history of diabetes melitis, developmental delay, history of brain tumor and as a child radiation, history of sensory neuronal hearing loss history of hypopituitarism on chronic hydrocortisone and Synthroid. Initially presented to hospital with a fall with multiple nondisplaced fracture/pelvic fracture and C2 transverse fracture and sacral ala fracture neurosurgery and orthopedic has seen the patient patient had altered mental status decline few days after presentation was tachypneic hypoxic and was intubated and was on ventilator patient was extubated on 12/7/2022 and again was reintubated on 12/10/2022 and had a prolonged stay in the hospital/in ICU with altered mental status lethargy decreased mentation sedation was discontinued mentation started improving after many days of sedation discontinued and ultimately she was extubated on 12/22/2022 post extubation she had stridor patient was treated with heliox humidified O2 and ultimately was transferred out of ICU to stepdown unit on 12/23/2022. Interval History:  12/31/22  Persistent stridor    Review of Systems:  Review of Systems   Constitutional:  Positive for fatigue. Respiratory:  Positive for cough, shortness of breath and stridor. Cardiovascular: Negative. Gastrointestinal: Negative. Neurological: Negative.       OBJECTIVE     VITAL SIGNS:   LAST-  /79   Pulse 59 Temp 97.4 °F (36.3 °C) (Temporal)   Resp 20   Ht 5' 1\" (1.549 m)   Wt 261 lb 11 oz (118.7 kg)   SpO2 97%   BMI 49.45 kg/m²   8-24 HR RANGE-  TEMP Temp  Av.7 °F (36.5 °C)  Min: 96.9 °F (36.1 °C)  Max: 98.5 °F (80.5 °C)   BP Systolic (90UIN), MZK:315 , Min:98 , WQU:096      Diastolic (97HZW), EVH:72, Min:54, Max:79     PULSE Pulse  Av.6  Min: 54  Max: 80   RR Resp  Av.5  Min: 20  Max: 25   O2 SAT SpO2  Av %  Min: 93 %  Max: 97 %   OXYGEN DELIVERY O2 Flow Rate (L/min)  Av L/min  Min: 4 L/min  Max: 4 L/min     Systemic Examination:   Physical Exam  General appearance - looks comfortable and in no acute distress mild tachypnea present  Mental status - alert, oriented to person, place, and time  Eyes - pupils equal and reactive, extraocular eye movements intact  Mouth - mucous membranes moist, pharynx normal without lesions. Small oropharynx Mallampati 3  Neck - supple, no significant adenopathy. Short thick neck.   , stridor   Chest -decreased breath sounds in bilateral bases anterior rhonchi are heard  Heart - normal rate, regular rhythm, normal S1, S2, no murmurs, rubs, clicks or gallops  Abdomen - soft, nontender, nondistended, no masses or organomegaly  Neurological - alert, oriented, normal speech, no focal findings or movement disorder noted  Extremities - peripheral pulses normal, positive pedal edema, no clubbing or cyanosis  Skin - normal coloration and turgor, no rashes, no suspicious skin lesions noted     DATA REVIEW     Medications:  Scheduled Meds:   fluticasone  1 puff Inhalation BID    famotidine  20 mg Oral BID    furosemide  20 mg Oral Daily    guaiFENesin  200 mg Oral BID    miconazole   Topical BID    ARIPiprazole  10 mg Oral Daily    gabapentin  100 mg Oral TID    hydrocortisone  10 mg Oral Nightly    hydrocortisone  15 mg Oral QAM    enoxaparin  30 mg SubCUTAneous BID    sertraline  150 mg Oral Daily    lamoTRIgine  200 mg Oral Daily    levothyroxine  150 mcg Oral QAM AC    topiramate  75 mg Oral Daily    topiramate  100 mg Oral Nightly     Continuous Infusions:   sodium chloride 50 mL/hr at 12/30/22 1919    sodium chloride Stopped (12/12/22 0316)     LABS:-  ABG:   No results for input(s): POCPH, POCPCO2, POCPO2, POCHCO3, QWUV8UFE in the last 72 hours. CBC:   Recent Labs     12/29/22  0639 12/31/22  0602   WBC 5.6 9.4   HGB 13.5 12.6   HCT 42.8 38.8   MCV 89.0 86.0    209   LYMPHOPCT 27 6*   RBC 4.81 4.51   MCH 28.1 27.9   MCHC 31.5 32.5   RDW 13.9 13.6       BMP:   Recent Labs     12/29/22  0639 12/31/22  0602    135   K 3.7 3.8    102   CO2 29 22   BUN 11 12   CREATININE 0.84 0.68   GLUCOSE 85 133*       Liver Function Test:   No results for input(s): PROT, LABALBU, ALT, AST, GGT, ALKPHOS, BILITOT in the last 72 hours. Amylase/Lipase:  No results for input(s): AMYLASE, LIPASE in the last 72 hours. Coagulation Profile:   No results for input(s): INR, PROTIME, APTT in the last 72 hours. Cardiac Enzymes:  No results for input(s): CKTOTAL, CKMB, CKMBINDEX, TROPONINI in the last 72 hours.   Lactic Acid:  No results found for: LACTA  BNP:   No results found for: BNP  D-Dimer:  Lab Results   Component Value Date    DDIMER 0.33 07/15/2020     Others:   Lab Results   Component Value Date    TSH <0.01 (L) 12/07/2022    FT3 2.45 10/16/2019     Lab Results   Component Value Date    JAYY NEGATIVE 10/23/2013    SEDRATE 20 08/21/2018    CRP 4.4 08/21/2018     Lab Results   Component Value Date    URICACID 5.2 05/24/2018     No results found for: IRON, TIBC, FERRITIN  No results found for: SPEP, UPEP  No results found for: PSA, CEA, , RP1727,     Input/Output:    Intake/Output Summary (Last 24 hours) at 12/31/2022 1542  Last data filed at 12/31/2022 0701  Gross per 24 hour   Intake 633.89 ml   Output 1350 ml   Net -716.11 ml         Microbiology:  No results for input(s): SPECDESC, SPECDESC, SPECIAL, CULTURE, CULTURE, STATUS, ORG, CDIFFTOXPCR, CAMPYLOBPCR, SALMONELLAPC, SHIGAPCR, SHIGELLAPCR, MPNEUG, MPNEUM, LACTOQL in the last 72 hours. Pathology:    Radiology reports:  FL MODIFIED BARIUM SWALLOW W VIDEO   Final Result   Aspiration of thin liquid. Laryngeal penetration of thick liquid by teaspoon. Please see separate speech pathology report for full discussion of findings   and recommendations. CT CHEST WO CONTRAST   Final Result   Severe focus of narrowing within the mid trachea. Bilateral effusion with adjacent consolidation representing atelectasis   versus pneumonia. XR CHEST PORTABLE   Final Result   Cardiomegaly with perihilar congestive changes and increased perihilar   opacities, particularly on the right, which may be due to edema although   superimposed infection should be excluded clinically. Possible small right   pleural effusion. XR CHEST PORTABLE   Final Result   1. Enlargement of the cardiac silhouette with vascular congestion, though   improved focal infiltrate of the right upper lobe. FL MODIFIED BARIUM SWALLOW W VIDEO   Final Result   1. Penetration followed by aspiration without coughing with the nectar thick   and honey thick substances. 2. No penetration or aspiration with the pureed/pudding thick or soft solid   substances. 3. Cookie solid substance was not attempted. Please see separate speech pathology report for full discussion of findings   and recommendations. XR CHEST PORTABLE   Final Result   No change from prior study. Tubes and lines as above. Endotracheal tube is   somewhat low lying but unchanged. XR CHEST PORTABLE   Final Result   Similar appearance of mild bilateral airspace disease. This appears   consistent with pneumonia         XR CHEST PORTABLE   Final Result   1. No significant change in bilateral airspace disease. 2.  Endotracheal tube terminates above the manny. 3.  Enteric tube terminates at the level of the body of the stomach. XR ABDOMEN FOR NG/OG/NE TUBE PLACEMENT   Final Result   1. No significant change in bilateral airspace disease. 2.  Endotracheal tube terminates above the manny. 3.  Enteric tube terminates at the level of the body of the stomach. XR CHEST PORTABLE   Final Result   No significant interval change. Patchy perihilar airspace opacities. XR PELVIS (MIN 3 VIEWS)   Final Result   No acute abnormality of the pelvis. Known right superior ramus fracture is not   well seen radiographically. XR CHEST PORTABLE   Final Result   Stable bilateral airspace opacities. This could represent multifocal   pneumonia, however, edema is not fully excluded         XR CHEST PORTABLE   Final Result   Persistent bilateral airspace opacities suggesting multifocal pneumonia         XR CHEST PORTABLE   Final Result   Chest:      1. Tubes as detailed above. 2. Bilateral parahilar and lower zone predominant airspace disease. 3. Stable cardiomegaly. KUB:      1. NG tube traverses the GE junction with distal tip likely in the body of   the stomach. 2. Nonspecific bowel gas pattern. XR ABDOMEN FOR NG/OG/NE TUBE PLACEMENT   Final Result   Chest:      1. Tubes as detailed above. 2. Bilateral parahilar and lower zone predominant airspace disease. 3. Stable cardiomegaly. KUB:      1. NG tube traverses the GE junction with distal tip likely in the body of   the stomach. 2. Nonspecific bowel gas pattern. XR CHEST PORTABLE   Final Result   Findings suggest congestive heart failure         CT HEAD WO CONTRAST   Final Result   No acute intracranial abnormality. No evidence of intracranial hemorrhage or any other definable acute   intracranial abnormality. Redemonstration of dense calcifications in the bilateral lentiform nuclei,   bilateral caudate nuclei and bilateral thalami similar to findings on   previous CT scan in 2010.       There are bilateral cochlear implants redemonstrated. No demonstrable fracture in calvarium or in base of skull. CT CHEST PULMONARY EMBOLISM W CONTRAST   Final Result   No evidence of pulmonary embolism. No evidence of thoracic aortic aneurysm or dissection. There are mild atelectatic changes, and/or infiltrate in the posterior lung   bases bilaterally, left more than right. Trace left basilar pleural effusion. Focal moderate dense infiltrates, and/or atelectasis in the posterior segment   of right pulmonary upper lobe. No subphrenic acute process demonstrated. XR CHEST PORTABLE   Final Result   Lower end of the ET tube is 1.6 cm above manny. Mild-to-moderate pulmonary vascular congestion. Finding suggestive of   interstitial pulmonary edema in lungs. XR CHEST PORTABLE   Final Result   Placement of gastric tube which extends to the distal aspect of the stomach. Cardiomegaly and vascular congestion without evidence of interstitial edema. No confluent airspace consolidation. XR ABDOMEN FOR NG/OG/NE TUBE PLACEMENT   Final Result   Gastric tube in place with the tip and side-port in the stomach. XR CHEST PORTABLE   Final Result   Very limited study due to underpenetration. Increased opacity in the lungs which could be related to under penetration   and overlying soft tissues, but diffuse airspace infiltrates cannot be   excluded. Cardiomegaly without evidence of CHF. XR HUMERUS RIGHT (MIN 2 VIEWS)   Final Result   No acute osseous abnormality in the right humerus. CTA NECK W CONTRAST   Final Result   No acute trauma of the major arterial vessels of the neck. CT THORACIC SPINE TRAUMA RECONSTRUCTION   Final Result   10-20% compression deformity of the T3 vertebra, possibly acute. No other   evidence of acute thoracic or lumbar spine fracture. Multilevel degenerative   change.          CT LUMBAR SPINE TRAUMA RECONSTRUCTION   Final Result 10-20% compression deformity of the T3 vertebra, possibly acute. No other   evidence of acute thoracic or lumbar spine fracture. Multilevel degenerative   change. XR PELVIS (MIN 3 VIEWS)   Final Result   1. Nondisplaced fracture involving the lateral aspect of the right superior   pubic ramus, though better seen on CT imaging. 2. No other radiographically evident acute fracture seen in the pelvis. XR SHOULDER RIGHT (MIN 2 VIEWS)   Final Result   1. No acute fracture seen in the right shoulder. 2. No acute fracture seen in the left shoulder. XR SHOULDER LEFT (MIN 2 VIEWS)   Final Result   1. No acute fracture seen in the right shoulder. 2. No acute fracture seen in the left shoulder. Echocardiogram:   No results found for this or any previous visit. Cardiac Catheterization:   No results found for this or any previous visit. 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 to Ana Ghosh MD, M.D. Pulmonary and Critical Care Medicine           12/31/2022, 3:42 PM    Please note that this chart was generated using voice recognition Dragon dictation software. Although every effort was made to ensure the accuracy of this automated transcription, some errors in transcription may have occurred.

## 2022-12-31 NOTE — PROGRESS NOTES
ENT/OTOLARYNGOLOGY SUBSEQUENT CARE PROGRESS NOTE     REASON FOR CARE: f/u     HISTORY OF PRESENT ILLNESS:   Nasrin Yuen is a 41 y.o. who is being seen for follow-up of her airway. On 4 L/min O2 overnight, stable from preop. Still with stridor. S/p DL/B yesterday with multilevel airway stenosis with mid-tracheal firm stenosis limited passage of 6.0 Bronchoscope at level of innominate as well as glottic stenosis with denuded cricoid cartilage left and posterior with fixation vs subglottic scarring at left posterior cord. Friable mucosa with mucosal oozing noted during surgery as well. Had a CT chest 12/29 showing concern for mid-tracheal stenosis/narrowing c/w innominate compression noted at bronch    Pertinent Examination:   GENERAL: well developed and well nourished and in no acute distress, overweight  HEAD: normocephalic and atraumatic  EYES: no eyelid swelling, no conjunctival injection or exudate, pupils equal round and reactive to light  EXTERNAL EARS: normal  EAR EXAM: deferred  NOSE: nares patent, normal mucosa  MOUTH/THROAT: mucous membranes moist, no focal lesions, no tonsillar enlargement or exudate  NECK: non-tender, full range of motion, no mass, no focal lymphadenopathy, palpable landmarks. RESPIRATORY: still with inspiratory stridor, nasal cannula in place. No retractions  NEUROLOGICAL:  cranial nerves II-XII are grossly intact     RELEVANT LABS/STUDIES:   Additional data reviewed:    None    Procedures:    None    Surgical risk factors:  None     IMPRESSION AND RECOMMENDATIONS:   Nasrin Yuen is a 41 y.o. female with inspiratory stridor, prolonged intubation with multilevel airway stenosis    Plan:  Await transfer to [facility] for laryngology evaluation and management of multilevel airway stenosis.  Tentative transport this evening at 20:00   Complete 24 hr IV steroids  Continue flovent    If work of breathing increases, would progress to BiPAP and notify ENT/Anesthesia for consideration for intubation with 5.0 ETT to secure airway given mid-tracheal stenosis.      --------------------------------------------------  Ashley Youngblood MD  Pediatric Otolaryngology-Head and Neck Surgery  [address] [place] [address] Otolaryngology group    Office  ph# [phone]    Also available in [facility]

## 2022-12-31 NOTE — PLAN OF CARE
Problem: Discharge Planning  Goal: Discharge to home or other facility with appropriate resources  Outcome: Progressing     Problem: Confusion  Goal: Confusion, delirium, dementia, or psychosis is improved or at baseline  Description: INTERVENTIONS:  1. Assess for possible contributors to thought disturbance, including medications, impaired vision or hearing, underlying metabolic abnormalities, dehydration, psychiatric diagnoses, and notify attending LIP  2. [place] high risk fall precautions, as indicated  3. Provide frequent short contacts to provide reality reorientation, refocusing and direction  4. Decrease environmental stimuli, including noise as appropriate  5. Monitor and intervene to maintain adequate nutrition, hydration, elimination, sleep and activity  6. If unable to ensure safety without constant attention obtain sitter and review sitter guidelines with assigned personnel  7. Initiate Psychosocial CNS and Spiritual Care consult, as indicated  Outcome: Progressing  Flowsheets (Taken 12/31/2022 0830)  Effect of thought disturbance (confusion, delirium, dementia, or psychosis) are managed with adequate functional status: Assess for contributors to thought disturbance, including medications, impaired vision or hearing, underlying metabolic abnormalities, dehydration, psychiatric diagnoses, notify LIP     Problem: Pain  Goal: Verbalizes/displays adequate comfort level or baseline comfort level  Outcome: Progressing     Problem: Chronic Conditions and Co-morbidities  Goal: Patient's chronic conditions and co-morbidity symptoms are monitored and maintained or improved  Outcome: Progressing     Problem: Skin/Tissue Integrity  Goal: Absence of new skin breakdown  Description: 1. Monitor for areas of redness and/or skin breakdown  2. Assess vascular access sites hourly  3. Every 4-6 hours minimum:  Change oxygen saturation probe site  4.   Every 4-6 hours:  If on nasal continuous positive airway pressure, respiratory therapy assess nares and determine need for appliance change or resting period.   Outcome: Progressing

## 2022-12-31 NOTE — PROGRESS NOTES
[facility] called saying theres a bed in [address] [place] [address], [address] [place] [address].    To give report to [phone] before transport. Called [facility], they said the earliest time the can provide is 7pm but will still depend on the dispatch in the morning.

## 2023-01-01 NOTE — DISCHARGE SUMMARY
[address]     Department of Internal Medicine - Staff Internal Medicine Teaching Service    INPATIENT DISCHARGE SUMMARY      Patient Identification:  Catrachita Marmolejo is a 41 y.o. female. :  1975  MRN: [id number]     Acct: [de-identified]   PCP: Lauren Ledezma DO  Admit Date:  2022  Discharge date and time: 2022  7:14 PM   Attending Provider: Dr. Sachin Hayden MD.                                    [facility] Problem Lists:  Principal Problem:    Acute respiratory failure with hypoxia [facility])  Active Problems:    Cervical transverse process fracture, initial encounter [facility])    Closed nondisplaced fracture of seventh cervical vertebra (HCC)    Lethargy    Multiple closed pelvic fractures without disruption of pelvic [place] (HCC)    Aspiration pneumonia (HCC)    Pneumonia of left lower lobe due to methicillin-resistant Staphylococcus aureus (MRSA) ([facility] [address]    Acute lower respiratory tract infection    (HFpEF) heart failure with preserved ejection fraction (HCC)    Stridor    Acquired subglottic stenosis    Paresis of left vocal cord    Tracheal stenosis    Innominate artery compression syndrome (HCC)    Seizure (HCC)    Hypothyroidism    Asthma    Morbid obesity ([address]    ROHIT on CPAP    Hypopituitarism ([address]  Resolved Problems:    * No resolved hospital problems. *      HOSPITAL STAY     ICU COURSE:     82-year-old female initially presented to hospital on  after a fall. 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 Patient also has past medical history of diabetes mellitus, bilateral sensorineural hearing loss. Had a CT head showed no intracranial abnormality, CT abdominal pelvis showed  nondisplaced fractures of right sacral ilya, nondisplaced fracture of right superior ramus and right obturator ring.   CT spine showed acute fracture of right C7 transverse process and also age indeterminate compressive fracture of T3. Patient admitted under trauma surgery-neurosurgery for cervical fractures was consulted, who recommended no neurosurgical intervention. Orthopedic surgery for pelvic fractures was also consulted, no surgical intervention but medical management and also recommended to follow-up in orthopedic clinic after discharge. Patient was started on BiPAP for increased work of breathing. Patient was oriented x4 at day of presentation. 12/06 - patient had a decline in mental status with tachypnea, respiratory rate of 30s to 40 with heart rate of 105 patient was intubated emergently     12/7- Neurology was consulted because of history of migraines, seizure disorder, patient was started on her home antiepileptic drugs including Topamax and Lamictal, EEG was ordered to rule out any subclinical seizure activity, MRI was also ordered to rule out any CVA. Patient was doing fine, arousable and following commands. Pulmonology was consulted, recommended to extubate and patient was extubated. 12/10 -rapid was called today due to impending respiratory failure, patient was intubated again and was sent to ICU      12/12 -respiratory culture came back positive for staph aureus , nasal respiratory swab came out positive for MRSA DNA, patient started on vancomycin      12/13 : Tachycardia improved to normal heart rate, patient remained afebrile, WBC count trending down. Sedation changed to precedex     12/15: Precedex discontinued. 12/21: Patient was extubated. 12/23: Pt was a febrile overnight, saturating well on NC.  12/29/2022-further ENT work-up with CT chest to assess for tracheal obstruction status post multiple intubations. Patient improving.   Repeat speech evaluation to try to advance diet     12/30 - direct laryngoscopy and rigid bronchoscopy which showed left >right posterior subglottic abrasion with exposed cartilage, friable airway mucosa, tracheal stenosis at innominate artery-no intervention was done as it was high risk. -ENT recommended transfer to [facility] for management by laryngology and thoracic surgery. Procedures/ Significant Interventions:    12/30 - direct laryngoscopy and rigid bronchoscopy which showed left >right posterior subglottic abrasion with exposed cartilage, friable airway mucosa, tracheal stenosis at innominate artery-no intervention was done as it was high risk. Consults:     Consults:     Final Specialist Recommendations/Findings:   IP CONSULT TO TRAUMA SURGERY  IP CONSULT TO NEUROSURGERY  IP CONSULT TO ORTHOPEDIC SURGERY  IP CONSULT TO DIETITIAN  IP CONSULT TO NEUROLOGY  IP CONSULT TO PHYSICAL MEDICINE REHAB  IP CONSULT TO CRITICAL CARE  IP CONSULT TO DIETITIAN  IP CONSULT TO INFECTIOUS DISEASES  IP CONSULT TO IV TEAM  IP CONSULT TO IV TEAM  IP CONSULT TO IV TEAM  IP CONSULT TO IV TEAM  IP CONSULT TO NEUROLOGY  IP CONSULT TO OTOLARYNGOLOGY      Any Hospital Acquired Infections: none    Discharge Functional Status:  stable    DISCHARGE PLAN     Disposition: Transfer to [facility] for higher level of care    Patient Instructions:   Discharge Medication List as of 12/31/2022  7:16 PM        START taking these medications    Details   furosemide (LASIX) 20 MG tablet Take 1 tablet by mouth daily, Disp-30 tablet, R-0Normal      fluticasone (FLOVENT HFA) 110 MCG/ACT inhaler Inhale 1 puff into the lungs in the morning and 1 puff in the evening., Disp-12 g, R-3Normal           CONTINUE these medications which have CHANGED    Details   gabapentin (NEURONTIN) 100 MG capsule Take 1 capsule by mouth 3 times daily for 90 doses. , Disp-90 capsule, R-1Normal      lamoTRIgine (LAMICTAL) 200 MG tablet Take 1 tablet by mouth daily, Disp-30 tablet, R-3Normal      !! topiramate (TOPAMAX) 50 MG tablet Take 1.5 tablets by mouth daily, Disp-60 tablet, R-3Normal      !! topiramate (TOPAMAX) 100 MG tablet Take 1 tablet by mouth nightly, Disp-60 tablet, R-3Normal       !! - Potential duplicate medications found. Please discuss with provider.         CONTINUE these medications which have NOT CHANGED    Details   SUMAtriptan (IMITREX) 100 MG tablet Take 100 mg by mouth once as needed for Migraine 1.5 tabs qam, 2 tabs qpmHistorical Med      hydrocortisone (CORTEF) 10 MG tablet Take 10 mg by mouth 2 times dailyHistorical Med      KLOR-CON M10 10 MEQ extended release tablet TAKE 1 TABLET BY MOUTH TWICE A DAY, Disp-180 tablet, R-1Normal      metFORMIN (GLUCOPHAGE) 500 MG tablet TAKE 1 TABLET BY MOUTH TWICE A DAY WITH MEALS, Disp-180 tablet, R-0Normal      [facility]28[facility], Disp-1 each, R-0, PrintEXPIRES IN 5 YEARS FROM ORIGINAL SCRIPT DATE      buPROPion (WELLBUTRIN XL) 150 MG extended release tablet Take 300 mg by mouth daily Historical Med      ARIPiprazole (ABILIFY) 20 MG tablet Take 1 tablet by mouth dailyHistorical Med      levothyroxine (SYNTHROID) 150 MCG tablet Take 1 tablet by mouth every morning (before breakfast)Historical Med      albuterol sulfate  (90 Base) MCG/ACT inhaler INHALE 2 PUFFS BY MOUTH EVERY 6 HOURS AS NEEDED FOR WHEEZING, Disp-8.5 Inhaler,R-0Normal      Calcium-Magnesium-Vitamin D (CALCIUM 1200+D3 PO) Take 2 tablets by mouth every morningHistorical Med      sertraline (ZOLOFT) 100 MG tablet Take 150 mg by mouth daily Historical Med      Ergocalciferol (VITAMIN D2 PO)   Take 1.25 mg by mouth daily Historical Med           STOP taking these medications       Melatonin ER 5 MG TBCR Comments:   Reason for Stopping:         [employer] SENNA 8.6 MG tablet Comments:   Reason for Stopping:         meloxicam (MOBIC) 15 MG tablet Comments:   Reason for Stopping:         nystatin (MYCOSTATIN) 916970 UNIT/GM cream Comments:   Reason for Stopping:         indomethacin (INDOCIN) 25 MG capsule Comments:   Reason for Stopping:         EPINEPHrine (EPIPEN 2-MINNIE) 0.3 MG/0.3ML SOAJ injection Comments:   Reason for Stopping: LORazepam (ATIVAN) 1 MG tablet Comments:   Reason for Stopping:               Activity: activity as tolerated    Diet: regular diet-dysphagia puréed with mildly thick liquids    Follow-up:    Tiara John, DO  [address] [facility] [address]  [phone]    Follow up in 1 week(s)      Kaitlin Boudreaux DO  [address]  [facility] [address] [facility]  [phone]    Schedule an appointment as soon as possible for a visit on 12/12/2022  follow up 7 days after injury    Stefanigeo Gail, [address] [facility] [address]  [phone]    Follow up  As needed    Nathan Melendez MD  [address] [facility] [address] [facility]  [phone]    Follow up in 1 month(s)  Follow-up for seizures and management of seizure medications    Abena Abreu MD  [address] [facility]  [phone]    Follow up in 2 week(s)        Patient Instructions:   Orthopaedic Instructions:  -Weight bearing status: Weight bearing as tolerated with the right leg  -Always work on toe motion to decrease swelling.  -Ice (20 minutes on and off 1 hour) to reduce swelling and throbbing pain.  -Call the office or come to Emergency Room if signs of infection appear (hot, swollen, red, draining pus, fever)  -Take medications as prescribed.  -Wean off narcotics (percocet/norco) as soon as possible. Do not take tylenol if still taking narcotics.  -Follow up with Dr. Alyssa Palafox in his office 4 weeks (week off 1/11/23) after injury. Call [phone]  to schedule/confirm. Vimal Carson MD  PGY-1 Resident Physician  Orthopaedic [address]  12/7/2022 at 5:36 AM     Discharge Instructions for Trauma         What to do after you leave the hospital:  General questions or concerns may be called to the trauma nurse line at [phone] and please leave a message. Trauma is a life-threatening condition. Your doctor will want to closely monitor you.  Be sure to go to all of your appointments. Please continue to use your Incentive Spirometer as directed. You can practice 10 deep breaths/hour while awake. Using the [address] will promote the health of your lungs by taking slow, deep breaths in. It is also important in preventing pneumonia or a pneumothorax from developing. Discharge instructions for internal medicine  Please take your medications as prescribed. Please visit your PCP within 1 week. Please visit neurosurgery as needed for cervical vertebral fracture. Please visit neurology for management of seizure medications. Please visit pulmonology for respiratory failure requiring oxygen and follow-up for aspiration MRSA pneumonia  Please come to the ED if you have any worsening of symptoms. Follow up labs: None  Follow up imaging: None    Note that over 30 minutes was spent in preparing discharge papers, discussing discharge with patient, medication review, etc.      Carly Campos MD,   Internal Medicine Resident, PGY-1  [address] [place], [place]  1/1/2023, 11:28 AM

## 2023-01-27 ENCOUNTER — HOSPITAL ENCOUNTER (OUTPATIENT)
Dept: MEDSURG UNIT | Age: 48
Discharge: HOME | End: 2023-01-27
Attending: INTERNAL MEDICINE | Admitting: INTERNAL MEDICINE

## 2023-02-02 NOTE — ED PROVIDER NOTES
List as of 7/15/2020  6:18 PM      CONTINUE these medications which have NOT CHANGED    Details   KLOR-CON M10 10 MEQ extended release tablet TAKE 1 TABLET BY MOUTH TWICE A DAY, Disp-180 tablet,R-1Normal      albuterol sulfate  (90 Base) MCG/ACT inhaler INHALE 2 PUFFS BY MOUTH EVERY 6 HOURS AS NEEDED FOR WHEEZING, Disp-8.5 Inhaler,R-0Normal      EPINEPHrine (EPIPEN 2-MINNIE) 0.3 MG/0.3ML SOAJ injection Inject 0.3 mLs into the muscle once for 1 dose Use as directed for allergic reaction, Disp-0.3 mL, R-0Normal      nystatin (MYCOSTATIN) 425495 UNIT/GM powder Apply 3 times daily. , Disp-45 g, R-0, Normal      metFORMIN (GLUCOPHAGE) 500 MG tablet TAKE 1 TABLET BY MOUTH TWICE A DAY WITH MEALS, Disp-180 tablet, R-0Normal      topiramate (TOPAMAX) 50 MG tablet Take 1 1/2 tablets in the morning and 2 tablets at night., Disp-315 tablet, R-1Normal      lamoTRIgine (LAMICTAL) 150 MG tablet Take 200 mg by mouth daily Pt Is Now [facility]      Calcium-Magnesium-Vitamin D (CALCIUM 1200+D3 PO) Take 2 tablets by mouth every morningHistorical Med      ARIPiprazole (ABILIFY) 15 MG tablet TAKE 1 TABLET BY MOUTH EVERY DAY, R-0      LORazepam (ATIVAN) 1 MG tablet 1 mg daily as needed. , R-0Historical Med      buPROPion (WELLBUTRIN XL) 300 MG XL tablet Take 150 mg by mouth daily Historical Med      levothyroxine (SYNTHROID) 112 MCG tablet 150 mcg Daily , R-3Historical Med      sertraline (ZOLOFT) 100 MG tablet Take 150 mg by mouth daily Historical Med      SUMAtriptan (IMITREX) 100 MG tablet TAKE 1 TABLET AT ONSET OFHEADACHE,MAY REPEAT 2 HOURS LATER. MAX 2/DAY, Disp-9 tablet, R-3      Ergocalciferol (VITAMIN D2 PO)   Take 1.25 mg by mouth daily       hydrocortisone (CORTEF) 10 MG tablet Take 10 mg by mouth 2 times daily. ALLERGIES     is allergic to bactrim [sulfamethoxazole-trimethoprim]; bee venom; ciprofloxacin; and milk-related compounds.     FAMILY HISTORY     She indicated that her mother is . She indicated that her father is alive. She indicated that her sister is alive. She indicated that the status of her maternal grandmother is unknown. She indicated that the status of her paternal grandmother is unknown. She indicated that her paternal uncle is . family history includes Cancer in her paternal uncle; Cancer (age of onset: 50) in her mother; Cancer (age of onset: 70) in her paternal grandmother; Diabetes in her father; High Blood Pressure in her father and maternal grandmother; High Cholesterol in her father; Migraines in her mother and sister. SOCIAL HISTORY      reports that she has never smoked. She has never used smokeless tobacco. She reports that she does not drink alcohol or use drugs. PHYSICAL EXAM       ED Triage Vitals [07/15/20 1659]   BP Temp Temp Source Pulse Resp SpO2 Height Weight   138/85 98.6 °F (37 °C) Oral 75 24 95 % 5' 1\" (1.549 m) 264 lb (119.7 kg)       Constitutional: Alert, oriented x3, nontoxic, answering questions appropriately, acting properly for age, in no acute distress patient rapidly breathes during conversation and then relaxes  HEENT: Extraocular muscles intact  Neck: Trachea midline   Cardiovascular: Regular rhythm and rate no S3, S4, or murmurs   Respiratory: Diminished bilaterally with tachypnea no retractions no hypoxia. No rhonchi or rales. No wheezing. Gastrointestinal: Soft, nontender, nondistended, positive bowel sounds. No rebound, rigidity, or guarding. Musculoskeletal: No extremity pain or swelling   Neurologic: Moving all 4 extremities without difficulty there are no gross focal neurologic deficits   Skin: Warm and dry       DIFFERENTIAL DIAGNOSIS/ MDM:     IV labs. EKG. Chest x-ray.       HEART Risk Score:   0 = History   Highly Suspicious   2    Moderately Suspicious   1    Slight Suspicious  0  0 = EKG  Significant ST Depression 2    Nonspecific   1    Normal    0  0 = Age > or = 65   2    > 45 to < 65   1    < or = 45   0  0 = Risk Factors > or = 3   2    1 or 2    1    0    0  0 = Troponin > or = 3 times normal limit 2    > 1 and < 3 times limit  1    Normal    0  0 = Score  0 - 3    Low Risk     4 - 6    Moderate risk     7 - 10    High Risk  * Risk Factors include: Diabetes, Tobacco use, Hypertension, Hyperlipidemia, Family History of CAD and Obesity    PERC Criteria     n = Age      > or = 50  n = Heart Rate     > or = 100  n = Pulse Oximetry     < or = 95%  n = Previous History of DVT   Yes / No  n = Trauma or Surgery within 4 Weeks Yes / No  n = Hemoptysis    Yes / No  n = Exogenous Estrogen use  Yes / No  n = Unilateral Leg Swelling   Yes / No    Risk Criteria for Thoracic Aortic Dissection:     n = Sudden Onset, Maximal at Onset, with radiation to back  n = Neurologic Deficits with Chest Pain  n = Connective Tissue Disorder such as Marfan's or Dianna-Danlos  n = History of Aortic Valvular Disease  n = Pregnancy  n = Family History of Dissection  Elevated D-Dimer with any of the above        DIAGNOSTIC RESULTS     EKG: All EKG's are interpreted by the Emergency Department Physician who either signs or Co-signs this chart in the absence of a cardiologist.    1717 sinus rhythm rate 70  QRS 96  no acute ST or T wave changes.     Not indicated unless otherwise documented above    LABS:  Results for orders placed or performed during the hospital encounter of 07/15/20   CBC Auto Differential   Result Value Ref Range    WBC 7.0 3.5 - 11.0 k/uL    RBC 4.71 4.0 - 5.2 m/uL    Hemoglobin 13.1 12.0 - 16.0 g/dL    Hematocrit 40.2 36 - 46 %    MCV 85.3 80 - 100 fL    MCH 27.9 26 - 34 pg    MCHC 32.7 31 - 37 g/dL    RDW 13.7 12.5 - 15.4 %    Platelets 842 998 - 425 k/uL    MPV 8.4 6.0 - 12.0 fL    NRBC Automated NOT REPORTED per 100 WBC    Differential Type NOT REPORTED     Seg Neutrophils 63 36 - 66 %    Lymphocytes 28 24 - 44 %    Monocytes 6 2 - 11 %    Eosinophils % 2 1 - 4 %    Basophils 1 0 - 2 %    Immature Wt 264 lb (119.7 kg)   SpO2 95%   BMI 49.88 kg/m²     6:15 PM feeling much better. Labs are unremarkable. .  Chest x-ray no infiltrate. Normal troponin. Low suspicion for cardiac etiology does not require second troponin. Will discharge. Suspect anxiety reaction. Onset of shortness of breath with history of anxiety. Will discharge. Return if worsening symptoms or any other concerns. I have reviewed the disposition diagnosis with the patient and or their family/guardian. I have answered their questions and given discharge instructions. They voiced understanding of these instructions and did not have any furtherquestions or complaints. CRITICAL CARE:    None    CONSULTS:    None    PROCEDURES:    None      OARRS Report if indicated             FINAL IMPRESSION      1.  Dyspnea, unspecified type          DISPOSITION/PLAN   DISPOSITION Decision To Discharge 07/15/2020 06:17:56 PM        CONDITION ON DISPOSITION: STABLE       PATIENT REFERRED TO:  Liv Mansfield DO  [address] [place] [address]  [phone]    Schedule an appointment as soon as possible for a visit in 1 day        DISCHARGE MEDICATIONS:  Discharge Medication List as of 7/15/2020  6:18 PM          (Please note that portions of thisnote were completed with a voice recognition program.  Efforts were made to edit the dictations but occasionally words are mis-transcribed.)    Platt DO  Attending Emergency Physician       Spenser Hunt DO  07/17/20 0800 intermittent

## 2023-04-21 ENCOUNTER — CLINICAL DOCUMENTATION ONLY (OUTPATIENT)
Facility: CLINIC | Age: 48
End: 2023-04-21

## 2023-05-08 ENCOUNTER — CLINICAL DOCUMENTATION ONLY (OUTPATIENT)
Facility: CLINIC | Age: 48
End: 2023-05-08

## 2023-05-13 ENCOUNTER — HOSPITAL ENCOUNTER (OUTPATIENT)
Dept: MEDSURG UNIT | Age: 48
Discharge: HOME | End: 2023-05-13
Attending: INTERNAL MEDICINE | Admitting: INTERNAL MEDICINE

## 2023-05-14 LAB — TSH SERPL-ACNC: 0.01 UIU/ML (ref 0.3–5)

## 2023-05-14 PROCEDURE — 84443 ASSAY THYROID STIM HORMONE: CPT

## 2023-05-15 LAB — VANCOMYCIN TROUGH SERPL-MCNC: 17.2 UG/ML (ref 10–20)

## (undated) DEVICE — GLOVE SURG SZ 65 THK91MIL LTX FREE SYN POLYISOPRENE

## (undated) DEVICE — KIT,ANTI FOG,W/SPONGE & FLUID,SOFT PACK: Brand: MEDLINE

## (undated) DEVICE — MEDICINE CUP, GRADUATED, STER: Brand: MEDLINE

## (undated) DEVICE — BLADE 1882925HRE SKIMMER 18CM 2.9MM M4: Brand: SKIMMER

## (undated) DEVICE — ADAPTER TBNG LUER STUB 15 GA INTMED

## (undated) DEVICE — GAUZE,SPONGE,4"X4",16PLY,STRL,LF,10/TRAY: Brand: MEDLINE

## (undated) DEVICE — Device

## (undated) DEVICE — BLADE 1882923HRE SKIMMER 22CM 2.9MM M4: Brand: SKIMMER

## (undated) DEVICE — YANKAUER,FLEXIBLE HANDLE,REGLR CAPACITY: Brand: MEDLINE INDUSTRIES, INC.

## (undated) DEVICE — GOWN,AURORA,NONREINFORCED,LARGE: Brand: MEDLINE

## (undated) DEVICE — PROCISE MLW WAND: Brand: COBLATION

## (undated) DEVICE — TUBING, SUCTION, 1/4" X 12', STRAIGHT: Brand: MEDLINE

## (undated) DEVICE — BLOCK BITE 60FR RUBBER ADLT DENTAL

## (undated) DEVICE — BLADE 1884030 RAD TRICUT 3PK 4MM 22.5CM: Brand: TRICUT®

## (undated) DEVICE — EVAC 70 XTRA HP WAND: Brand: COBLATION

## (undated) DEVICE — Device: Brand: DEFENDO VALVE AND CONNECTOR KIT

## (undated) DEVICE — MARKER,SKIN,WI/RULER AND LABELS: Brand: MEDLINE

## (undated) DEVICE — DRAPE,REIN 53X77,STERILE: Brand: MEDLINE

## (undated) DEVICE — BASIN EMSIS 700ML GRAPHITE PLAS KID SHP GRAD

## (undated) DEVICE — BLADE 1884024 SKIMMER 3PK 4MM 27.5CM: Brand: SKIMMER®

## (undated) DEVICE — GAUZE,SPONGE,4"X4",16PLY,XRAY,STRL,LF: Brand: MEDLINE

## (undated) DEVICE — CONTAINER,SPECIMEN,4OZ,OR STRL: Brand: MEDLINE

## (undated) DEVICE — GARMENT,MEDLINE,DVT,INT,CALF,MED, GEN2: Brand: MEDLINE

## (undated) DEVICE — SYRINGE MED 50ML LUERLOCK TIP

## (undated) DEVICE — TOWEL,OR,DSP,ST,NATURAL,DLX,4/PK,20PK/CS: Brand: MEDLINE

## (undated) DEVICE — FORCEP BX MESH TOOTH MIC 2.8 MMX240 CM NDL STRL RADIAL JAW 4

## (undated) DEVICE — JELLY,LUBE,STERILE,FLIP TOP,TUBE,2-OZ: Brand: MEDLINE

## (undated) DEVICE — TUBING, SUCTION, 9/32" X 20', STRAIGHT: Brand: MEDLINE INDUSTRIES, INC.

## (undated) DEVICE — CO2 CANNULA,SUPERSOFT, ADLT,7'O2,7'CO2: Brand: MEDLINE